# Patient Record
Sex: MALE | Race: BLACK OR AFRICAN AMERICAN | NOT HISPANIC OR LATINO | Employment: OTHER | URBAN - METROPOLITAN AREA
[De-identification: names, ages, dates, MRNs, and addresses within clinical notes are randomized per-mention and may not be internally consistent; named-entity substitution may affect disease eponyms.]

---

## 2013-10-01 LAB — CRC RECOMMENDATION EXT: NORMAL

## 2017-04-17 ENCOUNTER — HISTORICAL (OUTPATIENT)
Dept: ADMINISTRATIVE | Facility: HOSPITAL | Age: 67
End: 2017-04-17

## 2017-05-19 ENCOUNTER — HISTORICAL (OUTPATIENT)
Dept: CARDIOLOGY | Facility: HOSPITAL | Age: 67
End: 2017-05-19

## 2017-05-22 ENCOUNTER — HISTORICAL (OUTPATIENT)
Dept: ADMINISTRATIVE | Facility: HOSPITAL | Age: 67
End: 2017-05-22

## 2017-06-05 ENCOUNTER — HISTORICAL (OUTPATIENT)
Dept: ADMINISTRATIVE | Facility: HOSPITAL | Age: 67
End: 2017-06-05

## 2017-07-17 ENCOUNTER — HISTORICAL (OUTPATIENT)
Dept: ADMINISTRATIVE | Facility: HOSPITAL | Age: 67
End: 2017-07-17

## 2017-07-20 ENCOUNTER — HISTORICAL (OUTPATIENT)
Dept: MEDSURG UNIT | Facility: HOSPITAL | Age: 67
End: 2017-07-20

## 2017-07-21 LAB
ABS NEUT (OLG): 2.61 X10(3)/MCL (ref 2.1–9.2)
BASOPHILS # BLD AUTO: 0.1 X10(3)/MCL (ref 0–0.2)
BASOPHILS NFR BLD AUTO: 1 %
BUN SERPL-MCNC: 9 MG/DL (ref 7–18)
CALCIUM SERPL-MCNC: 8.3 MG/DL (ref 8.5–10.1)
CHLORIDE SERPL-SCNC: 108 MMOL/L (ref 98–107)
CO2 SERPL-SCNC: 29 MMOL/L (ref 21–32)
CREAT SERPL-MCNC: 1.02 MG/DL (ref 0.7–1.3)
EOSINOPHIL # BLD AUTO: 0.3 X10(3)/MCL (ref 0–0.9)
EOSINOPHIL NFR BLD AUTO: 5 %
ERYTHROCYTE [DISTWIDTH] IN BLOOD BY AUTOMATED COUNT: 14.6 % (ref 11.5–17)
GLUCOSE SERPL-MCNC: 129 MG/DL (ref 74–106)
HCT VFR BLD AUTO: 37.5 % (ref 42–52)
HGB BLD-MCNC: 12 GM/DL (ref 14–18)
LYMPHOCYTES # BLD AUTO: 2.2 X10(3)/MCL (ref 0.6–4.6)
LYMPHOCYTES NFR BLD AUTO: 36 %
MCH RBC QN AUTO: 29.1 PG (ref 27–31)
MCHC RBC AUTO-ENTMCNC: 32 GM/DL (ref 33–36)
MCV RBC AUTO: 90.8 FL (ref 80–94)
MONOCYTES # BLD AUTO: 0.8 X10(3)/MCL (ref 0.1–1.3)
MONOCYTES NFR BLD AUTO: 14 %
NEUTROPHILS # BLD AUTO: 2.61 X10(3)/MCL (ref 2.1–9.2)
NEUTROPHILS NFR BLD AUTO: 43 %
PLATELET # BLD AUTO: 315 X10(3)/MCL (ref 130–400)
PMV BLD AUTO: 9.2 FL (ref 9.4–12.4)
POTASSIUM SERPL-SCNC: 4 MMOL/L (ref 3.5–5.1)
RBC # BLD AUTO: 4.13 X10(6)/MCL (ref 4.7–6.1)
SODIUM SERPL-SCNC: 148 MMOL/L (ref 136–145)
WBC # SPEC AUTO: 6.1 X10(3)/MCL (ref 4.5–11.5)

## 2017-09-15 ENCOUNTER — HISTORICAL (OUTPATIENT)
Dept: ADMINISTRATIVE | Facility: HOSPITAL | Age: 67
End: 2017-09-15

## 2017-09-15 LAB
ABS NEUT (OLG): 2.87 X10(3)/MCL (ref 2.1–9.2)
ALBUMIN SERPL-MCNC: 3.4 GM/DL (ref 3.4–5)
ALBUMIN/GLOB SERPL: 1 {RATIO}
ALP SERPL-CCNC: 72 UNIT/L (ref 50–136)
ALT SERPL-CCNC: 16 UNIT/L (ref 12–78)
APPEARANCE, UA: CLEAR
AST SERPL-CCNC: 12 UNIT/L (ref 15–37)
BACTERIA SPEC CULT: ABNORMAL /HPF
BASOPHILS # BLD AUTO: 0.1 X10(3)/MCL (ref 0–0.2)
BASOPHILS NFR BLD AUTO: 2 %
BILIRUB SERPL-MCNC: 0.4 MG/DL (ref 0.2–1)
BILIRUB UR QL STRIP: NEGATIVE
BILIRUBIN DIRECT+TOT PNL SERPL-MCNC: 0.1 MG/DL (ref 0–0.2)
BILIRUBIN DIRECT+TOT PNL SERPL-MCNC: 0.3 MG/DL (ref 0–0.8)
BUN SERPL-MCNC: 14 MG/DL (ref 7–18)
CALCIUM SERPL-MCNC: 9.1 MG/DL (ref 8.5–10.1)
CHLORIDE SERPL-SCNC: 103 MMOL/L (ref 98–107)
CHOLEST SERPL-MCNC: 158 MG/DL (ref 0–200)
CHOLEST/HDLC SERPL: 3 {RATIO} (ref 0–5)
CO2 SERPL-SCNC: 32 MMOL/L (ref 21–32)
COLOR UR: YELLOW
CREAT SERPL-MCNC: 1.2 MG/DL (ref 0.7–1.3)
CREAT UR-MCNC: 249 MG/DL
EOSINOPHIL # BLD AUTO: 0.3 X10(3)/MCL (ref 0–0.9)
EOSINOPHIL NFR BLD AUTO: 6 %
ERYTHROCYTE [DISTWIDTH] IN BLOOD BY AUTOMATED COUNT: 13.1 % (ref 11.5–17)
EST. AVERAGE GLUCOSE BLD GHB EST-MCNC: 166 MG/DL
GLOBULIN SER-MCNC: 3.5 GM/DL (ref 2.4–3.5)
GLUCOSE (UA): ABNORMAL
GLUCOSE SERPL-MCNC: 153 MG/DL (ref 74–106)
HBA1C MFR BLD: 7.4 % (ref 4.2–6.3)
HCT VFR BLD AUTO: 40.5 % (ref 42–52)
HDLC SERPL-MCNC: 53 MG/DL (ref 35–60)
HGB BLD-MCNC: 13.5 GM/DL (ref 14–18)
HGB UR QL STRIP: NEGATIVE
KETONES UR QL STRIP: NEGATIVE
LDLC SERPL CALC-MCNC: 56 MG/DL (ref 0–129)
LEUKOCYTE ESTERASE UR QL STRIP: ABNORMAL
LYMPHOCYTES # BLD AUTO: 2 X10(3)/MCL (ref 0.6–4.6)
LYMPHOCYTES NFR BLD AUTO: 33 %
MCH RBC QN AUTO: 30.4 PG (ref 27–31)
MCHC RBC AUTO-ENTMCNC: 33.3 GM/DL (ref 33–36)
MCV RBC AUTO: 91.2 FL (ref 80–94)
MICROALBUMIN UR-MCNC: 9.1 MG/DL
MICROALBUMIN/CREAT RATIO PNL UR: 36.5 MG/GM CR (ref 0–30)
MONOCYTES # BLD AUTO: 0.7 X10(3)/MCL (ref 0.1–1.3)
MONOCYTES NFR BLD AUTO: 12 %
NEUTROPHILS # BLD AUTO: 2.87 X10(3)/MCL (ref 1.4–7.9)
NEUTROPHILS NFR BLD AUTO: 47 %
NITRITE UR QL STRIP: NEGATIVE
PH UR STRIP: 5.5 [PH] (ref 5–9)
PLATELET # BLD AUTO: 278 X10(3)/MCL (ref 130–400)
PMV BLD AUTO: 9 FL (ref 9.4–12.4)
POTASSIUM SERPL-SCNC: 4.3 MMOL/L (ref 3.5–5.1)
PROT SERPL-MCNC: 6.9 GM/DL (ref 6.4–8.2)
PROT UR QL STRIP: ABNORMAL
RBC # BLD AUTO: 4.44 X10(6)/MCL (ref 4.7–6.1)
RBC #/AREA URNS HPF: ABNORMAL /[HPF]
SODIUM SERPL-SCNC: 141 MMOL/L (ref 136–145)
SP GR UR STRIP: 1.02 (ref 1–1.03)
SQUAMOUS EPITHELIAL, UA: ABNORMAL
TRIGL SERPL-MCNC: 244 MG/DL (ref 30–150)
UROBILINOGEN UR STRIP-ACNC: 0.2
VLDLC SERPL CALC-MCNC: 49 MG/DL
WBC # SPEC AUTO: 6.1 X10(3)/MCL (ref 4.5–11.5)
WBC #/AREA URNS HPF: 19 /HPF (ref 0–3)

## 2018-01-19 ENCOUNTER — HISTORICAL (OUTPATIENT)
Dept: ADMINISTRATIVE | Facility: HOSPITAL | Age: 68
End: 2018-01-19

## 2018-01-19 LAB
ABS NEUT (OLG): 3.26 X10(3)/MCL (ref 2.1–9.2)
ALBUMIN SERPL-MCNC: 3.6 GM/DL (ref 3.4–5)
ALBUMIN/GLOB SERPL: 0.9 {RATIO}
ALP SERPL-CCNC: 75 UNIT/L (ref 50–136)
ALT SERPL-CCNC: 20 UNIT/L (ref 12–78)
APPEARANCE, UA: ABNORMAL
AST SERPL-CCNC: 12 UNIT/L (ref 15–37)
BACTERIA SPEC CULT: ABNORMAL /HPF
BASOPHILS # BLD AUTO: 0.1 X10(3)/MCL (ref 0–0.2)
BASOPHILS NFR BLD AUTO: 1 %
BILIRUB SERPL-MCNC: 0.5 MG/DL (ref 0.2–1)
BILIRUB UR QL STRIP: NEGATIVE
BILIRUBIN DIRECT+TOT PNL SERPL-MCNC: 0.1 MG/DL (ref 0–0.2)
BILIRUBIN DIRECT+TOT PNL SERPL-MCNC: 0.4 MG/DL (ref 0–0.8)
BUN SERPL-MCNC: 10 MG/DL (ref 7–18)
CALCIUM SERPL-MCNC: 8.8 MG/DL (ref 8.5–10.1)
CHLORIDE SERPL-SCNC: 102 MMOL/L (ref 98–107)
CHOLEST SERPL-MCNC: 179 MG/DL (ref 0–200)
CHOLEST/HDLC SERPL: 3.4 {RATIO} (ref 0–5)
CO2 SERPL-SCNC: 29 MMOL/L (ref 21–32)
COLOR UR: YELLOW
CREAT SERPL-MCNC: 1.07 MG/DL (ref 0.7–1.3)
CREAT UR-MCNC: 175 MG/DL
EOSINOPHIL # BLD AUTO: 0.3 X10(3)/MCL (ref 0–0.9)
EOSINOPHIL NFR BLD AUTO: 4 %
ERYTHROCYTE [DISTWIDTH] IN BLOOD BY AUTOMATED COUNT: 12.7 % (ref 11.5–17)
EST. AVERAGE GLUCOSE BLD GHB EST-MCNC: 171 MG/DL
GLOBULIN SER-MCNC: 4.1 GM/DL (ref 2.4–3.5)
GLUCOSE (UA): ABNORMAL
GLUCOSE SERPL-MCNC: 152 MG/DL (ref 74–106)
HBA1C MFR BLD: 7.6 % (ref 4.2–6.3)
HCT VFR BLD AUTO: 42.8 % (ref 42–52)
HDLC SERPL-MCNC: 52 MG/DL (ref 35–60)
HGB BLD-MCNC: 13.8 GM/DL (ref 14–18)
HGB UR QL STRIP: NEGATIVE
KETONES UR QL STRIP: NEGATIVE
LDLC SERPL CALC-MCNC: 81 MG/DL (ref 0–129)
LEUKOCYTE ESTERASE UR QL STRIP: ABNORMAL
LYMPHOCYTES # BLD AUTO: 1.9 X10(3)/MCL (ref 0.6–4.6)
LYMPHOCYTES NFR BLD AUTO: 30 %
MCH RBC QN AUTO: 29.6 PG (ref 27–31)
MCHC RBC AUTO-ENTMCNC: 32.2 GM/DL (ref 33–36)
MCV RBC AUTO: 91.6 FL (ref 80–94)
MICROALBUMIN UR-MCNC: 6.6 MG/DL
MICROALBUMIN/CREAT RATIO PNL UR: 37.7 MG/GM CR (ref 0–30)
MONOCYTES # BLD AUTO: 0.7 X10(3)/MCL (ref 0.1–1.3)
MONOCYTES NFR BLD AUTO: 11 %
NEUTROPHILS # BLD AUTO: 3.26 X10(3)/MCL (ref 1.4–7.9)
NEUTROPHILS NFR BLD AUTO: 52 %
NITRITE UR QL STRIP: POSITIVE
PH UR STRIP: 7 [PH] (ref 5–9)
PLATELET # BLD AUTO: 293 X10(3)/MCL (ref 130–400)
PMV BLD AUTO: 8.6 FL (ref 9.4–12.4)
POTASSIUM SERPL-SCNC: 4.3 MMOL/L (ref 3.5–5.1)
PROT SERPL-MCNC: 7.7 GM/DL (ref 6.4–8.2)
PROT UR QL STRIP: ABNORMAL
RBC # BLD AUTO: 4.67 X10(6)/MCL (ref 4.7–6.1)
RBC #/AREA URNS HPF: ABNORMAL /[HPF]
SODIUM SERPL-SCNC: 140 MMOL/L (ref 136–145)
SP GR UR STRIP: 1.02 (ref 1–1.03)
SQUAMOUS EPITHELIAL, UA: ABNORMAL
TRIGL SERPL-MCNC: 228 MG/DL (ref 30–150)
UA WBC MAN: ABNORMAL /HPF
UROBILINOGEN UR STRIP-ACNC: 0.2
VLDLC SERPL CALC-MCNC: 46 MG/DL
WBC # SPEC AUTO: 6.2 X10(3)/MCL (ref 4.5–11.5)
WBC #/AREA URNS HPF: 174 /HPF (ref 0–3)

## 2018-07-23 ENCOUNTER — HISTORICAL (OUTPATIENT)
Dept: ADMINISTRATIVE | Facility: HOSPITAL | Age: 68
End: 2018-07-23

## 2018-07-23 LAB
ABS NEUT (OLG): 3.44 X10(3)/MCL (ref 2.1–9.2)
ALBUMIN SERPL-MCNC: 3.3 GM/DL (ref 3.4–5)
ALBUMIN/GLOB SERPL: 0.8 RATIO (ref 1.1–2)
ALP SERPL-CCNC: 76 UNIT/L (ref 50–136)
ALT SERPL-CCNC: 24 UNIT/L (ref 12–78)
APPEARANCE, UA: CLEAR
AST SERPL-CCNC: 17 UNIT/L (ref 15–37)
BACTERIA SPEC CULT: ABNORMAL /HPF
BASOPHILS # BLD AUTO: 0.1 X10(3)/MCL (ref 0–0.2)
BASOPHILS NFR BLD AUTO: 1 %
BILIRUB SERPL-MCNC: 0.6 MG/DL (ref 0.2–1)
BILIRUB UR QL STRIP: NEGATIVE
BILIRUBIN DIRECT+TOT PNL SERPL-MCNC: 0.1 MG/DL (ref 0–0.5)
BILIRUBIN DIRECT+TOT PNL SERPL-MCNC: 0.5 MG/DL (ref 0–0.8)
BUN SERPL-MCNC: 10 MG/DL (ref 7–18)
CALCIUM SERPL-MCNC: 9.1 MG/DL (ref 8.5–10.1)
CHLORIDE SERPL-SCNC: 104 MMOL/L (ref 98–107)
CHOLEST SERPL-MCNC: 176 MG/DL (ref 0–200)
CHOLEST/HDLC SERPL: 3.5 {RATIO} (ref 0–5)
CO2 SERPL-SCNC: 33 MMOL/L (ref 21–32)
COLOR UR: YELLOW
CREAT SERPL-MCNC: 1.12 MG/DL (ref 0.7–1.3)
CREAT UR-MCNC: 264 MG/DL
EOSINOPHIL # BLD AUTO: 0.4 X10(3)/MCL (ref 0–0.9)
EOSINOPHIL NFR BLD AUTO: 5 %
ERYTHROCYTE [DISTWIDTH] IN BLOOD BY AUTOMATED COUNT: 13.2 % (ref 11.5–17)
EST. AVERAGE GLUCOSE BLD GHB EST-MCNC: 171 MG/DL
GLOBULIN SER-MCNC: 4.1 GM/DL (ref 2.4–3.5)
GLUCOSE (UA): ABNORMAL
GLUCOSE SERPL-MCNC: 168 MG/DL (ref 74–106)
HBA1C MFR BLD: 7.6 % (ref 4.2–6.3)
HCT VFR BLD AUTO: 43.7 % (ref 42–52)
HDLC SERPL-MCNC: 50 MG/DL (ref 35–60)
HGB BLD-MCNC: 13.6 GM/DL (ref 14–18)
HGB UR QL STRIP: NEGATIVE
KETONES UR QL STRIP: ABNORMAL
LDLC SERPL CALC-MCNC: 68 MG/DL (ref 0–129)
LEUKOCYTE ESTERASE UR QL STRIP: ABNORMAL
LYMPHOCYTES # BLD AUTO: 2.2 X10(3)/MCL (ref 0.6–4.6)
LYMPHOCYTES NFR BLD AUTO: 32 %
MCH RBC QN AUTO: 29.2 PG (ref 27–31)
MCHC RBC AUTO-ENTMCNC: 31.1 GM/DL (ref 33–36)
MCV RBC AUTO: 93.8 FL (ref 80–94)
MICROALBUMIN UR-MCNC: 7.3 MG/DL
MICROALBUMIN/CREAT RATIO PNL UR: 27.7 MG/GM CR (ref 0–30)
MONOCYTES # BLD AUTO: 0.8 X10(3)/MCL (ref 0.1–1.3)
MONOCYTES NFR BLD AUTO: 11 %
NEUTROPHILS # BLD AUTO: 3.44 X10(3)/MCL (ref 2.1–9.2)
NEUTROPHILS NFR BLD AUTO: 50 %
NITRITE UR QL STRIP: POSITIVE
PH UR STRIP: 7 [PH] (ref 5–9)
PLATELET # BLD AUTO: 333 X10(3)/MCL (ref 130–400)
PMV BLD AUTO: 8.9 FL (ref 9.4–12.4)
POTASSIUM SERPL-SCNC: 4.4 MMOL/L (ref 3.5–5.1)
PROT SERPL-MCNC: 7.4 GM/DL (ref 6.4–8.2)
PROT UR QL STRIP: ABNORMAL
RBC # BLD AUTO: 4.66 X10(6)/MCL (ref 4.7–6.1)
RBC #/AREA URNS HPF: ABNORMAL /[HPF]
SODIUM SERPL-SCNC: 142 MMOL/L (ref 136–145)
SP GR UR STRIP: 1.02 (ref 1–1.03)
SQUAMOUS EPITHELIAL, UA: ABNORMAL
TRIGL SERPL-MCNC: 292 MG/DL (ref 30–150)
UROBILINOGEN UR STRIP-ACNC: 1
VLDLC SERPL CALC-MCNC: 58 MG/DL
WBC # SPEC AUTO: 6.9 X10(3)/MCL (ref 4.5–11.5)
WBC #/AREA URNS HPF: 31 /HPF (ref 0–3)

## 2019-02-01 ENCOUNTER — HISTORICAL (OUTPATIENT)
Dept: ADMINISTRATIVE | Facility: HOSPITAL | Age: 69
End: 2019-02-01

## 2019-02-01 LAB
ABS NEUT (OLG): 2.56 X10(3)/MCL (ref 2.1–9.2)
ALBUMIN SERPL-MCNC: 3.5 GM/DL (ref 3.4–5)
ALBUMIN/GLOB SERPL: 0.9 {RATIO}
ALP SERPL-CCNC: 64 UNIT/L (ref 50–136)
ALT SERPL-CCNC: 25 UNIT/L (ref 12–78)
APPEARANCE, UA: CLEAR
AST SERPL-CCNC: 15 UNIT/L (ref 15–37)
BACTERIA SPEC CULT: ABNORMAL /HPF
BASOPHILS # BLD AUTO: 0.1 X10(3)/MCL (ref 0–0.2)
BASOPHILS NFR BLD AUTO: 1 %
BILIRUB SERPL-MCNC: 0.5 MG/DL (ref 0.2–1)
BILIRUB UR QL STRIP: NEGATIVE
BILIRUBIN DIRECT+TOT PNL SERPL-MCNC: 0.1 MG/DL (ref 0–0.2)
BILIRUBIN DIRECT+TOT PNL SERPL-MCNC: 0.4 MG/DL (ref 0–0.8)
BUN SERPL-MCNC: 12 MG/DL (ref 7–18)
CALCIUM SERPL-MCNC: 9.3 MG/DL (ref 8.5–10.1)
CHLORIDE SERPL-SCNC: 105 MMOL/L (ref 98–107)
CHOLEST SERPL-MCNC: 158 MG/DL (ref 0–200)
CHOLEST/HDLC SERPL: 3 {RATIO} (ref 0–5)
CO2 SERPL-SCNC: 32 MMOL/L (ref 21–32)
COLOR UR: YELLOW
CREAT SERPL-MCNC: 1.1 MG/DL (ref 0.7–1.3)
CREAT UR-MCNC: 245 MG/DL
EOSINOPHIL # BLD AUTO: 0.2 X10(3)/MCL (ref 0–0.9)
EOSINOPHIL NFR BLD AUTO: 4 %
ERYTHROCYTE [DISTWIDTH] IN BLOOD BY AUTOMATED COUNT: 13.5 % (ref 11.5–17)
EST. AVERAGE GLUCOSE BLD GHB EST-MCNC: 183 MG/DL
GLOBULIN SER-MCNC: 3.7 GM/DL (ref 2.4–3.5)
GLUCOSE (UA): ABNORMAL
GLUCOSE SERPL-MCNC: 131 MG/DL (ref 74–106)
HBA1C MFR BLD: 8 % (ref 4.2–6.3)
HCT VFR BLD AUTO: 44.3 % (ref 42–52)
HDLC SERPL-MCNC: 53 MG/DL (ref 35–60)
HGB BLD-MCNC: 13.9 GM/DL (ref 14–18)
HGB UR QL STRIP: NEGATIVE
KETONES UR QL STRIP: NEGATIVE
LDLC SERPL CALC-MCNC: 64 MG/DL (ref 0–129)
LEUKOCYTE ESTERASE UR QL STRIP: ABNORMAL
LYMPHOCYTES # BLD AUTO: 2.2 X10(3)/MCL (ref 0.6–4.6)
LYMPHOCYTES NFR BLD AUTO: 38 %
MCH RBC QN AUTO: 29.4 PG (ref 27–31)
MCHC RBC AUTO-ENTMCNC: 31.4 GM/DL (ref 33–36)
MCV RBC AUTO: 93.7 FL (ref 80–94)
MICROALBUMIN UR-MCNC: 5 MG/DL
MICROALBUMIN/CREAT RATIO PNL UR: 20.4 MG/GM CR (ref 0–30)
MONOCYTES # BLD AUTO: 0.6 X10(3)/MCL (ref 0.1–1.3)
MONOCYTES NFR BLD AUTO: 11 %
NEUTROPHILS # BLD AUTO: 2.56 X10(3)/MCL (ref 2.1–9.2)
NEUTROPHILS NFR BLD AUTO: 45 %
NITRITE UR QL STRIP: NEGATIVE
PH UR STRIP: 7 [PH] (ref 5–9)
PLATELET # BLD AUTO: 339 X10(3)/MCL (ref 130–400)
PMV BLD AUTO: 8.7 FL (ref 9.4–12.4)
POTASSIUM SERPL-SCNC: 4.8 MMOL/L (ref 3.5–5.1)
PROT SERPL-MCNC: 7.2 GM/DL (ref 6.4–8.2)
PROT UR QL STRIP: ABNORMAL
RBC # BLD AUTO: 4.73 X10(6)/MCL (ref 4.7–6.1)
RBC #/AREA URNS HPF: ABNORMAL /[HPF]
SODIUM SERPL-SCNC: 143 MMOL/L (ref 136–145)
SP GR UR STRIP: 1.02 (ref 1–1.03)
SQUAMOUS EPITHELIAL, UA: ABNORMAL
TRIGL SERPL-MCNC: 206 MG/DL (ref 30–150)
UROBILINOGEN UR STRIP-ACNC: 1
VLDLC SERPL CALC-MCNC: 41 MG/DL
WBC # SPEC AUTO: 5.8 X10(3)/MCL (ref 4.5–11.5)
WBC #/AREA URNS HPF: 5 /HPF (ref 0–3)

## 2019-08-09 ENCOUNTER — HISTORICAL (OUTPATIENT)
Dept: ADMINISTRATIVE | Facility: HOSPITAL | Age: 69
End: 2019-08-09

## 2019-08-09 LAB
ABS NEUT (OLG): 3.16 X10(3)/MCL (ref 2.1–9.2)
ALBUMIN SERPL-MCNC: 3.3 GM/DL (ref 3.4–5)
ALBUMIN/GLOB SERPL: 0.9 {RATIO}
ALP SERPL-CCNC: 65 UNIT/L (ref 50–136)
ALT SERPL-CCNC: 30 UNIT/L (ref 12–78)
APPEARANCE, UA: CLEAR
AST SERPL-CCNC: 20 UNIT/L (ref 15–37)
BACTERIA SPEC CULT: ABNORMAL /HPF
BASOPHILS # BLD AUTO: 0.1 X10(3)/MCL (ref 0–0.2)
BASOPHILS NFR BLD AUTO: 1 %
BILIRUB SERPL-MCNC: 0.5 MG/DL (ref 0.2–1)
BILIRUB UR QL STRIP: NEGATIVE
BILIRUBIN DIRECT+TOT PNL SERPL-MCNC: 0.2 MG/DL (ref 0–0.2)
BILIRUBIN DIRECT+TOT PNL SERPL-MCNC: 0.3 MG/DL (ref 0–0.8)
BUN SERPL-MCNC: 14 MG/DL (ref 7–18)
CALCIUM SERPL-MCNC: 9.1 MG/DL (ref 8.5–10.1)
CHLORIDE SERPL-SCNC: 105 MMOL/L (ref 98–107)
CHOLEST SERPL-MCNC: 153 MG/DL (ref 0–200)
CHOLEST/HDLC SERPL: 3.1 {RATIO} (ref 0–5)
CO2 SERPL-SCNC: 29 MMOL/L (ref 21–32)
COLOR UR: YELLOW
CREAT SERPL-MCNC: 1.08 MG/DL (ref 0.7–1.3)
EOSINOPHIL # BLD AUTO: 0.5 X10(3)/MCL (ref 0–0.9)
EOSINOPHIL NFR BLD AUTO: 7 %
ERYTHROCYTE [DISTWIDTH] IN BLOOD BY AUTOMATED COUNT: 13.4 % (ref 11.5–17)
GLOBULIN SER-MCNC: 3.6 GM/DL (ref 2.4–3.5)
GLUCOSE (UA): NEGATIVE
GLUCOSE SERPL-MCNC: 114 MG/DL (ref 74–106)
HCT VFR BLD AUTO: 42.6 % (ref 42–52)
HDLC SERPL-MCNC: 50 MG/DL (ref 35–60)
HGB BLD-MCNC: 13.4 GM/DL (ref 14–18)
HGB UR QL STRIP: NEGATIVE
KETONES UR QL STRIP: NEGATIVE
LDLC SERPL CALC-MCNC: 67 MG/DL (ref 0–129)
LEUKOCYTE ESTERASE UR QL STRIP: ABNORMAL
LYMPHOCYTES # BLD AUTO: 2 X10(3)/MCL (ref 0.6–4.6)
LYMPHOCYTES NFR BLD AUTO: 32 %
MCH RBC QN AUTO: 29.1 PG (ref 27–31)
MCHC RBC AUTO-ENTMCNC: 31.5 GM/DL (ref 33–36)
MCV RBC AUTO: 92.6 FL (ref 80–94)
MONOCYTES # BLD AUTO: 0.6 X10(3)/MCL (ref 0.1–1.3)
MONOCYTES NFR BLD AUTO: 10 %
NEUTROPHILS # BLD AUTO: 3.16 X10(3)/MCL (ref 2.1–9.2)
NEUTROPHILS NFR BLD AUTO: 50 %
NITRITE UR QL STRIP: POSITIVE
PH UR STRIP: 7 [PH] (ref 5–9)
PLATELET # BLD AUTO: 316 X10(3)/MCL (ref 130–400)
PMV BLD AUTO: 8.4 FL (ref 9.4–12.4)
POTASSIUM SERPL-SCNC: 4.2 MMOL/L (ref 3.5–5.1)
PROT SERPL-MCNC: 6.9 GM/DL (ref 6.4–8.2)
PROT UR QL STRIP: NEGATIVE
RBC # BLD AUTO: 4.6 X10(6)/MCL (ref 4.7–6.1)
RBC #/AREA URNS HPF: ABNORMAL /[HPF]
SODIUM SERPL-SCNC: 140 MMOL/L (ref 136–145)
SP GR UR STRIP: 1.02 (ref 1–1.03)
SQUAMOUS EPITHELIAL, UA: ABNORMAL
TRIGL SERPL-MCNC: 180 MG/DL (ref 30–150)
UROBILINOGEN UR STRIP-ACNC: 0.2
VLDLC SERPL CALC-MCNC: 36 MG/DL
WBC # SPEC AUTO: 6.3 X10(3)/MCL (ref 4.5–11.5)
WBC #/AREA URNS HPF: 13 /HPF (ref 0–3)

## 2020-02-17 ENCOUNTER — HISTORICAL (OUTPATIENT)
Dept: ADMINISTRATIVE | Facility: HOSPITAL | Age: 70
End: 2020-02-17

## 2020-02-17 LAB
ABS NEUT (OLG): 3.25 X10(3)/MCL (ref 2.1–9.2)
ALBUMIN SERPL-MCNC: 3.6 GM/DL (ref 3.4–5)
ALBUMIN/GLOB SERPL: 1 {RATIO}
ALP SERPL-CCNC: 66 UNIT/L (ref 50–136)
ALT SERPL-CCNC: 21 UNIT/L (ref 12–78)
APPEARANCE, UA: CLEAR
AST SERPL-CCNC: 18 UNIT/L (ref 15–37)
BACTERIA SPEC CULT: ABNORMAL /HPF
BASOPHILS # BLD AUTO: 0.1 X10(3)/MCL (ref 0–0.2)
BASOPHILS NFR BLD AUTO: 1 %
BILIRUB SERPL-MCNC: 0.9 MG/DL (ref 0.2–1)
BILIRUB UR QL STRIP: NEGATIVE
BILIRUBIN DIRECT+TOT PNL SERPL-MCNC: 0.1 MG/DL (ref 0–0.2)
BILIRUBIN DIRECT+TOT PNL SERPL-MCNC: 0.8 MG/DL (ref 0–0.8)
BUN SERPL-MCNC: 11 MG/DL (ref 7–18)
CALCIUM SERPL-MCNC: 9.3 MG/DL (ref 8.5–10.1)
CHLORIDE SERPL-SCNC: 104 MMOL/L (ref 98–107)
CHOLEST SERPL-MCNC: 152 MG/DL (ref 0–200)
CHOLEST/HDLC SERPL: 3 {RATIO} (ref 0–5)
CO2 SERPL-SCNC: 32 MMOL/L (ref 21–32)
COLOR UR: YELLOW
CREAT SERPL-MCNC: 1.05 MG/DL (ref 0.7–1.3)
CREAT UR-MCNC: 196 MG/DL
EOSINOPHIL # BLD AUTO: 0.2 X10(3)/MCL (ref 0–0.9)
EOSINOPHIL NFR BLD AUTO: 4 %
ERYTHROCYTE [DISTWIDTH] IN BLOOD BY AUTOMATED COUNT: 14 % (ref 11.5–17)
EST. AVERAGE GLUCOSE BLD GHB EST-MCNC: 140 MG/DL
GLOBULIN SER-MCNC: 3.6 GM/DL (ref 2.4–3.5)
GLUCOSE (UA): NEGATIVE
GLUCOSE SERPL-MCNC: 105 MG/DL (ref 74–106)
HBA1C MFR BLD: 6.5 % (ref 4.2–6.3)
HCT VFR BLD AUTO: 44 % (ref 42–52)
HDLC SERPL-MCNC: 50 MG/DL (ref 35–60)
HGB BLD-MCNC: 13.8 GM/DL (ref 14–18)
HGB UR QL STRIP: NEGATIVE
KETONES UR QL STRIP: NEGATIVE
LDLC SERPL CALC-MCNC: 72 MG/DL (ref 0–129)
LEUKOCYTE ESTERASE UR QL STRIP: ABNORMAL
LYMPHOCYTES # BLD AUTO: 1.7 X10(3)/MCL (ref 0.6–4.6)
LYMPHOCYTES NFR BLD AUTO: 28 %
MCH RBC QN AUTO: 30.3 PG (ref 27–31)
MCHC RBC AUTO-ENTMCNC: 31.4 GM/DL (ref 33–36)
MCV RBC AUTO: 96.5 FL (ref 80–94)
MICROALBUMIN UR-MCNC: 5.2 MG/DL
MICROALBUMIN/CREAT RATIO PNL UR: 26.5 MG/GM CR (ref 0–30)
MONOCYTES # BLD AUTO: 0.8 X10(3)/MCL (ref 0.1–1.3)
MONOCYTES NFR BLD AUTO: 13 %
NEUTROPHILS # BLD AUTO: 3.25 X10(3)/MCL (ref 2.1–9.2)
NEUTROPHILS NFR BLD AUTO: 54 %
NITRITE UR QL STRIP: POSITIVE
PH UR STRIP: 6.5 [PH] (ref 5–9)
PLATELET # BLD AUTO: 349 X10(3)/MCL (ref 130–400)
PMV BLD AUTO: 8.7 FL (ref 9.4–12.4)
POTASSIUM SERPL-SCNC: 4.5 MMOL/L (ref 3.5–5.1)
PROT SERPL-MCNC: 7.2 GM/DL (ref 6.4–8.2)
PROT UR QL STRIP: ABNORMAL
PSA SERPL-MCNC: 0.6 NG/ML (ref 0–4)
RBC # BLD AUTO: 4.56 X10(6)/MCL (ref 4.7–6.1)
RBC #/AREA URNS HPF: ABNORMAL /[HPF]
SODIUM SERPL-SCNC: 141 MMOL/L (ref 136–145)
SP GR UR STRIP: 1.02 (ref 1–1.03)
SQUAMOUS EPITHELIAL, UA: ABNORMAL
TRIGL SERPL-MCNC: 148 MG/DL (ref 30–150)
UROBILINOGEN UR STRIP-ACNC: 1
VLDLC SERPL CALC-MCNC: 30 MG/DL
WBC # SPEC AUTO: 6 X10(3)/MCL (ref 4.5–11.5)
WBC #/AREA URNS HPF: 22 /HPF (ref 0–3)

## 2020-08-17 ENCOUNTER — HISTORICAL (OUTPATIENT)
Dept: ADMINISTRATIVE | Facility: HOSPITAL | Age: 70
End: 2020-08-17

## 2020-08-17 LAB
ABS NEUT (OLG): 3.07 X10(3)/MCL (ref 2.1–9.2)
ALBUMIN SERPL-MCNC: 3.5 GM/DL (ref 3.4–5)
ALBUMIN/GLOB SERPL: 1.1 RATIO (ref 1.1–2)
ALP SERPL-CCNC: 61 UNIT/L (ref 40–150)
ALT SERPL-CCNC: 13 UNIT/L (ref 0–55)
APPEARANCE, UA: CLEAR
AST SERPL-CCNC: 15 UNIT/L (ref 5–34)
BACTERIA SPEC CULT: ABNORMAL /HPF
BASOPHILS # BLD AUTO: 0.1 X10(3)/MCL (ref 0–0.2)
BASOPHILS NFR BLD AUTO: 1 %
BILIRUB SERPL-MCNC: 0.6 MG/DL
BILIRUB UR QL STRIP: NEGATIVE
BILIRUBIN DIRECT+TOT PNL SERPL-MCNC: 0.3 MG/DL (ref 0–0.5)
BILIRUBIN DIRECT+TOT PNL SERPL-MCNC: 0.3 MG/DL (ref 0–0.8)
BUN SERPL-MCNC: 9.3 MG/DL (ref 8.4–25.7)
CALCIUM SERPL-MCNC: 8.5 MG/DL (ref 8.8–10)
CHLORIDE SERPL-SCNC: 103 MMOL/L (ref 98–107)
CHOLEST SERPL-MCNC: 136 MG/DL
CHOLEST/HDLC SERPL: 3 {RATIO} (ref 0–5)
CO2 SERPL-SCNC: 31 MMOL/L (ref 23–31)
COLOR UR: YELLOW
CREAT SERPL-MCNC: 1.03 MG/DL (ref 0.73–1.18)
CREAT UR-MCNC: 258 MG/DL (ref 58–161)
EOSINOPHIL # BLD AUTO: 0.2 X10(3)/MCL (ref 0–0.9)
EOSINOPHIL NFR BLD AUTO: 4 %
ERYTHROCYTE [DISTWIDTH] IN BLOOD BY AUTOMATED COUNT: 13.4 % (ref 11.5–17)
EST. AVERAGE GLUCOSE BLD GHB EST-MCNC: 125.5 MG/DL
GLOBULIN SER-MCNC: 3.2 GM/DL (ref 2.4–3.5)
GLUCOSE (UA): NEGATIVE
GLUCOSE SERPL-MCNC: 110 MG/DL (ref 82–115)
HBA1C MFR BLD: 6 %
HCT VFR BLD AUTO: 43.5 % (ref 42–52)
HDLC SERPL-MCNC: 43 MG/DL (ref 35–60)
HGB BLD-MCNC: 14.1 GM/DL (ref 14–18)
HGB UR QL STRIP: NEGATIVE
KETONES UR QL STRIP: ABNORMAL
LDLC SERPL CALC-MCNC: 70 MG/DL (ref 50–140)
LEUKOCYTE ESTERASE UR QL STRIP: ABNORMAL
LYMPHOCYTES # BLD AUTO: 1.8 X10(3)/MCL (ref 0.6–4.6)
LYMPHOCYTES NFR BLD AUTO: 30 %
MCH RBC QN AUTO: 31.1 PG (ref 27–31)
MCHC RBC AUTO-ENTMCNC: 32.4 GM/DL (ref 33–36)
MCV RBC AUTO: 95.8 FL (ref 80–94)
MICROALBUMIN UR-MCNC: 27.6 UG/ML
MICROALBUMIN/CREAT RATIO PNL UR: 10.7 MG/GM CR (ref 0–30)
MONOCYTES # BLD AUTO: 0.7 X10(3)/MCL (ref 0.1–1.3)
MONOCYTES NFR BLD AUTO: 12 %
NEUTROPHILS # BLD AUTO: 3.07 X10(3)/MCL (ref 2.1–9.2)
NEUTROPHILS NFR BLD AUTO: 52 %
NITRITE UR QL STRIP: POSITIVE
PH UR STRIP: 5.5 [PH] (ref 5–9)
PLATELET # BLD AUTO: 321 X10(3)/MCL (ref 130–400)
PMV BLD AUTO: 8.4 FL (ref 9.4–12.4)
POTASSIUM SERPL-SCNC: 4.5 MMOL/L (ref 3.5–5.1)
PROT SERPL-MCNC: 6.7 GM/DL (ref 5.8–7.6)
PROT UR QL STRIP: ABNORMAL
RBC # BLD AUTO: 4.54 X10(6)/MCL (ref 4.7–6.1)
RBC #/AREA URNS HPF: ABNORMAL /HPF (ref 0–2)
SODIUM SERPL-SCNC: 140 MMOL/L (ref 136–145)
SP GR UR STRIP: 1.02 (ref 1–1.03)
SQUAMOUS EPITHELIAL, UA: ABNORMAL
TRIGL SERPL-MCNC: 115 MG/DL (ref 34–140)
UROBILINOGEN UR STRIP-ACNC: 0.2
VLDLC SERPL CALC-MCNC: 23 MG/DL
WBC # SPEC AUTO: 5.9 X10(3)/MCL (ref 4.5–11.5)
WBC #/AREA URNS HPF: 81 /HPF (ref 0–3)

## 2021-02-22 ENCOUNTER — HISTORICAL (OUTPATIENT)
Dept: ADMINISTRATIVE | Facility: HOSPITAL | Age: 71
End: 2021-02-22

## 2021-02-22 LAB
ABS NEUT (OLG): 2.75 X10(3)/MCL (ref 2.1–9.2)
ALBUMIN SERPL-MCNC: 3.7 GM/DL (ref 3.4–4.8)
ALBUMIN/GLOB SERPL: 1.1 RATIO (ref 1.1–2)
ALP SERPL-CCNC: 63 UNIT/L (ref 40–150)
ALT SERPL-CCNC: 11 UNIT/L (ref 0–55)
APPEARANCE, UA: CLEAR
AST SERPL-CCNC: 13 UNIT/L (ref 5–34)
BACTERIA SPEC CULT: ABNORMAL /HPF
BASOPHILS # BLD AUTO: 0.1 X10(3)/MCL (ref 0–0.2)
BASOPHILS NFR BLD AUTO: 2 %
BILIRUB SERPL-MCNC: 0.7 MG/DL
BILIRUB UR QL STRIP: NEGATIVE
BILIRUBIN DIRECT+TOT PNL SERPL-MCNC: 0.3 MG/DL (ref 0–0.5)
BILIRUBIN DIRECT+TOT PNL SERPL-MCNC: 0.4 MG/DL (ref 0–0.8)
BUN SERPL-MCNC: 10.1 MG/DL (ref 8.4–25.7)
CALCIUM SERPL-MCNC: 9 MG/DL (ref 8.8–10)
CHLORIDE SERPL-SCNC: 104 MMOL/L (ref 98–107)
CHOLEST SERPL-MCNC: 132 MG/DL
CHOLEST/HDLC SERPL: 3 {RATIO} (ref 0–5)
CO2 SERPL-SCNC: 31 MMOL/L (ref 23–31)
COLOR UR: YELLOW
CREAT SERPL-MCNC: 1.04 MG/DL (ref 0.73–1.18)
CREAT UR-MCNC: 169.2 MG/DL (ref 58–161)
EOSINOPHIL # BLD AUTO: 0.2 X10(3)/MCL (ref 0–0.9)
EOSINOPHIL NFR BLD AUTO: 4 %
ERYTHROCYTE [DISTWIDTH] IN BLOOD BY AUTOMATED COUNT: 13.9 % (ref 11.5–17)
EST. AVERAGE GLUCOSE BLD GHB EST-MCNC: 116.9 MG/DL
GLOBULIN SER-MCNC: 3.3 GM/DL (ref 2.4–3.5)
GLUCOSE (UA): ABNORMAL
GLUCOSE SERPL-MCNC: 87 MG/DL (ref 82–115)
HBA1C MFR BLD: 5.7 %
HCT VFR BLD AUTO: 45.4 % (ref 42–52)
HDLC SERPL-MCNC: 45 MG/DL (ref 35–60)
HGB BLD-MCNC: 14.1 GM/DL (ref 14–18)
HGB UR QL STRIP: NEGATIVE
KETONES UR QL STRIP: NEGATIVE
LDLC SERPL CALC-MCNC: 61 MG/DL (ref 50–140)
LEUKOCYTE ESTERASE UR QL STRIP: ABNORMAL
LYMPHOCYTES # BLD AUTO: 1.9 X10(3)/MCL (ref 0.6–4.6)
LYMPHOCYTES NFR BLD AUTO: 34 %
MCH RBC QN AUTO: 30.5 PG (ref 27–31)
MCHC RBC AUTO-ENTMCNC: 31.1 GM/DL (ref 33–36)
MCV RBC AUTO: 98.3 FL (ref 80–94)
MICROALBUMIN UR-MCNC: 17 UG/ML
MICROALBUMIN/CREAT RATIO PNL UR: 10 MG/GM CR (ref 0–30)
MONOCYTES # BLD AUTO: 0.6 X10(3)/MCL (ref 0.1–1.3)
MONOCYTES NFR BLD AUTO: 11 %
NEUTROPHILS # BLD AUTO: 2.75 X10(3)/MCL (ref 2.1–9.2)
NEUTROPHILS NFR BLD AUTO: 49 %
NITRITE UR QL STRIP: NEGATIVE
PH UR STRIP: 7 [PH] (ref 5–9)
PLATELET # BLD AUTO: 364 X10(3)/MCL (ref 130–400)
PMV BLD AUTO: 9 FL (ref 9.4–12.4)
POTASSIUM SERPL-SCNC: 5.1 MMOL/L (ref 3.5–5.1)
PROT SERPL-MCNC: 7 GM/DL (ref 5.8–7.6)
PROT UR QL STRIP: NEGATIVE
RBC # BLD AUTO: 4.62 X10(6)/MCL (ref 4.7–6.1)
RBC #/AREA URNS HPF: ABNORMAL /[HPF]
SODIUM SERPL-SCNC: 143 MMOL/L (ref 136–145)
SP GR UR STRIP: 1.02 (ref 1–1.03)
SQUAMOUS EPITHELIAL, UA: ABNORMAL
TRIGL SERPL-MCNC: 130 MG/DL (ref 34–140)
UROBILINOGEN UR STRIP-ACNC: 0.2
VLDLC SERPL CALC-MCNC: 26 MG/DL
WBC # SPEC AUTO: 5.6 X10(3)/MCL (ref 4.5–11.5)
WBC #/AREA URNS HPF: 8 /HPF (ref 0–3)

## 2021-05-12 LAB
LEFT EYE DM RETINOPATHY: NEGATIVE
RIGHT EYE DM RETINOPATHY: NEGATIVE

## 2021-08-06 ENCOUNTER — HISTORICAL (OUTPATIENT)
Dept: ADMINISTRATIVE | Facility: HOSPITAL | Age: 71
End: 2021-08-06

## 2021-08-06 LAB
ABS NEUT (OLG): 3.63 X10(3)/MCL (ref 2.1–9.2)
ALBUMIN SERPL-MCNC: 3.6 GM/DL (ref 3.4–4.8)
ALBUMIN/GLOB SERPL: 1.1 RATIO (ref 1.1–2)
ALP SERPL-CCNC: 60 UNIT/L (ref 40–150)
ALT SERPL-CCNC: 9 UNIT/L (ref 0–55)
APPEARANCE, UA: CLEAR
AST SERPL-CCNC: 15 UNIT/L (ref 5–34)
BACTERIA SPEC CULT: ABNORMAL /HPF
BASOPHILS # BLD AUTO: 0.1 X10(3)/MCL (ref 0–0.2)
BASOPHILS NFR BLD AUTO: 1 %
BILIRUB SERPL-MCNC: 0.8 MG/DL
BILIRUB UR QL STRIP: NEGATIVE
BILIRUBIN DIRECT+TOT PNL SERPL-MCNC: 0.3 MG/DL (ref 0–0.5)
BILIRUBIN DIRECT+TOT PNL SERPL-MCNC: 0.5 MG/DL (ref 0–0.8)
BUN SERPL-MCNC: 9.2 MG/DL (ref 8.4–25.7)
CALCIUM SERPL-MCNC: 9.1 MG/DL (ref 8.8–10)
CHLORIDE SERPL-SCNC: 104 MMOL/L (ref 98–107)
CHOLEST SERPL-MCNC: 148 MG/DL
CHOLEST/HDLC SERPL: 3 {RATIO} (ref 0–5)
CO2 SERPL-SCNC: 27 MMOL/L (ref 23–31)
COLOR UR: YELLOW
CREAT SERPL-MCNC: 0.96 MG/DL (ref 0.73–1.18)
CREAT UR-MCNC: 265.4 MG/DL (ref 58–161)
EOSINOPHIL # BLD AUTO: 0.2 X10(3)/MCL (ref 0–0.9)
EOSINOPHIL NFR BLD AUTO: 3 %
ERYTHROCYTE [DISTWIDTH] IN BLOOD BY AUTOMATED COUNT: 14 % (ref 11.5–17)
EST. AVERAGE GLUCOSE BLD GHB EST-MCNC: 125.5 MG/DL
GLOBULIN SER-MCNC: 3.3 GM/DL (ref 2.4–3.5)
GLUCOSE (UA): NEGATIVE
GLUCOSE SERPL-MCNC: 83 MG/DL (ref 82–115)
HBA1C MFR BLD: 6 %
HCT VFR BLD AUTO: 42.4 % (ref 42–52)
HDLC SERPL-MCNC: 46 MG/DL (ref 35–60)
HGB BLD-MCNC: 13.8 GM/DL (ref 14–18)
HGB UR QL STRIP: NEGATIVE
KETONES UR QL STRIP: ABNORMAL
LDLC SERPL CALC-MCNC: 74 MG/DL (ref 50–140)
LEUKOCYTE ESTERASE UR QL STRIP: ABNORMAL
LYMPHOCYTES # BLD AUTO: 1.8 X10(3)/MCL (ref 0.6–4.6)
LYMPHOCYTES NFR BLD AUTO: 28 %
MCH RBC QN AUTO: 30.1 PG (ref 27–31)
MCHC RBC AUTO-ENTMCNC: 32.5 GM/DL (ref 33–36)
MCV RBC AUTO: 92.6 FL (ref 80–94)
MICROALBUMIN UR-MCNC: 55.3 UG/ML
MICROALBUMIN/CREAT RATIO PNL UR: 20.8 MG/GM CR (ref 0–30)
MONOCYTES # BLD AUTO: 0.7 X10(3)/MCL (ref 0.1–1.3)
MONOCYTES NFR BLD AUTO: 11 %
NEUTROPHILS # BLD AUTO: 3.63 X10(3)/MCL (ref 2.1–9.2)
NEUTROPHILS NFR BLD AUTO: 57 %
NITRITE UR QL STRIP: POSITIVE
PH UR STRIP: 7 [PH] (ref 5–9)
PLATELET # BLD AUTO: 332 X10(3)/MCL (ref 130–400)
PMV BLD AUTO: 9.1 FL (ref 9.4–12.4)
POTASSIUM SERPL-SCNC: 4.2 MMOL/L (ref 3.5–5.1)
PROT SERPL-MCNC: 6.9 GM/DL (ref 5.8–7.6)
PROT UR QL STRIP: ABNORMAL
PSA SERPL-MCNC: 0.68 NG/ML
RBC # BLD AUTO: 4.58 X10(6)/MCL (ref 4.7–6.1)
RBC #/AREA URNS HPF: ABNORMAL /[HPF]
SODIUM SERPL-SCNC: 144 MMOL/L (ref 136–145)
SP GR UR STRIP: 1.02 (ref 1–1.03)
SQUAMOUS EPITHELIAL, UA: ABNORMAL /HPF (ref 0–4)
TRIGL SERPL-MCNC: 142 MG/DL (ref 34–140)
TSH SERPL-ACNC: 0.46 UIU/ML (ref 0.35–4.94)
UROBILINOGEN UR STRIP-ACNC: 1
VLDLC SERPL CALC-MCNC: 28 MG/DL
WBC # SPEC AUTO: 6.4 X10(3)/MCL (ref 4.5–11.5)
WBC #/AREA URNS HPF: 107 /HPF (ref 0–3)

## 2022-04-12 ENCOUNTER — HISTORICAL (OUTPATIENT)
Dept: ADMINISTRATIVE | Facility: HOSPITAL | Age: 72
End: 2022-04-12

## 2022-04-22 ENCOUNTER — HISTORICAL (OUTPATIENT)
Dept: ADMINISTRATIVE | Facility: HOSPITAL | Age: 72
End: 2022-04-22

## 2022-04-22 LAB
ABS NEUT (OLG): 2.72 (ref 2.1–9.2)
ALBUMIN SERPL-MCNC: 3.5 G/DL (ref 3.4–4.8)
ALBUMIN/GLOB SERPL: 1.1 {RATIO} (ref 1.1–2)
ALP SERPL-CCNC: 52 U/L (ref 40–150)
ALT SERPL-CCNC: 10 U/L (ref 0–55)
AST SERPL-CCNC: 14 U/L (ref 5–34)
BASOPHILS # BLD AUTO: 0.1 10*3/UL (ref 0–0.2)
BASOPHILS NFR BLD AUTO: 2 %
BILIRUB SERPL-MCNC: 0.6 MG/DL
BILIRUBIN DIRECT+TOT PNL SERPL-MCNC: 0.3 (ref 0–0.5)
BILIRUBIN DIRECT+TOT PNL SERPL-MCNC: 0.3 (ref 0–0.8)
BUN SERPL-MCNC: 12.9 MG/DL (ref 8.4–25.7)
CALCIUM SERPL-MCNC: 9.3 MG/DL (ref 8.7–10.5)
CHLORIDE SERPL-SCNC: 104 MMOL/L (ref 98–107)
CHOLEST SERPL-MCNC: 118 MG/DL
CHOLEST/HDLC SERPL: 2 {RATIO} (ref 0–5)
CO2 SERPL-SCNC: 32 MMOL/L (ref 23–31)
CREAT SERPL-MCNC: 0.91 MG/DL (ref 0.73–1.18)
CREAT UR-MCNC: 155.1 MG/DL (ref 58–161)
EOSINOPHIL # BLD AUTO: 0.2 10*3/UL (ref 0–0.9)
EOSINOPHIL NFR BLD AUTO: 5 %
ERYTHROCYTE [DISTWIDTH] IN BLOOD BY AUTOMATED COUNT: 13.2 % (ref 11.5–17)
EST. AVERAGE GLUCOSE BLD GHB EST-MCNC: 105.4 MG/DL
GLOBULIN SER-MCNC: 3.2 G/DL (ref 2.4–3.5)
GLUCOSE SERPL-MCNC: 93 MG/DL (ref 82–115)
HBA1C MFR BLD: 5.3 %
HCT VFR BLD AUTO: 41.1 % (ref 42–52)
HDLC SERPL-MCNC: 52 MG/DL (ref 35–60)
HEMOLYSIS INTERF INDEX SERPL-ACNC: 2
HGB BLD-MCNC: 13.6 G/DL (ref 14–18)
ICTERIC INTERF INDEX SERPL-ACNC: 1
LDLC SERPL CALC-MCNC: 52 MG/DL (ref 50–140)
LIPEMIC INTERF INDEX SERPL-ACNC: <0
LYMPHOCYTES # BLD AUTO: 1.6 10*3/UL (ref 0.6–4.6)
LYMPHOCYTES NFR BLD AUTO: 30 %
MANUAL DIFF? (OHS): NO
MCH RBC QN AUTO: 32.8 PG (ref 27–31)
MCHC RBC AUTO-ENTMCNC: 33.1 G/DL (ref 33–36)
MCV RBC AUTO: 99 FL (ref 80–94)
MICROALBUMIN UR-MCNC: 16.5
MICROALBUMIN/CREAT RATIO PNL UR: 10.6 (ref 0–30)
MONOCYTES # BLD AUTO: 0.7 10*3/UL (ref 0.1–1.3)
MONOCYTES NFR BLD AUTO: 13 %
NEUTROPHILS # BLD AUTO: 2.72 10*3/UL (ref 2.1–9.2)
NEUTROPHILS NFR BLD AUTO: 50 %
PLATELET # BLD AUTO: 330 10*3/UL (ref 130–400)
PMV BLD AUTO: 8.8 FL (ref 9.4–12.4)
POTASSIUM SERPL-SCNC: 4.5 MMOL/L (ref 3.5–5.1)
PROT SERPL-MCNC: 6.7 G/DL (ref 5.8–7.6)
RBC # BLD AUTO: 4.15 10*6/UL (ref 4.7–6.1)
SODIUM SERPL-SCNC: 143 MMOL/L (ref 136–145)
TRIGL SERPL-MCNC: 71 MG/DL (ref 34–140)
TSH SERPL-ACNC: 0.44 M[IU]/L (ref 0.35–4.94)
VLDLC SERPL CALC-MCNC: 14 MG/DL
WBC # SPEC AUTO: 5.4 10*3/UL (ref 4.5–11.5)

## 2022-04-29 NOTE — OP NOTE
Patient:   Vaibhav Vargas            MRN: [id number]            FIN: [id number]               Age:   66 years     Sex:  Male     :  1950   Associated Diagnoses:   None   Author:   Casey Steel MD intervention done with LUIS  follow up as outpt    DC on asa and plavix

## 2022-04-30 VITALS
SYSTOLIC BLOOD PRESSURE: 136 MMHG | WEIGHT: 294.31 LBS | BODY MASS INDEX: 39.86 KG/M2 | DIASTOLIC BLOOD PRESSURE: 80 MMHG | OXYGEN SATURATION: 99 % | HEIGHT: 72 IN

## 2022-05-19 RX ORDER — HYDROCODONE BITARTRATE AND ACETAMINOPHEN 10; 325 MG/1; MG/1
1 TABLET ORAL EVERY 12 HOURS PRN
Qty: 60 TABLET | Refills: 0 | Status: SHIPPED | OUTPATIENT
Start: 2022-05-19 | End: 2022-06-20 | Stop reason: SDUPTHER

## 2022-05-19 RX ORDER — HYDROCODONE BITARTRATE AND ACETAMINOPHEN 10; 325 MG/1; MG/1
1 TABLET ORAL 2 TIMES DAILY
COMMUNITY
Start: 2022-02-21 | End: 2022-05-19 | Stop reason: SDUPTHER

## 2022-05-19 NOTE — TELEPHONE ENCOUNTER
----- Message from Nevin Mcmillan sent at 5/19/2022  9:10 AM CDT -----  Regarding: med  Pt is req refill of Norco & humulin - 100 mg  [place]  [phone]

## 2022-06-20 RX ORDER — METFORMIN HYDROCHLORIDE 1000 MG/1
1000 TABLET ORAL 2 TIMES DAILY
COMMUNITY
Start: 2022-06-20 | End: 2022-06-20 | Stop reason: SDUPTHER

## 2022-06-20 RX ORDER — METFORMIN HYDROCHLORIDE 1000 MG/1
1000 TABLET ORAL 2 TIMES DAILY
Qty: 60 TABLET | Refills: 11 | Status: SHIPPED | OUTPATIENT
Start: 2022-06-20 | End: 2022-09-20 | Stop reason: SDUPTHER

## 2022-06-20 RX ORDER — HYDROCODONE BITARTRATE AND ACETAMINOPHEN 10; 325 MG/1; MG/1
1 TABLET ORAL EVERY 12 HOURS PRN
Qty: 60 TABLET | Refills: 0 | Status: SHIPPED | OUTPATIENT
Start: 2022-06-20 | End: 2022-07-19 | Stop reason: SDUPTHER

## 2022-06-20 NOTE — TELEPHONE ENCOUNTER
Sent to Dr. Srivastava  ----- Message from Chapis Lpóez sent at 6/20/2022  9:03 AM CDT -----  Need refills on Metformin and Boqueron  Uses [facility]

## 2022-07-19 RX ORDER — HYDROCODONE BITARTRATE AND ACETAMINOPHEN 10; 325 MG/1; MG/1
1 TABLET ORAL EVERY 12 HOURS PRN
Qty: 60 TABLET | Refills: 0 | Status: SHIPPED | OUTPATIENT
Start: 2022-07-19 | End: 2022-08-17 | Stop reason: SDUPTHER

## 2022-07-19 RX ORDER — HYDROCODONE BITARTRATE AND ACETAMINOPHEN 10; 325 MG/1; MG/1
TABLET ORAL
Qty: 60 TABLET | Refills: 0 | OUTPATIENT
Start: 2022-07-19

## 2022-07-19 NOTE — TELEPHONE ENCOUNTER
----- Message from Elaina Lane Patient Care Assistant sent at 7/19/2022  9:07 AM CDT -----  Regarding: med refill  Type:  RX Refill Request    Who Called: pt  Refill or New Rx: refill  RX Name and Strength: HYDROcodone-acetaminophen (NORCO)  mg per tablet  How is the patient currently taking it? (ex. 1XDay): Take 1 tablet by mouth every 12 (twelve) hours as needed for Pain.  Is this a 30 day or 90 day RX: 60 tablets  Preferred Pharmacy with phone number: [facility] [phone]  Local or Mail Order: Local  Ordering Provider: Dr. Srivastava  Would the patient rather a call back or a response via [facility]? C/b  Best Call Back Number: [phone]  Additional Information: req refill on med please

## 2022-07-19 NOTE — TELEPHONE ENCOUNTER
Sent to Dr. Srivastava  ----- Message from Elaina Lane, Patient Care Assistant sent at 7/19/2022  9:07 AM CDT -----  Regarding: med refill  Type:  RX Refill Request    Who Called: pt  Refill or New Rx: refill  RX Name and Strength: HYDROcodone-acetaminophen (NORCO)  mg per tablet  How is the patient currently taking it? (ex. 1XDay): Take 1 tablet by mouth every 12 (twelve) hours as needed for Pain.  Is this a 30 day or 90 day RX: 60 tablets  Preferred Pharmacy with phone number: [facility] [phone]  Local or Mail Order: Local  Ordering Provider: Dr. Srivastava  Would the patient rather a call back or a response via [facility]? C/b  Best Call Back Number: [phone]  Additional Information: req refill on med please

## 2022-08-09 ENCOUNTER — DOCUMENTATION ONLY (OUTPATIENT)
Dept: ADMINISTRATIVE | Facility: HOSPITAL | Age: 72
End: 2022-08-09
Payer: MEDICARE

## 2022-08-17 RX ORDER — HYDROCODONE BITARTRATE AND ACETAMINOPHEN 10; 325 MG/1; MG/1
1 TABLET ORAL EVERY 12 HOURS PRN
Qty: 60 TABLET | Refills: 0 | Status: SHIPPED | OUTPATIENT
Start: 2022-08-19 | End: 2022-09-19

## 2022-08-17 NOTE — TELEPHONE ENCOUNTER
----- Message from Nevin Mcmillan sent at 8/17/2022  9:25 AM CDT -----  Regarding: med  Pt is re refill [facility]  [phone]

## 2022-09-19 DIAGNOSIS — E11.9 TYPE 2 DIABETES MELLITUS WITHOUT COMPLICATION, WITHOUT LONG-TERM CURRENT USE OF INSULIN: Primary | ICD-10-CM

## 2022-09-19 RX ORDER — HYDROCODONE BITARTRATE AND ACETAMINOPHEN 10; 325 MG/1; MG/1
TABLET ORAL
Qty: 60 TABLET | Refills: 0 | Status: SHIPPED | OUTPATIENT
Start: 2022-09-19 | End: 2022-10-21 | Stop reason: SDUPTHER

## 2022-09-19 NOTE — TELEPHONE ENCOUNTER
----- Message from Chapis López sent at 9/19/2022  9:36 AM CDT -----  Need refills on [facility]

## 2022-09-20 RX ORDER — METFORMIN HYDROCHLORIDE 1000 MG/1
1000 TABLET ORAL 2 TIMES DAILY
Qty: 180 TABLET | Refills: 3 | Status: SHIPPED | OUTPATIENT
Start: 2022-09-20 | End: 2022-12-21 | Stop reason: SDUPTHER

## 2022-09-20 NOTE — TELEPHONE ENCOUNTER
----- Message from Chapis López sent at 9/20/2022  9:20 AM CDT -----  Need refills on Metformin 1000mg, #180  Uses [facility]

## 2022-10-19 NOTE — TELEPHONE ENCOUNTER
----- Message from Chapis López sent at 10/19/2022  9:16 AM CDT -----  Need refills on [facility]

## 2022-10-21 RX ORDER — HYDROCODONE BITARTRATE AND ACETAMINOPHEN 10; 325 MG/1; MG/1
TABLET ORAL
Qty: 10 TABLET | Refills: 0 | Status: SHIPPED | OUTPATIENT
Start: 2022-10-21 | End: 2022-10-27 | Stop reason: SDUPTHER

## 2022-10-21 RX ORDER — HYDROCODONE BITARTRATE AND ACETAMINOPHEN 10; 325 MG/1; MG/1
TABLET ORAL
Qty: 60 TABLET | Refills: 0 | OUTPATIENT
Start: 2022-10-21

## 2022-10-24 ENCOUNTER — TELEPHONE (OUTPATIENT)
Dept: INTERNAL MEDICINE | Facility: CLINIC | Age: 72
End: 2022-10-24
Payer: MEDICARE

## 2022-10-24 NOTE — TELEPHONE ENCOUNTER
Patient advised will send a refill request to Dr. Srivastava and if he returns tomorrow he will fill it. Unable to until then.  ----- Message from Chapis López sent at 10/24/2022  1:53 PM CDT -----  Mr Vargas called to ask why the remaining script for his pain medication was not called to pharmacy  Only 10 was sent in on Friday, I know we talked about this  Just let me know thanks!

## 2022-10-26 ENCOUNTER — TELEPHONE (OUTPATIENT)
Dept: INTERNAL MEDICINE | Facility: CLINIC | Age: 72
End: 2022-10-26
Payer: MEDICARE

## 2022-10-26 DIAGNOSIS — Z79.4 TYPE 2 DIABETES MELLITUS WITHOUT COMPLICATION, WITH LONG-TERM CURRENT USE OF INSULIN: Primary | ICD-10-CM

## 2022-10-26 DIAGNOSIS — E11.9 TYPE 2 DIABETES MELLITUS WITHOUT COMPLICATION, WITH LONG-TERM CURRENT USE OF INSULIN: Primary | ICD-10-CM

## 2022-10-26 RX ORDER — INSULIN HUMAN 100 [IU]/ML
INJECTION, SUSPENSION SUBCUTANEOUS
Qty: 20 ML | Refills: 11 | Status: SHIPPED | OUTPATIENT
Start: 2022-10-26 | End: 2022-10-26

## 2022-10-26 NOTE — TELEPHONE ENCOUNTER
sent    ----- Message from Nevin Mcmillan sent at 10/26/2022  9:16 AM CDT -----  Regarding: med  Pt is req refill of [facility]  [phone]

## 2022-11-01 DIAGNOSIS — Z12.5 PROSTATE CANCER SCREENING: ICD-10-CM

## 2022-11-01 DIAGNOSIS — Z13.6 SCREENING FOR CARDIOVASCULAR, RESPIRATORY, AND GENITOURINARY DISEASES: ICD-10-CM

## 2022-11-01 DIAGNOSIS — Z13.83 SCREENING FOR CARDIOVASCULAR, RESPIRATORY, AND GENITOURINARY DISEASES: ICD-10-CM

## 2022-11-01 DIAGNOSIS — R60.9 EDEMA, UNSPECIFIED TYPE: ICD-10-CM

## 2022-11-01 DIAGNOSIS — Z13.89 SCREENING FOR CARDIOVASCULAR, RESPIRATORY, AND GENITOURINARY DISEASES: ICD-10-CM

## 2022-11-01 DIAGNOSIS — I10 HYPERTENSION, UNSPECIFIED TYPE: ICD-10-CM

## 2022-11-01 DIAGNOSIS — Z79.4 TYPE 2 DIABETES MELLITUS WITHOUT COMPLICATION, WITH LONG-TERM CURRENT USE OF INSULIN: Primary | ICD-10-CM

## 2022-11-01 DIAGNOSIS — Z00.00 WELL ADULT EXAM: ICD-10-CM

## 2022-11-01 DIAGNOSIS — E11.9 TYPE 2 DIABETES MELLITUS WITHOUT COMPLICATION, WITH LONG-TERM CURRENT USE OF INSULIN: Primary | ICD-10-CM

## 2022-11-07 ENCOUNTER — LAB VISIT (OUTPATIENT)
Dept: LAB | Facility: HOSPITAL | Age: 72
End: 2022-11-07
Attending: INTERNAL MEDICINE
Payer: MEDICARE

## 2022-11-07 DIAGNOSIS — Z13.89 SCREENING FOR CARDIOVASCULAR, RESPIRATORY, AND GENITOURINARY DISEASES: ICD-10-CM

## 2022-11-07 DIAGNOSIS — Z13.83 SCREENING FOR CARDIOVASCULAR, RESPIRATORY, AND GENITOURINARY DISEASES: ICD-10-CM

## 2022-11-07 DIAGNOSIS — E11.9 TYPE 2 DIABETES MELLITUS WITHOUT COMPLICATION, WITH LONG-TERM CURRENT USE OF INSULIN: ICD-10-CM

## 2022-11-07 DIAGNOSIS — Z00.00 WELL ADULT EXAM: ICD-10-CM

## 2022-11-07 DIAGNOSIS — R60.9 EDEMA, UNSPECIFIED TYPE: ICD-10-CM

## 2022-11-07 DIAGNOSIS — Z13.6 SCREENING FOR CARDIOVASCULAR, RESPIRATORY, AND GENITOURINARY DISEASES: ICD-10-CM

## 2022-11-07 DIAGNOSIS — Z12.5 PROSTATE CANCER SCREENING: ICD-10-CM

## 2022-11-07 DIAGNOSIS — Z79.4 TYPE 2 DIABETES MELLITUS WITHOUT COMPLICATION, WITH LONG-TERM CURRENT USE OF INSULIN: ICD-10-CM

## 2022-11-07 DIAGNOSIS — I10 HYPERTENSION, UNSPECIFIED TYPE: ICD-10-CM

## 2022-11-07 LAB
ALBUMIN SERPL-MCNC: 3.7 GM/DL (ref 3.4–4.8)
ALBUMIN/GLOB SERPL: 1.2 RATIO (ref 1.1–2)
ALP SERPL-CCNC: 52 UNIT/L (ref 40–150)
ALT SERPL-CCNC: 10 UNIT/L (ref 0–55)
AMORPH URATE CRY URNS QL MICRO: ABNORMAL /HPF
APPEARANCE UR: CLEAR
AST SERPL-CCNC: 13 UNIT/L (ref 5–34)
BACTERIA #/AREA URNS AUTO: ABNORMAL /HPF
BASOPHILS # BLD AUTO: 0.07 X10(3)/MCL (ref 0–0.2)
BASOPHILS NFR BLD AUTO: 1.1 %
BILIRUB UR QL STRIP.AUTO: NEGATIVE MG/DL
BILIRUBIN DIRECT+TOT PNL SERPL-MCNC: 0.7 MG/DL
BUN SERPL-MCNC: 10.5 MG/DL (ref 8.4–25.7)
CALCIUM SERPL-MCNC: 9.7 MG/DL (ref 8.8–10)
CHLORIDE SERPL-SCNC: 102 MMOL/L (ref 98–107)
CHOLEST SERPL-MCNC: 126 MG/DL
CHOLEST/HDLC SERPL: 2 {RATIO} (ref 0–5)
CO2 SERPL-SCNC: 29 MMOL/L (ref 23–31)
COLOR UR AUTO: YELLOW
CREAT SERPL-MCNC: 1.01 MG/DL (ref 0.73–1.18)
CREAT UR-MCNC: 127.1 MG/DL (ref 63–166)
EOSINOPHIL # BLD AUTO: 0.4 X10(3)/MCL (ref 0–0.9)
EOSINOPHIL NFR BLD AUTO: 6.5 %
ERYTHROCYTE [DISTWIDTH] IN BLOOD BY AUTOMATED COUNT: 13.4 % (ref 11.5–17)
EST. AVERAGE GLUCOSE BLD GHB EST-MCNC: 111.2 MG/DL
GFR SERPLBLD CREATININE-BSD FMLA CKD-EPI: >60 MLS/MIN/1.73/M2
GLOBULIN SER-MCNC: 3 GM/DL (ref 2.4–3.5)
GLUCOSE SERPL-MCNC: 90 MG/DL (ref 82–115)
GLUCOSE UR QL STRIP.AUTO: NEGATIVE MG/DL
HBA1C MFR BLD: 5.5 %
HCT VFR BLD AUTO: 42.6 % (ref 42–52)
HDLC SERPL-MCNC: 53 MG/DL (ref 35–60)
HGB BLD-MCNC: 14 GM/DL (ref 14–18)
IMM GRANULOCYTES # BLD AUTO: 0.02 X10(3)/MCL (ref 0–0.04)
IMM GRANULOCYTES NFR BLD AUTO: 0.3 %
KETONES UR QL STRIP.AUTO: NEGATIVE MG/DL
LDLC SERPL CALC-MCNC: 57 MG/DL (ref 50–140)
LEUKOCYTE ESTERASE UR QL STRIP.AUTO: ABNORMAL UNIT/L
LYMPHOCYTES # BLD AUTO: 1.86 X10(3)/MCL (ref 0.6–4.6)
LYMPHOCYTES NFR BLD AUTO: 30.1 %
MCH RBC QN AUTO: 32.6 PG (ref 27–31)
MCHC RBC AUTO-ENTMCNC: 32.9 MG/DL (ref 33–36)
MCV RBC AUTO: 99.3 FL (ref 80–94)
MICROALBUMIN UR-MCNC: 9.5 UG/ML
MICROALBUMIN/CREAT RATIO PNL UR: 7.5 MG/GM CR (ref 0–30)
MONOCYTES # BLD AUTO: 0.7 X10(3)/MCL (ref 0.1–1.3)
MONOCYTES NFR BLD AUTO: 11.3 %
MUCOUS THREADS URNS QL MICRO: ABNORMAL /LPF
NEUTROPHILS # BLD AUTO: 3.1 X10(3)/MCL (ref 2.1–9.2)
NEUTROPHILS NFR BLD AUTO: 50.7 %
NITRITE UR QL STRIP.AUTO: POSITIVE
NRBC BLD AUTO-RTO: 0 %
PH UR STRIP.AUTO: 6.5 [PH]
PLATELET # BLD AUTO: 304 X10(3)/MCL (ref 130–400)
PMV BLD AUTO: 8.7 FL (ref 7.4–10.4)
POTASSIUM SERPL-SCNC: 4.6 MMOL/L (ref 3.5–5.1)
PROT SERPL-MCNC: 6.7 GM/DL (ref 5.8–7.6)
PROT UR QL STRIP.AUTO: NEGATIVE MG/DL
PSA SERPL-MCNC: 0.65 NG/ML
RBC # BLD AUTO: 4.29 X10(6)/MCL (ref 4.7–6.1)
RBC #/AREA URNS AUTO: <5 /HPF
RBC UR QL AUTO: NEGATIVE UNIT/L
SODIUM SERPL-SCNC: 141 MMOL/L (ref 136–145)
SP GR UR STRIP.AUTO: 1.01 (ref 1–1.03)
SQUAMOUS #/AREA URNS AUTO: <5 /HPF
TRIGL SERPL-MCNC: 80 MG/DL (ref 34–140)
TSH SERPL-ACNC: 0.25 UIU/ML (ref 0.35–4.94)
UROBILINOGEN UR STRIP-ACNC: 1 MG/DL
VLDLC SERPL CALC-MCNC: 16 MG/DL
WBC # SPEC AUTO: 6.2 X10(3)/MCL (ref 4.5–11.5)
WBC #/AREA URNS AUTO: 44 /HPF

## 2022-11-07 PROCEDURE — 82043 UR ALBUMIN QUANTITATIVE: CPT

## 2022-11-07 PROCEDURE — 83036 HEMOGLOBIN GLYCOSYLATED A1C: CPT

## 2022-11-07 PROCEDURE — 81001 URINALYSIS AUTO W/SCOPE: CPT

## 2022-11-07 PROCEDURE — 80061 LIPID PANEL: CPT

## 2022-11-07 PROCEDURE — 84443 ASSAY THYROID STIM HORMONE: CPT

## 2022-11-07 PROCEDURE — 85025 COMPLETE CBC W/AUTO DIFF WBC: CPT

## 2022-11-07 PROCEDURE — 84153 ASSAY OF PSA TOTAL: CPT

## 2022-11-07 PROCEDURE — 36415 COLL VENOUS BLD VENIPUNCTURE: CPT

## 2022-11-07 PROCEDURE — 87088 URINE BACTERIA CULTURE: CPT

## 2022-11-07 PROCEDURE — 80053 COMPREHEN METABOLIC PANEL: CPT

## 2022-11-08 PROBLEM — E11.9 TYPE 2 DIABETES MELLITUS: Status: ACTIVE | Noted: 2022-11-08

## 2022-11-08 PROBLEM — G89.29 CHRONIC LOW BACK PAIN: Status: ACTIVE | Noted: 2022-11-08

## 2022-11-08 PROBLEM — I10 PRIMARY HYPERTENSION: Status: ACTIVE | Noted: 2022-11-08

## 2022-11-08 PROBLEM — E66.9 OBESITY: Status: ACTIVE | Noted: 2022-11-08

## 2022-11-08 PROBLEM — R60.9 EDEMA: Status: ACTIVE | Noted: 2022-11-08

## 2022-11-08 PROBLEM — M54.50 CHRONIC LOW BACK PAIN: Status: ACTIVE | Noted: 2022-11-08

## 2022-11-08 PROBLEM — M17.9 OSTEOARTHRITIS OF KNEE: Status: ACTIVE | Noted: 2022-11-08

## 2022-11-09 LAB — BACTERIA UR CULT: NORMAL
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
## 2022-11-14 ENCOUNTER — OFFICE VISIT (OUTPATIENT)
Dept: INTERNAL MEDICINE | Facility: CLINIC | Age: 72
End: 2022-11-14
Payer: MEDICARE

## 2022-11-14 VITALS
SYSTOLIC BLOOD PRESSURE: 140 MMHG | DIASTOLIC BLOOD PRESSURE: 90 MMHG | BODY MASS INDEX: 36.57 KG/M2 | HEIGHT: 72 IN | WEIGHT: 270 LBS | OXYGEN SATURATION: 97 % | HEART RATE: 68 BPM

## 2022-11-14 DIAGNOSIS — Z00.00 WELL ADULT EXAM: ICD-10-CM

## 2022-11-14 DIAGNOSIS — Z23 NEED FOR INFLUENZA VACCINATION: Primary | ICD-10-CM

## 2022-11-14 DIAGNOSIS — Z79.4 TYPE 2 DIABETES MELLITUS WITHOUT COMPLICATION, WITH LONG-TERM CURRENT USE OF INSULIN: ICD-10-CM

## 2022-11-14 DIAGNOSIS — E11.9 TYPE 2 DIABETES MELLITUS WITHOUT COMPLICATION, WITH LONG-TERM CURRENT USE OF INSULIN: ICD-10-CM

## 2022-11-14 DIAGNOSIS — I10 PRIMARY HYPERTENSION: ICD-10-CM

## 2022-11-14 DIAGNOSIS — R60.9 EDEMA, UNSPECIFIED TYPE: ICD-10-CM

## 2022-11-14 PROCEDURE — 3080F PR MOST RECENT DIASTOLIC BLOOD PRESSURE >= 90 MM HG: ICD-10-PCS | Mod: CPTII,,, | Performed by: INTERNAL MEDICINE

## 2022-11-14 PROCEDURE — 3288F PR FALLS RISK ASSESSMENT DOCUMENTED: ICD-10-PCS | Mod: CPTII,,, | Performed by: INTERNAL MEDICINE

## 2022-11-14 PROCEDURE — G0008 FLU VACCINE - QUADRIVALENT - ADJUVANTED: ICD-10-PCS | Mod: ,,, | Performed by: INTERNAL MEDICINE

## 2022-11-14 PROCEDURE — 4010F PR ACE/ARB THEARPY RXD/TAKEN: ICD-10-PCS | Mod: CPTII,,, | Performed by: INTERNAL MEDICINE

## 2022-11-14 PROCEDURE — 3008F PR BODY MASS INDEX (BMI) DOCUMENTED: ICD-10-PCS | Mod: CPTII,,, | Performed by: INTERNAL MEDICINE

## 2022-11-14 PROCEDURE — 4010F ACE/ARB THERAPY RXD/TAKEN: CPT | Mod: CPTII,,, | Performed by: INTERNAL MEDICINE

## 2022-11-14 PROCEDURE — 1159F PR MEDICATION LIST DOCUMENTED IN MEDICAL RECORD: ICD-10-PCS | Mod: CPTII,,, | Performed by: INTERNAL MEDICINE

## 2022-11-14 PROCEDURE — 1160F PR REVIEW ALL MEDS BY PRESCRIBER/CLIN PHARMACIST DOCUMENTED: ICD-10-PCS | Mod: CPTII,,, | Performed by: INTERNAL MEDICINE

## 2022-11-14 PROCEDURE — 3066F PR DOCUMENTATION OF TREATMENT FOR NEPHROPATHY: ICD-10-PCS | Mod: CPTII,,, | Performed by: INTERNAL MEDICINE

## 2022-11-14 PROCEDURE — 3008F BODY MASS INDEX DOCD: CPT | Mod: CPTII,,, | Performed by: INTERNAL MEDICINE

## 2022-11-14 PROCEDURE — 3077F SYST BP >= 140 MM HG: CPT | Mod: CPTII,,, | Performed by: INTERNAL MEDICINE

## 2022-11-14 PROCEDURE — 90694 FLU VACCINE - QUADRIVALENT - ADJUVANTED: ICD-10-PCS | Mod: ,,, | Performed by: INTERNAL MEDICINE

## 2022-11-14 PROCEDURE — G0439 PPPS, SUBSEQ VISIT: HCPCS | Mod: ,,, | Performed by: INTERNAL MEDICINE

## 2022-11-14 PROCEDURE — G0008 ADMIN INFLUENZA VIRUS VAC: HCPCS | Mod: ,,, | Performed by: INTERNAL MEDICINE

## 2022-11-14 PROCEDURE — 3288F FALL RISK ASSESSMENT DOCD: CPT | Mod: CPTII,,, | Performed by: INTERNAL MEDICINE

## 2022-11-14 PROCEDURE — 3061F NEG MICROALBUMINURIA REV: CPT | Mod: CPTII,,, | Performed by: INTERNAL MEDICINE

## 2022-11-14 PROCEDURE — 1101F PT FALLS ASSESS-DOCD LE1/YR: CPT | Mod: CPTII,,, | Performed by: INTERNAL MEDICINE

## 2022-11-14 PROCEDURE — 90694 VACC AIIV4 NO PRSRV 0.5ML IM: CPT | Mod: ,,, | Performed by: INTERNAL MEDICINE

## 2022-11-14 PROCEDURE — 1101F PR PT FALLS ASSESS DOC 0-1 FALLS W/OUT INJ PAST YR: ICD-10-PCS | Mod: CPTII,,, | Performed by: INTERNAL MEDICINE

## 2022-11-14 PROCEDURE — 1160F RVW MEDS BY RX/DR IN RCRD: CPT | Mod: CPTII,,, | Performed by: INTERNAL MEDICINE

## 2022-11-14 PROCEDURE — 3077F PR MOST RECENT SYSTOLIC BLOOD PRESSURE >= 140 MM HG: ICD-10-PCS | Mod: CPTII,,, | Performed by: INTERNAL MEDICINE

## 2022-11-14 PROCEDURE — 3061F PR NEG MICROALBUMINURIA RESULT DOCUMENTED/REVIEW: ICD-10-PCS | Mod: CPTII,,, | Performed by: INTERNAL MEDICINE

## 2022-11-14 PROCEDURE — G0439 PR MEDICARE ANNUAL WELLNESS SUBSEQUENT VISIT: ICD-10-PCS | Mod: ,,, | Performed by: INTERNAL MEDICINE

## 2022-11-14 PROCEDURE — 3080F DIAST BP >= 90 MM HG: CPT | Mod: CPTII,,, | Performed by: INTERNAL MEDICINE

## 2022-11-14 PROCEDURE — 3066F NEPHROPATHY DOC TX: CPT | Mod: CPTII,,, | Performed by: INTERNAL MEDICINE

## 2022-11-14 PROCEDURE — 1159F MED LIST DOCD IN RCRD: CPT | Mod: CPTII,,, | Performed by: INTERNAL MEDICINE

## 2022-11-14 RX ORDER — LOSARTAN POTASSIUM 50 MG/1
50 TABLET ORAL DAILY
COMMUNITY
Start: 2022-11-07

## 2022-11-14 RX ORDER — PROPRANOLOL HYDROCHLORIDE 40 MG/1
TABLET ORAL
COMMUNITY
Start: 2022-10-21

## 2022-11-14 RX ORDER — CIPROFLOXACIN 500 MG/1
500 TABLET ORAL 2 TIMES DAILY
Qty: 14 TABLET | Refills: 1 | Status: SHIPPED | OUTPATIENT
Start: 2022-11-14 | End: 2022-11-21

## 2022-11-14 RX ORDER — CLOPIDOGREL BISULFATE 75 MG/1
75 TABLET ORAL DAILY
COMMUNITY
Start: 2022-10-21 | End: 2023-01-23

## 2022-11-14 RX ORDER — MUPIROCIN 20 MG/G
OINTMENT TOPICAL 3 TIMES DAILY
Qty: 2 G | Refills: 1 | Status: ON HOLD | OUTPATIENT
Start: 2022-11-14 | End: 2024-03-24 | Stop reason: HOSPADM

## 2022-11-22 RX ORDER — HYDROCODONE BITARTRATE AND ACETAMINOPHEN 10; 325 MG/1; MG/1
TABLET ORAL
Qty: 60 TABLET | Refills: 0 | Status: SHIPPED | OUTPATIENT
Start: 2022-11-22 | End: 2022-12-23

## 2022-11-22 NOTE — TELEPHONE ENCOUNTER
----- Message from Nevin Mcmillan sent at 11/22/2022  9:12 AM CST -----  Regarding: med  Pt is req refill [facility]  [phone]

## 2022-12-19 NOTE — TELEPHONE ENCOUNTER
----- Message from Elaina Lane Patient Care Assistant sent at 12/19/2022  9:07 AM CST -----  Regarding: med refill  Type:  RX Refill Request    RX Name and Strength: HYDROcodone-acetaminophen (NORCO)  mg per tablet    How is the patient currently taking it? (ex. 1XDay): TAKE 1 TABLET BY MOUTH EVERY 12 HOURS AS NEEDED FOR PAIN    Is this a 30 day or 90 day RX: 60 tablets    Preferred Pharmacy with phone number: [facility] [address] Call Back Number: [phone]    Additional Information: pt is req refill on med please!

## 2022-12-20 DIAGNOSIS — E11.9 TYPE 2 DIABETES MELLITUS WITHOUT COMPLICATION, WITHOUT LONG-TERM CURRENT USE OF INSULIN: ICD-10-CM

## 2022-12-20 DIAGNOSIS — I10 PRIMARY HYPERTENSION: Primary | ICD-10-CM

## 2022-12-21 RX ORDER — HYDROCODONE BITARTRATE AND ACETAMINOPHEN 10; 325 MG/1; MG/1
TABLET ORAL
Qty: 60 TABLET | Refills: 0 | OUTPATIENT
Start: 2022-12-21

## 2022-12-21 RX ORDER — METFORMIN HYDROCHLORIDE 1000 MG/1
1000 TABLET ORAL 2 TIMES DAILY
Qty: 180 TABLET | Refills: 3 | Status: SHIPPED | OUTPATIENT
Start: 2022-12-21 | End: 2023-12-18 | Stop reason: SDUPTHER

## 2022-12-21 RX ORDER — VERAPAMIL HYDROCHLORIDE 240 MG/1
240 TABLET, FILM COATED, EXTENDED RELEASE ORAL DAILY
Qty: 90 TABLET | Refills: 4 | Status: SHIPPED | OUTPATIENT
Start: 2022-12-21

## 2022-12-21 NOTE — TELEPHONE ENCOUNTER
Will send Metformin and Verapamil but Dr. Srivastava will call patient regarding the Norco    ----- Message from Elaina Lane, Patient Care Assistant sent at 12/21/2022  9:23 AM [facility] -----  Regarding: med refill  Type:  RX Refill Request    RX Name and Strength: metFORMIN (GLUCOPHAGE) 1000 MG tablet  How is the patient currently taking it? (ex. 1XDay): Take 1 tablet (1,000 mg total) by mouth 2 (two) times daily  Is this a 30 day or 90 day RX: 180 tablets    RX Name and Strength: verapamiL (CALAN-SR) 240 MG CR tablet  How is the patient currently taking it? (ex. 1XDay):  TAKE 1 TABLET BY MOUTH ONCE DAILY  Is this a 30 day or 90 day RX: 90 tablets    RX Name and Strength: HYDROcodone-acetaminophen (NORCO)  mg per tablet  How is the patient currently taking it? (ex. 1XDay): TAKE 1 TABLET BY MOUTH EVERY 12 HOURS AS NEEDED FOR PAIN Strength  Is this a 30 day or 90 day RX: 60 tablets    Preferred Pharmacy with phone number: [facility] [place], [place] - [address] [place] [address] Call Back Number: [phone]    Additional Information: pt needs refill on all 3 meds please!

## 2022-12-22 DIAGNOSIS — G89.29 CHRONIC BILATERAL LOW BACK PAIN WITH BILATERAL SCIATICA: Primary | ICD-10-CM

## 2022-12-22 DIAGNOSIS — M54.42 CHRONIC BILATERAL LOW BACK PAIN WITH BILATERAL SCIATICA: Primary | ICD-10-CM

## 2022-12-22 DIAGNOSIS — M54.41 CHRONIC BILATERAL LOW BACK PAIN WITH BILATERAL SCIATICA: Primary | ICD-10-CM

## 2022-12-23 RX ORDER — HYDROCODONE BITARTRATE AND ACETAMINOPHEN 10; 325 MG/1; MG/1
TABLET ORAL
Qty: 90 TABLET | Refills: 0 | Status: ON HOLD | OUTPATIENT
Start: 2022-12-23 | End: 2024-03-24 | Stop reason: HOSPADM

## 2022-12-23 NOTE — TELEPHONE ENCOUNTER
Sent this morning    ----- Message from Elaina Lane Patient Care Assistant sent at 12/23/2022  9:44 AM CST -----  Regarding: med refill  Type:  RX Refill Request    RX Name and Strength: HYDROcodone-acetaminophen (NORCO)  mg per tablet    How is the patient currently taking it? (ex. 1XDay): TAKE 1 TABLET BY MOUTH EVERY 12 HOURS AS NEEDED FOR PAIN Strength:  mg    Is this a 30 day or 90 day RX: 60 tablets    Preferred Pharmacy with phone number: [facility] [address] Call Back Number: [phone]    Additional Information: pt needs refill he said he req this earlier in the week but I dont see anything in the chart. Please send refill to pharmacy for the pt. Thank you!

## 2023-01-17 ENCOUNTER — OFFICE VISIT (OUTPATIENT)
Dept: PAIN MEDICINE | Facility: CLINIC | Age: 73
End: 2023-01-17
Payer: MEDICARE

## 2023-01-17 VITALS
DIASTOLIC BLOOD PRESSURE: 87 MMHG | SYSTOLIC BLOOD PRESSURE: 159 MMHG | OXYGEN SATURATION: 98 % | BODY MASS INDEX: 36.57 KG/M2 | HEART RATE: 67 BPM | RESPIRATION RATE: 18 BRPM | WEIGHT: 270 LBS | HEIGHT: 72 IN

## 2023-01-17 DIAGNOSIS — M54.41 CHRONIC BILATERAL LOW BACK PAIN WITH BILATERAL SCIATICA: ICD-10-CM

## 2023-01-17 DIAGNOSIS — G89.29 CHRONIC BILATERAL LOW BACK PAIN WITH BILATERAL SCIATICA: ICD-10-CM

## 2023-01-17 DIAGNOSIS — M15.3 POST-TRAUMATIC OSTEOARTHRITIS OF MULTIPLE JOINTS: Primary | ICD-10-CM

## 2023-01-17 DIAGNOSIS — M54.42 CHRONIC BILATERAL LOW BACK PAIN WITH BILATERAL SCIATICA: ICD-10-CM

## 2023-01-17 PROCEDURE — 1100F PR PT FALLS ASSESS DOC 2+ FALLS/FALL W/INJURY/YR: ICD-10-PCS | Mod: CPTII,,, | Performed by: NURSE PRACTITIONER

## 2023-01-17 PROCEDURE — 3079F PR MOST RECENT DIASTOLIC BLOOD PRESSURE 80-89 MM HG: ICD-10-PCS | Mod: CPTII,,, | Performed by: NURSE PRACTITIONER

## 2023-01-17 PROCEDURE — 1125F AMNT PAIN NOTED PAIN PRSNT: CPT | Mod: CPTII,,, | Performed by: NURSE PRACTITIONER

## 2023-01-17 PROCEDURE — 3288F PR FALLS RISK ASSESSMENT DOCUMENTED: ICD-10-PCS | Mod: CPTII,,, | Performed by: NURSE PRACTITIONER

## 2023-01-17 PROCEDURE — 1160F RVW MEDS BY RX/DR IN RCRD: CPT | Mod: CPTII,,, | Performed by: NURSE PRACTITIONER

## 2023-01-17 PROCEDURE — 3079F DIAST BP 80-89 MM HG: CPT | Mod: CPTII,,, | Performed by: NURSE PRACTITIONER

## 2023-01-17 PROCEDURE — 99204 OFFICE O/P NEW MOD 45 MIN: CPT | Mod: ,,, | Performed by: NURSE PRACTITIONER

## 2023-01-17 PROCEDURE — 3008F PR BODY MASS INDEX (BMI) DOCUMENTED: ICD-10-PCS | Mod: CPTII,,, | Performed by: NURSE PRACTITIONER

## 2023-01-17 PROCEDURE — 1125F PR PAIN SEVERITY QUANTIFIED, PAIN PRESENT: ICD-10-PCS | Mod: CPTII,,, | Performed by: NURSE PRACTITIONER

## 2023-01-17 PROCEDURE — 1100F PTFALLS ASSESS-DOCD GE2>/YR: CPT | Mod: CPTII,,, | Performed by: NURSE PRACTITIONER

## 2023-01-17 PROCEDURE — 3077F PR MOST RECENT SYSTOLIC BLOOD PRESSURE >= 140 MM HG: ICD-10-PCS | Mod: CPTII,,, | Performed by: NURSE PRACTITIONER

## 2023-01-17 PROCEDURE — 3077F SYST BP >= 140 MM HG: CPT | Mod: CPTII,,, | Performed by: NURSE PRACTITIONER

## 2023-01-17 PROCEDURE — 1159F MED LIST DOCD IN RCRD: CPT | Mod: CPTII,,, | Performed by: NURSE PRACTITIONER

## 2023-01-17 PROCEDURE — 1160F PR REVIEW ALL MEDS BY PRESCRIBER/CLIN PHARMACIST DOCUMENTED: ICD-10-PCS | Mod: CPTII,,, | Performed by: NURSE PRACTITIONER

## 2023-01-17 PROCEDURE — 3008F BODY MASS INDEX DOCD: CPT | Mod: CPTII,,, | Performed by: NURSE PRACTITIONER

## 2023-01-17 PROCEDURE — 1159F PR MEDICATION LIST DOCUMENTED IN MEDICAL RECORD: ICD-10-PCS | Mod: CPTII,,, | Performed by: NURSE PRACTITIONER

## 2023-01-17 PROCEDURE — 99204 PR OFFICE/OUTPT VISIT, NEW, LEVL IV, 45-59 MIN: ICD-10-PCS | Mod: ,,, | Performed by: NURSE PRACTITIONER

## 2023-01-17 PROCEDURE — 3288F FALL RISK ASSESSMENT DOCD: CPT | Mod: CPTII,,, | Performed by: NURSE PRACTITIONER

## 2023-01-17 RX ORDER — GABAPENTIN 300 MG/1
300 CAPSULE ORAL 3 TIMES DAILY
Qty: 90 CAPSULE | Refills: 3 | Status: SHIPPED | OUTPATIENT
Start: 2023-01-17 | End: 2024-02-14

## 2023-01-17 NOTE — PATIENT INSTRUCTIONS
Call our office back after you talk to your insurance company to see what [employer] in [place], [place] is accepted    Let them know your Nurse Practitioner would like to order a Rollator for you to prevent falling    Call our office back with the name of the [employer] company and  I will send the order     Follow up in two weeks to evaluated leg pain after taking Gabapentin 300  mg three times a day.
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
## 2023-01-18 ENCOUNTER — TELEPHONE (OUTPATIENT)
Dept: ORTHOPEDICS | Facility: HOSPITAL | Age: 73
End: 2023-01-18
Payer: MEDICARE

## 2023-01-18 DIAGNOSIS — G89.4 CHRONIC PAIN SYNDROME: Primary | ICD-10-CM

## 2023-01-18 NOTE — TELEPHONE ENCOUNTER
----- Message from Nate Silverio NP sent at 1/17/2023  5:02 PM CST -----  Regarding: RE: [facility] can you please send this DME order for a Rollator for this patient?  I am unable to access incomplete orders for nonnarcotic medications, DME, MRIs, any imaging, physical therapy etc..  I have placed a ticket to IT  They do not have a solution yet.  Blessing asked if I would send a request to you until day correct this and let the patient know.  Thanks, Eunice  ----- Message -----  From: Annemarie Craig MA  Sent: 1/17/2023  12:05 PM CST  To: Nate Silverio NP, Linette Malave LPN  Subject: DME                                              Patient would like to have his DME sent to [facility].

## 2023-01-31 ENCOUNTER — TELEPHONE (OUTPATIENT)
Dept: INTERNAL MEDICINE | Facility: CLINIC | Age: 73
End: 2023-01-31
Payer: MEDICARE

## 2023-01-31 ENCOUNTER — OFFICE VISIT (OUTPATIENT)
Dept: PAIN MEDICINE | Facility: CLINIC | Age: 73
End: 2023-01-31
Payer: MEDICARE

## 2023-01-31 VITALS — BODY MASS INDEX: 36.57 KG/M2 | HEIGHT: 72 IN | WEIGHT: 270 LBS

## 2023-01-31 DIAGNOSIS — M54.42 CHRONIC BILATERAL LOW BACK PAIN WITH BILATERAL SCIATICA: ICD-10-CM

## 2023-01-31 DIAGNOSIS — G89.29 CHRONIC BILATERAL LOW BACK PAIN WITH BILATERAL SCIATICA: ICD-10-CM

## 2023-01-31 DIAGNOSIS — G89.29 CHRONIC BILATERAL LOW BACK PAIN WITH BILATERAL SCIATICA: Primary | ICD-10-CM

## 2023-01-31 DIAGNOSIS — M17.0 OSTEOARTHRITIS OF BOTH KNEES, UNSPECIFIED OSTEOARTHRITIS TYPE: ICD-10-CM

## 2023-01-31 DIAGNOSIS — M54.41 CHRONIC BILATERAL LOW BACK PAIN WITH BILATERAL SCIATICA: ICD-10-CM

## 2023-01-31 DIAGNOSIS — M54.41 CHRONIC BILATERAL LOW BACK PAIN WITH BILATERAL SCIATICA: Primary | ICD-10-CM

## 2023-01-31 DIAGNOSIS — M54.42 CHRONIC BILATERAL LOW BACK PAIN WITH BILATERAL SCIATICA: Primary | ICD-10-CM

## 2023-01-31 PROCEDURE — 3288F FALL RISK ASSESSMENT DOCD: CPT | Mod: CPTII,,, | Performed by: NURSE PRACTITIONER

## 2023-01-31 PROCEDURE — 1160F PR REVIEW ALL MEDS BY PRESCRIBER/CLIN PHARMACIST DOCUMENTED: ICD-10-PCS | Mod: CPTII,,, | Performed by: NURSE PRACTITIONER

## 2023-01-31 PROCEDURE — 3008F PR BODY MASS INDEX (BMI) DOCUMENTED: ICD-10-PCS | Mod: CPTII,,, | Performed by: NURSE PRACTITIONER

## 2023-01-31 PROCEDURE — 1101F PT FALLS ASSESS-DOCD LE1/YR: CPT | Mod: CPTII,,, | Performed by: NURSE PRACTITIONER

## 2023-01-31 PROCEDURE — 3288F PR FALLS RISK ASSESSMENT DOCUMENTED: ICD-10-PCS | Mod: CPTII,,, | Performed by: NURSE PRACTITIONER

## 2023-01-31 PROCEDURE — 1101F PR PT FALLS ASSESS DOC 0-1 FALLS W/OUT INJ PAST YR: ICD-10-PCS | Mod: CPTII,,, | Performed by: NURSE PRACTITIONER

## 2023-01-31 PROCEDURE — 99214 PR OFFICE/OUTPT VISIT, EST, LEVL IV, 30-39 MIN: ICD-10-PCS | Mod: ,,, | Performed by: NURSE PRACTITIONER

## 2023-01-31 PROCEDURE — 99214 OFFICE O/P EST MOD 30 MIN: CPT | Mod: ,,, | Performed by: NURSE PRACTITIONER

## 2023-01-31 PROCEDURE — 1159F MED LIST DOCD IN RCRD: CPT | Mod: CPTII,,, | Performed by: NURSE PRACTITIONER

## 2023-01-31 PROCEDURE — 3008F BODY MASS INDEX DOCD: CPT | Mod: CPTII,,, | Performed by: NURSE PRACTITIONER

## 2023-01-31 PROCEDURE — 1160F RVW MEDS BY RX/DR IN RCRD: CPT | Mod: CPTII,,, | Performed by: NURSE PRACTITIONER

## 2023-01-31 PROCEDURE — 1159F PR MEDICATION LIST DOCUMENTED IN MEDICAL RECORD: ICD-10-PCS | Mod: CPTII,,, | Performed by: NURSE PRACTITIONER

## 2023-01-31 RX ORDER — HYDROCODONE BITARTRATE AND ACETAMINOPHEN 10; 325 MG/1; MG/1
TABLET ORAL
Qty: 60 TABLET | Refills: 0 | Status: CANCELLED | OUTPATIENT
Start: 2023-01-31

## 2023-01-31 NOTE — PROGRESS NOTES
Subjective:      Patient ID: Vaibhav Vargas is a 72 y.o. male.    Chief Complaint: Follow-up (Chronic lower back pain with sciatica - pt states that the weather has his back hurting - he is ambulating with a cane - he is taking gabapentin for the pain,but it is not helping. )    Referred by: No ref. provider found     HPI:  Patient presents as a follow-up for chronic low back pain with sciatica.  Since last office visit patient has been taking gabapentin 300 mg t.i.d.. Patient states this is helping some. He reports about 50% pain relief. He states he was receiving Norco in the past when he was seeing Dr. Mccabe. Patient states he will call  him to see if he will increase his Norco.      Patient states this is not helping relieve his back pain Additionally I ordered a Rollator for him to [facility].  Recap of initial consult noted physical therapy was completed years ago he does not want to repeat this as it never helped his pain and he is not willing to obtain an MRI and does not want anymore interventional pain procedures.    Interventional Pain History  Patient reported prior 17 injections to lumbar spine with Dr. Thurston that were ineffective.    ROS  Low back pain with sciatica      Objective:          Physical Exam    General: Well developed; overweight; A&O x 3; No anxiety/depression.  Loquacious.  Accompanied by his sister  Mental Status: Oriented to person, palce and time. Displays appropriate mood & affect.  Head: Norm cephalic and atraumatic  Neck:  Normal range of motion with lateral neck turning, flexion and extension.  No cervical paraspinal banding.  Eyes: normal conjunctiva, normal lids, normal pupils  ENT and mouth: normal external ear, nose, and no lesions noted on the lips.  Respiratory: Symmetrical, Unlabored  Abdomen: Non-distended     Extremities:  Gen: No cyanosis or tenderness to palpation bilateral LE  Skin: Warm, pink, dry, no rashes, no lesions on  the lumbar spine  Strength: 5/5 motor strength bilateral LE  ROM: Full ROM in bilateral knees and ankles without pain or instability.     Neuro:  Gait: altalgic lean, normal toe and heel raise.  Intermittently using single prong cane.  Right foot deviate slightly outward.  DTR's: 2+ in bilateral patellar  Sensory: Intact to light touch bilateral lower extremities     Spine tenderness to palpation over lower lumbar spine to low back.  Mild discomfort  to upperbilateral buttocks No trigger points.     L-spine ROM:  limited range of motion with extension and  bilateral rotation at the waist.  Normal lumbar flexion   Straight Leg Raise:  negative bilateral  SI Joint: negative bilaterally      Assessment:       Encounter Diagnoses   Name Primary?    Chronic bilateral low back pain with bilateral sciatica Yes    Osteoarthritis of both knees, unspecified osteoarthritis type          Plan:       Vaibhav was seen today for follow-up.    Diagnoses and all orders for this visit:    Chronic bilateral low back pain with bilateral sciatica    Osteoarthritis of both knees, unspecified osteoarthritis type  -     HME - OTHER       Offered to increase Gabapentin to 400 mg TID or 600 mg TID. He will call back if he is interested and will call back if he wants an increase in Gabpentin.     Patient has not received rollator from [employer]. His sister relayed Annemarie stated she would confirm if they received my resubmission of his Rollator order with a new diagnosis. Stevie confirmed with patient and sister the fax went through.Additionally, Annemarie is calling patient back to confirm [facility] received.  [facility] responded saying they needed a specific location of osteoarthritis this was added as his diagnosis.  Script was given to Linette Malave LPN today to fax to [facility]  in [place], [place] on [facility] and notify patient    Patient is planning to call Dr. Murillo back to see if he will continue to Rx Norco, as   [facility] does not Rx this for chronic pain.

## 2023-06-16 ENCOUNTER — OFFICE VISIT (OUTPATIENT)
Dept: FAMILY MEDICINE | Facility: CLINIC | Age: 73
End: 2023-06-16
Payer: MEDICARE

## 2023-06-16 VITALS
TEMPERATURE: 98 F | RESPIRATION RATE: 18 BRPM | BODY MASS INDEX: 36.87 KG/M2 | HEART RATE: 65 BPM | HEIGHT: 72 IN | WEIGHT: 272.19 LBS | DIASTOLIC BLOOD PRESSURE: 72 MMHG | SYSTOLIC BLOOD PRESSURE: 140 MMHG | OXYGEN SATURATION: 100 %

## 2023-06-16 DIAGNOSIS — G89.29 CHRONIC LOW BACK PAIN, UNSPECIFIED BACK PAIN LATERALITY, UNSPECIFIED WHETHER SCIATICA PRESENT: ICD-10-CM

## 2023-06-16 DIAGNOSIS — I10 PRIMARY HYPERTENSION: ICD-10-CM

## 2023-06-16 DIAGNOSIS — Z79.4 TYPE 2 DIABETES MELLITUS WITHOUT COMPLICATION, WITH LONG-TERM CURRENT USE OF INSULIN: Primary | ICD-10-CM

## 2023-06-16 DIAGNOSIS — Z11.59 ENCOUNTER FOR HEPATITIS C SCREENING TEST FOR LOW RISK PATIENT: ICD-10-CM

## 2023-06-16 DIAGNOSIS — M54.50 CHRONIC LOW BACK PAIN, UNSPECIFIED BACK PAIN LATERALITY, UNSPECIFIED WHETHER SCIATICA PRESENT: ICD-10-CM

## 2023-06-16 DIAGNOSIS — R60.9 EDEMA, UNSPECIFIED TYPE: ICD-10-CM

## 2023-06-16 DIAGNOSIS — R79.89 LOW TSH LEVEL: ICD-10-CM

## 2023-06-16 DIAGNOSIS — E11.9 TYPE 2 DIABETES MELLITUS WITHOUT COMPLICATION, WITH LONG-TERM CURRENT USE OF INSULIN: Primary | ICD-10-CM

## 2023-06-16 DIAGNOSIS — I25.10 CORONARY ARTERY DISEASE, UNSPECIFIED VESSEL OR LESION TYPE, UNSPECIFIED WHETHER ANGINA PRESENT, UNSPECIFIED WHETHER NATIVE OR TRANSPLANTED HEART: ICD-10-CM

## 2023-06-16 DIAGNOSIS — E78.49 OTHER HYPERLIPIDEMIA: ICD-10-CM

## 2023-06-16 PROCEDURE — 1159F PR MEDICATION LIST DOCUMENTED IN MEDICAL RECORD: ICD-10-PCS | Mod: CPTII,,, | Performed by: STUDENT IN AN ORGANIZED HEALTH CARE EDUCATION/TRAINING PROGRAM

## 2023-06-16 PROCEDURE — 99204 OFFICE O/P NEW MOD 45 MIN: CPT | Mod: ,,, | Performed by: STUDENT IN AN ORGANIZED HEALTH CARE EDUCATION/TRAINING PROGRAM

## 2023-06-16 PROCEDURE — 3078F PR MOST RECENT DIASTOLIC BLOOD PRESSURE < 80 MM HG: ICD-10-PCS | Mod: CPTII,,, | Performed by: STUDENT IN AN ORGANIZED HEALTH CARE EDUCATION/TRAINING PROGRAM

## 2023-06-16 PROCEDURE — 3288F FALL RISK ASSESSMENT DOCD: CPT | Mod: CPTII,,, | Performed by: STUDENT IN AN ORGANIZED HEALTH CARE EDUCATION/TRAINING PROGRAM

## 2023-06-16 PROCEDURE — 99204 PR OFFICE/OUTPT VISIT, NEW, LEVL IV, 45-59 MIN: ICD-10-PCS | Mod: ,,, | Performed by: STUDENT IN AN ORGANIZED HEALTH CARE EDUCATION/TRAINING PROGRAM

## 2023-06-16 PROCEDURE — 3077F SYST BP >= 140 MM HG: CPT | Mod: CPTII,,, | Performed by: STUDENT IN AN ORGANIZED HEALTH CARE EDUCATION/TRAINING PROGRAM

## 2023-06-16 PROCEDURE — 1126F PR PAIN SEVERITY QUANTIFIED, NO PAIN PRESENT: ICD-10-PCS | Mod: CPTII,,, | Performed by: STUDENT IN AN ORGANIZED HEALTH CARE EDUCATION/TRAINING PROGRAM

## 2023-06-16 PROCEDURE — 1126F AMNT PAIN NOTED NONE PRSNT: CPT | Mod: CPTII,,, | Performed by: STUDENT IN AN ORGANIZED HEALTH CARE EDUCATION/TRAINING PROGRAM

## 2023-06-16 PROCEDURE — 1101F PT FALLS ASSESS-DOCD LE1/YR: CPT | Mod: CPTII,,, | Performed by: STUDENT IN AN ORGANIZED HEALTH CARE EDUCATION/TRAINING PROGRAM

## 2023-06-16 PROCEDURE — 3288F PR FALLS RISK ASSESSMENT DOCUMENTED: ICD-10-PCS | Mod: CPTII,,, | Performed by: STUDENT IN AN ORGANIZED HEALTH CARE EDUCATION/TRAINING PROGRAM

## 2023-06-16 PROCEDURE — 3008F BODY MASS INDEX DOCD: CPT | Mod: CPTII,,, | Performed by: STUDENT IN AN ORGANIZED HEALTH CARE EDUCATION/TRAINING PROGRAM

## 2023-06-16 PROCEDURE — 1101F PR PT FALLS ASSESS DOC 0-1 FALLS W/OUT INJ PAST YR: ICD-10-PCS | Mod: CPTII,,, | Performed by: STUDENT IN AN ORGANIZED HEALTH CARE EDUCATION/TRAINING PROGRAM

## 2023-06-16 PROCEDURE — 3077F PR MOST RECENT SYSTOLIC BLOOD PRESSURE >= 140 MM HG: ICD-10-PCS | Mod: CPTII,,, | Performed by: STUDENT IN AN ORGANIZED HEALTH CARE EDUCATION/TRAINING PROGRAM

## 2023-06-16 PROCEDURE — 1159F MED LIST DOCD IN RCRD: CPT | Mod: CPTII,,, | Performed by: STUDENT IN AN ORGANIZED HEALTH CARE EDUCATION/TRAINING PROGRAM

## 2023-06-16 PROCEDURE — 3008F PR BODY MASS INDEX (BMI) DOCUMENTED: ICD-10-PCS | Mod: CPTII,,, | Performed by: STUDENT IN AN ORGANIZED HEALTH CARE EDUCATION/TRAINING PROGRAM

## 2023-06-16 PROCEDURE — 4010F ACE/ARB THERAPY RXD/TAKEN: CPT | Mod: CPTII,,, | Performed by: STUDENT IN AN ORGANIZED HEALTH CARE EDUCATION/TRAINING PROGRAM

## 2023-06-16 PROCEDURE — 4010F PR ACE/ARB THEARPY RXD/TAKEN: ICD-10-PCS | Mod: CPTII,,, | Performed by: STUDENT IN AN ORGANIZED HEALTH CARE EDUCATION/TRAINING PROGRAM

## 2023-06-16 PROCEDURE — 3078F DIAST BP <80 MM HG: CPT | Mod: CPTII,,, | Performed by: STUDENT IN AN ORGANIZED HEALTH CARE EDUCATION/TRAINING PROGRAM

## 2023-06-16 RX ORDER — ATORVASTATIN CALCIUM 20 MG/1
20 TABLET, FILM COATED ORAL DAILY
Qty: 90 TABLET | Refills: 0 | Status: SHIPPED | OUTPATIENT
Start: 2023-06-16 | End: 2023-09-21 | Stop reason: SDUPTHER

## 2023-06-16 NOTE — ASSESSMENT & PLAN NOTE
- Patient following with Pain Management.   - Pain Management office visit from 01/31/23 reviewed.  - Patient taking Gabapentin, but states it's not really helping. Patient used to take Norco.  - Patient informed that I would not prescribe Norco. He verbalized understanding.

## 2023-06-16 NOTE — PROGRESS NOTES
Subjective:      Patient ID: Vaibhav Vargas is a 72 y.o.  male. Patient accompanied by his sister, Ms. Taylor Vargas.    Chief Complaint: Establish Care    IDDMII: A1c 5.5 from 11/07/22. He checks his fasting blood sugars in the mornings, averaging 80-100s. He gives himself 45U of Humulin every morning. He states compliance with Metformin 1000mg BID. He is following with [facility]. He is also following with Dr. Raji Green with Podiatry.    HTN/CAD/LE Edema: Patient following with Dr. Casey Steel with Cardiology. Patient taking Plavix, Losartan, Propranolol, Torsemide, and Verapamil.     Chronic Back Pain: Patient following with Pain Management. Patient taking Gabapentin, but states it's not really helping. Patient used to take Norco.    Review of Systems   Constitutional:  Negative for activity change, chills, fatigue and fever.   Eyes:  Negative for visual disturbance.   Respiratory:  Negative for apnea, cough and shortness of breath.    Cardiovascular:  Negative for chest pain, palpitations and leg swelling.   Gastrointestinal:  Negative for abdominal pain, blood in stool, constipation, diarrhea, nausea and vomiting.   Genitourinary:  Negative for dysuria and hematuria.   Musculoskeletal:  Positive for back pain (chronic). Negative for arthralgias.   Skin:  Negative for rash and wound.   Neurological:  Negative for dizziness, weakness, numbness and headaches.   Psychiatric/Behavioral:  Negative for behavioral problems, dysphoric mood and sleep disturbance. The patient is not nervous/anxious.      Objective:   BP (!) 140/72 (BP Location: Right arm, Patient Position: Sitting, BP Method: Medium (Automatic))   Pulse 65   Temp 98 °F (36.7 °C)   Resp 18   Ht 6' (1.829 m)   Wt 123.5 kg (272 lb 3.2 oz)   SpO2 100%   BMI 36.92 kg/m²     Physical Exam  Vitals and nursing note reviewed.   Constitutional:       General: He is not in acute distress.     Appearance: Normal appearance. He is  not ill-appearing or toxic-appearing.   HENT:      Head: Normocephalic and atraumatic.   Eyes:      Conjunctiva/sclera: Conjunctivae normal.   Cardiovascular:      Rate and Rhythm: Normal rate and regular rhythm.      Heart sounds: Normal heart sounds. No murmur heard.  Pulmonary:      Effort: Pulmonary effort is normal. No respiratory distress.      Breath sounds: Normal breath sounds. No wheezing.   Abdominal:      General: Bowel sounds are normal. There is no distension.      Palpations: Abdomen is soft.      Tenderness: There is no abdominal tenderness.   Musculoskeletal:         General: No deformity. Normal range of motion.      Right lower leg: Edema (2+ pitting) present.      Left lower leg: Edema (2+ pitting) present.   Skin:     General: Skin is warm and dry.      Findings: No lesion or rash.   Neurological:      General: No focal deficit present.      Mental Status: He is alert.   Psychiatric:         Mood and Affect: Mood normal.         Behavior: Behavior normal.         Thought Content: Thought content normal.         Judgment: Judgment normal.     Assessment/Plan:   1. Type 2 diabetes mellitus without complication, with long-term current use of insulin  Assessment & Plan:  - Labs for routine monitoring.  - Patient following with [facility].  - Patient following with Dr. Raji Green with Podiatry.  - Starting Lipitor 20mg daily.  - Continue Humulin 45U daily and Metformin 1000mg BID.   - Continue glucose monitoring.      Orders:  -     Hemoglobin A1C; Future; Expected date: 06/16/2023  -     atorvastatin (LIPITOR) 20 MG tablet; Take 1 tablet (20 mg total) by mouth once daily.  Dispense: 90 tablet; Refill: 0    2. Primary hypertension  Assessment & Plan:  - Patient following with Dr. Casey Steel with Cardiology.   - He is taking Plavix 75mg daily, Losartan 50mg daily, Propranolol 40mg daily, and Verapamil 240mg daily.      3. Coronary artery disease, unspecified vessel or lesion type,  unspecified whether angina present, unspecified whether native or transplanted heart  Assessment & Plan:  - Refer to HTN plan.    Orders:  -     atorvastatin (LIPITOR) 20 MG tablet; Take 1 tablet (20 mg total) by mouth once daily.  Dispense: 90 tablet; Refill: 0    4. Edema, unspecified type  Assessment & Plan:  - Patient taking Torsemide.      5. Chronic low back pain, unspecified back pain laterality, unspecified whether sciatica present  Assessment & Plan:  - Patient following with Pain Management.   - Pain Management office visit from 01/31/23 reviewed.  - Patient taking Gabapentin, but states it's not really helping. Patient used to take Norco.  - Patient informed that I would not prescribe Norco. He verbalized understanding.      6. Other hyperlipidemia  -     TSH; Future; Expected date: 06/16/2023    7. Low TSH level  -     TSH; Future; Expected date: 06/16/2023    8. Encounter for hepatitis C screening test for low risk patient  -     Hepatitis C Antibody; Future; Expected date: 06/16/2023       Follow up in about 3 months (around 9/16/2023) for Medicare Wellness.

## 2023-06-16 NOTE — ASSESSMENT & PLAN NOTE
- Patient following with Dr. Casey Steel with Cardiology.   - He is taking Plavix 75mg daily, Losartan 50mg daily, Propranolol 40mg daily, and Verapamil 240mg daily.

## 2023-07-24 DIAGNOSIS — I10 PRIMARY HYPERTENSION: ICD-10-CM

## 2023-07-24 RX ORDER — CLOPIDOGREL BISULFATE 75 MG/1
75 TABLET ORAL DAILY
Qty: 90 TABLET | Refills: 1 | Status: CANCELLED | OUTPATIENT
Start: 2023-07-24

## 2023-07-24 NOTE — TELEPHONE ENCOUNTER
Patient will need to reach out to Cardiology regarding Plavix refill.    Would recommend patient go to ER for wound evaluation.

## 2023-07-25 ENCOUNTER — HOSPITAL ENCOUNTER (EMERGENCY)
Facility: HOSPITAL | Age: 73
Discharge: HOME | End: 2023-07-25
Attending: EMERGENCY MEDICINE
Payer: MEDICARE

## 2023-07-25 VITALS
RESPIRATION RATE: 18 BRPM | DIASTOLIC BLOOD PRESSURE: 88 MMHG | HEIGHT: 74 IN | TEMPERATURE: 98 F | BODY MASS INDEX: 34.39 KG/M2 | HEART RATE: 56 BPM | OXYGEN SATURATION: 97 % | WEIGHT: 268 LBS | SYSTOLIC BLOOD PRESSURE: 159 MMHG

## 2023-07-25 DIAGNOSIS — M79.89 LEG SWELLING: ICD-10-CM

## 2023-07-25 DIAGNOSIS — N39.0 ACUTE UTI: ICD-10-CM

## 2023-07-25 DIAGNOSIS — R60.0 VENOUS STASIS ULCER OF LEFT LOWER LEG WITH EDEMA OF LEFT LOWER LEG: ICD-10-CM

## 2023-07-25 DIAGNOSIS — I83.029 VENOUS STASIS ULCER OF LEFT LOWER LEG WITH EDEMA OF LEFT LOWER LEG: ICD-10-CM

## 2023-07-25 DIAGNOSIS — I87.2 VENOUS STASIS DERMATITIS OF BOTH LOWER EXTREMITIES: Primary | ICD-10-CM

## 2023-07-25 DIAGNOSIS — L97.929 VENOUS STASIS ULCER OF LEFT LOWER LEG WITH EDEMA OF LEFT LOWER LEG: ICD-10-CM

## 2023-07-25 DIAGNOSIS — I83.892 VENOUS STASIS ULCER OF LEFT LOWER LEG WITH EDEMA OF LEFT LOWER LEG: ICD-10-CM

## 2023-07-25 LAB
ALBUMIN SERPL-MCNC: 3.6 G/DL (ref 3.4–4.8)
ALBUMIN/GLOB SERPL: 0.9 RATIO (ref 1.1–2)
ALP SERPL-CCNC: 53 UNIT/L (ref 40–150)
ALT SERPL-CCNC: 10 UNIT/L (ref 0–55)
APPEARANCE UR: ABNORMAL
APTT PPP: 28.5 SECONDS (ref 23.4–33.9)
AST SERPL-CCNC: 16 UNIT/L (ref 5–34)
BACTERIA #/AREA URNS AUTO: ABNORMAL /HPF
BASOPHILS # BLD AUTO: 0.08 X10(3)/MCL
BASOPHILS NFR BLD AUTO: 1.2 %
BILIRUB UR QL STRIP.AUTO: NEGATIVE
BILIRUBIN DIRECT+TOT PNL SERPL-MCNC: 0.7 MG/DL
BNP BLD-MCNC: 104.8 PG/ML
BUN SERPL-MCNC: 12.9 MG/DL (ref 8.4–25.7)
CALCIUM SERPL-MCNC: 10.1 MG/DL (ref 8.8–10)
CHLORIDE SERPL-SCNC: 103 MMOL/L (ref 98–107)
CO2 SERPL-SCNC: 28 MMOL/L (ref 23–31)
COLOR UR: ABNORMAL
CREAT SERPL-MCNC: 1.06 MG/DL (ref 0.73–1.18)
D DIMER PPP IA.FEU-MCNC: 0.53 UG/ML FEU (ref 0–0.5)
EOSINOPHIL # BLD AUTO: 0.56 X10(3)/MCL (ref 0–0.9)
EOSINOPHIL NFR BLD AUTO: 8.4 %
ERYTHROCYTE [DISTWIDTH] IN BLOOD BY AUTOMATED COUNT: 13.4 % (ref 11.5–17)
GFR SERPLBLD CREATININE-BSD FMLA CKD-EPI: >60 MLS/MIN/1.73/M2
GLOBULIN SER-MCNC: 3.9 GM/DL (ref 2.4–3.5)
GLUCOSE SERPL-MCNC: 121 MG/DL (ref 82–115)
GLUCOSE UR QL STRIP.AUTO: NEGATIVE
HCT VFR BLD AUTO: 42.6 % (ref 42–52)
HGB BLD-MCNC: 14.3 G/DL (ref 14–18)
HYALINE CASTS URNS QL MICRO: ABNORMAL /LPF
IMM GRANULOCYTES # BLD AUTO: 0.02 X10(3)/MCL (ref 0–0.04)
IMM GRANULOCYTES NFR BLD AUTO: 0.3 %
INR BLD: 1.02 (ref 2–3)
KETONES UR QL STRIP.AUTO: NEGATIVE
LEUKOCYTE ESTERASE UR QL STRIP.AUTO: ABNORMAL
LYMPHOCYTES # BLD AUTO: 1.87 X10(3)/MCL (ref 0.6–4.6)
LYMPHOCYTES NFR BLD AUTO: 27.9 %
MAGNESIUM SERPL-MCNC: 1.7 MG/DL (ref 1.6–2.6)
MCH RBC QN AUTO: 32.3 PG (ref 27–31)
MCHC RBC AUTO-ENTMCNC: 33.6 G/DL (ref 33–36)
MCV RBC AUTO: 96.2 FL (ref 80–94)
MONOCYTES # BLD AUTO: 0.73 X10(3)/MCL (ref 0.1–1.3)
MONOCYTES NFR BLD AUTO: 10.9 %
NEUTROPHILS # BLD AUTO: 3.44 X10(3)/MCL (ref 2.1–9.2)
NEUTROPHILS NFR BLD AUTO: 51.3 %
NITRITE UR QL STRIP.AUTO: POSITIVE
NRBC BLD AUTO-RTO: 0 %
PH UR STRIP.AUTO: 6 [PH]
PLATELET # BLD AUTO: 322 X10(3)/MCL (ref 130–400)
PMV BLD AUTO: 8.7 FL (ref 7.4–10.4)
POTASSIUM SERPL-SCNC: 4.2 MMOL/L (ref 3.5–5.1)
PROT SERPL-MCNC: 7.5 GM/DL (ref 5.8–7.6)
PROT UR QL STRIP.AUTO: NEGATIVE
PROTHROMBIN TIME: 13.5 SECONDS (ref 11.7–14.5)
RBC # BLD AUTO: 4.43 X10(6)/MCL (ref 4.7–6.1)
RBC #/AREA URNS AUTO: ABNORMAL /HPF
RBC UR QL AUTO: ABNORMAL
SODIUM SERPL-SCNC: 142 MMOL/L (ref 136–145)
SP GR UR STRIP.AUTO: 1.01
SPERM URNS QL MICRO: ABNORMAL /HPF
SQUAMOUS #/AREA URNS AUTO: ABNORMAL /HPF
TROPONIN I SERPL-MCNC: 0.03 NG/ML (ref 0–0.04)
TSH SERPL-ACNC: 0.33 UIU/ML (ref 0.35–4.94)
UROBILINOGEN UR STRIP-ACNC: 0.2
WBC # SPEC AUTO: 6.7 X10(3)/MCL (ref 4.5–11.5)
WBC #/AREA URNS AUTO: ABNORMAL /HPF

## 2023-07-25 PROCEDURE — 84443 ASSAY THYROID STIM HORMONE: CPT | Performed by: EMERGENCY MEDICINE

## 2023-07-25 PROCEDURE — 83735 ASSAY OF MAGNESIUM: CPT | Performed by: EMERGENCY MEDICINE

## 2023-07-25 PROCEDURE — 63600175 PHARM REV CODE 636 W HCPCS: Performed by: EMERGENCY MEDICINE

## 2023-07-25 PROCEDURE — 85025 COMPLETE CBC W/AUTO DIFF WBC: CPT | Performed by: EMERGENCY MEDICINE

## 2023-07-25 PROCEDURE — 84484 ASSAY OF TROPONIN QUANT: CPT | Performed by: EMERGENCY MEDICINE

## 2023-07-25 PROCEDURE — 85730 THROMBOPLASTIN TIME PARTIAL: CPT | Performed by: EMERGENCY MEDICINE

## 2023-07-25 PROCEDURE — 81001 URINALYSIS AUTO W/SCOPE: CPT | Performed by: EMERGENCY MEDICINE

## 2023-07-25 PROCEDURE — 83880 ASSAY OF NATRIURETIC PEPTIDE: CPT | Performed by: EMERGENCY MEDICINE

## 2023-07-25 PROCEDURE — 99285 EMERGENCY DEPT VISIT HI MDM: CPT | Mod: 25

## 2023-07-25 PROCEDURE — 87184 SC STD DISK METHOD PER PLATE: CPT | Performed by: EMERGENCY MEDICINE

## 2023-07-25 PROCEDURE — 87077 CULTURE AEROBIC IDENTIFY: CPT | Performed by: EMERGENCY MEDICINE

## 2023-07-25 PROCEDURE — 96374 THER/PROPH/DIAG INJ IV PUSH: CPT

## 2023-07-25 PROCEDURE — 80053 COMPREHEN METABOLIC PANEL: CPT | Performed by: EMERGENCY MEDICINE

## 2023-07-25 PROCEDURE — 85379 FIBRIN DEGRADATION QUANT: CPT | Performed by: EMERGENCY MEDICINE

## 2023-07-25 PROCEDURE — 85610 PROTHROMBIN TIME: CPT | Performed by: EMERGENCY MEDICINE

## 2023-07-25 RX ORDER — POTASSIUM CHLORIDE 20 MEQ/1
20 TABLET, EXTENDED RELEASE ORAL DAILY
Qty: 30 TABLET | Refills: 0 | Status: ON HOLD | OUTPATIENT
Start: 2023-07-25 | End: 2024-03-24 | Stop reason: HOSPADM

## 2023-07-25 RX ORDER — FUROSEMIDE 40 MG/1
40 TABLET ORAL DAILY
Qty: 30 TABLET | Refills: 0 | Status: SHIPPED | OUTPATIENT
Start: 2023-07-25 | End: 2024-07-24

## 2023-07-25 RX ORDER — FUROSEMIDE 10 MG/ML
80 INJECTION INTRAMUSCULAR; INTRAVENOUS
Status: COMPLETED | OUTPATIENT
Start: 2023-07-25 | End: 2023-07-25

## 2023-07-25 RX ORDER — CEFDINIR 300 MG/1
300 CAPSULE ORAL 2 TIMES DAILY
Qty: 20 CAPSULE | Refills: 0 | Status: SHIPPED | OUTPATIENT
Start: 2023-07-25 | End: 2023-08-04

## 2023-07-25 RX ADMIN — FUROSEMIDE 80 MG: 10 INJECTION, SOLUTION INTRAMUSCULAR; INTRAVENOUS at 10:07

## 2023-07-25 NOTE — ED TRIAGE NOTES
Pt concerned at healing of sore to left anterior lower leg for 2 weeks with no injury, and relates swelling to right lower leg also
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
## 2023-07-28 LAB — BACTERIA UR CULT: ABNORMAL

## 2023-09-18 ENCOUNTER — LAB VISIT (OUTPATIENT)
Dept: LAB | Facility: HOSPITAL | Age: 73
End: 2023-09-18
Attending: STUDENT IN AN ORGANIZED HEALTH CARE EDUCATION/TRAINING PROGRAM
Payer: MEDICARE

## 2023-09-18 DIAGNOSIS — Z11.59 ENCOUNTER FOR HEPATITIS C SCREENING TEST FOR LOW RISK PATIENT: ICD-10-CM

## 2023-09-18 LAB — HCV AB SERPL QL IA: NONREACTIVE

## 2023-09-18 PROCEDURE — 36415 COLL VENOUS BLD VENIPUNCTURE: CPT

## 2023-09-18 PROCEDURE — 86803 HEPATITIS C AB TEST: CPT

## 2023-09-21 ENCOUNTER — LAB VISIT (OUTPATIENT)
Dept: LAB | Facility: HOSPITAL | Age: 73
End: 2023-09-21
Attending: STUDENT IN AN ORGANIZED HEALTH CARE EDUCATION/TRAINING PROGRAM
Payer: MEDICARE

## 2023-09-21 ENCOUNTER — OFFICE VISIT (OUTPATIENT)
Dept: FAMILY MEDICINE | Facility: CLINIC | Age: 73
End: 2023-09-21
Payer: MEDICARE

## 2023-09-21 VITALS
OXYGEN SATURATION: 100 % | RESPIRATION RATE: 18 BRPM | WEIGHT: 271.63 LBS | HEART RATE: 69 BPM | TEMPERATURE: 98 F | SYSTOLIC BLOOD PRESSURE: 139 MMHG | DIASTOLIC BLOOD PRESSURE: 75 MMHG | HEIGHT: 74 IN | BODY MASS INDEX: 34.86 KG/M2

## 2023-09-21 DIAGNOSIS — M54.50 CHRONIC LOW BACK PAIN, UNSPECIFIED BACK PAIN LATERALITY, UNSPECIFIED WHETHER SCIATICA PRESENT: ICD-10-CM

## 2023-09-21 DIAGNOSIS — Z79.4 TYPE 2 DIABETES MELLITUS WITHOUT COMPLICATION, WITH LONG-TERM CURRENT USE OF INSULIN: ICD-10-CM

## 2023-09-21 DIAGNOSIS — I25.10 CORONARY ARTERY DISEASE, UNSPECIFIED VESSEL OR LESION TYPE, UNSPECIFIED WHETHER ANGINA PRESENT, UNSPECIFIED WHETHER NATIVE OR TRANSPLANTED HEART: ICD-10-CM

## 2023-09-21 DIAGNOSIS — Z12.11 SCREENING FOR MALIGNANT NEOPLASM OF COLON: ICD-10-CM

## 2023-09-21 DIAGNOSIS — G89.29 CHRONIC LOW BACK PAIN, UNSPECIFIED BACK PAIN LATERALITY, UNSPECIFIED WHETHER SCIATICA PRESENT: ICD-10-CM

## 2023-09-21 DIAGNOSIS — R60.9 EDEMA, UNSPECIFIED TYPE: ICD-10-CM

## 2023-09-21 DIAGNOSIS — I10 PRIMARY HYPERTENSION: ICD-10-CM

## 2023-09-21 DIAGNOSIS — Z00.00 MEDICARE ANNUAL WELLNESS VISIT, SUBSEQUENT: Primary | ICD-10-CM

## 2023-09-21 DIAGNOSIS — E11.9 TYPE 2 DIABETES MELLITUS WITHOUT COMPLICATION, WITH LONG-TERM CURRENT USE OF INSULIN: ICD-10-CM

## 2023-09-21 DIAGNOSIS — Z23 NEED FOR INFLUENZA VACCINATION: ICD-10-CM

## 2023-09-21 LAB
EST. AVERAGE GLUCOSE BLD GHB EST-MCNC: 102.5 MG/DL
HBA1C MFR BLD: 5.2 %

## 2023-09-21 PROCEDURE — G0439 PPPS, SUBSEQ VISIT: HCPCS | Mod: ,,, | Performed by: STUDENT IN AN ORGANIZED HEALTH CARE EDUCATION/TRAINING PROGRAM

## 2023-09-21 PROCEDURE — 3008F BODY MASS INDEX DOCD: CPT | Mod: CPTII,,, | Performed by: STUDENT IN AN ORGANIZED HEALTH CARE EDUCATION/TRAINING PROGRAM

## 2023-09-21 PROCEDURE — 1126F PR PAIN SEVERITY QUANTIFIED, NO PAIN PRESENT: ICD-10-PCS | Mod: CPTII,,, | Performed by: STUDENT IN AN ORGANIZED HEALTH CARE EDUCATION/TRAINING PROGRAM

## 2023-09-21 PROCEDURE — 36415 COLL VENOUS BLD VENIPUNCTURE: CPT

## 2023-09-21 PROCEDURE — 3044F PR MOST RECENT HEMOGLOBIN A1C LEVEL <7.0%: ICD-10-PCS | Mod: CPTII,,, | Performed by: STUDENT IN AN ORGANIZED HEALTH CARE EDUCATION/TRAINING PROGRAM

## 2023-09-21 PROCEDURE — 3075F SYST BP GE 130 - 139MM HG: CPT | Mod: CPTII,,, | Performed by: STUDENT IN AN ORGANIZED HEALTH CARE EDUCATION/TRAINING PROGRAM

## 2023-09-21 PROCEDURE — 1159F MED LIST DOCD IN RCRD: CPT | Mod: CPTII,,, | Performed by: STUDENT IN AN ORGANIZED HEALTH CARE EDUCATION/TRAINING PROGRAM

## 2023-09-21 PROCEDURE — 83036 HEMOGLOBIN GLYCOSYLATED A1C: CPT

## 2023-09-21 PROCEDURE — 1126F AMNT PAIN NOTED NONE PRSNT: CPT | Mod: CPTII,,, | Performed by: STUDENT IN AN ORGANIZED HEALTH CARE EDUCATION/TRAINING PROGRAM

## 2023-09-21 PROCEDURE — 3078F DIAST BP <80 MM HG: CPT | Mod: CPTII,,, | Performed by: STUDENT IN AN ORGANIZED HEALTH CARE EDUCATION/TRAINING PROGRAM

## 2023-09-21 PROCEDURE — 90694 FLU VACCINE - QUADRIVALENT - ADJUVANTED: ICD-10-PCS | Mod: ,,, | Performed by: STUDENT IN AN ORGANIZED HEALTH CARE EDUCATION/TRAINING PROGRAM

## 2023-09-21 PROCEDURE — G0439 PR MEDICARE ANNUAL WELLNESS SUBSEQUENT VISIT: ICD-10-PCS | Mod: ,,, | Performed by: STUDENT IN AN ORGANIZED HEALTH CARE EDUCATION/TRAINING PROGRAM

## 2023-09-21 PROCEDURE — 3044F HG A1C LEVEL LT 7.0%: CPT | Mod: CPTII,,, | Performed by: STUDENT IN AN ORGANIZED HEALTH CARE EDUCATION/TRAINING PROGRAM

## 2023-09-21 PROCEDURE — 4010F PR ACE/ARB THEARPY RXD/TAKEN: ICD-10-PCS | Mod: CPTII,,, | Performed by: STUDENT IN AN ORGANIZED HEALTH CARE EDUCATION/TRAINING PROGRAM

## 2023-09-21 PROCEDURE — 90694 VACC AIIV4 NO PRSRV 0.5ML IM: CPT | Mod: ,,, | Performed by: STUDENT IN AN ORGANIZED HEALTH CARE EDUCATION/TRAINING PROGRAM

## 2023-09-21 PROCEDURE — G0008 ADMIN INFLUENZA VIRUS VAC: HCPCS | Mod: ,,, | Performed by: STUDENT IN AN ORGANIZED HEALTH CARE EDUCATION/TRAINING PROGRAM

## 2023-09-21 PROCEDURE — 1159F PR MEDICATION LIST DOCUMENTED IN MEDICAL RECORD: ICD-10-PCS | Mod: CPTII,,, | Performed by: STUDENT IN AN ORGANIZED HEALTH CARE EDUCATION/TRAINING PROGRAM

## 2023-09-21 PROCEDURE — 3008F PR BODY MASS INDEX (BMI) DOCUMENTED: ICD-10-PCS | Mod: CPTII,,, | Performed by: STUDENT IN AN ORGANIZED HEALTH CARE EDUCATION/TRAINING PROGRAM

## 2023-09-21 PROCEDURE — 1101F PR PT FALLS ASSESS DOC 0-1 FALLS W/OUT INJ PAST YR: ICD-10-PCS | Mod: CPTII,,, | Performed by: STUDENT IN AN ORGANIZED HEALTH CARE EDUCATION/TRAINING PROGRAM

## 2023-09-21 PROCEDURE — 1101F PT FALLS ASSESS-DOCD LE1/YR: CPT | Mod: CPTII,,, | Performed by: STUDENT IN AN ORGANIZED HEALTH CARE EDUCATION/TRAINING PROGRAM

## 2023-09-21 PROCEDURE — 3288F PR FALLS RISK ASSESSMENT DOCUMENTED: ICD-10-PCS | Mod: CPTII,,, | Performed by: STUDENT IN AN ORGANIZED HEALTH CARE EDUCATION/TRAINING PROGRAM

## 2023-09-21 PROCEDURE — 3288F FALL RISK ASSESSMENT DOCD: CPT | Mod: CPTII,,, | Performed by: STUDENT IN AN ORGANIZED HEALTH CARE EDUCATION/TRAINING PROGRAM

## 2023-09-21 PROCEDURE — 3075F PR MOST RECENT SYSTOLIC BLOOD PRESS GE 130-139MM HG: ICD-10-PCS | Mod: CPTII,,, | Performed by: STUDENT IN AN ORGANIZED HEALTH CARE EDUCATION/TRAINING PROGRAM

## 2023-09-21 PROCEDURE — 4010F ACE/ARB THERAPY RXD/TAKEN: CPT | Mod: CPTII,,, | Performed by: STUDENT IN AN ORGANIZED HEALTH CARE EDUCATION/TRAINING PROGRAM

## 2023-09-21 PROCEDURE — 3078F PR MOST RECENT DIASTOLIC BLOOD PRESSURE < 80 MM HG: ICD-10-PCS | Mod: CPTII,,, | Performed by: STUDENT IN AN ORGANIZED HEALTH CARE EDUCATION/TRAINING PROGRAM

## 2023-09-21 PROCEDURE — G0008 FLU VACCINE - QUADRIVALENT - ADJUVANTED: ICD-10-PCS | Mod: ,,, | Performed by: STUDENT IN AN ORGANIZED HEALTH CARE EDUCATION/TRAINING PROGRAM

## 2023-09-21 RX ORDER — ATORVASTATIN CALCIUM 20 MG/1
20 TABLET, FILM COATED ORAL DAILY
Qty: 90 TABLET | Refills: 3 | Status: SHIPPED | OUTPATIENT
Start: 2023-09-21 | End: 2024-02-14 | Stop reason: SDUPTHER

## 2023-09-21 NOTE — PROGRESS NOTES
"Subjective:      Patient ID: Vaibhav Vargas is a 72 y.o.  male. Patient accompanied by his sister, Ms. Taylor Vargas.    Chief Complaint: Medicare Wellness    Preventative Health: Hepatitis C Antibody negative.    IDDMII: A1c 5.5 from 11/07/22. He checks his fasting blood sugars in the mornings, averaging 80-100s. He gives himself 45U of Humulin every morning. He states compliance with Metformin 1000mg BID. He is following with [facility]. He is also following with Dr. Raji Green with Podiatry.     HTN/CAD/LE Edema: Patient following with Dr. Casey Steel with Cardiology. Patient taking Plavix, Losartan, Propranolol, Torsemide, and Verapamil.      Chronic Back Pain: Patient following with Pain Management. Patient taking Gabapentin, but states it's not really helping. Patient used to take Norco.    Review of Systems   Constitutional:  Negative for activity change, appetite change, chills, diaphoresis, fatigue, fever and unexpected weight change.   Eyes:  Negative for visual disturbance.   Respiratory:  Negative for apnea, cough and shortness of breath.    Cardiovascular:  Positive for leg swelling. Negative for chest pain and palpitations.   Gastrointestinal:  Negative for abdominal pain, blood in stool, constipation, diarrhea, nausea and vomiting.   Genitourinary:  Negative for dysuria and hematuria.   Musculoskeletal:  Positive for back pain (chronic). Negative for arthralgias.   Skin:  Positive for rash (venous stasis dermatitis). Negative for wound.   Neurological:  Negative for dizziness, syncope, weakness, numbness and headaches.   Psychiatric/Behavioral:  Negative for behavioral problems, dysphoric mood and sleep disturbance. The patient is not nervous/anxious.      Objective:   /75 (BP Location: Right arm, Patient Position: Sitting, BP Method: Large (Automatic))   Pulse 69   Temp 97.9 °F (36.6 °C) (Temporal)   Resp 18   Ht 6' 2" (1.88 m)   Wt 123.2 kg (271 lb 9.6 oz)   " SpO2 100%   BMI 34.87 kg/m²     Physical Exam  Vitals and nursing note reviewed.   Constitutional:       General: He is not in acute distress.     Appearance: Normal appearance. He is obese. He is not ill-appearing or toxic-appearing.   HENT:      Head: Normocephalic and atraumatic.      Mouth/Throat:      Mouth: Mucous membranes are moist.      Pharynx: Oropharynx is clear.   Eyes:      Conjunctiva/sclera: Conjunctivae normal.   Cardiovascular:      Rate and Rhythm: Normal rate and regular rhythm.      Heart sounds: Normal heart sounds. No murmur heard.  Pulmonary:      Effort: Pulmonary effort is normal. No respiratory distress.      Breath sounds: Normal breath sounds. No wheezing.   Abdominal:      General: Bowel sounds are normal. There is no distension.      Palpations: Abdomen is soft.      Tenderness: There is no abdominal tenderness.   Musculoskeletal:         General: No deformity. Normal range of motion.      Cervical back: Neck supple. No tenderness.      Right lower leg: Edema (2+ pitting) present.      Left lower leg: Edema (2+ pitting) present.   Lymphadenopathy:      Cervical: No cervical adenopathy.   Skin:     General: Skin is warm and dry.      Findings: Rash (brawny skin discoloration to bilateral shins) present. No lesion.   Neurological:      General: No focal deficit present.      Mental Status: He is alert. Mental status is at baseline.      Motor: No weakness.      Coordination: Coordination normal.   Psychiatric:         Mood and Affect: Mood normal.         Behavior: Behavior normal.         Thought Content: Thought content normal.         Judgment: Judgment normal.       Assessment/Plan:   1. Medicare annual wellness visit, subsequent  Comments:  - Health maintenance reviewed.  Orders:  -     Influenza (FLUAD) - Quadrivalent (Adjuvanted) *Preferred* (65+) (PF)  -     Cologuard Screening (Multitarget Stool DNA); Future; Expected date: 09/21/2023    2. Need for influenza vaccination  -      Influenza (FLUAD) - Quadrivalent (Adjuvanted) *Preferred* (65+) (PF)    3. Screening for malignant neoplasm of colon  -     Cologuard Screening (Multitarget Stool DNA); Future; Expected date: 09/21/2023    4. Type 2 diabetes mellitus without complication, with long-term current use of insulin  Assessment & Plan:  - Labs for routine monitoring.  - Patient following with [facility].  - Patient following with Dr. Raji Green with Podiatry.  - Starting Lipitor 20mg daily.  - Continue Humulin 45U daily and Metformin 1000mg BID.   - Continue glucose monitoring.      Orders:  -     Hemoglobin A1C; Future; Expected date: 09/21/2023  -     atorvastatin (LIPITOR) 20 MG tablet; Take 1 tablet (20 mg total) by mouth once daily.  Dispense: 90 tablet; Refill: 3    5. Primary hypertension  Assessment & Plan:  - Patient following with Dr. Casey Steel with Cardiology.   - He is taking Plavix 75mg daily, Losartan 50mg daily, Propranolol 40mg daily, and Verapamil 240mg daily.      6. Coronary artery disease, unspecified vessel or lesion type, unspecified whether angina present, unspecified whether native or transplanted heart  Assessment & Plan:  - Refer to HTN plan.    Orders:  -     atorvastatin (LIPITOR) 20 MG tablet; Take 1 tablet (20 mg total) by mouth once daily.  Dispense: 90 tablet; Refill: 3    7. Edema, unspecified type  Assessment & Plan:  - Patient taking Torsemide.      8. Chronic low back pain, unspecified back pain laterality, unspecified whether sciatica present  Assessment & Plan:  - Patient following with Pain Management.   - Pain Management office visit from 01/31/23 reviewed.  - Patient taking Gabapentin, but states it's not really helping. Patient used to take Norco.  - Patient informed that I would not prescribe Norco. He verbalized understanding.         Opioid Screening: Patient medication list reviewed, patient is taking prescription opioids. Patient is not using additional opioids than prescribed.  Patient is not at low risk of substance abuse based on this opioid use history.     Patient Reported Health Risk Assessment  What [age] 70-79  Are you male or female?: Male  During the past four weeks, how much have you been bothered by emotional problems such as feeling anxious, depressed, irritable, sad, or downhearted and blue?: Not at all  During the past five weeks, has your physical and/or emotional health limited your social activities with family, friends, neighbors, or groups?: Not at all  During the past four weeks, how much bodily pain have you generally had?: Mild pain  During the past four weeks, was someone available to help if you needed and wanted help?: Yes, as much as I wanted  During the past four weeks, what was the hardest physical activity you could do for at least two minutes?: Moderate  Can you get to places out of walking distance without help?  (For example, can you travel alone on buses or taxis, or drive your own car?): No  Can you go shopping for groceries or clothes without someone's help?: Yes  Can you prepare your own meals?: Yes  Can you do your own housework without help?: Yes  Because of any health problems, do you need the help of another person with your personal care needs such as eating, bathing, dressing, or getting around the house?: No  Can you handle your own money without help?: Yes  During the past four weeks, how would you rate your health in general?: Good  How have things been going for you during the past four weeks?: Very well  Are you having difficulties driving your car?: No  Do you always fasten your seat belt when you are in a car?: Yes, usually  How often in the past four weeks have you been bothered by falling or dizzy when standing up?: Never  How often in the past four weeks have you been bothered by sexual problems?: Never  How often in the past four weeks have you been bothered by trouble eating well?: Never  How often in the past four weeks have  you been bothered by teeth or denture problems?: Never  How often in the past four weeks have you been bothered with problems using the telephone?: Never  How often in the past four weeks have you been bothered by tiredness or fatigue?: Never  Have you fallen two or more times in the past year?: No  Are you afraid of falling?: No  Are you a smoker?: No  During the past four weeks, how many drinks of wine, beer, or other alcoholic beverages did you have?: No alcohol at all  Do you exercise for about 20 minutes three or more days a week?: No, I usually do not exercise this much  Have you been given any information to help you with hazards in your house that might hurt you?: No  Have you been given any information to help you with keeping track of your medications?: No  How often do you have trouble taking medicines the way you've been told to take them?: I do not have to take medicine  How confident are you that you can control and manage most of your health problems?: Not very confident  What is your race? (Check all that apply.):  or other ,  or [place] Native    Medicare Annual Wellness and Personalized Prevention Plan:   Fall Risk + Home Safety + Hearing Impairment + Depression Screen + Opioid and Substance Abuse Screening + Cognitive Impairment Screen + Health Risk Assessment all reviewed.     Advance Care Planning   I attest to discussing Advance Care Planning with patient and/or family member.  Education was provided including the importance of the Health Care Power of , Advance Directives, and/or [place] documentation.  The patient expressed understanding to the importance of this information and discussion.       Follow up in about 3 months (around 12/21/2023) for Chronic Medical Management.

## 2023-09-21 NOTE — ASSESSMENT & PLAN NOTE
- Labs for routine monitoring.  - Patient following with [facility].  - Patient following with Dr. Raji Green with Podiatry.  - Continue Lipitor 20mg daily.  - Continue Humulin 45U daily and Metformin 1000mg BID.   - Continue glucose monitoring.

## 2023-09-22 ENCOUNTER — TELEPHONE (OUTPATIENT)
Dept: FAMILY MEDICINE | Facility: CLINIC | Age: 73
End: 2023-09-22
Payer: MEDICARE

## 2023-09-22 NOTE — TELEPHONE ENCOUNTER
----- Message from Molly Momin sent at 9/22/2023 10:09 AM CDT -----  Regarding: med advice  .Type:  Needs Medical Advice    Who Called: Taylor pt's sister  Symptoms (please be specific):    How long has patient had these symptoms:    Pharmacy name and phone #:    Would the patient rather a call back or a response via [facility]? Call back  Best Call Back Number: [phone]  Additional Information: Pt states he was told to stop taking insulin for 3 weeks, sister following up to make sure that's what was told.

## 2023-10-25 ENCOUNTER — PATIENT OUTREACH (OUTPATIENT)
Dept: ADMINISTRATIVE | Facility: HOSPITAL | Age: 73
End: 2023-10-25
Payer: MEDICARE

## 2023-10-25 NOTE — LETTER
AUTHORIZATION FOR RELEASE OF   CONFIDENTIAL INFORMATION    Dear [facility], Fax [phone]    We are seeing Vaibhav Vargas, date of birth 1950, in the clinic at [facility]. Shameka Rooney DO is the patient's PCP. Vaibhav Vargas has an outstanding lab/procedure at the time we reviewed his chart. In order to help keep his health information updated, he has authorized us to request the following medical record(s):        (  )  MAMMOGRAM                                      (  )  COLONOSCOPY      (  )  PAP SMEAR                                          (  )  OUTSIDE LAB RESULTS     (  )  DEXA SCAN                                          ( X )  EYE EXAM            (  )  FOOT EXAM                                          (  )  ENTIRE RECORD     (  )  OUTSIDE IMMUNIZATIONS                 (  )  _______________         Please fax records to [facility], Nguyen, Mimi T, DO, [phone] or [phone].       If you have any questions you can contact Ana Friend at [phone].           Patient Name: Vaibhav Vargas  : 1950  Patient Phone #: [phone]

## 2023-10-25 NOTE — PROGRESS NOTES
Population Health. Out Reach. Reviewing patient's chart for quality metrics. Records request sent to [facility] for DM eye exam.

## 2023-12-04 DIAGNOSIS — E11.9 TYPE 2 DIABETES MELLITUS WITHOUT COMPLICATION, WITH LONG-TERM CURRENT USE OF INSULIN: Primary | ICD-10-CM

## 2023-12-04 DIAGNOSIS — Z79.4 TYPE 2 DIABETES MELLITUS WITHOUT COMPLICATION, WITH LONG-TERM CURRENT USE OF INSULIN: Primary | ICD-10-CM

## 2023-12-04 RX ORDER — INSULIN HUMAN 100 [IU]/ML
45 INJECTION, SUSPENSION SUBCUTANEOUS DAILY
Qty: 20 ML | Refills: 2 | Status: SHIPPED | OUTPATIENT
Start: 2023-12-04 | End: 2024-02-14 | Stop reason: SDUPTHER

## 2023-12-04 NOTE — TELEPHONE ENCOUNTER
----- Message from Fracisco Simpson sent at 12/4/2023  8:57 AM CST -----  Regarding: refill  .Type:  RX Refill Request    Who Called: dimitrios Beckford or New Rx:refill  RX Name and Strength:HUMULIN N NPH U-100 INSULIN 100 unit/mL injection  How is the patient currently taking it? (ex. 1XDay):1x day  Is this a 30 day or 90 day RX:90  Preferred Pharmacy with phone number: [facility]   Local or Mail Order:local  Ordering Provider:  Would the patient rather a call back or a response via [facility]?   Best Call Back Number:[phone]  Additional Information:

## 2023-12-18 DIAGNOSIS — E11.9 TYPE 2 DIABETES MELLITUS WITHOUT COMPLICATION, WITHOUT LONG-TERM CURRENT USE OF INSULIN: ICD-10-CM

## 2023-12-18 RX ORDER — METFORMIN HYDROCHLORIDE 1000 MG/1
1000 TABLET ORAL 2 TIMES DAILY
Qty: 180 TABLET | Refills: 3 | Status: SHIPPED | OUTPATIENT
Start: 2023-12-18 | End: 2024-02-14 | Stop reason: SDUPTHER

## 2024-01-24 DIAGNOSIS — I10 PRIMARY HYPERTENSION: ICD-10-CM

## 2024-01-24 DIAGNOSIS — I25.10 CORONARY ARTERY DISEASE, UNSPECIFIED VESSEL OR LESION TYPE, UNSPECIFIED WHETHER ANGINA PRESENT, UNSPECIFIED WHETHER NATIVE OR TRANSPLANTED HEART: Primary | ICD-10-CM

## 2024-01-24 RX ORDER — CLOPIDOGREL BISULFATE 75 MG/1
75 TABLET ORAL DAILY
Qty: 90 TABLET | Refills: 1 | OUTPATIENT
Start: 2024-01-24

## 2024-01-25 RX ORDER — CLOPIDOGREL BISULFATE 75 MG/1
75 TABLET ORAL DAILY
Qty: 90 TABLET | Refills: 3 | Status: SHIPPED | OUTPATIENT
Start: 2024-01-25

## 2024-01-25 NOTE — TELEPHONE ENCOUNTER
I have ordered the following medication. Please notify the patient.    Medications Ordered This Encounter   Medications    clopidogreL (PLAVIX) 75 mg tablet     Sig: Take 1 tablet (75 mg total) by mouth once daily.     Dispense:  90 tablet     Refill:  3

## 2024-02-12 RX ORDER — TORSEMIDE 20 MG/1
TABLET ORAL
COMMUNITY
Start: 2023-11-27 | End: 2024-02-14

## 2024-02-14 ENCOUNTER — OFFICE VISIT (OUTPATIENT)
Dept: FAMILY MEDICINE | Facility: CLINIC | Age: 74
End: 2024-02-14
Payer: MEDICARE

## 2024-02-14 ENCOUNTER — LAB VISIT (OUTPATIENT)
Dept: LAB | Facility: HOSPITAL | Age: 74
End: 2024-02-14
Attending: STUDENT IN AN ORGANIZED HEALTH CARE EDUCATION/TRAINING PROGRAM
Payer: MEDICARE

## 2024-02-14 VITALS
TEMPERATURE: 98 F | SYSTOLIC BLOOD PRESSURE: 126 MMHG | DIASTOLIC BLOOD PRESSURE: 72 MMHG | OXYGEN SATURATION: 98 % | HEIGHT: 74 IN | RESPIRATION RATE: 16 BRPM | HEART RATE: 71 BPM | BODY MASS INDEX: 35.74 KG/M2 | WEIGHT: 278.5 LBS

## 2024-02-14 DIAGNOSIS — I10 PRIMARY HYPERTENSION: ICD-10-CM

## 2024-02-14 DIAGNOSIS — E11.9 TYPE 2 DIABETES MELLITUS WITHOUT COMPLICATION, WITHOUT LONG-TERM CURRENT USE OF INSULIN: ICD-10-CM

## 2024-02-14 DIAGNOSIS — M54.50 CHRONIC LOW BACK PAIN, UNSPECIFIED BACK PAIN LATERALITY, UNSPECIFIED WHETHER SCIATICA PRESENT: ICD-10-CM

## 2024-02-14 DIAGNOSIS — Z79.4 TYPE 2 DIABETES MELLITUS WITHOUT COMPLICATION, WITH LONG-TERM CURRENT USE OF INSULIN: Primary | ICD-10-CM

## 2024-02-14 DIAGNOSIS — G89.29 CHRONIC LOW BACK PAIN, UNSPECIFIED BACK PAIN LATERALITY, UNSPECIFIED WHETHER SCIATICA PRESENT: ICD-10-CM

## 2024-02-14 DIAGNOSIS — Z12.11 SCREENING FOR MALIGNANT NEOPLASM OF COLON: ICD-10-CM

## 2024-02-14 DIAGNOSIS — R60.9 EDEMA, UNSPECIFIED TYPE: ICD-10-CM

## 2024-02-14 DIAGNOSIS — E11.9 TYPE 2 DIABETES MELLITUS WITHOUT COMPLICATION, WITH LONG-TERM CURRENT USE OF INSULIN: Primary | ICD-10-CM

## 2024-02-14 DIAGNOSIS — I25.10 CORONARY ARTERY DISEASE, UNSPECIFIED VESSEL OR LESION TYPE, UNSPECIFIED WHETHER ANGINA PRESENT, UNSPECIFIED WHETHER NATIVE OR TRANSPLANTED HEART: ICD-10-CM

## 2024-02-14 DIAGNOSIS — Z79.4 TYPE 2 DIABETES MELLITUS WITHOUT COMPLICATION, WITH LONG-TERM CURRENT USE OF INSULIN: ICD-10-CM

## 2024-02-14 DIAGNOSIS — E11.9 TYPE 2 DIABETES MELLITUS WITHOUT COMPLICATION, WITH LONG-TERM CURRENT USE OF INSULIN: ICD-10-CM

## 2024-02-14 LAB
CREAT UR-MCNC: 57.5 MG/DL (ref 63–166)
EST. AVERAGE GLUCOSE BLD GHB EST-MCNC: 99.7 MG/DL
HBA1C MFR BLD: 5.1 %
MICROALBUMIN UR-MCNC: 10.2 UG/ML
MICROALBUMIN/CREAT RATIO PNL UR: 17.7 MG/GM CR (ref 0–30)

## 2024-02-14 PROCEDURE — 99214 OFFICE O/P EST MOD 30 MIN: CPT | Mod: ,,, | Performed by: STUDENT IN AN ORGANIZED HEALTH CARE EDUCATION/TRAINING PROGRAM

## 2024-02-14 PROCEDURE — 83036 HEMOGLOBIN GLYCOSYLATED A1C: CPT

## 2024-02-14 PROCEDURE — 36415 COLL VENOUS BLD VENIPUNCTURE: CPT

## 2024-02-14 PROCEDURE — 82043 UR ALBUMIN QUANTITATIVE: CPT

## 2024-02-14 RX ORDER — ATORVASTATIN CALCIUM 20 MG/1
20 TABLET, FILM COATED ORAL DAILY
Qty: 90 TABLET | Refills: 3 | Status: SHIPPED | OUTPATIENT
Start: 2024-02-14 | End: 2025-02-13

## 2024-02-14 RX ORDER — INSULIN HUMAN 100 [IU]/ML
45 INJECTION, SUSPENSION SUBCUTANEOUS DAILY
Qty: 20 ML | Refills: 2 | Status: ON HOLD | OUTPATIENT
Start: 2024-02-14 | End: 2024-03-24 | Stop reason: HOSPADM

## 2024-02-14 RX ORDER — METFORMIN HYDROCHLORIDE 1000 MG/1
1000 TABLET ORAL 2 TIMES DAILY
Qty: 180 TABLET | Refills: 3 | Status: SHIPPED | OUTPATIENT
Start: 2024-02-14

## 2024-02-14 NOTE — PROGRESS NOTES
"Subjective:      Patient ID: Vaibhav Vargas is a 73 y.o.  male. Patient accompanied by his sister, Ms. Taylor Vargas.     Chief Complaint: Chronic Medical Management    IDDMII: A1c 5.2 from 09/21/23. He checks his fasting blood sugars in the mornings, averaging 80-100s. He gives himself 45U of Humulin every morning. He states compliance with Metformin 1000mg BID. He is following with [facility]. He is also following with Dr. Raji Green with Podiatry.     HTN/CAD/LE Edema: Patient following with Dr. Casey Steel with Cardiology. Patient taking Plavix, Losartan, Propranolol, Lasix, and Verapamil.      Chronic Back Pain: Patient following with Pain Management. Patient taking Gabapentin, but states it's not really helping. Patient used to take [place].    Preventative Health: Medicare Wellness completed on 09/21/23. Patient amenable to colonoscopy referral.    Review of Systems   Constitutional:  Negative for activity change, appetite change, chills, diaphoresis, fatigue, fever and unexpected weight change.   Eyes:  Negative for visual disturbance.   Respiratory:  Negative for apnea, cough and shortness of breath.    Cardiovascular:  Positive for leg swelling. Negative for chest pain and palpitations.   Gastrointestinal:  Negative for abdominal pain, blood in stool, constipation, diarrhea, nausea and vomiting.   Genitourinary:  Negative for dysuria and hematuria.   Musculoskeletal:  Positive for back pain (chronic). Negative for arthralgias.   Skin:  Positive for rash (venous stasis dermatitis). Negative for wound.   Neurological:  Negative for dizziness, syncope, weakness, numbness and headaches.   Psychiatric/Behavioral:  Negative for behavioral problems, dysphoric mood and sleep disturbance. The patient is not nervous/anxious.      Objective:   /72 (BP Location: Right arm)   Pulse 71   Temp 97.9 °F (36.6 °C) (Temporal)   Resp 16   Ht 6' 2" (1.88 m)   Wt 126.3 kg (278 lb 8 oz)   " SpO2 98%   BMI 35.76 kg/m²     Physical Exam  Vitals and nursing note reviewed.   Constitutional:       General: He is not in acute distress.     Appearance: Normal appearance. He is obese. He is not ill-appearing or toxic-appearing.   HENT:      Head: Normocephalic and atraumatic.   Eyes:      Conjunctiva/sclera: Conjunctivae normal.   Cardiovascular:      Rate and Rhythm: Normal rate and regular rhythm.      Heart sounds: Normal heart sounds. No murmur heard.  Pulmonary:      Effort: Pulmonary effort is normal. No respiratory distress.      Breath sounds: Normal breath sounds. No wheezing.   Abdominal:      General: Bowel sounds are normal. There is no distension.      Palpations: Abdomen is soft.      Tenderness: There is no abdominal tenderness.   Musculoskeletal:         General: No deformity. Normal range of motion.      Right lower leg: Edema (2+ pitting) present.      Left lower leg: Edema (2+ pitting) present.   Skin:     General: Skin is warm and dry.      Findings: Rash (brawny skin discoloration to bilateral shins) present. No lesion.   Neurological:      General: No focal deficit present.      Mental Status: He is alert. Mental status is at baseline.      Motor: No weakness.      Coordination: Coordination normal.   Psychiatric:         Mood and Affect: Mood normal.         Behavior: Behavior normal.         Thought Content: Thought content normal.         Judgment: Judgment normal.       Assessment/Plan:   1. Type 2 diabetes mellitus without complication, with long-term current use of insulin  Assessment & Plan:  - Labs for routine monitoring.  - Patient following with [facility].  - Patient following with Dr. Raji Green with Podiatry.  - Continue Lipitor 20mg daily.  - Continue Humulin 45U daily and Metformin 1000mg BID.   - Continue glucose monitoring.      Orders:  -     Hemoglobin A1C; Future; Expected date: 02/14/2024  -     Microalbumin/Creatinine Ratio, Urine; Future; Expected date:  02/14/2024  -     atorvastatin (LIPITOR) 20 MG tablet; Take 1 tablet (20 mg total) by mouth once daily.  Dispense: 90 tablet; Refill: 3  -     insulin NPH (HUMULIN N NPH U-100 INSULIN) 100 unit/mL injection; Inject 45 Units into the skin once daily.  Dispense: 20 mL; Refill: 2    2. Type 2 diabetes mellitus without complication, without long-term current use of insulin  Assessment & Plan:  - Labs for routine monitoring.  - Patient following with [facility].  - Patient following with Dr. Raji Green with Podiatry.  - Continue Lipitor 20mg daily.  - Continue Humulin 45U daily and Metformin 1000mg BID.   - Continue glucose monitoring.      Orders:  -     metFORMIN (GLUCOPHAGE) 1000 MG tablet; Take 1 tablet (1,000 mg total) by mouth 2 (two) times daily.  Dispense: 180 tablet; Refill: 3    3. Primary hypertension  Assessment & Plan:  - BP well-controlled.  - Patient following with Dr. Casey Steel with Cardiology.   - He is taking Plavix 75mg daily, Losartan 50mg daily, Propranolol 40mg daily, and Verapamil 240mg daily.      4. Coronary artery disease, unspecified vessel or lesion type, unspecified whether angina present, unspecified whether native or transplanted heart  Assessment & Plan:  - Refer to HTN plan.    Orders:  -     atorvastatin (LIPITOR) 20 MG tablet; Take 1 tablet (20 mg total) by mouth once daily.  Dispense: 90 tablet; Refill: 3    5. Edema, unspecified type  Assessment & Plan:  - Stable.  - Patient taking Lasix.      6. Chronic low back pain, unspecified back pain laterality, unspecified whether sciatica present  Assessment & Plan:  - Patient following with Pain Management.   - Pain Management office visit from 01/31/23 reviewed.  - Patient taking Gabapentin, but states it's not really helping. Patient used to take Norco.  - Patient informed that I would not prescribe Norco. He verbalized understanding.      7. Screening for malignant neoplasm of colon  -     Ambulatory referral/consult to  Gastroenterology; Future; Expected date: 02/21/2024       Follow up in about 3 months (around 5/14/2024) for Chronic Medical Management.

## 2024-02-14 NOTE — ASSESSMENT & PLAN NOTE
- BP well-controlled.  - Patient following with Dr. Casey Steel with Cardiology.   - He is taking Plavix 75mg daily, Losartan 50mg daily, Propranolol 40mg daily, and Verapamil 240mg daily.

## 2024-02-14 NOTE — ASSESSMENT & PLAN NOTE
- Labs for routine monitoring.  - Patient following with [facility].  - Patient following with Dr. Raji Green with Podiatry.  - Continue Lipitor 20mg daily.  - Continue Humulin 45U daily and Metformin 1000mg BID.   - Continue glucose monitoring.

## 2024-02-26 ENCOUNTER — PATIENT OUTREACH (OUTPATIENT)
Dept: ADMINISTRATIVE | Facility: HOSPITAL | Age: 74
End: 2024-02-26
Payer: MEDICARE

## 2024-02-26 NOTE — PROGRESS NOTES
Population Health. Out Reach.  The following record(s)  below were uploaded for Health Maintenance .    DM FOOT EXAM   9/13/23

## 2024-03-22 ENCOUNTER — HOSPITAL ENCOUNTER (OUTPATIENT)
Facility: HOSPITAL | Age: 74
Discharge: HOME | End: 2024-03-24
Attending: EMERGENCY MEDICINE | Admitting: INTERNAL MEDICINE
Payer: MEDICARE

## 2024-03-22 DIAGNOSIS — R79.89 TROPONIN LEVEL ELEVATED: ICD-10-CM

## 2024-03-22 DIAGNOSIS — I21.4 NSTEMI (NON-ST ELEVATED MYOCARDIAL INFARCTION): Primary | ICD-10-CM

## 2024-03-22 DIAGNOSIS — E16.2 HYPOGLYCEMIA: ICD-10-CM

## 2024-03-22 DIAGNOSIS — N39.0 URINARY TRACT INFECTION WITHOUT HEMATURIA, SITE UNSPECIFIED: ICD-10-CM

## 2024-03-22 LAB
ALBUMIN SERPL-MCNC: 3.7 G/DL (ref 3.4–4.8)
ALBUMIN/GLOB SERPL: 1 RATIO (ref 1.1–2)
ALP SERPL-CCNC: 58 UNIT/L (ref 40–150)
ALT SERPL-CCNC: 9 UNIT/L (ref 0–55)
APPEARANCE UR: ABNORMAL
AST SERPL-CCNC: 17 UNIT/L (ref 5–34)
BACTERIA #/AREA URNS AUTO: ABNORMAL /HPF
BASOPHILS # BLD AUTO: 0.1 X10(3)/MCL
BASOPHILS NFR BLD AUTO: 1.2 %
BILIRUB SERPL-MCNC: 0.7 MG/DL
BILIRUB UR QL STRIP.AUTO: NEGATIVE
BUN SERPL-MCNC: 9.9 MG/DL (ref 8.4–25.7)
CALCIUM SERPL-MCNC: 9.6 MG/DL (ref 8.8–10)
CHLORIDE SERPL-SCNC: 104 MMOL/L (ref 98–107)
CO2 SERPL-SCNC: 26 MMOL/L (ref 23–31)
COLOR UR AUTO: ABNORMAL
CREAT SERPL-MCNC: 1.11 MG/DL (ref 0.73–1.18)
EOSINOPHIL # BLD AUTO: 0.3 X10(3)/MCL (ref 0–0.9)
EOSINOPHIL NFR BLD AUTO: 3.6 %
ERYTHROCYTE [DISTWIDTH] IN BLOOD BY AUTOMATED COUNT: 13 % (ref 11.5–17)
ETHANOL SERPL-MCNC: <10 MG/DL
GFR SERPLBLD CREATININE-BSD FMLA CKD-EPI: >60 MLS/MIN/1.73/M2
GLOBULIN SER-MCNC: 3.6 GM/DL (ref 2.4–3.5)
GLUCOSE SERPL-MCNC: 102 MG/DL (ref 82–115)
GLUCOSE UR QL STRIP.AUTO: NORMAL
HCT VFR BLD AUTO: 43 % (ref 42–52)
HGB BLD-MCNC: 14.3 G/DL (ref 14–18)
HYALINE CASTS #/AREA URNS LPF: ABNORMAL /LPF
IMM GRANULOCYTES # BLD AUTO: 0.04 X10(3)/MCL (ref 0–0.04)
IMM GRANULOCYTES NFR BLD AUTO: 0.5 %
KETONES UR QL STRIP.AUTO: NEGATIVE
LEUKOCYTE ESTERASE UR QL STRIP.AUTO: 500
LYMPHOCYTES # BLD AUTO: 1.71 X10(3)/MCL (ref 0.6–4.6)
LYMPHOCYTES NFR BLD AUTO: 20.4 %
MCH RBC QN AUTO: 31.6 PG (ref 27–31)
MCHC RBC AUTO-ENTMCNC: 33.3 G/DL (ref 33–36)
MCV RBC AUTO: 94.9 FL (ref 80–94)
MONOCYTES # BLD AUTO: 0.59 X10(3)/MCL (ref 0.1–1.3)
MONOCYTES NFR BLD AUTO: 7 %
MUCOUS THREADS URNS QL MICRO: ABNORMAL /LPF
NEUTROPHILS # BLD AUTO: 5.65 X10(3)/MCL (ref 2.1–9.2)
NEUTROPHILS NFR BLD AUTO: 67.3 %
NITRITE UR QL STRIP.AUTO: NEGATIVE
NRBC BLD AUTO-RTO: 0 %
PH UR STRIP.AUTO: 6.5 [PH]
PLATELET # BLD AUTO: 357 X10(3)/MCL (ref 130–400)
PMV BLD AUTO: 8.5 FL (ref 7.4–10.4)
POCT GLUCOSE: 105 MG/DL (ref 70–110)
POCT GLUCOSE: 41 MG/DL (ref 70–110)
POCT GLUCOSE: 92 MG/DL (ref 70–110)
POTASSIUM SERPL-SCNC: 4.6 MMOL/L (ref 3.5–5.1)
PROT SERPL-MCNC: 7.3 GM/DL (ref 5.8–7.6)
PROT UR QL STRIP.AUTO: NEGATIVE
RBC # BLD AUTO: 4.53 X10(6)/MCL (ref 4.7–6.1)
RBC #/AREA URNS AUTO: ABNORMAL /HPF
RBC UR QL AUTO: ABNORMAL
SODIUM SERPL-SCNC: 141 MMOL/L (ref 136–145)
SP GR UR STRIP.AUTO: 1.01 (ref 1–1.03)
SQUAMOUS #/AREA URNS LPF: ABNORMAL /HPF
TROPONIN I SERPL-MCNC: 0.18 NG/ML (ref 0–0.04)
TROPONIN I SERPL-MCNC: 0.21 NG/ML (ref 0–0.04)
UROBILINOGEN UR STRIP-ACNC: NORMAL
WBC # SPEC AUTO: 8.39 X10(3)/MCL (ref 4.5–11.5)
WBC #/AREA URNS AUTO: >100 /HPF
WBC CLUMPS UR QL AUTO: ABNORMAL

## 2024-03-22 PROCEDURE — 96374 THER/PROPH/DIAG INJ IV PUSH: CPT

## 2024-03-22 PROCEDURE — 81001 URINALYSIS AUTO W/SCOPE: CPT | Performed by: EMERGENCY MEDICINE

## 2024-03-22 PROCEDURE — 84484 ASSAY OF TROPONIN QUANT: CPT | Performed by: EMERGENCY MEDICINE

## 2024-03-22 PROCEDURE — 25000003 PHARM REV CODE 250: Performed by: EMERGENCY MEDICINE

## 2024-03-22 PROCEDURE — 87077 CULTURE AEROBIC IDENTIFY: CPT | Performed by: EMERGENCY MEDICINE

## 2024-03-22 PROCEDURE — 85025 COMPLETE CBC W/AUTO DIFF WBC: CPT | Performed by: EMERGENCY MEDICINE

## 2024-03-22 PROCEDURE — 99285 EMERGENCY DEPT VISIT HI MDM: CPT | Mod: 25

## 2024-03-22 PROCEDURE — 93005 ELECTROCARDIOGRAM TRACING: CPT

## 2024-03-22 PROCEDURE — 87086 URINE CULTURE/COLONY COUNT: CPT | Performed by: EMERGENCY MEDICINE

## 2024-03-22 PROCEDURE — 63600175 PHARM REV CODE 636 W HCPCS: Performed by: EMERGENCY MEDICINE

## 2024-03-22 PROCEDURE — 82962 GLUCOSE BLOOD TEST: CPT

## 2024-03-22 PROCEDURE — 82077 ASSAY SPEC XCP UR&BREATH IA: CPT | Performed by: EMERGENCY MEDICINE

## 2024-03-22 PROCEDURE — 80053 COMPREHEN METABOLIC PANEL: CPT | Performed by: EMERGENCY MEDICINE

## 2024-03-22 RX ORDER — LOSARTAN POTASSIUM 25 MG/1
50 TABLET ORAL DAILY
Status: DISCONTINUED | OUTPATIENT
Start: 2024-03-23 | End: 2024-03-24 | Stop reason: HOSPADM

## 2024-03-22 RX ORDER — POTASSIUM CHLORIDE 20 MEQ/1
20 TABLET, EXTENDED RELEASE ORAL DAILY
Status: DISCONTINUED | OUTPATIENT
Start: 2024-03-23 | End: 2024-03-24 | Stop reason: HOSPADM

## 2024-03-22 RX ORDER — VERAPAMIL HYDROCHLORIDE 120 MG/1
240 TABLET, FILM COATED, EXTENDED RELEASE ORAL DAILY
Status: DISCONTINUED | OUTPATIENT
Start: 2024-03-23 | End: 2024-03-24 | Stop reason: HOSPADM

## 2024-03-22 RX ORDER — ASPIRIN 325 MG
325 TABLET ORAL
Status: COMPLETED | OUTPATIENT
Start: 2024-03-22 | End: 2024-03-22

## 2024-03-22 RX ORDER — ATORVASTATIN CALCIUM 20 MG/1
20 TABLET, FILM COATED ORAL DAILY
Status: DISCONTINUED | OUTPATIENT
Start: 2024-03-23 | End: 2024-03-24 | Stop reason: HOSPADM

## 2024-03-22 RX ORDER — CEFTRIAXONE 1 G/1
1 INJECTION, POWDER, FOR SOLUTION INTRAMUSCULAR; INTRAVENOUS
Status: COMPLETED | OUTPATIENT
Start: 2024-03-22 | End: 2024-03-22

## 2024-03-22 RX ORDER — CLOPIDOGREL BISULFATE 75 MG/1
75 TABLET ORAL DAILY
Status: DISCONTINUED | OUTPATIENT
Start: 2024-03-23 | End: 2024-03-24 | Stop reason: HOSPADM

## 2024-03-22 RX ORDER — PROPRANOLOL HYDROCHLORIDE 20 MG/1
40 TABLET ORAL DAILY
Status: DISCONTINUED | OUTPATIENT
Start: 2024-03-23 | End: 2024-03-24 | Stop reason: HOSPADM

## 2024-03-22 RX ORDER — FUROSEMIDE 20 MG/1
40 TABLET ORAL DAILY
Status: DISCONTINUED | OUTPATIENT
Start: 2024-03-23 | End: 2024-03-24 | Stop reason: HOSPADM

## 2024-03-22 RX ADMIN — CEFTRIAXONE SODIUM 1 G: 1 INJECTION, POWDER, FOR SOLUTION INTRAMUSCULAR; INTRAVENOUS at 10:03

## 2024-03-22 RX ADMIN — ASPIRIN 325 MG ORAL TABLET 325 MG: 325 PILL ORAL at 10:03

## 2024-03-23 LAB
ALBUMIN SERPL-MCNC: 3.3 G/DL (ref 3.4–4.8)
ALBUMIN/GLOB SERPL: 1 RATIO (ref 1.1–2)
ALP SERPL-CCNC: 53 UNIT/L (ref 40–150)
ALT SERPL-CCNC: 9 UNIT/L (ref 0–55)
AST SERPL-CCNC: 15 UNIT/L (ref 5–34)
BASOPHILS # BLD AUTO: 0.07 X10(3)/MCL
BASOPHILS NFR BLD AUTO: 1 %
BILIRUB SERPL-MCNC: 0.4 MG/DL
BUN SERPL-MCNC: 9.9 MG/DL (ref 8.4–25.7)
CALCIUM SERPL-MCNC: 9.3 MG/DL (ref 8.8–10)
CHLORIDE SERPL-SCNC: 105 MMOL/L (ref 98–107)
CO2 SERPL-SCNC: 28 MMOL/L (ref 23–31)
CREAT SERPL-MCNC: 0.95 MG/DL (ref 0.73–1.18)
EOSINOPHIL # BLD AUTO: 0.24 X10(3)/MCL (ref 0–0.9)
EOSINOPHIL NFR BLD AUTO: 3.4 %
ERYTHROCYTE [DISTWIDTH] IN BLOOD BY AUTOMATED COUNT: 12.9 % (ref 11.5–17)
FOLATE SERPL-MCNC: 12.6 NG/ML (ref 7–31.4)
GFR SERPLBLD CREATININE-BSD FMLA CKD-EPI: >60 MLS/MIN/1.73/M2
GLOBULIN SER-MCNC: 3.2 GM/DL (ref 2.4–3.5)
GLUCOSE SERPL-MCNC: 63 MG/DL (ref 82–115)
HCT VFR BLD AUTO: 38.8 % (ref 42–52)
HGB BLD-MCNC: 12.6 G/DL (ref 14–18)
IMM GRANULOCYTES # BLD AUTO: 0.03 X10(3)/MCL (ref 0–0.04)
IMM GRANULOCYTES NFR BLD AUTO: 0.4 %
LYMPHOCYTES # BLD AUTO: 2.25 X10(3)/MCL (ref 0.6–4.6)
LYMPHOCYTES NFR BLD AUTO: 32.3 %
MAGNESIUM SERPL-MCNC: 2 MG/DL (ref 1.6–2.6)
MCH RBC QN AUTO: 30.6 PG (ref 27–31)
MCHC RBC AUTO-ENTMCNC: 32.5 G/DL (ref 33–36)
MCV RBC AUTO: 94.2 FL (ref 80–94)
MONOCYTES # BLD AUTO: 0.69 X10(3)/MCL (ref 0.1–1.3)
MONOCYTES NFR BLD AUTO: 9.9 %
NEUTROPHILS # BLD AUTO: 3.68 X10(3)/MCL (ref 2.1–9.2)
NEUTROPHILS NFR BLD AUTO: 53 %
NRBC BLD AUTO-RTO: 0 %
PHOSPHATE SERPL-MCNC: 3.3 MG/DL (ref 2.3–4.7)
PLATELET # BLD AUTO: 323 X10(3)/MCL (ref 130–400)
PMV BLD AUTO: 8.8 FL (ref 7.4–10.4)
POCT GLUCOSE: 102 MG/DL (ref 70–110)
POCT GLUCOSE: 109 MG/DL (ref 70–110)
POCT GLUCOSE: 123 MG/DL (ref 70–110)
POCT GLUCOSE: 166 MG/DL (ref 70–110)
POCT GLUCOSE: 209 MG/DL (ref 70–110)
POCT GLUCOSE: 35 MG/DL (ref 70–110)
POCT GLUCOSE: 70 MG/DL (ref 70–110)
POTASSIUM SERPL-SCNC: 4.6 MMOL/L (ref 3.5–5.1)
PROT SERPL-MCNC: 6.5 GM/DL (ref 5.8–7.6)
RBC # BLD AUTO: 4.12 X10(6)/MCL (ref 4.7–6.1)
SODIUM SERPL-SCNC: 142 MMOL/L (ref 136–145)
TROPONIN I SERPL-MCNC: 0.12 NG/ML (ref 0–0.04)
VIT B12 SERPL-MCNC: <148 PG/ML (ref 213–816)
WBC # SPEC AUTO: 6.96 X10(3)/MCL (ref 4.5–11.5)

## 2024-03-23 PROCEDURE — 93005 ELECTROCARDIOGRAM TRACING: CPT

## 2024-03-23 PROCEDURE — 25000003 PHARM REV CODE 250

## 2024-03-23 PROCEDURE — 94761 N-INVAS EAR/PLS OXIMETRY MLT: CPT

## 2024-03-23 PROCEDURE — 80053 COMPREHEN METABOLIC PANEL: CPT

## 2024-03-23 PROCEDURE — 82962 GLUCOSE BLOOD TEST: CPT

## 2024-03-23 PROCEDURE — 82746 ASSAY OF FOLIC ACID SERUM: CPT

## 2024-03-23 PROCEDURE — 63600175 PHARM REV CODE 636 W HCPCS

## 2024-03-23 PROCEDURE — 84484 ASSAY OF TROPONIN QUANT: CPT

## 2024-03-23 PROCEDURE — 85025 COMPLETE CBC W/AUTO DIFF WBC: CPT

## 2024-03-23 PROCEDURE — 82607 VITAMIN B-12: CPT

## 2024-03-23 PROCEDURE — G0378 HOSPITAL OBSERVATION PER HR: HCPCS

## 2024-03-23 PROCEDURE — 96372 THER/PROPH/DIAG INJ SC/IM: CPT

## 2024-03-23 PROCEDURE — 83735 ASSAY OF MAGNESIUM: CPT

## 2024-03-23 PROCEDURE — 84100 ASSAY OF PHOSPHORUS: CPT

## 2024-03-23 RX ORDER — SODIUM CHLORIDE 0.9 % (FLUSH) 0.9 %
10 SYRINGE (ML) INJECTION
Status: DISCONTINUED | OUTPATIENT
Start: 2024-03-23 | End: 2024-03-24 | Stop reason: HOSPADM

## 2024-03-23 RX ORDER — HYDRALAZINE HYDROCHLORIDE 20 MG/ML
10 INJECTION INTRAMUSCULAR; INTRAVENOUS EVERY 4 HOURS PRN
Status: DISCONTINUED | OUTPATIENT
Start: 2024-03-23 | End: 2024-03-24 | Stop reason: HOSPADM

## 2024-03-23 RX ORDER — IBUPROFEN 200 MG
24 TABLET ORAL
Status: DISCONTINUED | OUTPATIENT
Start: 2024-03-23 | End: 2024-03-24 | Stop reason: HOSPADM

## 2024-03-23 RX ORDER — ACETAMINOPHEN 325 MG/1
650 TABLET ORAL EVERY 6 HOURS PRN
Status: DISCONTINUED | OUTPATIENT
Start: 2024-03-23 | End: 2024-03-24 | Stop reason: HOSPADM

## 2024-03-23 RX ORDER — IBUPROFEN 200 MG
16 TABLET ORAL
Status: DISCONTINUED | OUTPATIENT
Start: 2024-03-23 | End: 2024-03-24 | Stop reason: HOSPADM

## 2024-03-23 RX ORDER — IBUPROFEN 200 MG
16 TABLET ORAL
Status: DISCONTINUED | OUTPATIENT
Start: 2024-03-23 | End: 2024-03-23

## 2024-03-23 RX ORDER — IBUPROFEN 200 MG
24 TABLET ORAL
Status: DISCONTINUED | OUTPATIENT
Start: 2024-03-23 | End: 2024-03-23

## 2024-03-23 RX ORDER — GLUCAGON 1 MG
1 KIT INJECTION
Status: DISCONTINUED | OUTPATIENT
Start: 2024-03-23 | End: 2024-03-23

## 2024-03-23 RX ORDER — INSULIN ASPART 100 [IU]/ML
0-10 INJECTION, SOLUTION INTRAVENOUS; SUBCUTANEOUS
Status: DISCONTINUED | OUTPATIENT
Start: 2024-03-23 | End: 2024-03-23

## 2024-03-23 RX ORDER — ENOXAPARIN SODIUM 100 MG/ML
40 INJECTION SUBCUTANEOUS EVERY 24 HOURS
Status: DISCONTINUED | OUTPATIENT
Start: 2024-03-23 | End: 2024-03-24 | Stop reason: HOSPADM

## 2024-03-23 RX ORDER — VERAPAMIL HYDROCHLORIDE 240 MG/1
240 TABLET, FILM COATED, EXTENDED RELEASE ORAL DAILY
Status: DISCONTINUED | OUTPATIENT
Start: 2024-03-23 | End: 2024-03-24 | Stop reason: HOSPADM

## 2024-03-23 RX ORDER — INSULIN ASPART 100 [IU]/ML
0-5 INJECTION, SOLUTION INTRAVENOUS; SUBCUTANEOUS
Status: DISCONTINUED | OUTPATIENT
Start: 2024-03-23 | End: 2024-03-24 | Stop reason: HOSPADM

## 2024-03-23 RX ORDER — TALC
6 POWDER (GRAM) TOPICAL NIGHTLY PRN
Status: DISCONTINUED | OUTPATIENT
Start: 2024-03-23 | End: 2024-03-24 | Stop reason: HOSPADM

## 2024-03-23 RX ORDER — GLUCAGON 1 MG
1 KIT INJECTION
Status: DISCONTINUED | OUTPATIENT
Start: 2024-03-23 | End: 2024-03-24 | Stop reason: HOSPADM

## 2024-03-23 RX ADMIN — INSULIN ASPART 2 UNITS: 100 INJECTION, SOLUTION INTRAVENOUS; SUBCUTANEOUS at 11:03

## 2024-03-23 RX ADMIN — FUROSEMIDE 40 MG: 20 TABLET ORAL at 09:03

## 2024-03-23 RX ADMIN — GLUCAGON 1 MG: KIT at 09:03

## 2024-03-23 RX ADMIN — POTASSIUM CHLORIDE 20 MEQ: 1500 TABLET, EXTENDED RELEASE ORAL at 09:03

## 2024-03-23 RX ADMIN — ATORVASTATIN CALCIUM 20 MG: 20 TABLET, FILM COATED ORAL at 09:03

## 2024-03-23 RX ADMIN — LOSARTAN POTASSIUM 50 MG: 25 TABLET, FILM COATED ORAL at 09:03

## 2024-03-23 RX ADMIN — VERAPAMIL HYDROCHLORIDE 240 MG: 240 TABLET ORAL at 01:03

## 2024-03-23 RX ADMIN — PROPRANOLOL HYDROCHLORIDE 40 MG: 20 TABLET ORAL at 09:03

## 2024-03-23 RX ADMIN — CLOPIDOGREL BISULFATE 75 MG: 75 TABLET ORAL at 09:03

## 2024-03-23 RX ADMIN — ENOXAPARIN SODIUM 40 MG: 40 INJECTION SUBCUTANEOUS at 06:03

## 2024-03-23 RX ADMIN — DEXTROSE MONOHYDRATE 250 ML: 100 INJECTION, SOLUTION INTRAVENOUS at 08:03
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
## 2024-03-24 VITALS
BODY MASS INDEX: 36.22 KG/M2 | HEART RATE: 64 BPM | HEIGHT: 74 IN | SYSTOLIC BLOOD PRESSURE: 155 MMHG | OXYGEN SATURATION: 96 % | RESPIRATION RATE: 18 BRPM | DIASTOLIC BLOOD PRESSURE: 78 MMHG | TEMPERATURE: 98 F | WEIGHT: 282.19 LBS

## 2024-03-24 PROBLEM — E16.2 HYPOGLYCEMIA: Status: ACTIVE | Noted: 2024-03-24

## 2024-03-24 PROBLEM — I21.4 NSTEMI (NON-ST ELEVATED MYOCARDIAL INFARCTION): Status: ACTIVE | Noted: 2024-03-24

## 2024-03-24 PROBLEM — I21.4 NSTEMI (NON-ST ELEVATED MYOCARDIAL INFARCTION): Status: RESOLVED | Noted: 2024-03-24 | Resolved: 2024-03-24

## 2024-03-24 LAB
ALBUMIN SERPL-MCNC: 3.2 G/DL (ref 3.4–4.8)
ALBUMIN/GLOB SERPL: 0.9 RATIO (ref 1.1–2)
ALP SERPL-CCNC: 49 UNIT/L (ref 40–150)
ALT SERPL-CCNC: 8 UNIT/L (ref 0–55)
AST SERPL-CCNC: 15 UNIT/L (ref 5–34)
BASOPHILS # BLD AUTO: 0.1 X10(3)/MCL
BASOPHILS NFR BLD AUTO: 1.7 %
BILIRUB SERPL-MCNC: 0.6 MG/DL
BUN SERPL-MCNC: 10.6 MG/DL (ref 8.4–25.7)
CALCIUM SERPL-MCNC: 9.4 MG/DL (ref 8.8–10)
CHLORIDE SERPL-SCNC: 105 MMOL/L (ref 98–107)
CO2 SERPL-SCNC: 29 MMOL/L (ref 23–31)
CREAT SERPL-MCNC: 1.08 MG/DL (ref 0.73–1.18)
EOSINOPHIL # BLD AUTO: 0.37 X10(3)/MCL (ref 0–0.9)
EOSINOPHIL NFR BLD AUTO: 6.3 %
ERYTHROCYTE [DISTWIDTH] IN BLOOD BY AUTOMATED COUNT: 13.1 % (ref 11.5–17)
GFR SERPLBLD CREATININE-BSD FMLA CKD-EPI: >60 MLS/MIN/1.73/M2
GLOBULIN SER-MCNC: 3.4 GM/DL (ref 2.4–3.5)
GLUCOSE SERPL-MCNC: 87 MG/DL (ref 82–115)
HCT VFR BLD AUTO: 38 % (ref 42–52)
HGB BLD-MCNC: 12.6 G/DL (ref 14–18)
IMM GRANULOCYTES # BLD AUTO: 0.02 X10(3)/MCL (ref 0–0.04)
IMM GRANULOCYTES NFR BLD AUTO: 0.3 %
LYMPHOCYTES # BLD AUTO: 2.5 X10(3)/MCL (ref 0.6–4.6)
LYMPHOCYTES NFR BLD AUTO: 42.4 %
MAGNESIUM SERPL-MCNC: 2.1 MG/DL (ref 1.6–2.6)
MCH RBC QN AUTO: 31.2 PG (ref 27–31)
MCHC RBC AUTO-ENTMCNC: 33.2 G/DL (ref 33–36)
MCV RBC AUTO: 94.1 FL (ref 80–94)
MONOCYTES # BLD AUTO: 0.78 X10(3)/MCL (ref 0.1–1.3)
MONOCYTES NFR BLD AUTO: 13.2 %
NEUTROPHILS # BLD AUTO: 2.13 X10(3)/MCL (ref 2.1–9.2)
NEUTROPHILS NFR BLD AUTO: 36.1 %
NRBC BLD AUTO-RTO: 0 %
PHOSPHATE SERPL-MCNC: 3.9 MG/DL (ref 2.3–4.7)
PLATELET # BLD AUTO: 313 X10(3)/MCL (ref 130–400)
PMV BLD AUTO: 8.6 FL (ref 7.4–10.4)
POCT GLUCOSE: 35 MG/DL (ref 70–110)
POCT GLUCOSE: 73 MG/DL (ref 70–110)
POTASSIUM SERPL-SCNC: 4.4 MMOL/L (ref 3.5–5.1)
PROT SERPL-MCNC: 6.6 GM/DL (ref 5.8–7.6)
RBC # BLD AUTO: 4.04 X10(6)/MCL (ref 4.7–6.1)
SODIUM SERPL-SCNC: 141 MMOL/L (ref 136–145)
WBC # SPEC AUTO: 5.9 X10(3)/MCL (ref 4.5–11.5)

## 2024-03-24 PROCEDURE — 25000003 PHARM REV CODE 250

## 2024-03-24 PROCEDURE — G0378 HOSPITAL OBSERVATION PER HR: HCPCS

## 2024-03-24 PROCEDURE — 84100 ASSAY OF PHOSPHORUS: CPT

## 2024-03-24 PROCEDURE — 83735 ASSAY OF MAGNESIUM: CPT

## 2024-03-24 PROCEDURE — 94761 N-INVAS EAR/PLS OXIMETRY MLT: CPT

## 2024-03-24 PROCEDURE — 85025 COMPLETE CBC W/AUTO DIFF WBC: CPT

## 2024-03-24 PROCEDURE — 80053 COMPREHEN METABOLIC PANEL: CPT

## 2024-03-24 RX ADMIN — PROPRANOLOL HYDROCHLORIDE 40 MG: 20 TABLET ORAL at 08:03

## 2024-03-24 RX ADMIN — LOSARTAN POTASSIUM 50 MG: 25 TABLET, FILM COATED ORAL at 08:03

## 2024-03-24 RX ADMIN — POTASSIUM CHLORIDE 20 MEQ: 1500 TABLET, EXTENDED RELEASE ORAL at 08:03

## 2024-03-24 RX ADMIN — ATORVASTATIN CALCIUM 20 MG: 20 TABLET, FILM COATED ORAL at 08:03

## 2024-03-24 RX ADMIN — CLOPIDOGREL BISULFATE 75 MG: 75 TABLET ORAL at 08:03

## 2024-03-24 RX ADMIN — VERAPAMIL HYDROCHLORIDE 240 MG: 240 TABLET ORAL at 08:03

## 2024-03-24 RX ADMIN — FUROSEMIDE 40 MG: 20 TABLET ORAL at 08:03

## 2024-03-24 NOTE — DISCHARGE SUMMARY
[facility] Discharge Summary    Admitting Physician: Yen Barber MD  Attending Physician: Yen Barber MD  Date of Admit: 3/22/2024  Date of Discharge: 3/24/2024    Condition: Good  Outcome: Condition has improved and patient is now ready for discharge.  DISPOSITION: Home or Self Care    Discharge Diagnoses     Patient Active Problem List   Diagnosis    Chronic low back pain    Edema    Obesity    Osteoarthritis of knee    Primary hypertension    Type 2 diabetes mellitus    Coronary artery disease    NSTEMI (non-ST elevated myocardial infarction)       Principal Problem:  NSTEMI (non-ST elevated myocardial infarction)    Consultants and Procedures     Consultants:  IP CONSULT TO HOSPITAL MEDICINE    Procedures:   * No surgery found *     Brief Admission History      Vaibhav Vargas is a 73 y.o. male with past medical history of chronic lumbar pain, T2DM, lower extremity edema, Hypertension, Obesity, lymphedema, venous insufficieny (stents in R leg), CAD (stent in LAD - follows Dr. Casey Steel with [facility]), and Osteoarthritis presents to ED on 03/22/204 after daughter called EMS due to concern for altered mentation.  Patient's sugar at his house and in route were in the 30s. In the emergency department he was awake alert and oriented and was given food, initially his point of care glucose was in the 40s but after several hours his glucose normalized in the low 100s.  Patient is consistent with taking his metformin BID and NPH 45 units daily; patient checks sugars each morning and they are usually in the low 100s, today 101.  He has not been hospitalized for hypoglycemia in the past.      ROS  (+) decreased responsiveness (resolved), generalized weakness (resolved), anasarca of feet bilaterally (chronic)   (-) chest pain, shortness on breath, nausea, vomiting, dysuria, diarrhea, constipation, falls, numbness, tingling, abdominal pain, decreased sensation     In ED patient was hypertensive with blood pressure  185/89, afebrile with normal heart rate in 60s and 80s, and saturating above 95% on room air without difficulty.  Patient's CBC unremarkable.  Patient's CMP unremarkable.  Patient with initial troponin 0.251, on repeat troponin still elevated but decreased to 0.181. EKG in ED showed sinus rhythm with first-degree AV block, right bundle branch block.  No significant changes from prior EKG on repeat. Chest x-ray unremarkable.  Urinalysis showed leukocyte esterase 500, patient received Rocephin in ED. Pt's HEART score of 6, moderate risk of adverse cardiac event. Patient being admitted to Internal Medicine for elevated troponin and continue management of diabetes in setting of hypoglycemia due to insulin.        Hospital Course with Pertinent Findings     Upon hospital admission, patient's troponins were trended x3 that were downtrended from 0.251 to 0.181 to 0.117 every 6 hours along with ECGs that showed no ischemic changes. Patient home insulin and metformin were held. Patient's home Plavix 75 mg, losartan 50 mg, propranolol 40 mg, Lasix 40 mg, and potassium chloride were resumed during hospital stay. Patient was started on low-dose sliding to monitor glucose levels, which have been ranging 70s-100s. Patient was given 2 units of insulin after glucose levels were 105, which decreased his CBG to 35. Patient was subsequently given 250 mL D10 IV and 1 mg Glucagon injection, which rupal his glucose levels to 123. Patient's glucose was monitored for additional 24hrs in-pt which his glucose levels have been ranging in the 70s. Patient's A1c on 02/14/24 was 5.1. Patient was informed to stop his home insulin regiment but to continue his metformin 1000 mg BID and to have close f/u with his PCP for diabetes management upon discharge. Informed patient to monitor home glucose levels with home glucose monitor and to log CBGs to paper to bring to PCP's office. Patient was told to make appointment the day after discharge for appt  "with PCP's office. Patient is stable for discharge.     Discharge physical exam:  Vitals  BP: (!) 155/78  Temp: 98 °F (36.7 °C)  Temp Source: Oral  Pulse: 64  Resp: 18  SpO2: 96 %  Height: 6' 2" (188 cm)  Weight: 128 kg (282 lb 3 oz)    General:  Well developed, well nourished, no acute respiratory distress  Head: Normocephalic, atraumatic  Eyes: PERRL, EOMI, anicteric sclera  Throat: No posterior pharyngeal erythema or exudate, no tonsillar exudate  Neck: supple, normal ROM, no JVD  CVS:  RRR, S1 and S2 normal, no murmurs, no added heart sounds, rubs, gallops, regular peripheral pulses, and no peripheral edema  Resp:  Lungs clear to auscultation bilaterally, no wheezes, rales, or rhonci  GI:  Abdomen soft, non-tender, non-distended, normoactive bowel sounds  MSK:  Full range of motion, no obvious deformities   Skin:  No rashes, ulcers, erythema  Neuro:  Alert and oriented x3, No focal neuro deficits, CNII-XII grossly intact  Psych:  Appropriate mood and affect     TIME SPENT ON DISCHARGE: 60 minutes    Discharge Medications        Medication List        CONTINUE taking these medications      atorvastatin 20 MG tablet  Commonly known as: LIPITOR  Take 1 tablet (20 mg total) by mouth once daily.     clopidogreL 75 mg tablet  Commonly known as: PLAVIX  Take 1 tablet (75 mg total) by mouth once daily.     furosemide 40 MG tablet  Commonly known as: LASIX  Take 1 tablet (40 mg total) by mouth once daily.     losartan 50 MG tablet  Commonly known as: COZAAR     metFORMIN 1000 MG tablet  Commonly known as: GLUCOPHAGE  Take 1 tablet (1,000 mg total) by mouth 2 (two) times daily.     propranoloL 40 MG tablet  Commonly known as: INDERAL     verapamiL 240 MG CR tablet  Commonly known as: CALAN-SR  Take 1 tablet (240 mg total) by mouth once daily.            STOP taking these medications      HumuLIN N NPH U-100 Insulin 100 unit/mL injection  Generic drug: insulin NPH     HYDROcodone-acetaminophen  mg per " tablet  Commonly known as: NORCO     mupirocin 2 % ointment  Commonly known as: BACTROBAN     potassium chloride SA 20 MEQ tablet  Commonly known as: K-DUR,KLOR-CON              Discharge Information:   Vaibhav Vargas is being discharged Home or Self Care.    No discharge procedures on file.     Follow-Up Appointments:   Follow-up Information       Shameka Rooney, DO Follow up in 1 week(s).    Specialty: Family Medicine  Why: - for hospital follow-up  - Stop taking Insulin but continue to take metformin 1000 mg BID  - monitor/log glucose in the AM before meals and at night before bedside and bring paper log to PCP's office  - inquire PCP about insulin use moving forward due to having low-glucose levels during hospital stay  Contact information:  [address] [place] [address]  [phone]                             To address at follow-up:  - consider stopping Insulin use at home due hypoglycemic event resulting in hospital admission.     The above information was discussed with the patient in clear terms. Patient was able to repeat the instructions to me in their own words. All questions answered. ED precautions provided.    Lc Díaz MD     [facility] Resident HO-1  03/24/2024

## 2024-03-24 NOTE — PLAN OF CARE
Problem: Adult Inpatient Plan of Care  Goal: Plan of Care Review  3/24/2024 1010 by Mali Wyatt LPN  Outcome: Met  3/24/2024 0750 by Mali Wyatt LPN  Outcome: Ongoing, Progressing  Goal: Patient-Specific Goal (Individualized)  3/24/2024 1010 by Mali Wyatt LPN  Outcome: Met  3/24/2024 0750 by Mali Wyatt LPN  Outcome: Ongoing, Progressing  Goal: Absence of Hospital-Acquired Illness or Injury  3/24/2024 1010 by Mali Wyatt LPN  Outcome: Met  3/24/2024 0750 by Mali Wyatt LPN  Outcome: Ongoing, Progressing  Goal: Optimal Comfort and Wellbeing  3/24/2024 1010 by Mali Wyatt LPN  Outcome: Met  3/24/2024 0750 by Mali Wyatt LPN  Outcome: Ongoing, Progressing  Goal: Readiness for Transition of Care  3/24/2024 1010 by Mali Wyatt LPN  Outcome: Met  3/24/2024 0750 by Mali Wyatt LPN  Outcome: Ongoing, Progressing

## 2024-03-25 ENCOUNTER — TELEPHONE (OUTPATIENT)
Dept: FAMILY MEDICINE | Facility: CLINIC | Age: 74
End: 2024-03-25
Payer: MEDICARE

## 2024-03-25 LAB
OHS QRS DURATION: 134 MS
OHS QRS DURATION: 136 MS
OHS QRS DURATION: 142 MS
OHS QTC CALCULATION: 435 MS
OHS QTC CALCULATION: 477 MS
OHS QTC CALCULATION: 490 MS

## 2024-03-26 ENCOUNTER — OFFICE VISIT (OUTPATIENT)
Dept: FAMILY MEDICINE | Facility: CLINIC | Age: 74
End: 2024-03-26
Payer: MEDICARE

## 2024-03-26 VITALS
TEMPERATURE: 97 F | HEART RATE: 64 BPM | SYSTOLIC BLOOD PRESSURE: 115 MMHG | BODY MASS INDEX: 36.28 KG/M2 | DIASTOLIC BLOOD PRESSURE: 63 MMHG | HEIGHT: 74 IN | OXYGEN SATURATION: 95 % | RESPIRATION RATE: 18 BRPM | WEIGHT: 282.69 LBS

## 2024-03-26 DIAGNOSIS — E11.9 TYPE 2 DIABETES MELLITUS WITHOUT COMPLICATION, WITH LONG-TERM CURRENT USE OF INSULIN: ICD-10-CM

## 2024-03-26 DIAGNOSIS — Z79.4 TYPE 2 DIABETES MELLITUS WITHOUT COMPLICATION, WITH LONG-TERM CURRENT USE OF INSULIN: ICD-10-CM

## 2024-03-26 DIAGNOSIS — I10 PRIMARY HYPERTENSION: ICD-10-CM

## 2024-03-26 DIAGNOSIS — Z09 HOSPITAL DISCHARGE FOLLOW-UP: Primary | ICD-10-CM

## 2024-03-26 PROCEDURE — 99215 OFFICE O/P EST HI 40 MIN: CPT | Mod: ,,, | Performed by: NURSE PRACTITIONER

## 2024-03-26 RX ORDER — INSULIN HUMAN 100 [IU]/ML
INJECTION, SUSPENSION SUBCUTANEOUS
COMMUNITY
End: 2024-03-26 | Stop reason: ALTCHOICE

## 2024-03-26 RX ORDER — ASPIRIN 81 MG/1
81 TABLET ORAL
COMMUNITY

## 2024-03-26 RX ORDER — GLUCAGON INJECTION, SOLUTION 1 MG/.2ML
0.2 INJECTION, SOLUTION SUBCUTANEOUS DAILY PRN
Qty: 0.4 ML | Refills: 0 | Status: SHIPPED | OUTPATIENT
Start: 2024-03-26

## 2024-03-26 NOTE — PROGRESS NOTES
Subjective:      Patient ID: Vaibhav Vargas is a 73 y.o. Black or  male      Chief Complaint: Hospital F/U       Past Medical History:   Diagnosis Date    Chronic lumbar pain     Diabetes mellitus, type 2     Edema     Hypertension     Obesity, unspecified     Osteoarthritis of multiple joints         HPI  Presents to clinic for hospital follow up.    Hospital Course:  Patient was transferred to the ED with altered mental status and hypoglycemia. His blood sugar route to the ED was in the 30s and 40s. ED showed a troponin of 0.251, repeat troponin level 0.181, and 0.117. EKG showed no change from previous EKG of sinus rhythm with first degree AV block, R bundle branch block.  The patient arrived at the ED alert and oriented, he was feed and his blood sugar increased to the low 100s. Admitted with Hypoglycemia and increased troponin level.  His home insulin and Metformin were held.  He was later tried on low dose sliding scale insulin when his blood sugar was 105, he was given 2 units of insulin which dropped his blood sugar to 35. He was given 250 mg of D10 IV and 1 mg glucagon. His blood sugar increased to 123. His blood sugars were monitored for the next 24 hours and remained in the 70s. He was told to discontinue home insulin, continue Metformin 1000 mg BID and f/u with PCP.     Overall:reports doing better. Denies CP, SOB, fever, chills, dizziness or weakness. CBGs at home am 119, 127, and pm 114 and 122. Not taking insulin, still taking metformin 1000 mg BID.  BP at goal.  States compliance with meds. Denies any chest pain, SOB, or weakness.     Follow up: sees Dr. Steel with [facility], appt. made in office to see Dr. Steel tomorrow.   3 month f/u with PCP        Patient Care Team:  Shameka Rooney DO as PCP - General (Family Medicine)      Review of Systems   Constitutional:  Negative for chills, fatigue, fever and unexpected weight change.   HENT: Negative.     Eyes: Negative.    Respiratory:  Negative.  Negative for shortness of breath.    Cardiovascular: Negative.  Negative for chest pain.   Gastrointestinal: Negative.    Endocrine: Negative.    Genitourinary: Negative.    Musculoskeletal: Negative.    Integumentary:  Negative.   Allergic/Immunologic: Negative.    Neurological: Negative.  Negative for dizziness and weakness.   Hematological: Negative.    Psychiatric/Behavioral: Negative.     All other systems reviewed and are negative.          Objective:      Vitals:    03/26/24 1318   BP: 115/63   Pulse: 64   Resp: 18   Temp: 97.2 °F (36.2 °C)      Body mass index is 36.3 kg/m².     Physical Exam  Vitals reviewed.   Constitutional:       Appearance: He is not toxic-appearing.   HENT:      Head: Normocephalic.      Mouth/Throat:      Mouth: Mucous membranes are moist.   Eyes:      Extraocular Movements: Extraocular movements intact.      Conjunctiva/sclera: Conjunctivae normal.      Pupils: Pupils are equal, round, and reactive to light.   Cardiovascular:      Rate and Rhythm: Normal rate and regular rhythm.      Pulses: Normal pulses.      Heart sounds: Normal heart sounds.   Pulmonary:      Effort: Pulmonary effort is normal. No respiratory distress.      Breath sounds: Normal breath sounds.   Abdominal:      General: Bowel sounds are normal. There is no distension.      Palpations: Abdomen is soft.      Tenderness: There is no abdominal tenderness.   Musculoskeletal:         General: No tenderness. Normal range of motion.      Cervical back: Neck supple.   Skin:     General: Skin is warm and dry.   Neurological:      Mental Status: He is alert and oriented to person, place, and time.   Psychiatric:         Mood and Affect: Mood normal.            Current Outpatient Medications:     atorvastatin (LIPITOR) 20 MG tablet, Take 1 tablet (20 mg total) by mouth once daily., Disp: 90 tablet, Rfl: 3    clopidogreL (PLAVIX) 75 mg tablet, Take 1 tablet (75 mg total) by mouth once daily., Disp: 90 tablet, Rfl:  3    furosemide (LASIX) 40 MG tablet, Take 1 tablet (40 mg total) by mouth once daily., Disp: 30 tablet, Rfl: 0    losartan (COZAAR) 50 MG tablet, Take 50 mg by mouth once daily., Disp: , Rfl:     metFORMIN (GLUCOPHAGE) 1000 MG tablet, Take 1 tablet (1,000 mg total) by mouth 2 (two) times daily., Disp: 180 tablet, Rfl: 3    propranoloL (INDERAL) 40 MG tablet, Take by mouth., Disp: , Rfl:     verapamiL (CALAN-SR) 240 MG CR tablet, Take 1 tablet (240 mg total) by mouth once daily., Disp: 90 tablet, Rfl: 4    aspirin (ECOTRIN) 81 MG EC tablet, Take 81 mg by mouth., Disp: , Rfl:     glucagon (GVOKE HYPOPEN 2-PACK) 1 mg/0.2 mL AtIn, Inject 0.2 mLs into the skin daily as needed (low blood sugar). May repeat dose in 15 minutes, Disp: 0.4 mL, Rfl: 0    Assessment & Plan:     Problem List Items Addressed This Visit          Cardiac/Vascular    Primary hypertension     BP at goal  Continue current medication  Daily BP check; bring log all clinic visits  Instructed to report to ED for any BP greater than 200/100, dizziness, syncope, CP, or SOB  Keep appt. With PCP for follow up  Patient is agreeable to plan and verbalizes understanding              Endocrine    Type 2 diabetes mellitus     Was seen in ED for altered mental status and hypoglycemia  Insulin discontinued in the hospital;   Continue Metformin BID  Instructed to restart NPH insulin at 10 units for fasting blood sugar >130  Rx: glucagon pen  Educated on symptoms of hypoglycemia and treatment  Monitor blood sugars daily,  >150, call office if blood sugar continuously above 150.   Follow up with PCP in 1 month             Relevant Medications    glucagon (GVOKE HYPOPEN 2-PACK) 1 mg/0.2 mL AtIn       Other    Hospital discharge follow-up - Primary     S/P Hypoglycemia and elevated Troponin  Med rec complicated  Follow up:   Dr. Steel with [facility], appt. made in office to see Dr. Steel on tomorrow.   Instructed to continue to monitor symptoms  Report to ED for any CBG's  >400; hypoglycemia, CP, SOB, and/or worsening symptoms  Pt is agreeable to plan and verbalizes understanding                     Prior to the patient's arrival on the same day, I spent (5) minutes reviewing chart. Once in the exam room with the patient, I spent (25) minutes in the room with the member performing a history and exam as well as reviewing the test results and recommendations with the patient. After leaving the exam room, I spent an additional (10 ) minutes completing the electronic health record. The total time spent that day caring for the member is (40 ) minutes, and this time - including the breakdown - is documented in the medical record.

## 2024-03-26 NOTE — ASSESSMENT & PLAN NOTE
S/P Hypoglycemia and elevated Troponin  Med rec complicated  Follow up:   Dr. Steel with [facility], appt. made in office to see Dr. Steel on tomorrow.   Instructed to continue to monitor symptoms  Report to ED for any CBG's >400; hypoglycemia, CP, SOB, and/or worsening symptoms  Pt is agreeable to plan and verbalizes understanding

## 2024-03-26 NOTE — ASSESSMENT & PLAN NOTE
Was seen in ED for altered mental status and hypoglycemia  Insulin discontinued in the hospital;   Continue Metformin BID  Instructed to restart NPH insulin at 10 units for fasting blood sugar >130  Rx: glucagon pen  Educated on symptoms of hypoglycemia and treatment  Monitor blood sugars daily,  >150, call office if blood sugar continuously above 150.   Follow up with PCP in 1 month

## 2024-03-27 LAB — BACTERIA UR CULT: ABNORMAL

## 2024-05-15 PROBLEM — E16.2 HYPOGLYCEMIA: Status: RESOLVED | Noted: 2024-03-24 | Resolved: 2024-05-15

## 2024-05-15 PROBLEM — Z09 HOSPITAL DISCHARGE FOLLOW-UP: Status: RESOLVED | Noted: 2024-03-26 | Resolved: 2024-05-15

## 2025-01-01 ENCOUNTER — ANESTHESIA EVENT (OUTPATIENT)
Dept: ENDOSCOPY | Facility: HOSPITAL | Age: 75
DRG: 853 | End: 2025-01-01
Payer: MEDICARE

## 2025-01-01 ENCOUNTER — ANESTHESIA (OUTPATIENT)
Dept: ENDOSCOPY | Facility: HOSPITAL | Age: 75
DRG: 853 | End: 2025-01-01
Payer: MEDICARE

## 2025-01-01 PROCEDURE — D9220A PRA ANESTHESIA: Mod: ,,, | Performed by: NURSE ANESTHETIST, CERTIFIED REGISTERED

## 2025-01-01 PROCEDURE — 25000003 PHARM REV CODE 250: Performed by: NURSE ANESTHETIST, CERTIFIED REGISTERED

## 2025-01-01 PROCEDURE — 63600175 PHARM REV CODE 636 W HCPCS: Performed by: NURSE ANESTHETIST, CERTIFIED REGISTERED

## 2025-01-01 RX ORDER — PROPOFOL 10 MG/ML
VIAL (ML) INTRAVENOUS
Status: DISCONTINUED | OUTPATIENT
Start: 2025-01-01 | End: 2025-01-01

## 2025-01-01 RX ORDER — ETOMIDATE 2 MG/ML
INJECTION INTRAVENOUS
Status: DISCONTINUED | OUTPATIENT
Start: 2025-01-01 | End: 2025-01-01

## 2025-01-01 RX ORDER — LIDOCAINE HYDROCHLORIDE 20 MG/ML
INJECTION, SOLUTION EPIDURAL; INFILTRATION; INTRACAUDAL; PERINEURAL
Status: DISCONTINUED | OUTPATIENT
Start: 2025-01-01 | End: 2025-01-01

## 2025-01-01 RX ADMIN — ETOMIDATE INJECTION 4 MG: 2 SOLUTION INTRAVENOUS at 11:06

## 2025-01-01 RX ADMIN — LIDOCAINE HYDROCHLORIDE 75 MG: 20 INJECTION, SOLUTION EPIDURAL; INFILTRATION; INTRACAUDAL; PERINEURAL at 11:06

## 2025-01-01 RX ADMIN — PROPOFOL 20 MG: 10 INJECTION, EMULSION INTRAVENOUS at 11:06

## 2025-01-01 RX ADMIN — SODIUM CHLORIDE: 9 INJECTION, SOLUTION INTRAVENOUS at 11:06

## 2025-03-13 DIAGNOSIS — E11.9 TYPE 2 DIABETES MELLITUS WITHOUT COMPLICATION, WITHOUT LONG-TERM CURRENT USE OF INSULIN: ICD-10-CM

## 2025-03-13 RX ORDER — METFORMIN HYDROCHLORIDE 1000 MG/1
1000 TABLET ORAL 2 TIMES DAILY
Qty: 180 TABLET | Refills: 3 | Status: SHIPPED | OUTPATIENT
Start: 2025-03-13

## 2025-03-23 ENCOUNTER — HOSPITAL ENCOUNTER (INPATIENT)
Facility: HOSPITAL | Age: 75
LOS: 7 days | Discharge: NURSING, REHAB OR LONG-TERM CARE | DRG: 064 | End: 2025-03-31
Attending: EMERGENCY MEDICINE | Admitting: INTERNAL MEDICINE
Payer: MEDICARE

## 2025-03-23 DIAGNOSIS — Z13.9 SCREENING DUE: ICD-10-CM

## 2025-03-23 DIAGNOSIS — I21.4 NSTEMI (NON-ST ELEVATED MYOCARDIAL INFARCTION): ICD-10-CM

## 2025-03-23 DIAGNOSIS — I63.9 ACUTE STROKE DUE TO ISCHEMIA: ICD-10-CM

## 2025-03-23 DIAGNOSIS — M62.82 NON-TRAUMATIC RHABDOMYOLYSIS: Primary | ICD-10-CM

## 2025-03-23 DIAGNOSIS — R55 SYNCOPE: ICD-10-CM

## 2025-03-23 DIAGNOSIS — N17.9 AKI (ACUTE KIDNEY INJURY): ICD-10-CM

## 2025-03-23 DIAGNOSIS — R07.9 CHEST PAIN: ICD-10-CM

## 2025-03-23 PROBLEM — I21.9 MYOCARDIAL INFARCTION: Status: ACTIVE | Noted: 2024-03-24

## 2025-03-23 LAB
ALBUMIN SERPL-MCNC: 3.2 G/DL (ref 3.4–4.8)
ALBUMIN/GLOB SERPL: 0.8 RATIO (ref 1.1–2)
ALP SERPL-CCNC: 58 UNIT/L (ref 40–150)
ALT SERPL-CCNC: 29 UNIT/L (ref 0–55)
ANION GAP SERPL CALC-SCNC: 12 MEQ/L
AST SERPL-CCNC: 115 UNIT/L (ref 11–45)
BASOPHILS # BLD AUTO: 0.02 X10(3)/MCL
BASOPHILS NFR BLD AUTO: 0.2 %
BILIRUB SERPL-MCNC: 1.3 MG/DL
BNP BLD-MCNC: 570.6 PG/ML
BUN SERPL-MCNC: 13.3 MG/DL (ref 8.4–25.7)
CALCIUM SERPL-MCNC: 9 MG/DL (ref 8.8–10)
CHLORIDE SERPL-SCNC: 106 MMOL/L (ref 98–107)
CK SERPL-CCNC: 4104 U/L (ref 30–200)
CO2 SERPL-SCNC: 24 MMOL/L (ref 23–31)
CREAT SERPL-MCNC: 1.02 MG/DL (ref 0.72–1.25)
CREAT/UREA NIT SERPL: 13
EOSINOPHIL # BLD AUTO: 0.01 X10(3)/MCL (ref 0–0.9)
EOSINOPHIL NFR BLD AUTO: 0.1 %
ERYTHROCYTE [DISTWIDTH] IN BLOOD BY AUTOMATED COUNT: 13.7 % (ref 11.5–17)
FLUAV AG UPPER RESP QL IA.RAPID: NOT DETECTED
FLUBV AG UPPER RESP QL IA.RAPID: NOT DETECTED
GFR SERPLBLD CREATININE-BSD FMLA CKD-EPI: >60 ML/MIN/1.73/M2
GLOBULIN SER-MCNC: 4 GM/DL (ref 2.4–3.5)
GLUCOSE SERPL-MCNC: 119 MG/DL (ref 82–115)
HCT VFR BLD AUTO: 41.5 % (ref 42–52)
HGB BLD-MCNC: 14.3 G/DL (ref 14–18)
HOLD SPECIMEN: NORMAL
HOLD SPECIMEN: NORMAL
IMM GRANULOCYTES # BLD AUTO: 0.07 X10(3)/MCL (ref 0–0.04)
IMM GRANULOCYTES NFR BLD AUTO: 0.8 %
LACTATE SERPL-SCNC: 2 MMOL/L (ref 0.5–2.2)
LYMPHOCYTES # BLD AUTO: 0.87 X10(3)/MCL (ref 0.6–4.6)
LYMPHOCYTES NFR BLD AUTO: 10.2 %
MCH RBC QN AUTO: 35.9 PG (ref 27–31)
MCHC RBC AUTO-ENTMCNC: 34.5 G/DL (ref 33–36)
MCV RBC AUTO: 104.3 FL (ref 80–94)
MONOCYTES # BLD AUTO: 0.6 X10(3)/MCL (ref 0.1–1.3)
MONOCYTES NFR BLD AUTO: 7 %
NEUTROPHILS # BLD AUTO: 6.96 X10(3)/MCL (ref 2.1–9.2)
NEUTROPHILS NFR BLD AUTO: 81.7 %
NRBC BLD AUTO-RTO: 0 %
PLATELET # BLD AUTO: 292 X10(3)/MCL (ref 130–400)
PMV BLD AUTO: 8.9 FL (ref 7.4–10.4)
POCT GLUCOSE: 102 MG/DL (ref 70–110)
POTASSIUM SERPL-SCNC: 4.7 MMOL/L (ref 3.5–5.1)
PROT SERPL-MCNC: 7.2 GM/DL (ref 5.8–7.6)
RBC # BLD AUTO: 3.98 X10(6)/MCL (ref 4.7–6.1)
SARS-COV-2 RNA RESP QL NAA+PROBE: NOT DETECTED
SODIUM SERPL-SCNC: 142 MMOL/L (ref 136–145)
TROPONIN I SERPL-MCNC: 0.12 NG/ML (ref 0–0.04)
TSH SERPL-ACNC: 0.68 UIU/ML (ref 0.35–4.94)
WBC # BLD AUTO: 8.53 X10(3)/MCL (ref 4.5–11.5)

## 2025-03-23 PROCEDURE — 83880 ASSAY OF NATRIURETIC PEPTIDE: CPT | Performed by: EMERGENCY MEDICINE

## 2025-03-23 PROCEDURE — 82550 ASSAY OF CK (CPK): CPT | Performed by: EMERGENCY MEDICINE

## 2025-03-23 PROCEDURE — 93005 ELECTROCARDIOGRAM TRACING: CPT

## 2025-03-23 PROCEDURE — 84443 ASSAY THYROID STIM HORMONE: CPT

## 2025-03-23 PROCEDURE — 80053 COMPREHEN METABOLIC PANEL: CPT | Performed by: EMERGENCY MEDICINE

## 2025-03-23 PROCEDURE — G0378 HOSPITAL OBSERVATION PER HR: HCPCS

## 2025-03-23 PROCEDURE — 83605 ASSAY OF LACTIC ACID: CPT | Performed by: EMERGENCY MEDICINE

## 2025-03-23 PROCEDURE — 96372 THER/PROPH/DIAG INJ SC/IM: CPT

## 2025-03-23 PROCEDURE — 0240U COVID/FLU A&B PCR: CPT | Performed by: EMERGENCY MEDICINE

## 2025-03-23 PROCEDURE — 96374 THER/PROPH/DIAG INJ IV PUSH: CPT

## 2025-03-23 PROCEDURE — 25000003 PHARM REV CODE 250: Performed by: EMERGENCY MEDICINE

## 2025-03-23 PROCEDURE — 85025 COMPLETE CBC W/AUTO DIFF WBC: CPT | Performed by: EMERGENCY MEDICINE

## 2025-03-23 PROCEDURE — 63600175 PHARM REV CODE 636 W HCPCS

## 2025-03-23 PROCEDURE — 84484 ASSAY OF TROPONIN QUANT: CPT | Performed by: EMERGENCY MEDICINE

## 2025-03-23 PROCEDURE — 99285 EMERGENCY DEPT VISIT HI MDM: CPT | Mod: 25

## 2025-03-23 PROCEDURE — 25000003 PHARM REV CODE 250

## 2025-03-23 RX ORDER — PROCHLORPERAZINE EDISYLATE 5 MG/ML
5 INJECTION INTRAMUSCULAR; INTRAVENOUS EVERY 6 HOURS PRN
Status: DISCONTINUED | OUTPATIENT
Start: 2025-03-23 | End: 2025-03-31 | Stop reason: HOSPADM

## 2025-03-23 RX ORDER — ATORVASTATIN CALCIUM 20 MG/1
20 TABLET, FILM COATED ORAL DAILY
Status: DISCONTINUED | OUTPATIENT
Start: 2025-03-24 | End: 2025-03-25

## 2025-03-23 RX ORDER — FUROSEMIDE 10 MG/ML
80 INJECTION INTRAMUSCULAR; INTRAVENOUS ONCE
Status: COMPLETED | OUTPATIENT
Start: 2025-03-23 | End: 2025-03-23

## 2025-03-23 RX ORDER — ONDANSETRON HYDROCHLORIDE 2 MG/ML
4 INJECTION, SOLUTION INTRAVENOUS EVERY 8 HOURS PRN
Status: DISCONTINUED | OUTPATIENT
Start: 2025-03-23 | End: 2025-03-31 | Stop reason: HOSPADM

## 2025-03-23 RX ORDER — IBUPROFEN 200 MG
24 TABLET ORAL
Status: DISCONTINUED | OUTPATIENT
Start: 2025-03-23 | End: 2025-03-31 | Stop reason: HOSPADM

## 2025-03-23 RX ORDER — SODIUM CHLORIDE 0.9 % (FLUSH) 0.9 %
10 SYRINGE (ML) INJECTION EVERY 12 HOURS PRN
Status: DISCONTINUED | OUTPATIENT
Start: 2025-03-23 | End: 2025-03-31 | Stop reason: HOSPADM

## 2025-03-23 RX ORDER — ENOXAPARIN SODIUM 100 MG/ML
40 INJECTION SUBCUTANEOUS EVERY 24 HOURS
Status: DISCONTINUED | OUTPATIENT
Start: 2025-03-23 | End: 2025-03-31 | Stop reason: HOSPADM

## 2025-03-23 RX ORDER — ACETAMINOPHEN 325 MG/1
650 TABLET ORAL EVERY 4 HOURS PRN
Status: DISCONTINUED | OUTPATIENT
Start: 2025-03-23 | End: 2025-03-31 | Stop reason: HOSPADM

## 2025-03-23 RX ORDER — ASPIRIN 325 MG
325 TABLET ORAL
Status: COMPLETED | OUTPATIENT
Start: 2025-03-23 | End: 2025-03-23

## 2025-03-23 RX ORDER — CLOPIDOGREL BISULFATE 75 MG/1
75 TABLET ORAL DAILY
Status: DISCONTINUED | OUTPATIENT
Start: 2025-03-24 | End: 2025-03-31 | Stop reason: HOSPADM

## 2025-03-23 RX ORDER — HYDROCHLOROTHIAZIDE 25 MG/1
240 TABLET ORAL DAILY
Status: DISCONTINUED | OUTPATIENT
Start: 2025-03-24 | End: 2025-03-31 | Stop reason: HOSPADM

## 2025-03-23 RX ORDER — ASPIRIN 81 MG/1
81 TABLET ORAL ONCE
Status: COMPLETED | OUTPATIENT
Start: 2025-03-23 | End: 2025-03-23

## 2025-03-23 RX ORDER — NALOXONE HCL 0.4 MG/ML
0.02 VIAL (ML) INJECTION
Status: DISCONTINUED | OUTPATIENT
Start: 2025-03-23 | End: 2025-03-31 | Stop reason: HOSPADM

## 2025-03-23 RX ORDER — INSULIN ASPART 100 [IU]/ML
0-5 INJECTION, SOLUTION INTRAVENOUS; SUBCUTANEOUS
Status: DISCONTINUED | OUTPATIENT
Start: 2025-03-23 | End: 2025-03-31 | Stop reason: HOSPADM

## 2025-03-23 RX ORDER — IBUPROFEN 200 MG
16 TABLET ORAL
Status: DISCONTINUED | OUTPATIENT
Start: 2025-03-23 | End: 2025-03-31 | Stop reason: HOSPADM

## 2025-03-23 RX ORDER — GLUCAGON 1 MG
1 KIT INJECTION
Status: DISCONTINUED | OUTPATIENT
Start: 2025-03-23 | End: 2025-03-31 | Stop reason: HOSPADM

## 2025-03-23 RX ADMIN — ASPIRIN 81 MG: 81 TABLET, COATED ORAL at 10:03

## 2025-03-23 RX ADMIN — ENOXAPARIN SODIUM 40 MG: 40 INJECTION SUBCUTANEOUS at 10:03

## 2025-03-23 RX ADMIN — FUROSEMIDE 80 MG: 10 INJECTION, SOLUTION INTRAVENOUS at 09:03

## 2025-03-23 RX ADMIN — ASPIRIN 325 MG ORAL TABLET 325 MG: 325 PILL ORAL at 07:03

## 2025-03-24 LAB
ACCEPTIBLE SP GR UR QL: 1.01 (ref 1–1.03)
ALBUMIN SERPL-MCNC: 2.9 G/DL (ref 3.4–4.8)
ALBUMIN/GLOB SERPL: 0.8 RATIO (ref 1.1–2)
ALP SERPL-CCNC: 54 UNIT/L (ref 40–150)
ALT SERPL-CCNC: 29 UNIT/L (ref 0–55)
AMPHET UR QL SCN: NEGATIVE
ANION GAP SERPL CALC-SCNC: 10 MEQ/L
ANION GAP SERPL CALC-SCNC: 7 MEQ/L
APICAL FOUR CHAMBER EJECTION FRACTION: 55 %
APICAL TWO CHAMBER EJECTION FRACTION: 56 %
AST SERPL-CCNC: 106 UNIT/L (ref 11–45)
AV INDEX (PROSTH): 0.71
AV MEAN GRADIENT: 2 MMHG
AV PEAK GRADIENT: 3 MMHG
AV VALVE AREA BY VELOCITY RATIO: 3 CM²
AV VALVE AREA: 2.7 CM²
AV VELOCITY RATIO: 0.78
BACTERIA #/AREA URNS AUTO: ABNORMAL /HPF
BARBITURATE SCN PRESENT UR: NEGATIVE
BASOPHILS # BLD AUTO: 0.04 X10(3)/MCL
BASOPHILS NFR BLD AUTO: 0.4 %
BENZODIAZ UR QL SCN: NEGATIVE
BILIRUB SERPL-MCNC: 1.2 MG/DL
BILIRUB UR QL STRIP.AUTO: NEGATIVE
BSA FOR ECHO PROCEDURE: 2.46 M2
BUN SERPL-MCNC: 14.1 MG/DL (ref 8.4–25.7)
BUN SERPL-MCNC: 14.8 MG/DL (ref 8.4–25.7)
CALCIUM SERPL-MCNC: 8.7 MG/DL (ref 8.8–10)
CALCIUM SERPL-MCNC: 9.1 MG/DL (ref 8.8–10)
CANNABINOIDS UR QL SCN: NEGATIVE
CHLORIDE SERPL-SCNC: 105 MMOL/L (ref 98–107)
CHLORIDE SERPL-SCNC: 107 MMOL/L (ref 98–107)
CK SERPL-CCNC: 2770 U/L (ref 30–200)
CK SERPL-CCNC: 2941 U/L (ref 30–200)
CK SERPL-CCNC: 3493 U/L (ref 30–200)
CLARITY UR: ABNORMAL
CO2 SERPL-SCNC: 25 MMOL/L (ref 23–31)
CO2 SERPL-SCNC: 29 MMOL/L (ref 23–31)
COCAINE UR QL SCN: NEGATIVE
COLOR UR AUTO: YELLOW
CREAT SERPL-MCNC: 1.01 MG/DL (ref 0.72–1.25)
CREAT SERPL-MCNC: 1.2 MG/DL (ref 0.72–1.25)
CREAT/UREA NIT SERPL: 12
CREAT/UREA NIT SERPL: 14
CV ECHO LV RWT: 0.43 CM
DOP CALC AO PEAK VEL: 0.9 M/S
DOP CALC AO VTI: 21.7 CM
DOP CALC LVOT AREA: 3.8 CM2
DOP CALC LVOT DIAMETER: 2.2 CM
DOP CALC LVOT PEAK VEL: 0.7 M/S
DOP CALC LVOT STROKE VOLUME: 58.5 CM3
DOP CALC MV VTI: 22 CM
DOP CALCLVOT PEAK VEL VTI: 15.4 CM
E WAVE DECELERATION TIME: 254 MSEC
E/A RATIO: 0.69
E/E' RATIO: 7 M/S
ECHO LV POSTERIOR WALL: 1 CM (ref 0.6–1.1)
EOSINOPHIL # BLD AUTO: 0.04 X10(3)/MCL (ref 0–0.9)
EOSINOPHIL NFR BLD AUTO: 0.4 %
ERYTHROCYTE [DISTWIDTH] IN BLOOD BY AUTOMATED COUNT: 13.7 % (ref 11.5–17)
EST. AVERAGE GLUCOSE BLD GHB EST-MCNC: 105.4 MG/DL
ETHANOL SERPL-MCNC: <10 MG/DL
FENTANYL UR QL SCN: NEGATIVE
FOLATE SERPL-MCNC: 12.6 NG/ML (ref 7–31.4)
FRACTIONAL SHORTENING: 26.1 % (ref 28–44)
GFR SERPLBLD CREATININE-BSD FMLA CKD-EPI: >60 ML/MIN/1.73/M2
GFR SERPLBLD CREATININE-BSD FMLA CKD-EPI: >60 ML/MIN/1.73/M2
GLOBULIN SER-MCNC: 3.7 GM/DL (ref 2.4–3.5)
GLUCOSE SERPL-MCNC: 137 MG/DL (ref 82–115)
GLUCOSE SERPL-MCNC: 205 MG/DL (ref 82–115)
GLUCOSE UR QL STRIP: NORMAL
HBA1C MFR BLD: 5.3 %
HCT VFR BLD AUTO: 38.2 % (ref 42–52)
HGB BLD-MCNC: 13.2 G/DL (ref 14–18)
HGB UR QL STRIP: ABNORMAL
HOLD SPECIMEN: NORMAL
HYALINE CASTS #/AREA URNS LPF: ABNORMAL /LPF
IMM GRANULOCYTES # BLD AUTO: 0.06 X10(3)/MCL (ref 0–0.04)
IMM GRANULOCYTES NFR BLD AUTO: 0.6 %
INTERVENTRICULAR SEPTUM: 1 CM (ref 0.6–1.1)
KETONES UR QL STRIP: ABNORMAL
LEFT ATRIUM SIZE: 4.3 CM
LEFT ATRIUM VOLUME INDEX MOD: 29 ML/M2
LEFT ATRIUM VOLUME MOD: 70 ML
LEFT INTERNAL DIMENSION IN SYSTOLE: 3.4 CM (ref 2.1–4)
LEFT VENTRICLE DIASTOLIC VOLUME INDEX: 41.08 ML/M2
LEFT VENTRICLE DIASTOLIC VOLUME: 99 ML
LEFT VENTRICLE END DIASTOLIC VOLUME APICAL 2 CHAMBER: 86.95 ML
LEFT VENTRICLE END DIASTOLIC VOLUME APICAL 4 CHAMBER: 91.1 ML
LEFT VENTRICLE MASS INDEX: 65.9 G/M2
LEFT VENTRICLE SYSTOLIC VOLUME INDEX: 19.1 ML/M2
LEFT VENTRICLE SYSTOLIC VOLUME: 46 ML
LEFT VENTRICULAR INTERNAL DIMENSION IN DIASTOLE: 4.6 CM (ref 3.5–6)
LEFT VENTRICULAR MASS: 158.8 G
LEUKOCYTE ESTERASE UR QL STRIP: 500
LV LATERAL E/E' RATIO: 6.3 M/S
LV SEPTAL E/E' RATIO: 7.1 M/S
LVED V (TEICH): 99.03 ML
LVES V (TEICH): 45.73 ML
LVOT MG: 0.92 MMHG
LVOT MV: 0.43 CM/S
LYMPHOCYTES # BLD AUTO: 1.68 X10(3)/MCL (ref 0.6–4.6)
LYMPHOCYTES NFR BLD AUTO: 16.9 %
MAGNESIUM SERPL-MCNC: 2 MG/DL (ref 1.6–2.6)
MCH RBC QN AUTO: 37 PG (ref 27–31)
MCHC RBC AUTO-ENTMCNC: 34.6 G/DL (ref 33–36)
MCV RBC AUTO: 107 FL (ref 80–94)
MDMA UR QL SCN: NEGATIVE
MONOCYTES # BLD AUTO: 0.97 X10(3)/MCL (ref 0.1–1.3)
MONOCYTES NFR BLD AUTO: 9.7 %
MUCOUS THREADS URNS QL MICRO: ABNORMAL /LPF
MV MEAN GRADIENT: 1 MMHG
MV PEAK A VEL: 0.72 M/S
MV PEAK E VEL: 0.5 M/S
MV PEAK GRADIENT: 1 MMHG
MV STENOSIS PRESSURE HALF TIME: 73.72 MS
MV VALVE AREA BY CONTINUITY EQUATION: 2.66 CM2
MV VALVE AREA P 1/2 METHOD: 2.98 CM2
NEUTROPHILS # BLD AUTO: 7.17 X10(3)/MCL (ref 2.1–9.2)
NEUTROPHILS NFR BLD AUTO: 72 %
NITRITE UR QL STRIP: NEGATIVE
NRBC BLD AUTO-RTO: 0 %
OHS CV RV/LV RATIO: 0.7 CM
OHS LV EJECTION FRACTION SIMPSONS BIPLANE MOD: 55 %
OHS QRS DURATION: 122 MS
OHS QTC CALCULATION: 481 MS
OPIATES UR QL SCN: NEGATIVE
PCP UR QL: NEGATIVE
PH UR STRIP: 6 [PH]
PH UR: 6 [PH] (ref 3–11)
PHOSPHATE SERPL-MCNC: 3.3 MG/DL (ref 2.3–4.7)
PISA TR MAX VEL: 3 M/S
PLATELET # BLD AUTO: 268 X10(3)/MCL (ref 130–400)
PMV BLD AUTO: 9.3 FL (ref 7.4–10.4)
POCT GLUCOSE: 149 MG/DL (ref 70–110)
POCT GLUCOSE: 185 MG/DL (ref 70–110)
POCT GLUCOSE: 194 MG/DL (ref 70–110)
POTASSIUM SERPL-SCNC: 3.9 MMOL/L (ref 3.5–5.1)
POTASSIUM SERPL-SCNC: 4 MMOL/L (ref 3.5–5.1)
PROT SERPL-MCNC: 6.6 GM/DL (ref 5.8–7.6)
PROT UR QL STRIP: ABNORMAL
RA PRESSURE ESTIMATED: 0 MMHG
RBC # BLD AUTO: 3.57 X10(6)/MCL (ref 4.7–6.1)
RBC #/AREA URNS AUTO: ABNORMAL /HPF
RIGHT VENTRICLE DIASTOLIC BASEL DIMENSION: 3.2 CM
RIGHT VENTRICULAR END-DIASTOLIC DIMENSION: 3.15 CM
RV TB RVSP: 3 MMHG
SINUS: 3.3 CM
SODIUM SERPL-SCNC: 141 MMOL/L (ref 136–145)
SODIUM SERPL-SCNC: 142 MMOL/L (ref 136–145)
SP GR UR STRIP.AUTO: 1.01 (ref 1–1.03)
SQUAMOUS #/AREA URNS LPF: ABNORMAL /HPF
TDI LATERAL: 0.08 M/S
TDI SEPTAL: 0.07 M/S
TDI: 0.08 M/S
TR MAX PG: 37 MMHG
TRICUSPID ANNULAR PLANE SYSTOLIC EXCURSION: 2.02 CM
TROPONIN I SERPL-MCNC: 0.12 NG/ML (ref 0–0.04)
TROPONIN I SERPL-MCNC: 0.13 NG/ML (ref 0–0.04)
TV REST PULMONARY ARTERY PRESSURE: 36 MMHG
UNIDENT CRYS #/AREA URNS HPF: ABNORMAL /HPF
UROBILINOGEN UR STRIP-ACNC: NORMAL
VIT B12 SERPL-MCNC: <148 PG/ML (ref 213–816)
WBC # BLD AUTO: 9.96 X10(3)/MCL (ref 4.5–11.5)
WBC #/AREA URNS AUTO: >100 /HPF
Z-SCORE OF LEFT VENTRICULAR DIMENSION IN END DIASTOLE: -8.88
Z-SCORE OF LEFT VENTRICULAR DIMENSION IN END SYSTOLE: -5.35

## 2025-03-24 PROCEDURE — 83735 ASSAY OF MAGNESIUM: CPT

## 2025-03-24 PROCEDURE — 97166 OT EVAL MOD COMPLEX 45 MIN: CPT

## 2025-03-24 PROCEDURE — 87086 URINE CULTURE/COLONY COUNT: CPT | Performed by: EMERGENCY MEDICINE

## 2025-03-24 PROCEDURE — 97162 PT EVAL MOD COMPLEX 30 MIN: CPT

## 2025-03-24 PROCEDURE — 80307 DRUG TEST PRSMV CHEM ANLYZR: CPT | Performed by: EMERGENCY MEDICINE

## 2025-03-24 PROCEDURE — 25500020 PHARM REV CODE 255

## 2025-03-24 PROCEDURE — 80053 COMPREHEN METABOLIC PANEL: CPT

## 2025-03-24 PROCEDURE — 84100 ASSAY OF PHOSPHORUS: CPT

## 2025-03-24 PROCEDURE — 85025 COMPLETE CBC W/AUTO DIFF WBC: CPT

## 2025-03-24 PROCEDURE — 82607 VITAMIN B-12: CPT

## 2025-03-24 PROCEDURE — 82550 ASSAY OF CK (CPK): CPT | Mod: 91

## 2025-03-24 PROCEDURE — 81001 URINALYSIS AUTO W/SCOPE: CPT | Mod: XB | Performed by: EMERGENCY MEDICINE

## 2025-03-24 PROCEDURE — 82550 ASSAY OF CK (CPK): CPT

## 2025-03-24 PROCEDURE — 21400001 HC TELEMETRY ROOM

## 2025-03-24 PROCEDURE — 63600175 PHARM REV CODE 636 W HCPCS

## 2025-03-24 PROCEDURE — 25000003 PHARM REV CODE 250

## 2025-03-24 PROCEDURE — 83036 HEMOGLOBIN GLYCOSYLATED A1C: CPT

## 2025-03-24 PROCEDURE — 87040 BLOOD CULTURE FOR BACTERIA: CPT | Mod: 91

## 2025-03-24 PROCEDURE — 25000003 PHARM REV CODE 250: Performed by: INTERNAL MEDICINE

## 2025-03-24 PROCEDURE — 84484 ASSAY OF TROPONIN QUANT: CPT | Mod: 91

## 2025-03-24 PROCEDURE — 82746 ASSAY OF FOLIC ACID SERUM: CPT

## 2025-03-24 PROCEDURE — 82077 ASSAY SPEC XCP UR&BREATH IA: CPT

## 2025-03-24 PROCEDURE — A9577 INJ MULTIHANCE: HCPCS

## 2025-03-24 PROCEDURE — 84484 ASSAY OF TROPONIN QUANT: CPT

## 2025-03-24 PROCEDURE — 36415 COLL VENOUS BLD VENIPUNCTURE: CPT

## 2025-03-24 RX ORDER — LANOLIN ALCOHOL/MO/W.PET/CERES
1000 CREAM (GRAM) TOPICAL DAILY
Status: DISCONTINUED | OUTPATIENT
Start: 2025-03-24 | End: 2025-03-31 | Stop reason: HOSPADM

## 2025-03-24 RX ORDER — NAPROXEN SODIUM 220 MG/1
81 TABLET, FILM COATED ORAL DAILY
Status: DISCONTINUED | OUTPATIENT
Start: 2025-03-24 | End: 2025-03-31 | Stop reason: HOSPADM

## 2025-03-24 RX ADMIN — CYANOCOBALAMIN TAB 1000 MCG 1000 MCG: 1000 TAB at 08:03

## 2025-03-24 RX ADMIN — ENOXAPARIN SODIUM 40 MG: 40 INJECTION SUBCUTANEOUS at 05:03

## 2025-03-24 RX ADMIN — ASPIRIN 81 MG: 81 TABLET, CHEWABLE ORAL at 11:03

## 2025-03-24 RX ADMIN — VERAPAMIL HYDROCHLORIDE 240 MG: 120 TABLET, FILM COATED, EXTENDED RELEASE ORAL at 11:03

## 2025-03-24 RX ADMIN — SODIUM CHLORIDE 1000 ML: 9 INJECTION, SOLUTION INTRAVENOUS at 01:03

## 2025-03-24 RX ADMIN — CLOPIDOGREL BISULFATE 75 MG: 75 TABLET, FILM COATED ORAL at 08:03

## 2025-03-24 RX ADMIN — IOHEXOL 100 ML: 350 INJECTION, SOLUTION INTRAVENOUS at 04:03

## 2025-03-24 RX ADMIN — ATORVASTATIN CALCIUM 20 MG: 20 TABLET, FILM COATED ORAL at 08:03

## 2025-03-24 RX ADMIN — GADOBENATE DIMEGLUMINE 20 ML: 529 INJECTION, SOLUTION INTRAVENOUS at 09:03

## 2025-03-24 NOTE — PLAN OF CARE
Problem: Adult Inpatient Plan of Care  Goal: Plan of Care Review  Outcome: Progressing  Goal: Patient-Specific Goal (Individualized)  Outcome: Progressing  Goal: Absence of Hospital-Acquired Illness or Injury  Outcome: Progressing  Goal: Optimal Comfort and Wellbeing  Outcome: Progressing  Goal: Readiness for Transition of Care  Outcome: Progressing     Problem: Skin Injury Risk Increased  Goal: Skin Health and Integrity  Outcome: Progressing     Problem: Diabetes Comorbidity  Goal: Blood Glucose Level Within Targeted Range  Outcome: Progressing     Problem: Heart Failure  Goal: Optimal Coping  Outcome: Progressing  Goal: Optimal Cardiac Output  Outcome: Progressing  Goal: Stable Heart Rate and Rhythm  Outcome: Progressing  Goal: Optimal Functional Ability  Outcome: Progressing  Goal: Fluid and Electrolyte Balance  Outcome: Progressing  Goal: Improved Oral Intake  Outcome: Progressing  Goal: Effective Breathing Pattern During Sleep  Outcome: Progressing     Problem: Fall Injury Risk  Goal: Absence of Fall and Fall-Related Injury  Outcome: Progressing

## 2025-03-24 NOTE — PLAN OF CARE
03/24/25 1224   Discharge Assessment   Assessment Type Discharge Planning Assessment   Confirmed/corrected address, phone number and insurance Yes   Confirmed Demographics Correct on Facesheet   Source of Information patient   When was your last doctors appointment?   (Shameka Rooney)   Reason For Admission Syncope, NSTEMI, AI, Chest pain, Non-traumatic rhabdomyolysis   People in Home alone   Facility Arrived From: Home   Do you expect to return to your current living situation? Yes   Do you have help at home or someone to help you manage your care at home? Yes   Who are your caregiver(s) and their phone number(s)? Tay Longoria (Daughter)  [phone]   Walking or Climbing Stairs Difficulty yes   Walking or Climbing Stairs ambulation difficulty, requires equipment   Mobility Management Cane, Rollator   Dressing/Bathing Difficulty no   Home Layout Able to live on 1st floor   Equipment Currently Used at Home rollator;cane, straight   Readmission within 30 days? No   Patient currently being followed by outpatient case management? No   Do you currently have service(s) that help you manage your care at home? No   Do you take prescription medications? Yes  ([facility])   Do you have prescription coverage? Yes   Coverage [employer]   Do you have any problems affording any of your prescribed medications? No   Is the patient taking medications as prescribed? yes   Who is going to help you get home at discharge? Family   How do you get to doctors appointments? car, drives self   Are you on dialysis? No   Discharge Plan A Skilled Nursing Facility   Discharge Plan B Home;Home Health   DME Needed Upon Discharge  none   Discharge Plan discussed with: Patient   Transition of Care Barriers None   Financial Resource Strain   How hard is it for you to pay for the very basics like food, housing, medical care, and heating? Not very   Housing Stability   In the last 12 months, was there a time when you were not  "able to pay the mortgage or rent on time? N   At any time in the past 12 months, were you homeless or living in a shelter (including now)? N   Transportation Needs   In the past 12 months, has lack of transportation kept you from medical appointments or from getting medications? no   In the past 12 months, has lack of transportation kept you from meetings, work, or from getting things needed for daily living? No   Food Insecurity   Within the past 12 months, you worried that your food would run out before you got the money to buy more. Never true   Within the past 12 months, the food you bought just didn't last and you didn't have money to get more. Never true   Stress   Do you feel stress - tense, restless, nervous, or anxious, or unable to sleep at night because your mind is troubled all the time - these days? Not at all   Social Isolation   How often do you feel lonely or isolated from those around you?  Never   Utilities   In the past 12 months has the electric, gas, oil, or water company threatened to shut off services in your home? No   Health Literacy   How often do you need to have someone help you when you read instructions, pamphlets, or other written material from your doctor or pharmacy? Never     Pt  with 2 children; Resides alone; Sister lives next and checks on pt "all the time";  Emergency contact is dghtr: Tay Longoria ([phone]); Pt independent with ADL's; Receives Disability & SNAP benefits; CM to follow for d/c planning needs.  "

## 2025-03-24 NOTE — PROGRESS NOTES
[facility]  PROGRESS NOTE    Patient's Name: Vaibhav Vargas  : 1950  MRN: [id number]    Admission Date: 3/23/2025  Length of Stay: 0  Attending Physician: Valentine Victor MD  Resident: Joaquim  Intern: Woodrow    SUBJECTIVE     Chief Complaint   Patient presents with    Fall     Pt. Arrives via EMS after being found down by family this evening. States he was putting on pajamas last night when he tripped and fell. C/o R shoulder pain, abrasion/swelling noted to R side face. Pt. GCS 15 at present     History of Present Illness:  Vaibhav Vargas is a 74 y.o. male with a history of CAD s/p LAD stent 2017, venous insuffiency, HTN, 1st Degree AVB, NIDDM2 who presented to [facility] ED on 3/23/2025  with complaint of a fall earlier in the day.  Patient is a difficult historian, daughter at bedside provides collateral information.  Patient arrives to the ED via EMS after being found down on the floor for an unknown period of time; patient states that he was attempting to put on his pajamas when he tripped and fell earlier in the evening.  He is unable to relay how long he was on the floor but states that he was wedged between the wall in his bed lying on his right side for prolonged period of time.  He denies any pain with extraocular motion, loss of consciousness, dizziness, chest pain, shortness of breath.  States that this is his 2nd fall over the course of the past 2 weeks.  States that he takes all of his medications but is unable to state which ones he is taking. Follows with Dr Steel for PVD, compliant with plavix.     In the ED, vital signs are stable.  CBC within normal limits.  CMP notable for AST//29, otherwise within normal limits.  , troponin 0.122, EKG NSR first-degree AV block and right bundle-branch block without any signs acute ischemia.  CPK 4104. UA positive for 1+ ketones, 2+ blood, 500 leukocyte esterase, >1000 WBC, trace bacteria.  Patient admitted to Hospital Medicine for continued monitoring and management.    Interval History:  NAEON. Patient initially hypertensive 150's/90's, hypotensive this am 98/57 and now normotensive. Seen at bedside this am with daughter in room. Patient appears to have some degree of dementia, not the best historian, tends to repeat self. No acute complaints, mentions remembering his syncopal episode yesterday. Denies any headache, dizziness, weakness, chest pain, SOB, nausea, vomiting, urinary or other complaints. Endorses R shoulder pain at site where he fell and stayed passed out - Xray R shoulder ordered. Labs revealed macrocytic anemia H, H 13.2, 38.2, ; Vit B12 <148,  supplementation ordered. CPK improving 2941 this am, Trop downtrended 0.119. Neurological deficits noted on exam, details below. Will continue to monitor.    Review of Systems:  A 12-pt ROS was conducted and was negative unless stated above.    OBJECTIVE     VITAL SIGNS: 24 HR MIN & MAX Most Recent Vitals   Temp  Min: 97.7 °F (36.5 °C)  Max: 97.8 °F (36.6 °C)  97.7 °F (36.5 °C)   BP  Min: 98/57  Max: 162/90  133/69    Pulse  Min: 65  Max: 106  106   Resp  Min: 17  Max: 21  20    SpO2  Min: 95 %  Max: 98 %  96 %    Body mass index is 32.74 kg/m².    Intake/Output:  I/O last 3 completed shifts:  In: 240 [P.O.:240]  Out: 450 [Urine:450]    Physical Examination:  General: Nontoxic-appearing, well nourished, in no acute distress  Eye: PERRL, EOMI  HENT: Normocephalic, atraumatic, moist mucous membranes  Neck: Supple, nontender, no JVD  Respiratory: Clear to auscultation bilaterally, no wheezes, rales, or rhonchi. Normal work of breathing  Cardiovascular: Regular rate and rhythm, no murmur, rubs, or gallops. Positive for leg swelling, 2+ radial pulses  Gastrointestinal: Soft, nontender, nondistended  Musculoskeletal: No calf tenderness. Reports pain and restriction of movement of R shoulder. Positive for falls, 2 known episodes over past  2 weeks  Integumentary: Warm, dry, intact. No rashes  Neurologic: Alert and oriented x3. Normal speech. CN grossly intact. Paralysis of R lower limb, recent onset since yesterday as well as reduced motor strength in RUE 4/5 vs 5/5 on L side.   Psychiatric: Appropriate mood and affect            Current Medications:  Scheduled:   aspirin  81 mg Oral Daily    atorvastatin  20 mg Oral Daily    clopidogreL  75 mg Oral Daily    cyanocobalamin  1,000 mcg Oral Daily    enoxparin  40 mg Subcutaneous Daily    verapamiL  240 mg Oral Daily      Infusions:    PRNs:    Current Facility-Administered Medications:     acetaminophen, 650 mg, Oral, Q4H PRN    dextrose 50%, 12.5 g, Intravenous, PRN    dextrose 50%, 25 g, Intravenous, PRN    glucagon (human recombinant), 1 mg, Intramuscular, PRN    glucose, 16 g, Oral, PRN    glucose, 24 g, Oral, PRN    insulin aspart U-100, 0-5 Units, Subcutaneous, QID (AC + HS) PRN    naloxone, 0.02 mg, Intravenous, PRN    ondansetron, 4 mg, Intravenous, Q8H PRN    prochlorperazine, 5 mg, Intravenous, Q6H PRN    sodium chloride 0.9%, 10 mL, Intravenous, Q12H PRN  Labs:  CBC:  Recent Labs   Lab 03/23/25 1939 03/24/25  0123   WBC 8.53 9.96   HGB 14.3 13.2*   HCT 41.5* 38.2*    268   .3* 107.0*   RDW 13.7 13.7     BMP/CMP:  Recent Labs   Lab 03/23/25 1939 03/24/25  0123    142   K 4.7 3.9    107   CO2 24 25   BUN 13.3 14.1   CREATININE 1.02 1.01   GLUCOSE 119* 137*   EGFRNORACEVR >60 >60     RFTs/LFTs:  Recent Labs   Lab 03/23/25 1939 03/24/25  0123   CALCIUM 9.0 8.7*   LABPROT 7.2 6.6   ALBUMIN 3.2* 2.9*   * 106*   ALT 29 29   ALKPHOS 58 54   BILITOT 1.3 1.2     Recent Labs   Lab 03/24/25  0123   MG 2.00   PHOS 3.3     Cardiac Panel:  Recent Labs   Lab 03/23/25 1939 03/24/25  0123 03/24/25  0510   TROPONINI 0.122* 0.130* 0.119*   .6*  --   --      Interval Imaging:  MRI Brain W WO Contrast  Narrative: EXAMINATION:  MRI BRAIN W WO CONTRAST    CLINICAL  HISTORY:  Syncope, recurrent;    TECHNIQUE:  Multiplanar multisequence MR imaging of the brain was performed before and after the administration of 20 mL MultiHance intravenous contrast.    COMPARISON:  CT head without contrast 03/23/2025.    FINDINGS:  INTRACRANIAL: Restricted diffusion noted within the left posterior parasagittal frontal gyrus consistent with an acute ischemic infarct of the left CINTHYA vascular territory.  Chronic infarcts of the cerebellar hemispheres bilaterally but more pronounced on the left.  Chronic lacunar infarct of the left paramedian gary.  Moderate chronic small-vessel ischemic changes of the supratentorial white matter bilaterally.  Mild generalized brain atrophy.  No abnormal enhancement demonstrated.  No evidence of intracranial hemorrhage.  No extra-axial fluid collection or mass.  No intracranial mass effect.  No hydrocephalus.  Midline structures have a normal configuration.  Visualized pituitary gland and infundibulum are normal.  Visualized major intracranial vascular structures demonstrate normal flow voids and are normal in course and caliber.    SINUSES: Small mucous retention cyst in the right maxillary sinus.    ORBITS: Bilateral lens replacements.    Other: 1.7 cm well-circumscribed cyst within the subcutaneous fat of the left face best seen on series 5, image 3.  Impression: Acute ischemic infarct of the left CINTHYA vascular territory involving the left posterior parasagittal frontal cortex.    Encephalomalacia of the left greater than right cerebellar hemispheres.    Chronic lacunar infarct of the left paramedian agry.    Moderate chronic small-vessel ischemic changes of the supratentorial white matter.    Mild generalized brain atrophy.    I discussed the findings with Melania Basilio RN at 10:51 on 03/24/2025.  She will notify the primary team of the above findings.    Electronically signed by: Venkat Yoder  Date:    03/24/2025  Time:    10:51      Microbiology:  Microbiology Results (last 7 days)       Procedure Component Value Units Date/Time    Blood Culture [[id number]] Collected: 03/24/25 0704    Order Status: Resulted Specimen: Blood from Hand, Left Updated: 03/24/25 0743    Blood Culture [[id number]] Collected: 03/24/25 0623    Order Status: Resulted Specimen: Blood from Hand, Right Updated: 03/24/25 0638    Urine culture [[id number]] Collected: 03/24/25 0011    Order Status: Sent Specimen: Urine Updated: 03/24/25 0036          Antibiotics:  Antibiotics (From admission, onward)      None             ASSESSMENT AND PLAN   Recurrent Syncope   Acute ischemic infarct of the left CINTHYA   R sided neurological deficits - Paralysis in RLE, 4/5 strength in RUE  HTN  - Initial , Trop 0.122, EKG SR with 1st AVB and RBBB  - TSH wnl, A1c 5.3, UDS negative  - Carotid US 2/2023 neg   - Trop downtrending 0.119 this am.   - Echo revealed absence of intracardiac shunt  - MRI brain w w/ocontrast:  Acute ischemic infarct of the left CINTHYA vascular territory involving the left posterior parasagittal frontal cortex.   Encephalomalacia of the left greater than right cerebellar hemispheres.  Chronic lacunar infarct of the left paramedian gary.  Moderate chronic small-vessel ischemic changes of the supratentorial white matter.  Mild generalized brain atrophy.   - Continue home DAPT, atorvastatin 20 mg, Verapamil; holding home propranolol, cozaar for permissive HTN.   - Orthostatic vitals ordered, fall precautions in place  - PT/OT on board, appreciate assistance  - CTA head and neck ordered    Type 2 NSTEMI  CHF Exacerbation (EF 55% 4/2024)  CAD s/p Stenting to LAD   1st Degree AV Block and RBBB  Hx of PVD with stents  - Initial , Trop 0.122, EKG SR with 1st AVB and RBBB  - Lasix 80 IV x1 in ED, will monitor lytes and response to diuresis; holding home lasix 40 PO for now   - Strict I's and O's  - Trop downtrending, 0.119 this am.   - Patient follows with Dr Steel  CIS for PVD  - Multiple runs of vtach overnight, longest being a 12 beat run  - Continue telemetry monitoring  - Cardiology consulted, appreciate assistance     Rhabdomyolysis   Transaminitis  - Diuresed with 80 Lasix IV   - CPK improving - 2941 this am - trending  - Avoiding nephrotoxins, hepatotoxins      Vit B12 deficiency  Mild macrocytic anemia  - H, H 13.2, 38.2, ; Vit B12 <148,   - Denies alcohol use  - Vit B12 supplementation ordered, 1000 mcg daily     CODE STATUS:  Full  Access:  PIV   Antibiotics:  None  Diet:  Heart Healthy, Fluid Restriction 1800 cc  DVT Prophylaxis:  LVX 40  GI Prophylaxis:  None  Fluids:  None      Dispo:  A 74 year-old male admitted for type 2 NSTEMI and rhabdomyolysis in the setting of recurrent syncope and fall yesterday. MRI positive for acute ischemic infarct of left CINTHYA - exam consistent with R sided deficits. Dispo pending response.    Sarah Troy MD  Internal Medicine - PGY-1

## 2025-03-24 NOTE — CARE UPDATE
Nursing called regarding asymptomatic 12 beat run of VT, which could be playing role in syncope. Costa cutler. Will CTM.         Say Hartmann,    [employer] IM PGY-2
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
## 2025-03-24 NOTE — PROGRESS NOTES
Inpatient Nutrition Assessment    Admit Date: 3/23/2025   Total duration of encounter: 1 day   Patient Age: 74 y.o.    Nutrition Recommendation/Prescription     Will change diet to consistent carb/heart healthy diet; 1800 ml fluid restriction  Pt would like boost breeze with lunch meal; Boost Breeze (provides 250 kcal, 9 g protein per serving)   Pt education on diet complete  MVI/fe  Biweekly wt  Will monitor nutrition status     Communication of Recommendations: reviewed with nurse and reviewed with patient    Nutrition Assessment     Malnutrition Assessment/Nutrition-Focused Physical Exam       Malnutrition Level: other (see comments) (Does not meet criteria) (03/24/25 1341)  Energy Intake (Malnutrition): other (see comments) (Does not meet criteria) (03/24/25 1341)  Weight Loss (Malnutrition): other (see comments) (Does not meet criteria) (03/24/25 1341)         Clavicle Bone Region (Muscle Loss): well nourished         Fluid Accumulation (Malnutrition): moderate to severe (03/24/25 1341)     Hand  Strength, Right (Malnutrition): Unable to assess (03/24/25 1341)  A minimum of two characteristics is recommended for diagnosis of either severe or non-severe malnutrition.    Chart Review    Reason Seen: continuous nutrition monitoring    Malnutrition Screening Tool Results              Diagnosis:  Recurrent syncope, NSTEMI, CHF, CAD< AV block, rhabdo, transaminitis    Relevant Medical History: CAD, S/P LAD stent, venous insufficiency, HTN, NIDDM, obesity     Scheduled Medications:  aspirin, 81 mg, Daily  atorvastatin, 20 mg, Daily  clopidogreL, 75 mg, Daily  cyanocobalamin, 1,000 mcg, Daily  enoxparin, 40 mg, Daily  sodium chloride 0.9%, 1,000 mL, Once  verapamiL, 240 mg, Daily    Continuous Infusions:   PRN Medications:  acetaminophen, 650 mg, Q4H PRN  dextrose 50%, 12.5 g, PRN  dextrose 50%, 25 g, PRN  glucagon (human recombinant), 1 mg, PRN  glucose, 16 g, PRN  glucose, 24 g, PRN  insulin aspart U-100, 0-5  "Units, QID (AC + HS) PRN  naloxone, 0.02 mg, PRN  ondansetron, 4 mg, Q8H PRN  prochlorperazine, 5 mg, Q6H PRN  sodium chloride 0.9%, 10 mL, Q12H PRN    Calorie Containing IV Medications: no significant kcals from medications at this time    Recent Labs   Lab 03/23/25  1939 03/24/25  0123 03/24/25  1155    142 141   K 4.7 3.9 4.0   CALCIUM 9.0 8.7* 9.1   PHOS  --  3.3  --    MG  --  2.00  --     107 105   CO2 24 25 29   BUN 13.3 14.1 14.8   CREATININE 1.02 1.01 1.20   EGFRNORACEVR >60 >60 >60   GLUCOSE 119* 137* 205*   BILITOT 1.3 1.2  --    ALKPHOS 58 54  --    ALT 29 29  --    * 106*  --    ALBUMIN 3.2* 2.9*  --    HGBA1C  --  5.3  --    WBC 8.53 9.96  --    HGB 14.3 13.2*  --    HCT 41.5* 38.2*  --      Nutrition Orders:  Diet Heart Healthy Fluid - 1800mL      Appetite/Oral Intake: good/% of meals  Factors Affecting Nutritional Intake: none identified  Social Needs Impacting Access to Food: none identified  Food/Mormonism/Cultural Preferences: none reported  Food Allergies: none reported  Last Bowel Movement: 03/23/25  Wound(s):  none    Comments    (3/24) Pt reported he is eating well; good appetite; cooks for self at home; tries to limit sugar/salt in diet; unsure of wt hx --wt 1 year ago 282#--? 10% wt loss; will track wts; pt did have + LE edema upon admit. Reinforced diet principles. Labs acknoweldged: Gluc (H)--DM; LFTs elevated.     Anthropometrics    Height: 6' 2" (188 cm), Height Method: Stated  Last Weight: 115.7 kg (255 lb) (03/24/25 0908), Weight Method: Bed Scale  BMI (Calculated): 32.7  BMI Classification: obese grade I (BMI 30-34.9)        Ideal Body Weight (IBW), Male: 190 lb     % Ideal Body Weight, Male (lb): 134.21 %                 Usual Body Weight (UBW), kg:  (pt not sure UBW)        Usual Weight Provided By: patient and EMR weight history    Wt Readings from Last 5 Encounters:   03/24/25 115.7 kg (255 lb)   03/26/24 128.2 kg (282 lb 11.2 oz)   03/23/24 128 kg (282 lb " 3 oz)   02/14/24 126.3 kg (278 lb 8 oz)   09/21/23 123.2 kg (271 lb 9.6 oz)     Weight Change(s) Since Admission:pt unsure any recent wt change   Wt Readings from Last 1 Encounters:   03/24/25 0908 115.7 kg (255 lb)   03/23/25 2349 115.8 kg (255 lb 3.2 oz)   03/23/25 1918 127 kg (280 lb)   Admit Weight: 127 kg (280 lb) (03/23/25 1918), Weight Method: Estimated    Estimated Needs    Weight Used For Calorie Calculations: 115.8 kg (255 lb 4.7 oz)  Energy Calorie Requirements (kcal): 2084 kcal/d; 18 humaira/kg Obese  Energy Need Method: Kcal/kg  Weight Used For Protein Calculations: 86.3 kg (190 lb 4.1 oz) (based IBW/obese)  Protein Requirements: 112 gm protein/d; 1.3 gm/kg IBW  Fluid Requirements (mL): 2084 ml/d; 1ml/humaira  CHO Requirement: 234 gm CHO/d     Enteral Nutrition     Patient not receiving enteral nutrition at this time.    Parenteral Nutrition     Patient not receiving parenteral nutrition support at this time.    Evaluation of Received Nutrient Intake    Calories: meeting estimated needs  Protein: meeting estimated needs    Patient Education     Education Provided: diabetic diet and heart healthy diet  Teaching Method: explanation and printed materials  Comprehension: verbalizes understanding  Barriers to Learning: none evident  Expected Compliance: fair  Comments: All questions were answered and dietitian's contact information was provided.     Nutrition Diagnosis     PES: Altered nutrition related laboratory values related to chronic illness as evidenced by Gluc (H). (new)     PES:            Nutrition Interventions     Intervention(s): modified composition of meals/snacks, commercial beverage, multivitamin/mineral supplement therapy, purpose of nutrition education, and collaboration with other providers  Intervention(s):      Goal: Meet greater than 80% of nutritional needs by follow-up. (new)  Goal: Maintain weight throughout hospitalization. (new)    Nutrition Goals & Monitoring     Dietitian will monitor:  food and beverage intake, weight, and food/nutrition knowledge skill  Discharge planning: continue diabetic/ card  diet  Nutrition Risk/Follow-Up: low (follow-up in 5-7 days)   Please consult if re-assessment needed sooner.

## 2025-03-24 NOTE — PLAN OF CARE
Problem: Occupational Therapy  Goal: Occupational Therapy Goal  Description: Goals to be met by: d/c     Patient will increase functional independence with ADLs by performing:    UE Dressing with Stand-by Assistance while seated EOB .  LE Dressing with Moderate Assistance.  Grooming while seated EOB with Stand-by Assistance.  Pt will improve sitting balance EOB to SBA > 8 min to impact performance in self care tasks  Toileting from bedside commode with  Moderate Assistance for hygiene and clothing management.   Toilet transfer to bedside commode with Minimal Assistance.  Pt will increase R shoulder strength to 3/3+/5 as needed to impact efficiency during self care tasks    Outcome: Progressing
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
## 2025-03-24 NOTE — CONSULTS
[facility]  Inpatient Cardiology Consult    Reason for Consult: syncopal episodes and NSVT                    HPI:  Vaibhav Vargas 74 y.o. male with CAD s/p PCI to mid LAD in 2017, lymphedema, HTN, DM who is admitted to [facility] with syncope or fall. He is usually followed by Dr. Steel.    Per pt, he does not recall losing consciousness. He reports he tripped and fell and was unable to get up for a long time until one of his neighbors came to check on him.  Per primary team, pt did mention losing consciousness.    On workup, pt was found to have an anterior cerebral artery stroke. He was noted to have elevated CPK and troponin to peak 0.13.   EKG shows normal sinus rhythm with 1st degree AVB and RBBB and LAD.   Echo which I have personally reviewed reveals preserved EF, grade I diastolic dysfunction, PASP at least 37mmHg (mild pHTN).    To note, a year ago, the pt was admitted with AMS due to hypoglycemia. He was noted to have mildly elevated troponin.   Nuclear stress test was attempted twice in April and May 2024  because of elevated trop but pt was unable to complete the test due to side effects from lexiscan.                                                                                                                                                                                                                                                                                                                                                                                                                                                                                CARDIAC TESTING:    Results for orders placed in visit on 07/20/17    CATH LAB PROCEDURE  Mid LAD 80% stenosis s/p PCI  Normal coronaries otherwise            Prior to Admission medications    Medication Sig Start Date End Date Taking? Authorizing Provider   aspirin (ECOTRIN) 81 MG EC tablet Take 81 mg by mouth.    Provider, Historical   atorvastatin (LIPITOR) 20  MG tablet Take 1 tablet (20 mg total) by mouth once daily. 2/14/24 2/13/25  Shameka Rooney, DO   clopidogreL (PLAVIX) 75 mg tablet Take 1 tablet (75 mg total) by mouth once daily. 1/25/24   Shameka Rooney, DO   furosemide (LASIX) 40 MG tablet Take 1 tablet (40 mg total) by mouth once daily. 7/25/23 7/24/24  Jayme López MD   glucagon (GVOKE HYPOPEN 2-PACK) 1 mg/0.2 mL AtIn Inject 0.2 mLs into the skin daily as needed (low blood sugar). May repeat dose in 15 minutes 3/26/24   Tamara Reed NP   losartan (COZAAR) 50 MG tablet Take 50 mg by mouth once daily. 11/7/22   Provider, Historical   metFORMIN (GLUCOPHAGE) 1000 MG tablet Take 1 tablet (1,000 mg total) by mouth 2 (two) times daily. 3/13/25   Shameka Rooney, DO   propranoloL (INDERAL) 40 MG tablet Take by mouth. 10/21/22   Provider, Historical   verapamiL (CALAN-SR) 240 MG CR tablet Take 1 tablet (240 mg total) by mouth once daily. 12/21/22   Ramone Srivastava Jr., MD       Inpatient Scheduled Medications:   iohexoL        aspirin  81 mg Oral Daily    atorvastatin  20 mg Oral Daily    clopidogreL  75 mg Oral Daily    cyanocobalamin  1,000 mcg Oral Daily    enoxparin  40 mg Subcutaneous Daily    verapamiL  240 mg Oral Daily     Continuous Infusions:      ROS:                                                                                                                                                                             Negative except as stated in the history of present illness. See HPI for details.    PHYSICAL EXAM:  VITALS: T 98.3 °F (36.8 °C)   BP (!) 151/73   P 97   RR 17   O2 96 %    General: alert and oriented/no acute distress  Eye: EOMI/normal conjunctiva/no xanthelasma  HENT: normocephalic/moist oral mucosa  Neck: supple/nontender/no carotid bruit  Respiratory: lungs CTA/nonlabored respirations/BS equal/symmetrical expansion/no  chest wall tenderness  Cardiovascular: normal rate/normal rhythm/no murmur/normal peripheral  perfusion/no  edema/no JVD  Gastrointestinal: soft/nontender  Musculoskeletal: normal ROM  Integumentary: warm/dry/pink/intact  Neurologic: alert/oriented/normal sensory/no focal deficits  Psychiatric: cooperative/appropriate mood and affect/normal judgment    All medications, laboratory studies, cardiac diagnostic imaging independently reviewed.     ASSESSMENT/PLAN:    Syncope vs fall  Troponin elevation  CINTHYA stroke  SVT episodes     Troponin I elevation likely in the setting of mild rhabdomyolysis  Echo not revealing of cause of syncope, also unclear if pt did actually have syncope.  Review of tele shows no evidence of NSVT. Pt has occasional PVCs and 2 short episodes of SVT.    Recommend pt follows up with his outpatient cardiologist where an event monitor can be ordered to rule out pauses or arrhythmias.  Pt could benefit from outpatient stress echocardiogram     Angelia Bhatti MD  Cardiology staff
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
## 2025-03-25 LAB
ALBUMIN SERPL-MCNC: 2.7 G/DL (ref 3.4–4.8)
ALBUMIN/GLOB SERPL: 0.9 RATIO (ref 1.1–2)
ALP SERPL-CCNC: 42 UNIT/L (ref 40–150)
ALT SERPL-CCNC: 27 UNIT/L (ref 0–55)
ANION GAP SERPL CALC-SCNC: 8 MEQ/L
AST SERPL-CCNC: 63 UNIT/L (ref 11–45)
BASOPHILS # BLD AUTO: 0.06 X10(3)/MCL
BASOPHILS NFR BLD AUTO: 0.7 %
BILIRUB SERPL-MCNC: 0.7 MG/DL
BUN SERPL-MCNC: 13.4 MG/DL (ref 8.4–25.7)
CALCIUM SERPL-MCNC: 8.4 MG/DL (ref 8.8–10)
CHLORIDE SERPL-SCNC: 108 MMOL/L (ref 98–107)
CK SERPL-CCNC: 1388 U/L (ref 30–200)
CO2 SERPL-SCNC: 26 MMOL/L (ref 23–31)
CREAT SERPL-MCNC: 0.95 MG/DL (ref 0.72–1.25)
CREAT/UREA NIT SERPL: 14
EOSINOPHIL # BLD AUTO: 0.47 X10(3)/MCL (ref 0–0.9)
EOSINOPHIL NFR BLD AUTO: 5.7 %
ERYTHROCYTE [DISTWIDTH] IN BLOOD BY AUTOMATED COUNT: 13.9 % (ref 11.5–17)
GFR SERPLBLD CREATININE-BSD FMLA CKD-EPI: >60 ML/MIN/1.73/M2
GLOBULIN SER-MCNC: 3.1 GM/DL (ref 2.4–3.5)
GLUCOSE SERPL-MCNC: 133 MG/DL (ref 82–115)
HCT VFR BLD AUTO: 33.9 % (ref 42–52)
HGB BLD-MCNC: 11.7 G/DL (ref 14–18)
IMM GRANULOCYTES # BLD AUTO: 0.05 X10(3)/MCL (ref 0–0.04)
IMM GRANULOCYTES NFR BLD AUTO: 0.6 %
LYMPHOCYTES # BLD AUTO: 2.34 X10(3)/MCL (ref 0.6–4.6)
LYMPHOCYTES NFR BLD AUTO: 28.2 %
MAGNESIUM SERPL-MCNC: 2 MG/DL (ref 1.6–2.6)
MCH RBC QN AUTO: 36.3 PG (ref 27–31)
MCHC RBC AUTO-ENTMCNC: 34.5 G/DL (ref 33–36)
MCV RBC AUTO: 105.3 FL (ref 80–94)
MONOCYTES # BLD AUTO: 0.85 X10(3)/MCL (ref 0.1–1.3)
MONOCYTES NFR BLD AUTO: 10.2 %
NEUTROPHILS # BLD AUTO: 4.53 X10(3)/MCL (ref 2.1–9.2)
NEUTROPHILS NFR BLD AUTO: 54.6 %
NRBC BLD AUTO-RTO: 0 %
PHOSPHATE SERPL-MCNC: 3.3 MG/DL (ref 2.3–4.7)
PLATELET # BLD AUTO: 219 X10(3)/MCL (ref 130–400)
PMV BLD AUTO: 9.1 FL (ref 7.4–10.4)
POCT GLUCOSE: 116 MG/DL (ref 70–110)
POCT GLUCOSE: 155 MG/DL (ref 70–110)
POCT GLUCOSE: 222 MG/DL (ref 70–110)
POCT GLUCOSE: 251 MG/DL (ref 70–110)
POTASSIUM SERPL-SCNC: 3.4 MMOL/L (ref 3.5–5.1)
PROT SERPL-MCNC: 5.8 GM/DL (ref 5.8–7.6)
RBC # BLD AUTO: 3.22 X10(6)/MCL (ref 4.7–6.1)
SODIUM SERPL-SCNC: 142 MMOL/L (ref 136–145)
WBC # BLD AUTO: 8.3 X10(3)/MCL (ref 4.5–11.5)

## 2025-03-25 PROCEDURE — 83735 ASSAY OF MAGNESIUM: CPT

## 2025-03-25 PROCEDURE — 25000003 PHARM REV CODE 250: Performed by: INTERNAL MEDICINE

## 2025-03-25 PROCEDURE — 97112 NEUROMUSCULAR REEDUCATION: CPT

## 2025-03-25 PROCEDURE — 63600175 PHARM REV CODE 636 W HCPCS

## 2025-03-25 PROCEDURE — 84100 ASSAY OF PHOSPHORUS: CPT

## 2025-03-25 PROCEDURE — 85025 COMPLETE CBC W/AUTO DIFF WBC: CPT

## 2025-03-25 PROCEDURE — 97530 THERAPEUTIC ACTIVITIES: CPT

## 2025-03-25 PROCEDURE — 80053 COMPREHEN METABOLIC PANEL: CPT

## 2025-03-25 PROCEDURE — 25000003 PHARM REV CODE 250

## 2025-03-25 PROCEDURE — 82550 ASSAY OF CK (CPK): CPT

## 2025-03-25 PROCEDURE — 21400001 HC TELEMETRY ROOM

## 2025-03-25 PROCEDURE — 36415 COLL VENOUS BLD VENIPUNCTURE: CPT

## 2025-03-25 RX ORDER — POTASSIUM CHLORIDE 20 MEQ/1
40 TABLET, EXTENDED RELEASE ORAL ONCE
Status: COMPLETED | OUTPATIENT
Start: 2025-03-25 | End: 2025-03-25

## 2025-03-25 RX ADMIN — ASPIRIN 81 MG: 81 TABLET, CHEWABLE ORAL at 10:03

## 2025-03-25 RX ADMIN — INSULIN ASPART 2 UNITS: 100 INJECTION, SOLUTION INTRAVENOUS; SUBCUTANEOUS at 12:03

## 2025-03-25 RX ADMIN — CYANOCOBALAMIN TAB 1000 MCG 1000 MCG: 1000 TAB at 10:03

## 2025-03-25 RX ADMIN — ENOXAPARIN SODIUM 40 MG: 40 INJECTION SUBCUTANEOUS at 05:03

## 2025-03-25 RX ADMIN — POTASSIUM CHLORIDE 40 MEQ: 1500 TABLET, EXTENDED RELEASE ORAL at 06:03

## 2025-03-25 RX ADMIN — VERAPAMIL HYDROCHLORIDE 240 MG: 120 TABLET, FILM COATED, EXTENDED RELEASE ORAL at 10:03

## 2025-03-25 RX ADMIN — INSULIN ASPART 1 UNITS: 100 INJECTION, SOLUTION INTRAVENOUS; SUBCUTANEOUS at 08:03

## 2025-03-25 RX ADMIN — CLOPIDOGREL BISULFATE 75 MG: 75 TABLET, FILM COATED ORAL at 10:03

## 2025-03-25 NOTE — PROGRESS NOTES
"Vandana Dumont with [facility] reported pt not appropriate for Rehab, as pt has not been able to "take steps". SNF level of care recommended until pt able to ambulate. Discussed with dghtr, who would like to see if pt able to progress before making decision re SNF.  "

## 2025-03-25 NOTE — PROGRESS NOTES
[facility]  PROGRESS NOTE    Patient's Name: Vaibhav Vargas  : 1950  MRN: [id number]    Admission Date: 3/23/2025  Length of Stay: 1  Attending Physician: Valetnine Victor MD  Resident: Joaquim  Intern: Woodrow    SUBJECTIVE     Chief Complaint   Patient presents with    Fall     Pt. Arrives via EMS after being found down by family this evening. States he was putting on pajamas last night when he tripped and fell. C/o R shoulder pain, abrasion/swelling noted to R side face. Pt. GCS 15 at present     History of Present Illness:  Vaibhav Vargas is a 74 y.o. male with a history of CAD s/p LAD stent 2017, venous insuffiency, HTN, 1st Degree AVB, NIDDM2 who presented to [facility] ED on 3/23/2025  with complaint of a fall earlier in the day.  Patient is a difficult historian, daughter at bedside provides collateral information.  Patient arrives to the ED via EMS after being found down on the floor for an unknown period of time; patient states that he was attempting to put on his pajamas when he tripped and fell earlier in the evening.  He is unable to relay how long he was on the floor but states that he was wedged between the wall in his bed lying on his right side for prolonged period of time.  He denies any pain with extraocular motion, loss of consciousness, dizziness, chest pain, shortness of breath.  States that this is his 2nd fall over the course of the past 2 weeks.  States that he takes all of his medications but is unable to state which ones he is taking. Follows with Dr Steel for PVD, compliant with plavix.     In the ED, vital signs are stable.  CBC within normal limits.  CMP notable for AST//29, otherwise within normal limits.  , troponin 0.122, EKG NSR first-degree AV block and right bundle-branch block without any signs acute ischemia.  CPK 4104. UA positive for 1+ ketones, 2+ blood, 500 leukocyte esterase, >1000 WBC, trace bacteria.  Patient admitted to Hospital Medicine for continued monitoring and management.    Interval History:  NAEON. Hypertensive 140's/80's. Seen at bedside this am. No acute complaints. Denies any headache, dizziness, weakness, chest pain, SOB, nausea, vomiting, urinary or other complaints. Labs revealed macrocytic anemia H, H 11.7, 33.9, ; CPK improving 1388 this am. Neurological deficits same as day prior. Will continue to monitor.    Review of Systems:  A 12-pt ROS was conducted and was negative unless stated above.    OBJECTIVE     VITAL SIGNS: 24 HR MIN & MAX Most Recent Vitals   Temp  Min: 97.3 °F (36.3 °C)  Max: 98 °F (36.7 °C)  97.3 °F (36.3 °C)   BP  Min: 134/70  Max: 149/80  134/70    Pulse  Min: 74  Max: 87  87   Resp  Min: 18  Max: 20  18    SpO2  Min: 94 %  Max: 98 %  97 %    Body mass index is 32.74 kg/m².    Intake/Output:  I/O last 3 completed shifts:  In: 2535.4 [P.O.:1540; IV Piggyback:995.4]  Out: 850 [Urine:850]    Physical Examination:  General: Nontoxic-appearing, well nourished, in no acute distress  Eye: PERRL, EOMI  HENT: Normocephalic, atraumatic, moist mucous membranes  Neck: Supple, nontender, no JVD  Respiratory: Clear to auscultation bilaterally, no wheezes, rales, or rhonchi. Normal work of breathing  Cardiovascular: Regular rate and rhythm, no murmur, rubs, or gallops. Positive for leg swelling, 2+ radial pulses  Gastrointestinal: Soft, nontender, nondistended  Musculoskeletal: No calf tenderness. Reports pain and restriction of movement of R shoulder. Positive for falls, 2 known episodes over past 2 weeks  Integumentary: Warm, dry, intact. No rashes  Neurologic: Alert and oriented x3. Normal speech. CN grossly intact. Paralysis of R lower limb, recent onset since yesterday as well as reduced motor strength in RUE 4/5 vs 5/5 on L side.   Psychiatric: Appropriate mood and affect            Current Medications:  Scheduled:   aspirin  81 mg Oral Daily    clopidogreL  75 mg Oral Daily     cyanocobalamin  1,000 mcg Oral Daily    enoxparin  40 mg Subcutaneous Daily    verapamiL  240 mg Oral Daily      Infusions:    PRNs:    Current Facility-Administered Medications:     acetaminophen, 650 mg, Oral, Q4H PRN    dextrose 50%, 12.5 g, Intravenous, PRN    dextrose 50%, 25 g, Intravenous, PRN    glucagon (human recombinant), 1 mg, Intramuscular, PRN    glucose, 16 g, Oral, PRN    glucose, 24 g, Oral, PRN    insulin aspart U-100, 0-5 Units, Subcutaneous, QID (AC + HS) PRN    naloxone, 0.02 mg, Intravenous, PRN    ondansetron, 4 mg, Intravenous, Q8H PRN    prochlorperazine, 5 mg, Intravenous, Q6H PRN    sodium chloride 0.9%, 10 mL, Intravenous, Q12H PRN  Labs:  CBC:  Recent Labs   Lab 03/23/25 1939 03/24/25 0123 03/25/25  0334   WBC 8.53 9.96 8.30   HGB 14.3 13.2* 11.7*   HCT 41.5* 38.2* 33.9*    268 219   .3* 107.0* 105.3*   RDW 13.7 13.7 13.9     BMP/CMP:  Recent Labs   Lab 03/24/25 0123 03/24/25  1155 03/25/25  0334    141 142   K 3.9 4.0 3.4*    105 108*   CO2 25 29 26   BUN 14.1 14.8 13.4   CREATININE 1.01 1.20 0.95   GLUCOSE 137* 205* 133*   EGFRNORACEVR >60 >60 >60     RFTs/LFTs:  Recent Labs   Lab 03/23/25 1939 03/24/25 0123 03/24/25  1155 03/25/25  0334   CALCIUM 9.0 8.7* 9.1 8.4*   LABPROT 7.2 6.6  --  5.8   ALBUMIN 3.2* 2.9*  --  2.7*   * 106*  --  63*   ALT 29 29  --  27   ALKPHOS 58 54  --  42   BILITOT 1.3 1.2  --  0.7     Recent Labs   Lab 03/24/25 0123 03/25/25  0334   MG 2.00 2.00   PHOS 3.3 3.3     Cardiac Panel:  Recent Labs   Lab 03/23/25  1939 03/24/25  0123 03/24/25  0510   TROPONINI 0.122* 0.130* 0.119*   .6*  --   --      Interval Imaging:  X-Ray Hand 2 View Right  Narrative: EXAMINATION:  XR HAND 2 VIEW RIGHT    CLINICAL HISTORY:  s/p fall, dislocated R 3rd digit and 'placed it back in';    COMPARISON:  None.    FINDINGS:  No acute displaced fractures or dislocations.    There is some narrowing of the proximal and distal interphalangeal  joints articular spaces otherwise preserved with smooth articular surfaces    No blastic or lytic lesions.    Soft tissues within normal limits.  Impression: No acute osseous abnormality..    Degenerative changes    Electronically signed by: Singh Woodruff  Date:    03/25/2025  Time:    05:51     Microbiology:  Microbiology Results (last 7 days)       Procedure Component Value Units Date/Time    Blood Culture [[id number]]  (Normal) Collected: 03/24/25 0623    Order Status: Completed Specimen: Blood from Hand, Right Updated: 03/25/25 1200     Blood Culture No Growth At 24 Hours    Blood Culture [[id number]]  (Normal) Collected: 03/24/25 0704    Order Status: Completed Specimen: Blood from Hand, Left Updated: 03/25/25 1100     Blood Culture No Growth At 24 Hours    Urine culture [[id number]] Collected: 03/24/25 0011    Order Status: Resulted Specimen: Urine Updated: 03/24/25 0036          Antibiotics:  Antibiotics (From admission, onward)      None             ASSESSMENT AND PLAN   Recurrent Syncope   Acute ischemic infarct of the left CINTHYA   R sided neurological deficits - Paralysis in RLE, 4/5 strength in RUE  HTN  - Initial , Trop 0.122, EKG SR with 1st AVB and RBBB  - TSH wnl, A1c 5.3, UDS negative  - Carotid US 2/2023 neg   - Trop downtrending 0.119 this am.   - Echo revealed absence of intracardiac shunt  - MRI brain w w/ocontrast:  Acute ischemic infarct of the left CINTHYA vascular territory involving the left posterior parasagittal frontal cortex.   Encephalomalacia of the left greater than right cerebellar hemispheres.  Chronic lacunar infarct of the left paramedian gary.  Moderate chronic small-vessel ischemic changes of the supratentorial white matter.  Mild generalized brain atrophy.   - Continue home DAPT, atorvastatin 20 mg, Verapamil; holding home propranolol, cozaar for permissive HTN.   - Orthostatic vitals ordered, fall precautions in place  - PT/OT on board, appreciate assistance  - CTA head  and neck:  Minimal flow throughout the left vertebral artery. No large vessel occlusion or flow-limiting stenosis. Discussed with neurology staff.  Enlarged multinodular thyroid gland with rightward tracheal deviation and tracheal narrowing. ENT consulted for evaluation  - CM consulted for inpatient rehab referral, appreciate assistance.     Type 2 NSTEMI  CHF Exacerbation (EF 55% 4/2024)  CAD s/p Stenting to LAD   1st Degree AV Block and RBBB  Hx of PVD with stents  - Initial , Trop 0.122, EKG SR with 1st AVB and RBBB  - Lasix 80 IV x1 in ED, will monitor lytes and response to diuresis; holding home lasix 40 PO for now   - Strict I's and O's  - Trop downtrending, 0.119 this am.   - Patient follows with Dr Steel [facility] for PVD  - Multiple runs of vtach overnight, longest being a 12 beat run  - Continue telemetry monitoring  - Cardiology consulted - per them, patient is okay to follow with Dr Steel outpatient and obtain stress test when possible. This has been attempted twice before but patient was unable to tolerate it.     Rhabdomyolysis   Transaminitis  - Diuresed with 80 Lasix IV   - CPK improving - 1388 this am - trending  - Avoiding nephrotoxins, hepatotoxins   - Statin discontinued      Vit B12 deficiency  Mild macrocytic anemia  - H, H 11.7, 33.9,   - Denies alcohol use  - Vit B12 supplementation ordered, 1000 mcg daily     CODE STATUS:  Full  Access:  PIV   Antibiotics:  None  Diet:  Heart Healthy, Fluid Restriction 1800 cc  DVT Prophylaxis:  LVX 40  GI Prophylaxis:  None  Fluids:  None      Dispo:  A 74 year-old male admitted for type 2 NSTEMI and rhabdomyolysis in the setting of recurrent syncope and fall yesterday. MRI positive for acute ischemic infarct of left CINTHYA - exam consistent with R sided deficits. Dispo pending response.    Sarah Troy MD  Internal Medicine - PGY-1

## 2025-03-25 NOTE — MEDICAL/APP STUDENT
[facility]  PROGRESS NOTE    Patient's Name: Vaibhav Vargas  : 1950  MRN: [id number]    Admission Date: 3/23/2025  Length of Stay: 1  Attending Physician: Valentine Victor MD  Resident: STAS March MD  Intern: STAS Troy MD    SUBJECTIVE     Chief Complaint   Patient presents with    Fall     Pt. Arrives via EMS after being found down by family this evening. States he was putting on pajamas last night when he tripped and fell. C/o R shoulder pain, abrasion/swelling noted to R side face. Pt. GCS 15 at present     History of Present Illness:  Vaibhav Vargas is a 74 y.o. male with a history of CAD s/p LAD stent 2017, venous insuffiency, HTN, 1st Degree AVB, NIDDM2 who presented to [facility] ED on 3/23/2025  with complaint of a fall earlier in the day.  Patient is a difficult historian, daughter at bedside provides collateral information.  Patient arrives to the ED via EMS after being found down on the floor for an unknown period of time; patient states that he was attempting to put on his pajamas when he tripped and fell earlier in the evening.  He is unable to relay how long he was on the floor but states that he was wedged between the wall in his bed lying on his right side for prolonged period of time.  He denies any pain with extraocular motion, loss of consciousness, dizziness, chest pain, shortness of breath.  States that this is his 2nd fall over the course of the past 2 weeks.  States that he takes all of his medications but is unable to state which ones he is taking. Follows with Dr Steel for PVD, compliant with plavix.     In the ED, vital signs are stable.  CBC within normal limits.  CMP notable for AST//29, otherwise within normal limits.  , troponin 0.122, EKG NSR first-degree AV block and right bundle-branch block without any signs acute ischemia.  CPK 4104. UA positive for 1+ ketones, 2+ blood, 500 leukocyte esterase, >1000 WBC, trace  bacteria. Patient admitted to Hospital Medicine for continued monitoring and management.    Hospital Course/Significant Events:  3/23/2025: Admitted to [facility].    Interval History:  NAEON. VSS. WBC 8.3, macrocytic anemia: H/H 11.7/33.9. .3, decreased from prior (107). CMP notable for K+ 3.4, decreased from yesterday (4.0). Glucose 133. AST/ALT 63/27, improved from 106/29. CPK 1388, down from 2770. Saw pt at bedside this am without daughter present. Some degree of dementia and will repeat himself. No acute complaints overnight. He reports feeling improved from yesterday. Continued paralysis of the RLE. Notes improved strength of RUE. Denies headache, new weakness/paresthesia, vision change, cp, palpitations, n/v/d/c, abd pain, or urinary complaints. XR R hand and shoulder negative for acute process or body abnormality. CT head and neck showed encephalomalacia in the L cerebellum with a questionable area of decreased attenuation in the L parietal lobe adjacent to the falx. Minimal flow in the L vertebral artery. Patent R vertebral artery. Noted enlarged multinodular thyroid gland with rightward tracheal deviation and tracheal narrowing. MRI brain positive for acute ischemia of left CINTHYA involving the L posterior parasagittal frontal cortex. Echo noted EF 55%, negative otherwise for abnormality. Will consult ENT due to findings on CT head and neck. Needs inpatient rehabilitation for mobility. Unable to tolerate nuclear stress test in the past. Will refer to cardiology for outpatient visit. Will continue to monitor.     Review of Systems:  A 12-pt ROS was conducted and was negative unless stated above.    OBJECTIVE     VITAL SIGNS: 24 HR MIN & MAX Most Recent Vitals   Temp  Min: 97.4 °F (36.3 °C)  Max: 98.3 °F (36.8 °C)  97.4 °F (36.3 °C)   BP  Min: 133/69  Max: 151/73  (!) 149/80    Pulse  Min: 74  Max: 106  74   Resp  Min: 18  Max: 20  18    SpO2  Min: 94 %  Max: 97 %  97 %    Body mass index is 32.74  kg/m².    Intake/Output:  I/O last 3 completed shifts:  In: 2535.4 [P.O.:1540; IV Piggyback:995.4]  Out: 850 [Urine:850]  Physical Exam  HENT:      Head: Normocephalic.      Comments: Old skin abrasion present on forehead.   Eyes:      Extraocular Movements: Extraocular movements intact.   Cardiovascular:      Rate and Rhythm: Normal rate and regular rhythm.      Heart sounds: Normal heart sounds.   Pulmonary:      Effort: Pulmonary effort is normal.      Breath sounds: Normal breath sounds.   Neurological:      Mental Status: Mental status is at baseline.      Comments: LUE: 5/5 strength, full ROM  RUE: 4/5 strength, full ROM  LLE: 5/5 strength in all planes of motion. Able to move metatarsals and dorsiflex/plantarflex against resistance  RLE: 0/5 strength, no ROM- paralyzed          Current Medications:  Scheduled:   aspirin  81 mg Oral Daily    clopidogreL  75 mg Oral Daily    cyanocobalamin  1,000 mcg Oral Daily    enoxparin  40 mg Subcutaneous Daily    verapamiL  240 mg Oral Daily      Infusions:    PRNs:    Current Facility-Administered Medications:     acetaminophen, 650 mg, Oral, Q4H PRN    dextrose 50%, 12.5 g, Intravenous, PRN    dextrose 50%, 25 g, Intravenous, PRN    glucagon (human recombinant), 1 mg, Intramuscular, PRN    glucose, 16 g, Oral, PRN    glucose, 24 g, Oral, PRN    insulin aspart U-100, 0-5 Units, Subcutaneous, QID (AC + HS) PRN    naloxone, 0.02 mg, Intravenous, PRN    ondansetron, 4 mg, Intravenous, Q8H PRN    prochlorperazine, 5 mg, Intravenous, Q6H PRN    sodium chloride 0.9%, 10 mL, Intravenous, Q12H PRN  Labs:  CBC:  Recent Labs   Lab 03/23/25  1939 03/24/25  0123 03/25/25  0334   WBC 8.53 9.96 8.30   HGB 14.3 13.2* 11.7*   HCT 41.5* 38.2* 33.9*    268 219   .3* 107.0* 105.3*   RDW 13.7 13.7 13.9     BMP/CMP:  Recent Labs   Lab 03/24/25  0123 03/24/25  1155 03/25/25  0334    141 142   K 3.9 4.0 3.4*    105 108*   CO2 25 29 26   BUN 14.1 14.8 13.4   CREATININE  1.01 1.20 0.95   GLUCOSE 137* 205* 133*   EGFRNORACEVR >60 >60 >60     RFTs/LFTs:  Recent Labs   Lab 03/23/25 1939 03/24/25  0123 03/24/25  1155 03/25/25  0334   CALCIUM 9.0 8.7* 9.1 8.4*   LABPROT 7.2 6.6  --  5.8   ALBUMIN 3.2* 2.9*  --  2.7*   * 106*  --  63*   ALT 29 29  --  27   ALKPHOS 58 54  --  42   BILITOT 1.3 1.2  --  0.7     Recent Labs   Lab 03/24/25  0123 03/25/25  0334   MG 2.00 2.00   PHOS 3.3 3.3     Cardiac Panel:  Recent Labs   Lab 03/23/25 1939 03/24/25 0123 03/24/25  0510   TROPONINI 0.122* 0.130* 0.119*   .6*  --   --      Interval Imaging:  X-Ray Hand 2 View Right  Narrative: EXAMINATION:  XR HAND 2 VIEW RIGHT    CLINICAL HISTORY:  s/p fall, dislocated R 3rd digit and 'placed it back in';    COMPARISON:  None.    FINDINGS:  No acute displaced fractures or dislocations.    There is some narrowing of the proximal and distal interphalangeal joints articular spaces otherwise preserved with smooth articular surfaces    No blastic or lytic lesions.    Soft tissues within normal limits.  Impression: No acute osseous abnormality..    Degenerative changes    Electronically signed by: Singh Woodruff  Date:    03/25/2025  Time:    05:51     Microbiology:  Microbiology Results (last 7 days)       Procedure Component Value Units Date/Time    Blood Culture [[id number]] Collected: 03/24/25 0704    Order Status: Resulted Specimen: Blood from Hand, Left Updated: 03/24/25 0743    Blood Culture [[id number]] Collected: 03/24/25 0623    Order Status: Resulted Specimen: Blood from Hand, Right Updated: 03/24/25 0638    Urine culture [[id number]] Collected: 03/24/25 0011    Order Status: Resulted Specimen: Urine Updated: 03/24/25 0036          Antibiotics:  Antibiotics (From admission, onward)      None             ASSESSMENT AND PLAN   Recurrent Syncope   Acute ischemic infarct of the left CINTHYA   R sided neurological deficits - Paralysis in RLE, 4/5 strength in RUE  HTN  - Initial , Trop  0.122, EKG SR with 1st AVB and RBBB  - TSH wnl, A1c 5.3, UDS negative  - Carotid US 2/2023 neg   - Trop downtrending 0.119 this am.   - Echo revealed absence of intracardiac shunt  - MRI brain w w/ocontrast:  Acute ischemic infarct of the left CINTHYA vascular territory involving the left posterior parasagittal frontal cortex.   Encephalomalacia of the left greater than right cerebellar hemispheres.  Chronic lacunar infarct of the left paramedian gary.  Moderate chronic small-vessel ischemic changes of the supratentorial white matter.  Mild generalized brain atrophy.            - Continue home DAPT, atorvastatin 20 mg, Verapamil; holding home propranolol, cozaar for permissive HTN.   - Orthostatic vitals ordered, fall precautions in place  - PT/OT on board, appreciate assistance  - CTA head and neck positive for decreased attenuation in the L parietal lobe adjacent to the falx. Minimal flow in the L vertebral artery. Patent R vertebral artery. Enlarged multinodular thyroid gland with rightward tracheal deviation and tracheal narrowing.-- will consult ENT  -pend ENT recs     Type 2 NSTEMI  CHF Exacerbation (EF 55% 4/2024)  CAD s/p Stenting to LAD   1st Degree AV Block and RBBB  Hx of PVD with stents  - Initial , Trop 0.122, EKG SR with 1st AVB and RBBB  - Lasix 80 IV x1 in ED, will monitor lytes and response to diuresis; holding home lasix 40 PO for now   - Strict I's and O's  - Trop downtrending, 0.119 this am.   - Patient follows with Dr Milvia GREER for PVD  - Multiple runs of vtach overnight, longest being a 12 beat run  - Continue telemetry monitoring  - Cardiology consulted, appreciate assistance  -Will refer outpatient for future nuclear stress testing     Rhabdomyolysis   Transaminitis  - Diuresed with 80 Lasix IV   - CPK improving - 1388 this am - trending  - statin held  - Avoiding nephrotoxins, hepatotoxins      Vit B12 deficiency  Mild macrocytic anemia  - H, H 11.7, 33.9, ; Vit B12 <148, AST 63  -  Denies alcohol use  - Vit B12 supplementation ordered, 1000 mcg daily      DVT Prophylaxis: Lovenox 40   GI Prophylaxis: none  Abx: none  Access: PIV  Fluids: none  Diet: Diet Heart Healthy Consistent Carbohydrate; 2000 Calories (up to 75 gm per meal); Fluid - 1800mL    Code Status: Full Code    Disposition: 74 y.o. male admitted for type 2 NSTEMI and rhabdomyolysis in the setting of recurrent syncope and fall 2 days ago MRI positive for acute infarct of L CINTHYA and physical exam consistent with R sided LE deficits. Disposition pending ENT consult, rehab referral, and clinical improvement.       Case was discussed and seen with Dr. Victor. Please appreciate attending's attestation to follow.    Sapna Spaulding, ADRIANS-III

## 2025-03-25 NOTE — PT/OT/SLP PROGRESS
Occupational Therapy   Treatment    Name: Vaibhav Vargas  MRN: [id number]  Admitting Diagnosis:     acute ischemic CVA  1. Non-traumatic rhabdomyolysis    2. Screening due    3. PRIYA (acute kidney injury)    4. NSTEMI (non-ST elevated myocardial infarction)    5. Chest pain    6. Syncope    Problem List[1]   Recommendations:     Discharge Recommendations: High Intensity Therapy  Discharge Equipment Recommendations:  to be determined by next level of care  Barriers to discharge:  Other (Comment) (severity of deficits, level of skilled assist, fall risk, safety concerns, medical dx)    Assessment:     Vaibhav Vargas is a 74 y.o. male with a medical diagnosis of acute ischemic CVA.  He presents with impaired proprioception , easily distracted impacting ability to follow commands  and limited insight to deficits . Overall improved tolerance of EOB activity this session and able to perform sit to stand with assist x 2 with improved weight bearing R LE.  Improved R UE AROM noted during functional reaching tasks . Pt motivated and cooperative . Performance deficits affecting function are weakness, impaired endurance, impaired sensation, impaired self care skills, impaired functional mobility, gait instability, impaired balance, impaired cognition, decreased upper extremity function, decreased lower extremity function, decreased safety awareness, pain, abnormal tone, decreased ROM, impaired coordination, impaired fine motor, edema.     Rehab Prognosis:  Good; patient would benefit from acute skilled OT services to address these deficits and reach maximum level of function.       Plan:     Patient to be seen 3 x/week to address the above listed problems via self-care/home management, therapeutic activities, therapeutic exercises, neuromuscular re-education  Plan of Care Expires:  (d/c)  Plan of Care Reviewed with: patient    Subjective     Chief Complaint: pain R UE proximally > distally  Patient/Family Comments/goals:  improve functional status   Pain/Comfort:  Pain Rating 1: 5/10  Location - Side 1: Right  Location 1: arm  Pain Addressed 1: Nurse notified  Pain Rating Post-Intervention 1: 5/10    Objective:     Communicated with: Nurse Mao prior to session.  Patient found HOB elevated with peripheral IV, telemetry, SCD upon OT entry to room.    General Precautions: Standard, fall    Orthopedic Precautions:N/A  Braces: N/A  Respiratory Status: Room air     Occupational Performance:     Bed Mobility:    Patient completed Supine to Sit with moderate assistance  Patient completed Sit to Supine with moderate assistance     Functional Mobility/Transfers:  Patient completed Sit <> Stand Transfer with moderate assistance and of 2 persons  with  rolling walker and and from elevated bed x 2 attempts ; Pt stood ~ 1 min intervals with mod A x 2 for balance; flexed posture , poor awareness of midline  and impulsive    Functional Mobility:    lateral scoot EOB to HOB with mod assist x 2 persons       Treatment & Education:  R UE weight bearing as tolerated to improve muscle activation followed by R UE functional reaching.    Pt sat EOB ~ 15 min with fluctuating balance and impacted by impaired proprioception , unaware of midline and trunk weakness. Overall mod A sitting balance but occasionally CGA for brief intervals with ongoing tactile and verbal cueing to midline .     Patient left with bed in chair position with all lines intact, call button in reach, bed alarm on, and nurse Antonio  notified    GOALS:   Multidisciplinary Problems       Occupational Therapy Goals          Problem: Occupational Therapy    Goal Priority Disciplines Outcome Interventions   Occupational Therapy Goal     OT, PT/OT Progressing    Description: Goals to be met by: d/c     Patient will increase functional independence with ADLs by performing:    UE Dressing with Stand-by Assistance while seated EOB .  LE Dressing with Moderate Assistance.  Grooming while seated  EOB with Stand-by Assistance.  Pt will improve sitting balance EOB to SBA > 8 min to impact performance in self care tasks  Toileting from bedside commode with  Moderate Assistance for hygiene and clothing management.   Toilet transfer to bedside commode with Minimal Assistance.  Pt will increase R shoulder strength to 3/3+/5 as needed to impact efficiency during self care tasks                         DME Justifications:  TBD pending progress and at next level of care    Time Tracking:     OT Date of Treatment: 03/25/25  OT Start Time: 0900  OT Stop Time: 0932  OT Total Time (min): 32 min    Billable Minutes:Neuromuscular Re-education 32 min   Co- treatment performed due to patient's multiple deficits requiring two skilled therapists to appropriately and safely assess patient's strength and endurance while facilitating functional tasks in addition to accommodating for patient's activity tolerance   OT/TETE: OT          3/25/2025       [1]   Patient Active Problem List  Diagnosis    Chronic low back pain    Edema    Obesity    Osteoarthritis of knee    Primary hypertension    Type 2 diabetes mellitus    Coronary artery disease    Myocardial infarction

## 2025-03-25 NOTE — PLAN OF CARE
ENT Plan of Care Note    Vaibhav Vargas is a 74 y.o. male with history of CAD, HTN, T2DM admitted on 3/23/25 for syncopal evident. Found to have NSTEMI and acute ischemic infarct of left CINTHYA with right sided neurological deficits. CTA head and neck obtained as part of work up, noted to have thyroid nodules.    ENT consulted for evaluation. Imaging reviewed, thyroid enlarged with some left substernal extent with some rightward tracheal deviation. Per chart review, patient without SOB, on room air.     Recommend dedicated thyroid ultrasound with FNA if indicated based on TIRADS criteria. Will set up patient for follow up in ENT clinic once discharged from hospital and recovered from acute event.     Please call ENT with questions/concerns.     Indiana Gunter MD  [facility]

## 2025-03-25 NOTE — PROGRESS NOTES
As discussed with [email or web address], referral submitted to [facility] for placement via [facility] fax.

## 2025-03-25 NOTE — PT/OT/SLP PROGRESS
Physical Therapy Treatment    Patient Name:  Vaibhav Vargas   MRN:  [id number]    Recommendations     Therapy Intensity Recommendations at Discharge: High Intensity Therapy  Discharge Equipment Recommendations: to be determined by next level of care   Barriers to discharge: fall risk, decreased endurance, decreased safety awareness, and insight into deficits    Assessment     Vaibhav Vargas is a 74 y.o. male admitted with a medical diagnosis of CVA   1. Non-traumatic rhabdomyolysis    2. Screening due    3. PRIYA (acute kidney injury)    4. NSTEMI (non-ST elevated myocardial infarction)    5. Chest pain    6. Syncope       Problem List[1]   He presents with the following impairments/functional limitations:   .    Rehab Prognosis: Good.    Patient would benefit from continued skilled acute PT services to: address above listed impairments/functional limitations; receive patient/caregiver education; reduce fall risk; and maximize independency/safety with functional mobility.    Recent Surgery: * No surgery found *      Plan     During this hospitalization, patient to be seen 5 x/week to address the identified impairments/functional limitations via gait training, therapeutic activities, therapeutic exercises and progress toward the established goals.    Plan of Care Expires:  04/23/25    Subjective     Communicated with patient's nurse Daylin  prior to session.    Patient agreeable to participate in treatment session.    Chief Complaint: R shoulder pain  Patient/Family Comments/goals: to get better and go home  Pain/Comfort:  Pain Rating 1: 5/10  Location - Side 1: Right  Location 1: arm  Pain Addressed 1: Nurse notified  Pain Rating Post-Intervention 1: 5/10    Objective     Patient found supine in bed with    upon PT entry to room.    General Precautions: Standard, fall   Orthopedic Precautions:N/A   Braces: none    Respiratory Status: room air    Functional Mobility:    Bed Mobility:  Supine to Sit: moderate  assistance    Transfers:  Sit to Stand: moderate assistance and of 2 persons with rolling walker from an elevated bed. Block R knee for safety. Pt. Performed x 2, on 2nd trial pt. Was holding his R knee and didn't require support. Pt. Has flexed posture, unaware of midline, can't step, pt. Is impulsive.    Gait:  Unable to step at this point    Other Mobility:  Worked on sitting balance, activation of trunk and extremities, promote awareness of midline and upright posture    Patient left in chair position in bed with all lines intact, call button in reach, tray table at bedside, and student nurse assisted and present.    DME Justifications:  To be determined with progress    Education     Patient educated on and assisted with functional mobility as noted above.  Patient educated on PT Plan of Care.  Patient was instructed to utilize staff assistance for mobility/transfers.  White board updated regarding patient's safest level of mobility with staff assistance    Goals     Multidisciplinary Problems       Physical Therapy Goals          Problem: Physical Therapy    Goal Priority Disciplines Outcome Interventions   Physical Therapy Goal     PT, PT/OT Progressing    Description: Goals to be met by: 2025     Patient will increase functional independence with mobility by performin. Supine to sit with Contact Guard Assistance  2. Sit to stand transfer with Contact Guard Assistance  3. Bed to chair transfer with Contact Guard Assistance using Rolling Walker vs HemiWalker  4. Gait  x 50 feet with Moderate Assistance using Rolling Walker vs HemiWalker  5. Sitting at edge of bed x10 minutes with Stand-by Assistance  6. Stand for 10 minutes with Contact Guard Assistance using Rolling Walker vs HemiWalker                       Time Tracking     PT Received On: 25  PT Start Time: 0900     PT Stop Time: 0932  PT Total Time (min): 32 min     Billable Minutes: Therapeutic Activity 20 and Neuromuscular  Re-education 12    03/25/2025       [1]   Patient Active Problem List  Diagnosis    Chronic low back pain    Edema    Obesity    Osteoarthritis of knee    Primary hypertension    Type 2 diabetes mellitus    Coronary artery disease    Myocardial infarction

## 2025-03-26 LAB
ALBUMIN SERPL-MCNC: 2.6 G/DL (ref 3.4–4.8)
ALBUMIN/GLOB SERPL: 0.9 RATIO (ref 1.1–2)
ALP SERPL-CCNC: 41 UNIT/L (ref 40–150)
ALT SERPL-CCNC: 23 UNIT/L (ref 0–55)
ANION GAP SERPL CALC-SCNC: 6 MEQ/L
AST SERPL-CCNC: 44 UNIT/L (ref 11–45)
BACTERIA UR CULT: NORMAL
BASOPHILS # BLD AUTO: 0.06 X10(3)/MCL
BASOPHILS NFR BLD AUTO: 0.9 %
BILIRUB SERPL-MCNC: 0.5 MG/DL
BUN SERPL-MCNC: 10.8 MG/DL (ref 8.4–25.7)
CALCIUM SERPL-MCNC: 8.3 MG/DL (ref 8.8–10)
CHLORIDE SERPL-SCNC: 108 MMOL/L (ref 98–107)
CK SERPL-CCNC: 790 U/L (ref 30–200)
CO2 SERPL-SCNC: 27 MMOL/L (ref 23–31)
CREAT SERPL-MCNC: 0.84 MG/DL (ref 0.72–1.25)
CREAT/UREA NIT SERPL: 13
EOSINOPHIL # BLD AUTO: 0.42 X10(3)/MCL (ref 0–0.9)
EOSINOPHIL NFR BLD AUTO: 6 %
ERYTHROCYTE [DISTWIDTH] IN BLOOD BY AUTOMATED COUNT: 14 % (ref 11.5–17)
GFR SERPLBLD CREATININE-BSD FMLA CKD-EPI: >60 ML/MIN/1.73/M2
GLOBULIN SER-MCNC: 3 GM/DL (ref 2.4–3.5)
GLUCOSE SERPL-MCNC: 133 MG/DL (ref 82–115)
HCT VFR BLD AUTO: 31.9 % (ref 42–52)
HGB BLD-MCNC: 10.8 G/DL (ref 14–18)
IMM GRANULOCYTES # BLD AUTO: 0.04 X10(3)/MCL (ref 0–0.04)
IMM GRANULOCYTES NFR BLD AUTO: 0.6 %
LYMPHOCYTES # BLD AUTO: 2.38 X10(3)/MCL (ref 0.6–4.6)
LYMPHOCYTES NFR BLD AUTO: 33.8 %
MAGNESIUM SERPL-MCNC: 2.1 MG/DL (ref 1.6–2.6)
MCH RBC QN AUTO: 35.8 PG (ref 27–31)
MCHC RBC AUTO-ENTMCNC: 33.9 G/DL (ref 33–36)
MCV RBC AUTO: 105.6 FL (ref 80–94)
MONOCYTES # BLD AUTO: 0.76 X10(3)/MCL (ref 0.1–1.3)
MONOCYTES NFR BLD AUTO: 10.8 %
NEUTROPHILS # BLD AUTO: 3.38 X10(3)/MCL (ref 2.1–9.2)
NEUTROPHILS NFR BLD AUTO: 47.9 %
NRBC BLD AUTO-RTO: 0 %
PHOSPHATE SERPL-MCNC: 3.4 MG/DL (ref 2.3–4.7)
PLATELET # BLD AUTO: 208 X10(3)/MCL (ref 130–400)
PMV BLD AUTO: 9.5 FL (ref 7.4–10.4)
POCT GLUCOSE: 107 MG/DL (ref 70–110)
POCT GLUCOSE: 149 MG/DL (ref 70–110)
POCT GLUCOSE: 149 MG/DL (ref 70–110)
POCT GLUCOSE: 154 MG/DL (ref 70–110)
POTASSIUM SERPL-SCNC: 3.4 MMOL/L (ref 3.5–5.1)
PROT SERPL-MCNC: 5.6 GM/DL (ref 5.8–7.6)
RBC # BLD AUTO: 3.02 X10(6)/MCL (ref 4.7–6.1)
SODIUM SERPL-SCNC: 141 MMOL/L (ref 136–145)
WBC # BLD AUTO: 7.04 X10(3)/MCL (ref 4.5–11.5)

## 2025-03-26 PROCEDURE — 83735 ASSAY OF MAGNESIUM: CPT

## 2025-03-26 PROCEDURE — 85025 COMPLETE CBC W/AUTO DIFF WBC: CPT

## 2025-03-26 PROCEDURE — 80053 COMPREHEN METABOLIC PANEL: CPT

## 2025-03-26 PROCEDURE — 97112 NEUROMUSCULAR REEDUCATION: CPT

## 2025-03-26 PROCEDURE — 84100 ASSAY OF PHOSPHORUS: CPT

## 2025-03-26 PROCEDURE — 25000003 PHARM REV CODE 250

## 2025-03-26 PROCEDURE — 63600175 PHARM REV CODE 636 W HCPCS

## 2025-03-26 PROCEDURE — 82550 ASSAY OF CK (CPK): CPT

## 2025-03-26 PROCEDURE — 97530 THERAPEUTIC ACTIVITIES: CPT

## 2025-03-26 PROCEDURE — 25000003 PHARM REV CODE 250: Performed by: INTERNAL MEDICINE

## 2025-03-26 PROCEDURE — 36415 COLL VENOUS BLD VENIPUNCTURE: CPT

## 2025-03-26 PROCEDURE — 21400001 HC TELEMETRY ROOM

## 2025-03-26 RX ORDER — POTASSIUM CHLORIDE 20 MEQ/1
40 TABLET, EXTENDED RELEASE ORAL ONCE
Status: COMPLETED | OUTPATIENT
Start: 2025-03-26 | End: 2025-03-26

## 2025-03-26 RX ADMIN — POTASSIUM CHLORIDE 40 MEQ: 1500 TABLET, EXTENDED RELEASE ORAL at 06:03

## 2025-03-26 RX ADMIN — CYANOCOBALAMIN TAB 1000 MCG 1000 MCG: 1000 TAB at 10:03

## 2025-03-26 RX ADMIN — ASPIRIN 81 MG: 81 TABLET, CHEWABLE ORAL at 10:03

## 2025-03-26 RX ADMIN — CLOPIDOGREL BISULFATE 75 MG: 75 TABLET, FILM COATED ORAL at 10:03

## 2025-03-26 RX ADMIN — ENOXAPARIN SODIUM 40 MG: 40 INJECTION SUBCUTANEOUS at 06:03

## 2025-03-26 RX ADMIN — VERAPAMIL HYDROCHLORIDE 240 MG: 120 TABLET, FILM COATED, EXTENDED RELEASE ORAL at 10:03

## 2025-03-26 NOTE — PT/OT/SLP PROGRESS
Physical Therapy Treatment    Patient Name:  Vaibhav Vargas   MRN:  [id number]    Recommendations     Therapy Intensity Recommendations at Discharge: High Intensity Therapy  Discharge Equipment Recommendations: to be determined by next level of care   Barriers to discharge: fall risk, level of skilled assistance required, and decreased safety awareness    Assessment     Vaibhav Vargas is a 74 y.o. male admitted with a medical diagnosis of CVA  1. Non-traumatic rhabdomyolysis    2. Screening due    3. PRIYA (acute kidney injury)    4. NSTEMI (non-ST elevated myocardial infarction)    5. Chest pain    6. Syncope       Problem List[1]   He presents with the following impairments/functional limitations:   .    Rehab Prognosis: Good.    Patient would benefit from continued skilled acute PT services to: address above listed impairments/functional limitations; receive patient/caregiver education; reduce fall risk; and maximize independency/safety with functional mobility.    Recent Surgery: * No surgery found *      Plan     During this hospitalization, patient to be seen 5 x/week to address the identified impairments/functional limitations via gait training, therapeutic activities, therapeutic exercises, neuromuscular re-education and progress toward the established goals.    Plan of Care Expires:  04/23/25    Subjective     Communicated with patient's nurse vieira  prior to session.    Patient agreeable to participate in treatment session.    Chief Complaint: pain in R arm when moving  Patient/Family Comments/goals: to walk again and be independent  Pain/Comfort:  Pain Rating 1: 0/10  Pain Addressed 1: Cessation of Activity  Pain Rating Post-Intervention 1: 2/10    Objective     Patient found supine in bed and with HOB elevated with    upon PT entry to room.    General Precautions: Standard, fall   Orthopedic Precautions:N/A   Braces: no    Respiratory Status: room air    Functional Mobility:    Bed Mobility:  Supine to  sit moderate assist    Transfers:  Sit to Stand: moderate assistance and of 2 persons with rolling walker    Gait:  Pt. Started to side step max A x2 for R LE. R knee hill, needs support  Verbal and tactile cues for upright posture. Pt improved with balance and transfers from yesterday.Pt. is very motivated and a hard worker  and wants to be able to walk again      Other Mobility:  N/A    Patient left in chair position in bed with all lines intact, call button in reach, tray table at bedside, and patient's nurse notified.    DME Justifications:  TBD pending progress    Education     Patient educated on and assisted with functional mobility as noted above.  Patient educated on PT Plan of Care.  Patient was instructed to utilize staff assistance for mobility/transfers.  White board updated regarding patient's safest level of mobility with staff assistance    Goals     Multidisciplinary Problems       Physical Therapy Goals          Problem: Physical Therapy    Goal Priority Disciplines Outcome Interventions   Physical Therapy Goal     PT, PT/OT Progressing    Description: Goals to be met by: 2025     Patient will increase functional independence with mobility by performin. Supine to sit with Contact Guard Assistance  2. Sit to stand transfer with Contact Guard Assistance  3. Bed to chair transfer with Contact Guard Assistance using Rolling Walker vs HemiWalker  4. Gait  x 50 feet with Moderate Assistance using Rolling Walker vs HemiWalker  5. Sitting at edge of bed x10 minutes with Stand-by Assistance  6. Stand for 10 minutes with Contact Guard Assistance using Rolling Walker vs HemiWalker  Reviewed 3/26/2025                         Time Tracking     PT Received On: 25  PT Start Time: 0852     PT Stop Time: 0920  PT Total Time (min): 28 min     Billable Minutes: Therapeutic Activity 28    2025       [1]   Patient Active Problem List  Diagnosis    Chronic low back pain    Edema    Obesity     Osteoarthritis of knee    Primary hypertension    Type 2 diabetes mellitus    Coronary artery disease    Myocardial infarction

## 2025-03-26 NOTE — PROGRESS NOTES
[facility]  PROGRESS NOTE    Patient's Name: Vaibhav Vargas  : 1950  MRN: [id number]    Admission Date: 3/23/2025  Length of Stay: 2  Attending Physician: Valentine Victor MD  Resident: Joaquim  Intern: Woodrow    SUBJECTIVE     Chief Complaint   Patient presents with    Fall     Pt. Arrives via EMS after being found down by family this evening. States he was putting on pajamas last night when he tripped and fell. C/o R shoulder pain, abrasion/swelling noted to R side face. Pt. GCS 15 at present     History of Present Illness:  Vaibhav Vargas is a 74 y.o. male with a history of CAD s/p LAD stent 2017, venous insuffiency, HTN, 1st Degree AVB, NIDDM2 who presented to [facility] ED on 3/23/2025  with complaint of a fall earlier in the day.  Patient is a difficult historian, daughter at bedside provides collateral information.  Patient arrives to the ED via EMS after being found down on the floor for an unknown period of time; patient states that he was attempting to put on his pajamas when he tripped and fell earlier in the evening.  He is unable to relay how long he was on the floor but states that he was wedged between the wall in his bed lying on his right side for prolonged period of time.  He denies any pain with extraocular motion, loss of consciousness, dizziness, chest pain, shortness of breath.  States that this is his 2nd fall over the course of the past 2 weeks.  States that he takes all of his medications but is unable to state which ones he is taking. Follows with Dr Steel for PVD, compliant with plavix.     In the ED, vital signs are stable.  CBC within normal limits.  CMP notable for AST//29, otherwise within normal limits.  , troponin 0.122, EKG NSR first-degree AV block and right bundle-branch block without any signs acute ischemia.  CPK 4104. UA positive for 1+ ketones, 2+ blood, 500 leukocyte esterase, >1000 WBC, trace bacteria.  Patient admitted to Hospital Medicine for continued monitoring and management.    Interval History:  NAEON. Hypertensive 140's/80's. Seen at bedside this am. No acute complaints. Denies any headache, dizziness, weakness, chest pain, SOB, nausea, vomiting, urinary or other complaints. Labs  consistent with macrocytic anemia; CPK improving 790 this am. Neurological deficits same as day prior, mildly improved flexion at R hip. Will continue to monitor. K 3.4 - repleted.    Review of Systems:  A 12-pt ROS was conducted and was negative unless stated above.    OBJECTIVE     VITAL SIGNS: 24 HR MIN & MAX Most Recent Vitals   Temp  Min: 97.3 °F (36.3 °C)  Max: 98.4 °F (36.9 °C)  97.6 °F (36.4 °C)   BP  Min: 134/70  Max: 157/89  (!) 157/89    Pulse  Min: 70  Max: 87  78   Resp  Min: 18  Max: 20  18    SpO2  Min: 94 %  Max: 100 %  98 %    Body mass index is 32.74 kg/m².    Intake/Output:  I/O last 3 completed shifts:  In: 1855.4 [P.O.:860; IV Piggyback:995.4]  Out: 700 [Urine:700]    Physical Examination:  General: Nontoxic-appearing, well nourished, in no acute distress  Eye: PERRL, EOMI  HENT: Normocephalic, atraumatic, moist mucous membranes  Neck: Supple, nontender, no JVD  Respiratory: Clear to auscultation bilaterally, no wheezes, rales, or rhonchi. Normal work of breathing  Cardiovascular: Regular rate and rhythm, no murmur, rubs, or gallops. Positive for leg swelling, 2+ radial pulses  Gastrointestinal: Soft, nontender, nondistended  Musculoskeletal: No calf tenderness. Reports pain and restriction of movement of R shoulder. Positive for falls, 2 known episodes over past 2 weeks  Integumentary: Warm, dry, intact. No rashes  Neurologic: Alert and oriented x3. Normal speech. CN grossly intact. Paralysis of R lower limb, recent onset since yesterday as well as reduced motor strength in RUE 4/5 vs 5/5 on L side.   Psychiatric: Appropriate mood and affect            Current Medications:  Scheduled:   aspirin  81 mg Oral  Daily    clopidogreL  75 mg Oral Daily    cyanocobalamin  1,000 mcg Oral Daily    enoxparin  40 mg Subcutaneous Daily    verapamiL  240 mg Oral Daily      Infusions:    PRNs:    Current Facility-Administered Medications:     acetaminophen, 650 mg, Oral, Q4H PRN    dextrose 50%, 12.5 g, Intravenous, PRN    dextrose 50%, 25 g, Intravenous, PRN    glucagon (human recombinant), 1 mg, Intramuscular, PRN    glucose, 16 g, Oral, PRN    glucose, 24 g, Oral, PRN    insulin aspart U-100, 0-5 Units, Subcutaneous, QID (AC + HS) PRN    naloxone, 0.02 mg, Intravenous, PRN    ondansetron, 4 mg, Intravenous, Q8H PRN    prochlorperazine, 5 mg, Intravenous, Q6H PRN    sodium chloride 0.9%, 10 mL, Intravenous, Q12H PRN  Labs:  CBC:  Recent Labs   Lab 03/24/25  0123 03/25/25  0334 03/26/25 0317   WBC 9.96 8.30 7.04   HGB 13.2* 11.7* 10.8*   HCT 38.2* 33.9* 31.9*    219 208   .0* 105.3* 105.6*   RDW 13.7 13.9 14.0     BMP/CMP:  Recent Labs   Lab 03/24/25  1155 03/25/25  0334 03/26/25 0317    142 141   K 4.0 3.4* 3.4*    108* 108*   CO2 29 26 27   BUN 14.8 13.4 10.8   CREATININE 1.20 0.95 0.84   GLUCOSE 205* 133* 133*   EGFRNORACEVR >60 >60 >60     RFTs/LFTs:  Recent Labs   Lab 03/24/25  0123 03/24/25  1155 03/25/25  0334 03/26/25  0317   CALCIUM 8.7* 9.1 8.4* 8.3*   LABPROT 6.6  --  5.8 5.6*   ALBUMIN 2.9*  --  2.7* 2.6*   *  --  63* 44   ALT 29  --  27 23   ALKPHOS 54  --  42 41   BILITOT 1.2  --  0.7 0.5     Recent Labs   Lab 03/24/25  0123 03/25/25  0334 03/26/25  0317   MG 2.00 2.00 2.10   PHOS 3.3 3.3 3.4     Cardiac Panel:  Recent Labs   Lab 03/23/25  1939 03/24/25  0123 03/24/25  0510   TROPONINI 0.122* 0.130* 0.119*   .6*  --   --      Interval Imaging:  X-Ray Hand 2 View Right  Narrative: EXAMINATION:  XR HAND 2 VIEW RIGHT    CLINICAL HISTORY:  s/p fall, dislocated R 3rd digit and 'placed it back in';    COMPARISON:  None.    FINDINGS:  No acute displaced fractures or  dislocations.    There is some narrowing of the proximal and distal interphalangeal joints articular spaces otherwise preserved with smooth articular surfaces    No blastic or lytic lesions.    Soft tissues within normal limits.  Impression: No acute osseous abnormality..    Degenerative changes    Electronically signed by: Singh Woodruff  Date:    03/25/2025  Time:    05:51     Microbiology:  Microbiology Results (last 7 days)       Procedure Component Value Units Date/Time    Blood Culture [[id number]]  (Normal) Collected: 03/24/25 0704    Order Status: Completed Specimen: Blood from Hand, Left Updated: 03/26/25 1100     Blood Culture No Growth At 48 Hours    Blood Culture [[id number]]  (Normal) Collected: 03/24/25 0623    Order Status: Completed Specimen: Blood from Hand, Right Updated: 03/25/25 1200     Blood Culture No Growth At 24 Hours    Urine culture [[id number]] Collected: 03/24/25 0011    Order Status: Resulted Specimen: Urine Updated: 03/24/25 0036          Antibiotics:  Antibiotics (From admission, onward)      None             ASSESSMENT AND PLAN   Recurrent Syncope   Acute ischemic infarct of the left CINTHYA   R sided neurological deficits - Paralysis in RLE, 4/5 strength in RUE  HTN  - Initial , Trop 0.122, EKG SR with 1st AVB and RBBB  - TSH wnl, A1c 5.3, UDS negative  - Carotid US 2/2023 neg   - Trop downtrending 0.119 this am.   - Echo revealed absence of intracardiac shunt  - MRI brain w w/ocontrast:  Acute ischemic infarct of the left CINTHYA vascular territory involving the left posterior parasagittal frontal cortex.   Encephalomalacia of the left greater than right cerebellar hemispheres.  Chronic lacunar infarct of the left paramedian gary.  Moderate chronic small-vessel ischemic changes of the supratentorial white matter.  Mild generalized brain atrophy.   - Continue home DAPT, atorvastatin 20 mg, Verapamil; holding home propranolol, cozaar for permissive HTN.   - Orthostatic vitals  ordered, fall precautions in place  - PT/OT on board, appreciate assistance  - CTA head and neck:  Minimal flow throughout the left vertebral artery. No large vessel occlusion or flow-limiting stenosis. Discussed with neurology staff.  Enlarged multinodular thyroid gland with rightward tracheal deviation and tracheal narrowing. ENT consulted for evaluation  - CM consulted for inpatient rehab referral - patient has been denied due to level of mobility. CM has concerns for insurance approval, even if another facility is approached. Currently recommended SNF placement. IM team to speak with patient's daughter regarding this today.     Type 2 NSTEMI  CHF Exacerbation (EF 55% 4/2024)  CAD s/p Stenting to LAD   1st Degree AV Block and RBBB  Hx of PVD with stents  - Initial , Trop 0.122, EKG SR with 1st AVB and RBBB  - Lasix 80 IV x1 in ED, will monitor lytes and response to diuresis; holding home lasix 40 PO for now   - Strict I's and O's  - Trop downtrended  - Patient follows with Dr Steel [facility] for PVD  - Multiple runs of vtach overnight, longest being a 12 beat run  - Continue telemetry monitoring  - Cardiology consulted - per them, patient is okay to follow with Dr Steel outpatient and obtain stress test when possible. This has been attempted twice before but patient was unable to tolerate it.     Rhabdomyolysis   Transaminitis  - Diuresed with 80 Lasix IV  on Day 0  - CPK improving - 790 this am - trending  - Avoiding nephrotoxins, hepatotoxins   - Statin discontinued      Vit B12 deficiency  Mild macrocytic anemia  - H, H 10.8, 31.9,    - Denies alcohol use  - Vit B12 supplementation ordered, 1000 mcg daily     CODE STATUS:  Full  Access:  PIV   Antibiotics:  None  Diet:  Heart Healthy, Fluid Restriction 1800 cc  DVT Prophylaxis:  LVX 40  GI Prophylaxis:  None  Fluids:  None      Dispo:  A 74 year-old male admitted for type 2 NSTEMI and rhabdomyolysis in the setting of recurrent syncope and fall  yesterday. MRI positive for acute ischemic infarct of left CINTHYA - exam consistent with R sided deficits. Dispo pending response.    Sarah Troy MD  Internal Medicine - PGY-1

## 2025-03-26 NOTE — PT/OT/SLP PROGRESS
Occupational Therapy   Treatment    Name: Vaibhav Vargas  MRN: [id number]  Admitting Diagnosis:     acute ischemic CVA  1. Non-traumatic rhabdomyolysis    2. Screening due    3. PRIYA (acute kidney injury)    4. NSTEMI (non-ST elevated myocardial infarction)    5. Chest pain    6. Syncope    Problem List[1]   Recommendations:     Discharge Recommendations: High Intensity Therapy  Discharge Equipment Recommendations:  to be determined by next level of care  Barriers to discharge:  Other (Comment) (severity of deficits, level of skilled assist, fall risk, safety concerns, medical dx)    Assessment:     Vaibhav Vargas is a 74 y.o. male with a medical diagnosis of acute ischemic CVA.  He presents with improved awareness of midline , generalized posture and balance seated EOB. Improved weight bearing R LE and able to take 3  sidesteps EOB with max A x 2 persons and RW and assist R knee due to buckling . Improved R UE AROM noted during functional reaching tasks. Performance deficits affecting function are weakness, impaired endurance, impaired sensation, impaired self care skills, impaired functional mobility, gait instability, impaired balance, impaired cognition, decreased upper extremity function, decreased lower extremity function, decreased safety awareness, decreased ROM, impaired coordination, impaired fine motor, edema.     Pt is highly motivated and would benefit from high intensity therapy.     Rehab Prognosis:  Good; patient would benefit from acute skilled OT services to address these deficits and reach maximum level of function.       Plan:     Patient to be seen 3 x/week to address the above listed problems via self-care/home management, therapeutic activities, therapeutic exercises  Plan of Care Expires:  (d/c)  Plan of Care Reviewed with: patient    Subjective     Chief Complaint: occasional c/o of R UE with AROM   Patient/Family Comments/goals: get stronger and return to PLOF and  independence  Pain/Comfort:  Pain Rating 1: 0/10  Pain Addressed 1: Nurse notified  Pain Rating Post-Intervention 1: 0/10    Objective:     Communicated with: Nurse Sprague prior to session.  Patient found HOB elevated with SCD, telemetry, peripheral IV , bed alarm upon OT entry to room.    General Precautions: Standard, fall    Orthopedic Precautions:N/A  Braces: N/A  Respiratory Status: Room air     Occupational Performance:     Bed Mobility:    Patient completed Supine to Sit with moderate assistance  Patient completed Sit to Supine with moderate assistance     Functional Mobility/Transfers:  Patient completed Sit <> Stand Transfer with moderate assistance and of 2 persons  with  rolling walker and x 2 attempts   Functional Mobility: Pt side stepped EOB 3 steps with max A x 2 persons and RW and support of R knee due to buckling     Treatment & Education:  Facilitated midline orientation, upright posture and balance while seated EOB and during B UE functional reaching varied planes with fluctuating balance CGA to min A x 10 min     Pt. educated on OT goals, POC, orientation to environment, use of call bell for assist with transfers OOB or for any other needs due to fall risk.     Patient left with bed in chair position with all lines intact, call button in reach, bed alarm on, and nurse  notified    GOALS:   Multidisciplinary Problems       Occupational Therapy Goals          Problem: Occupational Therapy    Goal Priority Disciplines Outcome Interventions   Occupational Therapy Goal     OT, PT/OT Progressing    Description: Goals to be met by: d/c     Patient will increase functional independence with ADLs by performing:    UE Dressing with Stand-by Assistance while seated EOB .  LE Dressing with Moderate Assistance.  Grooming while seated EOB with Stand-by Assistance.  Pt will improve sitting balance EOB to SBA > 8 min to impact performance in self care tasks  Toileting from bedside commode with  Moderate  Assistance for hygiene and clothing management.   Toilet transfer to bedside commode with Minimal Assistance.  Pt will increase R shoulder strength to 3/3+/5 as needed to impact efficiency during self care tasks                         DME Justifications:  TBD pending progress    Time Tracking:     OT Date of Treatment: 03/26/25  OT Start Time: 0850  OT Stop Time: 920  OT Total Time (min): 30 min    Billable Minutes:Neuromuscular Re-education 30 min    OT/TETE: OT          3/26/2025       [1]   Patient Active Problem List  Diagnosis    Chronic low back pain    Edema    Obesity    Osteoarthritis of knee    Primary hypertension    Type 2 diabetes mellitus    Coronary artery disease    Myocardial infarction

## 2025-03-26 NOTE — PROGRESS NOTES
Vandana Dumont with [facility] submitting request for auth to insurance provider with updated clinicals.

## 2025-03-26 NOTE — CARE UPDATE
Spoke to patient's daughter, Tay Longoria, via phone. Provided an update on patient's participation with physical therapy and inpatient rehab denial. Patient only able to have physical therapy once daily while inpatient and would benefit greatly from more attention and therapy which would be best achieved in inpatient rehab or, alternatively, in a skilled nursing facility. Discussed with case management as well. Current plans to submit physical therapy notes to insurance today for inpatient rehab. Will submit to skilled nursing facility if inpatient rehab denied. Patient's daughter expressed understanding and is in agreement.    Bibi Banda MD  [facility] Internal Medicine, PRG-3  3/26/2025

## 2025-03-26 NOTE — PLAN OF CARE
Problem: Adult Inpatient Plan of Care  Goal: Plan of Care Review  Outcome: Progressing  Goal: Patient-Specific Goal (Individualized)  Outcome: Progressing  Goal: Absence of Hospital-Acquired Illness or Injury  Outcome: Progressing  Goal: Optimal Comfort and Wellbeing  Outcome: Progressing  Goal: Readiness for Transition of Care  Outcome: Progressing     Problem: Skin Injury Risk Increased  Goal: Skin Health and Integrity  Outcome: Progressing     Problem: Diabetes Comorbidity  Goal: Blood Glucose Level Within Targeted Range  Outcome: Progressing     Problem: Heart Failure  Goal: Optimal Coping  Outcome: Progressing  Goal: Optimal Cardiac Output  Outcome: Progressing  Goal: Stable Heart Rate and Rhythm  Outcome: Progressing  Goal: Optimal Functional Ability  Outcome: Progressing  Goal: Fluid and Electrolyte Balance  Outcome: Progressing  Goal: Improved Oral Intake  Outcome: Progressing  Goal: Effective Breathing Pattern During Sleep  Outcome: Progressing

## 2025-03-26 NOTE — MEDICAL/APP STUDENT
[facility]  PROGRESS NOTE    Patient's Name: Vaibhav Vargas  : 1950  MRN: [id number]    Admission Date: 3/23/2025  Length of Stay: 2  Attending Physician: Valentine Victor MD  Resident: Solo  Intern: Woodrow    SUBJECTIVE     Chief Complaint   Patient presents with    Fall     Pt. Arrives via EMS after being found down by family this evening. States he was putting on pajamas last night when he tripped and fell. C/o R shoulder pain, abrasion/swelling noted to R side face. Pt. GCS 15 at present     History of Present Illness:  Vaibhav Vargas is a 74 y.o. male with a history of CAD s/p LAD stent 2017, venous insuffiency, HTN, 1st Degree AVB, NIDDM2 who presented to [facility] ED on 3/23/2025  with complaint of a fall earlier in the day.  Patient is a difficult historian, daughter at bedside provides collateral information.  Patient arrives to the ED via EMS after being found down on the floor for an unknown period of time; patient states that he was attempting to put on his pajamas when he tripped and fell earlier in the evening.  He is unable to relay how long he was on the floor but states that he was wedged between the wall in his bed lying on his right side for prolonged period of time.  He denies any pain with extraocular motion, loss of consciousness, dizziness, chest pain, shortness of breath.  States that this is his 2nd fall over the course of the past 2 weeks.  States that he takes all of his medications but is unable to state which ones he is taking. Follows with Dr Steel for PVD, compliant with plavix.     In the ED, vital signs are stable.  CBC within normal limits.  CMP notable for AST//29, otherwise within normal limits.  , troponin 0.122, EKG NSR first-degree AV block and right bundle-branch block without any signs acute ischemia.  CPK 4104. UA positive for 1+ ketones, 2+ blood, 500 leukocyte esterase, >1000 WBC, trace bacteria. Patient  admitted to Hospital Medicine for continued monitoring and management.    Hospital Course/Significant Events:  3/23/2025: Admitted to [facility].    Interval History:  NAEON. VSS with HTN at 2301 155/80. CBC stable with continued macrocytic anemia (H/H 10.8/31.9 today, .6). CMP stable with notable K at 3.4. Stable from yesterday. Continuing to monitor. AST/ALT normalized. Denies any new complaints overnight. Feeling cheerful with improved movement. Will continue to monitor while hospitalized.     Review of Systems:  A 12-pt ROS was conducted and was negative unless stated above.    OBJECTIVE     VITAL SIGNS: 24 HR MIN & MAX Most Recent Vitals   Temp  Min: 97.3 °F (36.3 °C)  Max: 98.4 °F (36.9 °C)  98.2 °F (36.8 °C)   BP  Min: 134/70  Max: 155/80  (!) 137/58    Pulse  Min: 70  Max: 87  74   Resp  Min: 16  Max: 20  16    SpO2  Min: 94 %  Max: 100 %  97 %    Body mass index is 32.74 kg/m².    Intake/Output:  I/O last 3 completed shifts:  In: 1855.4 [P.O.:860; IV Piggyback:995.4]  Out: 700 [Urine:700]  Physical Exam  Constitutional:       Appearance: Normal appearance.   HENT:      Head: Normocephalic.      Comments: Abrasion noted on the R frontal aspect of the cranium. Healing.   Eyes:      Extraocular Movements: Extraocular movements intact.      Pupils: Pupils are equal, round, and reactive to light.   Cardiovascular:      Rate and Rhythm: Normal rate and regular rhythm.      Heart sounds: Normal heart sounds.   Pulmonary:      Effort: Pulmonary effort is normal.      Breath sounds: Normal breath sounds.   Musculoskeletal:         General: Signs of injury present.      Comments: RUE: purple ecchymosis on the lateral aspect of the shoulder. Mild tenderness to palpation.    Skin:     Findings: Lesion present.      Comments: Abrasion on R frontal cranium   Neurological:      Mental Status: He is alert and oriented to person, place, and time.      Comments: RLE: Improved R hip flexion from yesterday (4/5  strength). Otherwise 0/5 strength in other planes of motion of the RLE.    RUE 4/5 strength.     LUE and LLE: 5/5 strength. Full ROM.           Current Medications:  Scheduled:   aspirin  81 mg Oral Daily    clopidogreL  75 mg Oral Daily    cyanocobalamin  1,000 mcg Oral Daily    enoxparin  40 mg Subcutaneous Daily    verapamiL  240 mg Oral Daily      Infusions:    PRNs:    Current Facility-Administered Medications:     acetaminophen, 650 mg, Oral, Q4H PRN    dextrose 50%, 12.5 g, Intravenous, PRN    dextrose 50%, 25 g, Intravenous, PRN    glucagon (human recombinant), 1 mg, Intramuscular, PRN    glucose, 16 g, Oral, PRN    glucose, 24 g, Oral, PRN    insulin aspart U-100, 0-5 Units, Subcutaneous, QID (AC + HS) PRN    naloxone, 0.02 mg, Intravenous, PRN    ondansetron, 4 mg, Intravenous, Q8H PRN    prochlorperazine, 5 mg, Intravenous, Q6H PRN    sodium chloride 0.9%, 10 mL, Intravenous, Q12H PRN  Labs:  CBC:  Recent Labs   Lab 03/24/25  0123 03/25/25  0334 03/26/25 0317   WBC 9.96 8.30 7.04   HGB 13.2* 11.7* 10.8*   HCT 38.2* 33.9* 31.9*    219 208   .0* 105.3* 105.6*   RDW 13.7 13.9 14.0     BMP/CMP:  Recent Labs   Lab 03/24/25  1155 03/25/25  0334 03/26/25 0317    142 141   K 4.0 3.4* 3.4*    108* 108*   CO2 29 26 27   BUN 14.8 13.4 10.8   CREATININE 1.20 0.95 0.84   GLUCOSE 205* 133* 133*   EGFRNORACEVR >60 >60 >60     RFTs/LFTs:  Recent Labs   Lab 03/24/25  0123 03/24/25  1155 03/25/25  0334 03/26/25 0317   CALCIUM 8.7* 9.1 8.4* 8.3*   LABPROT 6.6  --  5.8 5.6*   ALBUMIN 2.9*  --  2.7* 2.6*   *  --  63* 44   ALT 29  --  27 23   ALKPHOS 54  --  42 41   BILITOT 1.2  --  0.7 0.5     Recent Labs   Lab 03/24/25  0123 03/25/25  0334 03/26/25  0317   MG 2.00 2.00 2.10   PHOS 3.3 3.3 3.4     Cardiac Panel:  Recent Labs   Lab 03/23/25  1939 03/24/25  0123 03/24/25  0510   TROPONINI 0.122* 0.130* 0.119*   .6*  --   --      Interval Imaging:  X-Ray Hand 2 View Right  Narrative:  EXAMINATION:  XR HAND 2 VIEW RIGHT    CLINICAL HISTORY:  s/p fall, dislocated R 3rd digit and 'placed it back in';    COMPARISON:  None.    FINDINGS:  No acute displaced fractures or dislocations.    There is some narrowing of the proximal and distal interphalangeal joints articular spaces otherwise preserved with smooth articular surfaces    No blastic or lytic lesions.    Soft tissues within normal limits.  Impression: No acute osseous abnormality..    Degenerative changes    Electronically signed by: Singh Woodruff  Date:    03/25/2025  Time:    05:51     Microbiology:  Microbiology Results (last 7 days)       Procedure Component Value Units Date/Time    Blood Culture [[id number]]  (Normal) Collected: 03/24/25 0623    Order Status: Completed Specimen: Blood from Hand, Right Updated: 03/25/25 1200     Blood Culture No Growth At 24 Hours    Blood Culture [[id number]]  (Normal) Collected: 03/24/25 0704    Order Status: Completed Specimen: Blood from Hand, Left Updated: 03/25/25 1100     Blood Culture No Growth At 24 Hours    Urine culture [[id number]] Collected: 03/24/25 0011    Order Status: Resulted Specimen: Urine Updated: 03/24/25 0036          Antibiotics:  Antibiotics (From admission, onward)      None             ASSESSMENT AND PLAN   Recurrent Syncope   Acute ischemic infarct of the left CINTHYA   R sided neurological deficits - Paralysis in RLE, 4/5 strength in RUE  HTN  - Initial , Trop 0.122, EKG SR with 1st AVB and RBBB  - TSH wnl, A1c 5.3, UDS negative  - Carotid US 2/2023 neg   - Trop downtrended 0.119 yesterday  - Echo revealed absence of intracardiac shunt  - MRI brain w w/ocontrast:  Acute ischemic infarct of the left CINTHYA vascular territory involving the left posterior parasagittal frontal cortex.   Encephalomalacia of the left greater than right cerebellar hemispheres.  Chronic lacunar infarct of the left paramedian gary.  Moderate chronic small-vessel ischemic changes of the supratentorial  white matter.  Mild generalized brain atrophy.            - Continue home DAPT, Verapamil; holding home propranolol, cozaar for permissive HTN, held atorvastatin for rhabdomyolysis  - Orthostatic vitals ordered, fall precautions in place  - PT/OT on board, appreciate assistance  - CTA head and neck:  Minimal flow throughout the left vertebral artery. No large vessel occlusion or flow-limiting stenosis. Discussed with neurology staff.  Enlarged multinodular thyroid gland with rightward tracheal deviation and tracheal narrowing. Consulted ENT.  - ENT appreciate attestation. Will see outpatient after DC.   - CM consulted for inpatient rehab referral, appreciate assistance.      Type 2 NSTEMI  CHF Exacerbation (EF 55% 4/2024)  CAD s/p Stenting to LAD   1st Degree AV Block and RBBB  Hx of PVD with stents  - Initial , Trop 0.122, EKG SR with 1st AVB and RBBB  - Lasix 80 IV x1 in ED, will monitor lytes and response to diuresis; holding home lasix 40 PO for now   - Strict I's and O's  - Trop downtrending, 0.119 yesterday  - Patient follows with Dr Steel [facility] for PVD  - Multiple runs of vtach yesterday night, longest being a 12 beat run. No recurrences last night.  - Continue telemetry monitoring  - Cardiology consulted - per them, patient is okay to follow with Dr Steel outpatient and obtain stress test when possible. This has been attempted twice before but patient was unable to tolerate it.     Rhabdomyolysis   Transaminitis  - Diuresed with 80 Lasix IV   - CPK improving - 1388 this am - trending  - Avoiding nephrotoxins, hepatotoxins   - Statin discontinued      Vit B12 deficiency  Mild macrocytic anemia  - H, H 10.8, 31.9,   - Denies alcohol use  - Vit B12 supplementation ordered, 1000 mcg daily      DVT Prophylaxis: LVX 40  GI Prophylaxis: None  Abx: None  Access: PIV  Fluids: None  Diet: Diet Heart Healthy Consistent Carbohydrate; 2000 Calories (up to 75 gm per meal); Fluid - 1800mL    Code Status: Full  Code    Disposition: 74 y.o. male admitted for type 2 NSTEMI and rhabdomyolysis in the setting of recurrent syncope and fall two days ago. MRI positive for acute ischemic infarct of the L CINTHYA and physical exam consistent with R sided deficits. Disposition pending rehabilitation facility placement and response.       Case was discussed and seen with Dr. Victor. Please appreciate attending's attestation to follow.    Sapna ELLSWORTH, OMS-III

## 2025-03-27 LAB
ALBUMIN SERPL-MCNC: 2.7 G/DL (ref 3.4–4.8)
ALBUMIN/GLOB SERPL: 0.8 RATIO (ref 1.1–2)
ALP SERPL-CCNC: 45 UNIT/L (ref 40–150)
ALT SERPL-CCNC: 23 UNIT/L (ref 0–55)
ANION GAP SERPL CALC-SCNC: 6 MEQ/L
AST SERPL-CCNC: 34 UNIT/L (ref 11–45)
BASOPHILS # BLD AUTO: 0.04 X10(3)/MCL
BASOPHILS NFR BLD AUTO: 0.7 %
BILIRUB SERPL-MCNC: 0.5 MG/DL
BUN SERPL-MCNC: 9 MG/DL (ref 8.4–25.7)
CALCIUM SERPL-MCNC: 8.5 MG/DL (ref 8.8–10)
CHLORIDE SERPL-SCNC: 107 MMOL/L (ref 98–107)
CK SERPL-CCNC: 461 U/L (ref 30–200)
CO2 SERPL-SCNC: 26 MMOL/L (ref 23–31)
CREAT SERPL-MCNC: 0.78 MG/DL (ref 0.72–1.25)
CREAT/UREA NIT SERPL: 12
EOSINOPHIL # BLD AUTO: 0.35 X10(3)/MCL (ref 0–0.9)
EOSINOPHIL NFR BLD AUTO: 5.7 %
ERYTHROCYTE [DISTWIDTH] IN BLOOD BY AUTOMATED COUNT: 13.9 % (ref 11.5–17)
GFR SERPLBLD CREATININE-BSD FMLA CKD-EPI: >60 ML/MIN/1.73/M2
GLOBULIN SER-MCNC: 3.3 GM/DL (ref 2.4–3.5)
GLUCOSE SERPL-MCNC: 137 MG/DL (ref 82–115)
HCT VFR BLD AUTO: 33.7 % (ref 42–52)
HGB BLD-MCNC: 11.6 G/DL (ref 14–18)
IMM GRANULOCYTES # BLD AUTO: 0.04 X10(3)/MCL (ref 0–0.04)
IMM GRANULOCYTES NFR BLD AUTO: 0.7 %
LYMPHOCYTES # BLD AUTO: 2.03 X10(3)/MCL (ref 0.6–4.6)
LYMPHOCYTES NFR BLD AUTO: 33.2 %
MAGNESIUM SERPL-MCNC: 2.2 MG/DL (ref 1.6–2.6)
MCH RBC QN AUTO: 36.8 PG (ref 27–31)
MCHC RBC AUTO-ENTMCNC: 34.4 G/DL (ref 33–36)
MCV RBC AUTO: 107 FL (ref 80–94)
MONOCYTES # BLD AUTO: 0.85 X10(3)/MCL (ref 0.1–1.3)
MONOCYTES NFR BLD AUTO: 13.9 %
NEUTROPHILS # BLD AUTO: 2.8 X10(3)/MCL (ref 2.1–9.2)
NEUTROPHILS NFR BLD AUTO: 45.8 %
NRBC BLD AUTO-RTO: 0 %
PHOSPHATE SERPL-MCNC: 3 MG/DL (ref 2.3–4.7)
PLATELET # BLD AUTO: 214 X10(3)/MCL (ref 130–400)
PMV BLD AUTO: 9.6 FL (ref 7.4–10.4)
POCT GLUCOSE: 131 MG/DL (ref 70–110)
POCT GLUCOSE: 180 MG/DL (ref 70–110)
POCT GLUCOSE: 198 MG/DL (ref 70–110)
POCT GLUCOSE: 220 MG/DL (ref 70–110)
POTASSIUM SERPL-SCNC: 3.8 MMOL/L (ref 3.5–5.1)
PROT SERPL-MCNC: 6 GM/DL (ref 5.8–7.6)
RBC # BLD AUTO: 3.15 X10(6)/MCL (ref 4.7–6.1)
SODIUM SERPL-SCNC: 139 MMOL/L (ref 136–145)
WBC # BLD AUTO: 6.11 X10(3)/MCL (ref 4.5–11.5)

## 2025-03-27 PROCEDURE — 80053 COMPREHEN METABOLIC PANEL: CPT

## 2025-03-27 PROCEDURE — 84100 ASSAY OF PHOSPHORUS: CPT

## 2025-03-27 PROCEDURE — 25000003 PHARM REV CODE 250

## 2025-03-27 PROCEDURE — 25000003 PHARM REV CODE 250: Performed by: INTERNAL MEDICINE

## 2025-03-27 PROCEDURE — 21400001 HC TELEMETRY ROOM

## 2025-03-27 PROCEDURE — 82550 ASSAY OF CK (CPK): CPT

## 2025-03-27 PROCEDURE — 97530 THERAPEUTIC ACTIVITIES: CPT

## 2025-03-27 PROCEDURE — 97112 NEUROMUSCULAR REEDUCATION: CPT

## 2025-03-27 PROCEDURE — 36415 COLL VENOUS BLD VENIPUNCTURE: CPT

## 2025-03-27 PROCEDURE — 85025 COMPLETE CBC W/AUTO DIFF WBC: CPT

## 2025-03-27 PROCEDURE — 63600175 PHARM REV CODE 636 W HCPCS

## 2025-03-27 PROCEDURE — 83735 ASSAY OF MAGNESIUM: CPT

## 2025-03-27 RX ORDER — LOSARTAN POTASSIUM 25 MG/1
50 TABLET ORAL DAILY
Status: DISCONTINUED | OUTPATIENT
Start: 2025-03-27 | End: 2025-03-30

## 2025-03-27 RX ADMIN — INSULIN ASPART 1 UNITS: 100 INJECTION, SOLUTION INTRAVENOUS; SUBCUTANEOUS at 07:03

## 2025-03-27 RX ADMIN — CLOPIDOGREL BISULFATE 75 MG: 75 TABLET, FILM COATED ORAL at 08:03

## 2025-03-27 RX ADMIN — ASPIRIN 81 MG: 81 TABLET, CHEWABLE ORAL at 08:03

## 2025-03-27 RX ADMIN — ENOXAPARIN SODIUM 40 MG: 40 INJECTION SUBCUTANEOUS at 04:03

## 2025-03-27 RX ADMIN — CYANOCOBALAMIN TAB 1000 MCG 1000 MCG: 1000 TAB at 08:03

## 2025-03-27 RX ADMIN — LOSARTAN POTASSIUM 50 MG: 25 TABLET, FILM COATED ORAL at 10:03

## 2025-03-27 RX ADMIN — VERAPAMIL HYDROCHLORIDE 240 MG: 120 TABLET, FILM COATED, EXTENDED RELEASE ORAL at 08:03

## 2025-03-27 NOTE — PROGRESS NOTES
[facility]  PROGRESS [facility]    Patient's Name: Vaibhav Vargas  : 1950  MRN: [id number]    Admission Date: 3/23/2025  Length of Stay: 3  Attending Physician: Valentine Victor MD  Resident: Joaquim  Intern: Woodrow    SUBJECTIVE     Chief Complaint   Patient presents with    Fall     Pt. Arrives via EMS after being found down by family this evening. States he was putting on pajamas last night when he tripped and fell. C/o R shoulder pain, abrasion/swelling noted to R side face. Pt. GCS 15 at present     History of Present Illness:  Vaibhav Vargas is a 74 y.o. male with a history of CAD s/p LAD stent 2017, venous insuffiency, HTN, 1st Degree AVB, NIDDM2 who presented to [facility] ED on 3/23/2025  with complaint of a fall earlier in the day.  Patient is a difficult historian, daughter at bedside provides collateral information.  Patient arrives to the ED via EMS after being found down on the floor for an unknown period of time; patient states that he was attempting to put on his pajamas when he tripped and fell earlier in the evening.  He is unable to relay how long he was on the floor but states that he was wedged between the wall in his bed lying on his right side for prolonged period of time.  He denies any pain with extraocular motion, loss of consciousness, dizziness, chest pain, shortness of breath.  States that this is his 2nd fall over the course of the past 2 weeks.  States that he takes all of his medications but is unable to state which ones he is taking. Follows with Dr Steel for PVD, compliant with plavix.     In the ED, vital signs are stable.  CBC within normal limits.  CMP notable for AST//29, otherwise within normal limits.  , troponin 0.122, EKG NSR first-degree AV block and right bundle-branch block without any signs acute ischemia.  CPK 4104. UA positive for 1+ ketones, 2+ blood, 500 leukocyte esterase, >1000 WBC, trace bacteria.  Patient admitted to Hospital Medicine for continued monitoring and management.    Interval History:  NAEON. Hypertensive 150's/80's - added on losartan 50 mg that is home dose. Seen at bedside this am. No acute complaints. Denies any headache, dizziness, weakness, chest pain, SOB, nausea, vomiting, urinary or other complaints. Marginally improved weakness in RLE on exam, improved flexion at R hip. Labs  consistent with macrocytic anemia; CPK improving 461  this am. Patient is awaiting placement.     Review of Systems:  A 12-pt ROS was conducted and was negative unless stated above.    OBJECTIVE     VITAL SIGNS: 24 HR MIN & MAX Most Recent Vitals   Temp  Min: 97.6 °F (36.4 °C)  Max: 99.7 °F (37.6 °C)  98.2 °F (36.8 °C)   BP  Min: 143/77  Max: 160/70  (!) 146/76    Pulse  Min: 67  Max: 81  73   Resp  Min: 18  Max: 24  (!) 24    SpO2  Min: 96 %  Max: 100 %  98 %    Body mass index is 32.74 kg/m².    Intake/Output:  I/O last 3 completed shifts:  In: 620 [P.O.:620]  Out: 1450 [Urine:1450]    Physical Examination:  General: Nontoxic-appearing, well nourished, in no acute distress  Eye: PERRL, EOMI  HENT: Normocephalic, atraumatic, moist mucous membranes  Neck: Supple, nontender, no JVD  Respiratory: Clear to auscultation bilaterally, no wheezes, rales, or rhonchi. Normal work of breathing  Cardiovascular: Regular rate and rhythm, no murmur, rubs, or gallops. Positive for leg swelling, 2+ radial pulses  Gastrointestinal: Soft, nontender, nondistended  Musculoskeletal: No calf tenderness. Reports pain and restriction of movement of R shoulder. Positive for falls, 2 known episodes over past 2 weeks  Integumentary: Warm, dry, intact. No rashes  Neurologic: Alert and oriented x3. Normal speech. CN grossly intact. Marginally improving paralysis of R lower limb, recent onset since yesterday as well as reduced motor strength in RUE 4/5 vs 5/5 on L side.   Psychiatric: Appropriate mood and affect            Current  Medications:  Scheduled:   aspirin  81 mg Oral Daily    clopidogreL  75 mg Oral Daily    cyanocobalamin  1,000 mcg Oral Daily    enoxparin  40 mg Subcutaneous Daily    losartan  50 mg Oral Daily    verapamiL  240 mg Oral Daily      Infusions:    PRNs:    Current Facility-Administered Medications:     acetaminophen, 650 mg, Oral, Q4H PRN    dextrose 50%, 12.5 g, Intravenous, PRN    dextrose 50%, 25 g, Intravenous, PRN    glucagon (human recombinant), 1 mg, Intramuscular, PRN    glucose, 16 g, Oral, PRN    glucose, 24 g, Oral, PRN    insulin aspart U-100, 0-5 Units, Subcutaneous, QID (AC + HS) PRN    naloxone, 0.02 mg, Intravenous, PRN    ondansetron, 4 mg, Intravenous, Q8H PRN    prochlorperazine, 5 mg, Intravenous, Q6H PRN    sodium chloride 0.9%, 10 mL, Intravenous, Q12H PRN  Labs:  CBC:  Recent Labs   Lab 03/25/25 0334 03/26/25 0317 03/27/25  0305   WBC 8.30 7.04 6.11   HGB 11.7* 10.8* 11.6*   HCT 33.9* 31.9* 33.7*    208 214   .3* 105.6* 107.0*   RDW 13.9 14.0 13.9     BMP/CMP:  Recent Labs   Lab 03/25/25 0334 03/26/25 0317 03/27/25  0305    141 139   K 3.4* 3.4* 3.8   * 108* 107   CO2 26 27 26   BUN 13.4 10.8 9.0   CREATININE 0.95 0.84 0.78   GLUCOSE 133* 133* 137*   EGFRNORACEVR >60 >60 >60     RFTs/LFTs:  Recent Labs   Lab 03/25/25  0334 03/26/25 0317 03/27/25  0305   CALCIUM 8.4* 8.3* 8.5*   LABPROT 5.8 5.6* 6.0   ALBUMIN 2.7* 2.6* 2.7*   AST 63* 44 34   ALT 27 23 23   ALKPHOS 42 41 45   BILITOT 0.7 0.5 0.5     Recent Labs   Lab 03/25/25  0334 03/26/25 0317 03/27/25  0305   MG 2.00 2.10 2.20   PHOS 3.3 3.4 3.0     Cardiac Panel:  Recent Labs   Lab 03/23/25  1939 03/24/25  0123 03/24/25  0510   TROPONINI 0.122* 0.130* 0.119*   .6*  --   --      Interval Imaging:  X-Ray Hand 2 View Right  Narrative: EXAMINATION:  XR HAND 2 VIEW RIGHT    CLINICAL HISTORY:  s/p fall, dislocated R 3rd digit and 'placed it back in';    COMPARISON:  None.    FINDINGS:  No acute displaced  fractures or dislocations.    There is some narrowing of the proximal and distal interphalangeal joints articular spaces otherwise preserved with smooth articular surfaces    No blastic or lytic lesions.    Soft tissues within normal limits.  Impression: No acute osseous abnormality..    Degenerative changes    Electronically signed by: Singh Woodruff  Date:    03/25/2025  Time:    05:51     Microbiology:  Microbiology Results (last 7 days)       Procedure Component Value Units Date/Time    Urine culture [[id number]] Collected: 03/24/25 0011    Order Status: Completed Specimen: Urine Updated: 03/26/25 1359     Urine Culture Multiple organisms present indicate probable contamination. Recommend recollection.    Blood Culture [[id number]]  (Normal) Collected: 03/24/25 0623    Order Status: Completed Specimen: Blood from Hand, Right Updated: 03/26/25 1201     Blood Culture No Growth At 48 Hours    Blood Culture [[id number]]  (Normal) Collected: 03/24/25 0704    Order Status: Completed Specimen: Blood from Hand, Left Updated: 03/26/25 1100     Blood Culture No Growth At 48 Hours          Antibiotics:  Antibiotics (From admission, onward)      None             ASSESSMENT AND PLAN   Recurrent Syncope   Acute ischemic infarct of the left CINTHYA   R sided neurological deficits - Paralysis in RLE, 4/5 strength in RUE  HTN  - Initial , Trop 0.122, EKG SR with 1st AVB and RBBB  - TSH wnl, A1c 5.3, UDS negative  - Carotid US 2/2023 neg   - Trop downtrending 0.119 this am.   - Echo revealed absence of intracardiac shunt  - MRI brain w w/ocontrast:  Acute ischemic infarct of the left CINTHYA vascular territory involving the left posterior parasagittal frontal cortex.   Encephalomalacia of the left greater than right cerebellar hemispheres.  Chronic lacunar infarct of the left paramedian gary.  Moderate chronic small-vessel ischemic changes of the supratentorial white matter.  Mild generalized brain atrophy.   - Continue home  DAPT, atorvastatin 20 mg, Verapamil; holding home propranolol, cozaar for permissive HTN.   - Orthostatic vitals ordered, fall precautions in place  - PT/OT on board, appreciate assistance  - CTA head and neck:  Minimal flow throughout the left vertebral artery. No large vessel occlusion or flow-limiting stenosis. Discussed with neurology staff.  Enlarged multinodular thyroid gland with rightward tracheal deviation and tracheal narrowing. ENT consulted: Recommend dedicated thyroid ultrasound with FNA if indicated based on TIRADS criteria. Will set up patient for follow up in ENT clinic once discharged from hospital and recovered from acute event.   - CM consulted for inpatient rehab referral vs SNF placement; appreciate their assistance.    Type 2 NSTEMI  CHF Exacerbation (EF 55% 4/2024)  CAD s/p Stenting to LAD   1st Degree AV Block and RBBB  Hx of PVD with stents  - Initial , Trop 0.122, EKG SR with 1st AVB and RBBB  - Lasix 80 IV x1 in ED, will monitor lytes and response to diuresis; holding home lasix 40 PO for now   - Strict I's and O's  - Trop downtrended  - Patient follows with Dr Steel [facility] for PVD  - Multiple runs of vtach overnight, longest being a 12 beat run  - Continue telemetry monitoring  - Cardiology consulted - per them, patient is okay to follow with Dr Steel outpatient and obtain stress test when possible. This has been attempted twice before but patient was unable to tolerate it.     Rhabdomyolysis   Transaminitis  - Diuresed with 80 Lasix IV  on Day 0  - CPK improving - 461 this am - trending  - Avoiding nephrotoxins, hepatotoxins   - Statin discontinued      Vit B12 deficiency  Mild macrocytic anemia  - H, H 10.8, 31.9,    - Denies alcohol use  - Vit B12 supplementation ordered, 1000 mcg daily    HTN  - Has been hypertensive   - Continue home verapamil 240 mg  - Resume home losartan 50 mg     CODE STATUS:  Full  Access:  PIV   Antibiotics:  None  Diet:  Heart Healthy, Fluid  Restriction 1800 cc  DVT Prophylaxis:  LVX 40  GI Prophylaxis:  None  Fluids:  None      Dispo:  A 74 year-old male admitted for type 2 NSTEMI and rhabdomyolysis in the setting of recurrent syncope and fall yesterday. MRI positive for acute ischemic infarct of left CINTHYA - exam consistent with R sided deficits. Dispo pending response. Patient awaiting placement.    Sarah [place] MD  Internal Medicine - PGY-1

## 2025-03-27 NOTE — CONSULTS
Patient is seen at bedside to eval and treat abrasion to left knee from fall prior to admission. Pt is noted with a shallow abrasion measuring approximately 6x2cm. No purulence or erythema noted. Area is cleaned and dressed with new orders placed. Remaining care per nursing with wound care available as needed.

## 2025-03-27 NOTE — PROGRESS NOTES
Contacted by [facility] ([phone]) offering Pre-Determination MRU review for IP Rehab request. Provided contact info for Dr. Lu March ([phone]) with availability tomorrow 3/28/2025 between 1-3 pm.

## 2025-03-27 NOTE — PT/OT/SLP PROGRESS
Physical Therapy Treatment    Patient Name:  Vaibhav Vargas   MRN:  [id number]    Recommendations     Therapy Intensity Recommendations at Discharge: High Intensity Therapy  Discharge Equipment Recommendations: to be determined by next level of care   Barriers to discharge: fall risk, severity of deficits, level of skilled assistance required, decreased endurance, impaired cognitive status, decreased safety awareness, insight into deficits, and medical diagnosis    Assessment     Vaibhav aVrgas is a 74 y.o. male admitted with a medical diagnosis of:    1. Non-traumatic rhabdomyolysis    2. Screening due    3. PRIYA (acute kidney injury)    4. NSTEMI (non-ST elevated myocardial infarction)    5. Chest pain    6. Syncope       Problem List[1]   He presents with the following impairments/functional limitations:  weakness, impaired endurance, impaired sensation, impaired self care skills, impaired functional mobility, gait instability, impaired balance, impaired cognition, decreased upper extremity function, decreased lower extremity function, decreased safety awareness, decreased ROM, impaired coordination, impaired fine motor, edema, impaired skin.    Pt. Very motivated in therapy today and agreeable to all tasks.  Pt. Required 2 person assist with bed mobs and transfers. Pt. Seated edge of bed, and able to obtain midline, however, exhibited poor dual cognition and required Max A due to posterior loss of balance.  Pt. Demonstrated inattention, and required cueing for sequencing and to focus on specific tasks.  Pt. Also with normal range of motion with visual tracking, however, consistent midline jumping present with posterior loss of balance, requiring Max A.  Pt. Required repeated cueing for anterior weight shifting to correct sitting balance.  Pt. Able to obtain good sitting balance, however, unable to maintain for a prolonged period of time.  Pt. With significant R knee buckling  with R lateral lean during standing.   Pt. Required cueing to obtain midline and for weight shifting onto L LE.  Again, pt. Able to maintain for short period of time, however, unable to maintain it.  Pt. Also required verbal cues to push with B UE onto RW to obtain upright posture.  Pt. Very pleasant during session, however, exhibited poor insight with R UE and R LE deficits.    Rehab Prognosis: Good.    Patient would benefit from continued skilled acute PT services to: address above listed impairments/functional limitations; receive patient/caregiver education; reduce fall risk; and maximize independency/safety with functional mobility.    Recent Surgery: * No surgery found *      Plan     During this hospitalization, patient to be seen 5 x/week to address the identified impairments/functional limitations via therapeutic activities, therapeutic exercises, neuromuscular re-education and progress toward the established goals.    Plan of Care Expires:  04/23/25    Subjective     Communicated with patient's nurse (Melania) prior to session.    Patient agreeable to participate in treatment session.    Chief Complaint: Pt. With no complaints.  Patient/Family Comments/goals: Get stronger.  Pain/Comfort:  Pain Rating 1: 0/10  Pain Rating Post-Intervention 1: 0/10    Objective     Patient found supine in bed, with HOB elevated, and with bed rails up bilateral HOB, left FOB, and right FOB with SCD, telemetry, peripheral IV  upon PT entry to room.    General Precautions: Standard, fall   Orthopedic Precautions:N/A   Braces:  N/A  Respiratory Status: room air    Functional Mobility:    Bed Mobility:  Rolling Left: maximal assistance  Rolling Right: maximal assistance  Seated scooting along edge of bed: maximal assistance and of 2 persons  Supine to Sit: moderate assistance and of 2 persons (towards R side)  Sit to Supine: maximal assistance and of 2 persons  Repositioning to head-of-bed: total assistance and of 2 persons    Transfers:  Sit to Stand: moderate  assistance and of 2 persons with rolling walker  Stand to Sit: moderate assistance and of 2 persons with rolling walker    Gait:  N/A    Other Mobility:  Therapeutic Activities performed:        -sitting balance activities:              weight shifting:                   -forward                 -laterally (right)            scooting:                   -laterally (right)            repositioning at edge of bed            reaching:                   -forward                 -laterally (left)                 -laterally (right)                 -diagonal (left)                 -diagonal (right)    Patient left supine in bed, with HOB elevated, and with bed rails up bilateral HOB, left FOB, and right FOB with all lines intact, call button in reach, tray table at bedside, and patient's nurse notified.    DME Justifications:  TBD by next level of care.    Education     Patient educated on and assisted with functional mobility as noted above.  Patient educated on PT Plan of Care.  Patient was instructed to utilize staff assistance for mobility/transfers.  White board updated regarding patient's safest level of mobility with staff assistance    Goals     Multidisciplinary Problems       Physical Therapy Goals          Problem: Physical Therapy    Goal Priority Disciplines Outcome Interventions   Physical Therapy Goal     PT, PT/OT Progressing    Description: Goals to be met by: 2025     Patient will increase functional independence with mobility by performin. Supine to sit with Contact Guard Assistance  2. Sit to stand transfer with Contact Guard Assistance  3. Bed to chair transfer with Contact Guard Assistance using Rolling Walker vs HemiWalker  4. Gait  x 50 feet with Moderate Assistance using Rolling Walker vs HemiWalker  5. Sitting at edge of bed x10 minutes with Stand-by Assistance  6. Stand for 10 minutes with Contact Guard Assistance using Rolling Walker vs HemiWalker  Reviewed 3/26/2025                          Time Tracking     PT Received On: 03/27/25  PT Start Time: 0923     PT Stop Time: 0949  PT Total Time (min): 26 min     Billable Minutes: Therapeutic Activity 26 minutes  *Co- treatment performed due to patient's multiple deficits requiring 2 skilled therapists to appropriately and safely assess patient's strength and endurance while facilitating functional tasks in addition to accommodating for patient's activity tolerance.     03/27/2025         [1]   Patient Active Problem List  Diagnosis    Chronic low back pain    Edema    Obesity    Osteoarthritis of knee    Primary hypertension    Type 2 diabetes mellitus    Coronary artery disease    Myocardial infarction

## 2025-03-27 NOTE — PLAN OF CARE
Problem: Adult Inpatient Plan of Care  Goal: Plan of Care Review  Outcome: Progressing  Goal: Patient-Specific Goal (Individualized)  Outcome: Progressing  Goal: Absence of Hospital-Acquired Illness or Injury  Outcome: Progressing  Goal: Optimal Comfort and Wellbeing  Outcome: Progressing  Goal: Readiness for Transition of Care  Outcome: Progressing     Problem: Skin Injury Risk Increased  Goal: Skin Health and Integrity  Outcome: Progressing     Problem: Diabetes Comorbidity  Goal: Blood Glucose Level Within Targeted Range  Outcome: Progressing     Problem: Heart Failure  Goal: Optimal Coping  Outcome: Progressing  Goal: Optimal Cardiac Output  Outcome: Progressing  Goal: Stable Heart Rate and Rhythm  Outcome: Progressing  Goal: Optimal Functional Ability  Outcome: Progressing  Goal: Fluid and Electrolyte Balance  Outcome: Progressing  Goal: Improved Oral Intake  Outcome: Progressing  Goal: Effective Breathing Pattern During Sleep  Outcome: Progressing     Problem: Fall Injury Risk  Goal: Absence of Fall and Fall-Related Injury  Outcome: Progressing     Problem: Wound  Goal: Optimal Coping  Outcome: Progressing  Goal: Optimal Functional Ability  Outcome: Progressing  Goal: Absence of Infection Signs and Symptoms  Outcome: Progressing  Goal: Improved Oral Intake  Outcome: Progressing  Goal: Optimal Pain Control and Function  Outcome: Progressing  Goal: Skin Health and Integrity  Outcome: Progressing  Goal: Optimal Wound Healing  Outcome: Progressing

## 2025-03-27 NOTE — PT/OT/SLP PROGRESS
Occupational Therapy   Treatment    Name: Vaibhav Vargas  MRN: [id number]  Admitting Diagnosis:     acute ischemic CVA  1. Non-traumatic rhabdomyolysis    2. Screening due    3. PRIYA (acute kidney injury)    4. NSTEMI (non-ST elevated myocardial infarction)    5. Chest pain    6. Syncope    Problem List[1]   Recommendations:     Discharge Recommendations: High Intensity Therapy  Discharge Equipment Recommendations:  to be determined by next level of care  Barriers to discharge:       Assessment:     Vaibhav Vargas is a 74 y.o. male with a medical diagnosis of acute ischemic CVA.      He presents with improved RUE function, but still limited by attention deficits/decreased insight into deficits, and RLE weakness with knee buckling on attempts to take steps with RLE.  Pt motivated and with max cues is able to take small steps with LLE if R knee is braced by therapist.  In sitting, pt demonstrated improved midline orientation but required cues to attempt to self-correct with leaning to R side and posteriorly; inconsistent ability to self-correct with cueing.  Improved R UE AROM noted during functional reaching tasks.     Performance deficits affecting function are weakness, impaired endurance, impaired sensation, impaired self care skills, impaired functional mobility, gait instability, impaired balance, impaired cognition, decreased upper extremity function, decreased lower extremity function, decreased safety awareness, decreased ROM, impaired coordination, impaired fine motor, edema.     Pt is highly motivated and would benefit from high intensity therapy.     Rehab Prognosis:  Good; patient would benefit from acute skilled OT services to address these deficits and reach maximum level of function.       Plan:     Patient to be seen 3 x/week to address the above listed problems via self-care/home management, therapeutic activities, therapeutic exercises  Plan of Care Expires:  (d/c)  Plan of Care Reviewed with:  patient    Subjective     Chief Complaint: RLE weakness, edema  Patient/Family Comments/goals: get stronger and return to PLOF and independence  Pain/Comfort:  Pain Rating 1: 0/10    Objective:     Communicated with: Nurse Velázquez prior to session.  Patient found HOB elevated with SCD, telemetry, peripheral IV , bed alarm upon OT entry to room.    General Precautions: Standard, fall    Orthopedic Precautions:N/A  Braces: N/A  Respiratory Status: Room air     Occupational Performance:     Bed Mobility:    Patient completed Supine to Sit with moderate assistance and 2 persons to manage RLE out of bed and then to use RUE to help push himself up into sitting, and for initial positioning/balance    Functional Mobility/Transfers:  Patient completed Sit <> Stand Transfer with moderate assistance and of 2 persons  with  rolling walker and x 2 attempts , and able to stand roughly 1 minute following verbal and tactile cues for postural alignment and strengthening midline awareness, cues to activate triceps extension at RW to help support himself at RW    Treatment & Education:  Facilitated midline orientation, upright posture and balance while seated EOB and during B UE functional reaching varied planes with fluctuating balance CGA to min A x 10 min, focused on RUE reaching to midline and targets at L side to help facilitate weight shift onto L hip.  Visual scanning activities performed; pt with consistent midline jump during scanning across midline, but able to track targets fully in both visual fields, inconsistent though and apparently limited by inattention.    Pt with noted significant deficits in dual cognitive thinking tasks; most significantly losing postural control in sitting EOB when focusing on other tasks/conversation, and also losing motor control/coordination/strength at RLE during standing when attempting to converse simultaneously.      Pt. educated on OT goals, POC, orientation to environment, use of call  bell for assist with transfers OOB or for any other needs due to fall risk.     Patient left with bed in chair position with all lines intact, call button in reach, bed alarm on, and nurse  notified    GOALS:   Multidisciplinary Problems       Occupational Therapy Goals          Problem: Occupational Therapy    Goal Priority Disciplines Outcome Interventions   Occupational Therapy Goal     OT, PT/OT Progressing    Description: Goals to be met by: d/c     Patient will increase functional independence with ADLs by performing:    UE Dressing with Stand-by Assistance while seated EOB .  LE Dressing with Moderate Assistance.  Grooming while seated EOB with Stand-by Assistance.  Pt will improve sitting balance EOB to SBA > 8 min to impact performance in self care tasks  Toileting from bedside commode with  Moderate Assistance for hygiene and clothing management.   Toilet transfer to bedside commode with Minimal Assistance.  Pt will increase R shoulder strength to 3/3+/5 as needed to impact efficiency during self care tasks                         DME Justifications:  TBD pending progress    Time Tracking:     OT Date of Treatment: 03/27/25  OT Start Time: 0922  OT Stop Time: 939  OT Total Time (min): 17 min    Billable Minutes:Neuromuscular Re-education 17 min  Co- treatment performed due to patient's multiple deficits requiring two skilled therapists to appropriately and safely assess patient's strength and endurance while facilitating functional tasks in addition to accommodating for patient's activity tolerance    OT/TETE: OT          3/27/2025         [1]   Patient Active Problem List  Diagnosis    Chronic low back pain    Edema    Obesity    Osteoarthritis of knee    Primary hypertension    Type 2 diabetes mellitus    Coronary artery disease    Myocardial infarction

## 2025-03-28 LAB
ALBUMIN SERPL-MCNC: 2.7 G/DL (ref 3.4–4.8)
ALBUMIN/GLOB SERPL: 0.8 RATIO (ref 1.1–2)
ALP SERPL-CCNC: 44 UNIT/L (ref 40–150)
ALT SERPL-CCNC: 23 UNIT/L (ref 0–55)
ANION GAP SERPL CALC-SCNC: 5 MEQ/L
AST SERPL-CCNC: 28 UNIT/L (ref 11–45)
BASOPHILS # BLD AUTO: 0.03 X10(3)/MCL
BASOPHILS NFR BLD AUTO: 0.5 %
BILIRUB SERPL-MCNC: 0.6 MG/DL
BUN SERPL-MCNC: 8.7 MG/DL (ref 8.4–25.7)
CALCIUM SERPL-MCNC: 8.6 MG/DL (ref 8.8–10)
CHLORIDE SERPL-SCNC: 108 MMOL/L (ref 98–107)
CK SERPL-CCNC: 282 U/L (ref 30–200)
CO2 SERPL-SCNC: 26 MMOL/L (ref 23–31)
CREAT SERPL-MCNC: 0.81 MG/DL (ref 0.72–1.25)
CREAT/UREA NIT SERPL: 11
EOSINOPHIL # BLD AUTO: 0.35 X10(3)/MCL (ref 0–0.9)
EOSINOPHIL NFR BLD AUTO: 5.7 %
ERYTHROCYTE [DISTWIDTH] IN BLOOD BY AUTOMATED COUNT: 14 % (ref 11.5–17)
GFR SERPLBLD CREATININE-BSD FMLA CKD-EPI: >60 ML/MIN/1.73/M2
GLOBULIN SER-MCNC: 3.5 GM/DL (ref 2.4–3.5)
GLUCOSE SERPL-MCNC: 131 MG/DL (ref 82–115)
HCT VFR BLD AUTO: 34.9 % (ref 42–52)
HGB BLD-MCNC: 11.9 G/DL (ref 14–18)
IMM GRANULOCYTES # BLD AUTO: 0.04 X10(3)/MCL (ref 0–0.04)
IMM GRANULOCYTES NFR BLD AUTO: 0.7 %
LYMPHOCYTES # BLD AUTO: 1.67 X10(3)/MCL (ref 0.6–4.6)
LYMPHOCYTES NFR BLD AUTO: 27.4 %
MAGNESIUM SERPL-MCNC: 2.3 MG/DL (ref 1.6–2.6)
MCH RBC QN AUTO: 37.1 PG (ref 27–31)
MCHC RBC AUTO-ENTMCNC: 34.1 G/DL (ref 33–36)
MCV RBC AUTO: 108.7 FL (ref 80–94)
MONOCYTES # BLD AUTO: 1.04 X10(3)/MCL (ref 0.1–1.3)
MONOCYTES NFR BLD AUTO: 17.1 %
NEUTROPHILS # BLD AUTO: 2.96 X10(3)/MCL (ref 2.1–9.2)
NEUTROPHILS NFR BLD AUTO: 48.6 %
NRBC BLD AUTO-RTO: 0 %
PHOSPHATE SERPL-MCNC: 3 MG/DL (ref 2.3–4.7)
PLATELET # BLD AUTO: 204 X10(3)/MCL (ref 130–400)
PMV BLD AUTO: 9.5 FL (ref 7.4–10.4)
POCT GLUCOSE: 111 MG/DL (ref 70–110)
POCT GLUCOSE: 150 MG/DL (ref 70–110)
POCT GLUCOSE: 194 MG/DL (ref 70–110)
POCT GLUCOSE: 202 MG/DL (ref 70–110)
POTASSIUM SERPL-SCNC: 4.1 MMOL/L (ref 3.5–5.1)
PROT SERPL-MCNC: 6.2 GM/DL (ref 5.8–7.6)
RBC # BLD AUTO: 3.21 X10(6)/MCL (ref 4.7–6.1)
SODIUM SERPL-SCNC: 139 MMOL/L (ref 136–145)
WBC # BLD AUTO: 6.09 X10(3)/MCL (ref 4.5–11.5)

## 2025-03-28 PROCEDURE — 97112 NEUROMUSCULAR REEDUCATION: CPT

## 2025-03-28 PROCEDURE — 21400001 HC TELEMETRY ROOM

## 2025-03-28 PROCEDURE — 63600175 PHARM REV CODE 636 W HCPCS

## 2025-03-28 PROCEDURE — 36415 COLL VENOUS BLD VENIPUNCTURE: CPT

## 2025-03-28 PROCEDURE — 80053 COMPREHEN METABOLIC PANEL: CPT

## 2025-03-28 PROCEDURE — 25000003 PHARM REV CODE 250

## 2025-03-28 PROCEDURE — 97535 SELF CARE MNGMENT TRAINING: CPT

## 2025-03-28 PROCEDURE — 85025 COMPLETE CBC W/AUTO DIFF WBC: CPT

## 2025-03-28 PROCEDURE — 84100 ASSAY OF PHOSPHORUS: CPT

## 2025-03-28 PROCEDURE — 97530 THERAPEUTIC ACTIVITIES: CPT

## 2025-03-28 PROCEDURE — 25000003 PHARM REV CODE 250: Performed by: INTERNAL MEDICINE

## 2025-03-28 PROCEDURE — 82550 ASSAY OF CK (CPK): CPT

## 2025-03-28 PROCEDURE — 83735 ASSAY OF MAGNESIUM: CPT

## 2025-03-28 RX ADMIN — INSULIN ASPART 2 UNITS: 100 INJECTION, SOLUTION INTRAVENOUS; SUBCUTANEOUS at 11:03

## 2025-03-28 RX ADMIN — VERAPAMIL HYDROCHLORIDE 240 MG: 120 TABLET, FILM COATED, EXTENDED RELEASE ORAL at 08:03

## 2025-03-28 RX ADMIN — LOSARTAN POTASSIUM 50 MG: 25 TABLET, FILM COATED ORAL at 08:03

## 2025-03-28 RX ADMIN — CYANOCOBALAMIN TAB 1000 MCG 1000 MCG: 1000 TAB at 08:03

## 2025-03-28 RX ADMIN — CLOPIDOGREL BISULFATE 75 MG: 75 TABLET, FILM COATED ORAL at 08:03

## 2025-03-28 RX ADMIN — ASPIRIN 81 MG: 81 TABLET, CHEWABLE ORAL at 08:03

## 2025-03-28 RX ADMIN — ENOXAPARIN SODIUM 40 MG: 40 INJECTION SUBCUTANEOUS at 04:03

## 2025-03-28 NOTE — PROGRESS NOTES
Approval received for IP Rehab placement at [facility]. Plan for d/c Monday 3/31/2025. Apprised pt & dghtr, Tay Longoria.

## 2025-03-28 NOTE — PROGRESS NOTES
Inpatient Nutrition Assessment    Admit Date: 3/23/2025   Total duration of encounter: 5 days   Patient Age: 74 y.o.    Nutrition Recommendation/Prescription     continue consistent carb/heart healthy diet; 1800 ml fluid restriction  continue boost breeze with lunch meal; Boost Breeze (provides 250 kcal, 9 g protein per serving)   Pt education on diet complete  MVI/fe  Biweekly wt  Will monitor nutrition status     Communication of Recommendations: reviewed with nurse and reviewed with patient    Nutrition Assessment     Malnutrition Assessment/Nutrition-Focused Physical Exam       Malnutrition Level: other (see comments) (Does not meet criteria) (03/24/25 1341)  Energy Intake (Malnutrition): other (see comments) (Does not meet criteria) (03/24/25 1341)  Weight Loss (Malnutrition): other (see comments) (Does not meet criteria) (03/24/25 1341)         Clavicle Bone Region (Muscle Loss): well nourished         Fluid Accumulation (Malnutrition): moderate to severe (03/24/25 1341)     Hand  Strength, Right (Malnutrition): Unable to assess (03/24/25 1341)  A minimum of two characteristics is recommended for diagnosis of either severe or non-severe malnutrition.    Chart Review    Reason Seen: continuous nutrition monitoring and follow-up    Malnutrition Screening Tool Results   Have you recently lost weight without trying?: No  Have you been eating poorly because of a decreased appetite?: No   MST Score: 0   Diagnosis:  Recurrent syncope, NSTEMI, CHF, CAD< AV block, rhabdo, transaminitis; acute ischemic infarct, R side neurological deficit, HTN, B12 def    Relevant Medical History: CAD, S/P LAD stent, venous insufficiency, HTN, NIDDM, obesity     Scheduled Medications:  aspirin, 81 mg, Daily  clopidogreL, 75 mg, Daily  cyanocobalamin, 1,000 mcg, Daily  enoxparin, 40 mg, Daily  losartan, 50 mg, Daily  verapamiL, 240 mg, Daily    Continuous Infusions:   PRN Medications:  acetaminophen, 650 mg, Q4H PRN  dextrose 50%,  12.5 g, PRN  dextrose 50%, 25 g, PRN  glucagon (human recombinant), 1 mg, PRN  glucose, 16 g, PRN  glucose, 24 g, PRN  insulin aspart U-100, 0-5 Units, QID (AC + HS) PRN  naloxone, 0.02 mg, PRN  ondansetron, 4 mg, Q8H PRN  prochlorperazine, 5 mg, Q6H PRN  sodium chloride 0.9%, 10 mL, Q12H PRN    Calorie Containing IV Medications: no significant kcals from medications at this time    Recent Labs   Lab 03/23/25  1939 03/24/25  0123 03/24/25  1155 03/25/25  0334 03/26/25  0317 03/27/25  0305 03/28/25  0428 03/28/25  0429    142 141 142 141 139 139  --    K 4.7 3.9 4.0 3.4* 3.4* 3.8 4.1  --    CALCIUM 9.0 8.7* 9.1 8.4* 8.3* 8.5* 8.6*  --    PHOS  --  3.3  --  3.3 3.4 3.0 3.0  --    MG  --  2.00  --  2.00 2.10 2.20 2.30  --     107 105 108* 108* 107 108*  --    CO2 24 25 29 26 27 26 26  --    BUN 13.3 14.1 14.8 13.4 10.8 9.0 8.7  --    CREATININE 1.02 1.01 1.20 0.95 0.84 0.78 0.81  --    EGFRNORACEVR >60 >60 >60 >60 >60 >60 >60  --    GLUCOSE 119* 137* 205* 133* 133* 137* 131*  --    BILITOT 1.3 1.2  --  0.7 0.5 0.5 0.6  --    ALKPHOS 58 54  --  42 41 45 44  --    ALT 29 29  --  27 23 23 23  --    * 106*  --  63* 44 34 28  --    ALBUMIN 3.2* 2.9*  --  2.7* 2.6* 2.7* 2.7*  --    HGBA1C  --  5.3  --   --   --   --   --   --    WBC 8.53 9.96  --  8.30 7.04 6.11  --  6.09   HGB 14.3 13.2*  --  11.7* 10.8* 11.6*  --  11.9*   HCT 41.5* 38.2*  --  33.9* 31.9* 33.7*  --  34.9*     Nutrition Orders:  Diet Heart Healthy Consistent Carbohydrate; 2000 Calories (up to 75 gm per meal); Fluid - 1800mL  Dietary nutrition supplements Daily; Boost Breeze - [employer]    Appetite/Oral Intake: good/% of meals  Factors Affecting Nutritional Intake: none identified  Social Needs Impacting Access to Food: none identified  Food/Gnosticism/Cultural Preferences: none reported  Food Allergies: none reported  Last Bowel Movement: 03/25/25  Wound(s):  none    Comments  (3/28) Pt reported he continues to eat well; good  "appetiet; drinking ONS; mentioned no BM past few days (3/25); mentioned to nurse caring for pt; ? Need miralax ; bedwt during rounds--273#--wt elevated; + 3 edema; continue to limit salt in diet. Case management assisting with rehab placement. Continue curret diet tx    (3/24) Pt reported he is eating well; good appetite; cooks for self at home; tries to limit sugar/salt in diet; unsure of wt hx --wt 1 year ago 282#--? 10% wt loss; will track wts; pt did have + LE edema upon admit. Reinforced diet principles. Labs acknoweldged: Gluc (H)--DM; LFTs elevated.     Anthropometrics    Height: 6' 2" (188 cm), Height Method: Stated  Last Weight: 115.7 kg (255 lb) (03/24/25 0908), Weight Method: Bed Scale  BMI (Calculated): 32.7  BMI Classification: obese grade I (BMI 30-34.9)        Ideal Body Weight (IBW), Male: 190 lb     % Ideal Body Weight, Male (lb): 134.21 %                 Usual Body Weight (UBW), kg:  (pt not sure UBW)        Usual Weight Provided By: patient and EMR weight history    Wt Readings from Last 5 Encounters:   03/24/25 115.7 kg (255 lb)   03/26/24 128.2 kg (282 lb 11.2 oz)   03/23/24 128 kg (282 lb 3 oz)   02/14/24 126.3 kg (278 lb 8 oz)   09/21/23 123.2 kg (271 lb 9.6 oz)     Weight Change(s) Since Admission:pt unsure any recent wt change   Wt Readings from Last 1 Encounters:   03/24/25 0908 115.7 kg (255 lb)   03/23/25 2349 115.8 kg (255 lb 3.2 oz)   03/23/25 1918 127 kg (280 lb)   Admit Weight: 127 kg (280 lb) (03/23/25 1918), Weight Method: Estimated    Estimated Needs    Weight Used For Calorie Calculations: 115.8 kg (255 lb 4.7 oz)  Energy Calorie Requirements (kcal): 2084 kcal/d; 18 humaira/kg Obese  Energy Need Method: Kcal/kg  Weight Used For Protein Calculations: 86.3 kg (190 lb 4.1 oz) (based IBW/obese)  Protein Requirements: 112 gm protein/d; 1.3 gm/kg IBW  Fluid Requirements (mL): 2084 ml/d; 1ml/humaira  CHO Requirement: 234 gm CHO/d     Enteral Nutrition     Patient not receiving enteral nutrition " at this time.    Parenteral Nutrition     Patient not receiving parenteral nutrition support at this time.    Evaluation of Received Nutrient Intake    Calories: meeting estimated needs  Protein: meeting estimated needs    Patient Education     Education Provided: diabetic diet and heart healthy diet  Teaching Method: explanation and printed materials  Comprehension: verbalizes understanding  Barriers to Learning: none evident  Expected Compliance: fair  Comments: All questions were answered and dietitian's contact information was provided.     Nutrition Diagnosis     PES: Altered nutrition related laboratory values related to chronic illness as evidenced by Gluc (H). (active)     PES:            Nutrition Interventions     Intervention(s): modified composition of meals/snacks, commercial beverage, multivitamin/mineral supplement therapy, purpose of nutrition education, and collaboration with other providers  Intervention(s):      Goal: Meet greater than 80% of nutritional needs by follow-up. (goal progressing)  Goal: Maintain weight throughout hospitalization. (goal progressing)    Nutrition Goals & Monitoring     Dietitian will monitor: food and beverage intake, weight, and food/nutrition knowledge skill  Discharge planning: continue diabetic/ card  diet  Nutrition Risk/Follow-Up: low (follow-up in 5-7 days)   Please consult if re-assessment needed sooner.

## 2025-03-28 NOTE — PLAN OF CARE
Problem: Adult Inpatient Plan of Care  Goal: Plan of Care Review  Outcome: Progressing  Goal: Patient-Specific Goal (Individualized)  Outcome: Progressing  Goal: Absence of Hospital-Acquired Illness or Injury  Outcome: Progressing  Goal: Optimal Comfort and Wellbeing  Outcome: Progressing  Goal: Readiness for Transition of Care  Outcome: Progressing     Problem: Skin Injury Risk Increased  Goal: Skin Health and Integrity  Outcome: Progressing     Problem: Diabetes Comorbidity  Goal: Blood Glucose Level Within Targeted Range  Outcome: Progressing     Problem: Heart Failure  Goal: Optimal Coping  Outcome: Progressing  Goal: Optimal Cardiac Output  Outcome: Progressing  Goal: Stable Heart Rate and Rhythm  Outcome: Progressing  Goal: Optimal Functional Ability  Outcome: Progressing  Goal: Fluid and Electrolyte Balance  Outcome: Progressing  Goal: Improved Oral Intake  Outcome: Progressing  Goal: Effective Breathing Pattern During Sleep  Outcome: Progressing     Problem: Wound  Goal: Optimal Coping  Outcome: Progressing  Goal: Optimal Functional Ability  Outcome: Progressing  Goal: Absence of Infection Signs and Symptoms  Outcome: Progressing  Goal: Improved Oral Intake  Outcome: Progressing  Goal: Optimal Pain Control and Function  Outcome: Progressing  Goal: Skin Health and Integrity  Outcome: Progressing  Goal: Optimal Wound Healing  Outcome: Progressing

## 2025-03-28 NOTE — PT/OT/SLP PROGRESS
Physical Therapy Treatment    Patient Name:  Vaibhav Vargas   MRN:  [id number]    Recommendations     Therapy Intensity Recommendations at Discharge: High Intensity Therapy  Discharge Equipment Recommendations: to be determined by next level of care   Barriers to discharge: fall risk, severity of deficits, level of skilled assistance required, decreased endurance, decreased safety awareness, insight into deficits, and medical diagnosis    Assessment     Vaibhav Vargas is a 74 y.o. male admitted with a medical diagnosis of :  1. Non-traumatic rhabdomyolysis    2. Screening due    3. PRIYA (acute kidney injury)    4. NSTEMI (non-ST elevated myocardial infarction)    5. Chest pain    6. Syncope       Problem List[1]   He presents with the following impairments/functional limitations:  weakness, impaired endurance, impaired sensation, impaired self care skills, impaired functional mobility, gait instability, impaired balance, pain, decreased safety awareness, edema, decreased lower extremity function, impaired skin, decreased upper extremity function, decreased coordination, impaired cognition.    Pt. Participated in seated reaching activities with B upper extremities to promote weight shifting and sitting balance.  Pt. Demonstrated posterior loss of balance, and required cues for anterior weight shifting.  Pt. Required Max A x 2 people for standing with frequent cues to use B upper extremities to push on RW to obtain upright posture.  Pt. Also required verbal cues for weight shifting towards L LE and to advance R LE in stepping from bed to recliner chair.  Pt. Exhibited fear of falling with R knee crepitus and SI joint.  Pt. Continues to be able to correct deficits with verbal cues, however, exhibited difficulty maintaining, therefore, requires constant verbal and tactile cues.      Rehab Prognosis: Good.    Patient would benefit from continued skilled acute PT services to: address above listed impairments/functional  "limitations; receive patient/caregiver education; reduce fall risk; and maximize independency/safety with functional mobility.    Recent Surgery: * No surgery found *      Plan     During this hospitalization, patient to be seen 5 x/week to address the identified impairments/functional limitations via therapeutic activities, therapeutic exercises, neuromuscular re-education and progress toward the established goals.    Plan of Care Expires:  04/23/25    Subjective     Communicated with patient's nurse (Roberto) prior to session.    Patient agreeable to participate in treatment session.    Chief Complaint: R knee "cracking" during step transfer into bedside chair  Patient/Family Comments/goals: Sit up in the chair  Pain/Comfort:  Pain Rating 1: 0/10  Location - Side 1: Right  Location 1: knee  Pain Addressed 1: Cessation of Activity  Pain Rating Post-Intervention 1: 8/10 (pain during standing and taking steps)    Objective     Patient found supine in bed, with HOB elevated, and with bed rails up bilateral HOB, left FOB, and right FOB with SCD, telemetry, peripheral IV, PureWick  upon PT entry to room.    General Precautions: Standard, fall   Orthopedic Precautions:N/A   Braces:  N/A  Respiratory Status: room air    Functional Mobility:    Bed Mobility:  Supine to Sit: moderate assistance towards L side    Transfers:  Sit to Stand from elevated bed: moderate assistance and of 2 persons with rolling walker  Stand to Sit: moderate assistance and of 2 persons with rolling walker    Gait:  Patient ambulated 3 steps with no assistive device and maximal assistance and of 2 persons.    Other Mobility:  Therapeutic Activities performed:        -standing balance activities:              Pt. Required Max A x 2 people for standing, and a 3rd person to assist with wiping and changing diaper.    Patient left sitting in recliner chair w/ LE's elevated with all lines intact, call button in reach, tray table at bedside, and patient's " nurse notified.    Pt. Seen from 14:05-14:15:  Pt. Sat up in chair x 3 hours with no complaints.  Sit>stand from recliner chair with Max A x 2  SPT from recliner chair to bed with Max A x 2-pt. Requiring max verbal cues.  Pt. Holding onto wall, secondary to fear of falling, making it a difficult transfer.  Sit>supine with Max A x 2  Pt. Able to scoot to head of bed with SBA using bilateral upper extremities pulling on bed rails.    Pt. Left semi-supine in bed with lines intact, call button and all other needs within reach.  Nurse notified.    DME Justifications:  To be determined by next level of care.    Education     Patient educated on and assisted with functional mobility as noted above.  Patient educated on PT Plan of Care.  Patient was instructed to utilize staff assistance for mobility/transfers.  White board updated regarding patient's safest level of mobility with staff assistance    Goals     Multidisciplinary Problems       Physical Therapy Goals          Problem: Physical Therapy    Goal Priority Disciplines Outcome Interventions   Physical Therapy Goal     PT, PT/OT Progressing    Description: Goals to be met by: 2025     Patient will increase functional independence with mobility by performin. Supine to sit with Contact Guard Assistance  2. Sit to stand transfer with Contact Guard Assistance  3. Bed to chair transfer with Contact Guard Assistance using Rolling Walker vs HemiWalker  4. Gait  x 50 feet with Moderate Assistance using Rolling Walker vs HemiWalker  5. Sitting at edge of bed x10 minutes with Stand-by Assistance  6. Stand for 10 minutes with Contact Guard Assistance using Rolling Walker vs HemiWalker  Reviewed 3/26/2025                         Time Tracking     PT Received On: 25  PT Start Time: 1024     PT Stop Time: 1051  PT Total Time (min): 27 min     PT Start Time: 1405  PT Stop Time: 1415  PT Total Time (min): 10 min    Billable Minutes: AM: Therapeutic Activity 27  minutes ; PM: Therapeutic Activity 10 minutes    *Co- treatment performed due to patient's multiple deficits requiring 2 skilled therapists to appropriately and safely assess patient's strength and endurance while facilitating functional tasks in addition to accommodating for patient's activity tolerance.     03/28/2025         [1]   Patient Active Problem List  Diagnosis    Chronic low back pain    Edema    Obesity    Osteoarthritis of knee    Primary hypertension    Type 2 diabetes mellitus    Coronary artery disease    Myocardial infarction

## 2025-03-28 NOTE — PROGRESS NOTES
[facility]  [facility]    Patient's Name: Vaibhav Vargas  : 1950  MRN: [id number]    Admission Date: 3/23/2025  Length of Stay: 4  Attending Physician: Valentine Victor MD  Resident: Joaquim  Intern: Woodrow    SUBJECTIVE     Chief Complaint   Patient presents with    Fall     Pt. Arrives via EMS after being found down by family this evening. States he was putting on pajamas last night when he tripped and fell. C/o R shoulder pain, abrasion/swelling noted to R side face. Pt. GCS 15 at present     History of Present Illness:  Vaibhav Vargas is a 74 y.o. male with a history of CAD s/p LAD stent 2017, venous insuffiency, HTN, 1st Degree AVB, NIDDM2 who presented to [facility] ED on 3/23/2025  with complaint of a fall earlier in the day.  Patient is a difficult historian, daughter at bedside provides collateral information.  Patient arrives to the ED via EMS after being found down on the floor for an unknown period of time; patient states that he was attempting to put on his pajamas when he tripped and fell earlier in the evening.  He is unable to relay how long he was on the floor but states that he was wedged between the wall in his bed lying on his right side for prolonged period of time.  He denies any pain with extraocular motion, loss of consciousness, dizziness, chest pain, shortness of breath.  States that this is his 2nd fall over the course of the past 2 weeks.  States that he takes all of his medications but is unable to state which ones he is taking. Follows with Dr Steel for PVD, compliant with plavix.     In the ED, vital signs are stable.  CBC within normal limits.  CMP notable for AST//29, otherwise within normal limits.  , troponin 0.122, EKG NSR first-degree AV block and right bundle-branch block without any signs acute ischemia.  CPK 4104. UA positive for 1+ ketones, 2+ blood, 500 leukocyte esterase, >1000 WBC, trace bacteria.  Patient admitted to Hospital Medicine for continued monitoring and management.    Interval History:  NAEON. Hypertensive 150's/80's - will continue to monitor. Seen at bedside this am. No acute complaints. Denies any headache, dizziness, weakness, chest pain, SOB, nausea, vomiting, urinary or other complaints. Marginally improved weakness in RLE on exam, improved flexion at R hip. Labs  consistent with macrocytic anemia; CPK improving 282 this am. Patient is awaiting placement.  IM team to conduct peer to peer evaluation today for inpatient rehab placement.    Review of Systems:  A 12-pt ROS was conducted and was negative unless stated above.    OBJECTIVE     VITAL SIGNS: 24 HR MIN & MAX Most Recent Vitals   Temp  Min: 97.5 °F (36.4 °C)  Max: 98.3 °F (36.8 °C)  97.5 °F (36.4 °C)   BP  Min: 145/76  Max: 164/72  (!) 152/79    Pulse  Min: 69  Max: 99  69   Resp  Min: 16  Max: 19  19    SpO2  Min: 95 %  Max: 100 %  99 %    Body mass index is 32.74 kg/m².    Intake/Output:  I/O last 3 completed shifts:  In: 2340 [P.O.:2340]  Out: 2950 [Urine:2950]    Physical Examination:  General: Nontoxic-appearing, well nourished, in no acute distress  Eye: PERRL, EOMI  HENT: Normocephalic, atraumatic, moist mucous membranes  Neck: Supple, nontender, no JVD  Respiratory: Clear to auscultation bilaterally, no wheezes, rales, or rhonchi. Normal work of breathing  Cardiovascular: Regular rate and rhythm, no murmur, rubs, or gallops. Positive for leg swelling, 2+ radial pulses  Gastrointestinal: Soft, nontender, nondistended  Musculoskeletal: No calf tenderness. Reports pain and restriction of movement of R shoulder. Positive for falls, 2 known episodes over past 2 weeks  Integumentary: Warm, dry, intact. No rashes  Neurologic: Alert and oriented x3. Normal speech. CN grossly intact. Marginally improving paralysis of R lower limb, recent onset since yesterday as well as reduced motor strength in RUE 4/5 vs 5/5 on L side.   Psychiatric:  Appropriate mood and affect            Current Medications:  Scheduled:   aspirin  81 mg Oral Daily    clopidogreL  75 mg Oral Daily    cyanocobalamin  1,000 mcg Oral Daily    enoxparin  40 mg Subcutaneous Daily    losartan  50 mg Oral Daily    verapamiL  240 mg Oral Daily      Infusions:    PRNs:    Current Facility-Administered Medications:     acetaminophen, 650 mg, Oral, Q4H PRN    dextrose 50%, 12.5 g, Intravenous, PRN    dextrose 50%, 25 g, Intravenous, PRN    glucagon (human recombinant), 1 mg, Intramuscular, PRN    glucose, 16 g, Oral, PRN    glucose, 24 g, Oral, PRN    insulin aspart U-100, 0-5 Units, Subcutaneous, QID (AC + HS) PRN    naloxone, 0.02 mg, Intravenous, PRN    ondansetron, 4 mg, Intravenous, Q8H PRN    prochlorperazine, 5 mg, Intravenous, Q6H PRN    sodium chloride 0.9%, 10 mL, Intravenous, Q12H PRN  Labs:  CBC:  Recent Labs   Lab 03/26/25 0317 03/27/25 0305 03/28/25  0429   WBC 7.04 6.11 6.09   HGB 10.8* 11.6* 11.9*   HCT 31.9* 33.7* 34.9*    214 204   .6* 107.0* 108.7*   RDW 14.0 13.9 14.0     BMP/CMP:  Recent Labs   Lab 03/26/25 0317 03/27/25 0305 03/28/25  0428    139 139   K 3.4* 3.8 4.1   * 107 108*   CO2 27 26 26   BUN 10.8 9.0 8.7   CREATININE 0.84 0.78 0.81   GLUCOSE 133* 137* 131*   EGFRNORACEVR >60 >60 >60     RFTs/LFTs:  Recent Labs   Lab 03/26/25 0317 03/27/25 0305 03/28/25  0428   CALCIUM 8.3* 8.5* 8.6*   LABPROT 5.6* 6.0 6.2   ALBUMIN 2.6* 2.7* 2.7*   AST 44 34 28   ALT 23 23 23   ALKPHOS 41 45 44   BILITOT 0.5 0.5 0.6     Recent Labs   Lab 03/26/25 0317 03/27/25 0305 03/28/25  0428   MG 2.10 2.20 2.30   PHOS 3.4 3.0 3.0     Cardiac Panel:  Recent Labs   Lab 03/23/25  1939 03/24/25  0123 03/24/25  0510   TROPONINI 0.122* 0.130* 0.119*   .6*  --   --      Interval Imaging:  X-Ray Hand 2 View Right  Narrative: EXAMINATION:  XR HAND 2 VIEW RIGHT    CLINICAL HISTORY:  s/p fall, dislocated R 3rd digit and 'placed it back  in';    COMPARISON:  None.    FINDINGS:  No acute displaced fractures or dislocations.    There is some narrowing of the proximal and distal interphalangeal joints articular spaces otherwise preserved with smooth articular surfaces    No blastic or lytic lesions.    Soft tissues within normal limits.  Impression: No acute osseous abnormality..    Degenerative changes    Electronically signed by: Singh Woodruff  Date:    03/25/2025  Time:    05:51     Microbiology:  Microbiology Results (last 7 days)       Procedure Component Value Units Date/Time    Blood Culture [[id number]]  (Normal) Collected: 03/24/25 0704    Order Status: Completed Specimen: Blood from Hand, Left Updated: 03/28/25 1100     Blood Culture No Growth At 96 Hours    Blood Culture [[id number]]  (Normal) Collected: 03/24/25 0623    Order Status: Completed Specimen: Blood from Hand, Right Updated: 03/27/25 1201     Blood Culture No Growth At 72 Hours    Urine culture [[id number]] Collected: 03/24/25 0011    Order Status: Completed Specimen: Urine Updated: 03/26/25 1359     Urine Culture Multiple organisms present indicate probable contamination. Recommend recollection.          Antibiotics:  Antibiotics (From admission, onward)      None             ASSESSMENT AND PLAN   Recurrent Syncope   Acute ischemic infarct of the left CINTHYA   R sided neurological deficits - Paralysis in RLE, 4/5 strength in RUE  HTN  - Initial , Trop 0.122, EKG SR with 1st AVB and RBBB  - TSH wnl, A1c 5.3, UDS negative  - Carotid US 2/2023 neg   - Trop downtrending 0.119 this am.   - Echo revealed absence of intracardiac shunt  - MRI brain w w/ocontrast:  Acute ischemic infarct of the left CINTHYA vascular territory involving the left posterior parasagittal frontal cortex.   Encephalomalacia of the left greater than right cerebellar hemispheres.  Chronic lacunar infarct of the left paramedian gary.  Moderate chronic small-vessel ischemic changes of the supratentorial white  matter.  Mild generalized brain atrophy.   - Continue home DAPT, atorvastatin 20 mg, Verapamil; holding home propranolol, cozaar.  - Orthostatic vitals ordered, fall precautions in place  - PT/OT on board, appreciate assistance  - CTA head and neck:  Minimal flow throughout the left vertebral artery. No large vessel occlusion or flow-limiting stenosis. Discussed with neurology staff.  Enlarged multinodular thyroid gland with rightward tracheal deviation and tracheal narrowing. ENT consulted: Recommend dedicated thyroid ultrasound with FNA if indicated based on TIRADS criteria. Will set up patient for follow up in ENT clinic once discharged from hospital and recovered from acute event.   - CM consulted for inpatient rehab referral vs SNF placement; appreciate their assistance.    Type 2 NSTEMI  CHF Exacerbation (EF 55% 4/2024)  CAD s/p Stenting to LAD   1st Degree AV Block and RBBB  Hx of PVD with stents  - Initial , Trop 0.122, EKG SR with 1st AVB and RBBB  - Lasix 80 IV x1 in ED, will monitor lytes and response to diuresis; holding home lasix 40 PO for now   - Strict I's and O's  - Trop downtrended  - Patient follows with Dr Steel [facility] for PVD  - Multiple runs of vtach overnight, longest being a 12 beat run  - Continue telemetry monitoring  - Cardiology consulted - per them, patient is okay to follow with Dr Steel outpatient and obtain stress test when possible. This has been attempted twice before but patient was unable to tolerate it.     Rhabdomyolysis   Transaminitis  - Diuresed with 80 Lasix IV  on Day 0  - CPK improving - 461 this am - trending  - Avoiding nephrotoxins, hepatotoxins   - Statin discontinued      Vit B12 deficiency  Mild macrocytic anemia  - H, H 10.8, 31.9,    - Denies alcohol use  - Vit B12 supplementation ordered, 1000 mcg daily    HTN  - Has been hypertensive   - Continue home verapamil 240 mg  - Resume home losartan 50 mg     CODE STATUS:  Full  Access:  PIV   Antibiotics:   None  Diet:  Heart Healthy, Fluid Restriction 1800 cc  DVT Prophylaxis:  LVX 40  GI Prophylaxis:  None  Fluids:  None      Dispo:  A 74 year-old male admitted for type 2 NSTEMI and rhabdomyolysis in the setting of recurrent syncope and fall yesterday. MRI positive for acute ischemic infarct of left CINTHYA - exam consistent with R sided deficits. Dispo pending response. Patient awaiting placement.    Sraah Troy MD  Internal Medicine - PGY-1

## 2025-03-28 NOTE — PT/OT/SLP PROGRESS
Occupational Therapy   Treatment    Name: Vaibhav Vargas  MRN: [id number]  Admitting Diagnosis:  acute ischemic infarct of Left CINTHYA    Recommendations:     Discharge Recommendations: High Intensity Therapy  Discharge Equipment Recommendations:  to be determined by next level of care  Barriers to discharge:  None    Assessment:     Vaibhav Vargas is a 74 y.o. male with a medical diagnosis of acute ischemic CVA. .    He presents with improving functional mobility, but continues to require frequent cuing to maintain attention to RUE and RLE in sitting and standing along with cuing for midline orientation.  R LE giving out during attempts to take steps with LLE, limited by weakness and also arthritic crepitus noted at L knee and SI joint.  Pt very motivated and easy to cue/direct with good responses to cuing and able to correct R inattention easily with cuing.      . Performance deficits affecting function are weakness, impaired endurance, impaired sensation, impaired self care skills, impaired functional mobility, gait instability, impaired balance, impaired cognition, decreased upper extremity function, decreased lower extremity function, decreased safety awareness, decreased ROM, impaired coordination, impaired fine motor, edema.     Rehab Prognosis:  Good; patient would benefit from acute skilled OT services to address these deficits and reach maximum level of function.       Plan:     Patient to be seen 4 x/week to address the above listed problems via self-care/home management, neuromuscular re-education, therapeutic activities, therapeutic exercises  Plan of Care Expires:  (d/c)  Plan of Care Reviewed with: patient    Subjective     Chief Complaint: RLE weakness, R knee pain with weightbearing  Patient/Family Comments/goals: get stronger, return to independence  Pain/Comfort:  Pain Rating 1: 0/10    Objective:     Communicated with: nurse Mejia prior to session.  Patient found up in chair with telemetry,  peripheral IV, PureWick upon OT entry to room.    General Precautions: Standard, fall    Orthopedic Precautions:N/A  Braces: N/A  Respiratory Status: Room air     Occupational Performance:     Bed Mobility:    Patient completed Scooting/Bridging with maximal assistance and limited understanding/awareness of R side during attempts to scoot R hip forward to edge of chair; pt able to scoot L hip with SBA only  Patient completed Sit to Supine with maximal assistance and to bring RLE into bed      Functional Mobility/Transfers:  Patient completed Sit <> Stand Transfer with maximal assistance and of 2 persons  with  hand-held assist and max cuing for anterior weight shift to stand, maintaining weightbearing at RLE in standing   Patient completed Bed <> Chair Transfer using Stand Pivot technique with maximal assistance and of 2 persons with hand-held assist and frequent cues for technique, sequencing, attention to RUE and RLE, attention to erect posture  Functional Mobility: NA    Treatment & Education:  Pt with noted significant deficits in dual cognitive thinking tasks; most significantly losing postural control and also losing motor control/coordination/strength at RLE during standing when attempting to converse simultaneously.       Pt. educated on OT goals, POC, orientation to environment, use of call bell for assist with transfers OOB or for any other needs due to fall risk.     Patient left HOB elevated with all lines intact, call button in reach, and nurse notified    GOALS:   Multidisciplinary Problems       Occupational Therapy Goals          Problem: Occupational Therapy    Goal Priority Disciplines Outcome Interventions   Occupational Therapy Goal     OT, PT/OT Progressing    Description: Goals to be met by: d/c     Patient will increase functional independence with ADLs by performing:    UE Dressing with Stand-by Assistance while seated EOB .  LE Dressing with Moderate Assistance.  Grooming while seated EOB  with Stand-by Assistance.  Pt will improve sitting balance EOB to SBA > 8 min to impact performance in self care tasks  Toileting from bedside commode with  Moderate Assistance for hygiene and clothing management.   Toilet transfer to bedside commode with Minimal Assistance.  Pt will increase R shoulder strength to 3/3+/5 as needed to impact efficiency during self care tasks                         DME Justifications:  No DME recommended requiring DME justifications    Time Tracking:     OT Date of Treatment: 03/28/25  OT Start Time: 1404  OT Stop Time: 1417  OT Total Time (min): 13 min    Billable Minutes:Therapeutic Activity 13  Co- treatment performed due to patient's multiple deficits requiring two skilled therapists to appropriately and safely assess patient's strength and endurance while facilitating functional tasks in addition to accommodating for patient's activity tolerance    OT/TETE: OT          3/28/2025

## 2025-03-28 NOTE — PT/OT/SLP PROGRESS
Occupational Therapy   Treatment    Name: Vaibhav Vargas  MRN: [id number]  Admitting Diagnosis:  acute ischemic infarct of Left CINTHYA    Recommendations:     Discharge Recommendations: High Intensity Therapy  Discharge Equipment Recommendations:  to be determined by next level of care  Barriers to discharge:  None    Assessment:     Vaibhav Vargas is a 74 y.o. male with a medical diagnosis of acute ischemic CVA. .    He presents with improving functional mobility, but continues to require frequent cuing to maintain attention to RUE and RLE in sitting and standing along with cuing for midline orientation.  R LE giving out during attempts to take steps with LLE, limited by weakness and also arthritic crepitus noted at L knee and SI joint.  Pt very motivated and easy to cue/direct with good responses to cuing and able to correct R inattention easily with cuing.      . Performance deficits affecting function are weakness, impaired endurance, impaired sensation, impaired self care skills, impaired functional mobility, gait instability, impaired balance, impaired cognition, decreased upper extremity function, decreased lower extremity function, decreased safety awareness, decreased ROM, impaired coordination, impaired fine motor, edema.     Rehab Prognosis:  Good; patient would benefit from acute skilled OT services to address these deficits and reach maximum level of function.       Plan:     Patient to be seen 4 x/week to address the above listed problems via self-care/home management, neuromuscular re-education, therapeutic activities, therapeutic exercises  Plan of Care Expires:  (d/c)  Plan of Care Reviewed with: patient    Subjective     Chief Complaint: RLE weakness, R knee pain with weightbearing  Patient/Family Comments/goals: get stronger, return to independence  Pain/Comfort:  Pain Rating 1: 0/10    Objective:     Communicated with: nurse Mejia prior to session.  Patient found HOB elevated with SCD, telemetry,  peripheral IV, PureWick upon OT entry to room.    General Precautions: Standard, fall    Orthopedic Precautions:N/A  Braces: N/A  Respiratory Status: Room air     Occupational Performance:     Bed Mobility:    Patient completed Supine to Sit with moderate assistance     Functional Mobility/Transfers:  Patient completed Sit <> Stand Transfer with moderate assistance and of 2 persons  with  rolling walker and elevated bed surface, and cues for anterior weight shift to c ome to standing   Patient completed Bed <> Chair Transfer using Step Transfer technique with maximal assistance and of 2 persons with rolling walker and frequent cues for technique, sequencing, attention to RUE and RLE, attention to erect posture  Functional Mobility: NA    Activities of Daily Living:  Upper Body Dressing: minimum assistance seated EOB, able to reach to nearly 90 degrees shoulder flexion with RUE for threading into gown    Treatment & Education:  Facilitated midline orientation, upright posture and balance while seated EOB and during B UE functional reaching varied planes with fluctuating balance CGA to min A x 10 min, focused on RUE reaching to midline and targets at L side to help facilitate weight shift onto L hip.  Visual scanning activities performed; pt with consistent midline jump during scanning across midline, but able to track targets fully in both visual fields, inconsistent though and apparently limited by inattention.     Pt with noted significant deficits in dual cognitive thinking tasks; most significantly losing postural control in sitting EOB when focusing on other tasks/conversation, and also losing motor control/coordination/strength at RLE during standing when attempting to converse simultaneously.       Pt. educated on OT goals, POC, orientation to environment, use of call bell for assist with transfers OOB or for any other needs due to fall risk.     Patient left up in chair with all lines intact, call button in  reach, and nurse notified    GOALS:   Multidisciplinary Problems       Occupational Therapy Goals          Problem: Occupational Therapy    Goal Priority Disciplines Outcome Interventions   Occupational Therapy Goal     OT, PT/OT Progressing    Description: Goals to be met by: d/c     Patient will increase functional independence with ADLs by performing:    UE Dressing with Stand-by Assistance while seated EOB .  LE Dressing with Moderate Assistance.  Grooming while seated EOB with Stand-by Assistance.  Pt will improve sitting balance EOB to SBA > 8 min to impact performance in self care tasks  Toileting from bedside commode with  Moderate Assistance for hygiene and clothing management.   Toilet transfer to bedside commode with Minimal Assistance.  Pt will increase R shoulder strength to 3/3+/5 as needed to impact efficiency during self care tasks                         DME Justifications:  No DME recommended requiring DME justifications    Time Tracking:     OT Date of Treatment: 03/28/25  OT Start Time: 1025  OT Stop Time: 1051  OT Total Time (min): 26 min    Billable Minutes:Self Care/Home Management 13  Neuromuscular Re-education 13  Co- treatment performed due to patient's multiple deficits requiring two skilled therapists to appropriately and safely assess patient's strength and endurance while facilitating functional tasks in addition to accommodating for patient's activity tolerance    OT/TETE: OT          3/28/2025

## 2025-03-29 LAB
ALBUMIN SERPL-MCNC: 2.7 G/DL (ref 3.4–4.8)
ALBUMIN/GLOB SERPL: 0.8 RATIO (ref 1.1–2)
ALP SERPL-CCNC: 43 UNIT/L (ref 40–150)
ALT SERPL-CCNC: 20 UNIT/L (ref 0–55)
ANION GAP SERPL CALC-SCNC: 7 MEQ/L
AST SERPL-CCNC: 27 UNIT/L (ref 11–45)
BACTERIA BLD CULT: NORMAL
BACTERIA BLD CULT: NORMAL
BASOPHILS # BLD AUTO: 0.03 X10(3)/MCL
BASOPHILS NFR BLD AUTO: 0.5 %
BILIRUB SERPL-MCNC: 0.7 MG/DL
BUN SERPL-MCNC: 9 MG/DL (ref 8.4–25.7)
CALCIUM SERPL-MCNC: 8.4 MG/DL (ref 8.8–10)
CHLORIDE SERPL-SCNC: 106 MMOL/L (ref 98–107)
CO2 SERPL-SCNC: 27 MMOL/L (ref 23–31)
CREAT SERPL-MCNC: 0.8 MG/DL (ref 0.72–1.25)
CREAT/UREA NIT SERPL: 11
EOSINOPHIL # BLD AUTO: 0.36 X10(3)/MCL (ref 0–0.9)
EOSINOPHIL NFR BLD AUTO: 6.2 %
ERYTHROCYTE [DISTWIDTH] IN BLOOD BY AUTOMATED COUNT: 13.5 % (ref 11.5–17)
GFR SERPLBLD CREATININE-BSD FMLA CKD-EPI: >60 ML/MIN/1.73/M2
GLOBULIN SER-MCNC: 3.5 GM/DL (ref 2.4–3.5)
GLUCOSE SERPL-MCNC: 127 MG/DL (ref 82–115)
HCT VFR BLD AUTO: 34.3 % (ref 42–52)
HGB BLD-MCNC: 11.7 G/DL (ref 14–18)
IMM GRANULOCYTES # BLD AUTO: 0.04 X10(3)/MCL (ref 0–0.04)
IMM GRANULOCYTES NFR BLD AUTO: 0.7 %
LYMPHOCYTES # BLD AUTO: 1.59 X10(3)/MCL (ref 0.6–4.6)
LYMPHOCYTES NFR BLD AUTO: 27.3 %
MAGNESIUM SERPL-MCNC: 2.3 MG/DL (ref 1.6–2.6)
MCH RBC QN AUTO: 36.2 PG (ref 27–31)
MCHC RBC AUTO-ENTMCNC: 34.1 G/DL (ref 33–36)
MCV RBC AUTO: 106.2 FL (ref 80–94)
MONOCYTES # BLD AUTO: 0.99 X10(3)/MCL (ref 0.1–1.3)
MONOCYTES NFR BLD AUTO: 17 %
NEUTROPHILS # BLD AUTO: 2.82 X10(3)/MCL (ref 2.1–9.2)
NEUTROPHILS NFR BLD AUTO: 48.3 %
NRBC BLD AUTO-RTO: 0 %
PHOSPHATE SERPL-MCNC: 3.1 MG/DL (ref 2.3–4.7)
PLATELET # BLD AUTO: 210 X10(3)/MCL (ref 130–400)
PMV BLD AUTO: 9.5 FL (ref 7.4–10.4)
POCT GLUCOSE: 123 MG/DL (ref 70–110)
POCT GLUCOSE: 141 MG/DL (ref 70–110)
POCT GLUCOSE: 152 MG/DL (ref 70–110)
POCT GLUCOSE: 162 MG/DL (ref 70–110)
POTASSIUM SERPL-SCNC: 3.8 MMOL/L (ref 3.5–5.1)
PROT SERPL-MCNC: 6.2 GM/DL (ref 5.8–7.6)
RBC # BLD AUTO: 3.23 X10(6)/MCL (ref 4.7–6.1)
SODIUM SERPL-SCNC: 140 MMOL/L (ref 136–145)
WBC # BLD AUTO: 5.83 X10(3)/MCL (ref 4.5–11.5)

## 2025-03-29 PROCEDURE — 25000003 PHARM REV CODE 250

## 2025-03-29 PROCEDURE — 83735 ASSAY OF MAGNESIUM: CPT

## 2025-03-29 PROCEDURE — 21400001 HC TELEMETRY ROOM

## 2025-03-29 PROCEDURE — 85025 COMPLETE CBC W/AUTO DIFF WBC: CPT

## 2025-03-29 PROCEDURE — 25000003 PHARM REV CODE 250: Performed by: INTERNAL MEDICINE

## 2025-03-29 PROCEDURE — 63600175 PHARM REV CODE 636 W HCPCS

## 2025-03-29 PROCEDURE — 84100 ASSAY OF PHOSPHORUS: CPT

## 2025-03-29 PROCEDURE — 80053 COMPREHEN METABOLIC PANEL: CPT

## 2025-03-29 PROCEDURE — 97110 THERAPEUTIC EXERCISES: CPT

## 2025-03-29 PROCEDURE — 36415 COLL VENOUS BLD VENIPUNCTURE: CPT

## 2025-03-29 RX ADMIN — CLOPIDOGREL BISULFATE 75 MG: 75 TABLET, FILM COATED ORAL at 08:03

## 2025-03-29 RX ADMIN — ASPIRIN 81 MG: 81 TABLET, CHEWABLE ORAL at 08:03

## 2025-03-29 RX ADMIN — ENOXAPARIN SODIUM 40 MG: 40 INJECTION SUBCUTANEOUS at 05:03

## 2025-03-29 RX ADMIN — LOSARTAN POTASSIUM 50 MG: 25 TABLET, FILM COATED ORAL at 08:03

## 2025-03-29 RX ADMIN — VERAPAMIL HYDROCHLORIDE 240 MG: 120 TABLET, FILM COATED, EXTENDED RELEASE ORAL at 09:03

## 2025-03-29 RX ADMIN — CYANOCOBALAMIN TAB 1000 MCG 1000 MCG: 1000 TAB at 08:03

## 2025-03-29 NOTE — PT/OT/SLP PROGRESS
Physical Therapy Treatment    Patient Name:  Vaibhav Vargas   MRN:  [id number]    Recommendations     Therapy Intensity Recommendations at Discharge: High Intensity Therapy  Discharge Equipment Recommendations: to be determined by next level of care   Barriers to discharge: fall risk, severity of deficits, medical diagnosis, past medical history, and complicated medical history    Assessment     Vaibhav Vargas is a 74 y.o. male admitted with a medical diagnosis of .  1. Non-traumatic rhabdomyolysis    2. Screening due    3. PRIYA (acute kidney injury)    4. NSTEMI (non-ST elevated myocardial infarction)    5. Chest pain    6. Syncope       Problem List[1]   He presents with the following impairments/functional limitations:  weakness, impaired endurance, impaired sensation, gait instability, impaired balance, pain.    Rehab Prognosis: Good.    Patient would benefit from continued skilled acute PT services to: address above listed impairments/functional limitations; receive patient/caregiver education; reduce fall risk; and maximize independency/safety with functional mobility.    Recent Surgery: * No surgery found *      Plan     During this hospitalization, patient to be seen 5 x/week to address the identified impairments/functional limitations via gait training, therapeutic activities, therapeutic exercises, neuromuscular re-education and progress toward the established goals.    Plan of Care Expires:  04/29/25    Subjective     Communicated with patient's nurse  prior to session.    Patient agreeable to participate in treatment session.    Chief Complaint: Patient reports inc right leg pain limiting his ability to transfer  Patient/Family Comments/goals: to dec pain  Pain/Comfort:  Pain Rating 1: 9/10  Location 1: leg  Pain Addressed 1: Cessation of Activity  Pain Rating Post-Intervention 1: 9/10    Objective     Patient found supine in bed with peripheral IV  upon PT entry to room.    General Precautions: Standard,  fall   Orthopedic Precautions:N/A   Braces:  N/A  Respiratory Status: room air    Functional Mobility:    Bed Mobility:  Rolling Left: moderate assistance  Rolling Right: moderate assistance  Supine to Sit: moderate assistance  Sit to Supine: moderate assistance      Other Mobility:  Therapeutic Exercises performed:   1 set times 15 repetitions  active range of motion  bilat lower extremity       -seated exercises:              hip flexion            hip adduction            hip abduction            long arc quads            ankle pumps    Patient left supine in bed with all lines intact, call button in reach, tray table at bedside, bedside commode at bedside, and patient's nurse notified.    DME Justifications:  No DME recommended requiring DME justifications    Education     Patient educated on and assisted with functional mobility as noted above.  Patient educated on PT Plan of Care.  Patient was instructed to utilize staff assistance for mobility/transfers.  White board updated regarding patient's safest level of mobility with staff assistance    Goals     Multidisciplinary Problems       Physical Therapy Goals          Problem: Physical Therapy    Goal Priority Disciplines Outcome Interventions   Physical Therapy Goal     PT, PT/OT Progressing    Description: Goals to be met by: 2025     Patient will increase functional independence with mobility by performin. Supine to sit with Contact Guard Assistance  2. Sit to stand transfer with Contact Guard Assistance  3. Bed to chair transfer with Contact Guard Assistance using Rolling Walker vs HemiWalker  4. Gait  x 50 feet with Moderate Assistance using Rolling Walker vs HemiWalker  5. Sitting at edge of bed x10 minutes with Stand-by Assistance  6. Stand for 10 minutes with Contact Guard Assistance using Rolling Walker vs HemiWalker  Reviewed 3/26/2025                         Time Tracking     PT Received On: 25  PT Start Time: 1038     PT Stop  Time: 1055  PT Total Time (min): 17 min     Billable Minutes: Therapeutic Exercise 17 03/29/2025       [1]   Patient Active Problem List  Diagnosis    Chronic low back pain    Edema    Obesity    Osteoarthritis of knee    Primary hypertension    Type 2 diabetes mellitus    Coronary artery disease    Myocardial infarction

## 2025-03-29 NOTE — PROGRESS NOTES
[facility]  [facility]    Patient's Name: Vaibhav Vargas  : 1950  MRN: [id number]    Admission Date: 3/23/2025  Length of Stay: 5  Attending Physician: Valentine Victor MD  Resident: Joaquim  Intern: Woodrow    SUBJECTIVE     Chief Complaint   Patient presents with    Fall     Pt. Arrives via EMS after being found down by family this evening. States he was putting on pajamas last night when he tripped and fell. C/o R shoulder pain, abrasion/swelling noted to R side face. Pt. GCS 15 at present     History of Present Illness:  Vaibhav Vargas is a 74 y.o. male with a history of CAD s/p LAD stent 2017, venous insuffiency, HTN, 1st Degree AVB, NIDDM2 who presented to [facility] ED on 3/23/2025  with complaint of a fall earlier in the day.  Patient is a difficult historian, daughter at bedside provides collateral information.  Patient arrives to the ED via EMS after being found down on the floor for an unknown period of time; patient states that he was attempting to put on his pajamas when he tripped and fell earlier in the evening.  He is unable to relay how long he was on the floor but states that he was wedged between the wall in his bed lying on his right side for prolonged period of time.  He denies any pain with extraocular motion, loss of consciousness, dizziness, chest pain, shortness of breath.  States that this is his 2nd fall over the course of the past 2 weeks.  States that he takes all of his medications but is unable to state which ones he is taking. Follows with Dr Steel for PVD, compliant with plavix.     In the ED, vital signs are stable.  CBC within normal limits.  CMP notable for AST//29, otherwise within normal limits.  , troponin 0.122, EKG NSR first-degree AV block and right bundle-branch block without any signs acute ischemia.  CPK 4104. UA positive for 1+ ketones, 2+ blood, 500 leukocyte esterase, >1000 WBC, trace bacteria.  Patient admitted to Hospital Medicine for continued monitoring and management.    Interval History:  NAEON. Hypertensive 150's/80's - will continue to monitor. Seen at bedside this am. No acute complaints. Denies any headache, dizziness, weakness, chest pain, SOB, nausea, vomiting, urinary or other complaints. Marginally improved weakness in RLE on exam, improved flexion at R hip. Labs  consistent with macrocytic anemia; CPK improved. Patient is awaiting rehab placement.      Review of Systems:  A 12-pt ROS was conducted and was negative unless stated above.    OBJECTIVE     VITAL SIGNS: 24 HR MIN & MAX Most Recent Vitals   Temp  Min: 97.3 °F (36.3 °C)  Max: 98.5 °F (36.9 °C)  97.7 °F (36.5 °C)   BP  Min: 147/82  Max: 158/96  (!) 149/78    Pulse  Min: 70  Max: 85  79   Resp  Min: 16  Max: 20  20    SpO2  Min: 97 %  Max: 100 %  100 %    Body mass index is 32.74 kg/m².    Intake/Output:  I/O last 3 completed shifts:  In: 720 [P.O.:720]  Out: 2300 [Urine:2300]    Physical Examination:  General: Nontoxic-appearing, well nourished, in no acute distress  Eye: PERRL, EOMI  HENT: Normocephalic, atraumatic, moist mucous membranes  Neck: Supple, nontender, no JVD  Respiratory: Clear to auscultation bilaterally, no wheezes, rales, or rhonchi. Normal work of breathing  Cardiovascular: Regular rate and rhythm, no murmur, rubs, or gallops. Positive for leg swelling, 2+ radial pulses  Gastrointestinal: Soft, nontender, nondistended  Musculoskeletal: No calf tenderness. Reports pain and restriction of movement of R shoulder. Positive for falls, 2 known episodes over past 2 weeks  Integumentary: Warm, dry, intact. No rashes  Neurologic: Alert and oriented x3. Normal speech. CN grossly intact. Marginally improving paralysis of R lower limb, recent onset since yesterday as well as reduced motor strength in RUE 4/5 vs 5/5 on L side.   Psychiatric: Appropriate mood and affect            Current Medications:  Scheduled:   aspirin  81 mg  Oral Daily    clopidogreL  75 mg Oral Daily    cyanocobalamin  1,000 mcg Oral Daily    enoxparin  40 mg Subcutaneous Daily    losartan  50 mg Oral Daily    verapamiL  240 mg Oral Daily      Infusions:    PRNs:    Current Facility-Administered Medications:     acetaminophen, 650 mg, Oral, Q4H PRN    dextrose 50%, 12.5 g, Intravenous, PRN    dextrose 50%, 25 g, Intravenous, PRN    glucagon (human recombinant), 1 mg, Intramuscular, PRN    glucose, 16 g, Oral, PRN    glucose, 24 g, Oral, PRN    insulin aspart U-100, 0-5 Units, Subcutaneous, QID (AC + HS) PRN    naloxone, 0.02 mg, Intravenous, PRN    ondansetron, 4 mg, Intravenous, Q8H PRN    prochlorperazine, 5 mg, Intravenous, Q6H PRN    sodium chloride 0.9%, 10 mL, Intravenous, Q12H PRN  Labs:  CBC:  Recent Labs   Lab 03/27/25 0305 03/28/25 0429 03/29/25  0456   WBC 6.11 6.09 5.83   HGB 11.6* 11.9* 11.7*   HCT 33.7* 34.9* 34.3*    204 210   .0* 108.7* 106.2*   RDW 13.9 14.0 13.5     BMP/CMP:  Recent Labs   Lab 03/27/25 0305 03/28/25 0428 03/29/25  0456    139 140   K 3.8 4.1 3.8    108* 106   CO2 26 26 27   BUN 9.0 8.7 9.0   CREATININE 0.78 0.81 0.80   GLUCOSE 137* 131* 127*   EGFRNORACEVR >60 >60 >60     RFTs/LFTs:  Recent Labs   Lab 03/27/25 0305 03/28/25 0428 03/29/25  0456   CALCIUM 8.5* 8.6* 8.4*   LABPROT 6.0 6.2 6.2   ALBUMIN 2.7* 2.7* 2.7*   AST 34 28 27   ALT 23 23 20   ALKPHOS 45 44 43   BILITOT 0.5 0.6 0.7     Recent Labs   Lab 03/27/25 0305 03/28/25 0428 03/29/25  0456   MG 2.20 2.30 2.30   PHOS 3.0 3.0 3.1     Cardiac Panel:  Recent Labs   Lab 03/23/25  1939 03/24/25  0123 03/24/25  0510   TROPONINI 0.122* 0.130* 0.119*   .6*  --   --      Interval Imaging:  X-Ray Hand 2 View Right  Narrative: EXAMINATION:  XR HAND 2 VIEW RIGHT    CLINICAL HISTORY:  s/p fall, dislocated R 3rd digit and 'placed it back in';    COMPARISON:  None.    FINDINGS:  No acute displaced fractures or dislocations.    There is some narrowing  of the proximal and distal interphalangeal joints articular spaces otherwise preserved with smooth articular surfaces    No blastic or lytic lesions.    Soft tissues within normal limits.  Impression: No acute osseous abnormality..    Degenerative changes    Electronically signed by: Singh Woodruff  Date:    03/25/2025  Time:    05:51     Microbiology:  Microbiology Results (last 7 days)       Procedure Component Value Units Date/Time    Blood Culture [[id number]]  (Normal) Collected: 03/24/25 0623    Order Status: Completed Specimen: Blood from Hand, Right Updated: 03/28/25 1201     Blood Culture No Growth At 96 Hours    Blood Culture [[id number]]  (Normal) Collected: 03/24/25 0704    Order Status: Completed Specimen: Blood from Hand, Left Updated: 03/28/25 1100     Blood Culture No Growth At 96 Hours    Urine culture [[id number]] Collected: 03/24/25 0011    Order Status: Completed Specimen: Urine Updated: 03/26/25 1359     Urine Culture Multiple organisms present indicate probable contamination. Recommend recollection.          Antibiotics:  Antibiotics (From admission, onward)      None             ASSESSMENT AND PLAN     Recurrent Syncope   Acute ischemic infarct of the left CINTHYA   R sided neurological deficits - Paralysis in RLE, 4/5 strength in RUE- improved   HTN  - Initial , Trop 0.122, EKG SR with 1st AVB and RBBB  - TSH wnl, A1c 5.3, UDS negative  - Carotid US 2/2023 neg   - Trop downtrending   - Echo revealed absence of intracardiac shunt  - MRI brain w w/ocontrast:  Acute ischemic infarct of the left CINTHYA vascular territory involving the left posterior parasagittal frontal cortex.   Encephalomalacia of the left greater than right cerebellar hemispheres.  Chronic lacunar infarct of the left paramedian gary.  Moderate chronic small-vessel ischemic changes of the supratentorial white matter.  Mild generalized brain atrophy.   - Continue home DAPT, atorvastatin 20 mg, Verapamil; holding home  propranolol, cozaar.   - Orthostatic vitals ordered, fall precautions in place  - PT/OT on board, appreciate assistance  - CTA head and neck:  Minimal flow throughout the left vertebral artery. No large vessel occlusion or flow-limiting stenosis. Discussed with neurology staff.  Enlarged multinodular thyroid gland with rightward tracheal deviation and tracheal narrowing. ENT consulted: Recommend dedicated thyroid ultrasound with FNA if indicated based on TIRADS criteria. Will set up patient for follow up in ENT clinic once discharged from hospital and recovered from acute event.   - CM consulted for inpatient rehab, pending placement     Type 2 NSTEMI  CHF Exacerbation (EF 55% 4/2024)  CAD s/p Stenting to LAD   1st Degree AV Block and RBBB  Hx of PVD with stents  - Initial , Trop 0.122, EKG SR with 1st AVB and RBBB  - Lasix 80 IV x1 in ED, will monitor lytes and response to diuresis; holding home lasix 40 PO for now   - Strict I's and O's  - Trop downtrended  - Patient follows with Dr Steel [facility] for PVD  - Continue telemetry monitoring  - Cardiology consulted - per them, patient is okay to follow with Dr Steel outpatient and obtain stress test when possible. This has been attempted twice before but patient was unable to tolerate it.     Rhabdomyolysis   Transaminitis  - Diuresed with 80 Lasix IV  on Day 0  - CPK improved  - Avoiding nephrotoxins, hepatotoxins   - Statin discontinued      Vit B12 deficiency  Mild macrocytic anemia  - H, H 10.8, 31.9,    - Denies alcohol use  - Vit B12 supplementation ordered, 1000 mcg daily    HTN  - Has been hypertensive   - Continue home verapamil 240 mg  - Resume home losartan 50 mg     CODE STATUS:  Full  Access:  PIV   Antibiotics:  None  Diet:  Heart Healthy, Fluid Restriction 1800 cc  DVT Prophylaxis:  LVX 40  GI Prophylaxis:  None  Fluids:  None      Dispo:  A 74 year-old male admitted for type 2 NSTEMII and rhabdomyolysis in the setting of recurrent syncope and  fall. MRI positive for acute ischemic infarct of left CINTHYA - exam consistent with R sided deficits. Pending rehab placement.    Lu March MD  [facility], PGY-3

## 2025-03-30 LAB
ALBUMIN SERPL-MCNC: 2.7 G/DL (ref 3.4–4.8)
ALBUMIN/GLOB SERPL: 0.8 RATIO (ref 1.1–2)
ALP SERPL-CCNC: 45 UNIT/L (ref 40–150)
ALT SERPL-CCNC: 20 UNIT/L (ref 0–55)
ANION GAP SERPL CALC-SCNC: 7 MEQ/L
AST SERPL-CCNC: 24 UNIT/L (ref 11–45)
BASOPHILS # BLD AUTO: 0.05 X10(3)/MCL
BASOPHILS NFR BLD AUTO: 0.8 %
BILIRUB SERPL-MCNC: 0.6 MG/DL
BUN SERPL-MCNC: 8.9 MG/DL (ref 8.4–25.7)
CALCIUM SERPL-MCNC: 8.5 MG/DL (ref 8.8–10)
CHLORIDE SERPL-SCNC: 109 MMOL/L (ref 98–107)
CO2 SERPL-SCNC: 26 MMOL/L (ref 23–31)
CREAT SERPL-MCNC: 0.77 MG/DL (ref 0.72–1.25)
CREAT/UREA NIT SERPL: 12
EOSINOPHIL # BLD AUTO: 0.44 X10(3)/MCL (ref 0–0.9)
EOSINOPHIL NFR BLD AUTO: 7 %
ERYTHROCYTE [DISTWIDTH] IN BLOOD BY AUTOMATED COUNT: 13.7 % (ref 11.5–17)
GFR SERPLBLD CREATININE-BSD FMLA CKD-EPI: >60 ML/MIN/1.73/M2
GLOBULIN SER-MCNC: 3.4 GM/DL (ref 2.4–3.5)
GLUCOSE SERPL-MCNC: 130 MG/DL (ref 82–115)
GLUCOSE SERPL-MCNC: 152 MG/DL (ref 70–110)
HCT VFR BLD AUTO: 33.2 % (ref 42–52)
HGB BLD-MCNC: 11.2 G/DL (ref 14–18)
IMM GRANULOCYTES # BLD AUTO: 0.03 X10(3)/MCL (ref 0–0.04)
IMM GRANULOCYTES NFR BLD AUTO: 0.5 %
LYMPHOCYTES # BLD AUTO: 1.95 X10(3)/MCL (ref 0.6–4.6)
LYMPHOCYTES NFR BLD AUTO: 31.2 %
MAGNESIUM SERPL-MCNC: 2.2 MG/DL (ref 1.6–2.6)
MCH RBC QN AUTO: 36.4 PG (ref 27–31)
MCHC RBC AUTO-ENTMCNC: 33.7 G/DL (ref 33–36)
MCV RBC AUTO: 107.8 FL (ref 80–94)
MONOCYTES # BLD AUTO: 1.05 X10(3)/MCL (ref 0.1–1.3)
MONOCYTES NFR BLD AUTO: 16.8 %
NEUTROPHILS # BLD AUTO: 2.73 X10(3)/MCL (ref 2.1–9.2)
NEUTROPHILS NFR BLD AUTO: 43.7 %
NRBC BLD AUTO-RTO: 0 %
PHOSPHATE SERPL-MCNC: 3.1 MG/DL (ref 2.3–4.7)
PLATELET # BLD AUTO: 241 X10(3)/MCL (ref 130–400)
PMV BLD AUTO: 9.3 FL (ref 7.4–10.4)
POCT GLUCOSE: 119 MG/DL (ref 70–110)
POCT GLUCOSE: 146 MG/DL (ref 70–110)
POCT GLUCOSE: 188 MG/DL (ref 70–110)
POTASSIUM SERPL-SCNC: 3.9 MMOL/L (ref 3.5–5.1)
PROT SERPL-MCNC: 6.1 GM/DL (ref 5.8–7.6)
RBC # BLD AUTO: 3.08 X10(6)/MCL (ref 4.7–6.1)
SODIUM SERPL-SCNC: 142 MMOL/L (ref 136–145)
WBC # BLD AUTO: 6.25 X10(3)/MCL (ref 4.5–11.5)

## 2025-03-30 PROCEDURE — 21400001 HC TELEMETRY ROOM

## 2025-03-30 PROCEDURE — 63600175 PHARM REV CODE 636 W HCPCS

## 2025-03-30 PROCEDURE — 36415 COLL VENOUS BLD VENIPUNCTURE: CPT

## 2025-03-30 PROCEDURE — 25000003 PHARM REV CODE 250: Performed by: INTERNAL MEDICINE

## 2025-03-30 PROCEDURE — 25000003 PHARM REV CODE 250

## 2025-03-30 PROCEDURE — 83735 ASSAY OF MAGNESIUM: CPT

## 2025-03-30 PROCEDURE — 84100 ASSAY OF PHOSPHORUS: CPT

## 2025-03-30 PROCEDURE — 80053 COMPREHEN METABOLIC PANEL: CPT

## 2025-03-30 PROCEDURE — 85025 COMPLETE CBC W/AUTO DIFF WBC: CPT

## 2025-03-30 RX ORDER — LOSARTAN POTASSIUM 25 MG/1
75 TABLET ORAL DAILY
Status: DISCONTINUED | OUTPATIENT
Start: 2025-03-31 | End: 2025-03-31

## 2025-03-30 RX ADMIN — CYANOCOBALAMIN TAB 1000 MCG 1000 MCG: 1000 TAB at 08:03

## 2025-03-30 RX ADMIN — ENOXAPARIN SODIUM 40 MG: 40 INJECTION SUBCUTANEOUS at 04:03

## 2025-03-30 RX ADMIN — CLOPIDOGREL BISULFATE 75 MG: 75 TABLET, FILM COATED ORAL at 08:03

## 2025-03-30 RX ADMIN — LOSARTAN POTASSIUM 50 MG: 25 TABLET, FILM COATED ORAL at 08:03

## 2025-03-30 RX ADMIN — ASPIRIN 81 MG: 81 TABLET, CHEWABLE ORAL at 08:03

## 2025-03-30 RX ADMIN — VERAPAMIL HYDROCHLORIDE 240 MG: 120 TABLET, FILM COATED, EXTENDED RELEASE ORAL at 08:03

## 2025-03-30 NOTE — PROGRESS NOTES
[facility]  PROGRESS NOTE    Patient's Name: Vaibhav Vargas  : 1950  MRN: [id number]    Admission Date: 3/23/2025  Length of Stay: 6  Attending Physician: Valentine Victor MD  Resident: Joaquim  Intern: Woodrow    SUBJECTIVE     Chief Complaint   Patient presents with    Fall     Pt. Arrives via EMS after being found down by family this evening. States he was putting on pajamas last night when he tripped and fell. C/o R shoulder pain, abrasion/swelling noted to R side face. Pt. GCS 15 at present     History of Present Illness:  Vaibhav Vargas is a 74 y.o. male with a history of CAD s/p LAD stent 2017, venous insuffiency, HTN, 1st Degree AVB, NIDDM2 who presented to [facility] ED on 3/23/2025  with complaint of a fall earlier in the day.  Patient is a difficult historian, daughter at bedside provides collateral information.  Patient arrives to the ED via EMS after being found down on the floor for an unknown period of time; patient states that he was attempting to put on his pajamas when he tripped and fell earlier in the evening.  He is unable to relay how long he was on the floor but states that he was wedged between the wall in his bed lying on his right side for prolonged period of time.  He denies any pain with extraocular motion, loss of consciousness, dizziness, chest pain, shortness of breath.  States that this is his 2nd fall over the course of the past 2 weeks.  States that he takes all of his medications but is unable to state which ones he is taking. Follows with Dr Steel for PVD, compliant with plavix.     In the ED, vital signs are stable.  CBC within normal limits.  CMP notable for AST//29, otherwise within normal limits.  , troponin 0.122, EKG NSR first-degree AV block and right bundle-branch block without any signs acute ischemia.  CPK 4104. UA positive for 1+ ketones, 2+ blood, 500 leukocyte esterase, >1000 WBC, trace bacteria.  Patient admitted to Hospital Medicine for continued monitoring and management.    Interval History:  NAEON. Hypertensive 150's-160/70's - increasing losartan to 75 mg daily. Seen at bedside this am. No acute complaints. Denies any headache, dizziness, weakness, chest pain, SOB, nausea, vomiting, urinary or other complaints. Marginally improved weakness in RLE on exam, improved flexion at R hip. Labs  consistent with macrocytic anemia; CPK improved. Patient has been approved for IP rehab placement - plan for D/C tomorrow, Monday.      Review of Systems:  A 12-pt ROS was conducted and was negative unless stated above.    OBJECTIVE     VITAL SIGNS: 24 HR MIN & MAX Most Recent Vitals   Temp  Min: 98 °F (36.7 °C)  Max: 98.4 °F (36.9 °C)  98 °F (36.7 °C)   BP  Min: 137/84  Max: 161/76  (!) 152/70    Pulse  Min: 68  Max: 89  78   Resp  Min: 16  Max: 20  20    SpO2  Min: 95 %  Max: 100 %  99 %    Body mass index is 32.74 kg/m².    Intake/Output:  I/O last 3 completed shifts:  In: 640 [P.O.:640]  Out: 1525 [Urine:1525]    Physical Examination:  General: Nontoxic-appearing, well nourished, in no acute distress  Eye: PERRL, EOMI  HENT: Normocephalic, atraumatic, moist mucous membranes  Neck: Supple, nontender, no JVD  Respiratory: Clear to auscultation bilaterally, no wheezes, rales, or rhonchi. Normal work of breathing  Cardiovascular: Regular rate and rhythm, no murmur, rubs, or gallops. Positive for leg swelling, 2+ radial pulses  Gastrointestinal: Soft, nontender, nondistended  Musculoskeletal: No calf tenderness. Reports pain and restriction of movement of R shoulder. Positive for falls, 2 known episodes over past 2 weeks  Integumentary: Warm, dry, intact. No rashes  Neurologic: Alert and oriented x3. Normal speech. CN grossly intact. Improving paralysis of R lower limb, pt is able to lift leg against gravity now. RUE motor weakness continues to improve as patient works with PT  Psychiatric: Appropriate mood and affect             Current Medications:  Scheduled:   aspirin  81 mg Oral Daily    clopidogreL  75 mg Oral Daily    cyanocobalamin  1,000 mcg Oral Daily    enoxparin  40 mg Subcutaneous Daily    [START ON 3/31/2025] losartan  75 mg Oral Daily    verapamiL  240 mg Oral Daily      Infusions:    PRNs:    Current Facility-Administered Medications:     acetaminophen, 650 mg, Oral, Q4H PRN    dextrose 50%, 12.5 g, Intravenous, PRN    dextrose 50%, 25 g, Intravenous, PRN    glucagon (human recombinant), 1 mg, Intramuscular, PRN    glucose, 16 g, Oral, PRN    glucose, 24 g, Oral, PRN    insulin aspart U-100, 0-5 Units, Subcutaneous, QID (AC + HS) PRN    naloxone, 0.02 mg, Intravenous, PRN    ondansetron, 4 mg, Intravenous, Q8H PRN    prochlorperazine, 5 mg, Intravenous, Q6H PRN    sodium chloride 0.9%, 10 mL, Intravenous, Q12H PRN  Labs:  CBC:  Recent Labs   Lab 03/28/25 0429 03/29/25 0456 03/30/25  0333   WBC 6.09 5.83 6.25   HGB 11.9* 11.7* 11.2*   HCT 34.9* 34.3* 33.2*    210 241   .7* 106.2* 107.8*   RDW 14.0 13.5 13.7     BMP/CMP:  Recent Labs   Lab 03/28/25 0428 03/29/25 0456 03/30/25  0333    140 142   K 4.1 3.8 3.9   * 106 109*   CO2 26 27 26   BUN 8.7 9.0 8.9   CREATININE 0.81 0.80 0.77   GLUCOSE 131* 127* 130*   EGFRNORACEVR >60 >60 >60     RFTs/LFTs:  Recent Labs   Lab 03/28/25 0428 03/29/25  0456 03/30/25  0333   CALCIUM 8.6* 8.4* 8.5*   LABPROT 6.2 6.2 6.1   ALBUMIN 2.7* 2.7* 2.7*   AST 28 27 24   ALT 23 20 20   ALKPHOS 44 43 45   BILITOT 0.6 0.7 0.6     Recent Labs   Lab 03/28/25 0428 03/29/25  0456 03/30/25  0333   MG 2.30 2.30 2.20   PHOS 3.0 3.1 3.1     Cardiac Panel:  Recent Labs   Lab 03/23/25  1939 03/24/25  0123 03/24/25  0510   TROPONINI 0.122* 0.130* 0.119*   .6*  --   --      Interval Imaging:  X-Ray Hand 2 View Right  Narrative: EXAMINATION:  XR HAND 2 VIEW RIGHT    CLINICAL HISTORY:  s/p fall, dislocated R 3rd digit and 'placed it back  in';    COMPARISON:  None.    FINDINGS:  No acute displaced fractures or dislocations.    There is some narrowing of the proximal and distal interphalangeal joints articular spaces otherwise preserved with smooth articular surfaces    No blastic or lytic lesions.    Soft tissues within normal limits.  Impression: No acute osseous abnormality..    Degenerative changes    Electronically signed by: Singh Woodruff  Date:    03/25/2025  Time:    05:51     Microbiology:  Microbiology Results (last 7 days)       Procedure Component Value Units Date/Time    Blood Culture [[id number]]  (Normal) Collected: 03/24/25 0623    Order Status: Completed Specimen: Blood from Hand, Right Updated: 03/29/25 1201     Blood Culture No Growth at 5 days    Blood Culture [[id number]]  (Normal) Collected: 03/24/25 0704    Order Status: Completed Specimen: Blood from Hand, Left Updated: 03/29/25 1100     Blood Culture No Growth at 5 days    Urine culture [[id number]] Collected: 03/24/25 0011    Order Status: Completed Specimen: Urine Updated: 03/26/25 1359     Urine Culture Multiple organisms present indicate probable contamination. Recommend recollection.          Antibiotics:  Antibiotics (From admission, onward)      None             ASSESSMENT AND PLAN     Recurrent Syncope   Acute ischemic infarct of the left CINTHYA   R sided neurological deficits - Paralysis in RLE, 4/5 strength in RUE- improved   HTN  - Initial , Trop 0.122, EKG SR with 1st AVB and RBBB  - TSH wnl, A1c 5.3, UDS negative  - Carotid US 2/2023 neg   - Trop downtrending   - Echo revealed absence of intracardiac shunt  - MRI brain w w/ocontrast:  Acute ischemic infarct of the left CINTHYA vascular territory involving the left posterior parasagittal frontal cortex.   Encephalomalacia of the left greater than right cerebellar hemispheres.  Chronic lacunar infarct of the left paramedian gary.  Moderate chronic small-vessel ischemic changes of the supratentorial white  matter.  Mild generalized brain atrophy.   - Continue home DAPT, atorvastatin 20 mg, Verapamil; holding home propranolol, cozaar.   - Orthostatic vitals ordered, fall precautions in place  - PT/OT on board, appreciate assistance  - CTA head and neck:  Minimal flow throughout the left vertebral artery. No large vessel occlusion or flow-limiting stenosis. Discussed with neurology staff.  Enlarged multinodular thyroid gland with rightward tracheal deviation and tracheal narrowing. ENT consulted: Recommend dedicated thyroid ultrasound with FNA if indicated based on TIRADS criteria. Will set up patient for follow up in ENT clinic once discharged from hospital and recovered from acute event.   - Patient has been approved for IP rehab placement - plan for D/C tomorrow, Monday. Appreciate case management's assistance regarding the same.     Type 2 NSTEMI  CHF Exacerbation (EF 55% 4/2024)  CAD s/p Stenting to LAD   1st Degree AV Block and RBBB  Hx of PVD with stents  - Initial , Trop 0.122, EKG SR with 1st AVB and RBBB  - Lasix 80 IV x1 in ED, will monitor lytes and response to diuresis; holding home lasix 40 PO for now   - Strict I's and O's  - Trop downtrended  - Patient follows with Dr Steel [facility] for PVD  - Continue telemetry monitoring  - Cardiology consulted - per them, patient is okay to follow with Dr Steel outpatient and obtain stress test when possible. This has been attempted twice before but patient was unable to tolerate it.     Rhabdomyolysis   Transaminitis  - Diuresed with 80 Lasix IV  on Day 0  - CPK improved  - Avoiding nephrotoxins, hepatotoxins   - Statin discontinued      Vit B12 deficiency  Mild macrocytic anemia  - H, H 11.2, 33.2, .8   - Denies alcohol use  - Vit B12 supplementation ordered, 1000 mcg daily    HTN  - Has been hypertensive   - Continue home verapamil 240 mg  - Increased losartan to 75 mg due to persistent -160's systolic     CODE STATUS:  Full  Access:  ANITA    Antibiotics:  None  Diet:  Heart Healthy, Fluid Restriction 1800 cc  DVT Prophylaxis:  LVX 40  GI Prophylaxis:  None  Fluids:  None      Dispo:  A 74 year-old male admitted for type 2 NSTEMII and rhabdomyolysis in the setting of recurrent syncope and fall. MRI positive for acute ischemic infarct of left CINTHYA - exam consistent with R sided deficits. Approved for placement - plan for discharge tomorrow.     Sarah Troy MD  Internal Medicine - PGY-1

## 2025-03-31 VITALS
HEIGHT: 74 IN | BODY MASS INDEX: 32.73 KG/M2 | RESPIRATION RATE: 16 BRPM | WEIGHT: 255 LBS | DIASTOLIC BLOOD PRESSURE: 78 MMHG | OXYGEN SATURATION: 95 % | HEART RATE: 74 BPM | SYSTOLIC BLOOD PRESSURE: 148 MMHG | TEMPERATURE: 98 F

## 2025-03-31 PROBLEM — I63.9 ACUTE STROKE DUE TO ISCHEMIA: Status: ACTIVE | Noted: 2025-03-31

## 2025-03-31 LAB
ALBUMIN SERPL-MCNC: 2.8 G/DL (ref 3.4–4.8)
ALBUMIN/GLOB SERPL: 0.8 RATIO (ref 1.1–2)
ALP SERPL-CCNC: 48 UNIT/L (ref 40–150)
ALT SERPL-CCNC: 18 UNIT/L (ref 0–55)
ANION GAP SERPL CALC-SCNC: 7 MEQ/L
AST SERPL-CCNC: 22 UNIT/L (ref 11–45)
BASOPHILS # BLD AUTO: 0.02 X10(3)/MCL
BASOPHILS NFR BLD AUTO: 0.3 %
BILIRUB SERPL-MCNC: 0.6 MG/DL
BUN SERPL-MCNC: 6.5 MG/DL (ref 8.4–25.7)
CALCIUM SERPL-MCNC: 8.6 MG/DL (ref 8.8–10)
CHLORIDE SERPL-SCNC: 109 MMOL/L (ref 98–107)
CO2 SERPL-SCNC: 25 MMOL/L (ref 23–31)
CREAT SERPL-MCNC: 0.78 MG/DL (ref 0.72–1.25)
CREAT/UREA NIT SERPL: 8
EOSINOPHIL # BLD AUTO: 0.34 X10(3)/MCL (ref 0–0.9)
EOSINOPHIL NFR BLD AUTO: 5.9 %
ERYTHROCYTE [DISTWIDTH] IN BLOOD BY AUTOMATED COUNT: 13.3 % (ref 11.5–17)
GFR SERPLBLD CREATININE-BSD FMLA CKD-EPI: >60 ML/MIN/1.73/M2
GLOBULIN SER-MCNC: 3.5 GM/DL (ref 2.4–3.5)
GLUCOSE SERPL-MCNC: 129 MG/DL (ref 82–115)
HCT VFR BLD AUTO: 33.5 % (ref 42–52)
HGB BLD-MCNC: 11.3 G/DL (ref 14–18)
IMM GRANULOCYTES # BLD AUTO: 0.02 X10(3)/MCL (ref 0–0.04)
IMM GRANULOCYTES NFR BLD AUTO: 0.3 %
LYMPHOCYTES # BLD AUTO: 1.52 X10(3)/MCL (ref 0.6–4.6)
LYMPHOCYTES NFR BLD AUTO: 26.3 %
MAGNESIUM SERPL-MCNC: 2.2 MG/DL (ref 1.6–2.6)
MCH RBC QN AUTO: 36.2 PG (ref 27–31)
MCHC RBC AUTO-ENTMCNC: 33.7 G/DL (ref 33–36)
MCV RBC AUTO: 107.4 FL (ref 80–94)
MONOCYTES # BLD AUTO: 0.99 X10(3)/MCL (ref 0.1–1.3)
MONOCYTES NFR BLD AUTO: 17.1 %
NEUTROPHILS # BLD AUTO: 2.9 X10(3)/MCL (ref 2.1–9.2)
NEUTROPHILS NFR BLD AUTO: 50.1 %
NRBC BLD AUTO-RTO: 0 %
PHOSPHATE SERPL-MCNC: 2.9 MG/DL (ref 2.3–4.7)
PLATELET # BLD AUTO: 280 X10(3)/MCL (ref 130–400)
PMV BLD AUTO: 9.1 FL (ref 7.4–10.4)
POCT GLUCOSE: 105 MG/DL (ref 70–110)
POCT GLUCOSE: 246 MG/DL (ref 70–110)
POTASSIUM SERPL-SCNC: 3.8 MMOL/L (ref 3.5–5.1)
PROT SERPL-MCNC: 6.3 GM/DL (ref 5.8–7.6)
RBC # BLD AUTO: 3.12 X10(6)/MCL (ref 4.7–6.1)
SODIUM SERPL-SCNC: 141 MMOL/L (ref 136–145)
WBC # BLD AUTO: 5.79 X10(3)/MCL (ref 4.5–11.5)

## 2025-03-31 PROCEDURE — 97112 NEUROMUSCULAR REEDUCATION: CPT

## 2025-03-31 PROCEDURE — 85025 COMPLETE CBC W/AUTO DIFF WBC: CPT

## 2025-03-31 PROCEDURE — 25000003 PHARM REV CODE 250: Performed by: INTERNAL MEDICINE

## 2025-03-31 PROCEDURE — 97164 PT RE-EVAL EST PLAN CARE: CPT

## 2025-03-31 PROCEDURE — 36415 COLL VENOUS BLD VENIPUNCTURE: CPT

## 2025-03-31 PROCEDURE — 83735 ASSAY OF MAGNESIUM: CPT

## 2025-03-31 PROCEDURE — 84100 ASSAY OF PHOSPHORUS: CPT

## 2025-03-31 PROCEDURE — 97530 THERAPEUTIC ACTIVITIES: CPT

## 2025-03-31 PROCEDURE — 97535 SELF CARE MNGMENT TRAINING: CPT

## 2025-03-31 PROCEDURE — 25000003 PHARM REV CODE 250

## 2025-03-31 PROCEDURE — 97168 OT RE-EVAL EST PLAN CARE: CPT

## 2025-03-31 PROCEDURE — 80053 COMPREHEN METABOLIC PANEL: CPT

## 2025-03-31 RX ORDER — LANOLIN ALCOHOL/MO/W.PET/CERES
1000 CREAM (GRAM) TOPICAL EVERY OTHER DAY
Qty: 15 TABLET | Refills: 1 | Status: SHIPPED | OUTPATIENT
Start: 2025-03-31 | End: 2025-05-30

## 2025-03-31 RX ORDER — LOSARTAN POTASSIUM 100 MG/1
100 TABLET ORAL DAILY
Qty: 90 TABLET | Refills: 1 | Status: SHIPPED | OUTPATIENT
Start: 2025-03-31

## 2025-03-31 RX ORDER — LOSARTAN POTASSIUM 50 MG/1
75 TABLET ORAL DAILY
Qty: 135 TABLET | Refills: 1 | Status: SHIPPED | OUTPATIENT
Start: 2025-03-31 | End: 2025-03-31

## 2025-03-31 RX ORDER — LOSARTAN POTASSIUM 25 MG/1
100 TABLET ORAL DAILY
Status: DISCONTINUED | OUTPATIENT
Start: 2025-03-31 | End: 2025-03-31 | Stop reason: HOSPADM

## 2025-03-31 RX ADMIN — VERAPAMIL HYDROCHLORIDE 240 MG: 120 TABLET, FILM COATED, EXTENDED RELEASE ORAL at 09:03

## 2025-03-31 RX ADMIN — LOSARTAN POTASSIUM 100 MG: 25 TABLET, FILM COATED ORAL at 09:03

## 2025-03-31 RX ADMIN — CLOPIDOGREL BISULFATE 75 MG: 75 TABLET, FILM COATED ORAL at 09:03

## 2025-03-31 RX ADMIN — CYANOCOBALAMIN TAB 1000 MCG 1000 MCG: 1000 TAB at 09:03

## 2025-03-31 RX ADMIN — ASPIRIN 81 MG: 81 TABLET, CHEWABLE ORAL at 09:03

## 2025-03-31 NOTE — DISCHARGE SUMMARY
[facility]   DISCHARGE SUMMARY    Patient's Name: Vaibhav Vargas  : 1950  MRN: [id number]  Code Status: Full Code    Admitting Physician: Valentine Victor MD  Attending Physician: Valentine Victor MD  Primary Care Provider: Shameka Rooney DO  Date of Admission: 3/23/2025  Date of Discharge: 3/31/2025    Condition: Good  Outcome: Condition has improved and patient is now ready for discharge.  Disposition: Skilled Nursing Facility    Discharge Diagnoses   Problem List[1]    Principal Problem:  Acute stroke due to ischemia      Consultants and Procedures   Consultants:  IP CONSULT TO HOSPITAL MEDICINE  IP CONSULT TO CARDIOLOGY  IP CONSULT TO ENT  IP CONSULT TO SOCIAL WORK/CASE MANAGEMENT  IP CONSULT TO SOCIAL WORK/CASE MANAGEMENT  WOUND CARE CONSULT    Procedures:   * No surgery found *       Brief Admission History   Vaibhav Vargas is a 74 y.o. male with a history of CAD s/p LAD stent 2017, venous insuffiency, HTN, 1st Degree AVB, NIDDM2 who presented to [facility] ED on 3/23/2025  with complaint of a fall earlier in the day.  Patient is a difficult historian, daughter at bedside provides collateral information.  Patient arrives to the ED via EMS after being found down on the floor for an unknown period of time; patient states that he was attempting to put on his pajamas when he tripped and fell earlier in the evening.  He is unable to relay how long he was on the floor but states that he was wedged between the wall in his bed lying on his right side for prolonged period of time.  He denies any pain with extraocular motion, loss of consciousness, dizziness, chest pain, shortness of breath.  States that this is his 2nd fall over the course of the past 2 weeks.  States that he takes all of his medications but is unable to state which ones he is taking. Follows with Dr Steel for PVD, compliant with plavix.     In the ED, vital signs are stable.  CBC within normal  "limits.  CMP notable for AST//29, otherwise within normal limits.  , troponin 0.122, EKG NSR first-degree AV block and right bundle-branch block without any signs acute ischemia.  CPK 4104. UA positive for 1+ ketones, 2+ blood, 500 leukocyte esterase, >1000 WBC, trace bacteria. Patient admitted to Hospital Medicine for continued monitoring and management.    Hospital Course with Pertinent Findings   Patient was admitted 2/2 an acute stroke. MRI revealed Acute ischemic infarct of the left CINTHYA vascular territory involving the left posterior parasagittal frontal cortex along with other findings that can be found in the report. Due to being out of the window for thrombolysis, he was continued on home aspirin and plavix and encouraged to work with PT/OT. He did really well with them with notable improvement in his initial neurological deficits that were right lower extremity paralysis and 4/5 motor strength in right upper extremity. His CPK 2/2 rhabdomyolysis from his fall was monitored and fluids given till it downtrended to normal range. Troponin was closely monitored to normalcy. While inpatient, his comorbidities were managed appropriately. Relevant consults were placed as needed, and neurology, ENT, cardiology and case management were onboard for his care. It is believed that he will benefit greatly from being discharged to a skilled nursing facility that will enable him to regain his strength. Today, he is in good condition and stable to be discharged to the SNF. Per cardiology evaluation, it is recommended that he follows up with [facility] and obtains stress test whenever possible.     Discharge Physical Exam:  Vitals  BP: (!) 148/78  Temp: 97.7 °F (36.5 °C)  Temp Source: Oral  Pulse: 74  Resp: 16  SpO2: 95 %  Height: 6' 2" (188 cm)  Weight: 115.7 kg (255 lb)    Physical Examination:  General: Nontoxic-appearing, well nourished, in no acute distress  Eye: PERRL, EOMI  HENT: Normocephalic, atraumatic, moist " mucous membranes  Neck: Supple, nontender, no JVD  Respiratory: Clear to auscultation bilaterally, no wheezes, rales, or rhonchi. Normal work of breathing  Cardiovascular: Regular rate and rhythm, no murmur, rubs, or gallops. Positive for leg swelling, 2+ radial pulses  Gastrointestinal: Soft, nontender, nondistended  Musculoskeletal: No calf tenderness. Reports pain and restriction of movement of R shoulder. Positive for falls, 2 known episodes over past 2 weeks  Integumentary: Warm, dry, intact. No rashes  Neurologic: Alert and oriented x3. Normal speech. CN grossly intact. Improving paralysis of R lower limb, pt is able to lift leg against gravity now. RUE motor weakness continues to improve as patient works with PT  Psychiatric: Appropriate mood and affect       Discharge Medications        Medication List        START taking these medications      cyanocobalamin 1000 MCG tablet  Commonly known as: VITAMIN B-12  Take 1 tablet (1,000 mcg total) by mouth every other day.            CHANGE how you take these medications      losartan 50 MG tablet  Commonly known as: COZAAR  Take 1.5 tablets (75 mg total) by mouth once daily.  What changed: how much to take            CONTINUE taking these medications      aspirin 81 MG EC tablet  Commonly known as: ECOTRIN     atorvastatin 20 MG tablet  Commonly known as: LIPITOR  Take 1 tablet (20 mg total) by mouth once daily.     clopidogreL 75 mg tablet  Commonly known as: PLAVIX  Take 1 tablet (75 mg total) by mouth once daily.     GVOKE HYPOPEN 2-PACK 1 mg/0.2 mL Atin  Generic drug: glucagon  Inject 0.2 mLs into the skin daily as needed (low blood sugar). May repeat dose in 15 minutes     metFORMIN 1000 MG tablet  Commonly known as: GLUCOPHAGE  Take 1 tablet (1,000 mg total) by mouth 2 (two) times daily.     verapamiL 240 MG CR tablet  Commonly known as: CALAN-SR  Take 1 tablet (240 mg total) by mouth once daily.            STOP taking these medications      furosemide 40 MG  tablet  Commonly known as: LASIX     propranoloL 40 MG tablet  Commonly known as: INDERAL               Where to Get Your Medications        [facility] [address] [facility] [facility] [address] [facility] [address]      Phone: [phone]   cyanocobalamin 1000 MCG tablet  losartan 50 MG tablet           Discharge Information   Diet:  As tolerated    Physical Activity:  As tolerated    Counseling Provided to Patient and Family:  YES    Instructions:  1. Take all medications as prescribed.  2. Keep all follow-up appointments.  3. Return to the hospital or call your primary care physician if any worsening symptoms occur.  4. The above information was discussed with the patient in clear terms. he was able to repeat the instructions to me in his own words. All questions answered.  Patient verbalized understanding. ED precautions provided.    Follow-Up Appointments:  With PCP as needed  With [facility] as scheduled    Future Appointments:  Future Appointments   Date Time Provider Department Center   5/29/2025 10:00 AM RESIDENT 2, [facility]OLOGY [facility] ENT [facility]       Follow up items to address with PCP and pending results at time of discharge:  Compliance with DAPT  Blood pressure control and medication adjustment   Status of residual deficits from stroke  DM control  Resuming propranolol and Cozaar    TIME SPENT ON DISCHARGE: 60 minutes      Case discussed with Dr. Victor. Please appreciate attending's attestation to follow.    Sarah Troy MD  PGY-1 Resident  [facility] Internal Medicine Team 4  03/31/2025             [1]  Patient Active Problem List  Diagnosis    Chronic low back pain    Edema    Obesity    Osteoarthritis of knee    Primary hypertension    Type 2 diabetes mellitus    Coronary artery disease    Myocardial infarction    Acute stroke due to ischemia
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
## 2025-03-31 NOTE — PROGRESS NOTES
Clinical updates submitted to [facility]. Message sent re available bed today for discharge.

## 2025-03-31 NOTE — PT/OT/SLP PROGRESS
Occupational Therapy   Treatment and Discharge Note    Name: Vaibhav Vargas  MRN: [id number]  Admitting Diagnosis:  Acute stroke due to ischemia       Recommendations:     Discharge Recommendations: High Intensity Therapy  Discharge Equipment Recommendations:  to be determined by next level of care  Barriers to discharge:  None    Assessment:     Vaibhav Vargas is a 74 y.o. male with a medical diagnosis of acute ischemic CVA. .    He presents with improving functional mobility, but continues to require frequent cuing to maintain attention to RUE and RLE in sitting and standing along with cuing for midline orientation.  R LE giving out during attempts to take steps with LLE, limited by weakness and also arthritic crepitus noted at R knee and SI joint.  Pt very motivated and easy to cue/direct with good responses to cuing and able to correct R inattention easily with cuing.       Pt tolerated over 3 hours up to WC with good endurance, but did stated his buttocks were sore and will benefit from a WC cushion when OOB.  Pt demonstrated notable improvement with sit to stand and transfer back to bed; able for the first time to bear weight at RLE without bracing to take 2 steps for transfer back to bed using RW and continued cues for sequencing and technique.    Performance deficits affecting function are weakness, impaired endurance, impaired self care skills, impaired functional mobility, impaired balance, impaired cognition, decreased upper extremity function, decreased lower extremity function, decreased safety awareness, pain, decreased ROM, impaired coordination, impaired fine motor, edema, impaired cardiopulmonary response to activity.     Rehab Prognosis:   excellent ; patient would benefit from acute skilled OT services to address these deficits and reach maximum level of function.       Plan:     Anticipate DC to [facility] today.     Subjective     Chief Complaint: buttocks soreness after sitting up  to WC  Patient/Family Comments/goals: get stronger at rehab  Pain/Comfort:  Pain Rating 1: 0/10  Location - Side 1: Right  Location 1: knee  Pain Addressed 1: Reposition, Distraction, Cessation of Activity  Pain Rating Post-Intervention 1: 0/10    Objective:     Communicated with: nurse Garcia prior to session.  Patient found up in chair with peripheral IV, PureWick, telemetry upon OT entry to room.    General Precautions: Standard, fall    Orthopedic Precautions:N/A  Braces: N/A  Respiratory Status: Room air     Occupational Performance:     Bed Mobility:    Patient completed Sit to Supine with moderate assistance     Functional Mobility/Transfers:  Patient completed Sit <> Stand Transfer with moderate assistance and of 2 persons  with  rolling walker   Patient completed Bed <> Chair Transfer using Step Transfer technique with moderate assistance and of 2 persons with rolling walker  Functional Mobility: NA    Activities of Daily Living:  Lower Body Dressing: moderate assistance to don socks; pt able with encouragement and min assist holding LLE in figure 4 position in order to don socks with B UE, required min assist to shift weight forward for hip flexion to reach his foot.  RLE donning sock max assist; pt able to assist with lifting R foot off of ground.      Treatment & Education:  Pt. educated on updated OT goals, POC, and reiterated use of call bell for assist with transfers OOB or for any other needs due to fall risk.  Training also focused on attention strategies for RUE and RLE during sitting and standing to improve midline orientation and postural control.     Patient left HOB elevated with all lines intact, call button in reach, nurse notified, and pt's sister present    GOALS:   Multidisciplinary Problems       Occupational Therapy Goals          Problem: Occupational Therapy    Goal Priority Disciplines Outcome Interventions   Occupational Therapy Goal     OT, PT/OT Adequate for Care Transition     Description: Goals to be met by: d/c, updated at ReEval 3/31, pt progressing with all goals as follows:     Patient will increase functional independence with ADLs by performing:    UE Dressing with Stand-by Assistance while seated EOB .  Progressing, continue  LE Dressing with Moderate Assistance.  Grooming while seated EOB with Stand-by Assistance.  Met, SBA for grooming tasks seated at sink at WC level.  Pt will improve sitting balance EOB to SBA > 8 min to impact performance in self care tasks.  Progressing, continue toward SBA for 8 min.  Toileting from bedside commode with  Moderate Assistance for hygiene and clothing management. Continue, not yet safe to attempt, progressing with transfer training.  Toilet transfer to bedside commode with Minimal Assistance.  Progressing, continue  Pt will increase R shoulder strength to 3/3+/5 as needed to impact efficiency during self care tasks  Met 3/31                         DME Justifications:  To be determined by next level of care    Time Tracking:     OT Date of Treatment: 03/31/25  OT Start Time: 1324  OT Stop Time: 1336  OT Total Time (min): 12 min    Billable Minutes:Self Care/Home Management 12  Co- treatment performed due to patient's multiple deficits requiring two skilled therapists to appropriately and safely assess patient's strength and endurance while facilitating functional tasks in addition to accommodating for patient's activity tolerance    OT/TETE: OT          3/31/2025

## 2025-03-31 NOTE — PHYSICIAN QUERY
Please further specify the heart failure diagnosis.     Other (please specify): Diastolic dysfunction

## 2025-03-31 NOTE — PLAN OF CARE
03/31/25 1023   Medicare Message   Important Message from Medicare regarding Discharge Appeal Rights Given to patient/caregiver;Explained to patient/caregiver;Signed/date by patient/caregiver   Date IMM was signed 03/31/25   Time IMM was signed 1023

## 2025-03-31 NOTE — PROGRESS NOTES
Pt approved for discharge to [facility] today via facility van. Discharge Orders requested.

## 2025-03-31 NOTE — PLAN OF CARE
Problem: Adult Inpatient Plan of Care  Goal: Plan of Care Review  Outcome: Met  Goal: Patient-Specific Goal (Individualized)  Outcome: Met  Goal: Absence of Hospital-Acquired Illness or Injury  Outcome: Met  Goal: Optimal Comfort and Wellbeing  Outcome: Met  Goal: Readiness for Transition of Care  Outcome: Met     Problem: Skin Injury Risk Increased  Goal: Skin Health and Integrity  Outcome: Met     Problem: Diabetes Comorbidity  Goal: Blood Glucose Level Within Targeted Range  Outcome: Met     Problem: Heart Failure  Goal: Optimal Coping  Outcome: Met  Goal: Optimal Cardiac Output  Outcome: Met  Goal: Stable Heart Rate and Rhythm  Outcome: Met  Goal: Optimal Functional Ability  Outcome: Met  Goal: Fluid and Electrolyte Balance  Outcome: Met  Goal: Improved Oral Intake  Outcome: Met  Goal: Effective Breathing Pattern During Sleep  Outcome: Met     Problem: Fall Injury Risk  Goal: Absence of Fall and Fall-Related Injury  Outcome: Met     Problem: Wound  Goal: Optimal Coping  Outcome: Met  Goal: Optimal Functional Ability  Outcome: Met  Goal: Absence of Infection Signs and Symptoms  Outcome: Met  Goal: Improved Oral Intake  Outcome: Met  Goal: Optimal Pain Control and Function  Outcome: Met  Goal: Skin Health and Integrity  Outcome: Met  Goal: Optimal Wound Healing  Outcome: Met

## 2025-03-31 NOTE — PROGRESS NOTES
Discharge Summary submitted to [facility] via [employer] fax. [facility], Tay Longoria ([phone]), informed of discharge plan.

## 2025-05-08 ENCOUNTER — TELEPHONE (OUTPATIENT)
Dept: FAMILY MEDICINE | Facility: CLINIC | Age: 75
End: 2025-05-08
Payer: MEDICARE

## 2025-05-08 NOTE — TELEPHONE ENCOUNTER
Copied from CRM #[id number]. Topic: General Inquiry - Patient Advice  >> May 8, 2025  4:26 PM Josefa wrote:  Who Called: Joaquin with [facility]    Caller is requesting assistance/information from provider's office.    Symptoms (please be specific): n/a     How long has patient had these symptoms:  n/a    List of preferred pharmacies on file (remove unneeded):     Preferred Method of Contact: Phone Call    Patient's Preferred Phone Number on File: [phone]     Best Call Back Number, if different: [phone] opt [phone]    Additional Information:  Joaquin with [facility] stated he attempted to call the pt several times to schedule the referral appt, but he never answered and never returned the call. I verified he had the same phone number as we have.

## 2025-05-23 ENCOUNTER — HOSPITAL ENCOUNTER (INPATIENT)
Facility: HOSPITAL | Age: 75
LOS: 20 days | Discharge: ANOTHER HOSPITAL | DRG: 853 | End: 2025-06-12
Attending: INTERNAL MEDICINE | Admitting: STUDENT IN AN ORGANIZED HEALTH CARE EDUCATION/TRAINING PROGRAM
Payer: MEDICARE

## 2025-05-23 DIAGNOSIS — E87.20 LACTIC ACIDOSIS: ICD-10-CM

## 2025-05-23 DIAGNOSIS — I48.91 A-FIB: Primary | ICD-10-CM

## 2025-05-23 DIAGNOSIS — R00.0 TACHYCARDIA: ICD-10-CM

## 2025-05-23 DIAGNOSIS — Z00.00 ROUTINE CHECK-UP: ICD-10-CM

## 2025-05-23 DIAGNOSIS — K92.2 GASTROINTESTINAL HEMORRHAGE, UNSPECIFIED GASTROINTESTINAL HEMORRHAGE TYPE: ICD-10-CM

## 2025-05-23 DIAGNOSIS — N17.9 ACUTE RENAL FAILURE, UNSPECIFIED ACUTE RENAL FAILURE TYPE: ICD-10-CM

## 2025-05-23 DIAGNOSIS — I44.30 AV BLOCK: ICD-10-CM

## 2025-05-23 DIAGNOSIS — E43 SEVERE MALNUTRITION: ICD-10-CM

## 2025-05-23 DIAGNOSIS — R06.02 SHORTNESS OF BREATH: ICD-10-CM

## 2025-05-23 DIAGNOSIS — I73.9 PAD (PERIPHERAL ARTERY DISEASE): ICD-10-CM

## 2025-05-23 DIAGNOSIS — I51.3 INTRACARDIAC THROMBUS: ICD-10-CM

## 2025-05-23 DIAGNOSIS — R94.31 QT PROLONGATION: ICD-10-CM

## 2025-05-23 DIAGNOSIS — N39.0 COMPLICATED UTI (URINARY TRACT INFECTION): ICD-10-CM

## 2025-05-23 DIAGNOSIS — E87.5 HYPERKALEMIA: ICD-10-CM

## 2025-05-23 DIAGNOSIS — R65.20 SEVERE SEPSIS: ICD-10-CM

## 2025-05-23 DIAGNOSIS — I51.89 CARDIAC MASS: ICD-10-CM

## 2025-05-23 DIAGNOSIS — N32.0 BLADDER OUTLET OBSTRUCTION: ICD-10-CM

## 2025-05-23 DIAGNOSIS — R06.02 SOB (SHORTNESS OF BREATH): ICD-10-CM

## 2025-05-23 DIAGNOSIS — R07.9 CHEST PAIN: ICD-10-CM

## 2025-05-23 DIAGNOSIS — I26.99 PULMONARY EMBOLI: ICD-10-CM

## 2025-05-23 DIAGNOSIS — A41.9 SEPSIS: ICD-10-CM

## 2025-05-23 DIAGNOSIS — A41.9 SEVERE SEPSIS: ICD-10-CM

## 2025-05-23 DIAGNOSIS — I26.99 PULMONARY EMBOLISM: ICD-10-CM

## 2025-05-23 LAB
ABS NEUT CALC (OHS): 25.75 X10(3)/MCL (ref 2.1–9.2)
ALBUMIN SERPL-MCNC: 3.1 G/DL (ref 3.4–4.8)
ALBUMIN/GLOB SERPL: 0.7 RATIO (ref 1.1–2)
ALP SERPL-CCNC: 85 UNIT/L (ref 40–150)
ALT SERPL-CCNC: 40 UNIT/L (ref 0–55)
ANION GAP SERPL CALC-SCNC: 18 MEQ/L
ANION GAP SERPL CALC-SCNC: 21 MEQ/L
AST SERPL-CCNC: 44 UNIT/L (ref 11–45)
BACTERIA #/AREA URNS AUTO: ABNORMAL /HPF
BILIRUB SERPL-MCNC: 0.4 MG/DL
BILIRUB UR QL STRIP.AUTO: NEGATIVE
BNP BLD-MCNC: 1332.9 PG/ML
BUN SERPL-MCNC: 141.5 MG/DL (ref 8.4–25.7)
BUN SERPL-MCNC: 143.9 MG/DL (ref 8.4–25.7)
CALCIUM SERPL-MCNC: 9.1 MG/DL (ref 8.8–10)
CALCIUM SERPL-MCNC: 9.7 MG/DL (ref 8.8–10)
CHLORIDE SERPL-SCNC: 101 MMOL/L (ref 98–107)
CHLORIDE SERPL-SCNC: 104 MMOL/L (ref 98–107)
CK SERPL-CCNC: 132 U/L (ref 30–200)
CLARITY UR: ABNORMAL
CO2 SERPL-SCNC: 14 MMOL/L (ref 23–31)
CO2 SERPL-SCNC: 14 MMOL/L (ref 23–31)
COLOR UR AUTO: ABNORMAL
CREAT SERPL-MCNC: 5.58 MG/DL (ref 0.72–1.25)
CREAT SERPL-MCNC: 5.84 MG/DL (ref 0.72–1.25)
CREAT/UREA NIT SERPL: 25
CREAT/UREA NIT SERPL: 25
ERYTHROCYTE [DISTWIDTH] IN BLOOD BY AUTOMATED COUNT: 14.8 % (ref 11.5–17)
GFR SERPLBLD CREATININE-BSD FMLA CKD-EPI: 10 ML/MIN/1.73/M2
GFR SERPLBLD CREATININE-BSD FMLA CKD-EPI: 10 ML/MIN/1.73/M2
GLOBULIN SER-MCNC: 4.7 GM/DL (ref 2.4–3.5)
GLUCOSE SERPL-MCNC: 180 MG/DL (ref 82–115)
GLUCOSE SERPL-MCNC: 202 MG/DL (ref 82–115)
GLUCOSE UR QL STRIP: NORMAL
HCT VFR BLD AUTO: 37.2 % (ref 42–52)
HGB BLD-MCNC: 11.9 G/DL (ref 14–18)
HGB UR QL STRIP: ABNORMAL
HYALINE CASTS #/AREA URNS LPF: ABNORMAL /LPF
KETONES UR QL STRIP: ABNORMAL
LACTATE SERPL-SCNC: 6.6 MMOL/L (ref 0.5–2.2)
LACTATE SERPL-SCNC: 7.1 MMOL/L (ref 0.5–2.2)
LEUKOCYTE ESTERASE UR QL STRIP: 500
LYMPHOCYTES NFR BLD MANUAL: 0.54 X10(3)/MCL (ref 0.6–4.6)
LYMPHOCYTES NFR BLD MANUAL: 2 % (ref 13–40)
MAGNESIUM SERPL-MCNC: 3.1 MG/DL (ref 1.6–2.6)
MCH RBC QN AUTO: 31.4 PG (ref 27–31)
MCHC RBC AUTO-ENTMCNC: 32 G/DL (ref 33–36)
MCV RBC AUTO: 98.2 FL (ref 80–94)
MONOCYTES NFR BLD MANUAL: 0.54 X10(3)/MCL (ref 0.1–1.3)
MONOCYTES NFR BLD MANUAL: 2 % (ref 2–11)
NEUTROPHILS NFR BLD MANUAL: 94 % (ref 47–80)
NEUTS BAND NFR BLD MANUAL: 2 % (ref 0–11)
NITRITE UR QL STRIP: NEGATIVE
NRBC BLD AUTO-RTO: 0 %
PH UR STRIP: 8 [PH]
PHOSPHATE SERPL-MCNC: 5.3 MG/DL (ref 2.3–4.7)
PLATELET # BLD AUTO: 416 X10(3)/MCL (ref 130–400)
PLATELET # BLD EST: ABNORMAL 10*3/UL
PMV BLD AUTO: 9.4 FL (ref 7.4–10.4)
POTASSIUM SERPL-SCNC: 6.8 MMOL/L (ref 3.5–5.1)
POTASSIUM SERPL-SCNC: 7.1 MMOL/L (ref 3.5–5.1)
PROT SERPL-MCNC: 7.8 GM/DL (ref 5.8–7.6)
PROT UR QL STRIP: ABNORMAL
RBC # BLD AUTO: 3.79 X10(6)/MCL (ref 4.7–6.1)
RBC #/AREA URNS AUTO: ABNORMAL /HPF
RBC MORPH BLD: NORMAL
SODIUM SERPL-SCNC: 136 MMOL/L (ref 136–145)
SODIUM SERPL-SCNC: 136 MMOL/L (ref 136–145)
SP GR UR STRIP.AUTO: 1.02 (ref 1–1.03)
SQUAMOUS #/AREA URNS LPF: ABNORMAL /HPF
TRI-PHOS CRY UR QL COMP ASSIST: ABNORMAL
TROPONIN I SERPL-MCNC: 0.28 NG/ML (ref 0–0.04)
UROBILINOGEN UR STRIP-ACNC: ABNORMAL
WBC # BLD AUTO: 26.82 X10(3)/MCL (ref 4.5–11.5)
WBC #/AREA URNS AUTO: >100 /HPF
WBC CLUMPS UR QL AUTO: ABNORMAL
WBC VACUOLES (OHS): SLIGHT

## 2025-05-23 PROCEDURE — 25000003 PHARM REV CODE 250: Performed by: STUDENT IN AN ORGANIZED HEALTH CARE EDUCATION/TRAINING PROGRAM

## 2025-05-23 PROCEDURE — 83735 ASSAY OF MAGNESIUM: CPT | Performed by: INTERNAL MEDICINE

## 2025-05-23 PROCEDURE — 21400001 HC TELEMETRY ROOM

## 2025-05-23 PROCEDURE — 96374 THER/PROPH/DIAG INJ IV PUSH: CPT

## 2025-05-23 PROCEDURE — 84484 ASSAY OF TROPONIN QUANT: CPT | Performed by: INTERNAL MEDICINE

## 2025-05-23 PROCEDURE — 63600175 PHARM REV CODE 636 W HCPCS: Performed by: INTERNAL MEDICINE

## 2025-05-23 PROCEDURE — 84132 ASSAY OF SERUM POTASSIUM: CPT

## 2025-05-23 PROCEDURE — 11000001 HC ACUTE MED/SURG PRIVATE ROOM

## 2025-05-23 PROCEDURE — 25000003 PHARM REV CODE 250

## 2025-05-23 PROCEDURE — 82550 ASSAY OF CK (CPK): CPT

## 2025-05-23 PROCEDURE — 84100 ASSAY OF PHOSPHORUS: CPT | Performed by: INTERNAL MEDICINE

## 2025-05-23 PROCEDURE — 83605 ASSAY OF LACTIC ACID: CPT

## 2025-05-23 PROCEDURE — 94640 AIRWAY INHALATION TREATMENT: CPT

## 2025-05-23 PROCEDURE — 80053 COMPREHEN METABOLIC PANEL: CPT | Performed by: INTERNAL MEDICINE

## 2025-05-23 PROCEDURE — 63600175 PHARM REV CODE 636 W HCPCS

## 2025-05-23 PROCEDURE — 36415 COLL VENOUS BLD VENIPUNCTURE: CPT

## 2025-05-23 PROCEDURE — 99291 CRITICAL CARE FIRST HOUR: CPT

## 2025-05-23 PROCEDURE — 93005 ELECTROCARDIOGRAM TRACING: CPT

## 2025-05-23 PROCEDURE — 25000003 PHARM REV CODE 250: Performed by: INTERNAL MEDICINE

## 2025-05-23 PROCEDURE — 96375 TX/PRO/DX INJ NEW DRUG ADDON: CPT

## 2025-05-23 PROCEDURE — 87086 URINE CULTURE/COLONY COUNT: CPT | Performed by: INTERNAL MEDICINE

## 2025-05-23 PROCEDURE — 81001 URINALYSIS AUTO W/SCOPE: CPT | Performed by: INTERNAL MEDICINE

## 2025-05-23 PROCEDURE — 94761 N-INVAS EAR/PLS OXIMETRY MLT: CPT

## 2025-05-23 PROCEDURE — 83880 ASSAY OF NATRIURETIC PEPTIDE: CPT | Performed by: INTERNAL MEDICINE

## 2025-05-23 PROCEDURE — 51702 INSERT TEMP BLADDER CATH: CPT

## 2025-05-23 PROCEDURE — 85027 COMPLETE CBC AUTOMATED: CPT | Performed by: INTERNAL MEDICINE

## 2025-05-23 PROCEDURE — 87040 BLOOD CULTURE FOR BACTERIA: CPT | Performed by: INTERNAL MEDICINE

## 2025-05-23 PROCEDURE — 25000242 PHARM REV CODE 250 ALT 637 W/ HCPCS: Performed by: INTERNAL MEDICINE

## 2025-05-23 PROCEDURE — 83605 ASSAY OF LACTIC ACID: CPT | Performed by: INTERNAL MEDICINE

## 2025-05-23 RX ORDER — CEFTRIAXONE 1 G/1
1 INJECTION, POWDER, FOR SOLUTION INTRAMUSCULAR; INTRAVENOUS
Status: DISCONTINUED | OUTPATIENT
Start: 2025-05-23 | End: 2025-05-24

## 2025-05-23 RX ORDER — CEFTRIAXONE 1 G/1
1 INJECTION, POWDER, FOR SOLUTION INTRAMUSCULAR; INTRAVENOUS
Status: COMPLETED | OUTPATIENT
Start: 2025-05-23 | End: 2025-05-23

## 2025-05-23 RX ORDER — ACETAMINOPHEN 325 MG/1
650 TABLET ORAL EVERY 4 HOURS PRN
Status: DISCONTINUED | OUTPATIENT
Start: 2025-05-23 | End: 2025-06-12 | Stop reason: HOSPADM

## 2025-05-23 RX ORDER — SODIUM CHLORIDE 0.9 % (FLUSH) 0.9 %
10 SYRINGE (ML) INJECTION
Status: DISCONTINUED | OUTPATIENT
Start: 2025-05-23 | End: 2025-06-12 | Stop reason: HOSPADM

## 2025-05-23 RX ORDER — ASPIRIN 81 MG/1
81 TABLET ORAL DAILY
Status: DISCONTINUED | OUTPATIENT
Start: 2025-05-24 | End: 2025-06-01

## 2025-05-23 RX ORDER — SODIUM CHLORIDE 9 MG/ML
INJECTION, SOLUTION INTRAVENOUS CONTINUOUS
Status: DISCONTINUED | OUTPATIENT
Start: 2025-05-23 | End: 2025-05-24

## 2025-05-23 RX ORDER — TALC
6 POWDER (GRAM) TOPICAL NIGHTLY PRN
Status: DISCONTINUED | OUTPATIENT
Start: 2025-05-23 | End: 2025-06-12 | Stop reason: HOSPADM

## 2025-05-23 RX ORDER — CALCIUM GLUCONATE 20 MG/ML
1 INJECTION, SOLUTION INTRAVENOUS ONCE
Status: COMPLETED | OUTPATIENT
Start: 2025-05-23 | End: 2025-05-23

## 2025-05-23 RX ORDER — ALBUTEROL SULFATE 0.83 MG/ML
10 SOLUTION RESPIRATORY (INHALATION)
Status: COMPLETED | OUTPATIENT
Start: 2025-05-23 | End: 2025-05-23

## 2025-05-23 RX ORDER — MUPIROCIN 20 MG/G
OINTMENT TOPICAL 2 TIMES DAILY
Status: DISPENSED | OUTPATIENT
Start: 2025-05-23 | End: 2025-05-28

## 2025-05-23 RX ORDER — ATORVASTATIN CALCIUM 20 MG/1
20 TABLET, FILM COATED ORAL DAILY
Status: DISCONTINUED | OUTPATIENT
Start: 2025-05-24 | End: 2025-06-12 | Stop reason: HOSPADM

## 2025-05-23 RX ORDER — HEPARIN SODIUM 5000 [USP'U]/ML
5000 INJECTION, SOLUTION INTRAVENOUS; SUBCUTANEOUS EVERY 12 HOURS
Status: DISCONTINUED | OUTPATIENT
Start: 2025-05-23 | End: 2025-05-26

## 2025-05-23 RX ORDER — FUROSEMIDE 10 MG/ML
20 INJECTION INTRAMUSCULAR; INTRAVENOUS
Status: COMPLETED | OUTPATIENT
Start: 2025-05-23 | End: 2025-05-23

## 2025-05-23 RX ORDER — CLOPIDOGREL BISULFATE 75 MG/1
75 TABLET ORAL DAILY
Status: DISCONTINUED | OUTPATIENT
Start: 2025-05-24 | End: 2025-05-26

## 2025-05-23 RX ADMIN — SODIUM CHLORIDE: 9 INJECTION, SOLUTION INTRAVENOUS at 09:05

## 2025-05-23 RX ADMIN — HUMAN INSULIN 10 UNITS: 100 INJECTION, SOLUTION SUBCUTANEOUS at 09:05

## 2025-05-23 RX ADMIN — ALBUTEROL SULFATE 10 MG: 2.5 SOLUTION RESPIRATORY (INHALATION) at 09:05

## 2025-05-23 RX ADMIN — SODIUM CHLORIDE 500 ML: 9 INJECTION, SOLUTION INTRAVENOUS at 09:05

## 2025-05-23 RX ADMIN — SODIUM CHLORIDE 500 ML: 9 INJECTION, SOLUTION INTRAVENOUS at 08:05

## 2025-05-23 RX ADMIN — MUPIROCIN: 20 OINTMENT TOPICAL at 11:05

## 2025-05-23 RX ADMIN — SODIUM ZIRCONIUM CYCLOSILICATE 10 G: 10 POWDER, FOR SUSPENSION ORAL at 08:05

## 2025-05-23 RX ADMIN — CEFTRIAXONE SODIUM 1 G: 1 INJECTION, POWDER, FOR SOLUTION INTRAMUSCULAR; INTRAVENOUS at 08:05

## 2025-05-23 RX ADMIN — FUROSEMIDE 20 MG: 10 INJECTION, SOLUTION INTRAMUSCULAR; INTRAVENOUS at 08:05

## 2025-05-23 RX ADMIN — CALCIUM GLUCONATE 1 G: 20 INJECTION, SOLUTION INTRAVENOUS at 09:05

## 2025-05-23 RX ADMIN — HEPARIN SODIUM 5000 UNITS: 5000 INJECTION, SOLUTION INTRAVENOUS; SUBCUTANEOUS at 11:05

## 2025-05-23 RX ADMIN — DEXTROSE MONOHYDRATE 25 G: 25 INJECTION, SOLUTION INTRAVENOUS at 09:05
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
## 2025-05-23 NOTE — Clinical Note
Phone Number Called: [phone] (home)       Call outcome: Spoke to patient regarding message below.    Message: Called and spoke with mom, informed of results.   Phone report was given to Penelope on [facility]

## 2025-05-24 PROBLEM — R65.20 SEVERE SEPSIS: Status: ACTIVE | Noted: 2025-05-24

## 2025-05-24 PROBLEM — A41.9 SEVERE SEPSIS: Status: ACTIVE | Noted: 2025-05-24

## 2025-05-24 LAB
ALBUMIN SERPL-MCNC: 2.6 G/DL (ref 3.4–4.8)
ALBUMIN/GLOB SERPL: 0.7 RATIO (ref 1.1–2)
ALP SERPL-CCNC: 73 UNIT/L (ref 40–150)
ALT SERPL-CCNC: 37 UNIT/L (ref 0–55)
ANION GAP SERPL CALC-SCNC: 12 MEQ/L
ANION GAP SERPL CALC-SCNC: 15 MEQ/L
AST SERPL-CCNC: 41 UNIT/L (ref 11–45)
BASOPHILS # BLD AUTO: 0.05 X10(3)/MCL
BASOPHILS NFR BLD AUTO: 0.2 %
BILIRUB SERPL-MCNC: 0.2 MG/DL
BUN SERPL-MCNC: 126.3 MG/DL (ref 8.4–25.7)
BUN SERPL-MCNC: 131.3 MG/DL (ref 8.4–25.7)
CALCIUM SERPL-MCNC: 9.1 MG/DL (ref 8.8–10)
CALCIUM SERPL-MCNC: 9.3 MG/DL (ref 8.8–10)
CHLORIDE SERPL-SCNC: 106 MMOL/L (ref 98–107)
CHLORIDE SERPL-SCNC: 108 MMOL/L (ref 98–107)
CO2 SERPL-SCNC: 18 MMOL/L (ref 23–31)
CO2 SERPL-SCNC: 21 MMOL/L (ref 23–31)
CREAT SERPL-MCNC: 4.4 MG/DL (ref 0.72–1.25)
CREAT SERPL-MCNC: 4.94 MG/DL (ref 0.72–1.25)
CREAT/UREA NIT SERPL: 27
CREAT/UREA NIT SERPL: 29
EOSINOPHIL # BLD AUTO: 0.02 X10(3)/MCL (ref 0–0.9)
EOSINOPHIL NFR BLD AUTO: 0.1 %
ERYTHROCYTE [DISTWIDTH] IN BLOOD BY AUTOMATED COUNT: 14.8 % (ref 11.5–17)
EST. AVERAGE GLUCOSE BLD GHB EST-MCNC: 114 MG/DL
GFR SERPLBLD CREATININE-BSD FMLA CKD-EPI: 12 ML/MIN/1.73/M2
GFR SERPLBLD CREATININE-BSD FMLA CKD-EPI: 13 ML/MIN/1.73/M2
GLOBULIN SER-MCNC: 3.9 GM/DL (ref 2.4–3.5)
GLUCOSE SERPL-MCNC: 127 MG/DL (ref 82–115)
GLUCOSE SERPL-MCNC: 144 MG/DL (ref 82–115)
HBA1C MFR BLD: 5.6 %
HCT VFR BLD AUTO: 31.2 % (ref 42–52)
HGB BLD-MCNC: 10 G/DL (ref 14–18)
HOLD SPECIMEN: NORMAL
IMM GRANULOCYTES # BLD AUTO: 0.32 X10(3)/MCL (ref 0–0.04)
IMM GRANULOCYTES NFR BLD AUTO: 1.3 %
LACTATE SERPL-SCNC: 2.6 MMOL/L (ref 0.5–2.2)
LACTATE SERPL-SCNC: 6.6 MMOL/L (ref 0.5–2.2)
LYMPHOCYTES # BLD AUTO: 1.6 X10(3)/MCL (ref 0.6–4.6)
LYMPHOCYTES NFR BLD AUTO: 6.6 %
MAGNESIUM SERPL-MCNC: 2.8 MG/DL (ref 1.6–2.6)
MCH RBC QN AUTO: 31.3 PG (ref 27–31)
MCHC RBC AUTO-ENTMCNC: 32.1 G/DL (ref 33–36)
MCV RBC AUTO: 97.8 FL (ref 80–94)
MONOCYTES # BLD AUTO: 1.72 X10(3)/MCL (ref 0.1–1.3)
MONOCYTES NFR BLD AUTO: 7.1 %
NEUTROPHILS # BLD AUTO: 20.64 X10(3)/MCL (ref 2.1–9.2)
NEUTROPHILS NFR BLD AUTO: 84.7 %
NRBC BLD AUTO-RTO: 0 %
PHOSPHATE SERPL-MCNC: 5.3 MG/DL (ref 2.3–4.7)
PLATELET # BLD AUTO: 331 X10(3)/MCL (ref 130–400)
PMV BLD AUTO: 9.7 FL (ref 7.4–10.4)
POCT GLUCOSE: 164 MG/DL (ref 70–110)
POCT GLUCOSE: 190 MG/DL (ref 70–110)
POCT GLUCOSE: 193 MG/DL (ref 70–110)
POCT GLUCOSE: 249 MG/DL (ref 70–110)
POTASSIUM SERPL-SCNC: 5.1 MMOL/L (ref 3.5–5.1)
POTASSIUM SERPL-SCNC: 5.2 MMOL/L (ref 3.5–5.1)
POTASSIUM SERPL-SCNC: 5.5 MMOL/L (ref 3.5–5.1)
PROT SERPL-MCNC: 6.5 GM/DL (ref 5.8–7.6)
RBC # BLD AUTO: 3.19 X10(6)/MCL (ref 4.7–6.1)
SODIUM SERPL-SCNC: 139 MMOL/L (ref 136–145)
SODIUM SERPL-SCNC: 141 MMOL/L (ref 136–145)
TROPONIN I SERPL-MCNC: 0.24 NG/ML (ref 0–0.04)
WBC # BLD AUTO: 24.35 X10(3)/MCL (ref 4.5–11.5)

## 2025-05-24 PROCEDURE — 21400001 HC TELEMETRY ROOM

## 2025-05-24 PROCEDURE — 85025 COMPLETE CBC W/AUTO DIFF WBC: CPT

## 2025-05-24 PROCEDURE — 25000003 PHARM REV CODE 250

## 2025-05-24 PROCEDURE — 94761 N-INVAS EAR/PLS OXIMETRY MLT: CPT

## 2025-05-24 PROCEDURE — 63600175 PHARM REV CODE 636 W HCPCS

## 2025-05-24 PROCEDURE — 80053 COMPREHEN METABOLIC PANEL: CPT

## 2025-05-24 PROCEDURE — 83735 ASSAY OF MAGNESIUM: CPT

## 2025-05-24 PROCEDURE — 84484 ASSAY OF TROPONIN QUANT: CPT

## 2025-05-24 PROCEDURE — 84100 ASSAY OF PHOSPHORUS: CPT

## 2025-05-24 PROCEDURE — 83036 HEMOGLOBIN GLYCOSYLATED A1C: CPT

## 2025-05-24 PROCEDURE — 94760 N-INVAS EAR/PLS OXIMETRY 1: CPT

## 2025-05-24 PROCEDURE — 36415 COLL VENOUS BLD VENIPUNCTURE: CPT

## 2025-05-24 PROCEDURE — 83605 ASSAY OF LACTIC ACID: CPT

## 2025-05-24 RX ORDER — POLYETHYLENE GLYCOL 3350 17 G/17G
17 POWDER, FOR SOLUTION ORAL DAILY PRN
Status: DISCONTINUED | OUTPATIENT
Start: 2025-05-24 | End: 2025-06-09

## 2025-05-24 RX ORDER — AMOXICILLIN 250 MG
1 CAPSULE ORAL DAILY PRN
Status: DISCONTINUED | OUTPATIENT
Start: 2025-05-24 | End: 2025-06-09

## 2025-05-24 RX ORDER — GLUCAGON 1 MG
1 KIT INJECTION
Status: DISCONTINUED | OUTPATIENT
Start: 2025-05-24 | End: 2025-06-12 | Stop reason: HOSPADM

## 2025-05-24 RX ORDER — INSULIN ASPART 100 [IU]/ML
0-5 INJECTION, SOLUTION INTRAVENOUS; SUBCUTANEOUS
Status: DISCONTINUED | OUTPATIENT
Start: 2025-05-24 | End: 2025-06-12 | Stop reason: HOSPADM

## 2025-05-24 RX ORDER — SODIUM CHLORIDE 9 MG/ML
INJECTION, SOLUTION INTRAVENOUS ONCE
Status: COMPLETED | OUTPATIENT
Start: 2025-05-24 | End: 2025-05-24

## 2025-05-24 RX ORDER — IBUPROFEN 200 MG
16 TABLET ORAL
Status: DISCONTINUED | OUTPATIENT
Start: 2025-05-24 | End: 2025-06-12 | Stop reason: HOSPADM

## 2025-05-24 RX ORDER — SODIUM CHLORIDE 9 MG/ML
INJECTION, SOLUTION INTRAVENOUS CONTINUOUS
Status: DISCONTINUED | OUTPATIENT
Start: 2025-05-24 | End: 2025-05-25

## 2025-05-24 RX ORDER — IBUPROFEN 200 MG
24 TABLET ORAL
Status: DISCONTINUED | OUTPATIENT
Start: 2025-05-24 | End: 2025-06-12 | Stop reason: HOSPADM

## 2025-05-24 RX ADMIN — SODIUM ZIRCONIUM CYCLOSILICATE 10 G: 10 POWDER, FOR SUSPENSION ORAL at 07:05

## 2025-05-24 RX ADMIN — MUPIROCIN: 20 OINTMENT TOPICAL at 09:05

## 2025-05-24 RX ADMIN — PIPERACILLIN AND TAZOBACTAM 4.5 G: 4; .5 INJECTION, POWDER, LYOPHILIZED, FOR SOLUTION INTRAVENOUS; PARENTERAL at 09:05

## 2025-05-24 RX ADMIN — SODIUM ZIRCONIUM CYCLOSILICATE 10 G: 10 POWDER, FOR SUSPENSION ORAL at 09:05

## 2025-05-24 RX ADMIN — CLOPIDOGREL BISULFATE 75 MG: 75 TABLET, FILM COATED ORAL at 09:05

## 2025-05-24 RX ADMIN — SODIUM ZIRCONIUM CYCLOSILICATE 10 G: 10 POWDER, FOR SUSPENSION ORAL at 05:05

## 2025-05-24 RX ADMIN — HEPARIN SODIUM 5000 UNITS: 5000 INJECTION, SOLUTION INTRAVENOUS; SUBCUTANEOUS at 09:05

## 2025-05-24 RX ADMIN — SENNOSIDES AND DOCUSATE SODIUM 1 TABLET: 50; 8.6 TABLET ORAL at 08:05

## 2025-05-24 RX ADMIN — SODIUM CHLORIDE: 9 INJECTION, SOLUTION INTRAVENOUS at 08:05

## 2025-05-24 RX ADMIN — SODIUM CHLORIDE: 9 INJECTION, SOLUTION INTRAVENOUS at 07:05

## 2025-05-24 RX ADMIN — ASPIRIN 81 MG: 81 TABLET, COATED ORAL at 09:05

## 2025-05-24 RX ADMIN — SODIUM CHLORIDE: 9 INJECTION, SOLUTION INTRAVENOUS at 10:05

## 2025-05-24 RX ADMIN — POLYETHYLENE GLYCOL 3350 17 G: 17 POWDER, FOR SOLUTION ORAL at 08:05

## 2025-05-24 RX ADMIN — ATORVASTATIN CALCIUM 20 MG: 20 TABLET, FILM COATED ORAL at 09:05

## 2025-05-24 RX ADMIN — VANCOMYCIN HYDROCHLORIDE 750 MG: 750 INJECTION, POWDER, LYOPHILIZED, FOR SOLUTION INTRAVENOUS at 10:05

## 2025-05-24 RX ADMIN — SODIUM CHLORIDE: 9 INJECTION, SOLUTION INTRAVENOUS at 05:05
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
## 2025-05-24 NOTE — PLAN OF CARE
Problem: Infection  Goal: Absence of Infection Signs and Symptoms  Outcome: Progressing     Problem: Adult Inpatient Plan of Care  Goal: Plan of Care Review  Outcome: Progressing  Goal: Patient-Specific Goal (Individualized)  Outcome: Progressing  Goal: Absence of Hospital-Acquired Illness or Injury  Outcome: Progressing  Goal: Optimal Comfort and Wellbeing  Outcome: Progressing  Goal: Readiness for Transition of Care  Outcome: Progressing     Problem: Diabetes Comorbidity  Goal: Blood Glucose Level Within Targeted Range  Outcome: Progressing     Problem: Wound  Goal: Optimal Coping  Outcome: Progressing  Goal: Optimal Functional Ability  Outcome: Progressing  Goal: Absence of Infection Signs and Symptoms  Outcome: Progressing  Goal: Improved Oral Intake  Outcome: Progressing  Goal: Optimal Pain Control and Function  Outcome: Progressing  Goal: Skin Health and Integrity  Outcome: Progressing  Goal: Optimal Wound Healing  Outcome: Progressing     Problem: Skin Injury Risk Increased  Goal: Skin Health and Integrity  Outcome: Progressing     Problem: Heart Failure  Goal: Optimal Coping  Outcome: Progressing  Goal: Optimal Cardiac Output  Outcome: Progressing  Goal: Stable Heart Rate and Rhythm  Outcome: Progressing  Goal: Optimal Functional Ability  Outcome: Progressing  Goal: Fluid and Electrolyte Balance  Outcome: Progressing  Goal: Improved Oral Intake  Outcome: Progressing  Goal: Effective Oxygenation and Ventilation  Outcome: Progressing  Goal: Effective Breathing Pattern During Sleep  Outcome: Progressing
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
## 2025-05-24 NOTE — CARE UPDATE
BMP ordered for 2230 obtained @2124 prior to insulin given for potassium shift. Will obtain another potassium at this time to determine further management.

## 2025-05-25 LAB
ALBUMIN SERPL-MCNC: 2.5 G/DL (ref 3.4–4.8)
ALBUMIN/GLOB SERPL: 0.6 RATIO (ref 1.1–2)
ALP SERPL-CCNC: 101 UNIT/L (ref 40–150)
ALT SERPL-CCNC: 51 UNIT/L (ref 0–55)
ANION GAP SERPL CALC-SCNC: 9 MEQ/L
AST SERPL-CCNC: 48 UNIT/L (ref 11–45)
BASOPHILS # BLD AUTO: 0.07 X10(3)/MCL
BASOPHILS NFR BLD AUTO: 0.4 %
BILIRUB SERPL-MCNC: 0.3 MG/DL
BUN SERPL-MCNC: 103.5 MG/DL (ref 8.4–25.7)
CALCIUM SERPL-MCNC: 8.6 MG/DL (ref 8.8–10)
CHLORIDE SERPL-SCNC: 111 MMOL/L (ref 98–107)
CO2 SERPL-SCNC: 22 MMOL/L (ref 23–31)
CREAT SERPL-MCNC: 3.09 MG/DL (ref 0.72–1.25)
CREAT/UREA NIT SERPL: 33
EOSINOPHIL # BLD AUTO: 0.31 X10(3)/MCL (ref 0–0.9)
EOSINOPHIL NFR BLD AUTO: 1.9 %
ERYTHROCYTE [DISTWIDTH] IN BLOOD BY AUTOMATED COUNT: 15.2 % (ref 11.5–17)
GFR SERPLBLD CREATININE-BSD FMLA CKD-EPI: 20 ML/MIN/1.73/M2
GLOBULIN SER-MCNC: 3.9 GM/DL (ref 2.4–3.5)
GLUCOSE SERPL-MCNC: 140 MG/DL (ref 82–115)
HCT VFR BLD AUTO: 30.2 % (ref 42–52)
HGB BLD-MCNC: 9.6 G/DL (ref 14–18)
IMM GRANULOCYTES # BLD AUTO: 0.18 X10(3)/MCL (ref 0–0.04)
IMM GRANULOCYTES NFR BLD AUTO: 1.1 %
LYMPHOCYTES # BLD AUTO: 1.54 X10(3)/MCL (ref 0.6–4.6)
LYMPHOCYTES NFR BLD AUTO: 9.5 %
MAGNESIUM SERPL-MCNC: 2.6 MG/DL (ref 1.6–2.6)
MCH RBC QN AUTO: 31.3 PG (ref 27–31)
MCHC RBC AUTO-ENTMCNC: 31.8 G/DL (ref 33–36)
MCV RBC AUTO: 98.4 FL (ref 80–94)
MONOCYTES # BLD AUTO: 1.39 X10(3)/MCL (ref 0.1–1.3)
MONOCYTES NFR BLD AUTO: 8.5 %
NEUTROPHILS # BLD AUTO: 12.79 X10(3)/MCL (ref 2.1–9.2)
NEUTROPHILS NFR BLD AUTO: 78.6 %
NRBC BLD AUTO-RTO: 0 %
PHOSPHATE SERPL-MCNC: 3.7 MG/DL (ref 2.3–4.7)
PLATELET # BLD AUTO: 296 X10(3)/MCL (ref 130–400)
PMV BLD AUTO: 9.8 FL (ref 7.4–10.4)
POCT GLUCOSE: 153 MG/DL (ref 70–110)
POCT GLUCOSE: 158 MG/DL (ref 70–110)
POCT GLUCOSE: 163 MG/DL (ref 70–110)
POCT GLUCOSE: 179 MG/DL (ref 70–110)
POCT GLUCOSE: 183 MG/DL (ref 70–110)
POCT GLUCOSE: 196 MG/DL (ref 70–110)
POTASSIUM SERPL-SCNC: 4.5 MMOL/L (ref 3.5–5.1)
PROT SERPL-MCNC: 6.4 GM/DL (ref 5.8–7.6)
RBC # BLD AUTO: 3.07 X10(6)/MCL (ref 4.7–6.1)
SODIUM SERPL-SCNC: 142 MMOL/L (ref 136–145)
WBC # BLD AUTO: 16.28 X10(3)/MCL (ref 4.5–11.5)

## 2025-05-25 PROCEDURE — 25000003 PHARM REV CODE 250

## 2025-05-25 PROCEDURE — 93005 ELECTROCARDIOGRAM TRACING: CPT

## 2025-05-25 PROCEDURE — 63600175 PHARM REV CODE 636 W HCPCS

## 2025-05-25 PROCEDURE — 84100 ASSAY OF PHOSPHORUS: CPT

## 2025-05-25 PROCEDURE — 85025 COMPLETE CBC W/AUTO DIFF WBC: CPT

## 2025-05-25 PROCEDURE — 83735 ASSAY OF MAGNESIUM: CPT

## 2025-05-25 PROCEDURE — 21400001 HC TELEMETRY ROOM

## 2025-05-25 PROCEDURE — 94760 N-INVAS EAR/PLS OXIMETRY 1: CPT

## 2025-05-25 PROCEDURE — 80053 COMPREHEN METABOLIC PANEL: CPT

## 2025-05-25 PROCEDURE — 36415 COLL VENOUS BLD VENIPUNCTURE: CPT

## 2025-05-25 PROCEDURE — 94761 N-INVAS EAR/PLS OXIMETRY MLT: CPT

## 2025-05-25 RX ORDER — METOPROLOL TARTRATE 1 MG/ML
5 INJECTION, SOLUTION INTRAVENOUS ONCE
Status: COMPLETED | OUTPATIENT
Start: 2025-05-25 | End: 2025-05-25

## 2025-05-25 RX ORDER — ONDANSETRON HYDROCHLORIDE 2 MG/ML
4 INJECTION, SOLUTION INTRAVENOUS EVERY 6 HOURS PRN
Status: DISCONTINUED | OUTPATIENT
Start: 2025-05-25 | End: 2025-06-12 | Stop reason: HOSPADM

## 2025-05-25 RX ORDER — HYDROCODONE BITARTRATE AND ACETAMINOPHEN 5; 325 MG/1; MG/1
1 TABLET ORAL EVERY 6 HOURS PRN
Status: DISCONTINUED | OUTPATIENT
Start: 2025-05-25 | End: 2025-06-12 | Stop reason: HOSPADM

## 2025-05-25 RX ADMIN — HEPARIN SODIUM 5000 UNITS: 5000 INJECTION, SOLUTION INTRAVENOUS; SUBCUTANEOUS at 09:05

## 2025-05-25 RX ADMIN — HYDROCODONE BITARTRATE AND ACETAMINOPHEN 1 TABLET: 5; 325 TABLET ORAL at 09:05

## 2025-05-25 RX ADMIN — CLOPIDOGREL BISULFATE 75 MG: 75 TABLET, FILM COATED ORAL at 09:05

## 2025-05-25 RX ADMIN — VANCOMYCIN HYDROCHLORIDE 750 MG: 750 INJECTION, POWDER, LYOPHILIZED, FOR SOLUTION INTRAVENOUS at 10:05

## 2025-05-25 RX ADMIN — SODIUM ZIRCONIUM CYCLOSILICATE 10 G: 10 POWDER, FOR SUSPENSION ORAL at 04:05

## 2025-05-25 RX ADMIN — SODIUM ZIRCONIUM CYCLOSILICATE 10 G: 10 POWDER, FOR SUSPENSION ORAL at 09:05

## 2025-05-25 RX ADMIN — ASPIRIN 81 MG: 81 TABLET, COATED ORAL at 09:05

## 2025-05-25 RX ADMIN — PIPERACILLIN AND TAZOBACTAM 4.5 G: 4; .5 INJECTION, POWDER, LYOPHILIZED, FOR SOLUTION INTRAVENOUS; PARENTERAL at 09:05

## 2025-05-25 RX ADMIN — MUPIROCIN: 20 OINTMENT TOPICAL at 08:05

## 2025-05-25 RX ADMIN — PIPERACILLIN AND TAZOBACTAM 4.5 G: 4; .5 INJECTION, POWDER, LYOPHILIZED, FOR SOLUTION INTRAVENOUS; PARENTERAL at 08:05

## 2025-05-25 RX ADMIN — SODIUM ZIRCONIUM CYCLOSILICATE 10 G: 10 POWDER, FOR SUSPENSION ORAL at 08:05

## 2025-05-25 RX ADMIN — METOPROLOL TARTRATE 5 MG: 1 INJECTION, SOLUTION INTRAVENOUS at 10:05

## 2025-05-25 RX ADMIN — MUPIROCIN: 20 OINTMENT TOPICAL at 09:05

## 2025-05-25 RX ADMIN — ONDANSETRON 4 MG: 2 INJECTION INTRAMUSCULAR; INTRAVENOUS at 04:05

## 2025-05-25 RX ADMIN — HEPARIN SODIUM 5000 UNITS: 5000 INJECTION, SOLUTION INTRAVENOUS; SUBCUTANEOUS at 08:05

## 2025-05-25 RX ADMIN — ATORVASTATIN CALCIUM 20 MG: 20 TABLET, FILM COATED ORAL at 09:05
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
## 2025-05-25 NOTE — PLAN OF CARE
Problem: Infection  Goal: Absence of Infection Signs and Symptoms  Outcome: Progressing     Problem: Adult Inpatient Plan of Care  Goal: Plan of Care Review  Outcome: Progressing  Goal: Patient-Specific Goal (Individualized)  Outcome: Progressing  Goal: Absence of Hospital-Acquired Illness or Injury  Outcome: Progressing  Goal: Optimal Comfort and Wellbeing  Outcome: Progressing  Goal: Readiness for Transition of Care  Outcome: Progressing     Problem: Diabetes Comorbidity  Goal: Blood Glucose Level Within Targeted Range  Outcome: Progressing     Problem: Wound  Goal: Optimal Coping  Outcome: Progressing  Goal: Optimal Functional Ability  Outcome: Progressing  Goal: Absence of Infection Signs and Symptoms  Outcome: Progressing  Goal: Improved Oral Intake  Outcome: Progressing  Goal: Optimal Pain Control and Function  Outcome: Progressing  Goal: Skin Health and Integrity  Outcome: Progressing  Goal: Optimal Wound Healing  Outcome: Progressing     Problem: Skin Injury Risk Increased  Goal: Skin Health and Integrity  Outcome: Progressing     Problem: Heart Failure  Goal: Optimal Coping  Outcome: Progressing  Goal: Optimal Cardiac Output  Outcome: Progressing  Goal: Stable Heart Rate and Rhythm  Outcome: Progressing  Goal: Optimal Functional Ability  Outcome: Progressing  Goal: Fluid and Electrolyte Balance  Outcome: Progressing  Goal: Improved Oral Intake  Outcome: Progressing  Goal: Effective Oxygenation and Ventilation  Outcome: Progressing  Goal: Effective Breathing Pattern During Sleep  Outcome: Progressing     Problem: Sepsis/Septic Shock  Goal: Optimal Coping  Outcome: Progressing  Goal: Absence of Bleeding  Outcome: Progressing  Goal: Blood Glucose Level Within Targeted Range  Outcome: Progressing  Goal: Absence of Infection Signs and Symptoms  Outcome: Progressing  Goal: Optimal Nutrition Intake  Outcome: Progressing

## 2025-05-26 PROBLEM — E43 SEVERE MALNUTRITION: Status: ACTIVE | Noted: 2025-05-26

## 2025-05-26 LAB
ALBUMIN SERPL-MCNC: 2.8 G/DL (ref 3.4–4.8)
ALBUMIN/GLOB SERPL: 0.6 RATIO (ref 1.1–2)
ALP SERPL-CCNC: 128 UNIT/L (ref 40–150)
ALT SERPL-CCNC: 157 UNIT/L (ref 0–55)
ANION GAP SERPL CALC-SCNC: 9 MEQ/L
APTT PPP: 32.2 SECONDS (ref 23.2–33.7)
AST SERPL-CCNC: 158 UNIT/L (ref 11–45)
BACTERIA UR CULT: ABNORMAL
BASOPHILS # BLD AUTO: 0.06 X10(3)/MCL
BASOPHILS # BLD AUTO: 0.09 X10(3)/MCL
BASOPHILS NFR BLD AUTO: 0.5 %
BASOPHILS NFR BLD AUTO: 0.6 %
BILIRUB SERPL-MCNC: 0.3 MG/DL
BSA FOR ECHO PROCEDURE: 2.5 M2
BUN SERPL-MCNC: 74 MG/DL (ref 8.4–25.7)
CALCIUM SERPL-MCNC: 8.8 MG/DL (ref 8.8–10)
CHLORIDE SERPL-SCNC: 111 MMOL/L (ref 98–107)
CO2 SERPL-SCNC: 22 MMOL/L (ref 23–31)
CREAT SERPL-MCNC: 2.18 MG/DL (ref 0.72–1.25)
CREAT/UREA NIT SERPL: 34
CV ECHO LV RWT: 0.63 CM
DOP CALC LVOT AREA: 3.1 CM2
DOP CALC LVOT DIAMETER: 2 CM
ECHO LV POSTERIOR WALL: 1.1 CM (ref 0.6–1.1)
EOSINOPHIL # BLD AUTO: 0.24 X10(3)/MCL (ref 0–0.9)
EOSINOPHIL # BLD AUTO: 0.31 X10(3)/MCL (ref 0–0.9)
EOSINOPHIL NFR BLD AUTO: 1.9 %
EOSINOPHIL NFR BLD AUTO: 2 %
ERYTHROCYTE [DISTWIDTH] IN BLOOD BY AUTOMATED COUNT: 14.9 % (ref 11.5–17)
ERYTHROCYTE [DISTWIDTH] IN BLOOD BY AUTOMATED COUNT: 14.9 % (ref 11.5–17)
FRACTIONAL SHORTENING: 28.6 % (ref 28–44)
GFR SERPLBLD CREATININE-BSD FMLA CKD-EPI: 31 ML/MIN/1.73/M2
GLOBULIN SER-MCNC: 4.7 GM/DL (ref 2.4–3.5)
GLUCOSE SERPL-MCNC: 124 MG/DL (ref 82–115)
HCT VFR BLD AUTO: 31.7 % (ref 42–52)
HCT VFR BLD AUTO: 33.8 % (ref 42–52)
HGB BLD-MCNC: 10.3 G/DL (ref 14–18)
HGB BLD-MCNC: 10.8 G/DL (ref 14–18)
HOLD SPECIMEN: NORMAL
IMM GRANULOCYTES # BLD AUTO: 0.15 X10(3)/MCL (ref 0–0.04)
IMM GRANULOCYTES # BLD AUTO: 0.21 X10(3)/MCL (ref 0–0.04)
IMM GRANULOCYTES NFR BLD AUTO: 0.9 %
IMM GRANULOCYTES NFR BLD AUTO: 1.6 %
INR PPP: 1.3
INTERVENTRICULAR SEPTUM: 1 CM (ref 0.6–1.1)
IVC DIAMETER: 2.5 CM
LEFT ATRIUM SIZE: 4.1 CM
LEFT INTERNAL DIMENSION IN SYSTOLE: 2.5 CM (ref 2.1–4)
LEFT VENTRICLE DIASTOLIC VOLUME INDEX: 20.82 ML/M2
LEFT VENTRICLE DIASTOLIC VOLUME: 51 ML
LEFT VENTRICLE MASS INDEX: 45.3 G/M2
LEFT VENTRICLE SYSTOLIC VOLUME INDEX: 9.4 ML/M2
LEFT VENTRICLE SYSTOLIC VOLUME: 23 ML
LEFT VENTRICULAR INTERNAL DIMENSION IN DIASTOLE: 3.5 CM (ref 3.5–6)
LEFT VENTRICULAR MASS: 111 G
LVED V (TEICH): 50.63 ML
LVES V (TEICH): 22.84 ML
LYMPHOCYTES # BLD AUTO: 1.5 X10(3)/MCL (ref 0.6–4.6)
LYMPHOCYTES # BLD AUTO: 1.85 X10(3)/MCL (ref 0.6–4.6)
LYMPHOCYTES NFR BLD AUTO: 11.6 %
LYMPHOCYTES NFR BLD AUTO: 11.7 %
MAGNESIUM SERPL-MCNC: 2.4 MG/DL (ref 1.6–2.6)
MCH RBC QN AUTO: 31.4 PG (ref 27–31)
MCH RBC QN AUTO: 31.8 PG (ref 27–31)
MCHC RBC AUTO-ENTMCNC: 32 G/DL (ref 33–36)
MCHC RBC AUTO-ENTMCNC: 32.5 G/DL (ref 33–36)
MCV RBC AUTO: 97.8 FL (ref 80–94)
MCV RBC AUTO: 98.3 FL (ref 80–94)
MONOCYTES # BLD AUTO: 1.29 X10(3)/MCL (ref 0.1–1.3)
MONOCYTES # BLD AUTO: 1.46 X10(3)/MCL (ref 0.1–1.3)
MONOCYTES NFR BLD AUTO: 10 %
MONOCYTES NFR BLD AUTO: 9.2 %
NEUTROPHILS # BLD AUTO: 11.99 X10(3)/MCL (ref 2.1–9.2)
NEUTROPHILS # BLD AUTO: 9.62 X10(3)/MCL (ref 2.1–9.2)
NEUTROPHILS NFR BLD AUTO: 74.4 %
NEUTROPHILS NFR BLD AUTO: 75.6 %
NRBC BLD AUTO-RTO: 0.3 %
NRBC BLD AUTO-RTO: 0.9 %
OHS QRS DURATION: 134 MS
OHS QRS DURATION: 138 MS
OHS QRS DURATION: 144 MS
OHS QTC CALCULATION: 470 MS
OHS QTC CALCULATION: 511 MS
OHS QTC CALCULATION: 517 MS
PHOSPHATE SERPL-MCNC: 3.6 MG/DL (ref 2.3–4.7)
PISA TR MAX VEL: 2.9 M/S
PLATELET # BLD AUTO: 362 X10(3)/MCL (ref 130–400)
PLATELET # BLD AUTO: 365 X10(3)/MCL (ref 130–400)
PMV BLD AUTO: 9.3 FL (ref 7.4–10.4)
PMV BLD AUTO: 9.7 FL (ref 7.4–10.4)
POCT GLUCOSE: 127 MG/DL (ref 70–110)
POCT GLUCOSE: 166 MG/DL (ref 70–110)
POCT GLUCOSE: 240 MG/DL (ref 70–110)
POCT GLUCOSE: 246 MG/DL (ref 70–110)
POTASSIUM SERPL-SCNC: 4.3 MMOL/L (ref 3.5–5.1)
PROT SERPL-MCNC: 7.5 GM/DL (ref 5.8–7.6)
PROTHROMBIN TIME: 16.6 SECONDS (ref 11.4–14)
RA MAJOR: 4.8 CM
RA PRESSURE ESTIMATED: 15 MMHG
RBC # BLD AUTO: 3.24 X10(6)/MCL (ref 4.7–6.1)
RBC # BLD AUTO: 3.44 X10(6)/MCL (ref 4.7–6.1)
RV TB RVSP: 18 MMHG
SODIUM SERPL-SCNC: 142 MMOL/L (ref 136–145)
TR MAX PG: 34 MMHG
TRICUSPID ANNULAR PLANE SYSTOLIC EXCURSION: 0.9 CM
TV REST PULMONARY ARTERY PRESSURE: 49 MMHG
VANCOMYCIN TROUGH SERPL-MCNC: 8.7 UG/ML (ref 15–20)
WBC # BLD AUTO: 12.92 X10(3)/MCL (ref 4.5–11.5)
WBC # BLD AUTO: 15.85 X10(3)/MCL (ref 4.5–11.5)
Z-SCORE OF LEFT VENTRICULAR DIMENSION IN END DIASTOLE: -12.55
Z-SCORE OF LEFT VENTRICULAR DIMENSION IN END SYSTOLE: -8.49

## 2025-05-26 PROCEDURE — 85025 COMPLETE CBC W/AUTO DIFF WBC: CPT

## 2025-05-26 PROCEDURE — 63600175 PHARM REV CODE 636 W HCPCS: Performed by: INTERNAL MEDICINE

## 2025-05-26 PROCEDURE — 63600175 PHARM REV CODE 636 W HCPCS: Performed by: STUDENT IN AN ORGANIZED HEALTH CARE EDUCATION/TRAINING PROGRAM

## 2025-05-26 PROCEDURE — 80202 ASSAY OF VANCOMYCIN: CPT

## 2025-05-26 PROCEDURE — 63600175 PHARM REV CODE 636 W HCPCS

## 2025-05-26 PROCEDURE — 94761 N-INVAS EAR/PLS OXIMETRY MLT: CPT

## 2025-05-26 PROCEDURE — 84100 ASSAY OF PHOSPHORUS: CPT

## 2025-05-26 PROCEDURE — 83735 ASSAY OF MAGNESIUM: CPT

## 2025-05-26 PROCEDURE — 25000003 PHARM REV CODE 250

## 2025-05-26 PROCEDURE — 85730 THROMBOPLASTIN TIME PARTIAL: CPT

## 2025-05-26 PROCEDURE — 36415 COLL VENOUS BLD VENIPUNCTURE: CPT

## 2025-05-26 PROCEDURE — 80053 COMPREHEN METABOLIC PANEL: CPT

## 2025-05-26 PROCEDURE — 94760 N-INVAS EAR/PLS OXIMETRY 1: CPT

## 2025-05-26 PROCEDURE — 25000003 PHARM REV CODE 250: Performed by: STUDENT IN AN ORGANIZED HEALTH CARE EDUCATION/TRAINING PROGRAM

## 2025-05-26 PROCEDURE — 25000003 PHARM REV CODE 250: Performed by: INTERNAL MEDICINE

## 2025-05-26 PROCEDURE — 85610 PROTHROMBIN TIME: CPT

## 2025-05-26 PROCEDURE — 21400001 HC TELEMETRY ROOM

## 2025-05-26 PROCEDURE — 93005 ELECTROCARDIOGRAM TRACING: CPT

## 2025-05-26 RX ORDER — VERAPAMIL HYDROCHLORIDE 120 MG/1
240 CAPSULE, EXTENDED RELEASE ORAL DAILY
Status: DISCONTINUED | OUTPATIENT
Start: 2025-05-27 | End: 2025-05-26

## 2025-05-26 RX ORDER — QUETIAPINE FUMARATE 25 MG/1
25 TABLET, FILM COATED ORAL ONCE
Status: DISCONTINUED | OUTPATIENT
Start: 2025-05-26 | End: 2025-05-26

## 2025-05-26 RX ORDER — OLANZAPINE 10 MG/2ML
2.5 INJECTION, POWDER, FOR SOLUTION INTRAMUSCULAR ONCE
Status: COMPLETED | OUTPATIENT
Start: 2025-05-26 | End: 2025-05-26

## 2025-05-26 RX ORDER — HEPARIN SODIUM,PORCINE/D5W 25000/250
0-40 INTRAVENOUS SOLUTION INTRAVENOUS CONTINUOUS
Status: DISCONTINUED | OUTPATIENT
Start: 2025-05-26 | End: 2025-05-27

## 2025-05-26 RX ORDER — METOPROLOL TARTRATE 1 MG/ML
5 INJECTION, SOLUTION INTRAVENOUS
Status: COMPLETED | OUTPATIENT
Start: 2025-05-26 | End: 2025-05-28

## 2025-05-26 RX ORDER — METOPROLOL TARTRATE 50 MG/1
50 TABLET ORAL 2 TIMES DAILY
Status: DISCONTINUED | OUTPATIENT
Start: 2025-05-26 | End: 2025-05-27

## 2025-05-26 RX ORDER — FUROSEMIDE 10 MG/ML
40 INJECTION INTRAMUSCULAR; INTRAVENOUS ONCE
Status: COMPLETED | OUTPATIENT
Start: 2025-05-26 | End: 2025-05-26

## 2025-05-26 RX ORDER — METOPROLOL TARTRATE 50 MG/1
50 TABLET ORAL 2 TIMES DAILY
Status: CANCELLED | OUTPATIENT
Start: 2025-05-26

## 2025-05-26 RX ADMIN — OLANZAPINE 2.5 MG: 10 INJECTION, POWDER, FOR SOLUTION INTRAMUSCULAR at 11:05

## 2025-05-26 RX ADMIN — ASPIRIN 81 MG: 81 TABLET, COATED ORAL at 09:05

## 2025-05-26 RX ADMIN — HEPARIN SODIUM 5000 UNITS: 5000 INJECTION, SOLUTION INTRAVENOUS; SUBCUTANEOUS at 09:05

## 2025-05-26 RX ADMIN — MUPIROCIN: 20 OINTMENT TOPICAL at 10:05

## 2025-05-26 RX ADMIN — CLOPIDOGREL BISULFATE 75 MG: 75 TABLET, FILM COATED ORAL at 09:05

## 2025-05-26 RX ADMIN — OLANZAPINE 2.5 MG: 10 INJECTION, POWDER, FOR SOLUTION INTRAMUSCULAR at 04:05

## 2025-05-26 RX ADMIN — AMPICILLIN SODIUM 1 G: 1 INJECTION, POWDER, FOR SOLUTION INTRAMUSCULAR; INTRAVENOUS at 05:05

## 2025-05-26 RX ADMIN — AMPICILLIN SODIUM 1 G: 1 INJECTION, POWDER, FOR SOLUTION INTRAMUSCULAR; INTRAVENOUS at 10:05

## 2025-05-26 RX ADMIN — ATORVASTATIN CALCIUM 20 MG: 20 TABLET, FILM COATED ORAL at 09:05

## 2025-05-26 RX ADMIN — HEPARIN SODIUM 18 UNITS/KG/HR: 10000 INJECTION, SOLUTION INTRAVENOUS at 06:05

## 2025-05-26 RX ADMIN — FUROSEMIDE 40 MG: 10 INJECTION, SOLUTION INTRAVENOUS at 05:05

## 2025-05-26 RX ADMIN — METOPROLOL TARTRATE 5 MG: 5 INJECTION, SOLUTION INTRAVENOUS at 05:05

## 2025-05-26 RX ADMIN — METOPROLOL TARTRATE 50 MG: 50 TABLET, FILM COATED ORAL at 05:05

## 2025-05-26 RX ADMIN — VANCOMYCIN HYDROCHLORIDE 1000 MG: 1 INJECTION, POWDER, LYOPHILIZED, FOR SOLUTION INTRAVENOUS at 10:05

## 2025-05-26 NOTE — CONSULTS
Inpatient Nutrition Assessment    Admit Date: 5/23/2025   Total duration of encounter: 3 days   Patient Age: 74 y.o.    Nutrition Recommendation/Prescription     Low Na, Carb consistent, Soft & Bite size for ease of chewing  Boost Breeze (provides 250 kcal, 9 g protein per serving) TID  Consider appetite stimulant  Daily Weight    Communication of Recommendations: reviewed with nurse, reviewed with patient, and reviewed with family    Nutrition Assessment     Malnutrition Assessment/Nutrition-Focused Physical Exam    Malnutrition Context: acute illness or injury (05/26/25 1105)  Malnutrition Level: severe (05/26/25 1105)  Energy Intake (Malnutrition): less than or equal to 50% for greater than or equal to 5 days (05/26/25 1105)  Weight Loss (Malnutrition): other (see comments) (Does not meet criteria) (05/26/25 1105)     Or[place] Region (Subcutaneous Fat Loss): well nourished           [place] (Muscle Loss): well nourished                       Fluid Accumulation (Malnutrition): moderate to severe (05/26/25 1105)        A minimum of two characteristics is recommended for diagnosis of either severe or non-severe malnutrition.    Chart Review    Reason Seen: continuous nutrition monitoring and physician consult for CHF    Malnutrition Screening Tool Results   Have you recently lost weight without trying?: No  Have you been eating poorly because of a decreased appetite?: No   MST Score: 0   Diagnosis:  Severe sepsis 2/2 UTI   ARF, obstructive   Hyperkalemia, resolving  Hx of CVA  CHF  HTN    Relevant Medical History: CVA, CAD, Venous insufficiency, HTN, DM, Chronic lumbar pain, edema    Scheduled Medications:  aspirin, 81 mg, Daily  atorvastatin, 20 mg, Daily  clopidogreL, 75 mg, Daily  heparin (porcine), 5,000 Units, Q12H  mupirocin, , BID  vancomycin (VANCOCIN) IV (PEDS and ADULTS), 1,000 mg, Q24H    Continuous Infusions:   PRN Medications:  acetaminophen, 650 mg, Q4H PRN  dextrose 50%, 12.5 g, PRN  dextrose  50%, 25 g, PRN  glucagon (human recombinant), 1 mg, PRN  glucose, 16 g, PRN  glucose, 24 g, PRN  HYDROcodone-acetaminophen, 1 tablet, Q6H PRN  insulin aspart U-100, 0-5 Units, QID (AC + HS) PRN  melatonin, 6 mg, Nightly PRN  ondansetron, 4 mg, Q6H PRN  polyethylene glycol, 17 g, Daily PRN  senna-docusate, 1 tablet, Daily PRN  sodium chloride 0.9%, 10 mL, PRN    Calorie Containing IV Medications: no significant kcals from medications at this time    Recent Labs   Lab 05/23/25  1923 05/23/25  2124 05/23/25  2331 05/24/25  0446 05/24/25  0451 05/24/25  0809 05/25/25  0330 05/26/25  0702    136  --  139  --  141 142 142   K 7.1* 6.8* 5.5* 5.2*  --  5.1 4.5 4.3   CALCIUM 9.7 9.1  --  9.3  --  9.1 8.6* 8.8   PHOS 5.3*  --   --  5.3*  --   --  3.7 3.6   MG 3.10*  --   --  2.80*  --   --  2.60 2.40    104  --  106  --  108* 111* 111*   CO2 14* 14*  --  18*  --  21* 22* 22*   .9* 141.5*  --  131.3*  --  126.3* 103.5* 74.0*   CREATININE 5.84* 5.58*  --  4.94*  --  4.40* 3.09* 2.18*   EGFRNORACEVR 10 10  --  12  --  13 20 31   * 180*  --  144*  --  127* 140* 124*   BILITOT 0.4  --   --  0.2  --   --  0.3 0.3   ALKPHOS 85  --   --  73  --   --  101 128   ALT 40  --   --  37  --   --  51 157*   AST 44  --   --  41  --   --  48* 158*   ALBUMIN 3.1*  --   --  2.6*  --   --  2.5* 2.8*   HGBA1C  --   --   --   --  5.6  --   --   --    WBC 26.82*  --   --  24.35*  --   --  16.28* 15.85*   HGB 11.9*  --   --  10.0*  --   --  9.6* 10.8*   HCT 37.2*  --   --  31.2*  --   --  30.2* 33.8*     Nutrition Orders:  Diet Soft & Bite Sized (IDDSI Level 6)  Dietary nutrition supplements TID; Boost Breeze - Any flavor    Appetite/Oral Intake: poor/0-25% of meals  Factors Affecting Nutritional Intake: impaired cognitive status/motor control, chewing difficulty, and decreased appetite  Social Needs Impacting Access to Food: none identified  Food/Scientologist/Cultural Preferences: dislikes rice, gravy, milk; Likes applesauce,  "fruit cups, mashed potatoes, non daily ONS  Food Allergies: no known food allergies  Last Bowel Movement: 25  Wound(s):     Wound 25 Other (comment) Penis-Tissue loss description: Partial thickness       Wound 25 1900 Pressure Injury Left dorsal Foot-Tissue loss description: Full thickness     Comments    25 -- Pt with poor oral intake of 0-25% since admit, family at bedside reports decreased po intake > 1 week, reports does not eat rice, gravies, milk; food preferences obtained & reported to kitchen, agreeable to Boost Breeze to supplement nutrition; denies swallowing difficulty; chewing difficulty related to not having dentures this admit; no n/v/d; no indication of significant weight changes, UBW appears to be ~ 270 lb; Most recent labs/meds reviewed, suggest low Na, carb consistent diet; Monitor need for renal diet restriction    Anthropometrics    Height: 6' 2" (188 cm), Height Method: Stated  Last Weight: 120 kg (264 lb 8.8 oz) (25 1059), Weight Method: Bed Scale  BMI (Calculated): 34  BMI Classification: obese grade I (BMI 30-34.9)        Ideal Body Weight (IBW), Male: 190 lb     % Ideal Body Weight, Male (lb): 139.24 %                 Usual Body Weight (UBW), k kg  % Usual Body Weight: 97.76  % Weight Change From Usual Weight: -2.44 %  Usual Weight Provided By: EMR weight history    Wt Readings from Last 5 Encounters:   25 120 kg (264 lb 8.8 oz)   25 115.7 kg (255 lb)   24 128.2 kg (282 lb 11.2 oz)   24 128 kg (282 lb 3 oz)   24 126.3 kg (278 lb 8 oz)     Weight Change(s) Since Admission: new admit  Wt Readings from Last 1 Encounters:   25 1059 120 kg (264 lb 8.8 oz)   25 0600 120.5 kg (265 lb 11.2 oz)   25 0700 120.2 kg (264 lb 14.4 oz)   25 0517 120.2 kg (264 lb 14.4 oz)   255 117.9 kg (260 lb)   25 181 126.6 kg (279 lb)   25 1813 108.9 kg (240 lb)   Admit Weight: 108.9 kg (240 lb) (25 " 1813), Weight Method: Stated    Estimated Needs    Weight Used For Calorie Calculations: 120 kg (264 lb 8.8 oz)  Energy Calorie Requirements (kcal): 2989-5385 kcal (20 - 22 kcal/kg)  Energy Need Method: Kcal/kg  Weight Used For Protein Calculations: 86 kg (189 lb 9.5 oz)  Protein Requirements:  gm (1 - 1.2 gm/kg/IBW)  Fluid Requirements (mL): 4883-0775 ml (1ml/kcal)  CHO Requirement: 270-290 gm/d (45% EER)     Enteral Nutrition     Patient not receiving enteral nutrition at this time.    Parenteral Nutrition     Patient not receiving parenteral nutrition support at this time.    Evaluation of Received Nutrient Intake    Calories: not meeting estimated needs  Protein: not meeting estimated needs    Patient Education     Not applicable.    Nutrition Diagnosis     PES: Inadequate oral intake related to acute illness as evidenced by < 50% po intake > 5 days. (new)     PES: Severe acute disease or injury related malnutrition Related to acute malnutrition As Evidenced by:  - energy intake: <= 50% for 10 days (meets criteria for <= 50% >= 5 days (severe - acute)) - fluid accumulation: 1 area of moderate or severe fluid accumulation (Lower extremities edema) new    Nutrition Interventions     Intervention(s): Oral diet/nutrient modifications;Care coordination or referral;Oral nutritional supplement    Goal: Meet greater than 80% of nutritional needs by follow-up. (new)  Goal: Maintain weight throughout hospitalization. (new)    Nutrition Goals & Monitoring     Dietitian will monitor: food and beverage intake, weight, and electrolyte/renal panel  Discharge planning: continue Soft & Bite size, Low Na, Carb consistent  diet with Boost Breeze or equivalent oral supplements  Nutrition Risk/Follow-Up: patient at increased nutrition risk; dietitian will follow-up twice weekly   Please consult if re-assessment needed sooner.

## 2025-05-26 NOTE — CARE UPDATE
"3:25 AM: Nurse reported patient being agitated and angry while he was pulling out his IV lines. Examined and spoke with patient. He seems to be delirious at this time, he stated, "I'm going to report you and call the police, I know many policemen, you don't want me to leave, you just want me in residential." He is oriented to self but not to place. I asked if he is in pain and he stated his legs are swollen, uncomfortable and he cannot move it (on examination, he was able to spontaneously move his right leg). I offered pain medications however he refused because he stated "that's going to make me sleep and you want me to be asleep, you want to keep me here." I explained to the patient that we are trying to help him and to let us know if he will allow us to give him pain medications. Vital signs currently stable. Will continue to reorient patient and monitor closely.     3:50 AM: Nurse reported patient is more agitated and is refusing labs at this time. He currently does not have IV access and has been having nonsustained episodes of tachycardia. Will give 2.5 mg IM Zyprexa at this time. Continue to reorient patient.      Quinton Gutiérrez MD  [facility] PGY-III    "

## 2025-05-26 NOTE — PLAN OF CARE
05/26/25 0845   Discharge Assessment   Assessment Type Discharge Planning Assessment   Confirmed/corrected address, phone number and insurance Yes   Confirmed Demographics Correct on Facesheet   Source of Information patient   People in Home alone   Do you expect to return to your current living situation? Yes   Do you have help at home or someone to help you manage your care at home? Yes   Who are your caregiver(s) and their phone number(s)? Tay Longoria (Daughter)  [phone]   Prior to hospitilization cognitive status: Alert/Oriented   Current cognitive status: Alert/Oriented   Walking or Climbing Stairs Difficulty yes   Walking or Climbing Stairs ambulation difficulty, requires equipment   Mobility Management cane, rollator   Dressing/Bathing Difficulty no   Home Layout Able to live on 1st floor   Equipment Currently Used at Home rollator;cane, straight   Readmission within 30 days? No   Patient currently being followed by outpatient case management? No   Do you currently have service(s) that help you manage your care at home? No   Do you take prescription medications? Yes   Do you have prescription coverage? Yes   Do you have any problems affording any of your prescribed medications? No   Is the patient taking medications as prescribed? yes   Who is going to help you get home at discharge? family   How do you get to doctors appointments? family or friend will provide   Are you on dialysis? No   Discharge Plan A Home with family;Home Health   DME Needed Upon Discharge  none   Discharge Plan discussed with: Patient   Transition of Care Barriers None   Physical Activity   On average, how many days per week do you engage in moderate to strenuous exercise (like a brisk walk)? 0 days   On average, how many minutes do you engage in exercise at this level? 0 min   Financial Resource Strain   How hard is it for you to pay for the very basics like food, housing, medical care, and heating? Not very   Housing  Stability   In the last 12 months, was there a time when you were not able to pay the mortgage or rent on time? N   At any time in the past 12 months, were you homeless or living in a shelter (including now)? N   Transportation Needs   In the past 12 months, has lack of transportation kept you from medical appointments or from getting medications? no   In the past 12 months, has lack of transportation kept you from meetings, work, or from getting things needed for daily living? No   Food Insecurity   Within the past 12 months, you worried that your food would run out before you got the money to buy more. Never true   Within the past 12 months, the food you bought just didn't last and you didn't have money to get more. Never true   Stress   Do you feel stress - tense, restless, nervous, or anxious, or unable to sleep at night because your mind is troubled all the time - these days? Not at all   Social Isolation   How often do you feel lonely or isolated from those around you?  Never   Alcohol Use   Q1: How often do you have a drink containing alcohol? Never   Q2: How many drinks containing alcohol do you have on a typical day when you are drinking? None   Q3: How often do you have six or more drinks on one occasion? Never   Utilities   In the past 12 months has the electric, gas, oil, or water company threatened to shut off services in your home? No   Health Literacy   How often do you need to have someone help you when you read instructions, pamphlets, or other written material from your doctor or pharmacy? Never

## 2025-05-26 NOTE — PROGRESS NOTES
Pharmacokinetic Assessment Follow Up: IV Vancomycin    Vancomycin serum concentration assessment(s):    The trough level was drawn correctly and can be used to guide therapy at this time. The measurement is below the desired definitive target range of 10 to 20 mcg/mL.    Vancomycin Regimen Plan:    Change regimen to Vancomycin 1000 mg IV every 24 hours with next serum trough concentration measured at 0900 prior to 1000 dose on 5/27/2025    Drug levels (last 3 results):  Recent Labs   Lab Result Units 05/26/25  0813   Vancomycin Trough ug/ml 8.7*       Pharmacy will continue to follow and monitor vancomycin.    Please contact pharmacy at extension [phone] for questions regarding this assessment.    Thank you for the consult,   Mare Elder       Patient brief summary:  Vaibhav Vargas is a 74 y.o. male initiated on antimicrobial therapy with IV Vancomycin for treatment of sepsis    The patient's current regimen is vancomycin 1000 mg q 24 h     Drug Allergies:   Review of patient's allergies indicates:  No Known Allergies    Actual Body Weight:   120.5 kg    Renal Function:   Estimated Creatinine Clearance: 41 mL/min (A) (based on SCr of 2.18 mg/dL (H)).,     Dialysis Method (if applicable):  N/A    CBC (last 72 hours):  Recent Labs   Lab Result Units 05/23/25 1923 05/24/25  0446 05/24/25  0451 05/25/25  0330 05/26/25  0702   WBC x10(3)/mcL 26.82* 24.35*  --  16.28* 15.85*   Hgb g/dL 11.9* 10.0*  --  9.6* 10.8*   Hemoglobin A1c %  --   --  5.6  --   --    Hct % 37.2* 31.2*  --  30.2* 33.8*   Platelet x10(3)/mcL 416* 331  --  296 362   Mono % %  --  7.1  --  8.5 9.2   Monocytes % % 2  --   --   --   --    Eos % %  --  0.1  --  1.9 2.0   Basophil % %  --  0.2  --  0.4 0.6       Metabolic Panel (last 72 hours):  Recent Labs   Lab Result Units 05/23/25  1923 05/23/25  1929 05/23/25 2124 05/23/25  2331 05/24/25  0446 05/24/25  0809 05/25/25  0330 05/26/25  0702   Sodium mmol/L 136  --  136  --  139 141 142 142    Potassium mmol/L 7.1*  --  6.8* 5.5* 5.2* 5.1 4.5 4.3   Chloride mmol/L 101  --  104  --  106 108* 111* 111*   CO2 mmol/L 14*  --  14*  --  18* 21* 22* 22*   Glucose mg/dL 202*  --  180*  --  144* 127* 140* 124*   Glucose, UA   --  Normal  --   --   --   --   --   --    Blood Urea Nitrogen mg/dL 143.9*  --  141.5*  --  131.3* 126.3* 103.5* 74.0*   Creatinine mg/dL 5.84*  --  5.58*  --  4.94* 4.40* 3.09* 2.18*   Albumin g/dL 3.1*  --   --   --  2.6*  --  2.5* 2.8*   Bilirubin Total mg/dL 0.4  --   --   --  0.2  --  0.3 0.3   ALP unit/L 85  --   --   --  73  --  101 128   AST unit/L 44  --   --   --  41  --  48* 158*   ALT unit/L 40  --   --   --  37  --  51 157*   Magnesium Level mg/dL 3.10*  --   --   --  2.80*  --  2.60 2.40   Phosphorus Level mg/dL 5.3*  --   --   --  5.3*  --  3.7 3.6       Vancomycin Administrations:  vancomycin given in the last 96 hours                     vancomycin 750 mg in 0.9% NaCl 250 mL IVPB (admixture device) (mg) 750 mg New Bag 05/25/25 1011     750 mg New Bag 05/24/25 1025                    Microbiologic Results:  Microbiology Results (last 7 days)       Procedure Component Value Units Date/Time    Blood Culture #1 **CANNOT BE ORDERED STAT** [[id number]]  (Normal) Collected: 05/23/25 1923    Order Status: Completed Specimen: Blood Updated: 05/26/25 0002     Blood Culture No Growth At 48 Hours    Blood Culture #2 **CANNOT BE ORDERED STAT** [[id number]]  (Normal) Collected: 05/23/25 1957    Order Status: Completed Specimen: Blood from Arm, Left Updated: 05/26/25 0002     Blood Culture No Growth At 48 Hours    Urine culture [[id number]]  (Abnormal) Collected: 05/23/25 1929    Order Status: Completed Specimen: Urine Updated: 05/25/25 0815     Urine Culture >/= 100,000 colonies/ml Gram-positive Cocci
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
## 2025-05-27 ENCOUNTER — TELEPHONE (OUTPATIENT)
Dept: NEUROLOGY | Facility: CLINIC | Age: 75
End: 2025-05-27
Payer: MEDICARE

## 2025-05-27 LAB
ALBUMIN SERPL-MCNC: 2.8 G/DL (ref 3.4–4.8)
ALBUMIN/GLOB SERPL: 0.6 RATIO (ref 1.1–2)
ALP SERPL-CCNC: 269 UNIT/L (ref 40–150)
ALT SERPL-CCNC: 353 UNIT/L (ref 0–55)
ANION GAP SERPL CALC-SCNC: 11 MEQ/L
ANION GAP SERPL CALC-SCNC: 11 MEQ/L
APTT PPP: >200 SECONDS (ref 23.2–33.7)
APTT PPP: >200 SECONDS (ref 23.2–33.7)
AST SERPL-CCNC: 381 UNIT/L (ref 11–45)
BASOPHILS # BLD AUTO: 0.12 X10(3)/MCL
BASOPHILS NFR BLD AUTO: 0.7 %
BILIRUB SERPL-MCNC: 0.6 MG/DL
BNP BLD-MCNC: 1451 PG/ML
BUN SERPL-MCNC: 63.9 MG/DL (ref 8.4–25.7)
BUN SERPL-MCNC: 65.1 MG/DL (ref 8.4–25.7)
CALCIUM SERPL-MCNC: 8.9 MG/DL (ref 8.8–10)
CALCIUM SERPL-MCNC: 9.1 MG/DL (ref 8.8–10)
CHLORIDE SERPL-SCNC: 112 MMOL/L (ref 98–107)
CHLORIDE SERPL-SCNC: 113 MMOL/L (ref 98–107)
CO2 SERPL-SCNC: 22 MMOL/L (ref 23–31)
CO2 SERPL-SCNC: 23 MMOL/L (ref 23–31)
CREAT SERPL-MCNC: 2.02 MG/DL (ref 0.72–1.25)
CREAT SERPL-MCNC: 2.1 MG/DL (ref 0.72–1.25)
CREAT/UREA NIT SERPL: 31
CREAT/UREA NIT SERPL: 32
EOSINOPHIL # BLD AUTO: 0.25 X10(3)/MCL (ref 0–0.9)
EOSINOPHIL NFR BLD AUTO: 1.5 %
ERYTHROCYTE [DISTWIDTH] IN BLOOD BY AUTOMATED COUNT: 15 % (ref 11.5–17)
GFR SERPLBLD CREATININE-BSD FMLA CKD-EPI: 32 ML/MIN/1.73/M2
GFR SERPLBLD CREATININE-BSD FMLA CKD-EPI: 34 ML/MIN/1.73/M2
GLOBULIN SER-MCNC: 4.8 GM/DL (ref 2.4–3.5)
GLUCOSE SERPL-MCNC: 154 MG/DL (ref 82–115)
GLUCOSE SERPL-MCNC: 155 MG/DL (ref 82–115)
HCT VFR BLD AUTO: 32.9 % (ref 42–52)
HGB BLD-MCNC: 10.5 G/DL (ref 14–18)
HOLD SPECIMEN: NORMAL
IMM GRANULOCYTES # BLD AUTO: 0.25 X10(3)/MCL (ref 0–0.04)
IMM GRANULOCYTES NFR BLD AUTO: 1.5 %
LACTATE SERPL-SCNC: 1.6 MMOL/L (ref 0.5–2.2)
LYMPHOCYTES # BLD AUTO: 2.02 X10(3)/MCL (ref 0.6–4.6)
LYMPHOCYTES NFR BLD AUTO: 12.2 %
MAGNESIUM SERPL-MCNC: 2.2 MG/DL (ref 1.6–2.6)
MAGNESIUM SERPL-MCNC: 2.2 MG/DL (ref 1.6–2.6)
MCH RBC QN AUTO: 30.9 PG (ref 27–31)
MCHC RBC AUTO-ENTMCNC: 31.9 G/DL (ref 33–36)
MCV RBC AUTO: 96.8 FL (ref 80–94)
MONOCYTES # BLD AUTO: 1.4 X10(3)/MCL (ref 0.1–1.3)
MONOCYTES NFR BLD AUTO: 8.4 %
NEUTROPHILS # BLD AUTO: 12.57 X10(3)/MCL (ref 2.1–9.2)
NEUTROPHILS NFR BLD AUTO: 75.7 %
NRBC BLD AUTO-RTO: 1 %
PHOSPHATE SERPL-MCNC: 3.7 MG/DL (ref 2.3–4.7)
PLATELET # BLD AUTO: 348 X10(3)/MCL (ref 130–400)
PMV BLD AUTO: 9.4 FL (ref 7.4–10.4)
POCT GLUCOSE: 150 MG/DL (ref 70–110)
POCT GLUCOSE: 154 MG/DL (ref 70–110)
POCT GLUCOSE: 159 MG/DL (ref 70–110)
POCT GLUCOSE: 184 MG/DL (ref 70–110)
POTASSIUM SERPL-SCNC: 3.8 MMOL/L (ref 3.5–5.1)
POTASSIUM SERPL-SCNC: 4.2 MMOL/L (ref 3.5–5.1)
PROT SERPL-MCNC: 7.6 GM/DL (ref 5.8–7.6)
RBC # BLD AUTO: 3.4 X10(6)/MCL (ref 4.7–6.1)
SODIUM SERPL-SCNC: 146 MMOL/L (ref 136–145)
SODIUM SERPL-SCNC: 146 MMOL/L (ref 136–145)
WBC # BLD AUTO: 16.61 X10(3)/MCL (ref 4.5–11.5)

## 2025-05-27 PROCEDURE — 36415 COLL VENOUS BLD VENIPUNCTURE: CPT | Performed by: STUDENT IN AN ORGANIZED HEALTH CARE EDUCATION/TRAINING PROGRAM

## 2025-05-27 PROCEDURE — 63600175 PHARM REV CODE 636 W HCPCS: Performed by: INTERNAL MEDICINE

## 2025-05-27 PROCEDURE — 25000003 PHARM REV CODE 250: Performed by: STUDENT IN AN ORGANIZED HEALTH CARE EDUCATION/TRAINING PROGRAM

## 2025-05-27 PROCEDURE — 85025 COMPLETE CBC W/AUTO DIFF WBC: CPT

## 2025-05-27 PROCEDURE — 25000003 PHARM REV CODE 250

## 2025-05-27 PROCEDURE — 83735 ASSAY OF MAGNESIUM: CPT

## 2025-05-27 PROCEDURE — 83605 ASSAY OF LACTIC ACID: CPT | Performed by: INTERNAL MEDICINE

## 2025-05-27 PROCEDURE — 85730 THROMBOPLASTIN TIME PARTIAL: CPT | Performed by: STUDENT IN AN ORGANIZED HEALTH CARE EDUCATION/TRAINING PROGRAM

## 2025-05-27 PROCEDURE — 94761 N-INVAS EAR/PLS OXIMETRY MLT: CPT

## 2025-05-27 PROCEDURE — 84100 ASSAY OF PHOSPHORUS: CPT

## 2025-05-27 PROCEDURE — 21400001 HC TELEMETRY ROOM

## 2025-05-27 PROCEDURE — 25000003 PHARM REV CODE 250: Performed by: INTERNAL MEDICINE

## 2025-05-27 PROCEDURE — 36415 COLL VENOUS BLD VENIPUNCTURE: CPT

## 2025-05-27 PROCEDURE — 63600175 PHARM REV CODE 636 W HCPCS

## 2025-05-27 PROCEDURE — 80053 COMPREHEN METABOLIC PANEL: CPT

## 2025-05-27 PROCEDURE — 83880 ASSAY OF NATRIURETIC PEPTIDE: CPT | Performed by: STUDENT IN AN ORGANIZED HEALTH CARE EDUCATION/TRAINING PROGRAM

## 2025-05-27 PROCEDURE — 63600175 PHARM REV CODE 636 W HCPCS: Mod: JZ,TB | Performed by: STUDENT IN AN ORGANIZED HEALTH CARE EDUCATION/TRAINING PROGRAM

## 2025-05-27 RX ORDER — BUMETANIDE 0.25 MG/ML
2 INJECTION, SOLUTION INTRAMUSCULAR; INTRAVENOUS DAILY
Status: DISCONTINUED | OUTPATIENT
Start: 2025-05-27 | End: 2025-05-27

## 2025-05-27 RX ORDER — METOPROLOL TARTRATE 25 MG/1
25 TABLET, FILM COATED ORAL 4 TIMES DAILY
Status: DISCONTINUED | OUTPATIENT
Start: 2025-05-27 | End: 2025-05-28

## 2025-05-27 RX ORDER — METOPROLOL TARTRATE 25 MG/1
25 TABLET, FILM COATED ORAL 3 TIMES DAILY
Status: DISCONTINUED | OUTPATIENT
Start: 2025-05-27 | End: 2025-05-27

## 2025-05-27 RX ORDER — FUROSEMIDE 10 MG/ML
40 INJECTION INTRAMUSCULAR; INTRAVENOUS ONCE
Status: DISCONTINUED | OUTPATIENT
Start: 2025-05-27 | End: 2025-05-27

## 2025-05-27 RX ORDER — ENOXAPARIN SODIUM 100 MG/ML
1 INJECTION SUBCUTANEOUS EVERY 12 HOURS
Status: DISCONTINUED | OUTPATIENT
Start: 2025-05-27 | End: 2025-05-30

## 2025-05-27 RX ORDER — BUMETANIDE 0.25 MG/ML
2 INJECTION, SOLUTION INTRAMUSCULAR; INTRAVENOUS 2 TIMES DAILY
Status: DISCONTINUED | OUTPATIENT
Start: 2025-05-27 | End: 2025-05-29

## 2025-05-27 RX ORDER — POTASSIUM CHLORIDE 20 MEQ/1
20 TABLET, EXTENDED RELEASE ORAL ONCE
Status: COMPLETED | OUTPATIENT
Start: 2025-05-27 | End: 2025-05-27

## 2025-05-27 RX ADMIN — ASPIRIN 81 MG: 81 TABLET, COATED ORAL at 08:05

## 2025-05-27 RX ADMIN — AMPICILLIN SODIUM 1 G: 1 INJECTION, POWDER, FOR SOLUTION INTRAMUSCULAR; INTRAVENOUS at 01:05

## 2025-05-27 RX ADMIN — ATORVASTATIN CALCIUM 20 MG: 20 TABLET, FILM COATED ORAL at 08:05

## 2025-05-27 RX ADMIN — MUPIROCIN: 20 OINTMENT TOPICAL at 08:05

## 2025-05-27 RX ADMIN — ENOXAPARIN SODIUM 90 MG: 100 INJECTION SUBCUTANEOUS at 12:05

## 2025-05-27 RX ADMIN — METOPROLOL TARTRATE 5 MG: 5 INJECTION, SOLUTION INTRAVENOUS at 11:05

## 2025-05-27 RX ADMIN — ENOXAPARIN SODIUM 90 MG: 100 INJECTION SUBCUTANEOUS at 08:05

## 2025-05-27 RX ADMIN — METOPROLOL TARTRATE 25 MG: 25 TABLET, FILM COATED ORAL at 08:05

## 2025-05-27 RX ADMIN — METOPROLOL TARTRATE 25 MG: 25 TABLET, FILM COATED ORAL at 04:05

## 2025-05-27 RX ADMIN — HEPARIN SODIUM 14 UNITS/KG/HR: 10000 INJECTION, SOLUTION INTRAVENOUS at 04:05

## 2025-05-27 RX ADMIN — HYDRALAZINE HYDROCHLORIDE: 10 TABLET, FILM COATED ORAL at 03:05

## 2025-05-27 RX ADMIN — POTASSIUM CHLORIDE 20 MEQ: 1500 TABLET, EXTENDED RELEASE ORAL at 08:05

## 2025-05-27 RX ADMIN — EMPAGLIFLOZIN 10 MG: 10 TABLET, FILM COATED ORAL at 06:05

## 2025-05-27 RX ADMIN — MUPIROCIN: 20 OINTMENT TOPICAL at 09:05

## 2025-05-27 RX ADMIN — AMPICILLIN SODIUM 2 G: 2 INJECTION, POWDER, FOR SOLUTION INTRAMUSCULAR; INTRAVENOUS at 08:05

## 2025-05-27 RX ADMIN — BUMETANIDE 2 MG: 0.25 INJECTION INTRAMUSCULAR; INTRAVENOUS at 06:05

## 2025-05-27 RX ADMIN — AMPICILLIN SODIUM 1 G: 1 INJECTION, POWDER, FOR SOLUTION INTRAMUSCULAR; INTRAVENOUS at 05:05

## 2025-05-27 RX ADMIN — AMPICILLIN SODIUM 1 G: 1 INJECTION, POWDER, FOR SOLUTION INTRAMUSCULAR; INTRAVENOUS at 09:05

## 2025-05-27 RX ADMIN — BUMETANIDE 2 MG: 0.25 INJECTION INTRAMUSCULAR; INTRAVENOUS at 08:05

## 2025-05-27 RX ADMIN — AMPICILLIN SODIUM 2 G: 2 INJECTION, POWDER, FOR SOLUTION INTRAMUSCULAR; INTRAVENOUS at 03:05

## 2025-05-27 NOTE — MEDICAL/APP STUDENT
[facility]  PROGRESS NOTE    Patient's Name: Vaibhav Vargas  : 1950  MRN: [id number]    Admission Date: 2025  Length of Stay: 4  Attending Physician: Jayme Gilbert MD  Resident: Bernice Lipscomb MD  Intern: Laura Fernandez MD     SUBJECTIVE     Chief Complaint   Patient presents with    Shortness of Breath     From [facility] with complaints of SOB and lower back pain for about 3 days. Abdominal distension and bilateral leg edema noted. Fowler in place upon arrival with cloudy urine noted.     History of Present Illness:  74 y.o. male with PMH CVA 2025, CAD s/p LAD stent , venous insuffiency, HTN, 1st Degree AVB, NIDDM2  presents to ED by daughter from NH for weakness. Onset 1 week and worsening over last few days. Associated symptoms include AMS, back pain, abdominal pain. States suffered stroke in march and has chronic indwelling fowler since then. Last replaced 1 week ago. History obtained from daughter as patient with difficulty expressing self.      In ED, vitals stable. CMP shows significantly elevated K 7.1 and ARF with Cr 5. He received rocephin, and shifted with insulin, lokelma, and albuterol. UA consistent with UTI. Received rocephin. CT a/p shows malposition of fowler and b/l hydronephrosis. Fowler replaced in ED draining over 1L.     Interval History:  Patient remains hemodynamically stable. Pt was combative last night and given Zyprexa 2.5mg injection. Patient was still agitated this morning. Urine culture positive for Enterococcus Faecium, sensitive to ampicillin. Leukocytosis trending up from 12.92 to 16.61. Discontinue Vancomycin. Increase ampicillin to 2g. LFTs continue to be elevated. Ultrasound Abdomen was ordered. BNP 1,451, RFT trending down, continue diuresis. TTE Echo revealed EF of 35-40% and small mobile echogenic mass present. PTT >200, discontinued heparin and started Lovenox 90mg.Cardiology  decides to not proceed with YO at this time. Given R atrial mobile mass, will closely monitor signs for PE.     Review of Systems:  ROS    OBJECTIVE     VITAL SIGNS: 24 HR MIN & MAX Most Recent Vitals   Temp  Min: 97.1 °F (36.2 °C)  Max: 99.7 °F (37.6 °C)  98 °F (36.7 °C)   BP  Min: 118/76  Max: 161/112  (!) 147/88    Pulse  Min: 60  Max: 162  (!) 138   Resp  Min: 16  Max: 18  18    SpO2  Min: 90 %  Max: 97 %  (!) 91 %    Body mass index is 26.73 kg/m².    Intake/Output:  I/O last 3 completed shifts:  In: 125 [P.O.:125]  Out: 1800 [Urine:1800]  Physical Exam    Current Medications:  Scheduled:   ampicillin IV (PEDS and ADULTS)  2 g Intravenous Q6H    aspirin  81 mg Oral Daily    atorvastatin  20 mg Oral Daily    bumetanide  2 mg Intravenous Daily    enoxaparin  1 mg/kg Subcutaneous Q12H    hydrALAZINE 10 mg 1 tablet, hydrALAZINE 25 mg 1 tablet, isorsorbide dinitrate 20 mg 1 tablet combination   Oral TID    metoprolol tartrate  25 mg Oral TID    mupirocin   Nasal BID      Infusions:    PRNs:    Current Facility-Administered Medications:     acetaminophen, 650 mg, Oral, Q4H PRN    dextrose 50%, 12.5 g, Intravenous, PRN    dextrose 50%, 25 g, Intravenous, PRN    glucagon (human recombinant), 1 mg, Intramuscular, PRN    glucose, 16 g, Oral, PRN    glucose, 24 g, Oral, PRN    HYDROcodone-acetaminophen, 1 tablet, Oral, Q6H PRN    insulin aspart U-100, 0-5 Units, Subcutaneous, QID (AC + HS) PRN    melatonin, 6 mg, Oral, Nightly PRN    metoprolol, 5 mg, Intravenous, Q15 Min PRN    ondansetron, 4 mg, Intravenous, Q6H PRN    polyethylene glycol, 17 g, Oral, Daily PRN    senna-docusate, 1 tablet, Oral, Daily PRN    sodium chloride 0.9%, 10 mL, Intravenous, PRN    Labs:  CBC:  Recent Labs   Lab 05/26/25  0702 05/26/25  1704 05/27/25  0516   WBC 15.85* 12.92* 16.61*   HGB 10.8* 10.3* 10.5*   HCT 33.8* 31.7* 32.9*    365 348   MCV 98.3* 97.8* 96.8*   RDW 14.9 14.9 15.0     BMP/CMP:  Recent Labs   Lab 05/25/25  4234  05/26/25  0702 05/27/25  0516    142 146*   K 4.5 4.3 3.8   * 111* 113*   CO2 22* 22* 22*   .5* 74.0* 65.1*   CREATININE 3.09* 2.18* 2.10*   EGFRNORACEVR 20 31 32     RFTs/LFTs:  Recent Labs   Lab 05/25/25  0330 05/26/25  0702 05/27/25  0516   CALCIUM 8.6* 8.8 8.9   ALBUMIN 2.5* 2.8* 2.8*   AST 48* 158* 381*   ALT 51 157* 353*   ALKPHOS 101 128 269*   BILITOT 0.3 0.3 0.6     Recent Labs   Lab 05/25/25  0330 05/26/25  0702 05/27/25  0516   MG 2.60 2.40 2.20   PHOS 3.7 3.6 3.7     Cardiac Panel:  Recent Labs   Lab 05/23/25 1923 05/24/25  0956 05/27/25  0516   TROPONINI 0.284* 0.241*  --    BNP 1,332.9*  --  1,451.0*       Interval Imaging:  Echo Saline Bubble? No    Left Ventricle: The left ventricle is normal in size. Normal wall   thickness. There is moderately reduced systolic function with a visually   estimated ejection fraction of 35 - 40%. Unable to assess diastolic   function due to atrial fibrillation.    Right Ventricle: The right ventricle is moderately dilated Systolic   function is moderately reduced. Prominent moderator band in the right   ventricle.    Left Atrium: The left atrium is mildly dilated    Right Atrium: The right atrium is mildly dilated . 1.9 x 1.1 x 1.8   small mobile echogenic mass present.    Aortic Valve: There is mild aortic regurgitation.    Tricuspid Valve: There is mild regurgitation.    Pulmonary Artery: The estimated pulmonary artery systolic pressure is   49 mmHg.    IVC/SVC: Elevated venous pressure at 15 mmHg.       Microbiology:  Microbiology Results (last 7 days)       Procedure Component Value Units Date/Time    Blood Culture #1 **CANNOT BE ORDERED STAT** [[id number]]  (Normal) Collected: 05/23/25 1923    Order Status: Completed Specimen: Blood Updated: 05/27/25 0013     Blood Culture No Growth At 72 Hours    Blood Culture #2 **CANNOT BE ORDERED STAT** [[id number]]  (Normal) Collected: 05/23/25 1957    Order Status: Completed Specimen: Blood from Arm,  Left Updated: 05/27/25 0013     Blood Culture No Growth At 72 Hours    Urine culture [[id number]]  (Abnormal)  (Susceptibility) Collected: 05/23/25 1929    Order Status: Completed Specimen: Urine Updated: 05/26/25 1218     Urine Culture >/= 100,000 colonies/ml Enterococcus faecium     Comment: Ampicillin results may be used to predict susceptibility to amoxicillin-clavulanate, ampicillin-sulbactam, and piperacillin-tazobactam.               Antibiotics:  Antibiotics (From admission, onward)      Start     Stop Route Frequency Ordered    05/27/25 1400  ampicillin (OMNIPEN) 2 g in 0.9% NaCl 100 mL IVPB (MB+)         -- IV Every 6 hours (non-standard times) 05/27/25 0947    05/23/25 2230  mupirocin 2 % ointment         05/28/25 2059 Nasl 2 times daily 05/23/25 2124             Pathology:  Pathology Results  (Last 365 days)      None                ASSESSMENT AND PLAN   Severe sepsis 2/2 UTI  SIRS 2/4:  Leukocytosis downtrending  Lactate 7.1, resolved  UA c/w cystitis with hematuria    CT a/p with b/l hydro, replaced fowler  Urine culture resulted gram positive cocci,Enterococcus Faecium   Blood culture preliminary at 72 hrs shows no growth  Discontinued vancomycin. Start Ampicillin 2g per culture sensitivity  Has had multiple UTIs in the past growing Staphylococcus?  Received 1L NS in ED and hemodynamically stable  DC fluids, encourage oral hydration     ARF, obstructive   Hyperkalemia, resolving  Potassium this morning 4.3  Continue Lokelma t.i.d.  Cr 5.84 downtrending, today creatinine is 2.8  CT a/p show malpositioned fowler and bilateral hydronephrosis with a severely distended bladder  Fowler replaced in ED  Strict I/Os     Hx of CVA  Continue home asa, plavix, lipitor      CHF  BNP 1300, suspect elevated a result of ARF  Troponin down trended  EKG with tall T waves, RBB, 1st AVB  EF 55% in 03/2025, repeat echo pending  Per cards rec in 03/2025, arrange for outpatient stress trest with Dr Miliva Bailon I/Os  Holding  diuretics d/t renal function  Place on tele     HTN  Hold home losartan, propanolol, and verapamil   Will consider resuming verapamil if becomes hypertensive as minimally renally excreted      DVT Prophylaxis: ***  GI Prophylaxis: ***  Abx: ***  Access: ***  Fluids: ***  Diet: Diet NPO    Code Status: Full Code    Disposition: ***       Please appreciate attending's attestation to follow.    Nydia Lopez MD  [facility] Internal Medicine, PGY-1  05/27/2025

## 2025-05-27 NOTE — CONSULTS
Consult note already written. Duplicate. See note from 5/27/2025 at 0712 AM.     Kenney Hurley MD  [facility] Fellow

## 2025-05-28 LAB
ALBUMIN SERPL-MCNC: 2.6 G/DL (ref 3.4–4.8)
ALBUMIN/GLOB SERPL: 0.6 RATIO (ref 1.1–2)
ALP SERPL-CCNC: 272 UNIT/L (ref 40–150)
ALT SERPL-CCNC: 321 UNIT/L (ref 0–55)
ANION GAP SERPL CALC-SCNC: 12 MEQ/L
AST SERPL-CCNC: 177 UNIT/L (ref 11–45)
BASOPHILS # BLD AUTO: 0.06 X10(3)/MCL
BASOPHILS NFR BLD AUTO: 0.4 %
BILIRUB SERPL-MCNC: 0.6 MG/DL
BUN SERPL-MCNC: 63.9 MG/DL (ref 8.4–25.7)
C TRACH DNA SPEC QL NAA+PROBE: NOT DETECTED
CALCIUM SERPL-MCNC: 8.6 MG/DL (ref 8.8–10)
CHLORIDE SERPL-SCNC: 111 MMOL/L (ref 98–107)
CO2 SERPL-SCNC: 23 MMOL/L (ref 23–31)
CREAT SERPL-MCNC: 1.97 MG/DL (ref 0.72–1.25)
CREAT/UREA NIT SERPL: 32
EOSINOPHIL # BLD AUTO: 0.21 X10(3)/MCL (ref 0–0.9)
EOSINOPHIL NFR BLD AUTO: 1.3 %
ERYTHROCYTE [DISTWIDTH] IN BLOOD BY AUTOMATED COUNT: 15.3 % (ref 11.5–17)
GFR SERPLBLD CREATININE-BSD FMLA CKD-EPI: 35 ML/MIN/1.73/M2
GLOBULIN SER-MCNC: 4.7 GM/DL (ref 2.4–3.5)
GLUCOSE SERPL-MCNC: 142 MG/DL (ref 82–115)
HAV IGM SERPL QL IA: NONREACTIVE
HBV CORE IGM SERPL QL IA: NONREACTIVE
HBV SURFACE AB SER-ACNC: 0.08 MIU/ML
HBV SURFACE AB SERPL IA-ACNC: NONREACTIVE M[IU]/ML
HBV SURFACE AG SERPL QL IA: NONREACTIVE
HCT VFR BLD AUTO: 29.5 % (ref 42–52)
HCV AB SERPL QL IA: NONREACTIVE
HGB BLD-MCNC: 9.6 G/DL (ref 14–18)
IMM GRANULOCYTES # BLD AUTO: 0.32 X10(3)/MCL (ref 0–0.04)
IMM GRANULOCYTES NFR BLD AUTO: 1.9 %
LYMPHOCYTES # BLD AUTO: 2.02 X10(3)/MCL (ref 0.6–4.6)
LYMPHOCYTES NFR BLD AUTO: 12.1 %
MAGNESIUM SERPL-MCNC: 2.1 MG/DL (ref 1.6–2.6)
MCH RBC QN AUTO: 31.9 PG (ref 27–31)
MCHC RBC AUTO-ENTMCNC: 32.5 G/DL (ref 33–36)
MCV RBC AUTO: 98 FL (ref 80–94)
MONOCYTES # BLD AUTO: 1.35 X10(3)/MCL (ref 0.1–1.3)
MONOCYTES NFR BLD AUTO: 8.1 %
N GONORRHOEA DNA SPEC QL NAA+PROBE: NOT DETECTED
NEUTROPHILS # BLD AUTO: 12.74 X10(3)/MCL (ref 2.1–9.2)
NEUTROPHILS NFR BLD AUTO: 76.2 %
NRBC BLD AUTO-RTO: 2.3 %
OHS QRS DURATION: 136 MS
OHS QTC CALCULATION: 523 MS
PHOSPHATE SERPL-MCNC: 4.2 MG/DL (ref 2.3–4.7)
PLATELET # BLD AUTO: 336 X10(3)/MCL (ref 130–400)
PMV BLD AUTO: 9.3 FL (ref 7.4–10.4)
POCT GLUCOSE: 136 MG/DL (ref 70–110)
POCT GLUCOSE: 137 MG/DL (ref 70–110)
POCT GLUCOSE: 167 MG/DL (ref 70–110)
POCT GLUCOSE: 184 MG/DL (ref 70–110)
POTASSIUM SERPL-SCNC: 3.9 MMOL/L (ref 3.5–5.1)
PROT SERPL-MCNC: 7.3 GM/DL (ref 5.8–7.6)
RBC # BLD AUTO: 3.01 X10(6)/MCL (ref 4.7–6.1)
SODIUM SERPL-SCNC: 146 MMOL/L (ref 136–145)
SPECIMEN SOURCE: NORMAL
T4 FREE SERPL-MCNC: 0.93 NG/DL (ref 0.7–1.48)
TSH SERPL-ACNC: 0.16 UIU/ML (ref 0.35–4.94)
WBC # BLD AUTO: 16.7 X10(3)/MCL (ref 4.5–11.5)

## 2025-05-28 PROCEDURE — 85025 COMPLETE CBC W/AUTO DIFF WBC: CPT

## 2025-05-28 PROCEDURE — 87591 N.GONORRHOEAE DNA AMP PROB: CPT | Performed by: NURSE PRACTITIONER

## 2025-05-28 PROCEDURE — 93005 ELECTROCARDIOGRAM TRACING: CPT

## 2025-05-28 PROCEDURE — 94761 N-INVAS EAR/PLS OXIMETRY MLT: CPT

## 2025-05-28 PROCEDURE — 84443 ASSAY THYROID STIM HORMONE: CPT

## 2025-05-28 PROCEDURE — 80053 COMPREHEN METABOLIC PANEL: CPT

## 2025-05-28 PROCEDURE — 80074 ACUTE HEPATITIS PANEL: CPT | Performed by: NURSE PRACTITIONER

## 2025-05-28 PROCEDURE — 87070 CULTURE OTHR SPECIMN AEROBIC: CPT

## 2025-05-28 PROCEDURE — 63600175 PHARM REV CODE 636 W HCPCS: Performed by: INTERNAL MEDICINE

## 2025-05-28 PROCEDURE — 25000003 PHARM REV CODE 250

## 2025-05-28 PROCEDURE — 83735 ASSAY OF MAGNESIUM: CPT

## 2025-05-28 PROCEDURE — 86706 HEP B SURFACE ANTIBODY: CPT | Performed by: NURSE PRACTITIONER

## 2025-05-28 PROCEDURE — 84100 ASSAY OF PHOSPHORUS: CPT

## 2025-05-28 PROCEDURE — 97162 PT EVAL MOD COMPLEX 30 MIN: CPT

## 2025-05-28 PROCEDURE — 97166 OT EVAL MOD COMPLEX 45 MIN: CPT

## 2025-05-28 PROCEDURE — 25000003 PHARM REV CODE 250: Performed by: INTERNAL MEDICINE

## 2025-05-28 PROCEDURE — 84439 ASSAY OF FREE THYROXINE: CPT

## 2025-05-28 PROCEDURE — 97530 THERAPEUTIC ACTIVITIES: CPT

## 2025-05-28 PROCEDURE — 25000003 PHARM REV CODE 250: Performed by: STUDENT IN AN ORGANIZED HEALTH CARE EDUCATION/TRAINING PROGRAM

## 2025-05-28 PROCEDURE — 63600175 PHARM REV CODE 636 W HCPCS: Mod: JZ,TB

## 2025-05-28 PROCEDURE — 21400001 HC TELEMETRY ROOM

## 2025-05-28 PROCEDURE — 63600175 PHARM REV CODE 636 W HCPCS

## 2025-05-28 PROCEDURE — 36415 COLL VENOUS BLD VENIPUNCTURE: CPT

## 2025-05-28 RX ORDER — METOLAZONE 5 MG/1
5 TABLET ORAL ONCE
Status: COMPLETED | OUTPATIENT
Start: 2025-05-28 | End: 2025-05-28

## 2025-05-28 RX ORDER — METOLAZONE 5 MG/1
5 TABLET ORAL ONCE
Status: DISCONTINUED | OUTPATIENT
Start: 2025-05-28 | End: 2025-05-28

## 2025-05-28 RX ORDER — METOPROLOL TARTRATE 50 MG/1
50 TABLET ORAL 4 TIMES DAILY
Status: DISCONTINUED | OUTPATIENT
Start: 2025-05-28 | End: 2025-05-31

## 2025-05-28 RX ADMIN — HYDROCODONE BITARTRATE AND ACETAMINOPHEN 1 TABLET: 5; 325 TABLET ORAL at 04:05

## 2025-05-28 RX ADMIN — METOPROLOL TARTRATE 25 MG: 25 TABLET, FILM COATED ORAL at 02:05

## 2025-05-28 RX ADMIN — BUMETANIDE 2 MG: 0.25 INJECTION INTRAMUSCULAR; INTRAVENOUS at 10:05

## 2025-05-28 RX ADMIN — METOPROLOL TARTRATE 5 MG: 5 INJECTION, SOLUTION INTRAVENOUS at 02:05

## 2025-05-28 RX ADMIN — PENICILLIN G POTASSIUM 24 MILLION UNITS: 20000000 INJECTION, POWDER, FOR SOLUTION INTRAMUSCULAR; INTRAVENOUS at 09:05

## 2025-05-28 RX ADMIN — ENOXAPARIN SODIUM 90 MG: 100 INJECTION SUBCUTANEOUS at 08:05

## 2025-05-28 RX ADMIN — POTASSIUM BICARBONATE 25 MEQ: 978 TABLET, EFFERVESCENT ORAL at 10:05

## 2025-05-28 RX ADMIN — MUPIROCIN: 20 OINTMENT TOPICAL at 08:05

## 2025-05-28 RX ADMIN — EMPAGLIFLOZIN 10 MG: 10 TABLET, FILM COATED ORAL at 08:05

## 2025-05-28 RX ADMIN — HYDRALAZINE HYDROCHLORIDE: 10 TABLET, FILM COATED ORAL at 04:05

## 2025-05-28 RX ADMIN — AMPICILLIN SODIUM 2 G: 2 INJECTION, POWDER, FOR SOLUTION INTRAMUSCULAR; INTRAVENOUS at 10:05

## 2025-05-28 RX ADMIN — METOPROLOL TARTRATE 50 MG: 50 TABLET, FILM COATED ORAL at 09:05

## 2025-05-28 RX ADMIN — BUMETANIDE 2 MG: 0.25 INJECTION INTRAMUSCULAR; INTRAVENOUS at 05:05

## 2025-05-28 RX ADMIN — HYDRALAZINE HYDROCHLORIDE: 10 TABLET, FILM COATED ORAL at 08:05

## 2025-05-28 RX ADMIN — AMPICILLIN SODIUM 2 G: 2 INJECTION, POWDER, FOR SOLUTION INTRAMUSCULAR; INTRAVENOUS at 02:05

## 2025-05-28 RX ADMIN — METOPROLOL TARTRATE 25 MG: 25 TABLET, FILM COATED ORAL at 08:05

## 2025-05-28 RX ADMIN — Medication 6 MG: at 09:05

## 2025-05-28 RX ADMIN — METOLAZONE 5 MG: 5 TABLET ORAL at 09:05

## 2025-05-28 RX ADMIN — HYDRALAZINE HYDROCHLORIDE: 10 TABLET, FILM COATED ORAL at 09:05

## 2025-05-28 RX ADMIN — ATORVASTATIN CALCIUM 20 MG: 20 TABLET, FILM COATED ORAL at 08:05

## 2025-05-28 RX ADMIN — ASPIRIN 81 MG: 81 TABLET, COATED ORAL at 08:05

## 2025-05-28 RX ADMIN — ENOXAPARIN SODIUM 90 MG: 100 INJECTION SUBCUTANEOUS at 09:05

## 2025-05-28 RX ADMIN — ACETAMINOPHEN 650 MG: 325 TABLET, FILM COATED ORAL at 09:05
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
## 2025-05-28 NOTE — CONSULTS
Spoke to sara mcneill with home health. No preference indicated. Referral submitted to [facility] via [employer].

## 2025-05-28 NOTE — PLAN OF CARE
Problem: Occupational Therapy  Goal: Occupational Therapy Goal  Description:   Pt will complete grooming seated EOB SBA.  Pt will complete UB dressing with SBA.  Pt will complete sit>stand with mod A with RW for prepare for BSC transfer.  Pt will engage/tolerate BUE general therex x 10 minutes to increase activity tolerance seated EOB.  Pt will tolerate static standing x 1 minute to assist with ADL tasks  Outcome: Progressing
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
## 2025-05-29 LAB
ALBUMIN SERPL-MCNC: 2.5 G/DL (ref 3.4–4.8)
ALBUMIN/GLOB SERPL: 0.5 RATIO (ref 1.1–2)
ALP SERPL-CCNC: 245 UNIT/L (ref 40–150)
ALT SERPL-CCNC: 226 UNIT/L (ref 0–55)
ANION GAP SERPL CALC-SCNC: 13 MEQ/L
AST SERPL-CCNC: 72 UNIT/L (ref 11–45)
BACTERIA #/AREA URNS AUTO: ABNORMAL /HPF
BACTERIA BLD CULT: NORMAL
BACTERIA BLD CULT: NORMAL
BASOPHILS # BLD AUTO: 0.06 X10(3)/MCL
BASOPHILS NFR BLD AUTO: 0.3 %
BILIRUB SERPL-MCNC: 0.5 MG/DL
BILIRUB UR QL STRIP.AUTO: NEGATIVE
BUN SERPL-MCNC: 71.1 MG/DL (ref 8.4–25.7)
CALCIUM SERPL-MCNC: 8.9 MG/DL (ref 8.8–10)
CHLORIDE SERPL-SCNC: 106 MMOL/L (ref 98–107)
CLARITY UR: CLEAR
CO2 SERPL-SCNC: 26 MMOL/L (ref 23–31)
COLOR UR AUTO: COLORLESS
CREAT SERPL-MCNC: 1.98 MG/DL (ref 0.72–1.25)
CREAT/UREA NIT SERPL: 36
CRP SERPL-MCNC: 67.3 MG/L
EOSINOPHIL # BLD AUTO: 0.38 X10(3)/MCL (ref 0–0.9)
EOSINOPHIL NFR BLD AUTO: 2.2 %
ERYTHROCYTE [DISTWIDTH] IN BLOOD BY AUTOMATED COUNT: 15.5 % (ref 11.5–17)
ERYTHROCYTE [SEDIMENTATION RATE] IN BLOOD: 26 MM/HR (ref 0–15)
GFR SERPLBLD CREATININE-BSD FMLA CKD-EPI: 35 ML/MIN/1.73/M2
GLOBULIN SER-MCNC: 4.7 GM/DL (ref 2.4–3.5)
GLUCOSE SERPL-MCNC: 135 MG/DL (ref 82–115)
GLUCOSE UR QL STRIP: ABNORMAL
HAV AB SER QL IA: NONREACTIVE
HCT VFR BLD AUTO: 28.2 % (ref 42–52)
HGB BLD-MCNC: 9 G/DL (ref 14–18)
HGB UR QL STRIP: ABNORMAL
HIV 1+2 AB+HIV1 P24 AG SERPL QL IA: NONREACTIVE
HYALINE CASTS #/AREA URNS LPF: ABNORMAL /LPF
IMM GRANULOCYTES # BLD AUTO: 0.32 X10(3)/MCL (ref 0–0.04)
IMM GRANULOCYTES NFR BLD AUTO: 1.8 %
KETONES UR QL STRIP: NEGATIVE
LEUKOCYTE ESTERASE UR QL STRIP: 250
LYMPHOCYTES # BLD AUTO: 2.27 X10(3)/MCL (ref 0.6–4.6)
LYMPHOCYTES NFR BLD AUTO: 13.1 %
MAGNESIUM SERPL-MCNC: 2 MG/DL (ref 1.6–2.6)
MCH RBC QN AUTO: 31.5 PG (ref 27–31)
MCHC RBC AUTO-ENTMCNC: 31.9 G/DL (ref 33–36)
MCV RBC AUTO: 98.6 FL (ref 80–94)
MONOCYTES # BLD AUTO: 1.24 X10(3)/MCL (ref 0.1–1.3)
MONOCYTES NFR BLD AUTO: 7.2 %
NEUTROPHILS # BLD AUTO: 13.05 X10(3)/MCL (ref 2.1–9.2)
NEUTROPHILS NFR BLD AUTO: 75.4 %
NITRITE UR QL STRIP: NEGATIVE
NRBC BLD AUTO-RTO: 4 %
PH UR STRIP: 5 [PH]
PHOSPHATE SERPL-MCNC: 4.2 MG/DL (ref 2.3–4.7)
PLATELET # BLD AUTO: 353 X10(3)/MCL (ref 130–400)
PMV BLD AUTO: 9.7 FL (ref 7.4–10.4)
POCT GLUCOSE: 139 MG/DL (ref 70–110)
POCT GLUCOSE: 184 MG/DL (ref 70–110)
POCT GLUCOSE: 196 MG/DL (ref 70–110)
POCT GLUCOSE: 203 MG/DL (ref 70–110)
POTASSIUM SERPL-SCNC: 3.6 MMOL/L (ref 3.5–5.1)
PROT SERPL-MCNC: 7.2 GM/DL (ref 5.8–7.6)
PROT UR QL STRIP: NEGATIVE
RBC # BLD AUTO: 2.86 X10(6)/MCL (ref 4.7–6.1)
RBC #/AREA URNS AUTO: ABNORMAL /HPF
SODIUM SERPL-SCNC: 145 MMOL/L (ref 136–145)
SODIUM UR-SCNC: 120 MMOL/L
SP GR UR STRIP.AUTO: 1.01 (ref 1–1.03)
SQUAMOUS #/AREA URNS LPF: ABNORMAL /HPF
T PALLIDUM AB SER QL: NONREACTIVE
UNIDENT CRYS #/AREA URNS HPF: ABNORMAL /HPF
UROBILINOGEN UR STRIP-ACNC: NORMAL
WBC # BLD AUTO: 17.32 X10(3)/MCL (ref 4.5–11.5)
WBC #/AREA URNS AUTO: ABNORMAL /HPF

## 2025-05-29 PROCEDURE — 63600175 PHARM REV CODE 636 W HCPCS: Performed by: INTERNAL MEDICINE

## 2025-05-29 PROCEDURE — 25000003 PHARM REV CODE 250: Performed by: STUDENT IN AN ORGANIZED HEALTH CARE EDUCATION/TRAINING PROGRAM

## 2025-05-29 PROCEDURE — 86140 C-REACTIVE PROTEIN: CPT | Performed by: NURSE PRACTITIONER

## 2025-05-29 PROCEDURE — 21400001 HC TELEMETRY ROOM

## 2025-05-29 PROCEDURE — 63600175 PHARM REV CODE 636 W HCPCS

## 2025-05-29 PROCEDURE — 25000003 PHARM REV CODE 250: Performed by: NURSE PRACTITIONER

## 2025-05-29 PROCEDURE — 81015 MICROSCOPIC EXAM OF URINE: CPT | Performed by: INTERNAL MEDICINE

## 2025-05-29 PROCEDURE — 25000003 PHARM REV CODE 250

## 2025-05-29 PROCEDURE — 83735 ASSAY OF MAGNESIUM: CPT

## 2025-05-29 PROCEDURE — 85652 RBC SED RATE AUTOMATED: CPT | Performed by: NURSE PRACTITIONER

## 2025-05-29 PROCEDURE — 97110 THERAPEUTIC EXERCISES: CPT

## 2025-05-29 PROCEDURE — 86708 HEPATITIS A ANTIBODY: CPT | Performed by: NURSE PRACTITIONER

## 2025-05-29 PROCEDURE — 85025 COMPLETE CBC W/AUTO DIFF WBC: CPT

## 2025-05-29 PROCEDURE — 86780 TREPONEMA PALLIDUM: CPT | Performed by: NURSE PRACTITIONER

## 2025-05-29 PROCEDURE — 97530 THERAPEUTIC ACTIVITIES: CPT

## 2025-05-29 PROCEDURE — 63600175 PHARM REV CODE 636 W HCPCS: Mod: JZ,TB

## 2025-05-29 PROCEDURE — 87389 HIV-1 AG W/HIV-1&-2 AB AG IA: CPT | Performed by: NURSE PRACTITIONER

## 2025-05-29 PROCEDURE — 94761 N-INVAS EAR/PLS OXIMETRY MLT: CPT

## 2025-05-29 PROCEDURE — 25000003 PHARM REV CODE 250: Performed by: INTERNAL MEDICINE

## 2025-05-29 PROCEDURE — 87086 URINE CULTURE/COLONY COUNT: CPT | Performed by: INTERNAL MEDICINE

## 2025-05-29 PROCEDURE — 36415 COLL VENOUS BLD VENIPUNCTURE: CPT

## 2025-05-29 PROCEDURE — 27000221 HC OXYGEN, UP TO 24 HOURS

## 2025-05-29 PROCEDURE — 80053 COMPREHEN METABOLIC PANEL: CPT

## 2025-05-29 PROCEDURE — 84300 ASSAY OF URINE SODIUM: CPT | Performed by: INTERNAL MEDICINE

## 2025-05-29 PROCEDURE — 84100 ASSAY OF PHOSPHORUS: CPT

## 2025-05-29 RX ORDER — SODIUM CHLORIDE 450 MG/100ML
INJECTION, SOLUTION INTRAVENOUS CONTINUOUS
Status: DISCONTINUED | OUTPATIENT
Start: 2025-05-29 | End: 2025-05-30

## 2025-05-29 RX ADMIN — HYDRALAZINE HYDROCHLORIDE: 10 TABLET, FILM COATED ORAL at 05:05

## 2025-05-29 RX ADMIN — SODIUM CHLORIDE: 4.5 INJECTION, SOLUTION INTRAVENOUS at 10:05

## 2025-05-29 RX ADMIN — BUMETANIDE 2 MG: 0.25 INJECTION INTRAMUSCULAR; INTRAVENOUS at 08:05

## 2025-05-29 RX ADMIN — METOPROLOL TARTRATE 50 MG: 50 TABLET, FILM COATED ORAL at 10:05

## 2025-05-29 RX ADMIN — ENOXAPARIN SODIUM 90 MG: 100 INJECTION SUBCUTANEOUS at 08:05

## 2025-05-29 RX ADMIN — ENOXAPARIN SODIUM 90 MG: 100 INJECTION SUBCUTANEOUS at 10:05

## 2025-05-29 RX ADMIN — HYDRALAZINE HYDROCHLORIDE: 10 TABLET, FILM COATED ORAL at 10:05

## 2025-05-29 RX ADMIN — ATORVASTATIN CALCIUM 20 MG: 20 TABLET, FILM COATED ORAL at 08:05

## 2025-05-29 RX ADMIN — HYDRALAZINE HYDROCHLORIDE: 10 TABLET, FILM COATED ORAL at 08:05

## 2025-05-29 RX ADMIN — INSULIN ASPART 1 UNITS: 100 INJECTION, SOLUTION INTRAVENOUS; SUBCUTANEOUS at 10:05

## 2025-05-29 RX ADMIN — ASPIRIN 81 MG: 81 TABLET, COATED ORAL at 08:05

## 2025-05-29 RX ADMIN — PENICILLIN G POTASSIUM 24 MILLION UNITS: 20000000 INJECTION, POWDER, FOR SOLUTION INTRAMUSCULAR; INTRAVENOUS at 10:05

## 2025-05-29 RX ADMIN — METOPROLOL TARTRATE 50 MG: 50 TABLET, FILM COATED ORAL at 05:05

## 2025-05-29 RX ADMIN — METOPROLOL TARTRATE 50 MG: 50 TABLET, FILM COATED ORAL at 08:05

## 2025-05-29 RX ADMIN — METOPROLOL TARTRATE 50 MG: 50 TABLET, FILM COATED ORAL at 01:05
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
## 2025-05-29 NOTE — PROGRESS NOTES
[facility]  Infectious Diseases Progress Note        SUBJECTIVE:   HPI: 74-year-old male with PMH CAD, prior CVA in 03/2025, chronic indwelling Fowler catheter who was admitted to Medicine on 5/23 for workup/management of weakness.  Urine culture was obtained on admission, appears to be from old Fowler catheter, which showed growth of Enterococcus faecium.  He has been started on ampicillin as organism was not VRE and sensitive to beta lactams.  CT abdomen and pelvis showed malpositioned Fowler catheter with balloon of the prostate.  Per discussion with primary team there is noted purulence in his urine.  Infectious disease service has been consulted for management of UTI.     On admission he was noted to have leukocytosis of 26.8 however was afebrile.  He has remained afebrile throughout his entire hospitalization.  His leukocytosis has improved now to 16.7.  He was initially started on ceftriaxone empirically followed by vancomycin and Zosyn, however now has been transitioned to ampicillin.  TTE was completed which showed a mobile echogenic mass in the right atrium, cardiology has been consulted who is planning for YO to further evaluate this.     History was obtained from both the patient and his sister who was present at bedside. She reports that the patient's mentation decreased a few days prior to his admission along with generalized weakness to where he was unable to participate with PT at his nursing home where he resides. His fowler catheter apparently has not been exchanged since 3/2025. The patient reports noticing R midback pain that began about 3 days prior to his admission as well however this has since improved. He denies fever, chills, N/V/D.       Interval History:  Patient sitting in bed.  He is c/o generalized pain and SOB.  On oxygen at 2 L per nasal cannula.  Endorses cough.  Denies fever, chills, night sweats, rash, HA, vision changes, dizziness, CP, N/V/D, abdominal  pain, or urinary complaints.  Patient is also c/o pain to bilateral ankles.  Noted with gross edema to lower extremities.  Patient is afebrile with persistent leukocytosis; WBC 17.3.  PRIYA is improving; BUN 71/creatinine 1.98.  Transaminitis improving; AST 72/.  Previous BNP 1451.  Screening for hepatitis/HIV/syphilis/gonorrhea/chlamydia are all negative.  Blood culture x1 NGTD.  Urine culture with Enterococcus faecium.  Sputum culture NGTD.  Chest x-ray with no acute cardiopulmonary process identified.  Repeat CT abdomen pelvis without contrast was done yesterday and showed concerns for resolved hypoattenuated area of the prostate that was previously seen on last exam.  Calcifications were noted through the prostate.  Previous TTE had concerns for right atrial echogenic mass.  Cardiology is now evaluating patient.  YO is likely plan.  Patient is now on Penicillin G IV daily per continuous infusion.      Antibiotic / Antiviral / Antifungal History:  Ceftriaxone 1 g IV x1 dose - 05/23/2025   Zosyn 4.5 g IV q.8 hours - 05/24/2025 to 05/25/2025   Vancomycin IV trough 15-20 - 05/24/2025 to 05/26/2025   Ampicillin 1 g IV q.4 hours - 05/26/2025 to 05/28/2025   Penicillin G 24,000,000 units IV daily per continuous infusion - 05/28/2025 to present       MEDICATIONS:   Reviewed in EMR    REVIEW OF SYSTEMS:   Except as documented, all other systems reviewed and negative     PHYSICAL EXAM:   T 97.5 °F (36.4 °C)   /66   P 67   RR 18   O2 97 %  GENERAL: Elderly BM who is A&Ox3, NAD, appears chronically ill; on O2 per nasal cannula 2 L  HEENT: atraumatic, normocephalic, anicteric, moist oral mucosa without lesions, exudate, or erythema; no thrush  LUNGS: breathing unlabored, lungs coarse with rhonchi bilateral  HEART: RRR; no murmur, rub, or gallop  ABDOMEN: abdomen soft, obese, nondistended, nontender to palpation  : Palomo catheter in place  EXTREMITIES: 2-3+ bilateral lower extremity pitting edema  SKIN: Open  "wound to left medial great toe without purulence, erythema, or necrosis  NEURO: speech fluent and intact, facial symmetry preserved, no tremor  PSYCH: cooperative, normal mood and affect  LINES: PIV       LABS AND IMAGING:     Recent Labs     05/28/25 0439 05/29/25 0413   WBC 16.70* 17.32*   RBC 3.01* 2.86*   HGB 9.6* 9.0*   HCT 29.5* 28.2*   MCV 98.0* 98.6*   MCH 31.9* 31.5*   MCHC 32.5* 31.9*   RDW 15.3 15.5    353     No results for input(s): "LACTIC" in the last 72 hours.  Recent Labs     05/26/25  1704 05/27/25  0044 05/27/25  0938   INR 1.3  --   --    APTT 32.2   < > >200.0*    < > = values in this interval not displayed.     No results for input(s): "HGBA1C", "CHOL", "TRIG", "LDL", "VLDL", "HDL" in the last 72 hours.   Recent Labs     05/28/25 0439 05/29/25 0413   * 145   K 3.9 3.6   CO2 23 26   BUN 63.9* 71.1*   CREATININE 1.97* 1.98*   CALCIUM 8.6* 8.9   MG 2.10 2.00   PHOS 4.2 4.2   ALBUMIN 2.6* 2.5*   GLOBULIN 4.7* 4.7*   ALKPHOS 272* 245*   * 226*   * 72*   BILITOT 0.6 0.5   CRP  --  67.30*   TSH 0.158*  --      Recent Labs     05/27/25  0516   BNP 1,451.0*          Estimated Creatinine Clearance: 38.1 mL/min (A) (based on SCr of 1.98 mg/dL (H)).   Lab Results   Component Value Date    SEDRATE 26 (H) 05/29/2025      Lab Results   Component Value Date    CRP 67.30 (H) 05/29/2025        Micro:   Colonization:  No results found for this or any previous visit.     5/23 pBCx 2/2: NGTD  5/23 Urine cx: Enterococcus faecium      05/28/2025 sputum culture NGTD        X-Ray Chest 1 View  Narrative: EXAMINATION:  XR CHEST 1 VIEW    CLINICAL HISTORY:  hypoxic respiratory failure;    TECHNIQUE:  One view    COMPARISON:  May 23, 2025.    FINDINGS:  Cardiopericardial silhouette is within normal limits.  Right basilar atelectasis.  No convincing acute dense focal or segmental consolidation, congestive process, significant pleural effusions or pneumothorax.  Impression: No acute " cardiopulmonary process identified.    Electronically signed by: Sumit Núñez  Date:    05/29/2025  Time:    09:06    TTE 05/26/2025:  Summary  Show Result Comparison     Left Ventricle: The left ventricle is normal in size. Normal wall thickness. There is moderately reduced systolic function with a visually estimated ejection fraction of 35 - 40%. Unable to assess diastolic function due to atrial fibrillation.    Right Ventricle: The right ventricle is moderately dilated Systolic function is moderately reduced. Prominent moderator band in the right ventricle.    Left Atrium: The left atrium is mildly dilated    Right Atrium: The right atrium is mildly dilated . 1.9 x 1.1 x 1.8 small mobile echogenic mass present.    Aortic Valve: There is mild aortic regurgitation.    Tricuspid Valve: There is mild regurgitation.    Pulmonary Artery: The estimated pulmonary artery systolic pressure is 49 mmHg.    IVC/SVC: Elevated venous pressure at 15 mmHg.        ASSESSMENT & PLAN:     74-year-old male with PMH CAD, prior CVA in 03/2025, chronic indwelling Fowler catheter who was admitted to Medicine on 5/23 for workup/management of weakness.  Urine culture was obtained on admission, appears to be from old Fowler catheter, which showed growth of Enterococcus faecium.  He has been started on ampicillin as organism was not VRE and sensitive to beta lactams.  CT abdomen and pelvis showed malpositioned Fowler catheter with balloon of the prostate.  Per discussion with primary team there is noted purulence in his urine.  Infectious disease service has been consulted for management of UTI.     1) Enterococcus faecium cystitis  - Organism PCN sensitive, non-VRE  2) Possible acute prostatitis  3) Malpositioned fowler catheter, resolved  4) Sepsis  5) Right atrial echogenic mass  6) CVA  7) CAD  8) hepatomegaly   9) transaminitis   10) PRIYA (improving)  11) dyspnea  12) anemia        RECOMMENDATIONS:       Follow up culture results  Continue  Penicillin G-K 24 million units IV daily per continuous infusion    Purulent material seen in fowler could represent possible prostate abscess vs acute prostatitis vs urethritis.  Again recommend to repeat CT abd/pelvis WITH IV contrast when able, and would also add CT chest when patient goes for this  Concern for prostatitis due to malpositioned fowler catheter seen on admission  Awaiting YO for right atrial echogenic mass.  Blood cultures so far are negative to do not feel that right atrial mass is reflective of an infectious source.  Monitor liver enzymes   Renal protective measures  ID will continue to follow          KIRSTIN Hedrick  [facility] Infectious Diseases     *Portions of this note dictated using EMR integrated voice recognition software, and may be subject to voice recognition errors not corrected at proofreading. Please contact writer for clarification if needed. *
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
## 2025-05-29 NOTE — CONSULTS
Inpatient Nutrition Assessment    Admit Date: 5/23/2025   Total duration of encounter: 6 days   Patient Age: 74 y.o.    Nutrition Recommendation/Prescription     Suggest Low Na, Carb consistent, Soft & Bite size for ease of chewing  Continue Boost Breeze (provides 250 kcal, 9 g protein per serving) TID  Consider appetite stimulant  Daily Weight    Communication of Recommendations: reviewed with nurse, reviewed with patient, and reviewed with family    Nutrition Assessment     Malnutrition Assessment/Nutrition-Focused Physical Exam    Malnutrition Context: acute illness or injury (05/29/25 1252)  Malnutrition Level: severe (05/29/25 1252)  Energy Intake (Malnutrition): less than or equal to 50% for greater than or equal to 5 days (05/29/25 1252)  Weight Loss (Malnutrition): other (see comments) (Does not meet criteria) (05/29/25 1252)     Orbital Region (Subcutaneous Fat Loss): well nourished        Muscle Mass (Malnutrition): moderate depletion (05/29/25 1252)  [place] Region (Muscle Loss): moderate depletion                       Fluid Accumulation (Malnutrition): moderate to severe (05/29/25 1252)        A minimum of two characteristics is recommended for diagnosis of either severe or non-severe malnutrition.    Chart Review    Reason Seen: continuous nutrition monitoring, physician consult for CHF, and follow-up    Malnutrition Screening Tool Results   Have you recently lost weight without trying?: No  Have you been eating poorly because of a decreased appetite?: No   MST Score: 0   Diagnosis:  Severe sepsis 2/2 UTI   ARF, obstructive   Hyperkalemia, resolving  Hx of CVA  CHF  HTN    Relevant Medical History: CVA, CAD, Venous insufficiency, HTN, DM, Chronic lumbar pain, edema    Scheduled Medications:  aspirin, 81 mg, Daily  atorvastatin, 20 mg, Daily  enoxaparin, 1 mg/kg, Q12H  hydrALAZINE 10 mg 1 tablet, hydrALAZINE 25 mg 1 tablet, isorsorbide dinitrate 20 mg 1 tablet combination, , TID  metoprolol tartrate, 50  mg, QID  penicillin G potassium 24 Million Units in D5W 500 mL CONTINUOUS INFUSION, 24 Million Units, Q24H    Continuous Infusions:   PRN Medications:  acetaminophen, 650 mg, Q4H PRN  dextrose 50%, 12.5 g, PRN  dextrose 50%, 25 g, PRN  glucagon (human recombinant), 1 mg, PRN  glucose, 16 g, PRN  glucose, 24 g, PRN  HYDROcodone-acetaminophen, 1 tablet, Q6H PRN  insulin aspart U-100, 0-5 Units, QID (AC + HS) PRN  melatonin, 6 mg, Nightly PRN  ondansetron, 4 mg, Q6H PRN  polyethylene glycol, 17 g, Daily PRN  senna-docusate, 1 tablet, Daily PRN  sodium chloride 0.9%, 10 mL, PRN    Calorie Containing IV Medications: no significant kcals from medications at this time    Recent Labs   Lab 05/23/25  1923 05/23/25  2124 05/24/25  0446 05/24/25  0451 05/24/25  0809 05/25/25  0330 05/26/25  0702 05/26/25  1704 05/27/25  0516 05/27/25  1517 05/28/25  0439 05/29/25  0413      < > 139  --  141 142 142  --  146* 146* 146* 145   K 7.1*   < > 5.2*  --  5.1 4.5 4.3  --  3.8 4.2 3.9 3.6   CALCIUM 9.7   < > 9.3  --  9.1 8.6* 8.8  --  8.9 9.1 8.6* 8.9   PHOS 5.3*  --  5.3*  --   --  3.7 3.6  --  3.7  --  4.2 4.2   MG 3.10*  --  2.80*  --   --  2.60 2.40  --  2.20 2.20 2.10 2.00      < > 106  --  108* 111* 111*  --  113* 112* 111* 106   CO2 14*   < > 18*  --  21* 22* 22*  --  22* 23 23 26   .9*   < > 131.3*  --  126.3* 103.5* 74.0*  --  65.1* 63.9* 63.9* 71.1*   CREATININE 5.84*   < > 4.94*  --  4.40* 3.09* 2.18*  --  2.10* 2.02* 1.97* 1.98*   EGFRNORACEVR 10   < > 12  --  13 20 31  --  32 34 35 35   *   < > 144*  --  127* 140* 124*  --  155* 154* 142* 135*   BILITOT 0.4  --  0.2  --   --  0.3 0.3  --  0.6  --  0.6 0.5   ALKPHOS 85  --  73  --   --  101 128  --  269*  --  272* 245*   ALT 40  --  37  --   --  51 157*  --  353*  --  321* 226*   AST 44  --  41  --   --  48* 158*  --  381*  --  177* 72*   ALBUMIN 3.1*  --  2.6*  --   --  2.5* 2.8*  --  2.8*  --  2.6* 2.5*   CRP  --   --   --   --   --   --   --   --    --   --   --  67.30*   HGBA1C  --   --   --  5.6  --   --   --   --   --   --   --   --    WBC 26.82*  --  24.35*  --   --  16.28* 15.85* 12.92* 16.61*  --  16.70* 17.32*   HGB 11.9*  --  10.0*  --   --  9.6* 10.8* 10.3* 10.5*  --  9.6* 9.0*   HCT 37.2*  --  31.2*  --   --  30.2* 33.8* 31.7* 32.9*  --  29.5* 28.2*    < > = values in this interval not displayed.     Nutrition Orders:  Diet Heart Healthy  Dietary nutrition supplements TID; Boost Breeze - Any flavor    Appetite/Oral Intake: poor/0-25% of meals  Factors Affecting Nutritional Intake: chewing difficulty, decreased appetite, and pain  Social Needs Impacting Access to Food: none identified  Food/Denominational/Cultural Preferences: dislikes rice, gravy, milk; Likes applesauce, fruit cups, mashed potatoes, non daily ONS  Food Allergies: no known food allergies  Last Bowel Movement: 05/26/25  Wound(s):     Wound 05/23/25 2110 Other (comment) Penis-Tissue loss description: Partial thickness       Wound 03/29/25 1900 Pressure Injury Left dorsal Foot-Tissue loss description: Full thickness     Comments    5/29/25 -- Pt continues with poor oral intake, denies n/v; reports decreased oral intake related to pain & not liking foods -- preferences obtained by kitchen  daily; pt reports drinking Boost Breeze as ordered; weight fluctuation noted, 3+ LE edema - diuresing; most recent labs/meds reviewed, suggest low Na, carb consistent diet    5/26/25 -- Pt with poor oral intake of 0-25% since admit, family at bedside reports decreased po intake > 1 week, reports does not eat rice, gravies, milk; food preferences obtained & reported to kitchen, agreeable to Boost Breeze to supplement nutrition; denies swallowing difficulty; chewing difficulty related to not having dentures this admit; no n/v/d; no indication of significant weight changes, UBW appears to be ~ 270 lb; Most recent labs/meds reviewed, suggest low Na, carb consistent diet; Monitor need for renal diet  "restriction    Anthropometrics    Height: 6' 2" (188 cm), Height Method: Stated  Last Weight: 115 kg (253 lb 8.5 oz) (25 1251), Weight Method: Bed Scale  BMI (Calculated): 32.5  BMI Classification: obese grade I (BMI 30-34.9)        Ideal Body Weight (IBW), Male: 190 lb     % Ideal Body Weight, Male (lb): 139.24 %                 Usual Body Weight (UBW), k kg  % Usual Body Weight: 97.76  % Weight Change From Usual Weight: -2.44 %  Usual Weight Provided By: EMR weight history    Wt Readings from Last 5 Encounters:   25 115 kg (253 lb 8.5 oz)   25 115.7 kg (255 lb)   24 128.2 kg (282 lb 11.2 oz)   24 128 kg (282 lb 3 oz)   24 126.3 kg (278 lb 8 oz)     Weight Change(s) Since Admission: new admit  Wt Readings from Last 1 Encounters:   25 1251 115 kg (253 lb 8.5 oz)   25 0700 96.2 kg (212 lb 1.3 oz)   25 0104 96.2 kg (212 lb 1.3 oz)   25 0640 95.5 kg (210 lb 8.6 oz)   25 0639 95.5 kg (210 lb 8.6 oz)   25 0645 94.4 kg (208 lb 3.2 oz)   25 1059 120 kg (264 lb 8.8 oz)   25 0600 120.5 kg (265 lb 11.2 oz)   25 0700 120.2 kg (264 lb 14.4 oz)   25 0517 120.2 kg (264 lb 14.4 oz)   25 2225 117.9 kg (260 lb)   25 1816 126.6 kg (279 lb)   25 108.9 kg (240 lb)   Admit Weight: 108.9 kg (240 lb) (25), Weight Method: Stated    Estimated Needs    Weight Used For Calorie Calculations: 120 kg (264 lb 8.8 oz)  Energy Calorie Requirements (kcal): 9095-1709 kcal (20 - 22 kcal/kg)  Energy Need Method: Kcal/kg  Weight Used For Protein Calculations: 86 kg (189 lb 9.5 oz)  Protein Requirements:  gm (1 - 1.2 gm/kg/IBW)  Fluid Requirements (mL): 5735-2764 ml (1ml/kcal)  CHO Requirement: 270-290 gm/d (45% EER)     Enteral Nutrition     Patient not receiving enteral nutrition at this time.    Parenteral Nutrition     Patient not receiving parenteral nutrition support at this time.    Evaluation of Received " Nutrient Intake    Calories: not meeting estimated needs  Protein: not meeting estimated needs    Patient Education     Not applicable.    Nutrition Diagnosis     PES: Inadequate oral intake related to acute illness as evidenced by < 50% po intake > 5 days. (active)     PES: Severe acute disease or injury related malnutrition Related to acute malnutrition As Evidenced by:  - energy intake: <= 50% for 10 days (meets criteria for <= 50% >= 5 days (severe - acute)) - muscle mass depletion: 1 area of moderate muscle loss (Temporalis) - fluid accumulation: 1 area of moderate or severe fluid accumulation (Lower extremities edema) active    Nutrition Interventions     Intervention(s): Care coordination or referral;Oral diet/nutrient modifications;Oral nutritional supplement    Goal: Meet greater than 80% of nutritional needs by follow-up. (goal progressing)  Goal: Maintain weight throughout hospitalization. (goal progressing)    Nutrition Goals & Monitoring     Dietitian will monitor: food and beverage intake, weight, and electrolyte/renal panel  Discharge planning: continue Soft & Bite size, Low Na, Carb consistent  diet with Boost Breeze or equivalent oral supplements  Nutrition Risk/Follow-Up: patient at increased nutrition risk; dietitian will follow-up twice weekly   Please consult if re-assessment needed sooner.
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
## 2025-05-29 NOTE — PLAN OF CARE
Problem: Infection  Goal: Absence of Infection Signs and Symptoms  Outcome: Progressing     Problem: Adult Inpatient Plan of Care  Goal: Plan of Care Review  Outcome: Progressing  Goal: Patient-Specific Goal (Individualized)  Outcome: Progressing  Goal: Absence of Hospital-Acquired Illness or Injury  Outcome: Progressing  Goal: Optimal Comfort and Wellbeing  Outcome: Progressing  Goal: Readiness for Transition of Care  Outcome: Progressing     Problem: Diabetes Comorbidity  Goal: Blood Glucose Level Within Targeted Range  Outcome: Progressing     Problem: Wound  Goal: Optimal Coping  Outcome: Progressing  Goal: Optimal Functional Ability  Outcome: Progressing  Goal: Absence of Infection Signs and Symptoms  Outcome: Progressing  Goal: Improved Oral Intake  Outcome: Progressing  Goal: Optimal Pain Control and Function  Outcome: Progressing  Goal: Skin Health and Integrity  Outcome: Progressing  Goal: Optimal Wound Healing  Outcome: Progressing     Problem: Skin Injury Risk Increased  Goal: Skin Health and Integrity  Outcome: Progressing     Problem: Heart Failure  Goal: Optimal Coping  Outcome: Progressing  Goal: Optimal Cardiac Output  Outcome: Progressing  Goal: Stable Heart Rate and Rhythm  Outcome: Progressing  Goal: Optimal Functional Ability  Outcome: Progressing  Goal: Fluid and Electrolyte Balance  Outcome: Progressing  Goal: Improved Oral Intake  Outcome: Progressing  Goal: Effective Oxygenation and Ventilation  Outcome: Progressing  Goal: Effective Breathing Pattern During Sleep  Outcome: Progressing     Problem: Sepsis/Septic Shock  Goal: Optimal Coping  Outcome: Progressing  Goal: Absence of Bleeding  Outcome: Progressing  Goal: Blood Glucose Level Within Targeted Range  Outcome: Progressing  Goal: Absence of Infection Signs and Symptoms  Outcome: Progressing  Goal: Optimal Nutrition Intake  Outcome: Progressing     Problem: Fall Injury Risk  Goal: Absence of Fall and Fall-Related Injury  Outcome:  Progressing     Problem: Comorbidity Management  Goal: Blood Pressure in Desired Range  Outcome: Progressing

## 2025-05-29 NOTE — PT/OT/SLP PROGRESS
Physical Therapy Treatment    Patient Name:  Vaibhav Vargas   MRN:  [id number]    Recommendations     Therapy Intensity Recommendations at Discharge: Moderate Intensity Therapy  Discharge Equipment Recommendations: to be determined by next level of care   Barriers to discharge: fall risk, severity of deficits, level of skilled assistance required, and decreased endurance     Assessment     Vaibhav Vargas is a 74 y.o. male admitted with a medical diagnosis of Severe sepsis.  1. Acute renal failure, unspecified acute renal failure type    2. SOB (shortness of breath)    3. Shortness of breath    4. Hyperkalemia    5. Lactic acidosis    6. Complicated UTI (urinary tract infection)    7. Severe sepsis    8. Bladder outlet obstruction    9. Routine check-up    10. Tachycardia    11. Sepsis    12. QT prolongation       Problem List[1]   He presents with the following impairments/functional limitations:  weakness, impaired endurance, impaired cardiopulmonary response to activity, impaired balance, decreased lower extremity function, impaired self care skills, impaired functional mobility, impaired coordination, impaired fine motor.    Rehab Prognosis: Good-Fair.    Patient would benefit from continued skilled acute PT services to: address above listed impairments/functional limitations; receive patient/caregiver education; reduce fall risk; and maximize independency/safety with functional mobility.    -continued: cardiac chair positioning in bed, bed level activities, sitting edge of bed, as tolerated/appropriate, with assistance and supervision, therapeutic exercises    Recent Surgery: * No surgery found *      Plan     During this hospitalization, patient to be seen 5 x/week to address the identified impairments/functional limitations via gait training, therapeutic activities, therapeutic exercises, neuromuscular re-education, wheelchair management/training and progress toward the established goals.    Plan of Care  Expires:  06/25/25    Subjective     Communicated with patient's nurse Diane prior to session.    Patient agreeable to participate in treatment session.    Chief Complaint: tired, I can't  Patient/Family Comments/goals: none verbalized  Pain/Comfort:  Pain Rating 1: 6/10  Location 1:  (all over)  Pain Addressed 1: Nurse notified  Pain Rating Post-Intervention 1: 6/10    Objective     Patient found supine in bed, with HOB elevated, and with bed rails up bilateral HOB, left FOB, and right FOB with peripheral IV, telemetry, fowler catheter, Lt UE elevated on pillow, upon PT entry to room.    General Precautions: Standard, NPO, fall   Orthopedic Precautions:N/A   Braces: N/A  Respiratory Status: 2 liters/min O2 via nasal cannula  SAT O2 Check: n/a    Functional Mobility:    Bed Mobility:  Scooting: minimum assistance  Repositioning to head-of-bed: minimum assistance  Repositioning to center-of-bed: minimum assistance  with cues for proper technique  with firm mattress, trendelenburg positioning, and head board  *patient utilized bilateral Ue's (head board) w/ bed assist (firm mattress and trendelenburg) to reposition to HOB and center of bed w/ little to no Rt LE use to assist  *patient able to long sit in bed w/ bilateral UE support on FOB hand rails for repositioning of pillow    Transfers:  N/A    Gait:  N/A    Other Mobility:  Therapeutic Exercises performed:1 set times 10 repetitions  active-assist range of motion  bilat lower extremity       -bed level exercises:              hip/knee flexion            heel slides            short arc quads            ankle pumps            quad sets            hip internal/external rotation    Balance:  Sit  static balance - N/A  dynamic balance - N/A  Stand  static balance - N/A    Patient left in semi-cardiac chair position in bed (slight trendelenburg - ok'ed w/ patients nurse), with HOB elevated, and with bed rails up bilateral HOB, left FOB, and right FOB with peripheral IV,  telemetry, fowler catheter, bilateral UE's elevated on pillows, bilateral heel protectors, with all lines intact, call button in reach, tray table at bedside, patient's nurse notified, and bed alarm on.    DME Justifications: - pending progress  No DME recommended requiring DME justifications    Education     Patient educated on and assisted with functional mobility as noted above.  Patient educated on PT Plan of Care  Patient was instructed to utilize staff assistance for mobility/transfers.  White board updated regarding patient's safest level of mobility with staff assistance.    Goals     Multidisciplinary Problems       Physical Therapy Goals       Problem: Physical Therapy    Goal Priority Disciplines Outcome Interventions   Physical Therapy Goal     PT, PT/OT Progressing    Description: REVIEWED 05/29/2025  Goals to be met by: DISCHARGE  Patient will increase functional independence with mobility by performing:  -. Supine to sit with MInimal Assistance - ONGOING  -. Sit to supine with MInimal Assistance - ONGOING  -. Rolling to Left and Right with Minimal Assistance - ONGOING  -. Sit to stand transfer with Minimal Assistance w/ rolling walker - ONGOING  -. Gait  x 25 feet with Minimal Assistance using Rolling Walker w/ wheelchair in tow - ONGOING           Time Tracking     PT Received On: 05/29/25  PT Start Time: 1400     PT Stop Time: 1433  PT Total Time (min): 33 min     Billable Minutes: Therapeutic Activity 13 and Therapeutic Exercise 20  Non-Billable Minutes: N/A  05/29/2025       [1]   Patient Active Problem List  Diagnosis    Chronic low back pain    Edema    Obesity    Osteoarthritis of knee    Primary hypertension    Type 2 diabetes mellitus    Coronary artery disease    Myocardial infarction    Acute stroke due to ischemia    Severe sepsis    Severe malnutrition

## 2025-05-30 ENCOUNTER — ANESTHESIA (OUTPATIENT)
Dept: CARDIOLOGY | Facility: HOSPITAL | Age: 75
DRG: 853 | End: 2025-05-30
Payer: MEDICARE

## 2025-05-30 ENCOUNTER — ANESTHESIA EVENT (OUTPATIENT)
Dept: CARDIOLOGY | Facility: HOSPITAL | Age: 75
DRG: 853 | End: 2025-05-30
Payer: MEDICARE

## 2025-05-30 LAB
ALBUMIN SERPL-MCNC: 2.3 G/DL (ref 3.4–4.8)
ALBUMIN/GLOB SERPL: 0.5 RATIO (ref 1.1–2)
ALP SERPL-CCNC: 168 UNIT/L (ref 40–150)
ALT SERPL-CCNC: 153 UNIT/L (ref 0–55)
ANION GAP SERPL CALC-SCNC: 12 MEQ/L
AST SERPL-CCNC: 47 UNIT/L (ref 11–45)
BACTERIA SPEC CULT: NORMAL
BASOPHILS # BLD AUTO: 0.06 X10(3)/MCL
BASOPHILS NFR BLD AUTO: 0.4 %
BILIRUB SERPL-MCNC: 0.4 MG/DL
BUN SERPL-MCNC: 63.8 MG/DL (ref 8.4–25.7)
CALCIUM SERPL-MCNC: 8.6 MG/DL (ref 8.8–10)
CHLORIDE SERPL-SCNC: 100 MMOL/L (ref 98–107)
CO2 SERPL-SCNC: 29 MMOL/L (ref 23–31)
CREAT SERPL-MCNC: 1.85 MG/DL (ref 0.72–1.25)
CREAT/UREA NIT SERPL: 34
EOSINOPHIL # BLD AUTO: 0.79 X10(3)/MCL (ref 0–0.9)
EOSINOPHIL NFR BLD AUTO: 4.7 %
ERYTHROCYTE [DISTWIDTH] IN BLOOD BY AUTOMATED COUNT: 15.8 % (ref 11.5–17)
GFR SERPLBLD CREATININE-BSD FMLA CKD-EPI: 38 ML/MIN/1.73/M2
GLOBULIN SER-MCNC: 4.3 GM/DL (ref 2.4–3.5)
GLUCOSE SERPL-MCNC: 145 MG/DL (ref 82–115)
GRAM STN SPEC: NORMAL
GRAM STN SPEC: NORMAL
HCT VFR BLD AUTO: 25.8 % (ref 42–52)
HGB BLD-MCNC: 8.1 G/DL (ref 14–18)
IMM GRANULOCYTES # BLD AUTO: 0.35 X10(3)/MCL (ref 0–0.04)
IMM GRANULOCYTES NFR BLD AUTO: 2.1 %
LYMPHOCYTES # BLD AUTO: 2.15 X10(3)/MCL (ref 0.6–4.6)
LYMPHOCYTES NFR BLD AUTO: 12.8 %
MAGNESIUM SERPL-MCNC: 1.8 MG/DL (ref 1.6–2.6)
MCH RBC QN AUTO: 30.7 PG (ref 27–31)
MCHC RBC AUTO-ENTMCNC: 31.4 G/DL (ref 33–36)
MCV RBC AUTO: 97.7 FL (ref 80–94)
MONOCYTES # BLD AUTO: 1.28 X10(3)/MCL (ref 0.1–1.3)
MONOCYTES NFR BLD AUTO: 7.6 %
NEUTROPHILS # BLD AUTO: 12.14 X10(3)/MCL (ref 2.1–9.2)
NEUTROPHILS NFR BLD AUTO: 72.4 %
NRBC BLD AUTO-RTO: 3.6 %
PHOSPHATE SERPL-MCNC: 3.6 MG/DL (ref 2.3–4.7)
PLATELET # BLD AUTO: 367 X10(3)/MCL (ref 130–400)
PMV BLD AUTO: 9.9 FL (ref 7.4–10.4)
POCT GLUCOSE: 130 MG/DL (ref 70–110)
POCT GLUCOSE: 164 MG/DL (ref 70–110)
POCT GLUCOSE: 172 MG/DL (ref 70–110)
POCT GLUCOSE: 191 MG/DL (ref 70–110)
POTASSIUM SERPL-SCNC: 3.3 MMOL/L (ref 3.5–5.1)
PROT SERPL-MCNC: 6.6 GM/DL (ref 5.8–7.6)
RBC # BLD AUTO: 2.64 X10(6)/MCL (ref 4.7–6.1)
SODIUM SERPL-SCNC: 141 MMOL/L (ref 136–145)
TROPONIN I SERPL-MCNC: 0.14 NG/ML (ref 0–0.04)
TROPONIN I SERPL-MCNC: 0.16 NG/ML (ref 0–0.04)
WBC # BLD AUTO: 16.77 X10(3)/MCL (ref 4.5–11.5)

## 2025-05-30 PROCEDURE — 25000003 PHARM REV CODE 250: Performed by: NURSE ANESTHETIST, CERTIFIED REGISTERED

## 2025-05-30 PROCEDURE — 63600175 PHARM REV CODE 636 W HCPCS: Performed by: INTERNAL MEDICINE

## 2025-05-30 PROCEDURE — 63600175 PHARM REV CODE 636 W HCPCS: Performed by: STUDENT IN AN ORGANIZED HEALTH CARE EDUCATION/TRAINING PROGRAM

## 2025-05-30 PROCEDURE — 25000003 PHARM REV CODE 250: Performed by: NURSE PRACTITIONER

## 2025-05-30 PROCEDURE — 25000003 PHARM REV CODE 250: Performed by: INTERNAL MEDICINE

## 2025-05-30 PROCEDURE — 94761 N-INVAS EAR/PLS OXIMETRY MLT: CPT

## 2025-05-30 PROCEDURE — 25000003 PHARM REV CODE 250

## 2025-05-30 PROCEDURE — 63600175 PHARM REV CODE 636 W HCPCS

## 2025-05-30 PROCEDURE — 25000003 PHARM REV CODE 250: Performed by: STUDENT IN AN ORGANIZED HEALTH CARE EDUCATION/TRAINING PROGRAM

## 2025-05-30 PROCEDURE — 63600175 PHARM REV CODE 636 W HCPCS: Performed by: NURSE ANESTHETIST, CERTIFIED REGISTERED

## 2025-05-30 PROCEDURE — 21400001 HC TELEMETRY ROOM

## 2025-05-30 PROCEDURE — 97535 SELF CARE MNGMENT TRAINING: CPT

## 2025-05-30 PROCEDURE — 36415 COLL VENOUS BLD VENIPUNCTURE: CPT

## 2025-05-30 PROCEDURE — 97530 THERAPEUTIC ACTIVITIES: CPT

## 2025-05-30 PROCEDURE — 27000221 HC OXYGEN, UP TO 24 HOURS

## 2025-05-30 PROCEDURE — 36415 COLL VENOUS BLD VENIPUNCTURE: CPT | Performed by: INTERNAL MEDICINE

## 2025-05-30 PROCEDURE — 80053 COMPREHEN METABOLIC PANEL: CPT

## 2025-05-30 PROCEDURE — 84484 ASSAY OF TROPONIN QUANT: CPT | Performed by: INTERNAL MEDICINE

## 2025-05-30 PROCEDURE — 83735 ASSAY OF MAGNESIUM: CPT

## 2025-05-30 PROCEDURE — 84100 ASSAY OF PHOSPHORUS: CPT

## 2025-05-30 PROCEDURE — 85025 COMPLETE CBC W/AUTO DIFF WBC: CPT

## 2025-05-30 RX ORDER — PROPOFOL 10 MG/ML
VIAL (ML) INTRAVENOUS
Status: DISCONTINUED | OUTPATIENT
Start: 2025-05-30 | End: 2025-05-30

## 2025-05-30 RX ORDER — POTASSIUM CHLORIDE 20 MEQ/1
40 TABLET, EXTENDED RELEASE ORAL ONCE
Status: COMPLETED | OUTPATIENT
Start: 2025-05-30 | End: 2025-05-30

## 2025-05-30 RX ORDER — FUROSEMIDE 10 MG/ML
40 INJECTION INTRAMUSCULAR; INTRAVENOUS ONCE
Status: COMPLETED | OUTPATIENT
Start: 2025-05-30 | End: 2025-05-30

## 2025-05-30 RX ORDER — LIDOCAINE HYDROCHLORIDE 20 MG/ML
INJECTION, SOLUTION EPIDURAL; INFILTRATION; INTRACAUDAL; PERINEURAL
Status: DISCONTINUED | OUTPATIENT
Start: 2025-05-30 | End: 2025-05-30

## 2025-05-30 RX ORDER — LIDOCAINE HYDROCHLORIDE 10 MG/ML
INJECTION, SOLUTION INFILTRATION; PERINEURAL
Status: DISCONTINUED | OUTPATIENT
Start: 2025-05-30 | End: 2025-05-30 | Stop reason: HOSPADM

## 2025-05-30 RX ORDER — SODIUM CHLORIDE 9 MG/ML
INJECTION, SOLUTION INTRAVENOUS CONTINUOUS
Status: DISCONTINUED | OUTPATIENT
Start: 2025-05-30 | End: 2025-05-30

## 2025-05-30 RX ORDER — ENOXAPARIN SODIUM 100 MG/ML
1 INJECTION SUBCUTANEOUS EVERY 12 HOURS
Status: DISCONTINUED | OUTPATIENT
Start: 2025-05-30 | End: 2025-06-01

## 2025-05-30 RX ORDER — ETOMIDATE 2 MG/ML
INJECTION INTRAVENOUS
Status: DISCONTINUED | OUTPATIENT
Start: 2025-05-30 | End: 2025-05-30

## 2025-05-30 RX ADMIN — PROPOFOL 30 MG: 10 INJECTION, EMULSION INTRAVENOUS at 02:05

## 2025-05-30 RX ADMIN — ETOMIDATE INJECTION 4 MG: 2 SOLUTION INTRAVENOUS at 02:05

## 2025-05-30 RX ADMIN — HYDRALAZINE HYDROCHLORIDE: 10 TABLET, FILM COATED ORAL at 08:05

## 2025-05-30 RX ADMIN — ATORVASTATIN CALCIUM 20 MG: 20 TABLET, FILM COATED ORAL at 08:05

## 2025-05-30 RX ADMIN — ENOXAPARIN SODIUM 90 MG: 100 INJECTION SUBCUTANEOUS at 08:05

## 2025-05-30 RX ADMIN — PENICILLIN G POTASSIUM 24 MILLION UNITS: 20000000 INJECTION, POWDER, FOR SOLUTION INTRAMUSCULAR; INTRAVENOUS at 09:05

## 2025-05-30 RX ADMIN — SODIUM CHLORIDE: 4.5 INJECTION, SOLUTION INTRAVENOUS at 08:05

## 2025-05-30 RX ADMIN — PROPOFOL 20 MG: 10 INJECTION, EMULSION INTRAVENOUS at 02:05

## 2025-05-30 RX ADMIN — METOPROLOL TARTRATE 50 MG: 50 TABLET, FILM COATED ORAL at 08:05

## 2025-05-30 RX ADMIN — ETOMIDATE INJECTION 2 MG: 2 SOLUTION INTRAVENOUS at 02:05

## 2025-05-30 RX ADMIN — LIDOCAINE HYDROCHLORIDE 50 MG: 20 INJECTION, SOLUTION EPIDURAL; INFILTRATION; INTRACAUDAL; PERINEURAL at 02:05

## 2025-05-30 RX ADMIN — SODIUM CHLORIDE: 9 INJECTION, SOLUTION INTRAVENOUS at 10:05

## 2025-05-30 RX ADMIN — FUROSEMIDE 40 MG: 10 INJECTION, SOLUTION INTRAVENOUS at 08:05

## 2025-05-30 RX ADMIN — LIDOCAINE HYDROCHLORIDE 5 ML: 10 INJECTION, SOLUTION INFILTRATION; PERINEURAL at 01:05

## 2025-05-30 RX ADMIN — SODIUM CHLORIDE: 9 INJECTION, SOLUTION INTRAVENOUS at 02:05

## 2025-05-30 RX ADMIN — METOPROLOL TARTRATE 50 MG: 50 TABLET, FILM COATED ORAL at 04:05

## 2025-05-30 RX ADMIN — ENOXAPARIN SODIUM 120 MG: 100 INJECTION SUBCUTANEOUS at 08:05

## 2025-05-30 RX ADMIN — POTASSIUM CHLORIDE 40 MEQ: 1500 TABLET, EXTENDED RELEASE ORAL at 06:05

## 2025-05-30 RX ADMIN — Medication 6 MG: at 08:05

## 2025-05-30 RX ADMIN — ASPIRIN 81 MG: 81 TABLET, COATED ORAL at 08:05
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
## 2025-05-30 NOTE — PT/OT/SLP PROGRESS
Occupational Therapy   Treatment    Name: Vaibhav Vargas  MRN: [id number]  Admitting Diagnosis:  Severe sepsis       Recommendations:     Discharge Recommendations: Moderate Intensity Therapy  Discharge Equipment Recommendations:  to be determined by next level of care  Barriers to discharge:  Decreased caregiver support    Assessment:     Vaibhav Vargas is a 74 y.o. male with a medical diagnosis of Severe sepsis.  He presents with functional decline and impaired occupational performance. Performance deficits affecting function are weakness, impaired endurance, impaired self care skills, impaired functional mobility, gait instability, impaired balance, impaired cognition, decreased coordination, decreased lower extremity function, decreased safety awareness, pain, decreased upper extremity function, impaired coordination, decreased ROM, impaired skin, edema.     Rehab Prognosis:  Good; patient would benefit from acute skilled OT services to address these deficits and reach maximum level of function.       Plan:     Patient to be seen 3 x/week to address the above listed problems via self-care/home management, therapeutic activities, therapeutic exercises, neuromuscular re-education, wheelchair management/training  Plan of Care Expires:  (d/c)  Plan of Care Reviewed with: patient    Subjective     Chief Complaint: weakness   Patient/Family Comments/goals: return to PLOF and maximize independence   Pain/Comfort:  Pain Rating 1: 6/10  Location 1:  (generalized all over)  Pain Addressed 1: Reposition, Cessation of Activity, Distraction, Nurse notified  Pain Rating Post-Intervention 1: 6/10    Objective:     Communicated with: nurse prior to session.  Patient found supine with bed alarm, peripheral IV, telemetry, oxygen, fowler catheter, pressure relief boots upon OT entry to room.    General Precautions: Standard, fall, NPO    Orthopedic Precautions:N/A  Braces: N/A  Respiratory Status: Room air     Occupational  Performance:     Bed Mobility:    Patient completed Rolling/Turning to Left with  maximal assistance and 1 persons  Patient completed Rolling/Turning to Right with maximal assistance  Patient completed Scooting/Bridging with maximal assistance and 1 persons  Patient completed Supine to Sit with moderate assistance and 1 persons  Patient completed Sit to Supine with moderate assistance and 2 persons     Functional Mobility/Transfers:  Patient completed Sit <> Stand Transfer with moderate assistance and of 2 persons  with  rolling walker   Functional Mobility: attempts to stand and take lateral steps with RW but unable to take steps requiring assistance to scoot laterally towards HOB for optimal positioning in bed.     Activities of Daily Living:  Grooming: contact guard assistance for trunk stability due to decreased strength for EOB mouth wash rinse and wiping face with washcloth    Treatment & Education:  Pt. educated on OT goals, POC, orientation to environment, use of call bell for assist with transfers OOB or for any other needs due to fall risk.    Patient left with bed in chair position with all lines intact, call button in reach, and nurse notified    GOALS:   Multidisciplinary Problems       Occupational Therapy Goals          Problem: Occupational Therapy    Goal Priority Disciplines Outcome Interventions   Occupational Therapy Goal     OT, PT/OT Progressing    Description: Pt will complete grooming seated EOB SBA.  Pt will complete UB dressing with SBA.  Pt will complete sit>stand with mod A with RW for prepare for BSC transfer.  Pt will engage/tolerate BUE general therex x 10 minutes to increase activity tolerance seated EOB.  Pt will tolerate static standing x 1 minute to assist with ADL tasks                       DME Justifications:  No DME recommended requiring DME justifications    Time Tracking:     OT Date of Treatment: 05/30/25  OT Start Time: 0936  OT Stop Time: 1010  OT Total Time (min): 34  min    Billable Minutes:Self Care/Home Management 14  Therapeutic Activity 20    OT/TETE: OT          5/30/2025
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
## 2025-05-30 NOTE — ANESTHESIA PREPROCEDURE EVALUATION
05/30/2025  Vaibhav Vargas is a 74 y.o., male with PMHx of obesity, CAD/stents/MI, DM, CVA (3/2025) presents for YO secondary to new onset HFrEF, afib.    Metoprolol--last dose 5/30/25 @ 0808  Aspirin--last dose 5/30/25 @ 0808    Active Ambulatory Problems     Diagnosis Date Noted    Chronic low back pain 11/08/2022    Edema 11/08/2022    Obesity 11/08/2022    Osteoarthritis of knee 11/08/2022    Primary hypertension 11/08/2022    Type 2 diabetes mellitus 11/08/2022    Coronary artery disease 06/16/2023    Myocardial infarction 03/24/2024    Acute stroke due to ischemia 03/31/2025     Resolved Ambulatory Problems     Diagnosis Date Noted    Hypoglycemia 03/24/2024    Hospital discharge follow-up 03/26/2024     Past Medical History:   Diagnosis Date    Chronic lumbar pain     Diabetes mellitus, type 2     Hypertension     Obesity, unspecified     Osteoarthritis of multiple joints        Pre-op Assessment    I have reviewed the NPO Status.      Review of Systems  Anesthesia Hx:  No problems with previous Anesthesia                Social:  Former Smoker       Cardiovascular:     Hypertension, well controlled  Past MI CAD   CABG/stent Dysrhythmias atrial fibrillation  CHF                                   Pulmonary:  Pulmonary Normal                       Renal/:  Renal/ Normal                 Hepatic/GI:  Hepatic/GI Normal                    Neurological:   CVA, residual symptoms                                    Endocrine:  Diabetes, well controlled, type 2         Obesity / BMI > 30    Vitals:    05/30/25 1146 05/30/25 1335 05/30/25 1340 05/30/25 1350   BP: (!) 130/57 127/61 110/63 119/63   BP Location:  Left arm Left arm Left arm   Patient Position:  Lying Lying Lying   Pulse: 63 105 101 96   Resp: 18 (!) 22 (!) 22 (!) 22   Temp: 36.4 °C (97.6 °F)      TempSrc: Oral      SpO2: 100% 96% (!) 93% 96%    Weight:       Height:             Physical Exam  General: Alert, Cooperative and Well nourished    Airway:  Mallampati: II   Mouth Opening: Normal  TM Distance: Normal  Tongue: Normal  Neck ROM: Normal ROM    Dental:  Intact    Chest/Lungs:  Clear to auscultation, Normal Respiratory Rate    Heart:  Rate: Normal  Rhythm: Irregularly Irregular  Sounds: Normal      Lab Results   Component Value Date    WBC 16.77 (H) 05/30/2025    HGB 8.1 (L) 05/30/2025    HCT 25.8 (L) 05/30/2025    MCV 97.7 (H) 05/30/2025     05/30/2025       CMP  Sodium   Date Value Ref Range Status   05/30/2025 141 136 - 145 mmol/L Final     Potassium   Date Value Ref Range Status   05/30/2025 3.3 (L) 3.5 - 5.1 mmol/L Final     Chloride   Date Value Ref Range Status   05/30/2025 100 98 - 107 mmol/L Final     CO2   Date Value Ref Range Status   05/30/2025 29 23 - 31 mmol/L Final     Glucose   Date Value Ref Range Status   05/30/2025 145 (H) 82 - 115 mg/dL Final     Blood Urea Nitrogen   Date Value Ref Range Status   05/30/2025 63.8 (H) 8.4 - 25.7 mg/dL Final     Creatinine   Date Value Ref Range Status   05/30/2025 1.85 (H) 0.72 - 1.25 mg/dL Final     Calcium   Date Value Ref Range Status   05/30/2025 8.6 (L) 8.8 - 10.0 mg/dL Final     Protein Total   Date Value Ref Range Status   05/30/2025 6.6 5.8 - 7.6 gm/dL Final     Albumin   Date Value Ref Range Status   05/30/2025 2.3 (L) 3.4 - 4.8 g/dL Final     Bilirubin Total   Date Value Ref Range Status   05/30/2025 0.4 <=1.5 mg/dL Final     ALP   Date Value Ref Range Status   05/30/2025 168 (H) 40 - 150 unit/L Final     AST   Date Value Ref Range Status   05/30/2025 47 (H) 11 - 45 unit/L Final     ALT   Date Value Ref Range Status   05/30/2025 153 (H) 0 - 55 unit/L Final     eGFR   Date Value Ref Range Status   05/30/2025 38 mL/min/1.73/m2 Final     Comment:     Estimated GFR calculated using the CKD-EPI creatinine (2021) equation.     Lab Results   Component Value Date    HGBA1C 5.6 05/24/2025          [facility] NOTE 5/30/25        Anesthesia Plan  Type of Anesthesia, risks & benefits discussed:    Anesthesia Type: Gen Natural Airway  Intra-op Monitoring Plan: Standard ASA Monitors  Post Op Pain Control Plan: IV/PO Opioids PRN  Induction:  IV  Informed Consent: Informed consent signed with the Patient and all parties understand the risks and agree with anesthesia plan.  All questions answered.   ASA Score: 4  Day of Surgery Review of History & Physical: H&P Update referred to the surgeon/provider.    Ready For Surgery From Anesthesia Perspective.     .

## 2025-05-30 NOTE — PROGRESS NOTES
Pharmacist Renal Dose Adjustment Note    Vaibhav Vargas is a 74 y.o. male being treated with the medication enoxaparin    Patient Data:    Vital Signs (Most Recent):  Temp: 97.7 °F (36.5 °C) (05/30/25 1546)  Pulse: 70 (05/30/25 1546)  Resp: 18 (05/30/25 1546)  BP: 117/64 (05/30/25 1546)  SpO2: 95 % (05/30/25 1546) Vital Signs (72h Range):  Temp:  [97.4 °F (36.3 °C)-98.6 °F (37 °C)]   Pulse:  []   Resp:  [17-22]   BP: (102-160)/(52-96)   SpO2:  [92 %-100 %]      Recent Labs   Lab 05/28/25  0439 05/29/25  0413 05/30/25  0414   CREATININE 1.97* 1.98* 1.85*     Serum creatinine: 1.85 mg/dL (H) 05/30/25 0414  Estimated creatinine clearance: 47.5 mL/min (A)    Medication: Enoxaparin 90 mg q 12 hours to enoxaparin 120 mg q 12 hours based on patients weight.    Pharmacist's Name: Alexa Bai  Pharmacist's Extension: [phone]

## 2025-05-30 NOTE — PT/OT/SLP PROGRESS
Physical Therapy Treatment    Patient Name:  Vaibhav Vargas   MRN:  [id number]    Recommendations     Therapy Intensity Recommendations at Discharge: Moderate Intensity Therapy  Discharge Equipment Recommendations: to be determined by next level of care   Barriers to discharge: fall risk, severity of deficits, level of skilled assistance required, and decreased endurance     Assessment     Vaihbav Vargas is a 74 y.o. male admitted with a medical diagnosis of Severe sepsis.  1. Acute renal failure, unspecified acute renal failure type    2. SOB (shortness of breath)    3. Shortness of breath    4. Hyperkalemia    5. Lactic acidosis    6. Complicated UTI (urinary tract infection)    7. Severe sepsis    8. Bladder outlet obstruction    9. Routine check-up    10. Tachycardia    11. Sepsis    12. QT prolongation    13. Cardiac mass       Problem List[1]   He presents with the following impairments/functional limitations:  weakness, impaired endurance, impaired cardiopulmonary response to activity, impaired balance, decreased lower extremity function, impaired self care skills, impaired functional mobility, impaired coordination, impaired fine motor.    Rehab Prognosis: Good-Fair.    Patient would benefit from continued skilled acute PT services to: address above listed impairments/functional limitations; receive patient/caregiver education; reduce fall risk; and maximize independency/safety with functional mobility.    -continued: cardiac chair positioning in bed, bed level activities, sitting edge of bed, standing edge of bed, as tolerated/appropriate, with assistance and supervision, therapeutic exercises    Recent Surgery: * No surgery found *      Plan     During this hospitalization, patient to be seen 5 x/week to address the identified impairments/functional limitations via gait training, therapeutic activities, therapeutic exercises, neuromuscular re-education, wheelchair management/training and progress toward  the established goals.    Plan of Care Expires:  06/25/25    Subjective     Communicated with patient's nurse Vonnie prior to session.    Patient agreeable to participate in treatment session.    Chief Complaint: pain  Patient/Family Comments/goals: none verablized  Pain/Comfort:  Pain Rating 1: 6/10  Location 1:  (all over)  Pain Addressed 1: Cessation of Activity, Distraction, Reposition  Pain Rating Post-Intervention 1: 6/10    Objective     OT Isidoro and PM&R TECH Branden in room for therapy session    Patient found supine in bed, with HOB elevated, and with bed rails up bilateral HOB, left FOB, and right FOB with bed alarm, peripheral IV, telemetry, oxygen, fowler catheter, pressure relief boots  upon PT entry to room.    General Precautions: Standard, fall, NPO   Orthopedic Precautions:N/A   Braces: N/A  Respiratory Status: 2 liters/min O2 via nasal cannula  SAT O2 Check: n/a    Functional Mobility:    Bed Mobility:  Rolling Right: maximal assistance  Scooting: maximal assistance  Supine to Sit: moderate assistance  Sit to Supine: moderated assistance and of 2 persons  Repositioning to head-of-bed: minimum assistance w/ bed assist  Repositioning to center-of-bed: minimum assistance w/ bed assist  with cues for proper technique  with HOB elevated, hand rail, firm mattress, and trendelenburg (repositioning to HOB and center of bed) positioning    Transfers:  Sit to Stand: moderate assistance and of 2 persons with rolling walker  Stand to Sit: moderate assistance and of 2 persons with rolling walker  with cues for hand placement, foot placement, and posture  with firm mattress and elevated surface x6 inches  *2 sessions of standing w/ lateral shift of hip toward HOB w/ maximal assist of therapist  *patient unable to side step (no mvmt of either foot on/from floor surface    Gait:  N/A    Other Mobility:  Therapeutic Activities performed:        -sitting balance activities:              weight shifting:                    -laterally (left)                 -laterally (right)            scooting:                   -forward                 -backward            repositioning at edge of bed            patient grooming, wash washing, teeth brushing, mouthwash activities demonstrated w/ OT assist       and therapy assist w/ maintain upright posture and safety concerns    Balance:  Sit  Patient demonstrated static balance on level surface with contact guard assistance with minimal verbal cues.  Patient demonstrated dynamic balance on level surface with minimum assistance with minimal verbal cues during minimal excursions.  Stand  Patient demonstrated static balance on level surface  using rolling walker with moderate assistance and of 2 persons with moderate verbal cues.    Patient left in semi-cardiac chair position w/ slight trendelenburg (prevent slide toward FOB) - notified patients nurse,  with HOB elevated, and with bed rails up bilateral HOB, left FOB, and right FOB (per patient request) with peripheral IV, telemetry, oxygen, fowler catheter, pressure relief boots (heel protectors) with all lines intact, call button in reach, tray table at bedside, patient's nurse notified, and bed alarm on.    DME Justifications: - pending progress  No DME recommended requiring DME justifications    Education     Patient educated on and assisted with functional mobility as noted above.  Patient educated on PT Plan of Care  Patient was instructed to utilize staff assistance for mobility/transfers.  White board updated regarding patient's safest level of mobility with staff assistance.    Goals     Multidisciplinary Problems       Physical Therapy Goals       Problem: Physical Therapy    Goal Priority Disciplines Outcome Interventions   Physical Therapy Goal     PT, PT/OT Progressing    Description: REVIEWED 05/30/2025  Goals to be met by: DISCHARGE  Patient will increase functional independence with mobility by performing:  -. Supine to sit with  MInimal Assistance - ONGOING  -. Sit to supine with MInimal Assistance - ONGOING  -. Rolling to Left and Right with Minimal Assistance - ONGOING  -. Sit to stand transfer with Minimal Assistance w/ rolling walker - ONGOING  -. Gait  x 25 feet with Minimal Assistance using Rolling Walker w/ wheelchair in tow - ONGOING           Time Tracking     PT Received On: 05/30/25  PT Start Time: 0943     PT Stop Time: 1010  PT Total Time (min): 27 min     Billable Minutes: Therapeutic Activity 27  *Co- treatment performed due to patient's multiple deficits requiring 2 skilled therapists to appropriately and safely assess patient's strength and endurance while facilitating functional tasks in addition to accommodating for patient's activity tolerance     Non-Billable Minutes: N/A  05/30/2025         [1]   Patient Active Problem List  Diagnosis    Chronic low back pain    Edema    Obesity    Osteoarthritis of knee    Primary hypertension    Type 2 diabetes mellitus    Coronary artery disease    Myocardial infarction    Acute stroke due to ischemia    Severe sepsis    Severe malnutrition

## 2025-05-30 NOTE — TRANSFER OF CARE
Anesthesia Transfer of Care Note    Patient: Vaibhav Vargas    Procedure(s) Performed: * No procedures listed *    Patient location: Cath Lab    Anesthesia Type: general    Transport from OR: Transported from OR on 6-10 L/min O2 by face mask with adequate spontaneous ventilation    Post pain: adequate analgesia    Post assessment: no apparent anesthetic complications and tolerated procedure well    Post vital signs: stable    Level of consciousness: awake    Nausea/Vomiting: no nausea/vomiting    Complications: none    Transfer of care protocol was followed

## 2025-05-30 NOTE — ANESTHESIA POSTPROCEDURE EVALUATION
Anesthesia Post Evaluation    Patient: Vaibhav Vargas    Procedure(s) Performed: * No procedures listed *    Final Anesthesia Type: general      Patient location during evaluation: GI PACU  Patient participation: Yes- Able to Participate  Level of consciousness: awake and alert  Post-procedure vital signs: reviewed and stable  Pain management: adequate  Airway patency: patent    PONV status at discharge: No PONV  Anesthetic complications: no      Cardiovascular status: hemodynamically stable  Respiratory status: unassisted and room air  Follow-up not needed.

## 2025-05-30 NOTE — PROGRESS NOTES
"[facility]  Nephrology Progress Note    Patient Name: Vaibhav Vargas  Age: 74 y.o.  : 1950  MRN: [id number]  Admission Date: 2025    Chief complaint: Shortness of Breath (From [facility] with complaints of SOB and lower back pain for about 3 days. Abdominal distension and bilateral leg edema noted. Fowler in place upon arrival with cloudy urine noted.)      Hospital course  Vaibhav Vargas is a 74 y.o. Black or  male with past medical history of CVA 2025, CAD s/p LAD stent , venous insuffiency, HTN, 1st Degree AVB, Type 2 Diabetes Mellitus.  Presented to ER on 25 with poor appetite, leg swelling, back pain, abdominal pain and cloudy urine in Fowler x 3 days.  Ct revealed a malpositioned fowler and bilateral hydronephrosis.  Labs were unremarkable for hyperkalemia, PRIYA( Cr 5)  and a UTI . A new fowler was inserted in the ED and patient was admitted to Medicine Services. Renal Services was consulted for PRIYA and recommendations prior to contrast studies.     Subjective  Patient denies any complaints  Review of Systems  Respiratory: negative for dyspnea  Cardiovascular: negative for chest pain, positive for leg swelling.   Objective  /68   Pulse 103   Temp 97.9 °F (36.6 °C) (Oral)   Resp 18   Ht 6' 2" (1.88 m)   Wt 116.2 kg (256 lb 2.8 oz)   SpO2 98%   BMI 32.89 kg/m²     Intake/Output Summary (Last 24 hours) at 2025 0932  Last data filed at 2025 0557  Gross per 24 hour   Intake 118 ml   Output 3800 ml   Net -3682 ml       Physical Exam  General appearance: Patient is in no acute distress.  Skin: No rashes or wounds.  HEENT: PERRLA, EOMI, no scleral icterus, no JVD. Neck is supple.  Chest: Respirations are unlabored. Lungs sounds are clear.   Heart: S1, S2.   Abdomen: Benign.  : Fowler draining to gravity.   Extremities:  BLE edema, peripheral pulses are palpable.   Neuro: No focal deficits. " "    Medications  Scheduled Meds:   aspirin  81 mg Oral Daily    atorvastatin  20 mg Oral Daily    enoxaparin  1 mg/kg Subcutaneous Q12H    hydrALAZINE 10 mg 1 tablet, hydrALAZINE 25 mg 1 tablet, isorsorbide dinitrate 20 mg 1 tablet combination   Oral TID    metoprolol tartrate  50 mg Oral QID    penicillin G potassium 24 Million Units in D5W 500 mL CONTINUOUS INFUSION  24 Million Units Intravenous Q24H     Continuous Infusions:   0.45% NaCl   Intravenous Continuous 100 mL/hr at 05/30/25 0813 New Bag at 05/30/25 0813     PRN Meds:.  Current Facility-Administered Medications:     acetaminophen, 650 mg, Oral, Q4H PRN    dextrose 50%, 12.5 g, Intravenous, PRN    dextrose 50%, 25 g, Intravenous, PRN    glucagon (human recombinant), 1 mg, Intramuscular, PRN    glucose, 16 g, Oral, PRN    glucose, 24 g, Oral, PRN    HYDROcodone-acetaminophen, 1 tablet, Oral, Q6H PRN    insulin aspart U-100, 0-5 Units, Subcutaneous, QID (AC + HS) PRN    melatonin, 6 mg, Oral, Nightly PRN    ondansetron, 4 mg, Intravenous, Q6H PRN    polyethylene glycol, 17 g, Oral, Daily PRN    senna-docusate, 1 tablet, Oral, Daily PRN    sodium chloride 0.9%, 10 mL, Intravenous, PRN     Imaging:    Reviewed    Laboratory Data:    Chemistry  Recent Labs     05/27/25  1517 05/28/25  0439 05/29/25  0413 05/30/25  0414   * 146* 145 141   K 4.2 3.9 3.6 3.3*   CO2 23 23 26 29   BUN 63.9* 63.9* 71.1* 63.8*   CREATININE 2.02* 1.97* 1.98* 1.85*   EGFRNORACEVR 34 35 35 38   CALCIUM 9.1 8.6* 8.9 8.6*   MG 2.20 2.10 2.00 1.80   PHOS  --  4.2 4.2 3.6      LFT  Lab Results   Component Value Date    ALKPHOS 168 (H) 05/30/2025    AST 47 (H) 05/30/2025     (H) 05/30/2025    BILIDIR 0.3 04/22/2022    IBILI 0.30 04/22/2022    BILITOT 0.4 05/30/2025    ALBUMIN 2.3 (L) 05/30/2025      CKD-MBD  No results found for: "PTH", "[id number]"   Hematology  Recent Labs     05/28/25  0439 05/29/25  0413 05/30/25  0414   WBC 16.70* 17.32* 16.77*   HGB 9.6* 9.0* 8.1*   HCT " 29.5* 28.2* 25.8*    353 367      Lab Results   Component Value Date    FOLATE 12.6 03/24/2025    LHQCCUAS17 <148 (L) 03/24/2025      ID  Lab Results   Component Value Date    HIV Nonreactive 05/29/2025    HEPCAB Nonreactive 05/28/2025    HEPBSAB Nonreactive 05/28/2025      Additional serology  Lab Results   Component Value Date    HGBA1C 5.6 05/24/2025       Urine studies  Lab Results   Component Value Date    APPEARANCEUA Clear 05/29/2025    SGUA 1.009 05/29/2025    PROTEINUA Negative 05/29/2025    KETONESUA Negative 05/29/2025    LEUKOCYTESUR 250 (A) 05/29/2025    RBCUA 0-5 05/29/2025    WBCUA 21-50 (A) 05/29/2025    BACTERIA None Seen 05/29/2025    SQEPUA None Seen 05/29/2025    HYALINECASTS None Seen 05/29/2025    CREATRANDUR 57.5 (L) 02/14/2024        Impression/Plan  bAcute Kidney Injury Prerenal Azotemia  Renal indices are improving. Patient with increased amount of urine output likely due to postobstructive diuresis. Will continue   0.45% NaCl infusion and monitor CMP.    2. Hypokalemia  Patient with decreased potassium today , agree with replacement.     3. Urinary Tract Infection   Continue antibiotics per Primary Team      Talia Carrington NP  [facility]   5/30/2025

## 2025-05-31 LAB
ABORH RETYPE: NORMAL
ALBUMIN SERPL-MCNC: 2.2 G/DL (ref 3.4–4.8)
ALBUMIN/GLOB SERPL: 0.6 RATIO (ref 1.1–2)
ALP SERPL-CCNC: 115 UNIT/L (ref 40–150)
ALT SERPL-CCNC: 105 UNIT/L (ref 0–55)
ANION GAP SERPL CALC-SCNC: 10 MEQ/L
ANION GAP SERPL CALC-SCNC: 12 MEQ/L
AST SERPL-CCNC: 34 UNIT/L (ref 11–45)
BASOPHILS # BLD AUTO: 0.04 X10(3)/MCL
BASOPHILS NFR BLD AUTO: 0.3 %
BILIRUB SERPL-MCNC: 0.3 MG/DL
BUN SERPL-MCNC: 71.3 MG/DL (ref 8.4–25.7)
BUN SERPL-MCNC: 75.4 MG/DL (ref 8.4–25.7)
CALCIUM SERPL-MCNC: 8.3 MG/DL (ref 8.8–10)
CALCIUM SERPL-MCNC: 8.3 MG/DL (ref 8.8–10)
CHLORIDE SERPL-SCNC: 101 MMOL/L (ref 98–107)
CHLORIDE SERPL-SCNC: 102 MMOL/L (ref 98–107)
CO2 SERPL-SCNC: 26 MMOL/L (ref 23–31)
CO2 SERPL-SCNC: 30 MMOL/L (ref 23–31)
CREAT SERPL-MCNC: 1.66 MG/DL (ref 0.72–1.25)
CREAT SERPL-MCNC: 1.68 MG/DL (ref 0.72–1.25)
CREAT/UREA NIT SERPL: 42
CREAT/UREA NIT SERPL: 45
CRP SERPL-MCNC: 24.7 MG/L
EOSINOPHIL # BLD AUTO: 0.52 X10(3)/MCL (ref 0–0.9)
EOSINOPHIL NFR BLD AUTO: 3.3 %
ERYTHROCYTE [DISTWIDTH] IN BLOOD BY AUTOMATED COUNT: 16.1 % (ref 11.5–17)
ERYTHROCYTE [SEDIMENTATION RATE] IN BLOOD: 46 MM/HR (ref 0–15)
GFR SERPLBLD CREATININE-BSD FMLA CKD-EPI: 42 ML/MIN/1.73/M2
GFR SERPLBLD CREATININE-BSD FMLA CKD-EPI: 43 ML/MIN/1.73/M2
GLOBULIN SER-MCNC: 3.7 GM/DL (ref 2.4–3.5)
GLUCOSE SERPL-MCNC: 169 MG/DL (ref 82–115)
GLUCOSE SERPL-MCNC: 228 MG/DL (ref 82–115)
GROUP & RH: NORMAL
HCT VFR BLD AUTO: 20.9 % (ref 42–52)
HCT VFR BLD AUTO: 23.5 % (ref 42–52)
HGB BLD-MCNC: 6.6 G/DL (ref 14–18)
HGB BLD-MCNC: 7.5 G/DL (ref 14–18)
IMM GRANULOCYTES # BLD AUTO: 0.37 X10(3)/MCL (ref 0–0.04)
IMM GRANULOCYTES NFR BLD AUTO: 2.3 %
INDIRECT COOMBS: NORMAL
LYMPHOCYTES # BLD AUTO: 2.51 X10(3)/MCL (ref 0.6–4.6)
LYMPHOCYTES NFR BLD AUTO: 15.9 %
MAGNESIUM SERPL-MCNC: 1.7 MG/DL (ref 1.6–2.6)
MAGNESIUM SERPL-MCNC: 2.1 MG/DL (ref 1.6–2.6)
MCH RBC QN AUTO: 31.6 PG (ref 27–31)
MCHC RBC AUTO-ENTMCNC: 31.6 G/DL (ref 33–36)
MCV RBC AUTO: 100 FL (ref 80–94)
MONOCYTES # BLD AUTO: 1.19 X10(3)/MCL (ref 0.1–1.3)
MONOCYTES NFR BLD AUTO: 7.5 %
NEUTROPHILS # BLD AUTO: 11.15 X10(3)/MCL (ref 2.1–9.2)
NEUTROPHILS NFR BLD AUTO: 70.7 %
NRBC BLD AUTO-RTO: 2.6 %
PHOSPHATE SERPL-MCNC: 3.6 MG/DL (ref 2.3–4.7)
PLATELET # BLD AUTO: 310 X10(3)/MCL (ref 130–400)
PMV BLD AUTO: 9.9 FL (ref 7.4–10.4)
POCT GLUCOSE: 203 MG/DL (ref 70–110)
POCT GLUCOSE: 204 MG/DL (ref 70–110)
POCT GLUCOSE: 211 MG/DL (ref 70–110)
POCT GLUCOSE: 216 MG/DL (ref 70–110)
POTASSIUM SERPL-SCNC: 3.7 MMOL/L (ref 3.5–5.1)
POTASSIUM SERPL-SCNC: 4.5 MMOL/L (ref 3.5–5.1)
PROT SERPL-MCNC: 5.9 GM/DL (ref 5.8–7.6)
RBC # BLD AUTO: 2.09 X10(6)/MCL (ref 4.7–6.1)
SODIUM SERPL-SCNC: 139 MMOL/L (ref 136–145)
SODIUM SERPL-SCNC: 142 MMOL/L (ref 136–145)
SPECIMEN OUTDATE: NORMAL
TROPONIN I SERPL-MCNC: 0.12 NG/ML (ref 0–0.04)
WBC # BLD AUTO: 15.78 X10(3)/MCL (ref 4.5–11.5)

## 2025-05-31 PROCEDURE — 63600175 PHARM REV CODE 636 W HCPCS

## 2025-05-31 PROCEDURE — 84484 ASSAY OF TROPONIN QUANT: CPT | Performed by: INTERNAL MEDICINE

## 2025-05-31 PROCEDURE — 83735 ASSAY OF MAGNESIUM: CPT | Performed by: INTERNAL MEDICINE

## 2025-05-31 PROCEDURE — 25000003 PHARM REV CODE 250: Performed by: STUDENT IN AN ORGANIZED HEALTH CARE EDUCATION/TRAINING PROGRAM

## 2025-05-31 PROCEDURE — 63600175 PHARM REV CODE 636 W HCPCS: Mod: JZ,TB

## 2025-05-31 PROCEDURE — 83735 ASSAY OF MAGNESIUM: CPT

## 2025-05-31 PROCEDURE — 25000003 PHARM REV CODE 250: Performed by: INTERNAL MEDICINE

## 2025-05-31 PROCEDURE — 36415 COLL VENOUS BLD VENIPUNCTURE: CPT | Performed by: INTERNAL MEDICINE

## 2025-05-31 PROCEDURE — 63600175 PHARM REV CODE 636 W HCPCS: Performed by: INTERNAL MEDICINE

## 2025-05-31 PROCEDURE — 85014 HEMATOCRIT: CPT | Performed by: INTERNAL MEDICINE

## 2025-05-31 PROCEDURE — 25000003 PHARM REV CODE 250

## 2025-05-31 PROCEDURE — 86901 BLOOD TYPING SEROLOGIC RH(D): CPT

## 2025-05-31 PROCEDURE — 85652 RBC SED RATE AUTOMATED: CPT | Performed by: NURSE PRACTITIONER

## 2025-05-31 PROCEDURE — P9016 RBC LEUKOCYTES REDUCED: HCPCS

## 2025-05-31 PROCEDURE — 94761 N-INVAS EAR/PLS OXIMETRY MLT: CPT

## 2025-05-31 PROCEDURE — 63600175 PHARM REV CODE 636 W HCPCS: Performed by: STUDENT IN AN ORGANIZED HEALTH CARE EDUCATION/TRAINING PROGRAM

## 2025-05-31 PROCEDURE — 27000221 HC OXYGEN, UP TO 24 HOURS

## 2025-05-31 PROCEDURE — 85025 COMPLETE CBC W/AUTO DIFF WBC: CPT

## 2025-05-31 PROCEDURE — 93005 ELECTROCARDIOGRAM TRACING: CPT

## 2025-05-31 PROCEDURE — 80053 COMPREHEN METABOLIC PANEL: CPT

## 2025-05-31 PROCEDURE — 36415 COLL VENOUS BLD VENIPUNCTURE: CPT

## 2025-05-31 PROCEDURE — 21400001 HC TELEMETRY ROOM

## 2025-05-31 PROCEDURE — 84100 ASSAY OF PHOSPHORUS: CPT

## 2025-05-31 PROCEDURE — 86923 COMPATIBILITY TEST ELECTRIC: CPT

## 2025-05-31 PROCEDURE — 30233N1 TRANSFUSION OF NONAUTOLOGOUS RED BLOOD CELLS INTO PERIPHERAL VEIN, PERCUTANEOUS APPROACH: ICD-10-PCS | Performed by: INTERNAL MEDICINE

## 2025-05-31 PROCEDURE — 86140 C-REACTIVE PROTEIN: CPT | Performed by: NURSE PRACTITIONER

## 2025-05-31 RX ORDER — BUMETANIDE 0.25 MG/ML
2 INJECTION, SOLUTION INTRAMUSCULAR; INTRAVENOUS 2 TIMES DAILY
Status: DISCONTINUED | OUTPATIENT
Start: 2025-05-31 | End: 2025-05-31

## 2025-05-31 RX ORDER — METOPROLOL TARTRATE 25 MG/1
25 TABLET, FILM COATED ORAL 4 TIMES DAILY
Status: DISCONTINUED | OUTPATIENT
Start: 2025-05-31 | End: 2025-06-01

## 2025-05-31 RX ORDER — SODIUM CHLORIDE 9 MG/ML
INJECTION, SOLUTION INTRAVENOUS ONCE
Status: DISCONTINUED | OUTPATIENT
Start: 2025-05-31 | End: 2025-06-11

## 2025-05-31 RX ORDER — DIGOXIN 0.25 MG/ML
500 INJECTION INTRAMUSCULAR; INTRAVENOUS ONCE
Status: DISCONTINUED | OUTPATIENT
Start: 2025-05-31 | End: 2025-05-31

## 2025-05-31 RX ORDER — HYDROCODONE BITARTRATE AND ACETAMINOPHEN 500; 5 MG/1; MG/1
TABLET ORAL
Status: DISCONTINUED | OUTPATIENT
Start: 2025-05-31 | End: 2025-06-01

## 2025-05-31 RX ORDER — POTASSIUM CHLORIDE 7.45 MG/ML
10 INJECTION INTRAVENOUS
Status: COMPLETED | OUTPATIENT
Start: 2025-05-31 | End: 2025-05-31

## 2025-05-31 RX ORDER — DIGOXIN 0.25 MG/ML
INJECTION INTRAMUSCULAR; INTRAVENOUS
Status: CANCELLED | OUTPATIENT
Start: 2025-05-31

## 2025-05-31 RX ORDER — BUMETANIDE 0.25 MG/ML
2 INJECTION, SOLUTION INTRAMUSCULAR; INTRAVENOUS DAILY
Status: DISCONTINUED | OUTPATIENT
Start: 2025-05-31 | End: 2025-05-31

## 2025-05-31 RX ORDER — MAGNESIUM SULFATE 1 G/100ML
1 INJECTION INTRAVENOUS ONCE
Status: COMPLETED | OUTPATIENT
Start: 2025-05-31 | End: 2025-05-31

## 2025-05-31 RX ORDER — LIDOCAINE 50 MG/G
1 PATCH TOPICAL
Status: DISCONTINUED | OUTPATIENT
Start: 2025-05-31 | End: 2025-06-12 | Stop reason: HOSPADM

## 2025-05-31 RX ADMIN — ASPIRIN 81 MG: 81 TABLET, COATED ORAL at 08:05

## 2025-05-31 RX ADMIN — LIDOCAINE 5% 1 PATCH: 700 PATCH TOPICAL at 10:05

## 2025-05-31 RX ADMIN — INSULIN ASPART 2 UNITS: 100 INJECTION, SOLUTION INTRAVENOUS; SUBCUTANEOUS at 04:05

## 2025-05-31 RX ADMIN — METOPROLOL TARTRATE 50 MG: 50 TABLET, FILM COATED ORAL at 08:05

## 2025-05-31 RX ADMIN — METOPROLOL TARTRATE 25 MG: 25 TABLET, FILM COATED ORAL at 08:05

## 2025-05-31 RX ADMIN — PENICILLIN G POTASSIUM 24 MILLION UNITS: 20000000 INJECTION, POWDER, FOR SOLUTION INTRAMUSCULAR; INTRAVENOUS at 01:05

## 2025-05-31 RX ADMIN — INSULIN ASPART 2 UNITS: 100 INJECTION, SOLUTION INTRAVENOUS; SUBCUTANEOUS at 01:05

## 2025-05-31 RX ADMIN — MAGNESIUM SULFATE IN DEXTROSE 1 G: 10 INJECTION, SOLUTION INTRAVENOUS at 03:05

## 2025-05-31 RX ADMIN — ATORVASTATIN CALCIUM 20 MG: 20 TABLET, FILM COATED ORAL at 08:05

## 2025-05-31 RX ADMIN — POTASSIUM CHLORIDE 10 MEQ: 7.46 INJECTION, SOLUTION INTRAVENOUS at 03:05

## 2025-05-31 RX ADMIN — POTASSIUM CHLORIDE 10 MEQ: 7.46 INJECTION, SOLUTION INTRAVENOUS at 04:05

## 2025-05-31 RX ADMIN — ENOXAPARIN SODIUM 120 MG: 100 INJECTION SUBCUTANEOUS at 08:05

## 2025-05-31 RX ADMIN — HYDRALAZINE HYDROCHLORIDE: 10 TABLET, FILM COATED ORAL at 08:05

## 2025-05-31 RX ADMIN — INSULIN ASPART 2 UNITS: 100 INJECTION, SOLUTION INTRAVENOUS; SUBCUTANEOUS at 08:05

## 2025-05-31 RX ADMIN — BUMETANIDE 1 MG: 0.25 INJECTION INTRAMUSCULAR; INTRAVENOUS at 09:05

## 2025-05-31 RX ADMIN — SODIUM CHLORIDE, POTASSIUM CHLORIDE, SODIUM LACTATE AND CALCIUM CHLORIDE 500 ML: 600; 310; 30; 20 INJECTION, SOLUTION INTRAVENOUS at 08:05

## 2025-05-31 RX ADMIN — METOPROLOL TARTRATE 25 MG: 25 TABLET, FILM COATED ORAL at 04:05
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
## 2025-05-31 NOTE — CARE UPDATE
The patient went into RVR again with rate in the 180s intermittently, we were about to upgrade to the ICU and start Digoxin before he converted to sinus rhythm with PACs and 1st degree AV block, he has T wave abnormalities that have been present on previous EKGs.    Informed the nurse about the risk for embolic event after auto cardioversion, will order neuro checks q4, he remains on full dose Lovenox.    Bernice Lipscomb MD  Internal Medicine - PGY-2

## 2025-05-31 NOTE — PROGRESS NOTES
"  Nephrology Progress Note    Patient Name: Vaibhav Vargas  Age: 74 y.o.  : 1950  MRN: [id number]  Admission Date: 2025    Chief complaint: Shortness of Breath (From [facility] with complaints of SOB and lower back pain for about 3 days. Abdominal distension and bilateral leg edema noted. Palomo in place upon arrival with cloudy urine noted.)      Hospital course  Vaibhav Vargas is a 74 y.o. Black or  male with past medical history of cerebrovascular accident (2025), coronary artery disease status post LAD stent (), venous insufficiency, hypertension, first-degree AV block, and type 2 diabetes mellitus, who presented to the ED from a nursing home due to progressive weakness over the past week. His daughter provided history due to his communication difficulties. Associated symptoms included altered mental status, back pain, and abdominal discomfort. In the ED, vitals were stable, but lab results were remarkable for severe hyperkalemia and azotemia.  He was treated with Lokelma, insulin, and albuterol, started on Rocephin for a urinary tract infection.  Imaging of the abdomen revealed hydronephrosis and malpositioned Palomo catheter.  The Palomo catheter has been subsequently replaced and renal function has improved.  Nephrology service continues to follow for assistance with management of PRIYA.      Subjective  Patient is resting in bed, alert, complains of back pain.      Review of Systems  Cardiovascular: Negative for chest pain   Respiratory: Negative for shortness of breath.  Objective  BP (!) 113/59   Pulse 64   Temp 97.6 °F (36.4 °C) (Oral)   Resp 18   Ht 6' 2" (1.88 m)   Wt 116.1 kg (256 lb)   SpO2 100%   BMI 32.87 kg/m²     Intake/Output Summary (Last 24 hours) at 2025 0747  Last data filed at 2025 0517  Gross per 24 hour   Intake 540 ml   Output 3450 ml   Net -2910 ml       Physical Exam  General appearance: Patient is in no acute " distress.  Skin: No rashes or wounds.  HEENT: PERRLA, EOMI, no scleral icterus, no JVD. Neck is supple.  Chest: Respirations are unlabored. Lungs sounds are clear.   Heart: S1, S2.   Abdomen: Benign.  :  Palomo catheter to gravity.  Extremities: No edema, peripheral pulses are palpable.   Neuro: No focal deficits.     Medications  Scheduled Meds:   aspirin  81 mg Oral Daily    atorvastatin  20 mg Oral Daily    bumetanide  2 mg Intravenous Daily    enoxaparin  1 mg/kg Subcutaneous Q12H    hydrALAZINE 10 mg 1 tablet, hydrALAZINE 25 mg 1 tablet, isorsorbide dinitrate 20 mg 1 tablet combination   Oral TID    metoprolol tartrate  50 mg Oral QID    penicillin G potassium 24 Million Units in D5W 500 mL CONTINUOUS INFUSION  24 Million Units Intravenous Q24H     Continuous Infusions:  PRN Meds:.  Current Facility-Administered Medications:     0.9%  NaCl infusion (for blood administration), , Intravenous, Q24H PRN    acetaminophen, 650 mg, Oral, Q4H PRN    dextrose 50%, 12.5 g, Intravenous, PRN    dextrose 50%, 25 g, Intravenous, PRN    glucagon (human recombinant), 1 mg, Intramuscular, PRN    glucose, 16 g, Oral, PRN    glucose, 24 g, Oral, PRN    HYDROcodone-acetaminophen, 1 tablet, Oral, Q6H PRN    insulin aspart U-100, 0-5 Units, Subcutaneous, QID (AC + HS) PRN    melatonin, 6 mg, Oral, Nightly PRN    ondansetron, 4 mg, Intravenous, Q6H PRN    polyethylene glycol, 17 g, Oral, Daily PRN    senna-docusate, 1 tablet, Oral, Daily PRN    sodium chloride 0.9%, 10 mL, Intravenous, PRN     Imaging:    Reviewed    Laboratory Data:    Chemistry  Recent Labs     05/29/25  0413 05/30/25  0414 05/31/25  0324    141 142   K 3.6 3.3* 3.7   CO2 26 29 30   BUN 71.1* 63.8* 71.3*   CREATININE 1.98* 1.85* 1.68*   EGFRNORACEVR 35 38 42   CALCIUM 8.9 8.6* 8.3*   MG 2.00 1.80 1.70   PHOS 4.2 3.6 3.6      LFT  Lab Results   Component Value Date    ALKPHOS 115 05/31/2025    AST 34 05/31/2025     (H) 05/31/2025    BILIDIR 0.3  04/22/2022    IBILI 0.30 04/22/2022    BILITOT 0.3 05/31/2025    ALBUMIN 2.2 (L) 05/31/2025        Hematology  Recent Labs     05/29/25  0413 05/30/25  0414 05/31/25  0324   WBC 17.32* 16.77* 15.78*   HGB 9.0* 8.1* 6.6*   HCT 28.2* 25.8* 20.9*    367 310      Lab Results   Component Value Date    FOLATE 12.6 03/24/2025    DRCLGRGT21 <148 (L) 03/24/2025      ID  Lab Results   Component Value Date    HIV Nonreactive 05/29/2025    HEPCAB Nonreactive 05/28/2025    HEPBSAB Nonreactive 05/28/2025      Additional serology  Lab Results   Component Value Date    HGBA1C 5.6 05/24/2025       Urine studies  Lab Results   Component Value Date    APPEARANCEUA Clear 05/29/2025    SGUA 1.009 05/29/2025    PROTEINUA Negative 05/29/2025    KETONESUA Negative 05/29/2025    LEUKOCYTESUR 250 (A) 05/29/2025    RBCUA 0-5 05/29/2025    WBCUA 21-50 (A) 05/29/2025    BACTERIA None Seen 05/29/2025    SQEPUA None Seen 05/29/2025    HYALINECASTS None Seen 05/29/2025    CREATRANDUR 57.5 (L) 02/14/2024        Impression  Acute kidney injury, nonoliguric   Metabolic alkalosis  Urinary tract infection   Atrial fibrillation with rapid ventricular response   Acute decompensated heart failure   Hypertension   Acute anemia   Transaminitis    Plan  Renal indices are stable, urinary output is excellent, but metabolic alkalosis is worsening.  Will hold diuretic therapy for now.  Consider PRBC transfusion since hemoglobin level this morning is 6.6 grams/deciliter, consider evaluation for etiology of blood loss.    Skye Galindo NP  Nephrology Service   5/31/2025

## 2025-06-01 LAB
A-ADO2 BLOOD GAS (OHS): 326 MMHG
ABO + RH BLD: NORMAL
ALBUMIN SERPL-MCNC: 2.3 G/DL (ref 3.4–4.8)
ALBUMIN SERPL-MCNC: 2.3 G/DL (ref 3.4–4.8)
ALBUMIN/GLOB SERPL: 0.5 RATIO (ref 1.1–2)
ALBUMIN/GLOB SERPL: 0.6 RATIO (ref 1.1–2)
ALLENS TEST BLOOD GAS (OHS): YES
ALP SERPL-CCNC: 68 UNIT/L (ref 40–150)
ALP SERPL-CCNC: 88 UNIT/L (ref 40–150)
ALT SERPL-CCNC: 62 UNIT/L (ref 0–55)
ALT SERPL-CCNC: 77 UNIT/L (ref 0–55)
ANION GAP SERPL CALC-SCNC: 13 MEQ/L
ANION GAP SERPL CALC-SCNC: 14 MEQ/L
ANISOCYTOSIS BLD QL SMEAR: ABNORMAL
AST SERPL-CCNC: 27 UNIT/L (ref 11–45)
AST SERPL-CCNC: 29 UNIT/L (ref 11–45)
BACTERIA UR CULT: NO GROWTH
BASE EXCESS BLD CALC-SCNC: 7.6 MMOL/L (ref -2–2)
BASOPHILS # BLD AUTO: 0.07 X10(3)/MCL
BASOPHILS NFR BLD AUTO: 0.4 %
BILIRUB SERPL-MCNC: 0.4 MG/DL
BILIRUB SERPL-MCNC: 0.8 MG/DL
BLD PROD TYP BPU: NORMAL
BLOOD GAS SAMPLE TYPE (OHS): ABNORMAL
BLOOD UNIT EXPIRATION DATE: NORMAL
BLOOD UNIT TYPE CODE: 600
BLOOD UNIT TYPE CODE: 600
BLOOD UNIT TYPE CODE: 6200
BUN SERPL-MCNC: 72.3 MG/DL (ref 8.4–25.7)
BUN SERPL-MCNC: 75.4 MG/DL (ref 8.4–25.7)
CALCIUM SERPL-MCNC: 8.4 MG/DL (ref 8.8–10)
CALCIUM SERPL-MCNC: 8.5 MG/DL (ref 8.8–10)
CHLORIDE SERPL-SCNC: 101 MMOL/L (ref 98–107)
CHLORIDE SERPL-SCNC: 102 MMOL/L (ref 98–107)
CO2 BLDA-SCNC: 31.7 MMOL/L (ref 22–26)
CO2 SERPL-SCNC: 24 MMOL/L (ref 23–31)
CO2 SERPL-SCNC: 26 MMOL/L (ref 23–31)
COHGB MFR BLDA: 2.3 % (ref 0.5–1.5)
CREAT SERPL-MCNC: 1.46 MG/DL (ref 0.72–1.25)
CREAT SERPL-MCNC: 1.69 MG/DL (ref 0.72–1.25)
CREAT/UREA NIT SERPL: 45
CREAT/UREA NIT SERPL: 50
CROSSMATCH INTERPRETATION: NORMAL
DISPENSE STATUS: NORMAL
DRAWN BY BLOOD GAS (OHS): ABNORMAL
EOSINOPHIL # BLD AUTO: 0.22 X10(3)/MCL (ref 0–0.9)
EOSINOPHIL NFR BLD AUTO: 1.3 %
ERYTHROCYTE [DISTWIDTH] IN BLOOD BY AUTOMATED COUNT: 18.4 % (ref 11.5–17)
FERRITIN SERPL-MCNC: 238.32 NG/ML (ref 21.81–274.66)
FOLATE SERPL-MCNC: 10.1 NG/ML (ref 7–31.4)
GAS PNL BLD: 669 MMHG
GFR SERPLBLD CREATININE-BSD FMLA CKD-EPI: 42 ML/MIN/1.73/M2
GFR SERPLBLD CREATININE-BSD FMLA CKD-EPI: 50 ML/MIN/1.73/M2
GLOBULIN SER-MCNC: 3.8 GM/DL (ref 2.4–3.5)
GLOBULIN SER-MCNC: 4.2 GM/DL (ref 2.4–3.5)
GLUCOSE SERPL-MCNC: 192 MG/DL (ref 82–115)
GLUCOSE SERPL-MCNC: 199 MG/DL (ref 82–115)
HAPTOGLOB SERPL-MCNC: 199 MG/DL (ref 40–368)
HCO3 BLDA-SCNC: 30.6 MMOL/L (ref 22–26)
HCT VFR BLD AUTO: 21.5 % (ref 42–52)
HCT VFR BLD AUTO: 22.5 % (ref 42–52)
HCT VFR BLD AUTO: 23.9 % (ref 42–52)
HCT VFR BLD AUTO: 24.3 % (ref 42–52)
HEMOCCULT SP1 STL QL: POSITIVE
HGB BLD-MCNC: 6.9 G/DL (ref 14–18)
HGB BLD-MCNC: 7.3 G/DL (ref 14–18)
HGB BLD-MCNC: 7.5 G/DL (ref 14–18)
HGB BLD-MCNC: 7.8 G/DL (ref 14–18)
HOLD SPECIMEN: NORMAL
IMM GRANULOCYTES # BLD AUTO: 0.26 X10(3)/MCL (ref 0–0.04)
IMM GRANULOCYTES NFR BLD AUTO: 1.5 %
INHALED O2 CONCENTRATION: 100 %
IRON SATN MFR SERPL: 33 % (ref 20–50)
IRON SERPL-MCNC: 71 UG/DL (ref 65–175)
LDH SERPL-CCNC: 251 U/L (ref 125–220)
LPM (OHS): 15
LYMPHOCYTES # BLD AUTO: 2.08 X10(3)/MCL (ref 0.6–4.6)
LYMPHOCYTES NFR BLD AUTO: 12.4 %
MAGNESIUM SERPL-MCNC: 1.9 MG/DL (ref 1.6–2.6)
MAGNESIUM SERPL-MCNC: 1.9 MG/DL (ref 1.6–2.6)
MCH RBC QN AUTO: 31.5 PG (ref 27–31)
MCHC RBC AUTO-ENTMCNC: 32.1 G/DL (ref 33–36)
MCV RBC AUTO: 98.2 FL (ref 80–94)
METHGB MFR BLDA: 1.5 % (ref 0–1.5)
MONOCYTES # BLD AUTO: 1.01 X10(3)/MCL (ref 0.1–1.3)
MONOCYTES NFR BLD AUTO: 6 %
NEUTROPHILS # BLD AUTO: 13.2 X10(3)/MCL (ref 2.1–9.2)
NEUTROPHILS NFR BLD AUTO: 78.4 %
NRBC BLD AUTO-RTO: 0.9 %
O2 HB BLOOD GAS (OHS): 96.5 % (ref 94–100)
OXYGEN DEVICE BLOOD GAS (OHS): ABNORMAL
OXYHGB MFR BLDA: 7.1 G/DL (ref 12–18)
PCO2 BLDA: 35 MMHG (ref 35–45)
PH BLDA: 7.55 [PH] (ref 7.35–7.45)
PHOSPHATE SERPL-MCNC: 2.3 MG/DL (ref 2.3–4.7)
PHOSPHATE SERPL-MCNC: 3.2 MG/DL (ref 2.3–4.7)
PLATELET # BLD AUTO: 248 X10(3)/MCL (ref 130–400)
PLATELET # BLD EST: ADEQUATE 10*3/UL
PLATELETS.RETICULATED NFR BLD AUTO: 3 % (ref 0.9–11.2)
PMV BLD AUTO: 10.1 FL (ref 7.4–10.4)
PO2 BLDA: 343 MMHG (ref 75–100)
POCT GLUCOSE: 150 MG/DL (ref 70–110)
POCT GLUCOSE: 175 MG/DL (ref 70–110)
POCT GLUCOSE: 182 MG/DL (ref 70–110)
POIKILOCYTOSIS BLD QL SMEAR: SLIGHT
POTASSIUM SERPL-SCNC: 3.2 MMOL/L (ref 3.5–5.1)
POTASSIUM SERPL-SCNC: 3.7 MMOL/L (ref 3.5–5.1)
PROT SERPL-MCNC: 6.1 GM/DL (ref 5.8–7.6)
PROT SERPL-MCNC: 6.5 GM/DL (ref 5.8–7.6)
RBC # BLD AUTO: 2.19 X10(6)/MCL (ref 4.7–6.1)
RBC MORPH BLD: ABNORMAL
SAMPLE SITE BLOOD GAS (OHS): ABNORMAL
SAO2 % BLDA: 100.4 %
SCHISTOCYTE (OLG): SLIGHT
SODIUM SERPL-SCNC: 140 MMOL/L (ref 136–145)
SODIUM SERPL-SCNC: 140 MMOL/L (ref 136–145)
TIBC SERPL-MCNC: 144 UG/DL (ref 60–240)
TIBC SERPL-MCNC: 215 UG/DL (ref 250–450)
TRANSFERRIN SERPL-MCNC: 198 MG/DL (ref 163–344)
TRANSFERRIN SERPL-MCNC: 198 MG/DL (ref 163–344)
TROPONIN I SERPL-MCNC: 0.1 NG/ML (ref 0–0.04)
UNIT NUMBER: NORMAL
VIT B12 SERPL-MCNC: 212 PG/ML (ref 213–816)
WBC # BLD AUTO: 16.84 X10(3)/MCL (ref 4.5–11.5)

## 2025-06-01 PROCEDURE — 83010 ASSAY OF HAPTOGLOBIN QUANT: CPT

## 2025-06-01 PROCEDURE — 86923 COMPATIBILITY TEST ELECTRIC: CPT

## 2025-06-01 PROCEDURE — 80053 COMPREHEN METABOLIC PANEL: CPT

## 2025-06-01 PROCEDURE — 83615 LACTATE (LD) (LDH) ENZYME: CPT

## 2025-06-01 PROCEDURE — 93005 ELECTROCARDIOGRAM TRACING: CPT

## 2025-06-01 PROCEDURE — 82272 OCCULT BLD FECES 1-3 TESTS: CPT

## 2025-06-01 PROCEDURE — 86923 COMPATIBILITY TEST ELECTRIC: CPT | Mod: 91

## 2025-06-01 PROCEDURE — 36415 COLL VENOUS BLD VENIPUNCTURE: CPT

## 2025-06-01 PROCEDURE — 83735 ASSAY OF MAGNESIUM: CPT

## 2025-06-01 PROCEDURE — 84100 ASSAY OF PHOSPHORUS: CPT

## 2025-06-01 PROCEDURE — 27000221 HC OXYGEN, UP TO 24 HOURS

## 2025-06-01 PROCEDURE — 84484 ASSAY OF TROPONIN QUANT: CPT

## 2025-06-01 PROCEDURE — 25000003 PHARM REV CODE 250

## 2025-06-01 PROCEDURE — 85014 HEMATOCRIT: CPT

## 2025-06-01 PROCEDURE — 20000000 HC ICU ROOM

## 2025-06-01 PROCEDURE — 82728 ASSAY OF FERRITIN: CPT

## 2025-06-01 PROCEDURE — 63600175 PHARM REV CODE 636 W HCPCS

## 2025-06-01 PROCEDURE — 82746 ASSAY OF FOLIC ACID SERUM: CPT

## 2025-06-01 PROCEDURE — P9016 RBC LEUKOCYTES REDUCED: HCPCS

## 2025-06-01 PROCEDURE — 84466 ASSAY OF TRANSFERRIN: CPT

## 2025-06-01 PROCEDURE — 83550 IRON BINDING TEST: CPT

## 2025-06-01 PROCEDURE — 85025 COMPLETE CBC W/AUTO DIFF WBC: CPT

## 2025-06-01 PROCEDURE — 94761 N-INVAS EAR/PLS OXIMETRY MLT: CPT

## 2025-06-01 PROCEDURE — 82607 VITAMIN B-12: CPT

## 2025-06-01 RX ORDER — PANTOPRAZOLE SODIUM 40 MG/10ML
40 INJECTION, POWDER, LYOPHILIZED, FOR SOLUTION INTRAVENOUS DAILY
Status: DISCONTINUED | OUTPATIENT
Start: 2025-06-01 | End: 2025-06-01

## 2025-06-01 RX ORDER — METOPROLOL SUCCINATE 50 MG/1
100 TABLET, EXTENDED RELEASE ORAL DAILY
Status: DISCONTINUED | OUTPATIENT
Start: 2025-06-02 | End: 2025-06-09

## 2025-06-01 RX ORDER — DIGOXIN 0.25 MG/ML
250 INJECTION INTRAMUSCULAR; INTRAVENOUS ONCE
Status: COMPLETED | OUTPATIENT
Start: 2025-06-01 | End: 2025-06-01

## 2025-06-01 RX ORDER — PANTOPRAZOLE SODIUM 40 MG/10ML
80 INJECTION, POWDER, LYOPHILIZED, FOR SOLUTION INTRAVENOUS ONCE
Status: COMPLETED | OUTPATIENT
Start: 2025-06-01 | End: 2025-06-01

## 2025-06-01 RX ORDER — HYDROCODONE BITARTRATE AND ACETAMINOPHEN 500; 5 MG/1; MG/1
TABLET ORAL
Status: DISCONTINUED | OUTPATIENT
Start: 2025-06-01 | End: 2025-06-01

## 2025-06-01 RX ORDER — DIGOXIN 0.25 MG/ML
250 INJECTION INTRAMUSCULAR; INTRAVENOUS ONCE
Status: DISCONTINUED | OUTPATIENT
Start: 2025-06-01 | End: 2025-06-01

## 2025-06-01 RX ORDER — METOPROLOL SUCCINATE 50 MG/1
50 TABLET, EXTENDED RELEASE ORAL ONCE
Status: COMPLETED | OUTPATIENT
Start: 2025-06-01 | End: 2025-06-01

## 2025-06-01 RX ORDER — METOPROLOL SUCCINATE 50 MG/1
50 TABLET, EXTENDED RELEASE ORAL NIGHTLY
Status: DISCONTINUED | OUTPATIENT
Start: 2025-06-01 | End: 2025-06-09

## 2025-06-01 RX ORDER — PANTOPRAZOLE SODIUM 40 MG/10ML
40 INJECTION, POWDER, LYOPHILIZED, FOR SOLUTION INTRAVENOUS 2 TIMES DAILY
Status: DISCONTINUED | OUTPATIENT
Start: 2025-06-01 | End: 2025-06-10

## 2025-06-01 RX ORDER — METOPROLOL SUCCINATE 50 MG/1
100 TABLET, EXTENDED RELEASE ORAL DAILY
Status: DISCONTINUED | OUTPATIENT
Start: 2025-06-01 | End: 2025-06-01

## 2025-06-01 RX ORDER — FAMOTIDINE 10 MG/ML
20 INJECTION, SOLUTION INTRAVENOUS DAILY
Status: DISCONTINUED | OUTPATIENT
Start: 2025-06-01 | End: 2025-06-01

## 2025-06-01 RX ORDER — HYDROCODONE BITARTRATE AND ACETAMINOPHEN 500; 5 MG/1; MG/1
TABLET ORAL
Status: DISCONTINUED | OUTPATIENT
Start: 2025-06-01 | End: 2025-06-03

## 2025-06-01 RX ORDER — POTASSIUM CHLORIDE 7.45 MG/ML
10 INJECTION INTRAVENOUS
Status: COMPLETED | OUTPATIENT
Start: 2025-06-01 | End: 2025-06-01

## 2025-06-01 RX ORDER — ENOXAPARIN SODIUM 150 MG/ML
1 INJECTION SUBCUTANEOUS EVERY 12 HOURS
Status: DISCONTINUED | OUTPATIENT
Start: 2025-06-01 | End: 2025-06-01

## 2025-06-01 RX ADMIN — PANTOPRAZOLE SODIUM 40 MG: 40 INJECTION, POWDER, FOR SOLUTION INTRAVENOUS at 05:06

## 2025-06-01 RX ADMIN — LIDOCAINE 5% 1 PATCH: 700 PATCH TOPICAL at 11:06

## 2025-06-01 RX ADMIN — ATORVASTATIN CALCIUM 20 MG: 20 TABLET, FILM COATED ORAL at 07:06

## 2025-06-01 RX ADMIN — DIGOXIN 250 MCG: 0.25 INJECTION INTRAMUSCULAR; INTRAVENOUS at 03:06

## 2025-06-01 RX ADMIN — PENICILLIN G POTASSIUM 24 MILLION UNITS: 20000000 INJECTION, POWDER, FOR SOLUTION INTRAMUSCULAR; INTRAVENOUS at 02:06

## 2025-06-01 RX ADMIN — METOPROLOL SUCCINATE 50 MG: 50 TABLET, EXTENDED RELEASE ORAL at 07:06

## 2025-06-01 RX ADMIN — METOPROLOL SUCCINATE ER TABLETS 50 MG: 50 TABLET, FILM COATED, EXTENDED RELEASE ORAL at 04:06

## 2025-06-01 RX ADMIN — POTASSIUM CHLORIDE 10 MEQ: 7.46 INJECTION, SOLUTION INTRAVENOUS at 09:06

## 2025-06-01 RX ADMIN — POTASSIUM CHLORIDE 10 MEQ: 7.46 INJECTION, SOLUTION INTRAVENOUS at 08:06

## 2025-06-01 RX ADMIN — Medication 6 MG: at 08:06

## 2025-06-01 RX ADMIN — POTASSIUM BICARBONATE 40 MEQ: 391 TABLET, EFFERVESCENT ORAL at 07:06

## 2025-06-01 RX ADMIN — POTASSIUM CHLORIDE 10 MEQ: 7.46 INJECTION, SOLUTION INTRAVENOUS at 07:06

## 2025-06-01 RX ADMIN — PANTOPRAZOLE SODIUM 80 MG: 40 INJECTION, POWDER, FOR SOLUTION INTRAVENOUS at 07:06

## 2025-06-01 RX ADMIN — PANTOPRAZOLE SODIUM 40 MG: 40 INJECTION, POWDER, FOR SOLUTION INTRAVENOUS at 08:06

## 2025-06-01 RX ADMIN — METOPROLOL SUCCINATE ER TABLETS 50 MG: 50 TABLET, FILM COATED, EXTENDED RELEASE ORAL at 08:06

## 2025-06-01 RX ADMIN — POTASSIUM CHLORIDE 10 MEQ: 7.46 INJECTION, SOLUTION INTRAVENOUS at 10:06

## 2025-06-01 NOTE — NURSING TRANSFER
Nursing Transfer Note      6/1/2025   4:05 AM    Nurse giving handoff:raymundo SCHWARTZ Lpn  Nurse receiving handoff:Julieth YEH    Reason patient is being transferred: Higher Level of care    Transfer to: ICU    Transfer via  bed    Transported by House sup and staff    Transfer Vitals: see chart    Order for Tele Monitor? Yes      Patient belongings transferred with patient: yes    Chart send with patient:   Yes    Notified Dr will call family

## 2025-06-01 NOTE — PROGRESS NOTES
"  Nephrology Progress Note    Patient Name: Vaibhav Vargas  Age: 74 y.o.  : 1950  MRN: [id number]  Admission Date: 2025    Chief complaint: Shortness of Breath (From [facility] with complaints of SOB and lower back pain for about 3 days. Abdominal distension and bilateral leg edema noted. Palomo in place upon arrival with cloudy urine noted.)      Hospital course  Vaibhav Vargas is a 74 y.o. Black or  male with past medical history of cerebrovascular accident (2025), coronary artery disease status post LAD stent (), venous insufficiency, hypertension, first-degree AV block, and type 2 diabetes mellitus, who presented to the ED from a nursing home due to progressive weakness over the past week. His daughter provided history due to his communication difficulties. Associated symptoms included altered mental status, back pain, and abdominal discomfort. In the ED, vitals were stable, but lab results were remarkable for severe hyperkalemia and azotemia.  He was treated with Lokelma, insulin, and albuterol, started on Rocephin for a urinary tract infection.  Imaging of the abdomen revealed hydronephrosis and malpositioned Palomo catheter.  The Palomo catheter has been subsequently replaced and renal function has improved.  Nephrology service continues to follow for assistance with management of PRIYA.      Subjective  Patient developed PSVT yesterday, upgraded to the intensive care unit and started on digoxin.  He is resting quietly in bed and receiving PRBC transfusion at the time of my rounds.      Review of Systems  Cardiovascular: Negative for chest pain   Respiratory: Negative for shortness of breath.    Objective  BP (!) 104/57   Pulse 85   Temp 97.9 °F (36.6 °C)   Resp 20   Ht 6' 2" (1.88 m)   Wt 114 kg (251 lb 5.2 oz)   SpO2 99%   BMI 32.27 kg/m²     Intake/Output Summary (Last 24 hours) at 2025 1043  Last data filed at 2025 0600  Gross per 24 hour "   Intake 241.25 ml   Output 1600 ml   Net -1358.75 ml       Physical Exam  General appearance: Patient is in no acute distress.  Skin: No rashes or wounds.  HEENT: PERRLA, EOMI, no scleral icterus, no JVD. Neck is supple.  Chest: Respirations are unlabored. Lungs sounds are clear.   Heart: S1, S2.   Abdomen: Benign.  :  Palomo catheter to gravity.  Extremities: No edema, peripheral pulses are palpable.   Neuro: No focal deficits.     Medications  Scheduled Meds:   0.9% NaCl   Intravenous Once    atorvastatin  20 mg Oral Daily    LIDOcaine  1 patch Transdermal Q24H    [START ON 6/2/2025] metoprolol succinate  100 mg Oral Daily    metoprolol succinate  50 mg Oral QHS    pantoprazole  40 mg Intravenous BID    penicillin G potassium 24 Million Units in D5W 500 mL CONTINUOUS INFUSION  24 Million Units Intravenous Q24H    potassium chloride  10 mEq Intravenous Q1H     Continuous Infusions:  PRN Meds:.  Current Facility-Administered Medications:     0.9%  NaCl infusion (for blood administration), , Intravenous, Q24H PRN    acetaminophen, 650 mg, Oral, Q4H PRN    dextrose 50%, 12.5 g, Intravenous, PRN    dextrose 50%, 25 g, Intravenous, PRN    glucagon (human recombinant), 1 mg, Intramuscular, PRN    glucose, 16 g, Oral, PRN    glucose, 24 g, Oral, PRN    HYDROcodone-acetaminophen, 1 tablet, Oral, Q6H PRN    insulin aspart U-100, 0-5 Units, Subcutaneous, QID (AC + HS) PRN    melatonin, 6 mg, Oral, Nightly PRN    ondansetron, 4 mg, Intravenous, Q6H PRN    polyethylene glycol, 17 g, Oral, Daily PRN    senna-docusate, 1 tablet, Oral, Daily PRN    sodium chloride 0.9%, 10 mL, Intravenous, PRN     Imaging:    Reviewed    Laboratory Data:    Chemistry  Recent Labs     05/30/25  0414 05/31/25  0324 05/31/25  1711 06/01/25  0428    142 139 140   K 3.3* 3.7 4.5 3.2*   CO2 29 30 26 24   BUN 63.8* 71.3* 75.4* 75.4*   CREATININE 1.85* 1.68* 1.66* 1.69*   EGFRNORACEVR 38 42 43 42   CALCIUM 8.6* 8.3* 8.3* 8.4*   MG 1.80 1.70 2.10  1.90   PHOS 3.6 3.6  --  3.2      LFT  Lab Results   Component Value Date    ALKPHOS 88 06/01/2025    AST 29 06/01/2025    ALT 77 (H) 06/01/2025    BILIDIR 0.3 04/22/2022    IBILI 0.30 04/22/2022    BILITOT 0.4 06/01/2025    ALBUMIN 2.3 (L) 06/01/2025        Hematology  Recent Labs     05/30/25  0414 05/31/25  0324 05/31/25  1711 06/01/25  0427 06/01/25  0913   WBC 16.77* 15.78*  --  16.84*  --    HGB 8.1* 6.6* 7.5* 6.9* 7.8*   HCT 25.8* 20.9* 23.5* 21.5* 24.3*    310  --  248  --       Lab Results   Component Value Date    IRON 71 06/01/2025    TIBC 215 (L) 06/01/2025    FERRITIN 238.32 06/01/2025    FOLATE 10.1 06/01/2025    [id number] 212 (L) 06/01/2025     (H) 06/01/2025      ID  Lab Results   Component Value Date    HIV Nonreactive 05/29/2025    HEPCAB Nonreactive 05/28/2025    HEPBSAB Nonreactive 05/28/2025      Additional serology  Lab Results   Component Value Date    HGBA1C 5.6 05/24/2025       Urine studies  Lab Results   Component Value Date    APPEARANCEUA Clear 05/29/2025    SGUA 1.009 05/29/2025    PROTEINUA Negative 05/29/2025    KETONESUA Negative 05/29/2025    LEUKOCYTESUR 250 (A) 05/29/2025    RBCUA 0-5 05/29/2025    WBCUA 21-50 (A) 05/29/2025    BACTERIA None Seen 05/29/2025    SQEPUA None Seen 05/29/2025    HYALINECASTS None Seen 05/29/2025    CREATRANDUR 57.5 (L) 02/14/2024        Impression  Acute kidney injury, nonoliguric   Hypokalemia  Urinary tract infection   Atrial fibrillation with rapid ventricular response   Acute decompensated heart failure   Hypertension   Acute anemia   Transaminitis    Plan  Renal indices are stable, urinary output is excellent, alkalosis has resolved.  Potassium replacement is in progress.  Agree with PRBC transfusion.    Skye Galindo NP  Nephrology Service   6/1/2025

## 2025-06-01 NOTE — PLAN OF CARE
Problem: Infection  Goal: Absence of Infection Signs and Symptoms  Outcome: Progressing     Problem: Adult Inpatient Plan of Care  Goal: Plan of Care Review  Outcome: Progressing  Goal: Patient-Specific Goal (Individualized)  Outcome: Progressing  Goal: Absence of Hospital-Acquired Illness or Injury  Outcome: Progressing  Goal: Optimal Comfort and Wellbeing  Outcome: Progressing  Goal: Readiness for Transition of Care  Outcome: Progressing     Problem: Diabetes Comorbidity  Goal: Blood Glucose Level Within Targeted Range  Outcome: Progressing     Problem: Wound  Goal: Optimal Coping  Outcome: Progressing  Goal: Optimal Functional Ability  Outcome: Progressing  Goal: Absence of Infection Signs and Symptoms  Outcome: Progressing  Goal: Improved Oral Intake  Outcome: Progressing  Goal: Optimal Pain Control and Function  Outcome: Progressing  Goal: Skin Health and Integrity  Outcome: Progressing  Goal: Optimal Wound Healing  Outcome: Progressing     Problem: Skin Injury Risk Increased  Goal: Skin Health and Integrity  Outcome: Progressing     Problem: Heart Failure  Goal: Optimal Coping  Outcome: Progressing  Goal: Optimal Cardiac Output  Outcome: Progressing  Goal: Stable Heart Rate and Rhythm  Outcome: Progressing  Goal: Optimal Functional Ability  Outcome: Progressing  Goal: Fluid and Electrolyte Balance  Outcome: Progressing  Goal: Effective Oxygenation and Ventilation  Outcome: Progressing     Problem: Sepsis/Septic Shock  Goal: Optimal Coping  Outcome: Progressing  Goal: Absence of Bleeding  Outcome: Progressing  Goal: Blood Glucose Level Within Targeted Range  Outcome: Progressing  Goal: Absence of Infection Signs and Symptoms  Outcome: Progressing     Problem: Fall Injury Risk  Goal: Absence of Fall and Fall-Related Injury  Outcome: Progressing     Problem: Comorbidity Management  Goal: Blood Pressure in Desired Range  Outcome: Progressing
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
## 2025-06-01 NOTE — PT/OT/SLP PROGRESS
Physical Therapy    Missed Treatment Session - cancel note - 06/01/2025    Patient Name:  Vaibhav Vargas   MRN:  [id number]      Patient not seen at this time secondary to Transfer to ICU, await clearance.    Will follow-up as patient is appropriate/available/agreeable to participate and as therapists' schedule allows.

## 2025-06-02 ENCOUNTER — ANESTHESIA EVENT (OUTPATIENT)
Dept: ENDOSCOPY | Facility: HOSPITAL | Age: 75
End: 2025-06-02
Payer: MEDICARE

## 2025-06-02 ENCOUNTER — ANESTHESIA (OUTPATIENT)
Dept: ENDOSCOPY | Facility: HOSPITAL | Age: 75
End: 2025-06-02
Payer: MEDICARE

## 2025-06-02 LAB
ABO + RH BLD: NORMAL
ALBUMIN SERPL-MCNC: 2.2 G/DL (ref 3.4–4.8)
ALBUMIN/GLOB SERPL: 0.6 RATIO (ref 1.1–2)
ALP SERPL-CCNC: 61 UNIT/L (ref 40–150)
ALT SERPL-CCNC: 57 UNIT/L (ref 0–55)
ANION GAP SERPL CALC-SCNC: 11 MEQ/L
AST SERPL-CCNC: 27 UNIT/L (ref 11–45)
BASOPHILS # BLD AUTO: 0.06 X10(3)/MCL
BASOPHILS NFR BLD AUTO: 0.3 %
BILIRUB SERPL-MCNC: 0.6 MG/DL
BLD PROD TYP BPU: NORMAL
BLOOD UNIT EXPIRATION DATE: NORMAL
BLOOD UNIT TYPE CODE: 6200
BUN SERPL-MCNC: 65.8 MG/DL (ref 8.4–25.7)
CALCIUM SERPL-MCNC: 8.3 MG/DL (ref 8.8–10)
CHLORIDE SERPL-SCNC: 102 MMOL/L (ref 98–107)
CO2 SERPL-SCNC: 27 MMOL/L (ref 23–31)
CREAT SERPL-MCNC: 1.49 MG/DL (ref 0.72–1.25)
CREAT/UREA NIT SERPL: 44
CROSSMATCH INTERPRETATION: NORMAL
CRP SERPL-MCNC: 9.5 MG/L
DISPENSE STATUS: NORMAL
EOSINOPHIL # BLD AUTO: 0.29 X10(3)/MCL (ref 0–0.9)
EOSINOPHIL NFR BLD AUTO: 1.7 %
ERYTHROCYTE [DISTWIDTH] IN BLOOD BY AUTOMATED COUNT: 18.4 % (ref 11.5–17)
ERYTHROCYTE [SEDIMENTATION RATE] IN BLOOD: 8 MM/HR (ref 0–15)
GFR SERPLBLD CREATININE-BSD FMLA CKD-EPI: 49 ML/MIN/1.73/M2
GLOBULIN SER-MCNC: 3.6 GM/DL (ref 2.4–3.5)
GLUCOSE SERPL-MCNC: 193 MG/DL (ref 82–115)
HCT VFR BLD AUTO: 21.8 % (ref 42–52)
HCT VFR BLD AUTO: 22.3 % (ref 42–52)
HEMATOLOGIST REVIEW: NORMAL
HGB BLD-MCNC: 7.1 G/DL (ref 14–18)
HGB BLD-MCNC: 7.3 G/DL (ref 14–18)
IMM GRANULOCYTES # BLD AUTO: 0.26 X10(3)/MCL (ref 0–0.04)
IMM GRANULOCYTES NFR BLD AUTO: 1.5 %
LYMPHOCYTES # BLD AUTO: 1.68 X10(3)/MCL (ref 0.6–4.6)
LYMPHOCYTES NFR BLD AUTO: 9.7 %
MAGNESIUM SERPL-MCNC: 1.8 MG/DL (ref 1.6–2.6)
MCH RBC QN AUTO: 31.6 PG (ref 27–31)
MCHC RBC AUTO-ENTMCNC: 32.7 G/DL (ref 33–36)
MCV RBC AUTO: 96.5 FL (ref 80–94)
MONOCYTES # BLD AUTO: 1.24 X10(3)/MCL (ref 0.1–1.3)
MONOCYTES NFR BLD AUTO: 7.2 %
NEUTROPHILS # BLD AUTO: 13.77 X10(3)/MCL (ref 2.1–9.2)
NEUTROPHILS NFR BLD AUTO: 79.6 %
NRBC BLD AUTO-RTO: 1.4 %
OHS QRS DURATION: 136 MS
OHS QRS DURATION: 138 MS
OHS QRS DURATION: 140 MS
OHS QRS DURATION: 142 MS
OHS QRS DURATION: 142 MS
OHS QTC CALCULATION: 450 MS
OHS QTC CALCULATION: 471 MS
OHS QTC CALCULATION: 497 MS
OHS QTC CALCULATION: 541 MS
OHS QTC CALCULATION: 592 MS
PHOSPHATE SERPL-MCNC: 2.6 MG/DL (ref 2.3–4.7)
PLATELET # BLD AUTO: 232 X10(3)/MCL (ref 130–400)
PMV BLD AUTO: 9.6 FL (ref 7.4–10.4)
POCT GLUCOSE: 145 MG/DL (ref 70–110)
POCT GLUCOSE: 170 MG/DL (ref 70–110)
POCT GLUCOSE: 183 MG/DL (ref 70–110)
POCT GLUCOSE: 188 MG/DL (ref 70–110)
POCT GLUCOSE: 192 MG/DL (ref 70–110)
POTASSIUM SERPL-SCNC: 3.3 MMOL/L (ref 3.5–5.1)
PROT SERPL-MCNC: 5.8 GM/DL (ref 5.8–7.6)
RBC # BLD AUTO: 2.31 X10(6)/MCL (ref 4.7–6.1)
SODIUM SERPL-SCNC: 140 MMOL/L (ref 136–145)
UNIT NUMBER: NORMAL
WBC # BLD AUTO: 17.3 X10(3)/MCL (ref 4.5–11.5)

## 2025-06-02 PROCEDURE — 63600175 PHARM REV CODE 636 W HCPCS

## 2025-06-02 PROCEDURE — 43255 EGD CONTROL BLEEDING ANY: CPT | Performed by: INTERNAL MEDICINE

## 2025-06-02 PROCEDURE — 80053 COMPREHEN METABOLIC PANEL: CPT

## 2025-06-02 PROCEDURE — 86923 COMPATIBILITY TEST ELECTRIC: CPT

## 2025-06-02 PROCEDURE — 63600175 PHARM REV CODE 636 W HCPCS: Performed by: NURSE ANESTHETIST, CERTIFIED REGISTERED

## 2025-06-02 PROCEDURE — 21400001 HC TELEMETRY ROOM

## 2025-06-02 PROCEDURE — 0DB68ZX EXCISION OF STOMACH, VIA NATURAL OR ARTIFICIAL OPENING ENDOSCOPIC, DIAGNOSTIC: ICD-10-PCS | Performed by: INTERNAL MEDICINE

## 2025-06-02 PROCEDURE — 87075 CULTR BACTERIA EXCEPT BLOOD: CPT

## 2025-06-02 PROCEDURE — 88312 SPECIAL STAINS GROUP 1: CPT | Mod: TC | Performed by: INTERNAL MEDICINE

## 2025-06-02 PROCEDURE — P9016 RBC LEUKOCYTES REDUCED: HCPCS

## 2025-06-02 PROCEDURE — C1751 CATH, INF, PER/CENT/MIDLINE: HCPCS

## 2025-06-02 PROCEDURE — 86140 C-REACTIVE PROTEIN: CPT | Performed by: NURSE PRACTITIONER

## 2025-06-02 PROCEDURE — 25000003 PHARM REV CODE 250

## 2025-06-02 PROCEDURE — 87070 CULTURE OTHR SPECIMN AEROBIC: CPT

## 2025-06-02 PROCEDURE — 36415 COLL VENOUS BLD VENIPUNCTURE: CPT | Performed by: INTERNAL MEDICINE

## 2025-06-02 PROCEDURE — 36415 COLL VENOUS BLD VENIPUNCTURE: CPT | Performed by: NURSE PRACTITIONER

## 2025-06-02 PROCEDURE — 85652 RBC SED RATE AUTOMATED: CPT | Performed by: NURSE PRACTITIONER

## 2025-06-02 PROCEDURE — 0W3P8ZZ CONTROL BLEEDING IN GASTROINTESTINAL TRACT, VIA NATURAL OR ARTIFICIAL OPENING ENDOSCOPIC: ICD-10-PCS | Performed by: INTERNAL MEDICINE

## 2025-06-02 PROCEDURE — 25000003 PHARM REV CODE 250: Performed by: NURSE ANESTHETIST, CERTIFIED REGISTERED

## 2025-06-02 PROCEDURE — 99223 1ST HOSP IP/OBS HIGH 75: CPT | Mod: GC,,, | Performed by: INTERNAL MEDICINE

## 2025-06-02 PROCEDURE — 02HV33Z INSERTION OF INFUSION DEVICE INTO SUPERIOR VENA CAVA, PERCUTANEOUS APPROACH: ICD-10-PCS | Performed by: INTERNAL MEDICINE

## 2025-06-02 PROCEDURE — 82962 GLUCOSE BLOOD TEST: CPT | Performed by: INTERNAL MEDICINE

## 2025-06-02 PROCEDURE — 85014 HEMATOCRIT: CPT | Performed by: INTERNAL MEDICINE

## 2025-06-02 PROCEDURE — 43239 EGD BIOPSY SINGLE/MULTIPLE: CPT | Mod: XS | Performed by: INTERNAL MEDICINE

## 2025-06-02 PROCEDURE — 5A0935A ASSISTANCE WITH RESPIRATORY VENTILATION, LESS THAN 24 CONSECUTIVE HOURS, HIGH NASAL FLOW/VELOCITY: ICD-10-PCS | Performed by: STUDENT IN AN ORGANIZED HEALTH CARE EDUCATION/TRAINING PROGRAM

## 2025-06-02 PROCEDURE — 37000008 HC ANESTHESIA 1ST 15 MINUTES: Performed by: INTERNAL MEDICINE

## 2025-06-02 PROCEDURE — 27000221 HC OXYGEN, UP TO 24 HOURS

## 2025-06-02 PROCEDURE — 37000009 HC ANESTHESIA EA ADD 15 MINS: Performed by: INTERNAL MEDICINE

## 2025-06-02 PROCEDURE — 83735 ASSAY OF MAGNESIUM: CPT

## 2025-06-02 PROCEDURE — 0KBW0ZZ EXCISION OF LEFT FOOT MUSCLE, OPEN APPROACH: ICD-10-PCS

## 2025-06-02 PROCEDURE — 27100171 HC OXYGEN HIGH FLOW UP TO 24 HOURS

## 2025-06-02 PROCEDURE — 94761 N-INVAS EAR/PLS OXIMETRY MLT: CPT

## 2025-06-02 PROCEDURE — 84100 ASSAY OF PHOSPHORUS: CPT

## 2025-06-02 PROCEDURE — 27201423 OPTIME MED/SURG SUP & DEVICES STERILE SUPPLY: Performed by: INTERNAL MEDICINE

## 2025-06-02 PROCEDURE — 85025 COMPLETE CBC W/AUTO DIFF WBC: CPT

## 2025-06-02 PROCEDURE — 87077 CULTURE AEROBIC IDENTIFY: CPT

## 2025-06-02 PROCEDURE — 36569 INSJ PICC 5 YR+ W/O IMAGING: CPT

## 2025-06-02 RX ORDER — ETOMIDATE 2 MG/ML
INJECTION INTRAVENOUS
Status: DISCONTINUED | OUTPATIENT
Start: 2025-06-02 | End: 2025-06-02

## 2025-06-02 RX ORDER — HYDROCODONE BITARTRATE AND ACETAMINOPHEN 500; 5 MG/1; MG/1
TABLET ORAL
Status: DISCONTINUED | OUTPATIENT
Start: 2025-06-02 | End: 2025-06-12 | Stop reason: HOSPADM

## 2025-06-02 RX ORDER — PROPOFOL 10 MG/ML
VIAL (ML) INTRAVENOUS
Status: DISCONTINUED | OUTPATIENT
Start: 2025-06-02 | End: 2025-06-02

## 2025-06-02 RX ORDER — LIDOCAINE HYDROCHLORIDE 20 MG/ML
INJECTION INTRAVENOUS
Status: DISCONTINUED | OUTPATIENT
Start: 2025-06-02 | End: 2025-06-02

## 2025-06-02 RX ORDER — ENOXAPARIN SODIUM 150 MG/ML
1 INJECTION SUBCUTANEOUS EVERY 12 HOURS
Status: DISCONTINUED | OUTPATIENT
Start: 2025-06-02 | End: 2025-06-04

## 2025-06-02 RX ORDER — POTASSIUM CHLORIDE 7.45 MG/ML
10 INJECTION INTRAVENOUS
Status: DISPENSED | OUTPATIENT
Start: 2025-06-02 | End: 2025-06-02

## 2025-06-02 RX ORDER — METRONIDAZOLE 500 MG/1
500 TABLET ORAL EVERY 8 HOURS
Status: DISCONTINUED | OUTPATIENT
Start: 2025-06-02 | End: 2025-06-07

## 2025-06-02 RX ORDER — PHENYLEPHRINE HYDROCHLORIDE 10 MG/ML
INJECTION INTRAVENOUS
Status: DISCONTINUED | OUTPATIENT
Start: 2025-06-02 | End: 2025-06-02

## 2025-06-02 RX ORDER — SODIUM CHLORIDE 9 MG/ML
INJECTION, SOLUTION INTRAVENOUS ONCE
Status: COMPLETED | OUTPATIENT
Start: 2025-06-02 | End: 2025-06-02

## 2025-06-02 RX ORDER — SODIUM CHLORIDE 0.9 % (FLUSH) 0.9 %
10 SYRINGE (ML) INJECTION EVERY 12 HOURS PRN
Status: DISCONTINUED | OUTPATIENT
Start: 2025-06-02 | End: 2025-06-12 | Stop reason: HOSPADM

## 2025-06-02 RX ORDER — POTASSIUM CHLORIDE 7.45 MG/ML
10 INJECTION INTRAVENOUS
Status: COMPLETED | OUTPATIENT
Start: 2025-06-02 | End: 2025-06-02

## 2025-06-02 RX ORDER — SODIUM CHLORIDE 9 MG/ML
INJECTION, SOLUTION INTRAVENOUS ONCE
Status: COMPLETED | OUTPATIENT
Start: 2025-06-02 | End: 2025-06-03

## 2025-06-02 RX ORDER — CEFEPIME HYDROCHLORIDE 2 G/1
2 INJECTION, POWDER, FOR SOLUTION INTRAVENOUS
Status: DISCONTINUED | OUTPATIENT
Start: 2025-06-02 | End: 2025-06-05

## 2025-06-02 RX ADMIN — PHENYLEPHRINE HYDROCHLORIDE 100 MCG: 10 INJECTION INTRAVENOUS at 01:06

## 2025-06-02 RX ADMIN — VANCOMYCIN HYDROCHLORIDE 2000 MG: 2 INJECTION, POWDER, LYOPHILIZED, FOR SOLUTION INTRAVENOUS at 03:06

## 2025-06-02 RX ADMIN — SODIUM CHLORIDE: 9 INJECTION, SOLUTION INTRAVENOUS at 09:06

## 2025-06-02 RX ADMIN — PROPOFOL 20 MG: 10 INJECTION, EMULSION INTRAVENOUS at 01:06

## 2025-06-02 RX ADMIN — ETOMIDATE INJECTION 2 MG: 2 SOLUTION INTRAVENOUS at 01:06

## 2025-06-02 RX ADMIN — PANTOPRAZOLE SODIUM 40 MG: 40 INJECTION, POWDER, FOR SOLUTION INTRAVENOUS at 11:06

## 2025-06-02 RX ADMIN — METRONIDAZOLE 500 MG: 500 TABLET ORAL at 10:06

## 2025-06-02 RX ADMIN — PROPOFOL 10 MG: 10 INJECTION, EMULSION INTRAVENOUS at 01:06

## 2025-06-02 RX ADMIN — CEFEPIME 2 G: 2 INJECTION, POWDER, FOR SOLUTION INTRAVENOUS at 10:06

## 2025-06-02 RX ADMIN — METOPROLOL SUCCINATE ER TABLETS 100 MG: 50 TABLET, FILM COATED, EXTENDED RELEASE ORAL at 10:06

## 2025-06-02 RX ADMIN — METOPROLOL SUCCINATE ER TABLETS 50 MG: 50 TABLET, FILM COATED, EXTENDED RELEASE ORAL at 11:06

## 2025-06-02 RX ADMIN — CEFEPIME 2 G: 2 INJECTION, POWDER, FOR SOLUTION INTRAVENOUS at 03:06

## 2025-06-02 RX ADMIN — PHENYLEPHRINE HYDROCHLORIDE 200 MCG: 10 INJECTION INTRAVENOUS at 01:06

## 2025-06-02 RX ADMIN — LIDOCAINE HYDROCHLORIDE 50 MG: 20 INJECTION INTRAVENOUS at 01:06

## 2025-06-02 RX ADMIN — ENOXAPARIN SODIUM 120 MG: 120 INJECTION SUBCUTANEOUS at 10:06

## 2025-06-02 RX ADMIN — POTASSIUM CHLORIDE 10 MEQ: 7.46 INJECTION, SOLUTION INTRAVENOUS at 04:06

## 2025-06-02 RX ADMIN — POTASSIUM CHLORIDE 10 MEQ: 7.46 INJECTION, SOLUTION INTRAVENOUS at 07:06

## 2025-06-02 RX ADMIN — PANTOPRAZOLE SODIUM 40 MG: 40 INJECTION, POWDER, FOR SOLUTION INTRAVENOUS at 10:06

## 2025-06-02 RX ADMIN — POTASSIUM CHLORIDE 10 MEQ: 7.46 INJECTION, SOLUTION INTRAVENOUS at 11:06

## 2025-06-02 RX ADMIN — POTASSIUM CHLORIDE 10 MEQ: 7.46 INJECTION, SOLUTION INTRAVENOUS at 06:06

## 2025-06-02 RX ADMIN — ATORVASTATIN CALCIUM 20 MG: 20 TABLET, FILM COATED ORAL at 10:06

## 2025-06-02 RX ADMIN — POTASSIUM CHLORIDE 10 MEQ: 7.46 INJECTION, SOLUTION INTRAVENOUS at 03:06

## 2025-06-02 RX ADMIN — PROPOFOL 40 MG: 10 INJECTION, EMULSION INTRAVENOUS at 01:06

## 2025-06-02 NOTE — PROGRESS NOTES
Inpatient Nutrition Assessment    Admit Date: 5/23/2025   Total duration of encounter: 10 days   Patient Age: 74 y.o.    Nutrition Recommendation/Prescription     Post scope--suggest resume--Low Na, Carb consistent, Soft & Bite size for ease of chewing diet; encourage oral intake   Post scope--resume-- Boost Breeze (provides 250 kcal, 9 g protein per serving) TID  Suggest kavin bid; wound healing--Kavin (provides 90 kcal, 2.5 g protein per serving)   Continue electrolyte replacement as needed  Last BM 5/26; consider bowel regimen  Consider appetite stimulant  Daily Weight    Communication of Recommendations: reviewed with nurse, reviewed with patient, and reviewed with family    Nutrition Assessment     Malnutrition Assessment/Nutrition-Focused Physical Exam    Malnutrition Context: acute illness or injury (05/29/25 1252)  Malnutrition Level: severe (05/29/25 1252)  Energy Intake (Malnutrition): less than or equal to 50% for greater than or equal to 5 days (05/29/25 1252)  Weight Loss (Malnutrition): other (see comments) (Does not meet criteria) (05/29/25 1252)     Orbital Region (Subcutaneous Fat Loss): well nourished        Muscle Mass (Malnutrition): moderate depletion (05/29/25 1252)  [place] Region (Muscle Loss): moderate depletion         Fluid Accumulation (Malnutrition): moderate to severe (05/29/25 1252)        A minimum of two characteristics is recommended for diagnosis of either severe or non-severe malnutrition.    Chart Review    Reason Seen: continuous nutrition monitoring, physician consult for CHF, and follow-up    Malnutrition Screening Tool Results   Have you recently lost weight without trying?: No  Have you been eating poorly because of a decreased appetite?: No   MST Score: 0   Diagnosis:  Severe sepsis 2/2 UTI   ARF, obstructive   Hyperkalemia, resolving  Hx of CVA  CHF  HTN  PRIYA,   Afib  Atrial thrombus  Anemia; ? GI bleed  Relevant Medical History: CVA, CAD, Venous insufficiency, HTN, DM,  Chronic lumbar pain, edema    Scheduled Medications:  0.9% NaCl, , Once  atorvastatin, 20 mg, Daily  LIDOcaine, 1 patch, Q24H  metoprolol succinate, 100 mg, Daily  metoprolol succinate, 50 mg, QHS  pantoprazole, 40 mg, BID  penicillin G potassium 24 Million Units in D5W 500 mL CONTINUOUS INFUSION, 24 Million Units, Q24H    Continuous Infusions:   PRN Medications:  0.9%  NaCl infusion (for blood administration), , Q24H PRN  acetaminophen, 650 mg, Q4H PRN  dextrose 50%, 12.5 g, PRN  dextrose 50%, 25 g, PRN  glucagon (human recombinant), 1 mg, PRN  glucose, 16 g, PRN  glucose, 24 g, PRN  HYDROcodone-acetaminophen, 1 tablet, Q6H PRN  insulin aspart U-100, 0-5 Units, QID (AC + HS) PRN  melatonin, 6 mg, Nightly PRN  ondansetron, 4 mg, Q6H PRN  polyethylene glycol, 17 g, Daily PRN  senna-docusate, 1 tablet, Daily PRN  sodium chloride 0.9%, 10 mL, PRN    Calorie Containing IV Medications: no significant kcals from medications at this time    Recent Labs   Lab 05/27/25  0516 05/27/25  1517 05/28/25  0439 05/29/25  0413 05/30/25  0414 05/31/25  0324 05/31/25  1711 06/01/25  0427 06/01/25  0428 06/01/25  0913 06/01/25  1521 06/01/25  1716 06/01/25  1943 06/02/25  0333   *   < > 146* 145 141 142 139  --  140  --   --  140  --  140   K 3.8   < > 3.9 3.6 3.3* 3.7 4.5  --  3.2*  --   --  3.7  --  3.3*   CALCIUM 8.9   < > 8.6* 8.9 8.6* 8.3* 8.3*  --  8.4*  --   --  8.5*  --  8.3*   PHOS 3.7  --  4.2 4.2 3.6 3.6  --   --  3.2  --   --  2.3  --  2.6   MG 2.20   < > 2.10 2.00 1.80 1.70 2.10  --  1.90  --   --  1.90  --  1.80   *   < > 111* 106 100 102 101  --  102  --   --  101  --  102   CO2 22*   < > 23 26 29 30 26  --  24  --   --  26  --  27   BUN 65.1*   < > 63.9* 71.1* 63.8* 71.3* 75.4*  --  75.4*  --   --  72.3*  --  65.8*   CREATININE 2.10*   < > 1.97* 1.98* 1.85* 1.68* 1.66*  --  1.69*  --   --  1.46*  --  1.49*   EGFRNORACEVR 32   < > 35 35 38 42 43  --  42  --   --  50  --  49   *   < > 142* 135* 145*  169* 228*  --  199*  --   --  192*  --  193*   BILITOT 0.6  --  0.6 0.5 0.4 0.3  --   --  0.4  --   --  0.8  --  0.6   ALKPHOS 269*  --  272* 245* 168* 115  --   --  88  --   --  68  --  61   *  --  321* 226* 153* 105*  --   --  77*  --   --  62*  --  57*   *  --  177* 72* 47* 34  --   --  29  --   --  27  --  27   ALBUMIN 2.8*  --  2.6* 2.5* 2.3* 2.2*  --   --  2.3*  --   --  2.3*  --  2.2*   CRP  --   --   --  67.30*  --  24.70*  --   --   --   --   --   --   --  9.50*   WBC 16.61*  --  16.70* 17.32* 16.77* 15.78*  --  16.84*  --   --   --   --   --  17.30*   HGB 10.5*  --  9.6* 9.0* 8.1* 6.6* 7.5* 6.9*  --  7.8* 7.5*  --  7.3* 7.3*   HCT 32.9*  --  29.5* 28.2* 25.8* 20.9* 23.5* 21.5*  --  24.3* 23.9*  --  22.5* 22.3*    < > = values in this interval not displayed.     Nutrition Orders:  Diet NPO  Dietary nutrition supplements TID; Boost Breeze - Any flavor    Appetite/Oral Intake: poor/0-25% of meals; (6/2) NPO for scope  Factors Affecting Nutritional Intake: chewing difficulty, decreased appetite, NPO, and pain  Social Needs Impacting Access to Food: none identified  Food/[place]/Cultural Preferences: dislikes rice, gravy, milk; Likes applesauce, fruit cups, mashed potatoes, non daily ONS  Food Allergies: no known food allergies  Last Bowel Movement: 05/26/25  Wound(s):     Wound 05/23/25 2110 Other (comment) Penis-Tissue loss description: Partial thickness       Wound 03/29/25 1900 Pressure Injury Left dorsal Foot-Tissue loss description: Full thickness WCN--treating foot/sacral wound     Comments  (6/2) Pt upgraded to ICU on 6-1 2 to Afib/SOB, decline in cardiopulmonary status; pt NPO--for scope with GI; ? GI bleed. Pt not very talkative; noted pt has not been eating much previously; when diet resumes--encourage oral intake; ONS use; pt with foot/sacral wound; WCN on case; also suggest Kavin for wound healing. Wt decreased to 112.5kg; fluid/ tracking wt. Labs acknowledged: Bun/Cr (H);  "GFR(L)--PRIYA/ nephrology on case    25 -- Pt continues with poor oral intake, denies n/v; reports decreased oral intake related to pain & not liking foods -- preferences obtained by kitchen  daily; pt reports drinking Boost Breeze as ordered; weight fluctuation noted, 3+ LE edema - diuresing; most recent labs/meds reviewed, suggest low Na, carb consistent diet    25 -- Pt with poor oral intake of 0-25% since admit, family at bedside reports decreased po intake > 1 week, reports does not eat rice, gravies, milk; food preferences obtained & reported to kitchen, agreeable to Boost Breeze to supplement nutrition; denies swallowing difficulty; chewing difficulty related to not having dentures this admit; no n/v/d; no indication of significant weight changes, UBW appears to be ~ 270 lb; Most recent labs/meds reviewed, suggest low Na, carb consistent diet; Monitor need for renal diet restriction    Anthropometrics    Height: 6' 2" (188 cm), Height Method: Stated  Last Weight: 112.5 kg (248 lb 0.3 oz) (25 05), Weight Method: Bed Scale  BMI (Calculated): 31.8  BMI Classification: obese grade I (BMI 30-34.9)        Ideal Body Weight (IBW), Male: 190 lb     % Ideal Body Weight, Male (lb): 134.74 %         Usual Body Weight (UBW), k kg  % Usual Body Weight: 97.76  % Weight Change From Usual Weight: -2.44 %  Usual Weight Provided By: EMR weight history    Wt Readings from Last 5 Encounters:   25 112.5 kg (248 lb 0.3 oz)   25 115.7 kg (255 lb)   24 128.2 kg (282 lb 11.2 oz)   24 128 kg (282 lb 3 oz)   24 126.3 kg (278 lb 8 oz)     Weight Change(s) Since Admission: new admit  Wt Readings from Last 1 Encounters:   25 0527 112.5 kg (248 lb 0.3 oz)   25 0541 114 kg (251 lb 5.2 oz)   25 1351 116.1 kg (256 lb)   25 0625 116.2 kg (256 lb 2.8 oz)   25 1251 115 kg (253 lb 8.5 oz)   25 0700 96.2 kg (212 lb 1.3 oz)   25 0104 96.2 kg (212 lb " 1.3 oz)   05/28/25 0640 95.5 kg (210 lb 8.6 oz)   05/28/25 0639 95.5 kg (210 lb 8.6 oz)   05/27/25 0645 94.4 kg (208 lb 3.2 oz)   05/26/25 1059 120 kg (264 lb 8.8 oz)   05/26/25 0600 120.5 kg (265 lb 11.2 oz)   05/25/25 0700 120.2 kg (264 lb 14.4 oz)   05/25/25 0517 120.2 kg (264 lb 14.4 oz)   05/23/25 2225 117.9 kg (260 lb)   05/23/25 1816 126.6 kg (279 lb)   05/23/25 1813 108.9 kg (240 lb)   Admit Weight: 108.9 kg (240 lb) (05/23/25 1813), Weight Method: Stated    Estimated Needs    Weight Used For Calorie Calculations: 112.5 kg (248 lb 0.3 oz) (based on updated wt (6-2))  Energy Calorie Requirements (kcal): 2250kcal/d; 20 humaira/kg--obese  Energy Need Method: Kcal/kg  Weight Used For Protein Calculations: 86 kg (189 lb 9.5 oz) (based on IBW)  Protein Requirements:  gm protein/d; 1-1.2 gm/kg IBW  Fluid Requirements (mL): 2250 ml/d; 1ml/humaira  CHO Requirement: 253 gm CHO/d     Enteral Nutrition     Patient not receiving enteral nutrition at this time.    Parenteral Nutrition     Patient not receiving parenteral nutrition support at this time.    Evaluation of Received Nutrient Intake    Calories: not meeting estimated needs  Protein: not meeting estimated needs    Patient Education     Not applicable.    Nutrition Diagnosis     PES: Inadequate oral intake related to acute illness as evidenced by < 50% po intake > 5 days. (active)     PES: Severe acute disease or injury related malnutrition Related to acute malnutrition As Evidenced by:  - energy intake: <= 50% for 10 days (meets criteria for <= 50% >= 5 days (severe - acute)) - muscle mass depletion: 1 area of moderate muscle loss (Temporalis) - fluid accumulation: 1 area of moderate or severe fluid accumulation (Lower extremities edema) active    Nutrition Interventions     Intervention(s): Care coordination or referral;Oral diet/nutrient modifications;Oral nutritional supplement    Goal: Meet greater than 80% of nutritional needs by follow-up. (goal not  met)  Goal: Maintain weight throughout hospitalization. (goal progressing)    Nutrition Goals & Monitoring     Dietitian will monitor: food and beverage intake, weight, and electrolyte/renal panel  Discharge planning: continue Soft & Bite size, Low Na, Carb consistent  diet with Boost Breeze or equivalent oral supplements  Nutrition Risk/Follow-Up: patient at increased nutrition risk; dietitian will follow-up twice weekly   Please consult if re-assessment needed sooner.

## 2025-06-02 NOTE — PROCEDURES
Procedure: Conservative Sharps Debridement of Left medial 1st MPJ    Indication: Debridement performed for optimizing healthy wound healing environment by removing nonviable tissue. Also to obtain tissue sample to culture per discussion with ID     Phone Consent Obtained (Daughter), procedure explained and all questions answered. Time out performed.    Conservative Sharps Debridement:  Type of debridement: Selective Sharps Debridement  Local Anesthetic:Lidocaine 5% topical   Instrument used: Scissors and Forceps  Tissue removed: Tissue type: Muscle from inside the wound margins (sent for culture)  Tissue layer exposed: Tissue type: Bone  Bleeding: Minimal   Hemostasis Achieved via: Direct pressure  Pain: No    Pre-procedure      Post-procedure
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
## 2025-06-02 NOTE — PROGRESS NOTES
06/02/25 1343   Missed Time Reason   OT Attempted Eval Date 06/02/25   OT Attempted Eval Time 1344   Missed Treatment Reason Off the floor for procedure/surgery     Pt off floor for GI procedure, then anticipate downgrade to room 608 per nurse.  OT will f/u for eval as schedule allows.

## 2025-06-02 NOTE — PROGRESS NOTES
[facility]  Infectious Diseases Progress Note        SUBJECTIVE:   HPI: 74-year-old male with PMH CAD, prior CVA in 03/2025, chronic indwelling Fowler catheter who was admitted to Medicine on 5/23 for workup/management of weakness.  Urine culture was obtained on admission, appears to be from old Fowler catheter, which showed growth of Enterococcus faecium.  He has been started on ampicillin as organism was not VRE and sensitive to beta lactams.  CT abdomen and pelvis showed malpositioned Fowler catheter with balloon of the prostate.  Per discussion with primary team there is noted purulence in his urine.  Infectious disease service has been consulted for management of UTI.     On admission he was noted to have leukocytosis of 26.8 however was afebrile.  He has remained afebrile throughout his entire hospitalization.  His leukocytosis has improved now to 16.7.  He was initially started on ceftriaxone empirically followed by vancomycin and Zosyn, however now has been transitioned to ampicillin.  TTE was completed which showed a mobile echogenic mass in the right atrium, cardiology has been consulted who is planning for YO to further evaluate this.     History was obtained from both the patient and his sister who was present at bedside. She reports that the patient's mentation decreased a few days prior to his admission along with generalized weakness to where he was unable to participate with PT at his nursing home where he resides. His fowler catheter apparently has not been exchanged since 3/2025. The patient reports noticing R midback pain that began about 3 days prior to his admission as well however this has since improved. He denies fever, chills, N/V/D.       Interval History:  Patient now in ICU. Was moved to ICU due to episode of Atrial fib with RVR.  Patient is on oxygen at 1 L per nasal cannula.  No sedation.  No pressor support.  He is grimacing upon exam.  Complaining he is  nauseated.  Denies fever, chills, night sweats, rash, HA, vision changes, dizziness, CP, V/D, abdominal pain, or urinary complaints.  He is afebrile with persistent leukocytosis; WBC 17.3.  Remains anemic; hemoglobin 7.3/hematocrit 22.3.  PRIYA is improving; BUN 65/creatinine 1.49.  Transaminitis improving; AST 27/ALT 57.  Blood culture x1 NGTD.  Urine culture with Enterococcus faecium.  Repeat urine culture 05/29/2025 NGTD.  Sputum culture NGTD.  Previous TTE had concerns for right atrial echogenic mass.  YO done and showed highly mobile echogenic thrombus right atrium (1.5 cm).  Now noted with LT toe wound that probes to bone which is indicative of osteomyelitis.  While concerning, do not feel this is source of leukocytosis.  Cultures of toe have been sent to micro and are pending.  Patient remains on Penicillin G IV daily per continuous infusion. Tdap not up to date       Antibiotic / Antiviral / Antifungal History:  Ceftriaxone 1 g IV x1 dose - 05/23/2025   Zosyn 4.5 g IV q.8 hours - 05/24/2025 to 05/25/2025   Vancomycin IV trough 15-20 - 05/24/2025 to 05/26/2025   Ampicillin 1 g IV q.4 hours - 05/26/2025 to 05/28/2025   Penicillin G 24,000,000 units IV daily per continuous infusion - 05/28/2025 to present       MEDICATIONS:   Reviewed in EMR    REVIEW OF SYSTEMS:   Except as documented, all other systems reviewed and negative     PHYSICAL EXAM:   T 97.6 °F (36.4 °C)   BP (!) 110/53   P 98   RR (!) 21   O2 96 %  GENERAL: Elderly BM who is A&Ox3, NAD, appears chronically ill; on O2 per nasal cannula   HEENT: atraumatic, normocephalic, anicteric, moist oral mucosa without lesions, exudate, or erythema; no thrush  LUNGS: breathing unlabored, lungs coarse bilateral  HEART: RRR; no murmur, rub, or gallop  ABDOMEN: abdomen soft, obese, nondistended, nontender to palpation  : Palomo catheter in place  EXTREMITIES: 2-3+ bilateral lower extremity pitting edema  SKIN: Open wound to left medial great toe without purulence,  "erythema, or necrosis  NEURO: speech fluent and intact, facial symmetry preserved, no tremor  PSYCH: cooperative, normal mood and affect  LINES: PIV       LABS AND IMAGING:     Recent Labs     06/01/25  0427 06/01/25  0913 06/01/25  1943 06/02/25  0333   WBC 16.84*  --   --  17.30*   RBC 2.19*  --   --  2.31*   HGB 6.9*   < > 7.3* 7.3*   HCT 21.5*   < > 22.5* 22.3*   MCV 98.2*  --   --  96.5*   MCH 31.5*  --   --  31.6*   MCHC 32.1*  --   --  32.7*   RDW 18.4*  --   --  18.4*     --   --  232    < > = values in this interval not displayed.     No results for input(s): "LACTIC" in the last 72 hours.  No results for input(s): "INR", "APTT", "D-DIMER" in the last 72 hours.    No results for input(s): "HGBA1C", "CHOL", "TRIG", "LDL", "VLDL", "HDL" in the last 72 hours.   Recent Labs     06/01/25 0428 06/01/25  1716 06/02/25  0333    140 140   K 3.2* 3.7 3.3*   CO2 24 26 27   BUN 75.4* 72.3* 65.8*   CREATININE 1.69* 1.46* 1.49*   CALCIUM 8.4* 8.5* 8.3*   MG 1.90 1.90 1.80   PHOS 3.2 2.3 2.6   ALBUMIN 2.3* 2.3* 2.2*   GLOBULIN 4.2* 3.8* 3.6*   ALKPHOS 88 68 61   ALT 77* 62* 57*   AST 29 27 27   BILITOT 0.4 0.8 0.6   CRP  --   --  9.50*   *  --   --    HAPTOGLOBIN 199  --   --    FERRITIN 238.32  --   --    IRON 71  --   --    TIBC 215*  --   --      Recent Labs     05/30/25  2159 05/31/25 0324 06/01/25  0428   TROPONINI 0.141* 0.119* 0.105*          Estimated Creatinine Clearance: 58 mL/min (A) (based on SCr of 1.49 mg/dL (H)).   Lab Results   Component Value Date    SEDRATE 8 06/02/2025      Lab Results   Component Value Date    CRP 9.50 (H) 06/02/2025        Micro:   Colonization:  No results found for this or any previous visit.     5/23 pBCx 2/2: NGTD  5/23 Urine cx: Enterococcus faecium      05/28/2025 sputum culture NGTD  05/29/2025 urine culture NGTD  06/02/2025 left foot wound culture pending        X-Ray Chest 1 View  Narrative: EXAMINATION:  XR CHEST 1 VIEW    CLINICAL " HISTORY:  Sob;    TECHNIQUE:  Single frontal view of the chest was performed.    COMPARISON:  05/31/2025    FINDINGS:  LINES AND TUBES: EKG/telemetry leads overlie the chest.    MEDIASTINUM AND CHACE: The cardiac silhouette is normal.    LUNGS: Lung volumes are low with associated atelectatic change.  Mild improved aeration at the right lung base.    PLEURA:No pleural effusion. No pneumothorax.    OTHER: No acute osseous abnormality.  Impression: Mild improved aeration at the right lung base.    Electronically signed by: Ann Armstrong  Date:    06/01/2025  Time:    12:46    TTE 05/26/2025:  Summary  Show Result Comparison     Left Ventricle: The left ventricle is normal in size. Normal wall thickness. There is moderately reduced systolic function with a visually estimated ejection fraction of 35 - 40%. Unable to assess diastolic function due to atrial fibrillation.    Right Ventricle: The right ventricle is moderately dilated Systolic function is moderately reduced. Prominent moderator band in the right ventricle.    Left Atrium: The left atrium is mildly dilated    Right Atrium: The right atrium is mildly dilated . 1.9 x 1.1 x 1.8 small mobile echogenic mass present.    Aortic Valve: There is mild aortic regurgitation.    Tricuspid Valve: There is mild regurgitation.    Pulmonary Artery: The estimated pulmonary artery systolic pressure is 49 mmHg.    IVC/SVC: Elevated venous pressure at 15 mmHg.      YO 05/30/2025:   Summary  Show Result Comparison     Left Ventricle: The left ventricle is normal in size. Normal wall motion. There is normal systolic function. Quantitated ejection fraction is 56%. Elevated left ventricular filling pressure.    Right Ventricle: The right ventricle is mildly dilated Systolic function is normal.    Left Atrium: The left atrium is dilated Agitated saline study of the atrial septum is negative, suggesting absence of intracardiac shunt at the atrial level. No patent foramen ovale.     Right Atrium: The right atrium is dilated. There is a prominent Eustachian valve with a highly mobile echogenic structure attached to it most likely representing a thrombus and measuring 1.5 cm in its longest dimension. Dense spontaneous echo contrast visualized in the right atrial cavity.    Aortic Valve: The aortic valve is a trileaflet valve. There is no stenosis. There is no significant regurgitation.    Mitral Valve: The mitral valve is structurally normal. There is no stenosis. There is trace regurgitation.    Tricuspid Valve: There is trace regurgitation.    Aorta: The aortic root is normal in size measuring 3.5 cm. The ascending aorta is normal in size measuring 2.9 cm. The aortic arch is normal measuring 2.6 cm. The descending aorta is normal measuring 2.3 cm.    Pericardium: There is no pericardial effusion.     YO obtained for further evaluation of right atrial mass noted on transthoracic echocardiogram.        ASSESSMENT & PLAN:     74-year-old male with PMH CAD, prior CVA in 03/2025, chronic indwelling Fowler catheter who was admitted to Medicine on 5/23 for workup/management of weakness.  Urine culture was obtained on admission, appears to be from old Fowler catheter, which showed growth of Enterococcus faecium.  He has been started on ampicillin as organism was not VRE and sensitive to beta lactams.  CT abdomen and pelvis showed malpositioned Fowler catheter with balloon of the prostate.  Per discussion with primary team there is noted purulence in his urine.  Infectious disease service has been consulted for management of UTI.     1) Enterococcus faecium cystitis  - Organism PCN sensitive, non-VRE  2) Possible acute prostatitis  3) Malpositioned fowler catheter, resolved  4) Sepsis  5) Right atrial echogenic thrombus   6) CVA  7) CAD  8) hepatomegaly   9) transaminitis   10) PRIYA (improving)  11) dyspnea (improving)  12) anemia   13) persistent leukocytosis   14) atrial fibrillation with RVR  15) LT foot  osteomyelitis       Persistent leukocytosis in the setting of Enterococcus faecium cystitis.  Need to rule out acute prostatitis/prostate abscess.  Patient noted with episodes of atrial fib with RVR.  TTE showed concerns for a mobile right atrial mass.  YO was followed up and she confirmed a highly mobile thrombus to the right atrium (1.5 cm).  Now noted with LT toe wound that probes to bone which is indicative of osteomyelitis.  While concerning, do not feel this is source of leukocytosis.  Cultures of toe have been sent to [place] and are pending.         RECOMMENDATIONS:       Follow up culture results.  Have discussed with wound care NP and she will try to obtain bone / tissue cultures  May need to expand antibiotic coverage depending on culture results   Recommend LT foot Xray at this time   Recommend arterial vascular studies   Awaiting contrasted imaging to rule out prostatitis / prostate abscess  Continue Penicillin G-K 24 million units IV daily per continuous infusion   Purulent material was seen in fowler could represent possible prostate abscess vs acute prostatitis vs urethritis.  Again recommend to repeat CT abd/pelvis WITH IV contrast, along with CT chest when patient goes for this  Concern for prostatitis due to malpositioned fowler catheter seen on admission  Okay to place PICC line  Monitor liver enzymes   Renal protective measures  Patient needs continued wound care and strict glucose control   Recommend Tdap as this is not up to date   ID will continue to follow          KIRSTIN Hedrick  [facility] Infectious Diseases     *Portions of this note dictated using EMR integrated voice recognition software, and may be subject to voice recognition errors not corrected at proofreading. Please contact writer for clarification if needed. *

## 2025-06-02 NOTE — PROGRESS NOTES
06/02/25 0725   Missed Time Reason   Missed Treatment Reason Transfer to ICU, await clearance     Re consult for OT and PT  as appropriate

## 2025-06-02 NOTE — PROCEDURES
"Vaibhav Vargas is a 74 y.o. male patient.    Temp: 97.5 °F (36.4 °C) (06/02/25 1537)  Pulse: 93 (06/02/25 1608)  Resp: 17 (06/02/25 1537)  BP: (!) 116/57 (06/02/25 1608)  SpO2: 100 % (06/02/25 1537)  Weight: 112.5 kg (248 lb 0.3 oz) (06/02/25 0527)  Height: 6' 2" (188 cm) (05/30/25 1351)    PICC  Date/Time: 6/2/2025 4:26 PM  Performed by: Miguelangel Gonzalez, RN  Consent Done: Yes  Time out: Immediately prior to procedure a time out was called to verify the correct patient, procedure, equipment, support staff and site/side marked as required  Indications: med administration and vascular access  Anesthesia: local infiltration  Local anesthetic: lidocaine 1% without epinephrine  Anesthetic Total (mL): 3  Preparation: skin prepped with ChloraPrep  Skin prep agent dried: skin prep agent completely dried prior to procedure  Sterile barriers: all five maximum sterile barriers used - cap, mask, sterile gown, sterile gloves, and large sterile sheet  Hand hygiene: hand hygiene performed prior to central venous catheter insertion  Location details: right brachial  Catheter type: double lumen  Catheter size: 5 Fr  Catheter Length: 38cm    Ultrasound guidance: yes  Vessel Caliber: patent, compressibility normal  Vascular Doppler: not done  Needle advanced into vessel with real time Ultrasound guidance.  Guidewire confirmed in vessel.  Sterile sheath used.  no esophageal manometryNumber of attempts: 1  Post-procedure: blood return through all ports, sterile dressing applied and chlorhexidine patch    Assessment: placement verified by x-ray  Complications: none  Comments: 6wks iv abx per ID note          Miguelangel Gonzalez RN  6/2/2025    "

## 2025-06-02 NOTE — PLAN OF CARE
Problem: Infection  Goal: Absence of Infection Signs and Symptoms  Outcome: Progressing     Problem: Adult Inpatient Plan of Care  Goal: Plan of Care Review  Outcome: Progressing  Goal: Patient-Specific Goal (Individualized)  Outcome: Progressing  Goal: Absence of Hospital-Acquired Illness or Injury  Outcome: Progressing  Goal: Optimal Comfort and Wellbeing  Outcome: Progressing  Goal: Readiness for Transition of Care  Outcome: Progressing     Problem: Diabetes Comorbidity  Goal: Blood Glucose Level Within Targeted Range  Outcome: Progressing     Problem: Wound  Goal: Optimal Coping  Outcome: Progressing  Goal: Optimal Functional Ability  Outcome: Progressing  Goal: Absence of Infection Signs and Symptoms  Outcome: Progressing  Goal: Improved Oral Intake  Outcome: Progressing  Goal: Optimal Pain Control and Function  Outcome: Progressing  Goal: Skin Health and Integrity  Outcome: Progressing  Goal: Optimal Wound Healing  Outcome: Progressing     Problem: Skin Injury Risk Increased  Goal: Skin Health and Integrity  Outcome: Progressing     Problem: Heart Failure  Goal: Optimal Coping  Outcome: Progressing  Goal: Optimal Cardiac Output  Outcome: Progressing  Goal: Stable Heart Rate and Rhythm  Outcome: Progressing  Goal: Optimal Functional Ability  Outcome: Progressing  Goal: Fluid and Electrolyte Balance  Outcome: Progressing  Goal: Effective Oxygenation and Ventilation  Outcome: Progressing  Goal: Effective Breathing Pattern During Sleep  Outcome: Progressing     Problem: Sepsis/Septic Shock  Goal: Optimal Coping  Outcome: Progressing  Goal: Absence of Bleeding  Outcome: Progressing  Goal: Blood Glucose Level Within Targeted Range  Outcome: Progressing  Goal: Absence of Infection Signs and Symptoms  Outcome: Progressing     Problem: Fall Injury Risk  Goal: Absence of Fall and Fall-Related Injury  Outcome: Progressing     Problem: Comorbidity Management  Goal: Blood Pressure in Desired Range  Outcome: Progressing
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
## 2025-06-02 NOTE — PROGRESS NOTES
"  Nephrology Progress Note    Patient Name: Vaibhav Vargas  Age: 74 y.o.  : 1950  MRN: [id number]  Admission Date: 2025    Chief complaint: Shortness of Breath (From [facility] with complaints of SOB and lower back pain for about 3 days. Abdominal distension and bilateral leg edema noted. Palomo in place upon arrival with cloudy urine noted.)      Hospital course  Vaibhav Vargas is a 74 y.o. Black or  male with past medical history of cerebrovascular accident (2025), coronary artery disease status post LAD stent (), venous insufficiency, hypertension, first-degree AV block, and type 2 diabetes mellitus, who presented to the ED from a nursing home due to progressive weakness over the past week. His daughter provided history due to his communication difficulties. Associated symptoms included altered mental status, back pain, and abdominal discomfort. In the ED, vitals were stable, but lab results were remarkable for severe hyperkalemia and azotemia.  He was treated with Lokelma, insulin, and albuterol, started on Rocephin for a urinary tract infection.  Imaging of the abdomen revealed hydronephrosis and malpositioned Palomo catheter.  The Palomo catheter has been subsequently replaced and renal function has improved.  Nephrology service continues to follow for assistance with management of PRIYA.      Interval Hx  Received 1u pRBC yesterday. No acute complaints or concerns this morning. Continues to have LE edema.     Review of Systems  Cardiovascular: Negative for chest pain   Respiratory: Negative for shortness of breath.    Objective  /66   Pulse 101   Temp 97.6 °F (36.4 °C) (Oral)   Resp (!) 21   Ht 6' 2" (1.88 m)   Wt 112.5 kg (248 lb 0.3 oz)   SpO2 96%   BMI 31.84 kg/m²     Intake/Output Summary (Last 24 hours) at 2025 1043  Last data filed at 2025 0705  Gross per 24 hour   Intake 2453.45 ml   Output 2670 ml   Net -216.55 ml       Physical " Exam  General appearance: Patient is in no acute distress.  Skin: No rashes or wounds.  HEENT: no scleral icterus, no JVD. Neck is supple.  Chest: Respirations are unlabored. Lungs sounds are clear.   Heart: S1, S2.  : Palomo catheter to gravity.  Extremities: b/l LE edema.  Neuro: No focal deficits.     Medications  Scheduled Meds:   0.9% NaCl   Intravenous Once    atorvastatin  20 mg Oral Daily    LIDOcaine  1 patch Transdermal Q24H    metoprolol succinate  100 mg Oral Daily    metoprolol succinate  50 mg Oral QHS    pantoprazole  40 mg Intravenous BID    penicillin G potassium 24 Million Units in D5W 500 mL CONTINUOUS INFUSION  24 Million Units Intravenous Q24H    potassium chloride  10 mEq Intravenous Q1H     Continuous Infusions:  PRN Meds:.  Current Facility-Administered Medications:     0.9%  NaCl infusion (for blood administration), , Intravenous, Q24H PRN    acetaminophen, 650 mg, Oral, Q4H PRN    dextrose 50%, 12.5 g, Intravenous, PRN    dextrose 50%, 25 g, Intravenous, PRN    glucagon (human recombinant), 1 mg, Intramuscular, PRN    glucose, 16 g, Oral, PRN    glucose, 24 g, Oral, PRN    HYDROcodone-acetaminophen, 1 tablet, Oral, Q6H PRN    insulin aspart U-100, 0-5 Units, Subcutaneous, QID (AC + HS) PRN    melatonin, 6 mg, Oral, Nightly PRN    ondansetron, 4 mg, Intravenous, Q6H PRN    polyethylene glycol, 17 g, Oral, Daily PRN    senna-docusate, 1 tablet, Oral, Daily PRN    sodium chloride 0.9%, 10 mL, Intravenous, PRN     Imaging:    Reviewed    Laboratory Data:    Chemistry  Recent Labs     05/31/25  0324 05/31/25  1711 06/01/25  0428 06/01/25  1716 06/02/25  0333    139 140 140 140   K 3.7 4.5 3.2* 3.7 3.3*   CO2 30 26 24 26 27   BUN 71.3* 75.4* 75.4* 72.3* 65.8*   CREATININE 1.68* 1.66* 1.69* 1.46* 1.49*   EGFRNORACEVR 42 43 42 50 49   CALCIUM 8.3* 8.3* 8.4* 8.5* 8.3*   MG 1.70 2.10 1.90 1.90 1.80   PHOS 3.6  --  3.2 2.3 2.6      LFT  Lab Results   Component Value Date    ALKPHOS 61  06/02/2025    AST 27 06/02/2025    ALT 57 (H) 06/02/2025    BILIDIR 0.3 04/22/2022    IBILI 0.30 04/22/2022    BILITOT 0.6 06/02/2025    ALBUMIN 2.2 (L) 06/02/2025        Hematology  Recent Labs     05/31/25  0324 05/31/25  1711 06/01/25  0427 06/01/25  0913 06/01/25  1521 06/01/25  1943 06/02/25  0333   WBC 15.78*  --  16.84*  --   --   --  17.30*   HGB 6.6*   < > 6.9* 7.8* 7.5* 7.3* 7.3*   HCT 20.9*   < > 21.5* 24.3* 23.9* 22.5* 22.3*     --  248  --   --   --  232    < > = values in this interval not displayed.      Lab Results   Component Value Date    IRON 71 06/01/2025    TIBC 215 (L) 06/01/2025    FERRITIN 238.32 06/01/2025    FOLATE 10.1 06/01/2025    [id number] 212 (L) 06/01/2025     (H) 06/01/2025      ID  Lab Results   Component Value Date    HIV Nonreactive 05/29/2025    HEPCAB Nonreactive 05/28/2025    HEPBSAB Nonreactive 05/28/2025      Additional serology  Lab Results   Component Value Date    HGBA1C 5.6 05/24/2025       Urine studies  Lab Results   Component Value Date    APPEARANCEUA Clear 05/29/2025    SGUA 1.009 05/29/2025    PROTEINUA Negative 05/29/2025    KETONESUA Negative 05/29/2025    LEUKOCYTESUR 250 (A) 05/29/2025    RBCUA 0-5 05/29/2025    WBCUA 21-50 (A) 05/29/2025    BACTERIA None Seen 05/29/2025    SQEPUA None Seen 05/29/2025    HYALINECASTS None Seen 05/29/2025    CREATRANDUR 57.5 (L) 02/14/2024        Impression  Acute kidney injury, nonoliguric   Hypokalemia  Urinary tract infection   Atrial fibrillation with rapid ventricular response   Acute decompensated heart failure   Hypertension   Acute anemia   Transaminitis    Plan  Renal indices continue to remain stable with appropriate urinary output  alkalosis resolved.   Recommend Potassium replacement.   H/H stable today.       Piedad Brady MD  [employer] , [facility]

## 2025-06-02 NOTE — NURSING
Report given to 6th floor nurse and then GI nurse. Pt was brought to GI lab per GI and then will go to 608. Family came to visit and was notified of new room and that pt was in procedure.
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
## 2025-06-02 NOTE — PROGRESS NOTES
06/02/25 1424   Missed Time Reason   OT Attempted Eval Date 06/02/25   OT Attempted Eval Time 1424   Missed Treatment Reason Off the floor for procedure/surgery     Pt still not back room 608, OT will f/u for reeval tomorrow.

## 2025-06-02 NOTE — PROVATION PATIENT INSTRUCTIONS
Discharge Summary/Instructions after an Endoscopic Procedure  Patient Name: Vaibhav Vargas  Patient MRN: [id number]  Patient YOB: 1950  Monday, June 2, 2025  Chapis Lane MD  Dear patient,  As a result of recent federal legislation (The Federal Cures Act), you may   receive lab or pathology results from your procedure in your [facility]   account before your physician is able to contact you. Your physician or   their representative will relay the results to you with their   recommendations at their soonest availability.  Thank you,  RESTRICTIONS:  During your procedure today, you received medications for sedation.  These   medications may affect your judgment, balance and coordination.  Therefore,   for 24 hours, you have the following restrictions:   - DO NOT drive a car, operate machinery, make legal/financial decisions,   sign important papers or drink alcohol.    ACTIVITY:  Today: no heavy lifting, straining or running due to procedural   sedation/anesthesia.  The following day: return to full activity including work.  DIET:  Eat and drink normally unless instructed otherwise.     TREATMENT FOR COMMON SIDE EFFECTS:  - Mild abdominal pain, nausea, belching, bloating or excessive gas:  rest,   eat lightly and use a heating pad.  - Sore Throat: treat with throat lozenges and/or gargle with warm salt   water.  - Because air was used during the procedure, expelling large amounts of air   from your rectum or belching is normal.  - If a bowel prep was taken, you may not have a bowel movement for 1-3 days.    This is normal.  SYMPTOMS TO WATCH FOR AND REPORT TO YOUR PHYSICIAN:  1. Abdominal pain or bloating, other than gas cramps.  2. Chest pain.  3. Back pain.  4. Signs of infection such as: chills or fever occurring within 24 hours   after the procedure.  5. Rectal bleeding, which would show as bright red, maroon, or black stools.   (A tablespoon of blood from the rectum is not serious, especially  if   hemorrhoids are present.)  6. Vomiting.  7. Weakness or dizziness.  GO DIRECTLY TO THE NEAREST EMERGENCY ROOM IF YOU HAVE ANY OF THE FOLLOWING:      Difficulty breathing              Chills and/or fever over 101 F   Persistent vomiting and/or vomiting blood   Severe abdominal pain   Severe chest pain   Black, tarry stools   Bleeding- more than one tablespoon   Any other symptom or condition that you feel may need urgent attention  Your doctor recommends these additional instructions:  If any biopsies were taken, your doctors clinic will contact you in 1 to 2   weeks with any results.  Recommendations:  - [facility] for [facility] care.   - Soft diet.   - Continue present medications.   - Use Protonix (pantoprazole) 40 mg IV BID.   - Await pathology results.   - Monitor hemoglobin.  Ok to restart anticoagulation once hemoglobin   stabilizes.  Impressions:  - Normal esophagus.   - Z-line regular, 40 cm from the incisors.   - Normal stomach.  Biopsied.   - Non-obstructing non-bleeding medial wall duodenal ulcer with a clean ulcer   base ([place] Class III).   - Non-obstructing non-bleeding posterior wall duodenal ulcer with a clean   ulcer base ([place] Class III).  There was persistent oozing from the   friable mucosa at the edges of the ulcer.  Treated with a heater probe.    Treated with argon plasma coagulation (APC).   - Non-obstructing non-bleeding lateral wall duodenal ulcer with an adherent   clot ([place] Class IIb).  Treated with a heater probe.   - Normal second portion of the duodenum.  For questions, problems or results please call your physician - Chapis Lane MD at Work:  [phone], Work:  [phone].  [facility] , EMERGENCY ROOM PHONE NUMBER: [phone]  IF A COMPLICATION OR EMERGENCY SITUATION ARISES AND YOU ARE UNABLE TO REACH   YOUR PHYSICIAN - GO DIRECTLY TO THE EMERGENCY ROOM.  Chapis Lane MD  6/2/2025 2:16:20 PM  This report  has been verified and signed electronically.  Dear patient,  As a result of recent federal legislation (The Federal Cures Act), you may   receive lab or pathology results from your procedure in your [facility]   account before your physician is able to contact you. Your physician or   their representative will relay the results to you with their   recommendations at their soonest availability.  Thank you,  PROVATION

## 2025-06-02 NOTE — PROGRESS NOTES
Pharmacokinetic Initial Assessment: IV Vancomycin    Assessment/Plan:    Initiate intravenous vancomycin with loading dose of 2000 mg once followed by a maintenance dose of vancomycin 2000mg IV every 24 hours  Desired empiric serum trough concentration is 15 to 20 mcg/mL  Draw vancomycin trough level 60 min prior to third dose on 06/03 at approximately 1400  Pharmacy will continue to follow and monitor vancomycin.      Please contact pharmacy at extension [phone] with any questions regarding this assessment.     Thank you for the consult,   Sandra Martínez       Patient brief summary:  Vaibhav Vargas is a 74 y.o. male initiated on antimicrobial therapy with IV Vancomycin for treatment of suspected sepsis    Drug Allergies:   Review of patient's allergies indicates:  No Known Allergies    Actual Body Weight:   112.5 kg    Renal Function:   Estimated Creatinine Clearance: 58 mL/min (A) (based on SCr of 1.49 mg/dL (H)).,     Dialysis Method (if applicable):  N/A    CBC (last 72 hours):  Recent Labs   Lab Result Units 05/31/25 0324 05/31/25 1711 06/01/25  0427 06/01/25  0913 06/01/25  1521 06/01/25  1943 06/02/25  0333   WBC x10(3)/mcL 15.78*  --  16.84*  --   --   --  17.30*   Hgb g/dL 6.6* 7.5* 6.9* 7.8* 7.5* 7.3* 7.3*   Hct % 20.9* 23.5* 21.5* 24.3* 23.9* 22.5* 22.3*   Platelet x10(3)/mcL 310  --  248  --   --   --  232   Mono % % 7.5  --  6.0  --   --   --  7.2   Eos % % 3.3  --  1.3  --   --   --  1.7   Basophil % % 0.3  --  0.4  --   --   --  0.3       Metabolic Panel (last 72 hours):  Recent Labs   Lab Result Units 05/31/25 0324 05/31/25 1711 06/01/25  0428 06/01/25  1716 06/02/25  0333   Sodium mmol/L 142 139 140 140 140   Potassium mmol/L 3.7 4.5 3.2* 3.7 3.3*   Chloride mmol/L 102 101 102 101 102   CO2 mmol/L 30 26 24 26 27   Glucose mg/dL 169* 228* 199* 192* 193*   Blood Urea Nitrogen mg/dL 71.3* 75.4* 75.4* 72.3* 65.8*   Creatinine mg/dL 1.68* 1.66* 1.69* 1.46* 1.49*   Albumin g/dL 2.2*  --  2.3* 2.3*  "2.2*   Bilirubin Total mg/dL 0.3  --  0.4 0.8 0.6   ALP unit/L 115  --  88 68 61   AST unit/L 34  --  29 27 27   ALT unit/L 105*  --  77* 62* 57*   Magnesium Level mg/dL 1.70 2.10 1.90 1.90 1.80   Phosphorus Level mg/dL 3.6  --  3.2 2.3 2.6       Drug levels (last 3 results):  No results for input(s): "VANCOMYCINRA", "VANCORANDOM", "VANCOMYCINPE", "VANCOPEAK", "VANCOMYCINTR", "VANCOTROUGH" in the last 72 hours.    Microbiologic Results:  Microbiology Results (last 7 days)       Procedure Component Value Units Date/Time    Tissue Culture - Aerobic [[id number]] Collected: 06/02/25 1121    Order Status: Sent Specimen: Tissue from Foot, Left Updated: 06/02/25 1209    Anaerobic Culture [[id number]] Collected: 06/02/25 1121    Order Status: Sent Specimen: Tissue from Foot, Left Updated: 06/02/25 1209    Wound Culture [[id number]] Collected: 06/02/25 0919    Order Status: Sent Specimen: Wound from Foot, Left Updated: 06/02/25 0926    Anaerobic Culture [[id number]] Collected: 06/02/25 0919    Order Status: Sent Specimen: SWAB from Foot, Left Updated: 06/02/25 0926    Urine culture [[id number]] Collected: 05/29/25 1623    Order Status: Completed Specimen: Urine Updated: 06/01/25 1121     Urine Culture No Growth    Respiratory Culture [[id number]] Collected: 05/28/25 1357    Order Status: Completed Specimen: Sputum, Expectorated Updated: 05/30/25 0814     Respiratory Culture Normal respiratory jacy     GRAM STAIN Quality 3+      No bacteria seen    Blood Culture #1 **CANNOT BE ORDERED STAT** [[id number]]  (Normal) Collected: 05/23/25 1923    Order Status: Completed Specimen: Blood Updated: 05/29/25 0011     Blood Culture No Growth at 5 days    Blood Culture #2 **CANNOT BE ORDERED STAT** [[id number]]  (Normal) Collected: 05/23/25 1957    Order Status: Completed Specimen: Blood from Arm, Left Updated: 05/29/25 0011     Blood Culture No Growth at 5 days    Chlamydia/GC, PCR [[id number]] Collected: 05/28/25 1258    Order Status: " Completed Specimen: Urine Updated: 05/28/25 1536     Chlamydia trachomatis PCR Not Detected     N. gonorrhea PCR Not Detected     Source Urine    Narrative:      The Xpert CT/NG test, performed on the GeneXpert system is a qualitative in vitro real-time polymerase chain reaction (PCR) test for the automated detected and differentiation for genomic DNA from Chlamydia trachomatis (CT) and/or Neisseria gonorrhoeae (NG).

## 2025-06-02 NOTE — PT/OT/SLP PROGRESS
Physical Therapy    Missed Treatment Session - cancel note - 06/02/2025    Patient Name:  Vaibhav Vargas   MRN:  [id number]      Patient not seen at this time secondary to nursing trying to get IV in patient.    Will follow-up as patient is appropriate/available/agreeable to participate and as therapists' schedule allows.

## 2025-06-03 LAB
ALBUMIN SERPL-MCNC: 2.2 G/DL (ref 3.4–4.8)
ALBUMIN/GLOB SERPL: 0.6 RATIO (ref 1.1–2)
ALP SERPL-CCNC: 50 UNIT/L (ref 40–150)
ALT SERPL-CCNC: 42 UNIT/L (ref 0–55)
ANION GAP SERPL CALC-SCNC: 11 MEQ/L
AST SERPL-CCNC: 25 UNIT/L (ref 11–45)
BASOPHILS # BLD AUTO: 0.07 X10(3)/MCL
BASOPHILS NFR BLD AUTO: 0.5 %
BILIRUB SERPL-MCNC: 0.6 MG/DL
BUN SERPL-MCNC: 55.1 MG/DL (ref 8.4–25.7)
CALCIUM SERPL-MCNC: 8.2 MG/DL (ref 8.8–10)
CHLORIDE SERPL-SCNC: 106 MMOL/L (ref 98–107)
CO2 SERPL-SCNC: 23 MMOL/L (ref 23–31)
CREAT SERPL-MCNC: 1.26 MG/DL (ref 0.72–1.25)
CREAT/UREA NIT SERPL: 44
EOSINOPHIL # BLD AUTO: 0.64 X10(3)/MCL (ref 0–0.9)
EOSINOPHIL NFR BLD AUTO: 4.2 %
ERYTHROCYTE [DISTWIDTH] IN BLOOD BY AUTOMATED COUNT: 19.6 % (ref 11.5–17)
GFR SERPLBLD CREATININE-BSD FMLA CKD-EPI: 60 ML/MIN/1.73/M2
GLOBULIN SER-MCNC: 3.4 GM/DL (ref 2.4–3.5)
GLUCOSE SERPL-MCNC: 137 MG/DL (ref 82–115)
HCT VFR BLD AUTO: 22.6 % (ref 42–52)
HGB BLD-MCNC: 7.2 G/DL (ref 14–18)
IMM GRANULOCYTES # BLD AUTO: 0.19 X10(3)/MCL (ref 0–0.04)
IMM GRANULOCYTES NFR BLD AUTO: 1.2 %
LEFT ABI: 1.49
LEFT ARM BP: 98 MMHG
LEFT DORSALIS PEDIS: 146 MMHG
LEFT POSTERIOR TIBIAL: 124 MMHG
LYMPHOCYTES # BLD AUTO: 2.24 X10(3)/MCL (ref 0.6–4.6)
LYMPHOCYTES NFR BLD AUTO: 14.6 %
MAGNESIUM SERPL-MCNC: 2.2 MG/DL (ref 1.6–2.6)
MCH RBC QN AUTO: 31.3 PG (ref 27–31)
MCHC RBC AUTO-ENTMCNC: 31.9 G/DL (ref 33–36)
MCV RBC AUTO: 98.3 FL (ref 80–94)
MONOCYTES # BLD AUTO: 1.25 X10(3)/MCL (ref 0.1–1.3)
MONOCYTES NFR BLD AUTO: 8.2 %
NEUTROPHILS # BLD AUTO: 10.92 X10(3)/MCL (ref 2.1–9.2)
NEUTROPHILS NFR BLD AUTO: 71.3 %
NRBC BLD AUTO-RTO: 0.5 %
PHOSPHATE SERPL-MCNC: 2.9 MG/DL (ref 2.3–4.7)
PLATELET # BLD AUTO: 193 X10(3)/MCL (ref 130–400)
PMV BLD AUTO: 10 FL (ref 7.4–10.4)
POCT GLUCOSE: 159 MG/DL (ref 70–110)
POCT GLUCOSE: 197 MG/DL (ref 70–110)
POCT GLUCOSE: 198 MG/DL (ref 70–110)
POCT GLUCOSE: 234 MG/DL (ref 70–110)
POTASSIUM SERPL-SCNC: 4.2 MMOL/L (ref 3.5–5.1)
PROT SERPL-MCNC: 5.6 GM/DL (ref 5.8–7.6)
RBC # BLD AUTO: 2.3 X10(6)/MCL (ref 4.7–6.1)
RIGHT ABI: 2.64
RIGHT DORSALIS PEDIS: 259 MMHG
RIGHT POSTERIOR TIBIAL: 151 MMHG
SODIUM SERPL-SCNC: 140 MMOL/L (ref 136–145)
WBC # BLD AUTO: 15.31 X10(3)/MCL (ref 4.5–11.5)

## 2025-06-03 PROCEDURE — 21400001 HC TELEMETRY ROOM

## 2025-06-03 PROCEDURE — 94761 N-INVAS EAR/PLS OXIMETRY MLT: CPT

## 2025-06-03 PROCEDURE — 83735 ASSAY OF MAGNESIUM: CPT

## 2025-06-03 PROCEDURE — 84100 ASSAY OF PHOSPHORUS: CPT

## 2025-06-03 PROCEDURE — C1751 CATH, INF, PER/CENT/MIDLINE: HCPCS

## 2025-06-03 PROCEDURE — 80053 COMPREHEN METABOLIC PANEL: CPT

## 2025-06-03 PROCEDURE — 25500020 PHARM REV CODE 255: Performed by: INTERNAL MEDICINE

## 2025-06-03 PROCEDURE — 25000003 PHARM REV CODE 250

## 2025-06-03 PROCEDURE — 36415 COLL VENOUS BLD VENIPUNCTURE: CPT

## 2025-06-03 PROCEDURE — 63600175 PHARM REV CODE 636 W HCPCS

## 2025-06-03 PROCEDURE — 97163 PT EVAL HIGH COMPLEX 45 MIN: CPT

## 2025-06-03 PROCEDURE — 27000221 HC OXYGEN, UP TO 24 HOURS

## 2025-06-03 PROCEDURE — 97167 OT EVAL HIGH COMPLEX 60 MIN: CPT

## 2025-06-03 PROCEDURE — 85025 COMPLETE CBC W/AUTO DIFF WBC: CPT

## 2025-06-03 RX ORDER — NAPROXEN SODIUM 220 MG/1
81 TABLET, FILM COATED ORAL DAILY
Status: DISCONTINUED | OUTPATIENT
Start: 2025-06-04 | End: 2025-06-04

## 2025-06-03 RX ADMIN — ENOXAPARIN SODIUM 120 MG: 120 INJECTION SUBCUTANEOUS at 10:06

## 2025-06-03 RX ADMIN — INSULIN ASPART 2 UNITS: 100 INJECTION, SOLUTION INTRAVENOUS; SUBCUTANEOUS at 06:06

## 2025-06-03 RX ADMIN — LIDOCAINE 5% 1 PATCH: 700 PATCH TOPICAL at 03:06

## 2025-06-03 RX ADMIN — PANTOPRAZOLE SODIUM 40 MG: 40 INJECTION, POWDER, FOR SOLUTION INTRAVENOUS at 10:06

## 2025-06-03 RX ADMIN — CEFEPIME 2 G: 2 INJECTION, POWDER, FOR SOLUTION INTRAVENOUS at 05:06

## 2025-06-03 RX ADMIN — VANCOMYCIN HYDROCHLORIDE 1250 MG: 1.25 INJECTION, POWDER, LYOPHILIZED, FOR SOLUTION INTRAVENOUS at 10:06

## 2025-06-03 RX ADMIN — METRONIDAZOLE 500 MG: 500 TABLET ORAL at 09:06

## 2025-06-03 RX ADMIN — VANCOMYCIN HYDROCHLORIDE 1250 MG: 1.25 INJECTION, POWDER, LYOPHILIZED, FOR SOLUTION INTRAVENOUS at 12:06

## 2025-06-03 RX ADMIN — CEFEPIME 2 G: 2 INJECTION, POWDER, FOR SOLUTION INTRAVENOUS at 10:06

## 2025-06-03 RX ADMIN — METOPROLOL SUCCINATE ER TABLETS 50 MG: 50 TABLET, FILM COATED, EXTENDED RELEASE ORAL at 09:06

## 2025-06-03 RX ADMIN — ATORVASTATIN CALCIUM 20 MG: 20 TABLET, FILM COATED ORAL at 09:06

## 2025-06-03 RX ADMIN — METRONIDAZOLE 500 MG: 500 TABLET ORAL at 05:06

## 2025-06-03 RX ADMIN — CEFEPIME 2 G: 2 INJECTION, POWDER, FOR SOLUTION INTRAVENOUS at 03:06

## 2025-06-03 RX ADMIN — ENOXAPARIN SODIUM 120 MG: 120 INJECTION SUBCUTANEOUS at 09:06

## 2025-06-03 RX ADMIN — SODIUM CHLORIDE: 9 INJECTION, SOLUTION INTRAVENOUS at 12:06

## 2025-06-03 RX ADMIN — METOPROLOL SUCCINATE ER TABLETS 100 MG: 50 TABLET, FILM COATED, EXTENDED RELEASE ORAL at 09:06

## 2025-06-03 RX ADMIN — HYDROCODONE BITARTRATE AND ACETAMINOPHEN 1 TABLET: 5; 325 TABLET ORAL at 11:06

## 2025-06-03 RX ADMIN — IOHEXOL 95 ML: 350 INJECTION, SOLUTION INTRAVENOUS at 12:06

## 2025-06-03 RX ADMIN — METRONIDAZOLE 500 MG: 500 TABLET ORAL at 01:06

## 2025-06-03 NOTE — PROGRESS NOTES
Pharmacokinetic Initial Assessment: IV Vancomycin    Assessment/Plan:    Initiate intravenous vancomycin with loading dose of 2000 mg once followed by a maintenance dose of vancomycin 1250mg IV every 12 hours  Desired empiric serum trough concentration is 15 to 20 mcg/mL  Draw vancomycin trough level 60 min prior to third dose on 06/04 at approximately 1000  Pharmacy will continue to follow and monitor vancomycin.      Please contact pharmacy at extension [phone] with any questions regarding this assessment.     Thank you for the consult,   Sandra Martínez       Patient brief summary:  Vaibhav Vargas is a 74 y.o. male initiated on antimicrobial therapy with IV Vancomycin for treatment of suspected sepsis    Drug Allergies:   Review of patient's allergies indicates:  No Known Allergies    Actual Body Weight:   112.5 kg    Renal Function:   Estimated Creatinine Clearance: 68.6 mL/min (A) (based on SCr of 1.26 mg/dL (H)).,     Dialysis Method (if applicable):  N/A    CBC (last 72 hours):  Recent Labs   Lab Result Units 05/31/25 1711 06/01/25  0427 06/01/25  0913 06/01/25  1521 06/01/25  1943 06/02/25  0333 06/02/25  2154 06/03/25  0516   WBC x10(3)/mcL  --  16.84*  --   --   --  17.30*  --  15.31*   Hgb g/dL 7.5* 6.9* 7.8* 7.5* 7.3* 7.3* 7.1* 7.2*   Hct % 23.5* 21.5* 24.3* 23.9* 22.5* 22.3* 21.8* 22.6*   Platelet x10(3)/mcL  --  248  --   --   --  232  --  193   Mono % %  --  6.0  --   --   --  7.2  --  8.2   Eos % %  --  1.3  --   --   --  1.7  --  4.2   Basophil % %  --  0.4  --   --   --  0.3  --  0.5       Metabolic Panel (last 72 hours):  Recent Labs   Lab Result Units 05/31/25 1711 06/01/25  0428 06/01/25  1716 06/02/25  0333 06/03/25  0516   Sodium mmol/L 139 140 140 140 140   Potassium mmol/L 4.5 3.2* 3.7 3.3* 4.2   Chloride mmol/L 101 102 101 102 106   CO2 mmol/L 26 24 26 27 23   Glucose mg/dL 228* 199* 192* 193* 137*   Blood Urea Nitrogen mg/dL 75.4* 75.4* 72.3* 65.8* 55.1*   Creatinine mg/dL 1.66* 1.69*  "1.46* 1.49* 1.26*   Albumin g/dL  --  2.3* 2.3* 2.2* 2.2*   Bilirubin Total mg/dL  --  0.4 0.8 0.6 0.6   ALP unit/L  --  88 68 61 50   AST unit/L  --  29 27 27 25   ALT unit/L  --  77* 62* 57* 42   Magnesium Level mg/dL 2.10 1.90 1.90 1.80 2.20   Phosphorus Level mg/dL  --  3.2 2.3 2.6 2.9       Drug levels (last 3 results):  No results for input(s): "VANCOMYCINRA", "VANCORANDOM", "VANCOMYCINPE", "VANCOPEAK", "VANCOMYCINTR", "VANCOTROUGH" in the last 72 hours.    Microbiologic Results:  Microbiology Results (last 7 days)       Procedure Component Value Units Date/Time    Wound Culture [[id number]] Collected: 06/02/25 0919    Order Status: Completed Specimen: Wound from Foot, Left Updated: 06/03/25 0714     Wound Culture Normal Skin Jacy, no further workup.    Tissue Culture - Aerobic [[id number]] Collected: 06/02/25 1121    Order Status: Completed Specimen: Tissue from Foot, Left Updated: 06/03/25 0659     Tissue - Aerobic Culture No Growth At 24 Hours    Anaerobic Culture [[id number]] Collected: 06/02/25 1121    Order Status: Sent Specimen: Tissue from Foot, Left Updated: 06/02/25 1209    Anaerobic Culture [[id number]] Collected: 06/02/25 0919    Order Status: Sent Specimen: SWAB from Foot, Left Updated: 06/02/25 0926    Urine culture [[id number]] Collected: 05/29/25 1623    Order Status: Completed Specimen: Urine Updated: 06/01/25 1121     Urine Culture No Growth    Respiratory Culture [[id number]] Collected: 05/28/25 1357    Order Status: Completed Specimen: Sputum, Expectorated Updated: 05/30/25 0814     Respiratory Culture Normal respiratory jacy     GRAM STAIN Quality 3+      No bacteria seen    Blood Culture #1 **CANNOT BE ORDERED STAT** [[id number]]  (Normal) Collected: 05/23/25 1923    Order Status: Completed Specimen: Blood Updated: 05/29/25 0011     Blood Culture No Growth at 5 days    Blood Culture #2 **CANNOT BE ORDERED STAT** [[id number]]  (Normal) Collected: 05/23/25 1957    Order Status: Completed " Specimen: Blood from Arm, Left Updated: 05/29/25 0011     Blood Culture No Growth at 5 days    Chlamydia/GC, PCR [[id number]] Collected: 05/28/25 1258    Order Status: Completed Specimen: Urine Updated: 05/28/25 1536     Chlamydia trachomatis PCR Not Detected     N. gonorrhea PCR Not Detected     Source Urine    Narrative:      The Xpert CT/NG test, performed on the GeneXpert system is a qualitative in vitro real-time polymerase chain reaction (PCR) test for the automated detected and differentiation for genomic DNA from Chlamydia trachomatis (CT) and/or Neisseria gonorrhoeae (NG).

## 2025-06-03 NOTE — PROGRESS NOTES
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 discharge  - transfuse hemoglobin less than 7    Alexandru Jones MD  [facility] IM PGY-3, GI         [1]   Medications Prior to Admission   Medication Sig Dispense Refill Last Dose/Taking    aspirin (ECOTRIN) 81 MG EC tablet Take 81 mg by mouth.       atorvastatin (LIPITOR) 20 MG tablet Take 1 tablet (20 mg total) by mouth once daily. 90 tablet 3     clopidogreL (PLAVIX) 75 mg tablet Take 1 tablet (75 mg total) by mouth once daily. 90 tablet 3     [] cyanocobalamin (VITAMIN B-12) 1000 MCG tablet Take 1 tablet (1,000 mcg total) by mouth every other day. 15 tablet 1     glucagon (GVOKE HYPOPEN 2-PACK) 1 mg/0.2 mL AtIn Inject 0.2 mLs into the skin daily as needed (low blood sugar). May repeat dose in 15 minutes 0.4 mL 0     losartan (COZAAR) 100 MG tablet Take 1 tablet (100 mg total) by mouth once daily. 90 tablet 1     metFORMIN (GLUCOPHAGE) 1000 MG tablet Take 1 tablet (1,000 mg total) by mouth 2 (two) times daily. 180 tablet 3     verapamiL (CALAN-SR) 240 MG CR tablet Take 1 tablet (240 mg total) by mouth once daily. 90 tablet 4
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
## 2025-06-03 NOTE — PROGRESS NOTES
[facility]  Infectious Diseases Progress Note        SUBJECTIVE:   HPI: 74-year-old male with PMH CAD, prior CVA in 03/2025, chronic indwelling Fowler catheter who was admitted to Medicine on 5/23 for workup/management of weakness.  Urine culture was obtained on admission, appears to be from old Fowler catheter, which showed growth of Enterococcus faecium.  He has been started on ampicillin as organism was not VRE and sensitive to beta lactams.  CT abdomen and pelvis showed malpositioned Fowler catheter with balloon of the prostate.  Per discussion with primary team there is noted purulence in his urine.  Infectious disease service has been consulted for management of UTI.     On admission he was noted to have leukocytosis of 26.8 however was afebrile.  He has remained afebrile throughout his entire hospitalization.  His leukocytosis has improved now to 16.7.  He was initially started on ceftriaxone empirically followed by vancomycin and Zosyn, however now has been transitioned to ampicillin.  TTE was completed which showed a mobile echogenic mass in the right atrium, cardiology has been consulted who is planning for YO to further evaluate this.     History was obtained from both the patient and his sister who was present at bedside. She reports that the patient's mentation decreased a few days prior to his admission along with generalized weakness to where he was unable to participate with PT at his nursing home where he resides. His fowler catheter apparently has not been exchanged since 3/2025. The patient reports noticing R midback pain that began about 3 days prior to his admission as well however this has since improved. He denies fever, chills, N/V/D.       Interval History:  Patient now downgraded to the floor. He remains on oxygen;  2.5 liters per nasal cannula.  He was on venti mask overnight.  He reports he is still SOB and is still complaining he is nauseated.  Denies fever,  chills, night sweats, rash, HA, vision changes, dizziness, CP, V/D, abdominal pain, or urinary complaints.  He is afebrile with persistent leukocytosis; WBC 15.3.  CT pelvis with contrast was done and showed only concerns for cystitis; no prostate abscess.  Unfortunately, CT chest and abdomen with IV contrast were not included in imaging.  Remains anemic; hemoglobin 7.2/hematocrit 22.6.  PRIYA resolved.  GI now following.  Noted plans for EGD.  Transaminitis resolved. Noted with LT toe wound that probes to bone which is indicative of osteomyelitis.  While concerning, do not feel this is source of leukocytosis.  Tissue cultures of left foot were sent also and NGTD x 24 hours.  ABIs were done and showed concerns for noncompressible tibial vessels bilateral.  Antibiotic coverage was expanded to Vancomycin, Cefepime, and Flagyl. Patient now with PICC.      Antibiotic / Antiviral / Antifungal History:  Ceftriaxone 1 g IV x1 dose - 05/23/2025   Zosyn 4.5 g IV q.8 hours - 05/24/2025 to 05/25/2025   Vancomycin IV trough 15-20 - 05/24/2025 to 05/26/2025   Ampicillin 1 g IV q.4 hours - 05/26/2025 to 05/28/2025   Penicillin G 24,000,000 units IV daily per continuous infusion - 05/28/2025 to 06/01/2025   Vancomycin IV trough 15-20 - 06/02/2025 to present   Cefepime 2 g IV q.8 hours - 06/02/2025 to present   Flagyl 500 mg p.o. q.8 hours - 06/02/2025 to present      MEDICATIONS:   Reviewed in EMR    REVIEW OF SYSTEMS:   Except as documented, all other systems reviewed and negative     PHYSICAL EXAM:   T 97.5 °F (36.4 °C)   BP (!) 110/49   P 77   RR 18   O2 99 %  GENERAL: Elderly BM who is A&Ox3, NAD, appears chronically ill; on O2 per nasal cannula   HEENT: atraumatic, normocephalic, anicteric, moist oral mucosa without lesions, exudate, or erythema; no thrush  LUNGS: breathing unlabored, lungs coarse bilateral  HEART: RRR; no murmur, rub, or gallop  ABDOMEN: abdomen soft, obese, nondistended, nontender to palpation  : Palomo  "catheter in place  EXTREMITIES: 2-3+ bilateral lower extremity pitting edema  SKIN: Open wound to left medial great toe without purulence, erythema, or necrosis (see wound care note for pictures)  NEURO: speech fluent and intact, facial symmetry preserved, no tremor  PSYCH: cooperative, normal mood and affect  LINES: PICC to right upper arm without s/s infection       LABS AND IMAGING:     Recent Labs     06/02/25  0333 06/02/25  2154 06/03/25  0516   WBC 17.30*  --  15.31*   RBC 2.31*  --  2.30*   HGB 7.3* 7.1* 7.2*   HCT 22.3* 21.8* 22.6*   MCV 96.5*  --  98.3*   MCH 31.6*  --  31.3*   MCHC 32.7*  --  31.9*   RDW 18.4*  --  19.6*     --  193     No results for input(s): "LACTIC" in the last 72 hours.  No results for input(s): "INR", "APTT", "D-DIMER" in the last 72 hours.    No results for input(s): "HGBA1C", "CHOL", "TRIG", "LDL", "VLDL", "HDL" in the last 72 hours.   Recent Labs     06/01/25  0428 06/01/25  1716 06/02/25  0333 06/03/25  0516      < > 140 140   K 3.2*   < > 3.3* 4.2   CO2 24   < > 27 23   BUN 75.4*   < > 65.8* 55.1*   CREATININE 1.69*   < > 1.49* 1.26*   CALCIUM 8.4*   < > 8.3* 8.2*   MG 1.90   < > 1.80 2.20   PHOS 3.2   < > 2.6 2.9   ALBUMIN 2.3*   < > 2.2* 2.2*   GLOBULIN 4.2*   < > 3.6* 3.4   ALKPHOS 88   < > 61 50   ALT 77*   < > 57* 42   AST 29   < > 27 25   BILITOT 0.4   < > 0.6 0.6   CRP  --   --  9.50*  --    *  --   --   --    HAPTOGLOBIN 199  --   --   --    FERRITIN 238.32  --   --   --    IRON 71  --   --   --    TIBC 215*  --   --   --     < > = values in this interval not displayed.     Recent Labs     06/01/25  0428   TROPONINI 0.105*          Estimated Creatinine Clearance: 68.6 mL/min (A) (based on SCr of 1.26 mg/dL (H)).   Lab Results   Component Value Date    SEDRATE 8 06/02/2025      Lab Results   Component Value Date    CRP 9.50 (H) 06/02/2025        Micro:   Colonization:  No results found for this or any previous visit.     5/23 pBCx 2/2: NGTD  5/23 " Urine cx: Enterococcus faecium      05/28/2025 sputum culture NGTD  05/29/2025 urine culture NGTD  06/02/2025 left foot wound culture NGTD  06/02/2025 left foot tissue culture NGTD        CT Pelvis With IV Contrast NO Oral Contrast  Narrative: EXAMINATION:  CT PELVIS WITH IV CONTRAST    CLINICAL HISTORY:  concern for prostatitis/prostatic abscess;    Chief complaint: Shortness of Breath (From [facility] with complaints of SOB and lower back pain for about 3 days. Abdominal distension and bilateral leg edema noted. Palomo in place upon arrival with cloudy urine noted.)hospitalsVaibhav Vargas is a 74 y.o. Black or  male with past medical history of cerebrovascular accident (March 2025), coronary artery disease status post LAD stent (2017), venous insufficiency, hypertension, first-degree AV block, and type 2 diabetes mellitus, who presented to the ED from a nursing home due to progressive weakness over the past week. His daughter provided history due to his communication difficulties. Associated symptoms included altered mental status, back pain, and abdominal discomfort. In the ED, vitals were stable, but lab results were remarkable for severe hyperkalemia and azotemia.  He was treated with Lokelma, insulin, and albuterol, started on Rocephin for a urinary tract infection.  Imaging of the abdomen revealed hydronephrosis and malpositioned Palomo catheter.  The Palomo catheter has been subsequently replaced and renal function has improved.  Nephrology service continues to follow for assistance with management of PRIYA.    TECHNIQUE:  Helical axial images are acquired through the pelvis after the IV administration 95 mL of Omnipaque 350.  Images were reconstructed into the coronal sagittal plane.  Dose automated exposure control, dose radiation lowering technique was utilized.    COMPARISON:  CT abdomen and pelvis without contrast from 05/28/2025    CT abdomen and pelvis without contrast  from 05/23/2025    FINDINGS:  Pelvis: The bony structures of the pelvis are intact. A metallic density is seen in the left ilium.    Hip: There is mild degenerative change in the bilateral hip.    Right: No fracture dislocation or subluxation is seen involving the visualized right hip bony structures.    Left: No fracture dislocation or subluxation is seen involving the visualized left hip bony structures.    Femur:    Proximal Femur: The visualized proximal right and left femurs appear intact.    Pelvic structures:    Bladder: A fowler catheter in place. The bladder wall appears thickened even though the bladder is collapsed.    Visualized distal ureters: Normal.    Visualized small bowel loops: Normal.    Rectosigmoid colon: Multiple diverticulum are seen in the sigmoid colon without surrounding inflammation to suggest diverticulitis.    Prostate and seminal vesicles: There are multiple calcifications in the prostate gland. No peripherally enhancing fluid collection identified to suggest an abscess.    Pelvic cavity: Normal.    Pelvic wall: Normal.    Systemic arteries and veins: There is mild atheromatous calcification in the bilateral common iliac arteries and imaged bilateral superficial femoral arteries.    Osseous structures: Mild to moderate degenerative change is seen in the imaged osseous structures.  Impression: 1. A fowler catheter in place. The bladder wall appears thickened even though the bladder is collapsed. This could reflect an element of cystitis. Correlate with clinical and laboratory findings.    2. No acute pelvic solid organ or bowel pathology identified. Details and other findings as discussed above    [employer] concurrence    Electronically signed by: Jayme Hagan MD  Date:    06/03/2025  Time:    07:54  Ankle Brachial Indices (KAIT)  The right lower extremity ankle brachial index is 2.64 suggesting non   compressible tibial vessels.    The left lower extremity ankle brachial index is  1.49 suggesting non   compressible tibial vessels.        TTE 05/26/2025:  Summary  Show Result Comparison     Left Ventricle: The left ventricle is normal in size. Normal wall thickness. There is moderately reduced systolic function with a visually estimated ejection fraction of 35 - 40%. Unable to assess diastolic function due to atrial fibrillation.    Right Ventricle: The right ventricle is moderately dilated Systolic function is moderately reduced. Prominent moderator band in the right ventricle.    Left Atrium: The left atrium is mildly dilated    Right Atrium: The right atrium is mildly dilated . 1.9 x 1.1 x 1.8 small mobile echogenic mass present.    Aortic Valve: There is mild aortic regurgitation.    Tricuspid Valve: There is mild regurgitation.    Pulmonary Artery: The estimated pulmonary artery systolic pressure is 49 mmHg.    IVC/SVC: Elevated venous pressure at 15 mmHg.      YO 05/30/2025:   Summary  Show Result Comparison     Left Ventricle: The left ventricle is normal in size. Normal wall motion. There is normal systolic function. Quantitated ejection fraction is 56%. Elevated left ventricular filling pressure.    Right Ventricle: The right ventricle is mildly dilated Systolic function is normal.    Left Atrium: The left atrium is dilated Agitated saline study of the atrial septum is negative, suggesting absence of intracardiac shunt at the atrial level. No patent foramen ovale.    Right Atrium: The right atrium is dilated. There is a prominent Eustachian valve with a highly mobile echogenic structure attached to it most likely representing a thrombus and measuring 1.5 cm in its longest dimension. Dense spontaneous echo contrast visualized in the right atrial cavity.    Aortic Valve: The aortic valve is a trileaflet valve. There is no stenosis. There is no significant regurgitation.    Mitral Valve: The mitral valve is structurally normal. There is no stenosis. There is trace regurgitation.     Tricuspid Valve: There is trace regurgitation.    Aorta: The aortic root is normal in size measuring 3.5 cm. The ascending aorta is normal in size measuring 2.9 cm. The aortic arch is normal measuring 2.6 cm. The descending aorta is normal measuring 2.3 cm.    Pericardium: There is no pericardial effusion.     YO obtained for further evaluation of right atrial mass noted on transthoracic echocardiogram.    KAIT 06/02/2025:   Interpretation Summary  Show Result ComparisonThe right lower extremity ankle brachial index is 2.64 suggesting non compressible tibial vessels.    The left lower extremity ankle brachial index is 1.49 suggesting non compressible tibial vessels.        ASSESSMENT & PLAN:     74-year-old male with PMH CAD, prior CVA in 03/2025, chronic indwelling Fowler catheter who was admitted to Medicine on 5/23 for workup/management of weakness.  Urine culture was obtained on admission, appears to be from old Fowler catheter, which showed growth of Enterococcus faecium.  He has been started on ampicillin as organism was not VRE and sensitive to beta lactams.  CT abdomen and pelvis showed malpositioned Fowler catheter with balloon of the prostate.  Per discussion with primary team there is noted purulence in his urine.  Infectious disease service has been consulted for management of UTI.     1) Enterococcus faecium cystitis  - Organism PCN sensitive, non-VRE  2) Possible acute prostatitis  3) Malpositioned fowler catheter, resolved  4) Sepsis  5) Right atrial echogenic thrombus   6) CVA  7) CAD  8) hepatomegaly   9) transaminitis   10) PRIYA (improving)  11) dyspnea (improving)  12) anemia   13) persistent leukocytosis   14) atrial fibrillation with RVR  15) LT foot osteomyelitis       Persistent leukocytosis in the setting of Enterococcus faecium cystitis.  CT pelvis with contrast was done and showed only concerns for cystitis; no prostate abscess.    Urine culture with Enterococcus faecium.  Repeat urine culture  05/29/2025 NGTD.    Sputum culture NGTD.  Patient noted with episodes of atrial fib with RVR.  TTE showed concerns for a mobile right atrial mass.  YO was followed up and she confirmed a highly mobile thrombus to the right atrium (1.5 cm).  Now noted with LT toe wound that probes to bone which is indicative of osteomyelitis.  While concerning, do not feel this is source of leukocytosis.  Cultures of toe, to include tissue culture, so far NGTD.  ABIs were done and showed concerns for noncompressible tibial vessels bilateral  Leukocytosis persists.  Exact etiology unknown           RECOMMENDATIONS:       Follow up culture results.    Again recommend CT chest / abdomen with IV contrast as this was not done.  Also recommend CTA with runnoff to assess for blood flow   Follow up results of EGD   Continue Vancomycin IV (trough 15-20), Cefepime 2 g IV q.8 hours, and Flagyl 500 mg p.o. q.8 hours  Need to watch nausea complaint.  May be worsened with addition of Flagyl   Monitor liver enzymes   Renal protective measures  Patient needs continued wound care and strict glucose control   Recommend Tdap as this is not up to date   ID will continue to follow          KIRSTIN Hedrick  [facility]     *Portions of this note dictated using EMR integrated voice recognition software, and may be subject to voice recognition errors not corrected at proofreading. Please contact writer for clarification if needed. *

## 2025-06-03 NOTE — PROGRESS NOTES
"  Nephrology Progress Note    Patient Name: Vaibhav Vargas  Age: 74 y.o.  : 1950  MRN: [id number]  Admission Date: 2025    Chief complaint: Shortness of Breath (From [facility] with complaints of SOB and lower back pain for about 3 days. Abdominal distension and bilateral leg edema noted. Palomo in place upon arrival with cloudy urine noted.)      Hospital course  Vaibhav Vargas is a 74 y.o. Black or  male with past medical history of cerebrovascular accident (2025), coronary artery disease status post LAD stent (), venous insufficiency, hypertension, first-degree AV block, and type 2 diabetes mellitus, who presented to the ED from a nursing home due to progressive weakness over the past week. His daughter provided history due to his communication difficulties. Associated symptoms included altered mental status, back pain, and abdominal discomfort. In the ED, vitals were stable, but lab results were remarkable for severe hyperkalemia and azotemia.  He was treated with Lokelma, insulin, and albuterol, started on Rocephin for a urinary tract infection.  Imaging of the abdomen revealed hydronephrosis and malpositioned Palomo catheter.  The Palomo catheter has been subsequently replaced and renal function has improved.  Nephrology service continues to follow for assistance with management of PRIYA.      Interval Hx  No acute complaints or concerns this morning. Continues to have LE edema, although appears improved     Review of Systems  Cardiovascular: Negative for chest pain   Respiratory: Negative for shortness of breath.    Objective  BP (!) 110/49   Pulse 77   Temp 97.5 °F (36.4 °C) (Oral)   Resp 18   Ht 6' 2" (1.88 m)   Wt 112.5 kg (248 lb 0.3 oz)   SpO2 99%   BMI 31.84 kg/m²     Intake/Output Summary (Last 24 hours) at 6/3/2025 1050  Last data filed at 2025  Gross per 24 hour   Intake 566.25 ml   Output 1175 ml   Net -608.75 ml       Physical " Exam  General appearance: Patient is in no acute distress.  Skin: No rashes or wounds.  HEENT: no scleral icterus, no JVD. Neck is supple.  Chest: Respirations are unlabored. Lungs sounds are clear.   Heart: S1, S2.  : Palomo catheter to gravity.  Extremities: b/l LE edema.  Neuro: No focal deficits.     Medications  Scheduled Meds:   0.9% NaCl   Intravenous Once    atorvastatin  20 mg Oral Daily    ceFEPime IV (PEDS and ADULTS)  2 g Intravenous Q8H    enoxparin  1 mg/kg Subcutaneous Q12H (prophylaxis, 0900/2100)    LIDOcaine  1 patch Transdermal Q24H    metoprolol succinate  100 mg Oral Daily    metoprolol succinate  50 mg Oral QHS    metroNIDAZOLE  500 mg Oral Q8H    pantoprazole  40 mg Intravenous BID    vancomycin (VANCOCIN) IV (PEDS and ADULTS)  1,250 mg Intravenous Q12H     Continuous Infusions:  PRN Meds:.  Current Facility-Administered Medications:     0.9%  NaCl infusion (for blood administration), , Intravenous, Q24H PRN    0.9%  NaCl infusion (for blood administration), , Intravenous, Q24H PRN    acetaminophen, 650 mg, Oral, Q4H PRN    dextrose 50%, 12.5 g, Intravenous, PRN    dextrose 50%, 25 g, Intravenous, PRN    glucagon (human recombinant), 1 mg, Intramuscular, PRN    glucose, 16 g, Oral, PRN    glucose, 24 g, Oral, PRN    HYDROcodone-acetaminophen, 1 tablet, Oral, Q6H PRN    insulin aspart U-100, 0-5 Units, Subcutaneous, QID (AC + HS) PRN    melatonin, 6 mg, Oral, Nightly PRN    ondansetron, 4 mg, Intravenous, Q6H PRN    polyethylene glycol, 17 g, Oral, Daily PRN    senna-docusate, 1 tablet, Oral, Daily PRN    sodium chloride 0.9%, 10 mL, Intravenous, PRN    Flushing PICC/Midline Protocol, , , Until Discontinued **AND** sodium chloride 0.9%, 10 mL, Intravenous, Q12H PRN    Pharmacy to dose Vancomycin consult, , , Once **AND** vancomycin - pharmacy to dose, , Intravenous, pharmacy to manage frequency     Imaging:    Reviewed    Laboratory Data:    Chemistry  Recent Labs     05/31/25  0140  06/01/25  0428 06/01/25  1716 06/02/25  0333 06/03/25  0516    140 140 140 140   K 4.5 3.2* 3.7 3.3* 4.2   CO2 26 24 26 27 23   BUN 75.4* 75.4* 72.3* 65.8* 55.1*   CREATININE 1.66* 1.69* 1.46* 1.49* 1.26*   EGFRNORACEVR 43 42 50 49 60   CALCIUM 8.3* 8.4* 8.5* 8.3* 8.2*   MG 2.10 1.90 1.90 1.80 2.20   PHOS  --  3.2 2.3 2.6 2.9      LFT  Lab Results   Component Value Date    ALKPHOS 50 06/03/2025    AST 25 06/03/2025    ALT 42 06/03/2025    BILIDIR 0.3 04/22/2022    IBILI 0.30 04/22/2022    BILITOT 0.6 06/03/2025    ALBUMIN 2.2 (L) 06/03/2025        Hematology  Recent Labs     06/01/25  0427 06/01/25  0913 06/01/25  1521 06/01/25  1943 06/02/25  0333 06/02/25  2154 06/03/25  0516   WBC 16.84*  --   --   --  17.30*  --  15.31*   HGB 6.9*   < > 7.5* 7.3* 7.3* 7.1* 7.2*   HCT 21.5*   < > 23.9* 22.5* 22.3* 21.8* 22.6*     --   --   --  232  --  193    < > = values in this interval not displayed.      Lab Results   Component Value Date    IRON 71 06/01/2025    TIBC 215 (L) 06/01/2025    FERRITIN 238.32 06/01/2025    FOLATE 10.1 06/01/2025    [id number] 212 (L) 06/01/2025     (H) 06/01/2025      ID  Lab Results   Component Value Date    HIV Nonreactive 05/29/2025    HEPCAB Nonreactive 05/28/2025    HEPBSAB Nonreactive 05/28/2025      Additional serology  Lab Results   Component Value Date    HGBA1C 5.6 05/24/2025       Urine studies  Lab Results   Component Value Date    APPEARANCEUA Clear 05/29/2025    SGUA 1.009 05/29/2025    PROTEINUA Negative 05/29/2025    KETONESUA Negative 05/29/2025    LEUKOCYTESUR 250 (A) 05/29/2025    RBCUA 0-5 05/29/2025    WBCUA 21-50 (A) 05/29/2025    BACTERIA None Seen 05/29/2025    SQEPUA None Seen 05/29/2025    HYALINECASTS None Seen 05/29/2025    CREATRANDUR 57.5 (L) 02/14/2024        Impression  Acute kidney injury, nonoliguric   Hypokalemia  Urinary tract infection   Atrial fibrillation with rapid ventricular response   Acute decompensated heart failure   Hypertension    Acute anemia   Transaminitis    Plan  Renal indices continue to improve with appropriate urinary output  alkalosis resolved.   CMT potassium and replace prn  H/H stable    We will sign off at this time. Thank you for your consult.    Piedad Brady MD  [employer] , -III
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
## 2025-06-03 NOTE — PLAN OF CARE
Problem: Occupational Therapy  Goal: Occupational Therapy Goal  Description: Goals to be met for DC, set at initial evaluation, updated on Evaluation 6/3 s/p ICU upgrade with return to floor and EGD yesterday.  Note significant improvement in functional and ADL status requiring revision of goals as follows:    Pt will demonstrate feeding himself seated at recliner chair/WC with setup.  Pt will complete grooming seated at sink at WC level with SBA.  Pt will complete UB dressing with SBA.  Pt will complete sit>stand with min A with RW for prepare for BSC transfer.  Pt will toilet with moderate assistance for clothing management and hygiene at BS.  Pt will engage/tolerate BUE general therex x 10 minutes to increase activity tolerance seated EOB.  Pt will tolerate static standing x 1 minute to assist with ADL tasks  Outcome: Progressing

## 2025-06-04 ENCOUNTER — ANESTHESIA (OUTPATIENT)
Dept: ENDOSCOPY | Facility: HOSPITAL | Age: 75
DRG: 853 | End: 2025-06-04
Payer: MEDICARE

## 2025-06-04 ENCOUNTER — ANESTHESIA EVENT (OUTPATIENT)
Dept: ENDOSCOPY | Facility: HOSPITAL | Age: 75
DRG: 853 | End: 2025-06-04
Payer: MEDICARE

## 2025-06-04 LAB
ABO + RH BLD: NORMAL
ALBUMIN SERPL-MCNC: 1.8 G/DL (ref 3.4–4.8)
ALBUMIN/GLOB SERPL: 0.7 RATIO (ref 1.1–2)
ALP SERPL-CCNC: 46 UNIT/L (ref 40–150)
ALT SERPL-CCNC: 44 UNIT/L (ref 0–55)
ANION GAP SERPL CALC-SCNC: 8 MEQ/L
ANION GAP SERPL CALC-SCNC: 9 MEQ/L
AST SERPL-CCNC: 29 UNIT/L (ref 11–45)
BASOPHILS # BLD AUTO: 0.03 X10(3)/MCL
BASOPHILS NFR BLD AUTO: 0.2 %
BILIRUB SERPL-MCNC: 0.3 MG/DL
BLD PROD TYP BPU: NORMAL
BLOOD UNIT EXPIRATION DATE: NORMAL
BLOOD UNIT TYPE CODE: 6200
BUN SERPL-MCNC: 55.3 MG/DL (ref 8.4–25.7)
BUN SERPL-MCNC: 59.4 MG/DL (ref 8.4–25.7)
CALCIUM SERPL-MCNC: 7.5 MG/DL (ref 8.8–10)
CALCIUM SERPL-MCNC: 7.6 MG/DL (ref 8.8–10)
CHLORIDE SERPL-SCNC: 107 MMOL/L (ref 98–107)
CHLORIDE SERPL-SCNC: 110 MMOL/L (ref 98–107)
CO2 SERPL-SCNC: 23 MMOL/L (ref 23–31)
CO2 SERPL-SCNC: 25 MMOL/L (ref 23–31)
CREAT SERPL-MCNC: 1.33 MG/DL (ref 0.72–1.25)
CREAT SERPL-MCNC: 1.39 MG/DL (ref 0.72–1.25)
CREAT/UREA NIT SERPL: 40
CREAT/UREA NIT SERPL: 45
CROSSMATCH INTERPRETATION: NORMAL
CRP SERPL-MCNC: 7.5 MG/L
DISPENSE STATUS: NORMAL
EOSINOPHIL # BLD AUTO: 0.25 X10(3)/MCL (ref 0–0.9)
EOSINOPHIL NFR BLD AUTO: 1.8 %
ERYTHROCYTE [DISTWIDTH] IN BLOOD BY AUTOMATED COUNT: 20.2 % (ref 11.5–17)
ERYTHROCYTE [SEDIMENTATION RATE] IN BLOOD: <1 MM/HR (ref 0–15)
GFR SERPLBLD CREATININE-BSD FMLA CKD-EPI: 53 ML/MIN/1.73/M2
GFR SERPLBLD CREATININE-BSD FMLA CKD-EPI: 56 ML/MIN/1.73/M2
GLOBULIN SER-MCNC: 2.6 GM/DL (ref 2.4–3.5)
GLUCOSE SERPL-MCNC: 184 MG/DL (ref 82–115)
GLUCOSE SERPL-MCNC: 195 MG/DL (ref 82–115)
GROUP & RH: NORMAL
HCT VFR BLD AUTO: 15 % (ref 42–52)
HCT VFR BLD AUTO: 22.7 % (ref 42–52)
HGB BLD-MCNC: 4.7 G/DL (ref 14–18)
HGB BLD-MCNC: 7.4 G/DL (ref 14–18)
HOLD SPECIMEN: NORMAL
IMM GRANULOCYTES # BLD AUTO: 0.17 X10(3)/MCL (ref 0–0.04)
IMM GRANULOCYTES NFR BLD AUTO: 1.2 %
INDIRECT COOMBS: NORMAL
LACTATE SERPL-SCNC: 1.5 MMOL/L (ref 0.5–2.2)
LYMPHOCYTES # BLD AUTO: 1.44 X10(3)/MCL (ref 0.6–4.6)
LYMPHOCYTES NFR BLD AUTO: 10.5 %
MAGNESIUM SERPL-MCNC: 1.6 MG/DL (ref 1.6–2.6)
MAGNESIUM SERPL-MCNC: 1.7 MG/DL (ref 1.6–2.6)
MCH RBC QN AUTO: 31.3 PG (ref 27–31)
MCHC RBC AUTO-ENTMCNC: 31.3 G/DL (ref 33–36)
MCV RBC AUTO: 100 FL (ref 80–94)
MONOCYTES # BLD AUTO: 0.86 X10(3)/MCL (ref 0.1–1.3)
MONOCYTES NFR BLD AUTO: 6.3 %
NEUTROPHILS # BLD AUTO: 10.97 X10(3)/MCL (ref 2.1–9.2)
NEUTROPHILS NFR BLD AUTO: 80 %
NRBC BLD AUTO-RTO: 0.4 %
OHS QRS DURATION: 126 MS
OHS QTC CALCULATION: 492 MS
PHOSPHATE SERPL-MCNC: 3.2 MG/DL (ref 2.3–4.7)
PLATELET # BLD AUTO: 193 X10(3)/MCL (ref 130–400)
PMV BLD AUTO: 9.8 FL (ref 7.4–10.4)
POCT GLUCOSE: 146 MG/DL (ref 70–110)
POCT GLUCOSE: 171 MG/DL (ref 70–110)
POCT GLUCOSE: 194 MG/DL (ref 70–110)
POCT GLUCOSE: 274 MG/DL (ref 70–110)
POTASSIUM SERPL-SCNC: 3.4 MMOL/L (ref 3.5–5.1)
POTASSIUM SERPL-SCNC: 4 MMOL/L (ref 3.5–5.1)
PROT SERPL-MCNC: 4.4 GM/DL (ref 5.8–7.6)
RBC # BLD AUTO: 1.5 X10(6)/MCL (ref 4.7–6.1)
RBCS: NORMAL
SODIUM SERPL-SCNC: 140 MMOL/L (ref 136–145)
SODIUM SERPL-SCNC: 142 MMOL/L (ref 136–145)
SPECIMEN OUTDATE: NORMAL
TROPONIN I SERPL-MCNC: 0.08 NG/ML (ref 0–0.04)
UNIT NUMBER: NORMAL
VANCOMYCIN SERPL-MCNC: 23 UG/ML (ref 15–20)
VANCOMYCIN TROUGH SERPL-MCNC: 24.3 UG/ML (ref 15–20)
WBC # BLD AUTO: 13.72 X10(3)/MCL (ref 4.5–11.5)

## 2025-06-04 PROCEDURE — 86923 COMPATIBILITY TEST ELECTRIC: CPT | Mod: 91

## 2025-06-04 PROCEDURE — 25000003 PHARM REV CODE 250

## 2025-06-04 PROCEDURE — 85025 COMPLETE CBC W/AUTO DIFF WBC: CPT

## 2025-06-04 PROCEDURE — 93005 ELECTROCARDIOGRAM TRACING: CPT

## 2025-06-04 PROCEDURE — 84484 ASSAY OF TROPONIN QUANT: CPT | Performed by: INTERNAL MEDICINE

## 2025-06-04 PROCEDURE — 27201423 OPTIME MED/SURG SUP & DEVICES STERILE SUPPLY: Performed by: INTERNAL MEDICINE

## 2025-06-04 PROCEDURE — 36415 COLL VENOUS BLD VENIPUNCTURE: CPT

## 2025-06-04 PROCEDURE — 94761 N-INVAS EAR/PLS OXIMETRY MLT: CPT

## 2025-06-04 PROCEDURE — 80202 ASSAY OF VANCOMYCIN: CPT

## 2025-06-04 PROCEDURE — 63600175 PHARM REV CODE 636 W HCPCS

## 2025-06-04 PROCEDURE — 37000008 HC ANESTHESIA 1ST 15 MINUTES: Performed by: INTERNAL MEDICINE

## 2025-06-04 PROCEDURE — 20000000 HC ICU ROOM

## 2025-06-04 PROCEDURE — 83605 ASSAY OF LACTIC ACID: CPT

## 2025-06-04 PROCEDURE — 37000009 HC ANESTHESIA EA ADD 15 MINS: Performed by: INTERNAL MEDICINE

## 2025-06-04 PROCEDURE — 25000003 PHARM REV CODE 250: Performed by: NURSE ANESTHETIST, CERTIFIED REGISTERED

## 2025-06-04 PROCEDURE — 80053 COMPREHEN METABOLIC PANEL: CPT

## 2025-06-04 PROCEDURE — P9017 PLASMA 1 DONOR FRZ W/IN 8 HR: HCPCS

## 2025-06-04 PROCEDURE — 27000221 HC OXYGEN, UP TO 24 HOURS

## 2025-06-04 PROCEDURE — 0W3P8ZZ CONTROL BLEEDING IN GASTROINTESTINAL TRACT, VIA NATURAL OR ARTIFICIAL OPENING ENDOSCOPIC: ICD-10-PCS | Performed by: INTERNAL MEDICINE

## 2025-06-04 PROCEDURE — 99233 SBSQ HOSP IP/OBS HIGH 50: CPT | Mod: GC,,, | Performed by: INTERNAL MEDICINE

## 2025-06-04 PROCEDURE — 83735 ASSAY OF MAGNESIUM: CPT

## 2025-06-04 PROCEDURE — 83735 ASSAY OF MAGNESIUM: CPT | Performed by: INTERNAL MEDICINE

## 2025-06-04 PROCEDURE — 86901 BLOOD TYPING SEROLOGIC RH(D): CPT

## 2025-06-04 PROCEDURE — 43255 EGD CONTROL BLEEDING ANY: CPT | Performed by: INTERNAL MEDICINE

## 2025-06-04 PROCEDURE — P9016 RBC LEUKOCYTES REDUCED: HCPCS

## 2025-06-04 PROCEDURE — 30243K1 TRANSFUSION OF NONAUTOLOGOUS FROZEN PLASMA INTO CENTRAL VEIN, PERCUTANEOUS APPROACH: ICD-10-PCS | Performed by: INTERNAL MEDICINE

## 2025-06-04 PROCEDURE — 36415 COLL VENOUS BLD VENIPUNCTURE: CPT | Performed by: NURSE PRACTITIONER

## 2025-06-04 PROCEDURE — 85652 RBC SED RATE AUTOMATED: CPT | Performed by: NURSE PRACTITIONER

## 2025-06-04 PROCEDURE — 63600175 PHARM REV CODE 636 W HCPCS: Performed by: NURSE ANESTHETIST, CERTIFIED REGISTERED

## 2025-06-04 PROCEDURE — 80202 ASSAY OF VANCOMYCIN: CPT | Performed by: INTERNAL MEDICINE

## 2025-06-04 PROCEDURE — 86140 C-REACTIVE PROTEIN: CPT | Performed by: NURSE PRACTITIONER

## 2025-06-04 PROCEDURE — 84100 ASSAY OF PHOSPHORUS: CPT

## 2025-06-04 PROCEDURE — 85018 HEMOGLOBIN: CPT

## 2025-06-04 PROCEDURE — 36415 COLL VENOUS BLD VENIPUNCTURE: CPT | Performed by: INTERNAL MEDICINE

## 2025-06-04 RX ORDER — LIDOCAINE HYDROCHLORIDE 10 MG/ML
1 INJECTION, SOLUTION EPIDURAL; INFILTRATION; INTRACAUDAL; PERINEURAL ONCE
Status: CANCELLED | OUTPATIENT
Start: 2025-06-04 | End: 2025-06-04

## 2025-06-04 RX ORDER — HYDROCODONE BITARTRATE AND ACETAMINOPHEN 500; 5 MG/1; MG/1
TABLET ORAL
Status: DISCONTINUED | OUTPATIENT
Start: 2025-06-04 | End: 2025-06-12 | Stop reason: HOSPADM

## 2025-06-04 RX ORDER — PROPOFOL 10 MG/ML
INJECTION, EMULSION INTRAVENOUS CONTINUOUS PRN
Status: DISCONTINUED | OUTPATIENT
Start: 2025-06-04 | End: 2025-06-04

## 2025-06-04 RX ORDER — FUROSEMIDE 10 MG/ML
40 INJECTION INTRAMUSCULAR; INTRAVENOUS ONCE
Status: COMPLETED | OUTPATIENT
Start: 2025-06-04 | End: 2025-06-04

## 2025-06-04 RX ORDER — HYDROMORPHONE HYDROCHLORIDE 1 MG/ML
0.5 INJECTION, SOLUTION INTRAMUSCULAR; INTRAVENOUS; SUBCUTANEOUS ONCE AS NEEDED
Status: COMPLETED | OUTPATIENT
Start: 2025-06-04 | End: 2025-06-04

## 2025-06-04 RX ORDER — LIDOCAINE HYDROCHLORIDE 20 MG/ML
INJECTION INTRAVENOUS
Status: DISCONTINUED | OUTPATIENT
Start: 2025-06-04 | End: 2025-06-04

## 2025-06-04 RX ORDER — PROPOFOL 10 MG/ML
VIAL (ML) INTRAVENOUS
Status: DISCONTINUED | OUTPATIENT
Start: 2025-06-04 | End: 2025-06-04

## 2025-06-04 RX ORDER — SODIUM CHLORIDE, SODIUM LACTATE, POTASSIUM CHLORIDE, CALCIUM CHLORIDE 600; 310; 30; 20 MG/100ML; MG/100ML; MG/100ML; MG/100ML
INJECTION, SOLUTION INTRAVENOUS CONTINUOUS PRN
Status: DISCONTINUED | OUTPATIENT
Start: 2025-06-04 | End: 2025-06-04

## 2025-06-04 RX ORDER — ETOMIDATE 2 MG/ML
INJECTION INTRAVENOUS
Status: DISCONTINUED | OUTPATIENT
Start: 2025-06-04 | End: 2025-06-04

## 2025-06-04 RX ORDER — NOREPINEPHRINE BITARTRATE/D5W 4MG/250ML
0-1 PLASTIC BAG, INJECTION (ML) INTRAVENOUS CONTINUOUS
Status: DISCONTINUED | OUTPATIENT
Start: 2025-06-04 | End: 2025-06-04

## 2025-06-04 RX ORDER — SODIUM CHLORIDE 9 MG/ML
INJECTION, SOLUTION INTRAVENOUS ONCE
Status: COMPLETED | OUTPATIENT
Start: 2025-06-04 | End: 2025-06-04

## 2025-06-04 RX ORDER — DIGOXIN 0.25 MG/ML
250 INJECTION INTRAMUSCULAR; INTRAVENOUS ONCE
Status: DISCONTINUED | OUTPATIENT
Start: 2025-06-04 | End: 2025-06-04

## 2025-06-04 RX ORDER — SODIUM CHLORIDE, SODIUM LACTATE, POTASSIUM CHLORIDE, CALCIUM CHLORIDE 600; 310; 30; 20 MG/100ML; MG/100ML; MG/100ML; MG/100ML
INJECTION, SOLUTION INTRAVENOUS CONTINUOUS
Status: CANCELLED | OUTPATIENT
Start: 2025-06-04

## 2025-06-04 RX ADMIN — METOPROLOL SUCCINATE ER TABLETS 100 MG: 50 TABLET, FILM COATED, EXTENDED RELEASE ORAL at 09:06

## 2025-06-04 RX ADMIN — HYDROMORPHONE HYDROCHLORIDE 0.5 MG: 1 INJECTION, SOLUTION INTRAMUSCULAR; INTRAVENOUS; SUBCUTANEOUS at 11:06

## 2025-06-04 RX ADMIN — PANTOPRAZOLE SODIUM 40 MG: 40 INJECTION, POWDER, FOR SOLUTION INTRAVENOUS at 09:06

## 2025-06-04 RX ADMIN — CEFEPIME 2 G: 2 INJECTION, POWDER, FOR SOLUTION INTRAVENOUS at 04:06

## 2025-06-04 RX ADMIN — SODIUM CHLORIDE, POTASSIUM CHLORIDE, SODIUM LACTATE AND CALCIUM CHLORIDE: 600; 310; 30; 20 INJECTION, SOLUTION INTRAVENOUS at 02:06

## 2025-06-04 RX ADMIN — FUROSEMIDE 40 MG: 10 INJECTION, SOLUTION INTRAVENOUS at 03:06

## 2025-06-04 RX ADMIN — LIDOCAINE 5% 1 PATCH: 700 PATCH TOPICAL at 12:06

## 2025-06-04 RX ADMIN — PROPOFOL 20 MG: 10 INJECTION, EMULSION INTRAVENOUS at 02:06

## 2025-06-04 RX ADMIN — CEFEPIME 2 G: 2 INJECTION, POWDER, FOR SOLUTION INTRAVENOUS at 11:06

## 2025-06-04 RX ADMIN — ETOMIDATE INJECTION 6 MG: 2 SOLUTION INTRAVENOUS at 02:06

## 2025-06-04 RX ADMIN — ETOMIDATE INJECTION 4 MG: 2 SOLUTION INTRAVENOUS at 02:06

## 2025-06-04 RX ADMIN — ATORVASTATIN CALCIUM 20 MG: 20 TABLET, FILM COATED ORAL at 09:06

## 2025-06-04 RX ADMIN — METRONIDAZOLE 500 MG: 500 TABLET ORAL at 03:06

## 2025-06-04 RX ADMIN — SODIUM CHLORIDE 500 ML: 9 INJECTION, SOLUTION INTRAVENOUS at 02:06

## 2025-06-04 RX ADMIN — LIDOCAINE HYDROCHLORIDE 60 MG: 20 INJECTION INTRAVENOUS at 02:06

## 2025-06-04 RX ADMIN — METRONIDAZOLE 500 MG: 500 TABLET ORAL at 06:06

## 2025-06-04 RX ADMIN — SODIUM CHLORIDE: 9 INJECTION, SOLUTION INTRAVENOUS at 08:06

## 2025-06-04 RX ADMIN — PROPOFOL 30 MG: 10 INJECTION, EMULSION INTRAVENOUS at 02:06

## 2025-06-04 RX ADMIN — ETOMIDATE INJECTION 2 MG: 2 SOLUTION INTRAVENOUS at 02:06

## 2025-06-04 NOTE — TRANSFER OF CARE
"Anesthesia Transfer of Care Note    Patient: Vaibhav Vargas    Procedure(s) Performed: Procedure(s) (LRB):  EGD (ESOPHAGOGASTRODUODENOSCOPY) (Left)    Patient location: GI    Anesthesia Type: general    Transport from OR: Transported from OR on room air with adequate spontaneous ventilation    Post pain: adequate analgesia    Post assessment: no apparent anesthetic complications    Post vital signs: stable    Level of consciousness: awake    Nausea/Vomiting: no nausea/vomiting    Complications: none    Transfer of care protocol was followed      Last vitals: Visit Vitals  BP (!) 98/43   Pulse 94   Temp 36.7 °C (98.1 °F) (Oral)   Resp 18   Ht 6' 2" (1.88 m)   Wt 112.5 kg (248 lb 0.3 oz)   SpO2 100%   BMI 31.84 kg/m²     "

## 2025-06-04 NOTE — PROGRESS NOTES
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 hemodynamic instability this morning, with severe symptomatic anemia and given2 units PRBCs and FFP and bolused 1.5L with improvement    GI bleed?  - hemoglobin from 7.2-->4.7 overnight without noted melanotic stools or hematochezia/hematemesis  - Transfused 2 unit pRBC and FFP per mass transfusion protocol and bolused 1.5L due to hemodynamic instability, vitals stabilized afterwardsand transferred to ICU for close monitoring  - EGD on 2025 showed multiple nonbleeding duodenal ulcerations, 1 oozing blood which was fulgurated  - transfuse hemoglobin less than 7  - continue to hold anticoagulation at this time until source of bleed is identified and repaired  - EGD to be performed today, we will update team with findings  - in the absence of any findings on EGD, will need further imaging to assess for intraabdominal source    Alexandru Jones MD  [facility] IM PGY-3, GI         [1]   Medications Prior to Admission   Medication Sig Dispense Refill Last Dose/Taking    aspirin (ECOTRIN) 81 MG EC tablet Take 81 mg by mouth.       atorvastatin (LIPITOR) 20 MG tablet Take 1 tablet (20 mg total) by mouth once daily. 90 tablet 3     clopidogreL (PLAVIX) 75 mg tablet Take 1 tablet (75 mg total) by mouth once daily. 90 tablet 3     []  cyanocobalamin (VITAMIN B-12) 1000 MCG tablet Take 1 tablet (1,000 mcg total) by mouth every other day. 15 tablet 1     glucagon (GVOKE HYPOPEN 2-PACK) 1 mg/0.2 mL AtIn Inject 0.2 mLs into the skin daily as needed (low blood sugar). May repeat dose in 15 minutes 0.4 mL 0     losartan (COZAAR) 100 MG tablet Take 1 tablet (100 mg total) by mouth once daily. 90 tablet 1     metFORMIN (GLUCOPHAGE) 1000 MG tablet Take 1 tablet (1,000 mg total) by mouth 2 (two) times daily. 180 tablet 3     verapamiL (CALAN-SR) 240 MG CR tablet Take 1 tablet (240 mg total) by mouth once daily. 90 tablet 4

## 2025-06-04 NOTE — ANESTHESIA PREPROCEDURE EVALUATION
06/04/2025  Vaibhav Vargas is a 74 y.o., male with PMHx of obesity, HTN, CAD/stents/MI, HFrEF, afib, DM, CVA (3/2025) presents for EGD secondary to malnutrition.    NO BETA BLOCKER USE  Plavix--last dose    Active Ambulatory Problems     Diagnosis Date Noted    Chronic low back pain 11/08/2022    Edema 11/08/2022    Obesity 11/08/2022    Osteoarthritis of knee 11/08/2022    Primary hypertension 11/08/2022    Type 2 diabetes mellitus 11/08/2022    Coronary artery disease 06/16/2023    Myocardial infarction 03/24/2024    Acute stroke due to ischemia 03/31/2025     Resolved Ambulatory Problems     Diagnosis Date Noted    Hypoglycemia 03/24/2024    Hospital discharge follow-up 03/26/2024     Past Medical History:   Diagnosis Date    Chronic lumbar pain     Diabetes mellitus, type 2     Hypertension     Obesity, unspecified     Osteoarthritis of multiple joints        Pre-op Assessment    I have reviewed the NPO Status.      Review of Systems  Anesthesia Hx:  No problems with previous Anesthesia                Social:  Non-Smoker       Cardiovascular:     Hypertension, well controlled  Past MI CAD  asymptomatic CABG/stent Dysrhythmias atrial fibrillation  CHF                                   Pulmonary:  Pulmonary Normal                       Renal/:  Renal/ Normal                 Hepatic/GI:  Hepatic/GI Normal                    Neurological:   CVA, residual symptoms                                    Endocrine:  Diabetes, poorly controlled, type 2         Obesity / BMI > 30    Vitals:    06/04/25 1115 06/04/25 1130 06/04/25 1132 06/04/25 1145   BP: 100/64 (!) 97/53  (!) 112/48   Pulse: 89 88  93   Resp: 20 15  (!) 26   Temp: 36.7 °C (98.1 °F)  36.7 °C (98.1 °F)    TempSrc:   Oral    SpO2: 100% 100%  100%   Weight:       Height:             Physical Exam  General: Alert, Cooperative and Well  nourished    Airway:  Mallampati: II   Mouth Opening: Normal  TM Distance: Normal  Tongue: Normal  Neck ROM: Normal ROM    Dental:  Intact    Chest/Lungs:  Clear to auscultation, Normal Respiratory Rate    Heart:  Rate: Normal  Rhythm: Regular Rhythm  Sounds: Normal       Latest Reference Range & Units 06/04/25 11:28   POCT Glucose 70 - 110 mg/dL 274 (H)   (H): Data is abnormally high  Lab Results   Component Value Date    WBC 13.72 (H) 06/04/2025    HGB 4.7 (LL) 06/04/2025    HCT 15.0 (LL) 06/04/2025    .0 (H) 06/04/2025     06/04/2025       CMP  Sodium   Date Value Ref Range Status   06/04/2025 140 136 - 145 mmol/L Final     Potassium   Date Value Ref Range Status   06/04/2025 4.0 3.5 - 5.1 mmol/L Final     Chloride   Date Value Ref Range Status   06/04/2025 107 98 - 107 mmol/L Final     CO2   Date Value Ref Range Status   06/04/2025 25 23 - 31 mmol/L Final     Glucose   Date Value Ref Range Status   06/04/2025 195 (H) 82 - 115 mg/dL Final     Blood Urea Nitrogen   Date Value Ref Range Status   06/04/2025 55.3 (H) 8.4 - 25.7 mg/dL Final     Creatinine   Date Value Ref Range Status   06/04/2025 1.39 (H) 0.72 - 1.25 mg/dL Final     Calcium   Date Value Ref Range Status   06/04/2025 7.6 (L) 8.8 - 10.0 mg/dL Final     Protein Total   Date Value Ref Range Status   06/04/2025 4.4 (L) 5.8 - 7.6 gm/dL Final     Albumin   Date Value Ref Range Status   06/04/2025 1.8 (L) 3.4 - 4.8 g/dL Final     Bilirubin Total   Date Value Ref Range Status   06/04/2025 0.3 <=1.5 mg/dL Final     ALP   Date Value Ref Range Status   06/04/2025 46 40 - 150 unit/L Final     AST   Date Value Ref Range Status   06/04/2025 29 11 - 45 unit/L Final     ALT   Date Value Ref Range Status   06/04/2025 44 0 - 55 unit/L Final     eGFR   Date Value Ref Range Status   06/04/2025 53 mL/min/1.73/m2 Final     Comment:     Estimated GFR calculated using the CKD-EPI creatinine (2021) equation.     Lab Results   Component Value Date    HGBA1C 5.6  05/24/2025             Anesthesia Plan  Type of Anesthesia, risks & benefits discussed:    Anesthesia Type: Gen Natural Airway  Intra-op Monitoring Plan: Standard ASA Monitors  Post Op Pain Control Plan: IV/PO Opioids PRN  Induction:  IV  Informed Consent: Informed consent signed with the Patient and all parties understand the risks and agree with anesthesia plan.  All questions answered.   ASA Score: 4  Day of Surgery Review of History & Physical: H&P Update referred to the surgeon/provider.    Ready For Surgery From Anesthesia Perspective.     .

## 2025-06-04 NOTE — PROGRESS NOTES
[facility]  Infectious Diseases Progress Note        SUBJECTIVE:   HPI: 74-year-old male with PMH CAD, prior CVA in 03/2025, chronic indwelling Fowler catheter who was admitted to Medicine on 5/23 for workup/management of weakness.  Urine culture was obtained on admission, appears to be from old Fowler catheter, which showed growth of Enterococcus faecium.  He has been started on ampicillin as organism was not VRE and sensitive to beta lactams.  CT abdomen and pelvis showed malpositioned Fowler catheter with balloon of the prostate.  Per discussion with primary team there is noted purulence in his urine.  Infectious disease service has been consulted for management of UTI.     On admission he was noted to have leukocytosis of 26.8 however was afebrile.  He has remained afebrile throughout his entire hospitalization.  His leukocytosis has improved now to 16.7.  He was initially started on ceftriaxone empirically followed by vancomycin and Zosyn, however now has been transitioned to ampicillin.  TTE was completed which showed a mobile echogenic mass in the right atrium, cardiology has been consulted who is planning for YO to further evaluate this.     History was obtained from both the patient and his sister who was present at bedside. She reports that the patient's mentation decreased a few days prior to his admission along with generalized weakness to where he was unable to participate with PT at his nursing home where he resides. His fowler catheter apparently has not been exchanged since 3/2025. The patient reports noticing R midback pain that began about 3 days prior to his admission as well however this has since improved. He denies fever, chills, N/V/D.       Interval History:      - Remains afebrile  - WBC decreased today, now at 13.7  - Hemoglobin dropped significantly overnight, at 4.7 today. He has now been upgraded to the ICU but not requiring pressors  - Likely plan for EGD  "today, CTA on hold until source of bleeding is addressed  - Remains on vancomycin, cefepime, and flagyl  - Cultures from toe wound showing NGTD  - Today patient reports feeling more SOB and dizzy. Denies fever, chills, N/V/D    Antibiotic / Antiviral / Antifungal History:  Ceftriaxone 1 g IV x1 dose - 05/23/2025   Zosyn 4.5 g IV q.8 hours - 05/24/2025 to 05/25/2025   Vancomycin IV trough 15-20 - 05/24/2025 to 05/26/2025   Ampicillin 1 g IV q.4 hours - 05/26/2025 to 05/28/2025   Penicillin G 24,000,000 units IV daily per continuous infusion - 05/28/2025 to 06/01/2025   Vancomycin IV trough 15-20 - 06/02/2025 to present   Cefepime 2 g IV q.8 hours - 06/02/2025 to present   Flagyl 500 mg p.o. q.8 hours - 06/02/2025 to present      MEDICATIONS:   Reviewed in EMR    REVIEW OF SYSTEMS:   Except as documented, all other systems reviewed and negative     PHYSICAL EXAM:   T 98 °F (36.7 °C)   BP (!) 101/57   P 89   RR 19   O2 95 %  GENERAL: Elderly BM who is A&Ox3, NAD, appears chronically ill; on O2 per nasal cannula   HEENT: atraumatic, normocephalic, anicteric, moist oral mucosa without lesions, exudate, or erythema; no thrush  LUNGS: breathing unlabored, lungs coarse bilateral  HEART: RRR; no murmur, rub, or gallop  ABDOMEN: abdomen soft, obese, nondistended, nontender to palpation  : Palomo catheter in place  EXTREMITIES: 2-3+ bilateral lower extremity pitting edema  SKIN: Open wound to left medial great toe without purulence, erythema, or necrosis (see wound care note for pictures)  NEURO: speech fluent and intact, facial symmetry preserved, no tremor  PSYCH: cooperative, normal mood and affect  LINES: PICC to right upper arm without s/s infection       LABS AND IMAGING:     Recent Labs     06/03/25  0516 06/04/25  0303   WBC 15.31* 13.72*   RBC 2.30* 1.50*   HGB 7.2* 4.7*   HCT 22.6* 15.0*   MCV 98.3* 100.0*   MCH 31.3* 31.3*   MCHC 31.9* 31.3*   RDW 19.6* 20.2*    193     No results for input(s): "LACTIC" in " "the last 72 hours.  No results for input(s): "INR", "APTT", "D-DIMER" in the last 72 hours.    No results for input(s): "HGBA1C", "CHOL", "TRIG", "LDL", "VLDL", "HDL" in the last 72 hours.   Recent Labs     06/03/25  0516 06/04/25  0303    140   K 4.2 4.0   CO2 23 25   BUN 55.1* 55.3*   CREATININE 1.26* 1.39*   CALCIUM 8.2* 7.6*   MG 2.20 1.70   PHOS 2.9 3.2   ALBUMIN 2.2* 1.8*   GLOBULIN 3.4 2.6   ALKPHOS 50 46   ALT 42 44   AST 25 29   BILITOT 0.6 0.3   CRP  --  7.50*     No results for input(s): "BNP", "CPK", "TROPONINI" in the last 72 hours.         Estimated Creatinine Clearance: 62.2 mL/min (A) (based on SCr of 1.39 mg/dL (H)).   Lab Results   Component Value Date    SEDRATE <1 06/04/2025      Lab Results   Component Value Date    CRP 7.50 (H) 06/04/2025        Micro:   Colonization:  No results found for this or any previous visit.     5/23 pBCx 2/2: NGTD  5/23 Urine cx: Enterococcus faecium      05/28/2025 sputum culture NGTD  05/29/2025 urine culture NGTD  06/02/2025 left foot wound culture NGTD  06/02/2025 left foot tissue culture NGTD        CT Pelvis With IV Contrast NO Oral Contrast  Narrative: EXAMINATION:  CT PELVIS WITH IV CONTRAST    CLINICAL HISTORY:  concern for prostatitis/prostatic abscess;    Chief complaint: Shortness of Breath (From [facility] with complaints of SOB and lower back pain for about 3 days. Abdominal distension and bilateral leg edema noted. Palomo in place upon arrival with cloudy urine noted.)[facility] 74 y.o[facility] Black or  male with past medical history of cerebrovascular accident (March 2025), coronary artery disease status post LAD stent (2017), venous insufficiency, hypertension, first-degree AV block, and type 2 diabetes mellitus, who presented to the ED from a nursing home due to progressive weakness over the past week. His daughter provided history due to his communication difficulties. Associated symptoms " included altered mental status, back pain, and abdominal discomfort. In the ED, vitals were stable, but lab results were remarkable for severe hyperkalemia and azotemia.  He was treated with Lokelma, insulin, and albuterol, started on Rocephin for a urinary tract infection.  Imaging of the abdomen revealed hydronephrosis and malpositioned Fowler catheter.  The Fowler catheter has been subsequently replaced and renal function has improved.  Nephrology service continues to follow for assistance with management of PRIYA.    TECHNIQUE:  Helical axial images are acquired through the pelvis after the IV administration 95 mL of Omnipaque 350.  Images were reconstructed into the coronal sagittal plane.  Dose automated exposure control, dose radiation lowering technique was utilized.    COMPARISON:  CT abdomen and pelvis without contrast from 05/28/2025    CT abdomen and pelvis without contrast from 05/23/2025    FINDINGS:  Pelvis: The bony structures of the pelvis are intact. A metallic density is seen in the left ilium.    Hip: There is mild degenerative change in the bilateral hip.    Right: No fracture dislocation or subluxation is seen involving the visualized right hip bony structures.    Left: No fracture dislocation or subluxation is seen involving the visualized left hip bony structures.    Femur:    Proximal Femur: The visualized proximal right and left femurs appear intact.    Pelvic structures:    Bladder: A fowler catheter in place. The bladder wall appears thickened even though the bladder is collapsed.    Visualized distal ureters: Normal.    Visualized small bowel loops: Normal.    Rectosigmoid colon: Multiple diverticulum are seen in the sigmoid colon without surrounding inflammation to suggest diverticulitis.    Prostate and seminal vesicles: There are multiple calcifications in the prostate gland. No peripherally enhancing fluid collection identified to suggest an abscess.    Pelvic cavity: Normal.    Pelvic  wall: Normal.    Systemic arteries and veins: There is mild atheromatous calcification in the bilateral common iliac arteries and imaged bilateral superficial femoral arteries.    Osseous structures: Mild to moderate degenerative change is seen in the imaged osseous structures.  Impression: 1. A fowler catheter in place. The bladder wall appears thickened even though the bladder is collapsed. This could reflect an element of cystitis. Correlate with clinical and laboratory findings.    2. No acute pelvic solid organ or bowel pathology identified. Details and other findings as discussed above    [employer] concurrence    Electronically signed by: Jayme Hagan MD  Date:    06/03/2025  Time:    07:54  Ankle Brachial Indices (KAIT)  The right lower extremity ankle brachial index is 2.64 suggesting non   compressible tibial vessels.    The left lower extremity ankle brachial index is 1.49 suggesting non   compressible tibial vessels.        TTE 05/26/2025:  Summary  Show Result Comparison     Left Ventricle: The left ventricle is normal in size. Normal wall thickness. There is moderately reduced systolic function with a visually estimated ejection fraction of 35 - 40%. Unable to assess diastolic function due to atrial fibrillation.    Right Ventricle: The right ventricle is moderately dilated Systolic function is moderately reduced. Prominent moderator band in the right ventricle.    Left Atrium: The left atrium is mildly dilated    Right Atrium: The right atrium is mildly dilated . 1.9 x 1.1 x 1.8 small mobile echogenic mass present.    Aortic Valve: There is mild aortic regurgitation.    Tricuspid Valve: There is mild regurgitation.    Pulmonary Artery: The estimated pulmonary artery systolic pressure is 49 mmHg.    IVC/SVC: Elevated venous pressure at 15 mmHg.      YO 05/30/2025:   Summary  Show Result Comparison     Left Ventricle: The left ventricle is normal in size. Normal wall motion. There is normal systolic  function. Quantitated ejection fraction is 56%. Elevated left ventricular filling pressure.    Right Ventricle: The right ventricle is mildly dilated Systolic function is normal.    Left Atrium: The left atrium is dilated Agitated saline study of the atrial septum is negative, suggesting absence of intracardiac shunt at the atrial level. No patent foramen ovale.    Right Atrium: The right atrium is dilated. There is a prominent Eustachian valve with a highly mobile echogenic structure attached to it most likely representing a thrombus and measuring 1.5 cm in its longest dimension. Dense spontaneous echo contrast visualized in the right atrial cavity.    Aortic Valve: The aortic valve is a trileaflet valve. There is no stenosis. There is no significant regurgitation.    Mitral Valve: The mitral valve is structurally normal. There is no stenosis. There is trace regurgitation.    Tricuspid Valve: There is trace regurgitation.    Aorta: The aortic root is normal in size measuring 3.5 cm. The ascending aorta is normal in size measuring 2.9 cm. The aortic arch is normal measuring 2.6 cm. The descending aorta is normal measuring 2.3 cm.    Pericardium: There is no pericardial effusion.     YO obtained for further evaluation of right atrial mass noted on transthoracic echocardiogram.    KAIT 06/02/2025:   Interpretation Summary  Show Result ComparisonThe right lower extremity ankle brachial index is 2.64 suggesting non compressible tibial vessels.    The left lower extremity ankle brachial index is 1.49 suggesting non compressible tibial vessels.        ASSESSMENT & PLAN:     74-year-old male with PMH CAD, prior CVA in 03/2025, chronic indwelling Palomo catheter who was admitted to Medicine on 5/23 for workup/management of weakness.  Urine culture was obtained on admission, appears to be from old Palomo catheter, which showed growth of Enterococcus faecium.  He has been started on ampicillin as organism was not VRE and  sensitive to beta lactams.  CT abdomen and pelvis showed malpositioned Fowler catheter with balloon of the prostate.  Per discussion with primary team there is noted purulence in his urine.  Infectious disease service has been consulted for management of UTI.     1) Enterococcus faecium cystitis  - Organism PCN sensitive, non-VRE  2) Possible acute prostatitis, although not seen on CT pelvis  3) Malpositioned fowler catheter, resolved  4) Sepsis  5) Right atrial echogenic thrombus   6) CVA  7) CAD  8) hepatomegaly   9) transaminitis   10) PRIYA (improving)  11) dyspnea (improving)  12) anemia   13) persistent leukocytosis, improving  14) atrial fibrillation with RVR  15) LT foot osteomyelitis   - Tissue cx NGTD  16) Significant anemia with hemodynamic instablity      Persistent leukocytosis in the setting of Enterococcus faecium cystitis.  CT pelvis with contrast was done and showed only concerns for cystitis; no prostate abscess.    Urine culture with Enterococcus faecium.  Repeat urine culture 05/29/2025 NGTD.    Sputum culture NGTD.  Patient noted with episodes of atrial fib with RVR.  TTE showed concerns for a mobile right atrial mass.  YO was followed up and she confirmed a highly mobile thrombus to the right atrium (1.5 cm).  Now noted with LT toe wound that probes to bone which is indicative of osteomyelitis.  While concerning, do not feel this is source of leukocytosis.  Cultures of toe, to include tissue culture, so far NGTD.  ABIs were done and showed concerns for noncompressible tibial vessels bilateral           RECOMMENDATIONS:     - Follow up culture results.    - WBC improved today  - Would obtain CT chest, OK without IV contrast given PRIYA  - Would obtain CTA with runoff to evaluate arterial blood flow to LLE  - Continue Vancomycin IV (trough 15-20), Cefepime 2 g IV q.8 hours, and Flagyl 500 mg p.o. q.8 hours  Need to watch nausea complaint.  May be worsened with addition of Flagyl   - Monitor liver  enzymes   - Renal protective measures  - Patient needs continued wound care and strict glucose control   - Recommend Tdap as this is not up to date   - Defer workup/management of anemia to the primary team/GI service      ID will continue to follow      Jayme Mccallum MD  Infectious Diseases Faculty   of Medicine

## 2025-06-04 NOTE — PT/OT/SLP PROGRESS
Physical Therapy    Missed Treatment Session - cancel note - 1106 - 06/04/2025    Patient Name:  Vaibhav Vargas   MRN:  [id number]      -patient not seen at this time secondary to Transfer to ICU, await clearance  -upgrade to ICU 0903-06/04/2025  -current PT orders discontinued  -PT SERVICES will need 'new' orders to resume/continue therapy

## 2025-06-04 NOTE — ANESTHESIA POSTPROCEDURE EVALUATION
Anesthesia Post Evaluation    Patient: Vaibhav Vargas    Procedure(s) Performed: Procedure(s) (LRB):  EGD (ESOPHAGOGASTRODUODENOSCOPY) (Left)    Final Anesthesia Type: general      Patient location during evaluation: GI PACU  Patient participation: Yes- Able to Participate  Level of consciousness: awake and alert  Post-procedure vital signs: reviewed and stable  Pain management: adequate  Airway patency: patent    PONV status at discharge: No PONV  Anesthetic complications: no      Cardiovascular status: blood pressure returned to baseline  Respiratory status: unassisted  Hydration status: euvolemic  Follow-up not needed.              Vitals Value Taken Time   BP 97/53 06/04/25 11:31   Temp 36.7 °C (98.1 °F) 06/04/25 11:15   Pulse 87 06/04/25 11:31   Resp 14 06/04/25 11:31   SpO2 100 % 06/04/25 11:31   Vitals shown include unfiled device data.      No case tracking events are documented in the log.      Pain/Josefina Score: Pain Rating Prior to Med Admin: 10 (6/3/2025 11:27 PM)  Pain Rating Post Med Admin: 0 (6/4/2025 12:27 AM)

## 2025-06-04 NOTE — PROVATION PATIENT INSTRUCTIONS
Discharge Summary/Instructions after an Endoscopic Procedure  Patient Name: Vaibhav Vargas  Patient MRN: [id number]  Patient YOB: 1950  Wednesday, June 4, 2025  Chapis Lane MD  Dear patient,  As a result of recent federal legislation (The Federal Cures Act), you may   receive lab or pathology results from your procedure in your [facility]   account before your physician is able to contact you. Your physician or   their representative will relay the results to you with their   recommendations at their soonest availability.  Thank you,  RESTRICTIONS:  During your procedure today, you received medications for sedation.  These   medications may affect your judgment, balance and coordination.  Therefore,   for 24 hours, you have the following restrictions:   - DO NOT drive a car, operate machinery, make legal/financial decisions,   sign important papers or drink alcohol.    ACTIVITY:  Today: no heavy lifting, straining or running due to procedural   sedation/anesthesia.  The following day: return to full activity including work.  DIET:  Eat and drink normally unless instructed otherwise.     TREATMENT FOR COMMON SIDE EFFECTS:  - Mild abdominal pain, nausea, belching, bloating or excessive gas:  rest,   eat lightly and use a heating pad.  - Sore Throat: treat with throat lozenges and/or gargle with warm salt   water.  - Because air was used during the procedure, expelling large amounts of air   from your rectum or belching is normal.  - If a bowel prep was taken, you may not have a bowel movement for 1-3 days.    This is normal.  SYMPTOMS TO WATCH FOR AND REPORT TO YOUR PHYSICIAN:  1. Abdominal pain or bloating, other than gas cramps.  2. Chest pain.  3. Back pain.  4. Signs of infection such as: chills or fever occurring within 24 hours   after the procedure.  5. Rectal bleeding, which would show as bright red, maroon, or black stools.   (A tablespoon of blood from the rectum is not serious, especially  if   hemorrhoids are present.)  6. Vomiting.  7. Weakness or dizziness.  GO DIRECTLY TO THE NEAREST EMERGENCY ROOM IF YOU HAVE ANY OF THE FOLLOWING:      Difficulty breathing              Chills and/or fever over 101 F   Persistent vomiting and/or vomiting blood   Severe abdominal pain   Severe chest pain   Black, tarry stools   Bleeding- more than one tablespoon   Any other symptom or condition that you feel may need urgent attention  Your doctor recommends these additional instructions:  If any biopsies were taken, your doctors clinic will contact you in 1 to 2   weeks with any results.  Recommendations:  - Return patient to ICU for ongoing care.   - Use Protonix (pantoprazole) 40 mg IV BID.   - NPO today.   - Hold anticoagulation until Hgb stabilizes and no further bleeding.   - Observe patient's clinical course following today's procedure with   therapeutic intervention.  Impressions:  - Normal esophagus.   - Normal stomach.   - A single bleeding Dieulafoy lesion in the duodenum.  Clips were placed.    Clip : Exos.   - Non-obstructing non-bleeding duodenal ulcer with a nonbleeding visible   vessel (Kenton Class IIa).  Treated with bipolar cautery.  Clips were   placed.  Clip : Breaks Ui Link.   - Non-obstructing non-bleeding duodenal ulcer with a clean ulcer base   (Kenton Class III).   - No specimens collected.  For questions, problems or results please call your physician - Chapis Lane MD at Work:  [phone], Work:  [phone].  [facility] , EMERGENCY ROOM PHONE NUMBER: [phone]  IF A COMPLICATION OR EMERGENCY SITUATION ARISES AND YOU ARE UNABLE TO REACH   YOUR PHYSICIAN - GO DIRECTLY TO THE EMERGENCY ROOM.  Chapis Lane MD  6/4/2025 4:05:41 PM  This report has been verified and signed electronically.  Dear patient,  As a result of recent federal legislation (The Federal Cures Act), you may   receive lab or pathology  results from your procedure in your [facility]   account before your physician is able to contact you. Your physician or   their representative will relay the results to you with their   recommendations at their soonest availability.  Thank you,  PROVATION

## 2025-06-04 NOTE — PLAN OF CARE
Problem: Infection  Goal: Absence of Infection Signs and Symptoms  Outcome: Progressing     Problem: Adult Inpatient Plan of Care  Goal: Plan of Care Review  Outcome: Progressing  Goal: Patient-Specific Goal (Individualized)  Outcome: Progressing  Goal: Absence of Hospital-Acquired Illness or Injury  Outcome: Progressing  Goal: Optimal Comfort and Wellbeing  Outcome: Progressing  Goal: Readiness for Transition of Care  Outcome: Progressing     Problem: Diabetes Comorbidity  Goal: Blood Glucose Level Within Targeted Range  Outcome: Progressing     Problem: Wound  Goal: Optimal Coping  Outcome: Progressing  Goal: Optimal Functional Ability  Outcome: Progressing  Goal: Absence of Infection Signs and Symptoms  Outcome: Progressing  Goal: Improved Oral Intake  Outcome: Progressing  Goal: Optimal Pain Control and Function  Outcome: Progressing  Goal: Skin Health and Integrity  Outcome: Progressing  Goal: Optimal Wound Healing  Outcome: Progressing     Problem: Skin Injury Risk Increased  Goal: Skin Health and Integrity  Outcome: Progressing     Problem: Heart Failure  Goal: Optimal Coping  Outcome: Progressing  Goal: Optimal Cardiac Output  Outcome: Progressing  Goal: Stable Heart Rate and Rhythm  Outcome: Progressing  Goal: Optimal Functional Ability  Outcome: Progressing  Goal: Fluid and Electrolyte Balance  Outcome: Progressing  Goal: Improved Oral Intake  Outcome: Progressing  Goal: Effective Oxygenation and Ventilation  Outcome: Progressing  Goal: Effective Breathing Pattern During Sleep  Outcome: Progressing     Problem: Sepsis/Septic Shock  Goal: Optimal Coping  Outcome: Progressing  Goal: Absence of Bleeding  Outcome: Progressing  Goal: Blood Glucose Level Within Targeted Range  Outcome: Progressing  Goal: Absence of Infection Signs and Symptoms  Outcome: Progressing  Goal: Optimal Nutrition Intake  Outcome: Progressing     Problem: Fall Injury Risk  Goal: Absence of Fall and Fall-Related Injury  Outcome:  Progressing     Problem: Comorbidity Management  Goal: Blood Pressure in Desired Range  Outcome: Progressing     Problem: Infection  Goal: Absence of Infection Signs and Symptoms  Outcome: Progressing     Problem: Adult Inpatient Plan of Care  Goal: Plan of Care Review  Outcome: Progressing  Goal: Patient-Specific Goal (Individualized)  Outcome: Progressing  Goal: Absence of Hospital-Acquired Illness or Injury  Outcome: Progressing  Goal: Optimal Comfort and Wellbeing  Outcome: Progressing  Goal: Readiness for Transition of Care  Outcome: Progressing     Problem: Diabetes Comorbidity  Goal: Blood Glucose Level Within Targeted Range  Outcome: Progressing     Problem: Wound  Goal: Optimal Coping  Outcome: Progressing  Goal: Optimal Functional Ability  Outcome: Progressing  Goal: Absence of Infection Signs and Symptoms  Outcome: Progressing  Goal: Improved Oral Intake  Outcome: Progressing  Goal: Optimal Pain Control and Function  Outcome: Progressing  Goal: Skin Health and Integrity  Outcome: Progressing  Goal: Optimal Wound Healing  Outcome: Progressing     Problem: Skin Injury Risk Increased  Goal: Skin Health and Integrity  Outcome: Progressing     Problem: Heart Failure  Goal: Optimal Coping  Outcome: Progressing  Goal: Optimal Cardiac Output  Outcome: Progressing  Goal: Stable Heart Rate and Rhythm  Outcome: Progressing  Goal: Optimal Functional Ability  Outcome: Progressing  Goal: Fluid and Electrolyte Balance  Outcome: Progressing  Goal: Improved Oral Intake  Outcome: Progressing  Goal: Effective Oxygenation and Ventilation  Outcome: Progressing  Goal: Effective Breathing Pattern During Sleep  Outcome: Progressing     Problem: Sepsis/Septic Shock  Goal: Optimal Coping  Outcome: Progressing  Goal: Absence of Bleeding  Outcome: Progressing  Goal: Blood Glucose Level Within Targeted Range  Outcome: Progressing  Goal: Absence of Infection Signs and Symptoms  Outcome: Progressing  Goal: Optimal Nutrition  Intake  Outcome: Progressing     Problem: Fall Injury Risk  Goal: Absence of Fall and Fall-Related Injury  Outcome: Progressing     Problem: Comorbidity Management  Goal: Blood Pressure in Desired Range  Outcome: Progressing

## 2025-06-04 NOTE — ANESTHESIA POSTPROCEDURE EVALUATION
Anesthesia Post Evaluation    Patient: Vaibhav Vargas    Procedure(s) Performed: Procedure(s) (LRB):  EGD (ESOPHAGOGASTRODUODENOSCOPY) (Left)    Final Anesthesia Type: general      Patient location during evaluation: ICU  Patient participation: Yes- Able to Participate  Level of consciousness: oriented  Post-procedure vital signs: reviewed and stable  Pain management: adequate  Airway patency: patent    PONV status at discharge: No PONV  Anesthetic complications: no      Cardiovascular status: blood pressure returned to baseline  Respiratory status: unassisted  Hydration status: euvolemic  Follow-up not needed.              Vitals Value Taken Time   BP 97/53 06/04/25 11:31   Temp 36.7 °C (98.1 °F) 06/04/25 11:15   Pulse 87 06/04/25 11:31   Resp 14 06/04/25 11:31   SpO2 100 % 06/04/25 11:31   Vitals shown include unfiled device data.      No case tracking events are documented in the log.      Pain/Josefina Score: Pain Rating Prior to Med Admin: 10 (6/3/2025 11:27 PM)  Pain Rating Post Med Admin: 0 (6/4/2025 12:27 AM)
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
## 2025-06-04 NOTE — TRANSFER OF CARE
"Anesthesia Transfer of Care Note    Patient: Vaibhav Vargas    Procedure(s) Performed: Procedure(s) (LRB):  EGD (ESOPHAGOGASTRODUODENOSCOPY) (Left)    Patient location: GI    Anesthesia Type: general    Transport from OR: Transported from OR on room air with adequate spontaneous ventilation    Post pain: adequate analgesia    Post assessment: no apparent anesthetic complications    Post vital signs: stable    Level of consciousness: awake    Nausea/Vomiting: no nausea/vomiting    Complications: none    Transfer of care protocol was followed      Last vitals: Visit Vitals  /64   Pulse 89   Temp 36.7 °C (98.1 °F)   Resp 20   Ht 6' 2" (1.88 m)   Wt 112.5 kg (248 lb 0.3 oz)   SpO2 100%   BMI 31.84 kg/m²     "

## 2025-06-05 LAB
ABO + RH BLD: NORMAL
ALBUMIN SERPL-MCNC: 2.1 G/DL (ref 3.4–4.8)
ALBUMIN/GLOB SERPL: 0.8 RATIO (ref 1.1–2)
ALP SERPL-CCNC: 40 UNIT/L (ref 40–150)
ALT SERPL-CCNC: 28 UNIT/L (ref 0–55)
ANION GAP SERPL CALC-SCNC: 11 MEQ/L
AST SERPL-CCNC: 15 UNIT/L (ref 11–45)
BACTERIA TISS AEROBE CULT: NORMAL
BASOPHILS # BLD AUTO: 0.04 X10(3)/MCL
BASOPHILS NFR BLD AUTO: 0.3 %
BILIRUB SERPL-MCNC: 0.4 MG/DL
BLD PROD TYP BPU: NORMAL
BLOOD UNIT EXPIRATION DATE: NORMAL
BLOOD UNIT TYPE CODE: 6200
BSA FOR ECHO PROCEDURE: 2.35 M2
BUN SERPL-MCNC: 57.6 MG/DL (ref 8.4–25.7)
CALCIUM SERPL-MCNC: 7.7 MG/DL (ref 8.8–10)
CHLORIDE SERPL-SCNC: 111 MMOL/L (ref 98–107)
CO2 SERPL-SCNC: 23 MMOL/L (ref 23–31)
CREAT SERPL-MCNC: 1.48 MG/DL (ref 0.72–1.25)
CREAT/UREA NIT SERPL: 39
CROSSMATCH INTERPRETATION: NORMAL
DISPENSE STATUS: NORMAL
EOSINOPHIL # BLD AUTO: 0.14 X10(3)/MCL (ref 0–0.9)
EOSINOPHIL NFR BLD AUTO: 1 %
ERYTHROCYTE [DISTWIDTH] IN BLOOD BY AUTOMATED COUNT: 18.1 % (ref 11.5–17)
ESTROGEN SERPL-MCNC: NORMAL PG/ML
GFR SERPLBLD CREATININE-BSD FMLA CKD-EPI: 49 ML/MIN/1.73/M2
GLOBULIN SER-MCNC: 2.6 GM/DL (ref 2.4–3.5)
GLUCOSE SERPL-MCNC: 178 MG/DL (ref 82–115)
HCT VFR BLD AUTO: 21.2 % (ref 42–52)
HCT VFR BLD AUTO: 25.6 % (ref 42–52)
HGB BLD-MCNC: 7 G/DL (ref 14–18)
HGB BLD-MCNC: 8.5 G/DL (ref 14–18)
IMM GRANULOCYTES # BLD AUTO: 0.14 X10(3)/MCL (ref 0–0.04)
IMM GRANULOCYTES NFR BLD AUTO: 1 %
INSULIN SERPL-ACNC: NORMAL U[IU]/ML
LAB AP CLINICAL INFORMATION: NORMAL
LAB AP GROSS DESCRIPTION: NORMAL
LAB AP REPORT FOOTNOTES: NORMAL
LYMPHOCYTES # BLD AUTO: 1.68 X10(3)/MCL (ref 0.6–4.6)
LYMPHOCYTES NFR BLD AUTO: 11.7 %
MAGNESIUM SERPL-MCNC: 1.8 MG/DL (ref 1.6–2.6)
MCH RBC QN AUTO: 31.5 PG (ref 27–31)
MCHC RBC AUTO-ENTMCNC: 33 G/DL (ref 33–36)
MCV RBC AUTO: 95.5 FL (ref 80–94)
MONOCYTES # BLD AUTO: 1.34 X10(3)/MCL (ref 0.1–1.3)
MONOCYTES NFR BLD AUTO: 9.3 %
NEUTROPHILS # BLD AUTO: 11.05 X10(3)/MCL (ref 2.1–9.2)
NEUTROPHILS NFR BLD AUTO: 76.7 %
NRBC BLD AUTO-RTO: 0.5 %
PHOSPHATE SERPL-MCNC: 2.5 MG/DL (ref 2.3–4.7)
PISA TR MAX VEL: 3.6 M/S
PLATELET # BLD AUTO: 175 X10(3)/MCL (ref 130–400)
PMV BLD AUTO: 9.5 FL (ref 7.4–10.4)
POCT GLUCOSE: 154 MG/DL (ref 70–110)
POCT GLUCOSE: 156 MG/DL (ref 70–110)
POCT GLUCOSE: 174 MG/DL (ref 70–110)
POCT GLUCOSE: 196 MG/DL (ref 70–110)
POTASSIUM SERPL-SCNC: 3.7 MMOL/L (ref 3.5–5.1)
PROT SERPL-MCNC: 4.7 GM/DL (ref 5.8–7.6)
RBC # BLD AUTO: 2.22 X10(6)/MCL (ref 4.7–6.1)
RIGHT VENTRICLE DIASTOLIC BASEL DIMENSION: 4.4 CM
RIGHT VENTRICULAR END-DIASTOLIC DIMENSION: 4.39 CM
SODIUM SERPL-SCNC: 145 MMOL/L (ref 136–145)
TR MAX PG: 50 MMHG
TRICUSPID ANNULAR PLANE SYSTOLIC EXCURSION: 1.5 CM
UNIT NUMBER: NORMAL
VANCOMYCIN TROUGH SERPL-MCNC: 20.3 UG/ML (ref 15–20)
WBC # BLD AUTO: 14.39 X10(3)/MCL (ref 4.5–11.5)

## 2025-06-05 PROCEDURE — 63600175 PHARM REV CODE 636 W HCPCS: Performed by: INTERNAL MEDICINE

## 2025-06-05 PROCEDURE — 25000003 PHARM REV CODE 250

## 2025-06-05 PROCEDURE — 84100 ASSAY OF PHOSPHORUS: CPT

## 2025-06-05 PROCEDURE — 83735 ASSAY OF MAGNESIUM: CPT

## 2025-06-05 PROCEDURE — 36415 COLL VENOUS BLD VENIPUNCTURE: CPT

## 2025-06-05 PROCEDURE — 63600175 PHARM REV CODE 636 W HCPCS

## 2025-06-05 PROCEDURE — 25500020 PHARM REV CODE 255: Performed by: INTERNAL MEDICINE

## 2025-06-05 PROCEDURE — P9016 RBC LEUKOCYTES REDUCED: HCPCS

## 2025-06-05 PROCEDURE — 85014 HEMATOCRIT: CPT

## 2025-06-05 PROCEDURE — 25000003 PHARM REV CODE 250: Performed by: INTERNAL MEDICINE

## 2025-06-05 PROCEDURE — 20000000 HC ICU ROOM

## 2025-06-05 PROCEDURE — 85025 COMPLETE CBC W/AUTO DIFF WBC: CPT

## 2025-06-05 PROCEDURE — 80202 ASSAY OF VANCOMYCIN: CPT | Performed by: INTERNAL MEDICINE

## 2025-06-05 PROCEDURE — 80053 COMPREHEN METABOLIC PANEL: CPT

## 2025-06-05 PROCEDURE — 94761 N-INVAS EAR/PLS OXIMETRY MLT: CPT

## 2025-06-05 RX ORDER — MAGNESIUM SULFATE 1 G/100ML
1 INJECTION INTRAVENOUS ONCE
Status: COMPLETED | OUTPATIENT
Start: 2025-06-05 | End: 2025-06-05

## 2025-06-05 RX ORDER — HYDROCODONE BITARTRATE AND ACETAMINOPHEN 500; 5 MG/1; MG/1
TABLET ORAL
Status: DISCONTINUED | OUTPATIENT
Start: 2025-06-05 | End: 2025-06-12 | Stop reason: HOSPADM

## 2025-06-05 RX ORDER — SODIUM,POTASSIUM PHOSPHATES 280-250MG
2 POWDER IN PACKET (EA) ORAL ONCE
Status: COMPLETED | OUTPATIENT
Start: 2025-06-05 | End: 2025-06-05

## 2025-06-05 RX ORDER — METRONIDAZOLE 500 MG/100ML
500 INJECTION, SOLUTION INTRAVENOUS ONCE
Status: COMPLETED | OUTPATIENT
Start: 2025-06-05 | End: 2025-06-05

## 2025-06-05 RX ORDER — CEFEPIME HYDROCHLORIDE 2 G/1
2 INJECTION, POWDER, FOR SOLUTION INTRAVENOUS
Status: DISCONTINUED | OUTPATIENT
Start: 2025-06-06 | End: 2025-06-06

## 2025-06-05 RX ADMIN — LIDOCAINE 5% 1 PATCH: 700 PATCH TOPICAL at 11:06

## 2025-06-05 RX ADMIN — VANCOMYCIN HYDROCHLORIDE 1500 MG: 1.5 INJECTION, POWDER, LYOPHILIZED, FOR SOLUTION INTRAVENOUS at 05:06

## 2025-06-05 RX ADMIN — METRONIDAZOLE 500 MG: 500 TABLET ORAL at 09:06

## 2025-06-05 RX ADMIN — METOPROLOL SUCCINATE ER TABLETS 50 MG: 50 TABLET, FILM COATED, EXTENDED RELEASE ORAL at 09:06

## 2025-06-05 RX ADMIN — MAGNESIUM SULFATE IN DEXTROSE 1 G: 10 INJECTION, SOLUTION INTRAVENOUS at 06:06

## 2025-06-05 RX ADMIN — PANTOPRAZOLE SODIUM 40 MG: 40 INJECTION, POWDER, FOR SOLUTION INTRAVENOUS at 09:06

## 2025-06-05 RX ADMIN — POTASSIUM & SODIUM PHOSPHATES POWDER PACK 280-160-250 MG 2 PACKET: 280-160-250 PACK at 11:06

## 2025-06-05 RX ADMIN — METRONIDAZOLE 500 MG: 500 INJECTION, SOLUTION INTRAVENOUS at 07:06

## 2025-06-05 RX ADMIN — ATORVASTATIN CALCIUM 20 MG: 20 TABLET, FILM COATED ORAL at 11:06

## 2025-06-05 RX ADMIN — CEFEPIME 2 G: 2 INJECTION, POWDER, FOR SOLUTION INTRAVENOUS at 05:06

## 2025-06-05 RX ADMIN — CEFEPIME 2 G: 2 INJECTION, POWDER, FOR SOLUTION INTRAVENOUS at 01:06

## 2025-06-05 RX ADMIN — METRONIDAZOLE 500 MG: 500 TABLET ORAL at 01:06

## 2025-06-05 RX ADMIN — METOPROLOL SUCCINATE ER TABLETS 100 MG: 50 TABLET, FILM COATED, EXTENDED RELEASE ORAL at 11:06

## 2025-06-05 RX ADMIN — IOHEXOL 100 ML: 350 INJECTION, SOLUTION INTRAVENOUS at 01:06
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
## 2025-06-05 NOTE — PLAN OF CARE
Problem: Infection  Goal: Absence of Infection Signs and Symptoms  Outcome: Ongoing     Problem: Adult Inpatient Plan of Care  Goal: Plan of Care Review  Outcome: Ongoing  Goal: Patient-Specific Goal (Individualized)  Outcome: Ongoing  Goal: Absence of Hospital-Acquired Illness or Injury  Outcome: Ongoing  Goal: Optimal Comfort and Wellbeing  Outcome: Ongoing  Goal: Readiness for Transition of Care  Outcome: Ongoing
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
## 2025-06-05 NOTE — PROGRESS NOTES
Pharmacokinetic Assessment Follow Up: IV Vancomycin    Vancomycin serum concentration assessment(s):    The random level was drawn correctly and can be used to guide therapy at this time. The measurement is above the desired definitive target range of 15 to 20 mcg/mL.    Vancomycin Regimen Plan:    Change regimen to Vancomycin 1500 mg IV every 24 hours with next serum trough concentration measured at 0500 prior to 0600 dose on 6/6    Drug levels (last 3 results):  Recent Labs   Lab Result Units 06/04/25  1113 06/04/25 2000 06/05/25  0147   Vancomycin Random ug/ml  --  23.0*  --    Vancomycin Trough ug/ml 24.3*  --  20.3*       Pharmacy will continue to follow and monitor vancomycin.    Please contact pharmacy at [email or web address] [phone] for questions regarding this assessment.    Thank you for the consult,   Mable Kirk       Patient brief summary:  Vaibhav Vargas is a 74 y.o. male initiated on antimicrobial therapy with IV Vancomycin for treatment of bone/joint infection    The patient's current regimen is vancomycin 1500mg q24h    Drug Allergies:   Review of patient's allergies indicates:  No Known Allergies    Actual Body Weight:   112.5kg    Renal Function:   Estimated Creatinine Clearance: 58.4 mL/min (A) (based on SCr of 1.48 mg/dL (H)).,     Dialysis Method (if applicable):  N/A    CBC (last 72 hours):  Recent Labs   Lab Result Units 06/02/25  0333 06/02/25  2154 06/03/25  0516 06/04/25  0303 06/04/25 2000 06/05/25  0147   WBC x10(3)/mcL 17.30*  --  15.31* 13.72*  --  14.39*   Hgb g/dL 7.3* 7.1* 7.2* 4.7* 7.4* 7.0*   Hct % 22.3* 21.8* 22.6* 15.0* 22.7* 21.2*   Platelet x10(3)/mcL 232  --  193 193  --  175   Mono % % 7.2  --  8.2 6.3  --  9.3   Eos % % 1.7  --  4.2 1.8  --  1.0   Basophil % % 0.3  --  0.5 0.2  --  0.3       Metabolic Panel (last 72 hours):  Recent Labs   Lab Result Units 06/02/25  0333 06/03/25  0516 06/04/25  0303 06/04/25  1755 06/05/25  0147   Sodium mmol/L 140 140 140 142 145   Potassium  mmol/L 3.3* 4.2 4.0 3.4* 3.7   Chloride mmol/L 102 106 107 110* 111*   CO2 mmol/L 27 23 25 23 23   Glucose mg/dL 193* 137* 195* 184* 178*   Blood Urea Nitrogen mg/dL 65.8* 55.1* 55.3* 59.4* 57.6*   Creatinine mg/dL 1.49* 1.26* 1.39* 1.33* 1.48*   Albumin g/dL 2.2* 2.2* 1.8*  --  2.1*   Bilirubin Total mg/dL 0.6 0.6 0.3  --  0.4   ALP unit/L 61 50 46  --  40   AST unit/L 27 25 29  --  15   ALT unit/L 57* 42 44  --  28   Magnesium Level mg/dL 1.80 2.20 1.70 1.60 1.80   Phosphorus Level mg/dL 2.6 2.9 3.2  --  2.5       Vancomycin Administrations:  vancomycin given in the last 96 hours                     vancomycin 1,250 mg in 0.9% NaCl 250 mL IVPB (admixture device) (mg) 1,250 mg New Bag 06/03/25 2259     1,250 mg New Bag  1216    vancomycin 2,000 mg in 0.9% NaCl 500 mL IVPB (mg) 2,000 mg New Bag 06/02/25 1548                    Microbiologic Results:  Microbiology Results (last 7 days)       Procedure Component Value Units Date/Time    Anaerobic Culture [[id number]] Collected: 06/02/25 0919    Order Status: Completed Specimen: SWAB from Foot, Left Updated: 06/04/25 0933     Anaerobe Culture No Anaerobes Isolated    Anaerobic Culture [[id number]] Collected: 06/02/25 1121    Order Status: Completed Specimen: Tissue from Foot, Left Updated: 06/04/25 0930     Anaerobe Culture No Anaerobes Isolated    Wound Culture [[id number]] Collected: 06/02/25 0919    Order Status: Completed Specimen: Wound from Foot, Left Updated: 06/03/25 0714     Wound Culture Normal Skin Manisha, no further workup.    Tissue Culture - Aerobic [[id number]] Collected: 06/02/25 1121    Order Status: Completed Specimen: Tissue from Foot, Left Updated: 06/03/25 0659     Tissue - Aerobic Culture No Growth At 24 Hours    Urine culture [[id number]] Collected: 05/29/25 1623    Order Status: Completed Specimen: Urine Updated: 06/01/25 1121     Urine Culture No Growth    Respiratory Culture [[id number]] Collected: 05/28/25 1357    Order Status: Completed  Specimen: Sputum, Expectorated Updated: 05/30/25 0814     Respiratory Culture Normal respiratory jacy     GRAM STAIN Quality 3+      No bacteria seen

## 2025-06-05 NOTE — PROGRESS NOTES
Pharmacokinetic Assessment Follow Up: IV Vancomycin    Vancomycin serum concentration assessment(s):    The random level was drawn correctly and can be used to guide therapy at this time. The measurement is above the desired definitive target range of 15 to 20 mcg/mL.    Vancomycin Regimen Plan:    Re-dose when the random level is less than 15 mcg/mL, next level to be drawn at 0430 on 6/5/25    Drug levels (last 3 results):  Recent Labs   Lab Result Units 06/04/25  1113 06/04/25  2000   Vancomycin Random ug/ml  --  23.0*   Vancomycin Trough ug/ml 24.3*  --        Pharmacy will continue to follow and monitor vancomycin.    Please contact pharmacy at extension [phone] for questions regarding this assessment.    Thank you for the consult,   Alexa Bai       Patient brief summary:  Vaibhav Vargas is a 74 y.o. male initiated on antimicrobial therapy with IV Vancomycin for treatment of bone/joint infection    Drug Allergies:   Review of patient's allergies indicates:  No Known Allergies    Actual Body Weight:   112.5 kg    Renal Function:   Estimated Creatinine Clearance: 65 mL/min (A) (based on SCr of 1.33 mg/dL (H)).,     Dialysis Method (if applicable):  N/A    CBC (last 72 hours):  Recent Labs   Lab Result Units 06/02/25  0333 06/02/25  2154 06/03/25  0516 06/04/25  0303 06/04/25  2000   WBC x10(3)/mcL 17.30*  --  15.31* 13.72*  --    Hgb g/dL 7.3* 7.1* 7.2* 4.7* 7.4*   Hct % 22.3* 21.8* 22.6* 15.0* 22.7*   Platelet x10(3)/mcL 232  --  193 193  --    Mono % % 7.2  --  8.2 6.3  --    Eos % % 1.7  --  4.2 1.8  --    Basophil % % 0.3  --  0.5 0.2  --        Metabolic Panel (last 72 hours):  Recent Labs   Lab Result Units 06/02/25  0333 06/03/25  0516 06/04/25  0303 06/04/25  1755   Sodium mmol/L 140 140 140 142   Potassium mmol/L 3.3* 4.2 4.0 3.4*   Chloride mmol/L 102 106 107 110*   CO2 mmol/L 27 23 25 23   Glucose mg/dL 193* 137* 195* 184*   Blood Urea Nitrogen mg/dL 65.8* 55.1* 55.3* 59.4*   Creatinine mg/dL 1.49* 1.26*  1.39* 1.33*   Albumin g/dL 2.2* 2.2* 1.8*  --    Bilirubin Total mg/dL 0.6 0.6 0.3  --    ALP unit/L 61 50 46  --    AST unit/L 27 25 29  --    ALT unit/L 57* 42 44  --    Magnesium Level mg/dL 1.80 2.20 1.70 1.60   Phosphorus Level mg/dL 2.6 2.9 3.2  --        Vancomycin Administrations:  vancomycin given in the last 96 hours                     vancomycin 1,250 mg in 0.9% NaCl 250 mL IVPB (admixture device) (mg) 1,250 mg New Bag 06/03/25 2259     1,250 mg New Bag  1216    vancomycin 2,000 mg in 0.9% NaCl 500 mL IVPB (mg) 2,000 mg New Bag 06/02/25 1548                    Microbiologic Results:  Microbiology Results (last 7 days)       Procedure Component Value Units Date/Time    Anaerobic Culture [[id number]] Collected: 06/02/25 0919    Order Status: Completed Specimen: SWAB from Foot, Left Updated: 06/04/25 0933     Anaerobe Culture No Anaerobes Isolated    Anaerobic Culture [[id number]] Collected: 06/02/25 1121    Order Status: Completed Specimen: Tissue from Foot, Left Updated: 06/04/25 0930     Anaerobe Culture No Anaerobes Isolated    Wound Culture [[id number]] Collected: 06/02/25 0919    Order Status: Completed Specimen: Wound from Foot, Left Updated: 06/03/25 0714     Wound Culture Normal Skin Jacy, no further workup.    Tissue Culture - Aerobic [[id number]] Collected: 06/02/25 1121    Order Status: Completed Specimen: Tissue from Foot, Left Updated: 06/03/25 0659     Tissue - Aerobic Culture No Growth At 24 Hours    Urine culture [[id number]] Collected: 05/29/25 1623    Order Status: Completed Specimen: Urine Updated: 06/01/25 1121     Urine Culture No Growth    Respiratory Culture [[id number]] Collected: 05/28/25 1357    Order Status: Completed Specimen: Sputum, Expectorated Updated: 05/30/25 0814     Respiratory Culture Normal respiratory jacy     GRAM STAIN Quality 3+      No bacteria seen    Blood Culture #1 **CANNOT BE ORDERED STAT** [[id number]]  (Normal) Collected: 05/23/25 1923    Order Status:  Completed Specimen: Blood Updated: 05/29/25 0011     Blood Culture No Growth at 5 days    Blood Culture #2 **CANNOT BE ORDERED STAT** [[id number]]  (Normal) Collected: 05/23/25 1957    Order Status: Completed Specimen: Blood from Arm, Left Updated: 05/29/25 0011     Blood Culture No Growth at 5 days

## 2025-06-05 NOTE — CARE UPDATE
Segmental PE was found on CTA abdomen, will get stat ECHO and CTPE.  While this can be contributing to his RVR and recent decompensation, unfortunately he is at high risk for bleeding. Will start anticoagulation once his H&H is stable and GI is agreeable with restarting it. Will communicate cardiology to discuss interventional thrombectomy.    Bernice Lipscomb MD  Internal Medicine - PGY-2
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
## 2025-06-05 NOTE — PROGRESS NOTES
Inpatient Nutrition Assessment    Admit Date: 5/23/2025   Total duration of encounter: 13 days   Patient Age: 74 y.o.    Nutrition Recommendation/Prescription     Pt had repeat EGD yesterday; + GI bleed; diet progressed to full liquids; when appropriate ADAT to Low Na, Carb consistent, Soft & Bite size for ease of chewing diet; encourage oral intake   Continue Boost Breeze (provides 250 kcal, 9 g protein per serving) TID  Will order  kavin bid; wound healing--Kavin (provides 90 kcal, 2.5 g protein per serving)   If pt unable to tolerate oral diet; or needs to be placed back on NPO status/(active Gi bleed)--pt has PICC--consider TPN: Clinimix 5/20 @ 75ml/hr with lipids 20% 250 ml daily to provide 2084 calories, 90 gm protein.   Continue electrolyte replacement as needed  Consider appetite stimulant  Daily Weight    Communication of Recommendations: reviewed with nurse and reviewed with patient    Nutrition Assessment     Malnutrition Assessment/Nutrition-Focused Physical Exam    Malnutrition Context: acute illness or injury (05/29/25 1252)  Malnutrition Level: severe (05/29/25 1252)  Energy Intake (Malnutrition): less than or equal to 50% for greater than or equal to 5 days (05/29/25 1252)  Weight Loss (Malnutrition): other (see comments) (Does not meet criteria) (05/29/25 1252)     Orbital Region (Subcutaneous Fat Loss): well nourished        Muscle Mass (Malnutrition): moderate depletion (05/29/25 1252)  Hinduism Region (Muscle Loss): moderate depletion         Fluid Accumulation (Malnutrition): moderate to severe (05/29/25 1252)        A minimum of two characteristics is recommended for diagnosis of either severe or non-severe malnutrition.    Chart Review    Reason Seen: continuous nutrition monitoring, physician consult for CHF, and follow-up    Malnutrition Screening Tool Results   Have you recently lost weight without trying?: No  Have you been eating poorly because of a decreased appetite?: No   MST Score: 0    Diagnosis: (6/4) EGD--active GI bleed/lesion duodenum /clipped  Severe sepsis 2/2 UTI   ARF, obstructive   Hyperkalemia, resolving  Hx of CVA  CHF  HTN  PRIYA,   Afib  Atrial thrombus  Anemia; ? GI bleed  osteomyelitis  Relevant Medical History: CVA, CAD, Venous insufficiency, HTN, DM, Chronic lumbar pain, edema    Scheduled Medications:  0.9% NaCl, , Once  atorvastatin, 20 mg, Daily  ceFEPime IV (PEDS and ADULTS), 2 g, Q8H  LIDOcaine, 1 patch, Q24H  metoprolol succinate, 100 mg, Daily  metoprolol succinate, 50 mg, QHS  metroNIDAZOLE, 500 mg, Q8H  pantoprazole, 40 mg, BID  vancomycin (VANCOCIN) IV (PEDS and ADULTS), 1,500 mg, Q24H    Continuous Infusions:   PRN Medications:  0.9%  NaCl infusion (for blood administration), , Q24H PRN  0.9%  NaCl infusion (for blood administration), , Q24H PRN  0.9%  NaCl infusion (for blood administration), , Q24H PRN  0.9%  NaCl infusion (for blood administration), , Q24H PRN  0.9%  NaCl infusion (for blood administration), , Q24H PRN  0.9%  NaCl infusion (for blood administration), , Q24H PRN  acetaminophen, 650 mg, Q4H PRN  dextrose 50%, 12.5 g, PRN  dextrose 50%, 25 g, PRN  glucagon (human recombinant), 1 mg, PRN  glucose, 16 g, PRN  glucose, 24 g, PRN  HYDROcodone-acetaminophen, 1 tablet, Q6H PRN  insulin aspart U-100, 0-5 Units, QID (AC + HS) PRN  melatonin, 6 mg, Nightly PRN  ondansetron, 4 mg, Q6H PRN  polyethylene glycol, 17 g, Daily PRN  senna-docusate, 1 tablet, Daily PRN  sodium chloride 0.9%, 10 mL, PRN  sodium chloride 0.9%, 10 mL, Q12H PRN  vancomycin - pharmacy to dose, , pharmacy to manage frequency    Calorie Containing IV Medications: no significant kcals from medications at this time    Recent Labs   Lab 05/30/25  0414 05/31/25  0324 05/31/25  1711 06/01/25  0427 06/01/25  0428 06/01/25  0913 06/01/25  1716 06/01/25  1943 06/02/25  0333 06/02/25  2154 06/03/25  0516 06/04/25  0303 06/04/25  1755 06/04/25 2000 06/05/25  0147    142   < >  --  140  --  140   --  140  --  140 140 142  --  145   K 3.3* 3.7   < >  --  3.2*  --  3.7  --  3.3*  --  4.2 4.0 3.4*  --  3.7   CALCIUM 8.6* 8.3*   < >  --  8.4*  --  8.5*  --  8.3*  --  8.2* 7.6* 7.5*  --  7.7*   PHOS 3.6 3.6  --   --  3.2  --  2.3  --  2.6  --  2.9 3.2  --   --  2.5   MG 1.80 1.70   < >  --  1.90  --  1.90  --  1.80  --  2.20 1.70 1.60  --  1.80    102   < >  --  102  --  101  --  102  --  106 107 110*  --  111*   CO2 29 30   < >  --  24  --  26  --  27  --  23 25 23  --  23   BUN 63.8* 71.3*   < >  --  75.4*  --  72.3*  --  65.8*  --  55.1* 55.3* 59.4*  --  57.6*   CREATININE 1.85* 1.68*   < >  --  1.69*  --  1.46*  --  1.49*  --  1.26* 1.39* 1.33*  --  1.48*   EGFRNORACEVR 38 42   < >  --  42  --  50  --  49  --  60 53 56  --  49   * 169*   < >  --  199*  --  192*  --  193*  --  137* 195* 184*  --  178*   BILITOT 0.4 0.3  --   --  0.4  --  0.8  --  0.6  --  0.6 0.3  --   --  0.4   ALKPHOS 168* 115  --   --  88  --  68  --  61  --  50 46  --   --  40   * 105*  --   --  77*  --  62*  --  57*  --  42 44  --   --  28   AST 47* 34  --   --  29  --  27  --  27  --  25 29  --   --  15   ALBUMIN 2.3* 2.2*  --   --  2.3*  --  2.3*  --  2.2*  --  2.2* 1.8*  --   --  2.1*   CRP  --  24.70*  --   --   --   --   --   --  9.50*  --   --  7.50*  --   --   --    WBC 16.77* 15.78*  --  16.84*  --   --   --   --  17.30*  --  15.31* 13.72*  --   --  14.39*   HGB 8.1* 6.6*   < > 6.9*  --    < >  --  7.3* 7.3* 7.1* 7.2* 4.7*  --  7.4* 7.0*   HCT 25.8* 20.9*   < > 21.5*  --    < >  --  22.5* 22.3* 21.8* 22.6* 15.0*  --  22.7* 21.2*    < > = values in this interval not displayed.     Nutrition Orders:  Diet Full Liquid  Dietary nutrition supplements TID; Boost Breeze - Any flavor    Appetite/Oral Intake: poor/0-25% of meals;  Factors Affecting Nutritional Intake: altered gastrointestinal function, chewing difficulty, decreased appetite, NPO, and pain  Social Needs Impacting Access to Food: none  identified  Food/Amish/Cultural Preferences: dislikes rice, gravy, milk; Likes applesauce, fruit cups, mashed potatoes, non daily ONS  Food Allergies: no known food allergies  Last Bowel Movement: 06/05/25  Wound(s):     Wound 05/23/25 2110 Other (comment) Penis-Tissue loss description: Partial thickness       Wound 03/29/25 1900 Pressure Injury Left dorsal Foot-Tissue loss description: Full thickness WCN--treating foot/sacral wound     Comments  (6/5) PT had EGD yesterday; + active GI bleed; duodenal ulcer clipped; diet progressed to full liquid today; boost breeze ONS ordered. Noted wound care nurse--treating foot/sacral wounds; will order kavin bid. Wt continue to decrease; 106.1kg; tracking. Pt has PICC--TPN recs included if oral intake remains poor or in pt needs to be NPO for reoccurance of GI bleed. Abnormal labs noted: Bun/Cr (H)--PRIYA. H/H (L)--receiving transfusion during rounds.     (6/2) Pt upgraded to ICU on 6-1 2 to Afib/SOB, decline in cardiopulmonary status; pt NPO--for scope with GI; ? GI bleed. Pt not very talkative; noted pt has not been eating much previously; when diet resumes--encourage oral intake; ONS use; pt with foot/sacral wound; WCN on case; also suggest Kavin for wound healing. Wt decreased to 112.5kg; fluid/ tracking wt. Labs acknowledged: Bun/Cr (H); GFR(L)--PRIYA/ nephrology on case    5/29/25 -- Pt continues with poor oral intake, denies n/v; reports decreased oral intake related to pain & not liking foods -- preferences obtained by kitchen  daily; pt reports drinking Boost Breeze as ordered; weight fluctuation noted, 3+ LE edema - diuresing; most recent labs/meds reviewed, suggest low Na, carb consistent diet    5/26/25 -- Pt with poor oral intake of 0-25% since admit, family at bedside reports decreased po intake > 1 week, reports does not eat rice, gravies, milk; food preferences obtained & reported to kitchen, agreeable to Boost Breeze to supplement nutrition; denies  "swallowing difficulty; chewing difficulty related to not having dentures this admit; no n/v/d; no indication of significant weight changes, UBW appears to be ~ 270 lb; Most recent labs/meds reviewed, suggest low Na, carb consistent diet; Monitor need for renal diet restriction    Anthropometrics    Height: 6' 2" (188 cm), Height Method: Stated  Last Weight: 106.1 kg (234 lb) (25 0900), Weight Method: Bed Scale  BMI (Calculated): 30  BMI Classification: obese grade I (BMI 30-34.9)        Ideal Body Weight (IBW), Male: 190 lb     % Ideal Body Weight, Male (lb): 123.16 %         Usual Body Weight (UBW), k kg  % Usual Body Weight: 97.76  % Weight Change From Usual Weight: -2.44 %  Usual Weight Provided By: EMR weight history    Wt Readings from Last 5 Encounters:   25 106.1 kg (234 lb)   25 115.7 kg (255 lb)   24 128.2 kg (282 lb 11.2 oz)   24 128 kg (282 lb 3 oz)   24 126.3 kg (278 lb 8 oz)     Weight Change(s) Since Admission: new admit  Wt Readings from Last 1 Encounters:   25 0900 106.1 kg (234 lb)   25 0600 106.1 kg (234 lb)   25 0527 112.5 kg (248 lb 0.3 oz)   25 0541 114 kg (251 lb 5.2 oz)   25 1351 116.1 kg (256 lb)   25 0625 116.2 kg (256 lb 2.8 oz)   25 1251 115 kg (253 lb 8.5 oz)   25 0700 96.2 kg (212 lb 1.3 oz)   25 0104 96.2 kg (212 lb 1.3 oz)   25 0640 95.5 kg (210 lb 8.6 oz)   25 0639 95.5 kg (210 lb 8.6 oz)   25 0645 94.4 kg (208 lb 3.2 oz)   25 1059 120 kg (264 lb 8.8 oz)   25 0600 120.5 kg (265 lb 11.2 oz)   25 0700 120.2 kg (264 lb 14.4 oz)   25 0517 120.2 kg (264 lb 14.4 oz)   25 2225 117.9 kg (260 lb)   25 181 126.6 kg (279 lb)   25 1813 108.9 kg (240 lb)   Admit Weight: 108.9 kg (240 lb) (25), Weight Method: Stated    Estimated Needs    Weight Used For Calorie Calculations: 112.5 kg (248 lb 0.3 oz) (based on updated wt " (6-2))  Energy Calorie Requirements (kcal): 2250kcal/d; 20 humaira/kg--obese  Energy Need Method: Kcal/kg  Weight Used For Protein Calculations: 86 kg (189 lb 9.5 oz) (based on IBW)  Protein Requirements:  gm protein/d; 1-1.2 gm/kg IBW  Fluid Requirements (mL): 2250 ml/d; 1ml/humaira  CHO Requirement: 253 gm CHO/d     Enteral Nutrition     Patient not receiving enteral nutrition at this time.    Parenteral Nutrition     Patient not receiving parenteral nutrition support at this time.    Evaluation of Received Nutrient Intake    Calories: not meeting estimated needs  Protein: not meeting estimated needs    Patient Education     Not applicable.    Nutrition Diagnosis     PES: Inadequate oral intake related to acute illness as evidenced by < 50% po intake > 5 days. (active)     PES: Severe acute disease or injury related malnutrition Related to acute malnutrition As Evidenced by:  - energy intake: <= 50% for 10 days (meets criteria for <= 50% >= 5 days (severe - acute)) - muscle mass depletion: 1 area of moderate muscle loss (Temporalis) - fluid accumulation: 1 area of moderate or severe fluid accumulation (Lower extremities edema) active    Nutrition Interventions     Intervention(s): Care coordination or referral;Oral diet/nutrient modifications;Oral nutritional supplement    Goal: Meet greater than 80% of nutritional needs by follow-up. (goal not met)  Goal: Maintain weight throughout hospitalization. (goal progressing)    Nutrition Goals & Monitoring     Dietitian will monitor: food and beverage intake, parenteral nutrition intake, weight, and electrolyte/renal panel  Discharge planning: continue Soft & Bite size, Low Na, Carb consistent  diet with Boost Breeze or equivalent oral supplements  Nutrition Risk/Follow-Up: patient at increased nutrition risk; dietitian will follow-up twice weekly   Please consult if re-assessment needed sooner.

## 2025-06-05 NOTE — PROGRESS NOTES
[facility]  Infectious Diseases Progress Note        SUBJECTIVE:   HPI: 74-year-old male with PMH CAD, prior CVA in 03/2025, chronic indwelling Fowler catheter who was admitted to Medicine on 5/23 for workup/management of weakness.  Urine culture was obtained on admission, appears to be from old Fowler catheter, which showed growth of Enterococcus faecium.  He has been started on ampicillin as organism was not VRE and sensitive to beta lactams.  CT abdomen and pelvis showed malpositioned Fowler catheter with balloon of the prostate.  Per discussion with primary team there is noted purulence in his urine.  Infectious disease service has been consulted for management of UTI.     On admission he was noted to have leukocytosis of 26.8 however was afebrile.  He has remained afebrile throughout his entire hospitalization.  His leukocytosis has improved now to 16.7.  He was initially started on ceftriaxone empirically followed by vancomycin and Zosyn, however now has been transitioned to ampicillin.  TTE was completed which showed a mobile echogenic mass in the right atrium, cardiology has been consulted who is planning for YO to further evaluate this.     History was obtained from both the patient and his sister who was present at bedside. She reports that the patient's mentation decreased a few days prior to his admission along with generalized weakness to where he was unable to participate with PT at his nursing home where he resides. His fowler catheter apparently has not been exchanged since 3/2025. The patient reports noticing R midback pain that began about 3 days prior to his admission as well however this has since improved. He denies fever, chills, N/V/D.       Interval History:  Patient back in ICU.  Not ventilated/no pressor support/no sedation.  Currently is not on any oxygen.  He continues to complain he is nauseated.  Nurses report dark tarry stools last night and this a.m..  Slight  drop in H&H; hemoglobin 7.0/hematocrit 21.2.  Nurses about to start another transfusion of PRBCs.  Denies fever, chills, night sweats, rash, HA, vision changes, dizziness, CP, SOB, V/D, abdominal pain, or urinary complaints.  Patient is afebrile with persistent leukocytosis; WBC 14.3.  Blood pressures are soft inpatient is tachycardic.  Inflammatory markers are improving or have normalized.  PRIYA remains; BUN 57/creatinine 1.48.  Repeat EGD 06/04/2025 with a single Dieulafoy vessel bleeding and known duodenal ulcer.  Noted with LT toe wound that probes to bone which is indicative of osteomyelitis.  While concerning, do not feel this is source of leukocytosis.  Left foot wound culture with Staphylococcus aureus.  Tissue cultures of left foot with normal skin jacy.  ABIs were done and showed concerns for noncompressible tibial vessels bilateral.  CTA with runoff 06/05/2025 showed a PE at the left lower lung lobe, along with diffuse arthrosclerotic calcifications.  CTA of the chest without contrast 06/05/2025 showed collapse and consolidation of the right middle lobe and right lower lobe, along with tree-in-bud nodularity.  Patient remains on Vancomycin, Cefepime, and Flagyl.        Antibiotic / Antiviral / Antifungal History:  Ceftriaxone 1 g IV x1 dose - 05/23/2025   Zosyn 4.5 g IV q.8 hours - 05/24/2025 to 05/25/2025   Vancomycin IV trough 15-20 - 05/24/2025 to 05/26/2025   Ampicillin 1 g IV q.4 hours - 05/26/2025 to 05/28/2025   Penicillin G 24,000,000 units IV daily per continuous infusion - 05/28/2025 to 06/01/2025   Vancomycin IV trough 15-20 - 06/02/2025 to present   Cefepime 2 g IV q.8 hours - 06/02/2025 to present   Flagyl 500 mg p.o. q.8 hours - 06/02/2025 to present      MEDICATIONS:   Reviewed in EMR    REVIEW OF SYSTEMS:   Except as documented, all other systems reviewed and negative     PHYSICAL EXAM:   T 97.8 °F (36.6 °C)   BP (!) 111/58   P 103   RR (!) 21   O2 100 %  GENERAL: Elderly BM who is A&Ox3,  "NAD, appears chronically ill   HEENT: atraumatic, normocephalic, anicteric, moist oral mucosa without lesions, exudate, or erythema; no thrush  LUNGS: breathing unlabored, lungs clear to auscultation bilateral  HEART: RRR; no murmur, rub, or gallop  ABDOMEN: abdomen soft, obese, nondistended, nontender to palpation  : Palomo catheter in place  EXTREMITIES: 2-3+ bilateral lower extremity pitting edema  SKIN: Open wound to left medial great toe without purulence, erythema, or necrosis (see wound care note for pictures)  NEURO: speech fluent and intact, facial symmetry preserved, no tremor  PSYCH: cooperative, normal mood and affect  LINES: PICC to right upper arm without s/s infection       LABS AND IMAGING:     Recent Labs     06/04/25 0303 06/04/25 2000 06/05/25  0147   WBC 13.72*  --  14.39*   RBC 1.50*  --  2.22*   HGB 4.7* 7.4* 7.0*   HCT 15.0* 22.7* 21.2*   .0*  --  95.5*   MCH 31.3*  --  31.5*   MCHC 31.3*  --  33.0   RDW 20.2*  --  18.1*     --  175     No results for input(s): "LACTIC" in the last 72 hours.  No results for input(s): "INR", "APTT", "D-DIMER" in the last 72 hours.    No results for input(s): "HGBA1C", "CHOL", "TRIG", "LDL", "VLDL", "HDL" in the last 72 hours.   Recent Labs     06/04/25  0303 06/04/25  1755 06/05/25  0147    142 145   K 4.0 3.4* 3.7   CO2 25 23 23   BUN 55.3* 59.4* 57.6*   CREATININE 1.39* 1.33* 1.48*   CALCIUM 7.6* 7.5* 7.7*   MG 1.70 1.60 1.80   PHOS 3.2  --  2.5   ALBUMIN 1.8*  --  2.1*   GLOBULIN 2.6  --  2.6   ALKPHOS 46  --  40   ALT 44  --  28   AST 29  --  15   BILITOT 0.3  --  0.4   CRP 7.50*  --   --      Recent Labs     06/04/25  1113   TROPONINI 0.082*          Estimated Creatinine Clearance: 56.9 mL/min (A) (based on SCr of 1.48 mg/dL (H)).   Lab Results   Component Value Date    SEDRATE <1 06/04/2025      Lab Results   Component Value Date    CRP 7.50 (H) 06/04/2025        Micro:   Colonization:  No results found for this or any previous " visit.     5/23 pBCx 2/2: NGTD  5/23 Urine cx: Enterococcus faecium      05/28/2025 sputum culture NGTD  05/29/2025 urine culture NGTD  06/02/2025 left foot wound culture with Staphylococcus aureus  06/02/2025 left foot tissue culture NGTD; normal skin jacy          CTA Runoff ABD PEL Bilat Lower Ext  Narrative: EXAMINATION:  CTA RUNOFF ABD PEL BILAT LOWER EXT    CLINICAL HISTORY:  Peripheral arterial disease (PAD), asymptomatic;    TECHNIQUE:  Helically acquired images were obtained from the lung bases through the lower extremities prior to and after IV administration of contrast. Axial, sagittal, coronal and MIP reformations were interpreted.    Automated tube current modulation, weight-based exposure dosing, and/or iterative reconstruction technique utilized to reach lowest reasonably achievable exposure rate.    DLP: 1709 mGy*cm    COMPARISON:  CT abdomen pelvis 05/28/2025    FINDINGS:  VASCULATURE:    Pulmonary arteries: Segmental pulmonary embolus at the left lower lobe.    Aorta: Mild atherosclerosis without aneurysm or occlusion.    Mesenteric arteries: No significant stenosis.    Renal arteries:No significant stenosis.    Right:    Iliac arteries: No significant stenosis.    Femoral: Diffuse atherosclerotic change at the SFA and deep femoral branches.  Mild stenosis at the distal SFA.    Popliteal: Atherosclerosis with mild stenosis.    Tibioperoneal trunk: Patent tibioperoneal trunk.  Diminutive caliber vessels with calcific atherosclerosis below the trifurcation.  This makes it challenging to evaluate luminal patency of diminutive vessels below the knee.  Diminutive peroneal artery with enhancement fading at the ankle.  There does appear to be flow at the anterior tibial and posterior tibial artery at the ankle.  Dorsalis pedis artery enhances.    Left:    Iliac arteries: No significant stenosis.    Femoral: Diffuse atherosclerotic change at the SFA and deep femoral branches.  Mild  stenosis.    Popliteal: Atherosclerosis with mild stenosis.    Tibioperoneal trunk: Patent tibioperoneal trunk.  Diminutive caliber vessels with calcific atherosclerosis below the trifurcation.  This makes it challenging to evaluate luminal patency of diminutive vessels below the knee.  Peroneal artery does appear to be patent to the ankle.  Unable to confidently assess tibial arteries.  Defer to sonogram.    HEART: There are coronary artery calcifications.    LUNG BASES: See separate report for CT chest.    LIVER: No arterially enhancing mass.    BILIARY: No calcified gallstones.    PANCREAS: No inflammatory change.    SPLEEN: Normal in size    ADRENALS: No mass.    KIDNEYS/URETERS: No hydronephrosis.  Possible cyst at the upper pole right kidney, obscured by beam hardening artifact.    GI TRACT/MESENTERY: Clip seen at the level of the duodenum.  No active extravasation appreciable.  Colonic diverticulosis without acute inflammatory change.    PERITONEUM: No free fluid.No free air.    LYMPH NODES: No enlarged lymph nodes by size criteria.    BLADDER: Collapsed about a Palomo catheter.    REPRODUCTIVE ORGANS: There are prostate calcifications.    SOFT TISSUES: Ventral hernia mesh.  Body wall edema.  Bilateral lower extremity edema.    BONES: Note large field of view runoff exam not designed to evaluate fine osseous details.  No acute osseous abnormality seen.  Degenerative change at the spine.  Degenerative change at the feet  Impression: 1. Segmental pulmonary embolus at the left lower lobe.  Findings discussed with  Bernice Lipscomb, the physician caring for patient 6/5/2025 09:11.  2. Diffuse atherosclerotic calcifications.  No appreciable significant stenosis above the knee.  Below the knee evaluation is limited due to diminutive caliber vessels and calcific atherosclerosis.  Defer to sonogram.    Electronically signed by: Ann Armstrong  Date:    06/05/2025  Time:    09:12  CT Chest Without Contrast  Narrative:  EXAMINATION:  CT CHEST WITHOUT CONTRAST    CLINICAL HISTORY:  Sepsis;    TECHNIQUE:  Helically acquired axial images, sagittal and coronal reformations were obtained from the thoracic inlet to the lung bases without the IV administration of contrast.    Automated tube current modulation, weight-based exposure dosing, and/or iterative reconstruction technique utilized to reach lowest reasonably achievable exposure rate.    DLP: 1708 mGy*cm    COMPARISON:  Chest radiograph 06/04/2025    FINDINGS:  BASE OF NECK: Multinodular goiter.  This can be evaluated with outpatient thyroid sonogram after resolution of patient's acute illness.    AORTA: Atherosclerosis.    HEART: Heart at the upper limits of normal in size.  There are coronary artery calcifications.    CHAEC/MEDIASTINUM: Small nonspecific mediastinal lymph nodes.  Evaluation of hilar lymphadenopathy is limited without intravenous contrast.    AIRWAYS: Deviation of the trachea to the right at the thoracic inlet related to nodular enlargement of the left lobe of the thyroid.    LUNGS: Respiratory motion artifact.  Complete collapse and consolidation of the right middle lobe.  Moderate, near complete collapse and consolidation at the right lower lobe.  Tree-in-bud nodularity at the posterior left upper lobe and at the superior segment left lower lobe compatible with bronchiolitis.    PLEURA: Trace right pleural effusion.    UPPER ABDOMEN: See separate report for CT abdomen    THORACIC SOFT TISSUES: Right upper extremity PICC terminates at the SVC.    BONES: Flowing osteophytes of the thoracic spine as can be seen with DISH.  Impression: 1. Collapse and consolidation at the right middle lobe and right lower lobe.  Atelectasis versus pneumonia  2. Tree-in-bud nodularity at the left lung.  Bronchiolitis    Electronically signed by: Ann Armstrong  Date:    06/05/2025  Time:    08:52      TTE 05/26/2025:  Summary  Show Result Comparison     Left Ventricle: The left  ventricle is normal in size. Normal wall thickness. There is moderately reduced systolic function with a visually estimated ejection fraction of 35 - 40%. Unable to assess diastolic function due to atrial fibrillation.    Right Ventricle: The right ventricle is moderately dilated Systolic function is moderately reduced. Prominent moderator band in the right ventricle.    Left Atrium: The left atrium is mildly dilated    Right Atrium: The right atrium is mildly dilated . 1.9 x 1.1 x 1.8 small mobile echogenic mass present.    Aortic Valve: There is mild aortic regurgitation.    Tricuspid Valve: There is mild regurgitation.    Pulmonary Artery: The estimated pulmonary artery systolic pressure is 49 mmHg.    IVC/SVC: Elevated venous pressure at 15 mmHg.      YO 05/30/2025:   Summary  Show Result Comparison     Left Ventricle: The left ventricle is normal in size. Normal wall motion. There is normal systolic function. Quantitated ejection fraction is 56%. Elevated left ventricular filling pressure.    Right Ventricle: The right ventricle is mildly dilated Systolic function is normal.    Left Atrium: The left atrium is dilated Agitated saline study of the atrial septum is negative, suggesting absence of intracardiac shunt at the atrial level. No patent foramen ovale.    Right Atrium: The right atrium is dilated. There is a prominent Eustachian valve with a highly mobile echogenic structure attached to it most likely representing a thrombus and measuring 1.5 cm in its longest dimension. Dense spontaneous echo contrast visualized in the right atrial cavity.    Aortic Valve: The aortic valve is a trileaflet valve. There is no stenosis. There is no significant regurgitation.    Mitral Valve: The mitral valve is structurally normal. There is no stenosis. There is trace regurgitation.    Tricuspid Valve: There is trace regurgitation.    Aorta: The aortic root is normal in size measuring 3.5 cm. The ascending aorta is normal  in size measuring 2.9 cm. The aortic arch is normal measuring 2.6 cm. The descending aorta is normal measuring 2.3 cm.    Pericardium: There is no pericardial effusion.     YO obtained for further evaluation of right atrial mass noted on transthoracic echocardiogram.    KAIT 06/02/2025:   Interpretation Summary  Show Result ComparisonThe right lower extremity ankle brachial index is 2.64 suggesting non compressible tibial vessels.    The left lower extremity ankle brachial index is 1.49 suggesting non compressible tibial vessels.        ASSESSMENT & PLAN:     74-year-old male with PMH CAD, prior CVA in 03/2025, chronic indwelling Fowler catheter who was admitted to Medicine on 5/23 for workup/management of weakness.  Urine culture was obtained on admission, appears to be from old Fowler catheter, which showed growth of Enterococcus faecium.  He has been started on ampicillin as organism was not VRE and sensitive to beta lactams.  CT abdomen and pelvis showed malpositioned Fowler catheter with balloon of the prostate.  Per discussion with primary team there is noted purulence in his urine.  Infectious disease service has been consulted for management of UTI.     1) Enterococcus faecium cystitis  - Organism PCN sensitive, non-VRE  2) Possible acute prostatitis  3) Malpositioned fowler catheter, resolved  4) Sepsis  5) Right atrial echogenic thrombus   6) CVA  7) CAD  8) hepatomegaly   9) transaminitis (resolved)  10) PRIYA    11) dyspnea (improving)  12) anemia   13) persistent leukocytosis   14) atrial fibrillation with RVR  15) LT foot osteomyelitis    Tissue culture foot NGTD   16) Significant anemia with hemodynamic instablity   17) Pulmonary embolus left lung  18) atelectasis vs pneumonia with bronchiolitis  17) diffuse arthrosclerotic calcifications      Persistent leukocytosis in the setting of Enterococcus faecium cystitis.  CT pelvis with contrast was done and showed only concerns for cystitis; no prostate abscess.     Urine culture with Enterococcus faecium.  Repeat urine culture 05/29/2025 NGTD.    Sputum culture NGTD.  CTA of the chest without contrast 06/05/2025 showed collapse and consolidation of the right middle lobe and right lower lobe, along with tree-in-bud nodularity.    Patient noted with episodes of atrial fib with RVR.  TTE showed concerns for a mobile right atrial mass.  YO was followed up and she confirmed a highly mobile thrombus to the right atrium (1.5 cm).  LT toe wound that probes to bone which is indicative of osteomyelitis.  While concerning, do not feel this is source of leukocytosis.  Left foot wound culture with Staphylococcus aureus.  Left toe tissue culture NGTD; normal skin jacy.  ABIs were done and showed concerns for noncompressible tibial vessels bilateral.  CTA with runoff 06/05/2025 showed a PE at the left lower lung lobe, along with diffuse arthrosclerotic calcifications.    Leukocytosis persists.  Exact etiology unknown         RECOMMENDATIONS:       Follow up culture results.    Recommend vascular evaluation   Continue empiric therapy with Vancomycin IV (trough 15-20), Cefepime 2 g IV q.8 hours, and Flagyl 500 mg p.o. q.8 hours  Need to watch nausea complaint.  May be worsened with addition of Flagyl   Monitor liver enzymes   Renal protective measures  Patient needs continued wound care and strict glucose control   Recommend Tdap as this is not up to date   Defer workup/management of anemia to primary team/GI service  ID will continue to follow        KIRSTIN Hedrick  [facility] Infectious Diseases     *Portions of this note dictated using EMR integrated voice recognition software, and may be subject to voice recognition errors not corrected at proofreading. Please contact writer for clarification if needed. *

## 2025-06-05 NOTE — PLAN OF CARE
Problem: Infection  Goal: Absence of Infection Signs and Symptoms  Outcome: Progressing     Problem: Adult Inpatient Plan of Care  Goal: Plan of Care Review  Outcome: Progressing  Goal: Patient-Specific Goal (Individualized)  Outcome: Progressing  Goal: Absence of Hospital-Acquired Illness or Injury  Outcome: Progressing  Goal: Optimal Comfort and Wellbeing  Outcome: Progressing  Goal: Readiness for Transition of Care  Outcome: Progressing     Problem: Diabetes Comorbidity  Goal: Blood Glucose Level Within Targeted Range  Outcome: Progressing     Problem: Wound  Goal: Optimal Coping  Outcome: Progressing  Goal: Optimal Functional Ability  Outcome: Progressing  Goal: Absence of Infection Signs and Symptoms  Outcome: Progressing  Goal: Improved Oral Intake  Outcome: Progressing  Goal: Optimal Pain Control and Function  Outcome: Progressing  Goal: Skin Health and Integrity  Outcome: Progressing  Goal: Optimal Wound Healing  Outcome: Progressing     Problem: Skin Injury Risk Increased  Goal: Skin Health and Integrity  Outcome: Progressing     Problem: Heart Failure  Goal: Optimal Coping  Outcome: Progressing  Goal: Optimal Cardiac Output  Outcome: Progressing  Goal: Stable Heart Rate and Rhythm  Outcome: Progressing  Goal: Optimal Functional Ability  Outcome: Progressing  Goal: Fluid and Electrolyte Balance  Outcome: Progressing  Goal: Improved Oral Intake  Outcome: Progressing  Goal: Effective Oxygenation and Ventilation  Outcome: Progressing  Goal: Effective Breathing Pattern During Sleep  Outcome: Progressing     Problem: Sepsis/Septic Shock  Goal: Optimal Coping  Outcome: Progressing  Goal: Absence of Bleeding  Outcome: Progressing  Goal: Blood Glucose Level Within Targeted Range  Outcome: Progressing  Goal: Absence of Infection Signs and Symptoms  Outcome: Progressing  Goal: Optimal Nutrition Intake  Outcome: Progressing     Problem: Fall Injury Risk  Goal: Absence of Fall and Fall-Related Injury  Outcome:  Recheck a chest xray --was in [place]    Progressing     Problem: Comorbidity Management  Goal: Blood Pressure in Desired Range  Outcome: Progressing

## 2025-06-06 LAB
ALBUMIN SERPL-MCNC: 2.1 G/DL (ref 3.4–4.8)
ALBUMIN/GLOB SERPL: 0.7 RATIO (ref 1.1–2)
ALP SERPL-CCNC: 43 UNIT/L (ref 40–150)
ALT SERPL-CCNC: 27 UNIT/L (ref 0–55)
ANION GAP SERPL CALC-SCNC: 8 MEQ/L
AST SERPL-CCNC: 21 UNIT/L (ref 11–45)
BACTERIA SPEC ANAEROBE CULT: ABNORMAL
BACTERIA SPEC ANAEROBE CULT: NORMAL
BACTERIA WND CULT: ABNORMAL
BASOPHILS # BLD AUTO: 0.04 X10(3)/MCL
BASOPHILS NFR BLD AUTO: 0.3 %
BILIRUB SERPL-MCNC: 0.6 MG/DL
BUN SERPL-MCNC: 38.5 MG/DL (ref 8.4–25.7)
CALCIUM SERPL-MCNC: 8.3 MG/DL (ref 8.8–10)
CHLORIDE SERPL-SCNC: 113 MMOL/L (ref 98–107)
CO2 SERPL-SCNC: 24 MMOL/L (ref 23–31)
CREAT SERPL-MCNC: 1.29 MG/DL (ref 0.72–1.25)
CREAT/UREA NIT SERPL: 30
CRP SERPL-MCNC: 12 MG/L
EOSINOPHIL # BLD AUTO: 0.3 X10(3)/MCL (ref 0–0.9)
EOSINOPHIL NFR BLD AUTO: 2.3 %
ERYTHROCYTE [DISTWIDTH] IN BLOOD BY AUTOMATED COUNT: 18.2 % (ref 11.5–17)
ERYTHROCYTE [SEDIMENTATION RATE] IN BLOOD: 2 MM/HR (ref 0–15)
GFR SERPLBLD CREATININE-BSD FMLA CKD-EPI: 58 ML/MIN/1.73/M2
GLOBULIN SER-MCNC: 3.1 GM/DL (ref 2.4–3.5)
GLUCOSE SERPL-MCNC: 145 MG/DL (ref 82–115)
HCT VFR BLD AUTO: 25.1 % (ref 42–52)
HCT VFR BLD AUTO: 27.3 % (ref 42–52)
HGB BLD-MCNC: 8.2 G/DL (ref 14–18)
HGB BLD-MCNC: 8.7 G/DL (ref 14–18)
IMM GRANULOCYTES # BLD AUTO: 0.09 X10(3)/MCL (ref 0–0.04)
IMM GRANULOCYTES NFR BLD AUTO: 0.7 %
LYMPHOCYTES # BLD AUTO: 1.63 X10(3)/MCL (ref 0.6–4.6)
LYMPHOCYTES NFR BLD AUTO: 12.7 %
MAGNESIUM SERPL-MCNC: 1.9 MG/DL (ref 1.6–2.6)
MCH RBC QN AUTO: 31.7 PG (ref 27–31)
MCHC RBC AUTO-ENTMCNC: 32.7 G/DL (ref 33–36)
MCV RBC AUTO: 96.9 FL (ref 80–94)
MONOCYTES # BLD AUTO: 1.27 X10(3)/MCL (ref 0.1–1.3)
MONOCYTES NFR BLD AUTO: 9.9 %
NEUTROPHILS # BLD AUTO: 9.46 X10(3)/MCL (ref 2.1–9.2)
NEUTROPHILS NFR BLD AUTO: 74.1 %
NRBC BLD AUTO-RTO: 0.7 %
PHOSPHATE SERPL-MCNC: 2.8 MG/DL (ref 2.3–4.7)
PLATELET # BLD AUTO: 212 X10(3)/MCL (ref 130–400)
PMV BLD AUTO: 9.3 FL (ref 7.4–10.4)
POCT GLUCOSE: 117 MG/DL (ref 70–110)
POCT GLUCOSE: 122 MG/DL (ref 70–110)
POCT GLUCOSE: 139 MG/DL (ref 70–110)
POTASSIUM SERPL-SCNC: 4 MMOL/L (ref 3.5–5.1)
PROT SERPL-MCNC: 5.2 GM/DL (ref 5.8–7.6)
RBC # BLD AUTO: 2.59 X10(6)/MCL (ref 4.7–6.1)
SODIUM SERPL-SCNC: 145 MMOL/L (ref 136–145)
VANCOMYCIN TROUGH SERPL-MCNC: 20.5 UG/ML (ref 15–20)
WBC # BLD AUTO: 12.79 X10(3)/MCL (ref 4.5–11.5)

## 2025-06-06 PROCEDURE — 63600175 PHARM REV CODE 636 W HCPCS

## 2025-06-06 PROCEDURE — 94761 N-INVAS EAR/PLS OXIMETRY MLT: CPT

## 2025-06-06 PROCEDURE — 85025 COMPLETE CBC W/AUTO DIFF WBC: CPT

## 2025-06-06 PROCEDURE — 83735 ASSAY OF MAGNESIUM: CPT

## 2025-06-06 PROCEDURE — 25000003 PHARM REV CODE 250

## 2025-06-06 PROCEDURE — 20000000 HC ICU ROOM

## 2025-06-06 PROCEDURE — 86140 C-REACTIVE PROTEIN: CPT | Performed by: NURSE PRACTITIONER

## 2025-06-06 PROCEDURE — 85652 RBC SED RATE AUTOMATED: CPT | Performed by: NURSE PRACTITIONER

## 2025-06-06 PROCEDURE — 85018 HEMOGLOBIN: CPT

## 2025-06-06 PROCEDURE — 80053 COMPREHEN METABOLIC PANEL: CPT

## 2025-06-06 PROCEDURE — 36415 COLL VENOUS BLD VENIPUNCTURE: CPT

## 2025-06-06 PROCEDURE — 27000207 HC ISOLATION

## 2025-06-06 PROCEDURE — 99233 SBSQ HOSP IP/OBS HIGH 50: CPT | Mod: GC,,, | Performed by: INTERNAL MEDICINE

## 2025-06-06 PROCEDURE — 80202 ASSAY OF VANCOMYCIN: CPT | Performed by: INTERNAL MEDICINE

## 2025-06-06 PROCEDURE — 84100 ASSAY OF PHOSPHORUS: CPT

## 2025-06-06 RX ORDER — ENOXAPARIN SODIUM 150 MG/ML
110 INJECTION SUBCUTANEOUS EVERY 12 HOURS
Status: DISCONTINUED | OUTPATIENT
Start: 2025-06-06 | End: 2025-06-09

## 2025-06-06 RX ORDER — FUROSEMIDE 10 MG/ML
40 INJECTION INTRAMUSCULAR; INTRAVENOUS ONCE
Status: COMPLETED | OUTPATIENT
Start: 2025-06-06 | End: 2025-06-06

## 2025-06-06 RX ORDER — CEFEPIME HYDROCHLORIDE 2 G/1
2 INJECTION, POWDER, FOR SOLUTION INTRAVENOUS
Status: DISCONTINUED | OUTPATIENT
Start: 2025-06-06 | End: 2025-06-07

## 2025-06-06 RX ORDER — ENOXAPARIN SODIUM 150 MG/ML
110 INJECTION SUBCUTANEOUS EVERY 12 HOURS
Status: DISCONTINUED | OUTPATIENT
Start: 2025-06-06 | End: 2025-06-06

## 2025-06-06 RX ADMIN — HYDROCODONE BITARTRATE AND ACETAMINOPHEN 1 TABLET: 5; 325 TABLET ORAL at 09:06

## 2025-06-06 RX ADMIN — CEFEPIME 2 G: 2 INJECTION, POWDER, FOR SOLUTION INTRAVENOUS at 04:06

## 2025-06-06 RX ADMIN — PANTOPRAZOLE SODIUM 40 MG: 40 INJECTION, POWDER, FOR SOLUTION INTRAVENOUS at 09:06

## 2025-06-06 RX ADMIN — CEFEPIME 2 G: 2 INJECTION, POWDER, FOR SOLUTION INTRAVENOUS at 01:06

## 2025-06-06 RX ADMIN — LIDOCAINE 5% 1 PATCH: 700 PATCH TOPICAL at 11:06

## 2025-06-06 RX ADMIN — ENOXAPARIN SODIUM 105 MG: 120 INJECTION SUBCUTANEOUS at 04:06

## 2025-06-06 RX ADMIN — FUROSEMIDE 40 MG: 10 INJECTION, SOLUTION INTRAVENOUS at 09:06

## 2025-06-06 RX ADMIN — METRONIDAZOLE 500 MG: 500 TABLET ORAL at 01:06

## 2025-06-06 RX ADMIN — CEFEPIME 2 G: 2 INJECTION, POWDER, FOR SOLUTION INTRAVENOUS at 09:06

## 2025-06-06 RX ADMIN — METRONIDAZOLE 500 MG: 500 TABLET ORAL at 05:06

## 2025-06-06 RX ADMIN — VANCOMYCIN HYDROCHLORIDE 1250 MG: 1.25 INJECTION, POWDER, LYOPHILIZED, FOR SOLUTION INTRAVENOUS at 06:06

## 2025-06-06 RX ADMIN — ATORVASTATIN CALCIUM 20 MG: 20 TABLET, FILM COATED ORAL at 09:06

## 2025-06-06 RX ADMIN — METOPROLOL SUCCINATE ER TABLETS 50 MG: 50 TABLET, FILM COATED, EXTENDED RELEASE ORAL at 09:06

## 2025-06-06 RX ADMIN — METRONIDAZOLE 500 MG: 500 TABLET ORAL at 09:06

## 2025-06-06 RX ADMIN — METOPROLOL SUCCINATE ER TABLETS 100 MG: 50 TABLET, FILM COATED, EXTENDED RELEASE ORAL at 09:06

## 2025-06-06 NOTE — PLAN OF CARE
Problem: Infection  Goal: Absence of Infection Signs and Symptoms  Outcome: Ongoing     Problem: Adult Inpatient Plan of Care  Goal: Plan of Care Review  Outcome: Ongoing  Goal: Patient-Specific Goal (Individualized)  Outcome: Ongoing  Goal: Absence of Hospital-Acquired Illness or Injury  Outcome: Ongoing  Goal: Optimal Comfort and Wellbeing  Outcome: Ongoing  Goal: Readiness for Transition of Care  Outcome: Ongoing     Problem: Diabetes Comorbidity  Goal: Blood Glucose Level Within Targeted Range  Outcome: Ongoing     Problem: Wound  Goal: Optimal Coping  Outcome: Ongoing  Goal: Optimal Functional Ability  Outcome: Ongoing  Goal: Absence of Infection Signs and Symptoms  Outcome: Ongoing  Goal: Improved Oral Intake  Outcome: Ongoing  Goal: Optimal Pain Control and Function  Outcome: Ongoing  Goal: Skin Health and Integrity  Outcome: Ongoing  Goal: Optimal Wound Healing  Outcome: Ongoing     Problem: Skin Injury Risk Increased  Goal: Skin Health and Integrity  Outcome: Ongoing     Problem: Heart Failure  Goal: Optimal Coping  Outcome: Ongoing  Goal: Optimal Cardiac Output  Outcome: Ongoing  Goal: Stable Heart Rate and Rhythm  Outcome: Ongoing  Goal: Optimal Functional Ability  Outcome: Ongoing  Goal: Fluid and Electrolyte Balance  Outcome: Ongoing  Goal: Improved Oral Intake  Outcome: Ongoing  Goal: Effective Oxygenation and Ventilation  Outcome: Ongoing  Goal: Effective Breathing Pattern During Sleep  Outcome: Ongoing     Problem: Sepsis/Septic Shock  Goal: Optimal Coping  Outcome: Ongoing  Goal: Absence of Bleeding  Outcome: Ongoing  Goal: Blood Glucose Level Within Targeted Range  Outcome: Ongoing  Goal: Absence of Infection Signs and Symptoms  Outcome: Ongoing  Goal: Optimal Nutrition Intake  Outcome: Ongoing     Problem: Fall Injury Risk  Goal: Absence of Fall and Fall-Related Injury  Outcome: Ongoing     Problem: Comorbidity Management  Goal: Blood Pressure in Desired Range  Outcome: Ongoing
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
## 2025-06-06 NOTE — PROGRESS NOTES
Pharmacokinetic Assessment Follow Up: IV Vancomycin    Vancomycin serum concentration assessment(s):    The trough level was drawn correctly and can be used to guide therapy at this time. The measurement is above the desired definitive target range of 15 to 20 mcg/mL.    Vancomycin Regimen Plan:    Change regimen to Vancomycin 1250 mg IV every 24 hours with next serum trough concentration measured at 0600 prior to 0700 dose on 6/7    Drug levels (last 3 results):  Recent Labs   Lab Result Units 06/04/25  1113 06/04/25 2000 06/05/25 0147 06/06/25  0510   Vancomycin Random ug/ml  --  23.0*  --   --    Vancomycin Trough ug/ml 24.3*  --  20.3* 20.5*       Pharmacy will continue to follow and monitor vancomycin.    Please contact pharmacy at extension [phone] for questions regarding this assessment.    Thank you for the consult,   Mable Kirk       Patient brief summary:  Vaibhav Vargas is a 74 y.o. male initiated on antimicrobial therapy with IV Vancomycin for treatment of sepsis    The patient's current regimen is vancomycin 1250mg q24h    Drug Allergies:   Review of patient's allergies indicates:  No Known Allergies    Actual Body Weight:   106.1kg    Renal Function:   Estimated Creatinine Clearance: 65.2 mL/min (A) (based on SCr of 1.29 mg/dL (H)).,     Dialysis Method (if applicable):  N/A    CBC (last 72 hours):  Recent Labs   Lab Result Units 06/04/25  0303 06/04/25  2000 06/05/25  0147 06/05/25  1400 06/06/25  0510   WBC x10(3)/mcL 13.72*  --  14.39*  --  12.79*   Hgb g/dL 4.7* 7.4* 7.0* 8.5* 8.2*   Hct % 15.0* 22.7* 21.2* 25.6* 25.1*   Platelet x10(3)/mcL 193  --  175  --  212   Mono % % 6.3  --  9.3  --  9.9   Eos % % 1.8  --  1.0  --  2.3   Basophil % % 0.2  --  0.3  --  0.3       Metabolic Panel (last 72 hours):  Recent Labs   Lab Result Units 06/04/25 0303 06/04/25 1755 06/05/25 0147 06/06/25  0510   Sodium mmol/L 140 142 145 145   Potassium mmol/L 4.0 3.4* 3.7 4.0   Chloride mmol/L 107 110* 111*  113*   CO2 mmol/L 25 23 23 24   Glucose mg/dL 195* 184* 178* 145*   Blood Urea Nitrogen mg/dL 55.3* 59.4* 57.6* 38.5*   Creatinine mg/dL 1.39* 1.33* 1.48* 1.29*   Albumin g/dL 1.8*  --  2.1* 2.1*   Bilirubin Total mg/dL 0.3  --  0.4 0.6   ALP unit/L 46  --  40 43   AST unit/L 29  --  15 21   ALT unit/L 44  --  28 27   Magnesium Level mg/dL 1.70 1.60 1.80 1.90   Phosphorus Level mg/dL 3.2  --  2.5 2.8       Vancomycin Administrations:  vancomycin given in the last 96 hours                     vancomycin 1,500 mg in 0.9% NaCl 250 mL IVPB (admixture device) (mg) 1,500 mg New Bag 06/05/25 0540    vancomycin 1,250 mg in 0.9% NaCl 250 mL IVPB (admixture device) (mg) 1,250 mg New Bag 06/03/25 2259     1,250 mg New Bag  1216    vancomycin 2,000 mg in 0.9% NaCl 500 mL IVPB (mg) 2,000 mg New Bag 06/02/25 1548                    Microbiologic Results:  Microbiology Results (last 7 days)       Procedure Component Value Units Date/Time    Tissue Culture - Aerobic [[id number]] Collected: 06/02/25 1121    Order Status: Completed Specimen: Tissue from Foot, Left Updated: 06/05/25 1046     Tissue - Aerobic Culture Normal Skin Manisha, no further workup.    Wound Culture [[id number]]  (Abnormal) Collected: 06/02/25 0919    Order Status: Completed Specimen: Wound from Foot, Left Updated: 06/05/25 1021     Wound Culture Few Staphylococcus aureus    Anaerobic Culture [[id number]] Collected: 06/02/25 0919    Order Status: Completed Specimen: SWAB from Foot, Left Updated: 06/05/25 0831     Anaerobe Culture No Anaerobes Isolated    Anaerobic Culture [[id number]] Collected: 06/02/25 1121    Order Status: Completed Specimen: Tissue from Foot, Left Updated: 06/05/25 0830     Anaerobe Culture No Anaerobes Isolated    Urine culture [[id number]] Collected: 05/29/25 1623    Order Status: Completed Specimen: Urine Updated: 06/01/25 1121     Urine Culture No Growth    Respiratory Culture [[id number]] Collected: 05/28/25 1357    Order Status:  Completed Specimen: Sputum, Expectorated Updated: 05/30/25 0814     Respiratory Culture Normal respiratory jacy     GRAM STAIN Quality 3+      No bacteria seen

## 2025-06-06 NOTE — PROGRESS NOTES
[facility]  Infectious Diseases Progress Note        SUBJECTIVE:   HPI: 74-year-old male with PMH CAD, prior CVA in 03/2025, chronic indwelling Fowler catheter who was admitted to Medicine on 5/23 for workup/management of weakness.  Urine culture was obtained on admission, appears to be from old Fowler catheter, which showed growth of Enterococcus faecium.  He has been started on ampicillin as organism was not VRE and sensitive to beta lactams.  CT abdomen and pelvis showed malpositioned Fowler catheter with balloon of the prostate.  Per discussion with primary team there is noted purulence in his urine.  Infectious disease service has been consulted for management of UTI.     On admission he was noted to have leukocytosis of 26.8 however was afebrile.  He has remained afebrile throughout his entire hospitalization.  His leukocytosis has improved now to 16.7.  He was initially started on ceftriaxone empirically followed by vancomycin and Zosyn, however now has been transitioned to ampicillin.  TTE was completed which showed a mobile echogenic mass in the right atrium, cardiology has been consulted who is planning for YO to further evaluate this.     History was obtained from both the patient and his sister who was present at bedside. She reports that the patient's mentation decreased a few days prior to his admission along with generalized weakness to where he was unable to participate with PT at his nursing home where he resides. His fowler catheter apparently has not been exchanged since 3/2025. The patient reports noticing R midback pain that began about 3 days prior to his admission as well however this has since improved. He denies fever, chills, N/V/D.       Interval History:  Patient back in ICU.  Not ventilated/no pressor support/no sedation.  Not on any oxygen.  He continues to complain he is nauseated and states was SOB earlier.  + dizziness.  Denies fever, chills, night sweats,  rash, HA, vision changes, CP, V/D, abdominal pain, or urinary complaints.  Transfused yesterday.   Hemoglobin 8.2/hematocrit 25.1.  Patient is afebrile with persistent leukocytosis; WBC 12.7..  CRP increased to 12; ESR remains normalized at 2.  Blood pressures improved, but remains tachycardic.  PRIYA improved; BUN 38/creatinine 1.29.  Noted with LT toe wound that probes to bone which is indicative of osteomyelitis.  While concerning, do not feel this is source of leukocytosis.  Left foot wound culture with Staphylococcus aureus.  Tissue cultures of left foot with normal skin jacy.  Patient remains on Vancomycin, Cefepime, and Flagyl.        Antibiotic / Antiviral / Antifungal History:  Ceftriaxone 1 g IV x1 dose - 05/23/2025   Zosyn 4.5 g IV q.8 hours - 05/24/2025 to 05/25/2025   Vancomycin IV trough 15-20 - 05/24/2025 to 05/26/2025   Ampicillin 1 g IV q.4 hours - 05/26/2025 to 05/28/2025   Penicillin G 24,000,000 units IV daily per continuous infusion - 05/28/2025 to 06/01/2025   Vancomycin IV trough 15-20 - 06/02/2025 to present   Cefepime 2 g IV q.8 hours - 06/02/2025 to present   Flagyl 500 mg p.o. q.8 hours - 06/02/2025 to present      MEDICATIONS:   Reviewed in EMR    REVIEW OF SYSTEMS:   Except as documented, all other systems reviewed and negative     PHYSICAL EXAM:   T 98.4 °F (36.9 °C)   /67   P 103   RR 14   O2 (!) 94 %  GENERAL: Elderly BM who is A&Ox3, NAD, appears chronically ill   HEENT: atraumatic, normocephalic, anicteric, moist oral mucosa without lesions, exudate, or erythema; no thrush  LUNGS: breathing unlabored, lungs clear to auscultation bilateral  HEART: RRR; no murmur, rub, or gallop  ABDOMEN: abdomen soft, obese, nondistended, nontender to palpation  : Palomo catheter in place  EXTREMITIES: 2+ bilateral lower extremity pitting edema  SKIN: Open wound to left medial great toe without purulence, erythema, or necrosis (see wound care note for pictures)  NEURO: speech fluent and intact,  "facial symmetry preserved, no tremor  PSYCH: cooperative, normal mood and affect  LINES: PICC to right upper arm without s/s infection       LABS AND IMAGING:     Recent Labs     06/05/25  0147 06/05/25  1400 06/06/25  0510   WBC 14.39*  --  12.79*   RBC 2.22*  --  2.59*   HGB 7.0* 8.5* 8.2*   HCT 21.2* 25.6* 25.1*   MCV 95.5*  --  96.9*   MCH 31.5*  --  31.7*   MCHC 33.0  --  32.7*   RDW 18.1*  --  18.2*     --  212     No results for input(s): "LACTIC" in the last 72 hours.  No results for input(s): "INR", "APTT", "D-DIMER" in the last 72 hours.    No results for input(s): "HGBA1C", "CHOL", "TRIG", "LDL", "VLDL", "HDL" in the last 72 hours.   Recent Labs     06/05/25  0147 06/06/25  0510    145   K 3.7 4.0   CO2 23 24   BUN 57.6* 38.5*   CREATININE 1.48* 1.29*   CALCIUM 7.7* 8.3*   MG 1.80 1.90   PHOS 2.5 2.8   ALBUMIN 2.1* 2.1*   GLOBULIN 2.6 3.1   ALKPHOS 40 43   ALT 28 27   AST 15 21   BILITOT 0.4 0.6   CRP  --  12.00*     Recent Labs     06/04/25  1113   TROPONINI 0.082*          Estimated Creatinine Clearance: 65.2 mL/min (A) (based on SCr of 1.29 mg/dL (H)).   Lab Results   Component Value Date    SEDRATE 2 06/06/2025      Lab Results   Component Value Date    CRP 12.00 (H) 06/06/2025        Micro:   Colonization:  No results found for this or any previous visit.     5/23 pBCx 2/2: NGTD  5/23 Urine cx: Enterococcus faecium      05/28/2025 sputum culture NGTD  05/29/2025 urine culture NGTD  06/02/2025 left foot wound culture with Staphylococcus aureus  06/02/2025 left foot tissue culture NGTD; normal skin jacy          Echo    Limited echo to r/o right heart strain.    Complete echo 5/26/25. YO 5/30/25)    Right Ventricle: The right ventricle is moderately dilated Systolic   function is moderately reduced.    Right Atrium: The right atrium is normal in size.    Tricuspid Valve: There is mild regurgitation.    Pulmonary Artery: PASP is at least 50mmhg.    IVC/SVC: IVC was not well visualized due " to poor acoustic window.  CTA Runoff ABD PEL Bilat Lower Ext  Narrative: EXAMINATION:  CTA RUNOFF ABD PEL BILAT LOWER EXT    CLINICAL HISTORY:  Peripheral arterial disease (PAD), asymptomatic;    TECHNIQUE:  Helically acquired images were obtained from the lung bases through the lower extremities prior to and after IV administration of contrast. Axial, sagittal, coronal and MIP reformations were interpreted.    Automated tube current modulation, weight-based exposure dosing, and/or iterative reconstruction technique utilized to reach lowest reasonably achievable exposure rate.    DLP: 1709 mGy*cm    COMPARISON:  CT abdomen pelvis 05/28/2025    FINDINGS:  VASCULATURE:    Pulmonary arteries: Segmental pulmonary embolus at the left lower lobe.    Aorta: Mild atherosclerosis without aneurysm or occlusion.    Mesenteric arteries: No significant stenosis.    Renal arteries:No significant stenosis.    Right:    Iliac arteries: No significant stenosis.    Femoral: Diffuse atherosclerotic change at the SFA and deep femoral branches.  Mild stenosis at the distal SFA.    Popliteal: Atherosclerosis with mild stenosis.    Tibioperoneal trunk: Patent tibioperoneal trunk.  Diminutive caliber vessels with calcific atherosclerosis below the trifurcation.  This makes it challenging to evaluate luminal patency of diminutive vessels below the knee.  Diminutive peroneal artery with enhancement fading at the ankle.  There does appear to be flow at the anterior tibial and posterior tibial artery at the ankle.  Dorsalis pedis artery enhances.    Left:    Iliac arteries: No significant stenosis.    Femoral: Diffuse atherosclerotic change at the SFA and deep femoral branches.  Mild stenosis.    Popliteal: Atherosclerosis with mild stenosis.    Tibioperoneal trunk: Patent tibioperoneal trunk.  Diminutive caliber vessels with calcific atherosclerosis below the trifurcation.  This makes it challenging to evaluate luminal patency of diminutive  vessels below the knee.  Peroneal artery does appear to be patent to the ankle.  Unable to confidently assess tibial arteries.  Defer to sonogram.    HEART: There are coronary artery calcifications.    LUNG BASES: See separate report for CT chest.    LIVER: No arterially enhancing mass.    BILIARY: No calcified gallstones.    PANCREAS: No inflammatory change.    SPLEEN: Normal in size    ADRENALS: No mass.    KIDNEYS/URETERS: No hydronephrosis.  Possible cyst at the upper pole right kidney, obscured by beam hardening artifact.    GI TRACT/MESENTERY: Clip seen at the level of the duodenum.  No active extravasation appreciable.  Colonic diverticulosis without acute inflammatory change.    PERITONEUM: No free fluid.No free air.    LYMPH NODES: No enlarged lymph nodes by size criteria.    BLADDER: Collapsed about a Palomo catheter.    REPRODUCTIVE ORGANS: There are prostate calcifications.    SOFT TISSUES: Ventral hernia mesh.  Body wall edema.  Bilateral lower extremity edema.    BONES: Note large field of view runoff exam not designed to evaluate fine osseous details.  No acute osseous abnormality seen.  Degenerative change at the spine.  Degenerative change at the feet  Impression: 1. Segmental pulmonary embolus at the left lower lobe.  Findings discussed with  Bernice Lipscomb, the physician caring for patient 6/5/2025 09:11.  2. Diffuse atherosclerotic calcifications.  No appreciable significant stenosis above the knee.  Below the knee evaluation is limited due to diminutive caliber vessels and calcific atherosclerosis.  Defer to sonogram.    Electronically signed by: Ann Armstrong  Date:    06/05/2025  Time:    09:12  CT Chest Without Contrast  Narrative: EXAMINATION:  CT CHEST WITHOUT CONTRAST    CLINICAL HISTORY:  Sepsis;    TECHNIQUE:  Helically acquired axial images, sagittal and coronal reformations were obtained from the thoracic inlet to the lung bases without the IV administration of contrast.    Automated  tube current modulation, weight-based exposure dosing, and/or iterative reconstruction technique utilized to reach lowest reasonably achievable exposure rate.    DLP: 1708 mGy*cm    COMPARISON:  Chest radiograph 06/04/2025    FINDINGS:  BASE OF NECK: Multinodular goiter.  This can be evaluated with outpatient thyroid sonogram after resolution of patient's acute illness.    AORTA: Atherosclerosis.    HEART: Heart at the upper limits of normal in size.  There are coronary artery calcifications.    CHACE/MEDIASTINUM: Small nonspecific mediastinal lymph nodes.  Evaluation of hilar lymphadenopathy is limited without intravenous contrast.    AIRWAYS: Deviation of the trachea to the right at the thoracic inlet related to nodular enlargement of the left lobe of the thyroid.    LUNGS: Respiratory motion artifact.  Complete collapse and consolidation of the right middle lobe.  Moderate, near complete collapse and consolidation at the right lower lobe.  Tree-in-bud nodularity at the posterior left upper lobe and at the superior segment left lower lobe compatible with bronchiolitis.    PLEURA: Trace right pleural effusion.    UPPER ABDOMEN: See separate report for CT abdomen    THORACIC SOFT TISSUES: Right upper extremity PICC terminates at the SVC.    BONES: Flowing osteophytes of the thoracic spine as can be seen with DISH.  Impression: 1. Collapse and consolidation at the right middle lobe and right lower lobe.  Atelectasis versus pneumonia  2. Tree-in-bud nodularity at the left lung.  Bronchiolitis    Electronically signed by: Ann Armstrong  Date:    06/05/2025  Time:    08:52      TTE 05/26/2025:  Summary  Show Result Comparison     Left Ventricle: The left ventricle is normal in size. Normal wall thickness. There is moderately reduced systolic function with a visually estimated ejection fraction of 35 - 40%. Unable to assess diastolic function due to atrial fibrillation.    Right Ventricle: The right ventricle is  moderately dilated Systolic function is moderately reduced. Prominent moderator band in the right ventricle.    Left Atrium: The left atrium is mildly dilated    Right Atrium: The right atrium is mildly dilated . 1.9 x 1.1 x 1.8 small mobile echogenic mass present.    Aortic Valve: There is mild aortic regurgitation.    Tricuspid Valve: There is mild regurgitation.    Pulmonary Artery: The estimated pulmonary artery systolic pressure is 49 mmHg.    IVC/SVC: Elevated venous pressure at 15 mmHg.      YO 05/30/2025:   Summary  Show Result Comparison     Left Ventricle: The left ventricle is normal in size. Normal wall motion. There is normal systolic function. Quantitated ejection fraction is 56%. Elevated left ventricular filling pressure.    Right Ventricle: The right ventricle is mildly dilated Systolic function is normal.    Left Atrium: The left atrium is dilated Agitated saline study of the atrial septum is negative, suggesting absence of intracardiac shunt at the atrial level. No patent foramen ovale.    Right Atrium: The right atrium is dilated. There is a prominent Eustachian valve with a highly mobile echogenic structure attached to it most likely representing a thrombus and measuring 1.5 cm in its longest dimension. Dense spontaneous echo contrast visualized in the right atrial cavity.    Aortic Valve: The aortic valve is a trileaflet valve. There is no stenosis. There is no significant regurgitation.    Mitral Valve: The mitral valve is structurally normal. There is no stenosis. There is trace regurgitation.    Tricuspid Valve: There is trace regurgitation.    Aorta: The aortic root is normal in size measuring 3.5 cm. The ascending aorta is normal in size measuring 2.9 cm. The aortic arch is normal measuring 2.6 cm. The descending aorta is normal measuring 2.3 cm.    Pericardium: There is no pericardial effusion.     YO obtained for further evaluation of right atrial mass noted on transthoracic  echocardiogram.    KAIT 06/02/2025:   Interpretation Summary  Show Result ComparisonThe right lower extremity ankle brachial index is 2.64 suggesting non compressible tibial vessels.    The left lower extremity ankle brachial index is 1.49 suggesting non compressible tibial vessels.        ASSESSMENT & PLAN:     74-year-old male with PMH CAD, prior CVA in 03/2025, chronic indwelling Fowler catheter who was admitted to Medicine on 5/23 for workup/management of weakness.  Urine culture was obtained on admission, appears to be from old Fowler catheter, which showed growth of Enterococcus faecium.  He has been started on ampicillin as organism was not VRE and sensitive to beta lactams.  CT abdomen and pelvis showed malpositioned Fowler catheter with balloon of the prostate.  Per discussion with primary team there is noted purulence in his urine.  Infectious disease service has been consulted for management of UTI.     1) Enterococcus faecium cystitis  - Organism PCN sensitive, non-VRE  2) Possible acute prostatitis  3) Malpositioned fowler catheter, resolved  4) Sepsis  5) Right atrial echogenic thrombus   6) CVA  7) CAD  8) hepatomegaly   9) transaminitis (resolved)  10) PRIYA    11) dyspnea (improving)  12) anemia   13) persistent leukocytosis   14) atrial fibrillation with RVR  15) LT foot osteomyelitis    Tissue culture foot NGTD   16) Significant anemia with hemodynamic instablity   17) Pulmonary embolus left lung  18) atelectasis vs pneumonia with bronchiolitis  17) diffuse arthrosclerotic calcifications      Persistent leukocytosis in the setting of Enterococcus faecium cystitis.  CT pelvis with contrast was done and showed only concerns for cystitis; no prostate abscess.    Urine culture with Enterococcus faecium.  Repeat urine culture 05/29/2025 NGTD.    Sputum culture NGTD.  CTA of the chest without contrast 06/05/2025 showed collapse and consolidation of the right middle lobe and right lower lobe, along with  tree-in-bud nodularity.    Patient noted with episodes of atrial fib with RVR.  TTE showed concerns for a mobile right atrial mass.  YO was followed up and she confirmed a highly mobile thrombus to the right atrium (1.5 cm).  LT toe wound that probes to bone which is indicative of osteomyelitis.  While concerning, do not feel this is source of leukocytosis.  Left foot wound culture with Staphylococcus aureus.  Left toe tissue culture NGTD; normal skin jacy.  ABIs were done and showed concerns for noncompressible tibial vessels bilateral.  CTA with runoff 06/05/2025 showed a PE at the left lower lung lobe, along with diffuse arthrosclerotic calcifications.    Leukocytosis persists.  Exact etiology unknown         RECOMMENDATIONS:       Follow up culture results.    Again recommend vascular evaluation   Continue empiric therapy with Vancomycin IV (trough 15-20), Cefepime 2 g IV q.8 hours, and Flagyl 500 mg p.o. q.8 hours  Need to watch nausea complaint.  May be worsened with addition of Flagyl   Monitor liver enzymes   Renal protective measures  Patient needs continued wound care and strict glucose control   Recommend Tdap as this is not up to date   Defer workup/management of anemia to primary team/GI service  Defer management PE to primary team  ID will continue to follow        KIRSTIN Hedrick  [facility] Infectious Diseases     *Portions of this note dictated using EMR integrated voice recognition software, and may be subject to voice recognition errors not corrected at proofreading. Please contact writer for clarification if needed. *
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
## 2025-06-07 LAB
ALBUMIN SERPL-MCNC: 2.2 G/DL (ref 3.4–4.8)
ALBUMIN/GLOB SERPL: 0.7 RATIO (ref 1.1–2)
ALP SERPL-CCNC: 46 UNIT/L (ref 40–150)
ALT SERPL-CCNC: 30 UNIT/L (ref 0–55)
ANION GAP SERPL CALC-SCNC: 10 MEQ/L
AST SERPL-CCNC: 24 UNIT/L (ref 11–45)
BASOPHILS # BLD AUTO: 0.06 X10(3)/MCL
BASOPHILS NFR BLD AUTO: 0.6 %
BILIRUB SERPL-MCNC: 0.5 MG/DL
BUN SERPL-MCNC: 30.5 MG/DL (ref 8.4–25.7)
CALCIUM SERPL-MCNC: 8.3 MG/DL (ref 8.8–10)
CHLORIDE SERPL-SCNC: 112 MMOL/L (ref 98–107)
CO2 SERPL-SCNC: 22 MMOL/L (ref 23–31)
CREAT SERPL-MCNC: 1.25 MG/DL (ref 0.72–1.25)
CREAT/UREA NIT SERPL: 24
EOSINOPHIL # BLD AUTO: 0.37 X10(3)/MCL (ref 0–0.9)
EOSINOPHIL NFR BLD AUTO: 3.9 %
ERYTHROCYTE [DISTWIDTH] IN BLOOD BY AUTOMATED COUNT: 17.7 % (ref 11.5–17)
GFR SERPLBLD CREATININE-BSD FMLA CKD-EPI: 60 ML/MIN/1.73/M2
GLOBULIN SER-MCNC: 3.2 GM/DL (ref 2.4–3.5)
GLUCOSE SERPL-MCNC: 109 MG/DL (ref 82–115)
HCT VFR BLD AUTO: 26.8 % (ref 42–52)
HGB BLD-MCNC: 8.6 G/DL (ref 14–18)
IMM GRANULOCYTES # BLD AUTO: 0.08 X10(3)/MCL (ref 0–0.04)
IMM GRANULOCYTES NFR BLD AUTO: 0.8 %
LYMPHOCYTES # BLD AUTO: 1.31 X10(3)/MCL (ref 0.6–4.6)
LYMPHOCYTES NFR BLD AUTO: 13.7 %
MAGNESIUM SERPL-MCNC: 2 MG/DL (ref 1.6–2.6)
MCH RBC QN AUTO: 31.2 PG (ref 27–31)
MCHC RBC AUTO-ENTMCNC: 32.1 G/DL (ref 33–36)
MCV RBC AUTO: 97.1 FL (ref 80–94)
MONOCYTES # BLD AUTO: 1.02 X10(3)/MCL (ref 0.1–1.3)
MONOCYTES NFR BLD AUTO: 10.7 %
NEUTROPHILS # BLD AUTO: 6.7 X10(3)/MCL (ref 2.1–9.2)
NEUTROPHILS NFR BLD AUTO: 70.3 %
NRBC BLD AUTO-RTO: 0.5 %
PHOSPHATE SERPL-MCNC: 2.8 MG/DL (ref 2.3–4.7)
PLATELET # BLD AUTO: 272 X10(3)/MCL (ref 130–400)
PMV BLD AUTO: 9.9 FL (ref 7.4–10.4)
POCT GLUCOSE: 107 MG/DL (ref 70–110)
POCT GLUCOSE: 122 MG/DL (ref 70–110)
POTASSIUM SERPL-SCNC: 3.1 MMOL/L (ref 3.5–5.1)
PROT SERPL-MCNC: 5.4 GM/DL (ref 5.8–7.6)
RBC # BLD AUTO: 2.76 X10(6)/MCL (ref 4.7–6.1)
SODIUM SERPL-SCNC: 144 MMOL/L (ref 136–145)
VANCOMYCIN TROUGH SERPL-MCNC: 21.3 UG/ML (ref 15–20)
WBC # BLD AUTO: 9.54 X10(3)/MCL (ref 4.5–11.5)

## 2025-06-07 PROCEDURE — 36415 COLL VENOUS BLD VENIPUNCTURE: CPT

## 2025-06-07 PROCEDURE — 80202 ASSAY OF VANCOMYCIN: CPT

## 2025-06-07 PROCEDURE — 63600175 PHARM REV CODE 636 W HCPCS

## 2025-06-07 PROCEDURE — 20000000 HC ICU ROOM

## 2025-06-07 PROCEDURE — 85025 COMPLETE CBC W/AUTO DIFF WBC: CPT

## 2025-06-07 PROCEDURE — 80053 COMPREHEN METABOLIC PANEL: CPT

## 2025-06-07 PROCEDURE — 25000003 PHARM REV CODE 250

## 2025-06-07 PROCEDURE — 83735 ASSAY OF MAGNESIUM: CPT

## 2025-06-07 PROCEDURE — 84100 ASSAY OF PHOSPHORUS: CPT

## 2025-06-07 PROCEDURE — 27000207 HC ISOLATION

## 2025-06-07 PROCEDURE — 94761 N-INVAS EAR/PLS OXIMETRY MLT: CPT

## 2025-06-07 RX ADMIN — METOPROLOL SUCCINATE ER TABLETS 50 MG: 50 TABLET, FILM COATED, EXTENDED RELEASE ORAL at 09:06

## 2025-06-07 RX ADMIN — ENOXAPARIN SODIUM 105 MG: 120 INJECTION SUBCUTANEOUS at 06:06

## 2025-06-07 RX ADMIN — HYDROCODONE BITARTRATE AND ACETAMINOPHEN 1 TABLET: 5; 325 TABLET ORAL at 04:06

## 2025-06-07 RX ADMIN — PANTOPRAZOLE SODIUM 40 MG: 40 INJECTION, POWDER, FOR SOLUTION INTRAVENOUS at 09:06

## 2025-06-07 RX ADMIN — METRONIDAZOLE 500 MG: 500 TABLET ORAL at 05:06

## 2025-06-07 RX ADMIN — ENOXAPARIN SODIUM 105 MG: 120 INJECTION SUBCUTANEOUS at 05:06

## 2025-06-07 RX ADMIN — ATORVASTATIN CALCIUM 20 MG: 20 TABLET, FILM COATED ORAL at 09:06

## 2025-06-07 RX ADMIN — LIDOCAINE 5% 1 PATCH: 700 PATCH TOPICAL at 11:06

## 2025-06-07 RX ADMIN — VANCOMYCIN HYDROCHLORIDE 1000 MG: 1 INJECTION, POWDER, LYOPHILIZED, FOR SOLUTION INTRAVENOUS at 04:06

## 2025-06-07 RX ADMIN — CEFEPIME 2 G: 2 INJECTION, POWDER, FOR SOLUTION INTRAVENOUS at 12:06

## 2025-06-07 RX ADMIN — METOPROLOL SUCCINATE ER TABLETS 100 MG: 50 TABLET, FILM COATED, EXTENDED RELEASE ORAL at 09:06
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
## 2025-06-07 NOTE — PROGRESS NOTES
Pharmacokinetic Assessment Follow Up: IV Vancomycin    Vancomycin serum concentration assessment(s):    The trough level was drawn correctly and can be used to guide therapy at this time. The measurement is above the desired definitive target range of 15 to 20 mcg/mL.    Vancomycin Regimen Plan:    Change regimen to Vancomycin 1,000 mg IV every 24 hours with next serum trough concentration measured at 60 minutes prior to 3rd dose on 6/9 at 1500    Scheduled Administration Times    1600    Drug levels (last 3 results):  Recent Labs   Lab Result Units 06/04/25 2000 06/05/25 0147 06/06/25  0510 06/07/25  0700   Vancomycin Random ug/ml 23.0*  --   --   --    Vancomycin Trough ug/ml  --  20.3* 20.5* 21.3*       Vancomycin Administrations:  vancomycin given in the last 96 hours                     vancomycin 1,250 mg in 0.9% NaCl 250 mL IVPB (admixture device) (mg) 1,250 mg New Bag 06/06/25 0628    vancomycin 1,500 mg in 0.9% NaCl 250 mL IVPB (admixture device) (mg) 1,500 mg New Bag 06/05/25 0540    vancomycin 1,250 mg in 0.9% NaCl 250 mL IVPB (admixture device) (mg) 1,250 mg New Bag 06/03/25 2259     1,250 mg New Bag  1216                    Pharmacy will continue to follow and monitor vancomycin.    Please contact pharmacy at extension [phone] for questions regarding this assessment.    Thank you for the consult,   Elie Nick       Patient brief summary:  Vaibhav Vargas is a 74 y.o. male initiated on antimicrobial therapy with IV Vancomycin for treatment of sepsis    The patient's current regimen is vancomycin 1,000 mg q24h    Drug Allergies:   Review of patient's allergies indicates:  No Known Allergies    Actual Body Weight:   Wt Readings from Last 1 Encounters:   06/05/25 106.1 kg (234 lb)       Renal Function:   Estimated Creatinine Clearance: 67.3 mL/min (based on SCr of 1.25 mg/dL).,       CBC (last 72 hours):  Recent Labs   Lab Result Units 06/04/25 2000 06/05/25 0147 06/05/25  1400 06/06/25  0510  06/06/25  1229 06/07/25  0700   WBC x10(3)/mcL  --  14.39*  --  12.79*  --  9.54   Hgb g/dL 7.4* 7.0* 8.5* 8.2* 8.7* 8.6*   Hct % 22.7* 21.2* 25.6* 25.1* 27.3* 26.8*   Platelet x10(3)/mcL  --  175  --  212  --  272   Mono % %  --  9.3  --  9.9  --  10.7   Eos % %  --  1.0  --  2.3  --  3.9   Basophil % %  --  0.3  --  0.3  --  0.6       Metabolic Panel (last 72 hours):  Recent Labs   Lab Result Units 06/04/25  1755 06/05/25  0147 06/06/25  0510 06/07/25  0700   Sodium mmol/L 142 145 145 144   Potassium mmol/L 3.4* 3.7 4.0 3.1*   Chloride mmol/L 110* 111* 113* 112*   CO2 mmol/L 23 23 24 22*   Glucose mg/dL 184* 178* 145* 109   Blood Urea Nitrogen mg/dL 59.4* 57.6* 38.5* 30.5*   Creatinine mg/dL 1.33* 1.48* 1.29* 1.25   Albumin g/dL  --  2.1* 2.1* 2.2*   Bilirubin Total mg/dL  --  0.4 0.6 0.5   ALP unit/L  --  40 43 46   AST unit/L  --  15 21 24   ALT unit/L  --  28 27 30   Magnesium Level mg/dL 1.60 1.80 1.90 2.00   Phosphorus Level mg/dL  --  2.5 2.8 2.8       Microbiologic Results:  Microbiology Results (last 7 days)       Procedure Component Value Units Date/Time    Wound Culture [[id number]]  (Abnormal)  (Susceptibility) Collected: 06/02/25 0919    Order Status: Completed Specimen: Wound from Foot, Left Updated: 06/06/25 1032     Wound Culture Few Methicillin resistant Staphylococcus aureus    Anaerobic Culture [[id number]]  (Abnormal) Collected: 06/02/25 0919    Order Status: Completed Specimen: SWAB from Foot, Left Updated: 06/06/25 1018     Anaerobe Culture Corynebacterium tuberculostearicum     Comment: Anaerobic sensitivity is not usually indicated except on single isolates from sterile body sites.       Anaerobic Culture [[id number]] Collected: 06/02/25 1121    Order Status: Completed Specimen: Tissue from Foot, Left Updated: 06/06/25 0753     Anaerobe Culture No Anaerobes Isolated    Tissue Culture - Aerobic [[id number]] Collected: 06/02/25 1121    Order Status: Completed Specimen: Tissue from Foot, Left  Updated: 06/05/25 1046     Tissue - Aerobic Culture Normal Skin Manisha, no further workup.    Urine culture [[id number]] Collected: 05/29/25 1623    Order Status: Completed Specimen: Urine Updated: 06/01/25 1121     Urine Culture No Growth

## 2025-06-08 LAB
ALBUMIN SERPL-MCNC: 2.1 G/DL (ref 3.4–4.8)
ALBUMIN/GLOB SERPL: 0.7 RATIO (ref 1.1–2)
ALP SERPL-CCNC: 40 UNIT/L (ref 40–150)
ALT SERPL-CCNC: 22 UNIT/L (ref 0–55)
ANION GAP SERPL CALC-SCNC: 9 MEQ/L
AST SERPL-CCNC: 16 UNIT/L (ref 11–45)
BASOPHILS # BLD AUTO: 0.07 X10(3)/MCL
BASOPHILS NFR BLD AUTO: 0.9 %
BILIRUB SERPL-MCNC: 0.5 MG/DL
BUN SERPL-MCNC: 23.7 MG/DL (ref 8.4–25.7)
CALCIUM SERPL-MCNC: 8.1 MG/DL (ref 8.8–10)
CHLORIDE SERPL-SCNC: 112 MMOL/L (ref 98–107)
CO2 SERPL-SCNC: 24 MMOL/L (ref 23–31)
CREAT SERPL-MCNC: 1.15 MG/DL (ref 0.72–1.25)
CREAT/UREA NIT SERPL: 21
CRP SERPL-MCNC: 6.1 MG/L
EOSINOPHIL # BLD AUTO: 0.34 X10(3)/MCL (ref 0–0.9)
EOSINOPHIL NFR BLD AUTO: 4.2 %
ERYTHROCYTE [DISTWIDTH] IN BLOOD BY AUTOMATED COUNT: 17 % (ref 11.5–17)
ERYTHROCYTE [SEDIMENTATION RATE] IN BLOOD: 5 MM/HR (ref 0–15)
GFR SERPLBLD CREATININE-BSD FMLA CKD-EPI: >60 ML/MIN/1.73/M2
GLOBULIN SER-MCNC: 3.1 GM/DL (ref 2.4–3.5)
GLUCOSE SERPL-MCNC: 84 MG/DL (ref 82–115)
GROUP & RH: NORMAL
HCT VFR BLD AUTO: 25.9 % (ref 42–52)
HCT VFR BLD AUTO: 28.2 % (ref 42–52)
HGB BLD-MCNC: 8.2 G/DL (ref 14–18)
HGB BLD-MCNC: 9.1 G/DL (ref 14–18)
HOLD SPECIMEN: NORMAL
IMM GRANULOCYTES # BLD AUTO: 0.08 X10(3)/MCL (ref 0–0.04)
IMM GRANULOCYTES NFR BLD AUTO: 1 %
INDIRECT COOMBS: NORMAL
LYMPHOCYTES # BLD AUTO: 1.47 X10(3)/MCL (ref 0.6–4.6)
LYMPHOCYTES NFR BLD AUTO: 18.1 %
MAGNESIUM SERPL-MCNC: 1.9 MG/DL (ref 1.6–2.6)
MCH RBC QN AUTO: 30.9 PG (ref 27–31)
MCHC RBC AUTO-ENTMCNC: 31.7 G/DL (ref 33–36)
MCV RBC AUTO: 97.7 FL (ref 80–94)
MONOCYTES # BLD AUTO: 0.97 X10(3)/MCL (ref 0.1–1.3)
MONOCYTES NFR BLD AUTO: 11.9 %
NEUTROPHILS # BLD AUTO: 5.19 X10(3)/MCL (ref 2.1–9.2)
NEUTROPHILS NFR BLD AUTO: 63.9 %
NRBC BLD AUTO-RTO: 0.4 %
PHOSPHATE SERPL-MCNC: 2.7 MG/DL (ref 2.3–4.7)
PLATELET # BLD AUTO: 317 X10(3)/MCL (ref 130–400)
PMV BLD AUTO: 9.4 FL (ref 7.4–10.4)
POCT GLUCOSE: 102 MG/DL (ref 70–110)
POCT GLUCOSE: 109 MG/DL (ref 70–110)
POCT GLUCOSE: 111 MG/DL (ref 70–110)
POCT GLUCOSE: 95 MG/DL (ref 70–110)
POTASSIUM SERPL-SCNC: 3.4 MMOL/L (ref 3.5–5.1)
PROT SERPL-MCNC: 5.2 GM/DL (ref 5.8–7.6)
RBC # BLD AUTO: 2.65 X10(6)/MCL (ref 4.7–6.1)
RBCS: NORMAL
RBCS: NORMAL
SODIUM SERPL-SCNC: 145 MMOL/L (ref 136–145)
SPECIMEN OUTDATE: NORMAL
WBC # BLD AUTO: 8.12 X10(3)/MCL (ref 4.5–11.5)

## 2025-06-08 PROCEDURE — 25000003 PHARM REV CODE 250

## 2025-06-08 PROCEDURE — 97164 PT RE-EVAL EST PLAN CARE: CPT

## 2025-06-08 PROCEDURE — 86140 C-REACTIVE PROTEIN: CPT | Performed by: NURSE PRACTITIONER

## 2025-06-08 PROCEDURE — 80053 COMPREHEN METABOLIC PANEL: CPT

## 2025-06-08 PROCEDURE — 97162 PT EVAL MOD COMPLEX 30 MIN: CPT

## 2025-06-08 PROCEDURE — 85014 HEMATOCRIT: CPT

## 2025-06-08 PROCEDURE — 94761 N-INVAS EAR/PLS OXIMETRY MLT: CPT

## 2025-06-08 PROCEDURE — 84100 ASSAY OF PHOSPHORUS: CPT

## 2025-06-08 PROCEDURE — 86850 RBC ANTIBODY SCREEN: CPT | Performed by: INTERNAL MEDICINE

## 2025-06-08 PROCEDURE — 85025 COMPLETE CBC W/AUTO DIFF WBC: CPT

## 2025-06-08 PROCEDURE — 36415 COLL VENOUS BLD VENIPUNCTURE: CPT

## 2025-06-08 PROCEDURE — 85652 RBC SED RATE AUTOMATED: CPT | Performed by: NURSE PRACTITIONER

## 2025-06-08 PROCEDURE — 63600175 PHARM REV CODE 636 W HCPCS

## 2025-06-08 PROCEDURE — 27000207 HC ISOLATION

## 2025-06-08 PROCEDURE — 11000001 HC ACUTE MED/SURG PRIVATE ROOM

## 2025-06-08 PROCEDURE — 83735 ASSAY OF MAGNESIUM: CPT

## 2025-06-08 PROCEDURE — 36415 COLL VENOUS BLD VENIPUNCTURE: CPT | Performed by: NURSE PRACTITIONER

## 2025-06-08 RX ORDER — METOPROLOL TARTRATE 1 MG/ML
5 INJECTION, SOLUTION INTRAVENOUS EVERY 5 MIN PRN
Status: COMPLETED | OUTPATIENT
Start: 2025-06-08 | End: 2025-06-09

## 2025-06-08 RX ORDER — POTASSIUM CHLORIDE 7.45 MG/ML
10 INJECTION INTRAVENOUS ONCE
Status: COMPLETED | OUTPATIENT
Start: 2025-06-08 | End: 2025-06-08

## 2025-06-08 RX ORDER — FUROSEMIDE 10 MG/ML
40 INJECTION INTRAMUSCULAR; INTRAVENOUS ONCE
Status: COMPLETED | OUTPATIENT
Start: 2025-06-08 | End: 2025-06-08

## 2025-06-08 RX ADMIN — PANTOPRAZOLE SODIUM 40 MG: 40 INJECTION, POWDER, FOR SOLUTION INTRAVENOUS at 07:06

## 2025-06-08 RX ADMIN — ENOXAPARIN SODIUM 105 MG: 120 INJECTION SUBCUTANEOUS at 05:06

## 2025-06-08 RX ADMIN — POTASSIUM PHOSPHATE, MONOBASIC POTASSIUM PHOSPHATE, DIBASIC 30 MMOL: 224; 236 INJECTION, SOLUTION, CONCENTRATE INTRAVENOUS at 09:06

## 2025-06-08 RX ADMIN — METOPROLOL SUCCINATE ER TABLETS 100 MG: 50 TABLET, FILM COATED, EXTENDED RELEASE ORAL at 09:06

## 2025-06-08 RX ADMIN — VANCOMYCIN HYDROCHLORIDE 1000 MG: 1 INJECTION, POWDER, LYOPHILIZED, FOR SOLUTION INTRAVENOUS at 05:06

## 2025-06-08 RX ADMIN — POTASSIUM CHLORIDE 10 MEQ: 7.46 INJECTION, SOLUTION INTRAVENOUS at 08:06

## 2025-06-08 RX ADMIN — ATORVASTATIN CALCIUM 20 MG: 20 TABLET, FILM COATED ORAL at 09:06

## 2025-06-08 RX ADMIN — PANTOPRAZOLE SODIUM 40 MG: 40 INJECTION, POWDER, FOR SOLUTION INTRAVENOUS at 09:06

## 2025-06-08 RX ADMIN — METOPROLOL TARTRATE 5 MG: 1 INJECTION, SOLUTION INTRAVENOUS at 05:06

## 2025-06-08 RX ADMIN — METOPROLOL SUCCINATE ER TABLETS 50 MG: 50 TABLET, FILM COATED, EXTENDED RELEASE ORAL at 07:06

## 2025-06-08 RX ADMIN — FUROSEMIDE 40 MG: 10 INJECTION, SOLUTION INTRAVENOUS at 09:06
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
## 2025-06-09 LAB
ALBUMIN SERPL-MCNC: 2.1 G/DL (ref 3.4–4.8)
ALBUMIN/GLOB SERPL: 0.6 RATIO (ref 1.1–2)
ALP SERPL-CCNC: 40 UNIT/L (ref 40–150)
ALT SERPL-CCNC: 18 UNIT/L (ref 0–55)
ANION GAP SERPL CALC-SCNC: 11 MEQ/L
AST SERPL-CCNC: 17 UNIT/L (ref 11–45)
BASOPHILS # BLD AUTO: 0.05 X10(3)/MCL
BASOPHILS NFR BLD AUTO: 0.5 %
BILIRUB SERPL-MCNC: 0.4 MG/DL
BUN SERPL-MCNC: 19 MG/DL (ref 8.4–25.7)
CALCIUM SERPL-MCNC: 8.1 MG/DL (ref 8.8–10)
CHLORIDE SERPL-SCNC: 109 MMOL/L (ref 98–107)
CO2 SERPL-SCNC: 24 MMOL/L (ref 23–31)
CREAT SERPL-MCNC: 1.09 MG/DL (ref 0.72–1.25)
CREAT/UREA NIT SERPL: 17
EOSINOPHIL # BLD AUTO: 0.14 X10(3)/MCL (ref 0–0.9)
EOSINOPHIL NFR BLD AUTO: 1.5 %
ERYTHROCYTE [DISTWIDTH] IN BLOOD BY AUTOMATED COUNT: 16.3 % (ref 11.5–17)
GFR SERPLBLD CREATININE-BSD FMLA CKD-EPI: >60 ML/MIN/1.73/M2
GLOBULIN SER-MCNC: 3.3 GM/DL (ref 2.4–3.5)
GLUCOSE SERPL-MCNC: 102 MG/DL (ref 82–115)
HCT VFR BLD AUTO: 25.7 % (ref 42–52)
HCT VFR BLD AUTO: 26.2 % (ref 42–52)
HGB BLD-MCNC: 8.3 G/DL (ref 14–18)
HGB BLD-MCNC: 8.4 G/DL (ref 14–18)
IMM GRANULOCYTES # BLD AUTO: 0.07 X10(3)/MCL (ref 0–0.04)
IMM GRANULOCYTES NFR BLD AUTO: 0.8 %
LYMPHOCYTES # BLD AUTO: 1.34 X10(3)/MCL (ref 0.6–4.6)
LYMPHOCYTES NFR BLD AUTO: 14.6 %
MAGNESIUM SERPL-MCNC: 1.8 MG/DL (ref 1.6–2.6)
MCH RBC QN AUTO: 31.3 PG (ref 27–31)
MCHC RBC AUTO-ENTMCNC: 32.7 G/DL (ref 33–36)
MCV RBC AUTO: 95.9 FL (ref 80–94)
MONOCYTES # BLD AUTO: 1.31 X10(3)/MCL (ref 0.1–1.3)
MONOCYTES NFR BLD AUTO: 14.3 %
NEUTROPHILS # BLD AUTO: 6.24 X10(3)/MCL (ref 2.1–9.2)
NEUTROPHILS NFR BLD AUTO: 68.3 %
NRBC BLD AUTO-RTO: 0 %
PHOSPHATE SERPL-MCNC: 3.1 MG/DL (ref 2.3–4.7)
PLATELET # BLD AUTO: 367 X10(3)/MCL (ref 130–400)
PMV BLD AUTO: 9 FL (ref 7.4–10.4)
POCT GLUCOSE: 103 MG/DL (ref 70–110)
POCT GLUCOSE: 84 MG/DL (ref 70–110)
POCT GLUCOSE: 98 MG/DL (ref 70–110)
POCT GLUCOSE: 98 MG/DL (ref 70–110)
POTASSIUM SERPL-SCNC: 3.4 MMOL/L (ref 3.5–5.1)
PROT SERPL-MCNC: 5.4 GM/DL (ref 5.8–7.6)
RBC # BLD AUTO: 2.68 X10(6)/MCL (ref 4.7–6.1)
SODIUM SERPL-SCNC: 144 MMOL/L (ref 136–145)
VANCOMYCIN TROUGH SERPL-MCNC: 18.4 UG/ML (ref 15–20)
WBC # BLD AUTO: 9.15 X10(3)/MCL (ref 4.5–11.5)

## 2025-06-09 PROCEDURE — 25000003 PHARM REV CODE 250

## 2025-06-09 PROCEDURE — 63600175 PHARM REV CODE 636 W HCPCS

## 2025-06-09 PROCEDURE — 11000001 HC ACUTE MED/SURG PRIVATE ROOM

## 2025-06-09 PROCEDURE — 80202 ASSAY OF VANCOMYCIN: CPT

## 2025-06-09 PROCEDURE — 94760 N-INVAS EAR/PLS OXIMETRY 1: CPT

## 2025-06-09 PROCEDURE — 85025 COMPLETE CBC W/AUTO DIFF WBC: CPT

## 2025-06-09 PROCEDURE — 97530 THERAPEUTIC ACTIVITIES: CPT

## 2025-06-09 PROCEDURE — 80053 COMPREHEN METABOLIC PANEL: CPT

## 2025-06-09 PROCEDURE — 25000003 PHARM REV CODE 250: Performed by: INTERNAL MEDICINE

## 2025-06-09 PROCEDURE — 36415 COLL VENOUS BLD VENIPUNCTURE: CPT

## 2025-06-09 PROCEDURE — 97168 OT RE-EVAL EST PLAN CARE: CPT

## 2025-06-09 PROCEDURE — 84100 ASSAY OF PHOSPHORUS: CPT

## 2025-06-09 PROCEDURE — 85018 HEMOGLOBIN: CPT

## 2025-06-09 PROCEDURE — 83735 ASSAY OF MAGNESIUM: CPT

## 2025-06-09 PROCEDURE — 27000207 HC ISOLATION

## 2025-06-09 PROCEDURE — 63600175 PHARM REV CODE 636 W HCPCS: Performed by: INTERNAL MEDICINE

## 2025-06-09 RX ORDER — FUROSEMIDE 20 MG/1
40 TABLET ORAL DAILY
Status: DISCONTINUED | OUTPATIENT
Start: 2025-06-09 | End: 2025-06-11

## 2025-06-09 RX ORDER — POLYETHYLENE GLYCOL 3350 17 G/17G
17 POWDER, FOR SOLUTION ORAL DAILY PRN
Status: DISCONTINUED | OUTPATIENT
Start: 2025-06-09 | End: 2025-06-12 | Stop reason: HOSPADM

## 2025-06-09 RX ORDER — ENOXAPARIN SODIUM 100 MG/ML
1 INJECTION SUBCUTANEOUS EVERY 12 HOURS
Status: DISCONTINUED | OUTPATIENT
Start: 2025-06-09 | End: 2025-06-10

## 2025-06-09 RX ORDER — METOPROLOL SUCCINATE 50 MG/1
100 TABLET, EXTENDED RELEASE ORAL 2 TIMES DAILY
Status: DISCONTINUED | OUTPATIENT
Start: 2025-06-09 | End: 2025-06-11

## 2025-06-09 RX ORDER — AMOXICILLIN 250 MG
1 CAPSULE ORAL DAILY
Status: DISCONTINUED | OUTPATIENT
Start: 2025-06-10 | End: 2025-06-12 | Stop reason: HOSPADM

## 2025-06-09 RX ORDER — METOPROLOL TARTRATE 1 MG/ML
5 INJECTION, SOLUTION INTRAVENOUS EVERY 5 MIN PRN
Status: COMPLETED | OUTPATIENT
Start: 2025-06-09 | End: 2025-06-10

## 2025-06-09 RX ORDER — POTASSIUM CHLORIDE 20 MEQ/1
40 TABLET, EXTENDED RELEASE ORAL 2 TIMES DAILY
Status: COMPLETED | OUTPATIENT
Start: 2025-06-09 | End: 2025-06-09

## 2025-06-09 RX ADMIN — ENOXAPARIN SODIUM 105 MG: 120 INJECTION SUBCUTANEOUS at 05:06

## 2025-06-09 RX ADMIN — ENOXAPARIN SODIUM 110 MG: 40 INJECTION SUBCUTANEOUS at 08:06

## 2025-06-09 RX ADMIN — VANCOMYCIN HYDROCHLORIDE 1000 MG: 1 INJECTION, POWDER, LYOPHILIZED, FOR SOLUTION INTRAVENOUS at 06:06

## 2025-06-09 RX ADMIN — POTASSIUM CHLORIDE 40 MEQ: 1500 TABLET, EXTENDED RELEASE ORAL at 08:06

## 2025-06-09 RX ADMIN — ATORVASTATIN CALCIUM 20 MG: 20 TABLET, FILM COATED ORAL at 08:06

## 2025-06-09 RX ADMIN — LIDOCAINE 5% 1 PATCH: 700 PATCH TOPICAL at 12:06

## 2025-06-09 RX ADMIN — PANTOPRAZOLE SODIUM 40 MG: 40 INJECTION, POWDER, FOR SOLUTION INTRAVENOUS at 08:06

## 2025-06-09 RX ADMIN — METOPROLOL SUCCINATE 100 MG: 50 TABLET, EXTENDED RELEASE ORAL at 08:06

## 2025-06-09 RX ADMIN — METOPROLOL TARTRATE 5 MG: 1 INJECTION, SOLUTION INTRAVENOUS at 12:06

## 2025-06-09 RX ADMIN — FUROSEMIDE 40 MG: 20 TABLET ORAL at 08:06

## 2025-06-09 RX ADMIN — METOPROLOL TARTRATE 5 MG: 1 INJECTION, SOLUTION INTRAVENOUS at 10:06

## 2025-06-09 NOTE — PLAN OF CARE
Problem: Adult Inpatient Plan of Care  Goal: Plan of Care Review  Outcome: Progressing  Goal: Patient-Specific Goal (Individualized)  Outcome: Progressing  Goal: Absence of Hospital-Acquired Illness or Injury  Outcome: Progressing  Goal: Optimal Comfort and Wellbeing  Outcome: Progressing  Goal: Readiness for Transition of Care  Outcome: Progressing     Problem: Fall Injury Risk  Goal: Absence of Fall and Fall-Related Injury  Outcome: Progressing     Problem: Heart Failure  Goal: Optimal Coping  Outcome: Progressing  Goal: Optimal Cardiac Output  Outcome: Progressing  Goal: Stable Heart Rate and Rhythm  Outcome: Progressing  Goal: Optimal Functional Ability  Outcome: Progressing  Goal: Fluid and Electrolyte Balance  Outcome: Progressing  Goal: Improved Oral Intake  Outcome: Progressing  Goal: Effective Oxygenation and Ventilation  Outcome: Progressing  Goal: Effective Breathing Pattern During Sleep  Outcome: Progressing     Problem: Infection  Goal: Absence of Infection Signs and Symptoms  Outcome: Progressing
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
## 2025-06-09 NOTE — PROGRESS NOTES
Pharmacokinetic Assessment Follow Up: IV Vancomycin    Vancomycin serum concentration assessment(s):    The trough level was drawn correctly and can be used to guide therapy at this time. The measurement is within the desired definitive target range of 15 to 20 mcg/mL.    Vancomycin Regimen Plan:    Continue regimen to Vancomycin 1000 mg IV every 24 hours with next serum trough concentration measured at 1700 prior to 1800 dose on 6/10    Drug levels (last 3 results):  Recent Labs   Lab Result Units 06/07/25  0700 06/09/25  1656   Vancomycin Trough ug/ml 21.3* 18.4       Pharmacy will continue to follow and monitor vancomycin.    Please contact pharmacy at extension [phone] for questions regarding this assessment.    Thank you for the consult,   Alexa Bai       Patient brief summary:  Vaibhav Vargas is a 74 y.o. male initiated on antimicrobial therapy with IV Vancomycin for treatment of bone/joint infection    The patient's current regimen is vancomycin 1000 mg iv q 24 hours    Drug Allergies:   Review of patient's allergies indicates:  No Known Allergies    Actual Body Weight:   107.7 kg    Renal Function:   Estimated Creatinine Clearance: 77.7 mL/min (based on SCr of 1.09 mg/dL).,     Dialysis Method (if applicable):  N/A    CBC (last 72 hours):  Recent Labs   Lab Result Units 06/07/25  0700 06/08/25  0320 06/08/25  1735 06/09/25  0417   WBC x10(3)/mcL 9.54 8.12  --  9.15   Hgb g/dL 8.6* 8.2* 9.1* 8.4*   Hct % 26.8* 25.9* 28.2* 25.7*   Platelet x10(3)/mcL 272 317  --  367   Mono % % 10.7 11.9  --  14.3   Eos % % 3.9 4.2  --  1.5   Basophil % % 0.6 0.9  --  0.5       Metabolic Panel (last 72 hours):  Recent Labs   Lab Result Units 06/07/25  0700 06/08/25  0320 06/09/25  0417   Sodium mmol/L 144 145 144   Potassium mmol/L 3.1* 3.4* 3.4*   Chloride mmol/L 112* 112* 109*   CO2 mmol/L 22* 24 24   Glucose mg/dL 109 84 102   Blood Urea Nitrogen mg/dL 30.5* 23.7 19.0   Creatinine mg/dL 1.25 1.15 1.09   Albumin g/dL 2.2* 2.1*  2.1*   Bilirubin Total mg/dL 0.5 0.5 0.4   ALP unit/L 46 40 40   AST unit/L 24 16 17   ALT unit/L 30 22 18   Magnesium Level mg/dL 2.00 1.90 1.80   Phosphorus Level mg/dL 2.8 2.7 3.1       Vancomycin Administrations:  vancomycin given in the last 96 hours                     vancomycin (VANCOCIN) 1,000 mg in 0.9% NaCl 250 mL IVPB (admixture device) (mg) 1,000 mg New Bag 06/08/25 1753     1,000 mg New Bag 06/07/25 1603    vancomycin 1,250 mg in 0.9% NaCl 250 mL IVPB (admixture device) (mg) 1,250 mg New Bag 06/06/25 0628                    Microbiologic Results:  Microbiology Results (last 7 days)       Procedure Component Value Units Date/Time    Wound Culture [[id number]]  (Abnormal)  (Susceptibility) Collected: 06/02/25 0919    Order Status: Completed Specimen: Wound from Foot, Left Updated: 06/06/25 1032     Wound Culture Few Methicillin resistant Staphylococcus aureus    Anaerobic Culture [[id number]]  (Abnormal) Collected: 06/02/25 0919    Order Status: Completed Specimen: SWAB from Foot, Left Updated: 06/06/25 1018     Anaerobe Culture Corynebacterium tuberculostearicum     Comment: Anaerobic sensitivity is not usually indicated except on single isolates from sterile body sites.       Anaerobic Culture [[id number]] Collected: 06/02/25 1121    Order Status: Completed Specimen: Tissue from Foot, Left Updated: 06/06/25 0753     Anaerobe Culture No Anaerobes Isolated    Tissue Culture - Aerobic [[id number]] Collected: 06/02/25 1121    Order Status: Completed Specimen: Tissue from Foot, Left Updated: 06/05/25 1046     Tissue - Aerobic Culture Normal Skin Manisha, no further workup.
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
## 2025-06-09 NOTE — NURSING
Attempted void trial for two hours. Patient unable to feel urge to urinate. 200 ml urine released once unclamped.
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
## 2025-06-09 NOTE — PLAN OF CARE
Problem: Adult Inpatient Plan of Care  Goal: Plan of Care Review  Outcome: Progressing  Goal: Patient-Specific Goal (Individualized)  Outcome: Progressing  Goal: Absence of Hospital-Acquired Illness or Injury  Outcome: Progressing  Goal: Optimal Comfort and Wellbeing  Outcome: Progressing  Goal: Readiness for Transition of Care  Outcome: Progressing     Problem: Fall Injury Risk  Goal: Absence of Fall and Fall-Related Injury  Outcome: Progressing     Problem: Heart Failure  Goal: Optimal Coping  Outcome: Progressing  Goal: Optimal Cardiac Output  Outcome: Progressing  Goal: Stable Heart Rate and Rhythm  Outcome: Progressing  Goal: Optimal Functional Ability  Outcome: Progressing  Goal: Fluid and Electrolyte Balance  Outcome: Progressing  Goal: Improved Oral Intake  Outcome: Progressing  Goal: Effective Oxygenation and Ventilation  Outcome: Progressing  Goal: Effective Breathing Pattern During Sleep  Outcome: Progressing     Problem: Infection  Goal: Absence of Infection Signs and Symptoms  Outcome: Progressing     Problem: Pain Acute  Goal: Optimal Pain Control and Function  Outcome: Progressing

## 2025-06-09 NOTE — PROGRESS NOTES
[facility]   Inpatient Wound Care Progress Note            HPI:     Vaibhav Vargas is a 74 y.o. Black or  male with past medical history of cerebrovascular accident (March 2025), coronary artery disease status post LAD stent (2017), venous insufficiency, hypertension, first-degree AV block, and type 2 diabetes mellitus, who presented to the ED from a nursing home due to progressive weakness over the past week. His daughter provided history due to his communication difficulties. Associated symptoms included altered mental status, back pain, and abdominal discomfort. Imaging of the abdomen revealed hydronephrosis and malpositioned Palomo catheter.  The Palomo catheter has been subsequently replaced and renal function has improved.  Nephrology service continues to follow for assistance with management of PRIYA.  Patient was noted with multiple wounds on admission and evaluated by wound care today. Pt is noted with a 2cm round ulceration overlying the left medial MPJ. This ulceration is noted with nonviable tissue and probes to bone. Per chart review this wound was present on previous admission (3/23/25) with dry eschar. Unable to verify further history of wound with pt. Case discussed with ID with requests for tissue culture which will be obtained during bedside debridement today. Also discussed the need for KAIT to assure antibiotic delivery. We do not feel that pt would be an ideal candidate for surgical management at this time.       Interval History:    Minimal change to left medial MPJ wound, continued palpable bone noted. No purulence or erythema noted. Continue local wound care. Sacral wound with minimal change. Pt remains on a specialty bed and is allowing staff to turn with wedge today. Malodor has resolved. Pt was recently upgraded to ICU again for GI bleed as well as hypotension and atrial fibrillation with rapid ventricular response. Pt is currently downgraded back  "to 6th floor. Recent CTA showing "1. Segmental pulmonary embolus at the left lower lobe.2. Diffuse atherosclerotic calcifications.  No appreciable significant stenosis above the knee.  Below the knee evaluation is limited due to diminutive caliber vessels and calcific atherosclerosis." Will defer recommendations for vascular evaluation of LE till patient is more stable. Today pt is noted to have not eaten breakfast of protein supplement. He states that he does not like the taste of anything. Albumin levels remain low which will negatively affect wound healing. Will discuss with IM team and dietician. Last dietician noted recommended appetite stimulant.         Past Medical History/Past Surgical History/Social History     See HPI for pertinent history.      ALLERGIES: Review of patient's allergies indicates:  No Known Allergies      Review of Systems:     Review of Systems   Integumentary:  Positive for wound (left foot ulcer, pressure injury to sacrum (both present on admission)).        MEDICATIONS: See MAR    Physical exam:     Temp:  [97.3 °F (36.3 °C)-98.3 °F (36.8 °C)] 98.3 °F (36.8 °C)  Pulse:  [] 159  Resp:  [18-23] 23  SpO2:  [91 %-100 %] 100 %  BP: ()/(59-78) 95/61     GENERAL: A&Ox3, NAD  HEENT: atraumatic, normocephalic, anicteric, moist oral mucosa without lesions, exudate, or erythema  LUNGS: breathing unlabored, breath sounds clear bilaterally  HEART: irregular rhythm; no murmur, rub, or gallop  ABDOMEN: abdomen soft, nondistended, BS noted x 4 quadrants, no tympany, no rebound, guarding, or organomegaly  : no CVA tenderness  EXTREMITIES: pitting edema to BLE (R>L), clubbing, or cyanosis   SKIN: left foot ulcer and sacral pressure injury  NEURO: speech fluent and intact, facial symmetry preserved, no tremor  PSYCH: cooperative, normal mood and affect           Labs:     I have personally reviewed patient's labs.  Pertinent results noted below.      Recent Labs     06/06/25  1229 " 06/07/25  0700 06/08/25  0320 06/08/25  1735 06/09/25  0417   WBC  --  9.54 8.12  --  9.15   HGB 8.7* 8.6* 8.2* 9.1* 8.4*   HCT 27.3* 26.8* 25.9* 28.2* 25.7*   PLT  --  272 317  --  367          Recent Labs     06/07/25  0700 06/08/25  0320 06/09/25  0417    145 144   K 3.1* 3.4* 3.4*   * 112* 109*   CO2 22* 24 24   BUN 30.5* 23.7 19.0   CREATININE 1.25 1.15 1.09          Results for orders placed or performed during the hospital encounter of 05/23/25   Sedimentation Rate   Result Value Ref Range    Sed Rate 8 0 - 15 mm/hr        Results for orders placed or performed during the hospital encounter of 05/23/25   C-Reactive Protein   Result Value Ref Range    CRP 9.50 (H) <5.00 mg/L       Recent Labs   Lab 04/25/25  0230 05/02/25  0316 05/24/25  0451   HGBA1C 5.3 5.3 5.6       Microbiology Results (last 7 days)       Procedure Component Value Units Date/Time    Wound Culture [[id number]]  (Abnormal)  (Susceptibility) Collected: 06/02/25 0919    Order Status: Completed Specimen: Wound from Foot, Left Updated: 06/06/25 1032     Wound Culture Few Methicillin resistant Staphylococcus aureus    Anaerobic Culture [[id number]]  (Abnormal) Collected: 06/02/25 0919    Order Status: Completed Specimen: SWAB from Foot, Left Updated: 06/06/25 1018     Anaerobe Culture Corynebacterium tuberculostearicum     Comment: Anaerobic sensitivity is not usually indicated except on single isolates from sterile body sites.       Anaerobic Culture [[id number]] Collected: 06/02/25 1121    Order Status: Completed Specimen: Tissue from Foot, Left Updated: 06/06/25 0753     Anaerobe Culture No Anaerobes Isolated    Tissue Culture - Aerobic [[id number]] Collected: 06/02/25 1121    Order Status: Completed Specimen: Tissue from Foot, Left Updated: 06/05/25 1046     Tissue - Aerobic Culture Normal Skin Manisha, no further workup.              Wound Assessment:   Minimal decrease in size noted to left great toe ulceration. Wound remains with  nonviable tissue, mostly tendon. Wound not recommend further debridement at this time due to proximity to exposed bone. Will initiate new dressing with debriding properties. Sacral/gluteal cleft with mild decrease in fibrinous tissue. Malodor no longer noted. Wound size has not decreased much likely related to poor protein intake.                  Imaging:     I have personally reviewed patient's imaging. Pertinent results noted below.  CTA Runoff ABD PEL Bilat Lower Ext   Final Result      1. Segmental pulmonary embolus at the left lower lobe.  Findings discussed with  Bernice Lipscomb, the physician caring for patient 6/5/2025 09:11.   2. Diffuse atherosclerotic calcifications.  No appreciable significant stenosis above the knee.  Below the knee evaluation is limited due to diminutive caliber vessels and calcific atherosclerosis.  Defer to sonogram.         Electronically signed by: Ann Armstrong   Date:    06/05/2025   Time:    09:12         Impression:     DM vs arterial ulceration to left medial 1st MPJ that probes to bone  Stage 3 sacral pressure injury    Plan/Recommendations:     New wound care regimen ordered. Preliminary cultures showing staph aureus. Clinical osteo treatment per ID. Will follow up on possible need for vascular consult when more stable.   BID and PRN wound care ordered to sacrum. Continue turning with wedge. Pt is on a specialty air bed. Discuss nutrition status with IM team and dietician.        The time spent including preparing to see the patient, obtaining patient history and assessment, evaluation of the plan of care, patient/caregiver counseling and education, orders, documentation, coordination of care, and other professional medical management activities for today's encounter was 35 minutes.        Anabella Hylton [facility]-BC, C, C  [facility] Inpatient Woundcare

## 2025-06-09 NOTE — PROGRESS NOTES
[facility]  Infectious Diseases Progress Note        SUBJECTIVE:   HPI: 74-year-old male with PMH CAD, prior CVA in 03/2025, chronic indwelling Fowler catheter who was admitted to Medicine on 5/23 for workup/management of weakness.  Urine culture was obtained on admission, appears to be from old Fowler catheter, which showed growth of Enterococcus faecium.  He has been started on ampicillin as organism was not VRE and sensitive to beta lactams.  CT abdomen and pelvis showed malpositioned Fowler catheter with balloon of the prostate.  Per discussion with primary team there is noted purulence in his urine.  Infectious disease service has been consulted for management of UTI.     On admission he was noted to have leukocytosis of 26.8 however was afebrile.  He has remained afebrile throughout his entire hospitalization.  His leukocytosis has improved now to 16.7.  He was initially started on ceftriaxone empirically followed by vancomycin and Zosyn, however now has been transitioned to ampicillin.  TTE was completed which showed a mobile echogenic mass in the right atrium, cardiology has been consulted who is planning for YO to further evaluate this.     History was obtained from both the patient and his sister who was present at bedside. She reports that the patient's mentation decreased a few days prior to his admission along with generalized weakness to where he was unable to participate with PT at his nursing home where he resides. His fowler catheter apparently has not been exchanged since 3/2025. The patient reports noticing R midback pain that began about 3 days prior to his admission as well however this has since improved. He denies fever, chills, N/V/D.       Interval History:  Patient now downgraded to floor over the weekend.  Not on any oxygen.  No current complaints.  Denies fever, chills, night sweats, rash, HA, vision changes, dizziness, CP/SOB, cough, N/V/D, abdominal pain, or  urinary complaints.  H&H appears to be stable.  Patient is afebrile.  Noted with tachycardic episodes in 140s-150s last night.  Leukocytosis resolved; WBC 9.15.  PRIYA resolved.  Noted with LT toe wound that probes to bone which is indicative of osteomyelitis.  Left foot wound culture with Corynebacterium tuberculostearicum and MRSA.  Tissue cultures of left foot with normal skin jacy.  Patient is on monotherapy with Vancomycin.        Antibiotic / Antiviral / Antifungal History:  Ceftriaxone 1 g IV x1 dose - 05/23/2025   Zosyn 4.5 g IV q.8 hours - 05/24/2025 to 05/25/2025   Vancomycin IV trough 15-20 - 05/24/2025 to 05/26/2025   Ampicillin 1 g IV q.4 hours - 05/26/2025 to 05/28/2025   Penicillin G 24,000,000 units IV daily per continuous infusion - 05/28/2025 to 06/01/2025   Vancomycin IV trough 15-20 - 06/02/2025 to present   Cefepime 2 g IV q.8 hours - 06/02/2025 to 06/07/2025  Flagyl 500 mg p.o. q.8 hours - 06/02/2025 to 06/07/2025      MEDICATIONS:   Reviewed in EMR    REVIEW OF SYSTEMS:   Except as documented, all other systems reviewed and negative     PHYSICAL EXAM:   T 98.3 °F (36.8 °C)   /73   P 72   RR (!) 23   O2 98 %  GENERAL: Elderly BM who is A&Ox3, NAD, appears chronically ill   HEENT: atraumatic, normocephalic, anicteric, moist oral mucosa without lesions, exudate, or erythema; no thrush  LUNGS: breathing unlabored, lungs clear to auscultation bilateral  HEART: RRR; no murmur, rub, or gallop  ABDOMEN: abdomen soft, obese, nondistended, nontender to palpation  : Palomo catheter in place  EXTREMITIES: 2+ bilateral lower extremity pitting edema  SKIN: Open wound to left medial great toe without purulence, erythema, or necrosis (see wound care note for pictures)  NEURO: speech fluent and intact, facial symmetry preserved, no tremor  PSYCH: cooperative, normal mood and affect  LINES: PICC to right upper arm without s/s infection       LABS AND IMAGING:     Recent Labs     06/08/25  0320  "06/08/25  1735 06/09/25  0417   WBC 8.12  --  9.15   RBC 2.65*  --  2.68*   HGB 8.2* 9.1* 8.4*   HCT 25.9* 28.2* 25.7*   MCV 97.7*  --  95.9*   MCH 30.9  --  31.3*   MCHC 31.7*  --  32.7*   RDW 17.0  --  16.3     --  367     No results for input(s): "LACTIC" in the last 72 hours.  No results for input(s): "INR", "APTT", "D-DIMER" in the last 72 hours.    No results for input(s): "HGBA1C", "CHOL", "TRIG", "LDL", "VLDL", "HDL" in the last 72 hours.   Recent Labs     06/08/25  0320 06/09/25  0417    144   K 3.4* 3.4*   CO2 24 24   BUN 23.7 19.0   CREATININE 1.15 1.09   CALCIUM 8.1* 8.1*   MG 1.90 1.80   PHOS 2.7 3.1   ALBUMIN 2.1* 2.1*   GLOBULIN 3.1 3.3   ALKPHOS 40 40   ALT 22 18   AST 16 17   BILITOT 0.5 0.4   CRP 6.10*  --      No results for input(s): "BNP", "CPK", "TROPONINI" in the last 72 hours.         Estimated Creatinine Clearance: 77.7 mL/min (based on SCr of 1.09 mg/dL).   Lab Results   Component Value Date    SEDRATE 5 06/08/2025      Lab Results   Component Value Date    CRP 6.10 (H) 06/08/2025        Micro:   Colonization:  No results found for this or any previous visit.     5/23 pBCx 2/2: NGTD  5/23 Urine cx: Enterococcus faecium      05/28/2025 sputum culture NGTD  05/29/2025 urine culture NGTD  06/02/2025 left foot wound culture with Corynebacterium tuberculostearicum and MRSA.  06/02/2025 left foot tissue culture NGTD; normal skin jacy      CV Ultrasound doppler venous legs bilat    There is no evidence of a right lower extremity DVT.    There is no evidence of a left lower extremity DVT.    Negative for deep and superficial vein thrombosis in bilateral lower   extremities.      TTE 05/26/2025:  Summary  Show Result Comparison     Left Ventricle: The left ventricle is normal in size. Normal wall thickness. There is moderately reduced systolic function with a visually estimated ejection fraction of 35 - 40%. Unable to assess diastolic function due to atrial fibrillation.    Right " Ventricle: The right ventricle is moderately dilated Systolic function is moderately reduced. Prominent moderator band in the right ventricle.    Left Atrium: The left atrium is mildly dilated    Right Atrium: The right atrium is mildly dilated . 1.9 x 1.1 x 1.8 small mobile echogenic mass present.    Aortic Valve: There is mild aortic regurgitation.    Tricuspid Valve: There is mild regurgitation.    Pulmonary Artery: The estimated pulmonary artery systolic pressure is 49 mmHg.    IVC/SVC: Elevated venous pressure at 15 mmHg.      YO 05/30/2025:   Summary  Show Result Comparison     Left Ventricle: The left ventricle is normal in size. Normal wall motion. There is normal systolic function. Quantitated ejection fraction is 56%. Elevated left ventricular filling pressure.    Right Ventricle: The right ventricle is mildly dilated Systolic function is normal.    Left Atrium: The left atrium is dilated Agitated saline study of the atrial septum is negative, suggesting absence of intracardiac shunt at the atrial level. No patent foramen ovale.    Right Atrium: The right atrium is dilated. There is a prominent Eustachian valve with a highly mobile echogenic structure attached to it most likely representing a thrombus and measuring 1.5 cm in its longest dimension. Dense spontaneous echo contrast visualized in the right atrial cavity.    Aortic Valve: The aortic valve is a trileaflet valve. There is no stenosis. There is no significant regurgitation.    Mitral Valve: The mitral valve is structurally normal. There is no stenosis. There is trace regurgitation.    Tricuspid Valve: There is trace regurgitation.    Aorta: The aortic root is normal in size measuring 3.5 cm. The ascending aorta is normal in size measuring 2.9 cm. The aortic arch is normal measuring 2.6 cm. The descending aorta is normal measuring 2.3 cm.    Pericardium: There is no pericardial effusion.     YO obtained for further evaluation of right atrial mass  noted on transthoracic echocardiogram.    KAIT 06/02/2025:   Interpretation Summary  Show Result ComparisonThe right lower extremity ankle brachial index is 2.64 suggesting non compressible tibial vessels.    The left lower extremity ankle brachial index is 1.49 suggesting non compressible tibial vessels.        ASSESSMENT & PLAN:     74-year-old male with PMH CAD, prior CVA in 03/2025, chronic indwelling Fowler catheter who was admitted to Medicine on 5/23 for workup/management of weakness.  Urine culture was obtained on admission, appears to be from old Fowler catheter, which showed growth of Enterococcus faecium.  He has been started on ampicillin as organism was not VRE and sensitive to beta lactams.  CT abdomen and pelvis showed malpositioned Fowler catheter with balloon of the prostate.  Per discussion with primary team there is noted purulence in his urine.  Infectious disease service has been consulted for management of UTI.     1) Enterococcus faecium cystitis  - Organism PCN sensitive, non-VRE  2) Possible acute prostatitis  3) Malpositioned fowler catheter, resolved  4) Sepsis  5) Right atrial echogenic thrombus   6) CVA  7) CAD  8) hepatomegaly   9) transaminitis (resolved)  10) PRIYA (resolved)  11) dyspnea (improving)  12) anemia   13) persistent leukocytosis (resolved)  14) atrial fibrillation with RVR  15) LT foot osteomyelitis   Tissue culture foot NGTD, but additional foot cultures with Corynebacterium tuberculostearicum and MRSA.  16) Significant anemia with hemodynamic instablity   17) Pulmonary embolus left lung  18) atelectasis vs pneumonia with bronchiolitis  17) diffuse arthrosclerotic calcifications      Persistent leukocytosis in the setting of Enterococcus faecium cystitis.  CT pelvis with contrast was done and showed only concerns for cystitis; no prostate abscess.    Urine culture with Enterococcus faecium.  Repeat urine culture 05/29/2025 NGTD.    Sputum culture NGTD.  CTA of the chest without  contrast 06/05/2025 showed collapse and consolidation of the right middle lobe and right lower lobe, along with tree-in-bud nodularity.    Patient noted with episodes of atrial fib with RVR.  TTE showed concerns for a mobile right atrial mass.  YO was followed up and she confirmed a highly mobile thrombus to the right atrium (1.5 cm).  LT toe wound that probes to bone which is indicative of osteomyelitis.  While concerning, did not feel this was source of leukocytosis.  Left foot wound culture with Corynebacterium tuberculostearicum and MRSA.  Left toe tissue culture NGTD; normal skin jayc.  ABIs were done and showed concerns for noncompressible tibial vessels bilateral.  CTA with runoff 06/05/2025 showed a PE at the left lower lung lobe, along with diffuse arthrosclerotic calcifications.    Leukocytosis resolved         RECOMMENDATIONS:       Again recommend vascular evaluation   Continue monotherapy with Vancomycin IV (trough 15-20).  Patient will require 6 weeks of IV antibiotics for treatment of osteomyelitis.   Renal protective measures  Patient needs continued wound care and strict glucose control   Recommend Tdap as this is not up to date   Defer workup/management of anemia to primary team/GI service  Defer management PE to primary team  ID will continue to follow        KIRSTIN Hedrick  [facility] Infectious Diseases     *Portions of this note dictated using EMR integrated voice recognition software, and may be subject to voice recognition errors not corrected at proofreading. Please contact writer for clarification if needed. *

## 2025-06-09 NOTE — PT/OT/SLP PROGRESS
Physical Therapy Treatment    Patient Name:  Vaibhav Vargas   MRN:  [id number]    Recommendations     Therapy Intensity Recommendations at Discharge: Moderate Intensity Therapy  Discharge Equipment Recommendations: other (see comments) (tbd)   Barriers to discharge: fall risk, severity of deficits, level of skilled assistance required, decreased endurance, and complicated medical history    Assessment     Vaibhav Vargas is a 74 y.o. male admitted with a medical diagnosis of  Severe sepsis.  1. Acute renal failure, unspecified acute renal failure type    2. SOB (shortness of breath)    3. Shortness of breath    4. Hyperkalemia    5. Lactic acidosis    6. Complicated UTI (urinary tract infection)    7. Severe sepsis    8. Bladder outlet obstruction    9. Routine check-up    10. Tachycardia    11. Sepsis    12. QT prolongation    13. Cardiac mass    14. A-fib    15. AV block    16. Chest pain    17. PAD (peripheral artery disease)    18. Severe malnutrition    19. Pulmonary embolism    20. Pulmonary emboli       Problem List[1]   He presents with the following impairments/functional limitations:  weakness, impaired endurance, impaired self care skills, impaired functional mobility, gait instability, impaired balance, decreased lower extremity function, decreased safety awareness, impaired cardiopulmonary response to activity, decreased upper extremity function, impaired skin.    Pt. Found right side lying, and PT observed wounds in buttocks covered with gauze.  Pt. With complaints of dizziness and shortness of breath upon seated on edge of bed and did not subside throughout the session.  Pt. Able to eat pudding, however, refused to eat anything else, secondary to no appetite.  Pt.'s heart ranged from 150's to 160's throughout session, which nurse is aware.  Rehab Prognosis: Fair.    Patient would benefit from continued skilled acute PT services to: address above listed impairments/functional limitations; receive  "patient/caregiver education; reduce fall risk; and maximize independency/safety with functional mobility.    Recent Surgery: Procedure(s) (LRB):  EGD,WITH HEMORRHAGE CONTROL (Left) 5 Days Post-Op    Plan     During this hospitalization, patient to be seen 5 x/week to address the identified impairments/functional limitations via gait training, therapeutic activities, therapeutic exercises, neuromuscular re-education, wheelchair management/training and progress toward the established goals.    Plan of Care Expires:  07/08/25    Subjective     Communicated with patient's nurse (Andrew) prior to session.    Patient agreeable to participate in treatment session.    Chief Complaint: "I am dizzy."  Patient/Family Comments/goals: feel better  Pain/Comfort:  Pain Rating 1: 0/10  Pain Rating Post-Intervention 1: 0/10    Objective     Patient found right side lying in bed, with HOB elevated, and with bed rails up bilateral HOB, left FOB, and right FOB with telemetry, pulse ox (continuous), PICC line, fowler catheter  upon PT entry to room.    General Precautions: Standard, fall, contact   Orthopedic Precautions:N/A   Braces:  N/A  Respiratory Status: room air    Functional Mobility:    Bed Mobility:  Rolling Left: stand by assistance  Rolling Right: stand by assistance  Supine to Sit: moderate assistance  Sit to Supine: maximal assistance  Repositioning to center-of-bed: maximal assistance    Transfers:  Sit to Stand: minimum assistance and of 2 persons with rolling walker from elevated bed  Stand to Sit: minimum assistance and of 2 persons with rolling walker    Gait:  Attempted to take sidesteps, however, pt. Suddenly sat down in the bed, secondary to complaints of dizziness    Other Mobility:  Therapeutic Exercises performed:   1 set times 10 repetitions  active-assist range of motion  bilat lower extremity       -seated exercises:              long arc quads    Patient left supine in bed, with HOB elevated, and with bed " rails up bilateral HOB, left FOB, and right FOB with all lines intact, call button in reach, tray table at bedside, and patient's nurse notified.    DME Justifications:  No DME recommended requiring DME justifications    Education     Patient educated on and assisted with functional mobility as noted above.  Patient educated on PT Plan of Care.  Patient was instructed to utilize staff assistance for mobility/transfers.  White board updated regarding patient's safest level of mobility with staff assistance    Goals     Multidisciplinary Problems       Physical Therapy Goals          Problem: Physical Therapy    Goal Priority Disciplines Outcome Interventions   Physical Therapy Goal     PT, PT/OT Progressing    Description: REVIEWED 06/03/2025  Goals to be met by: DISCHARGE  Patient will increase functional independence with mobility by performing:  -. Supine to sit with contact guard assistance - MET & REVISED 06/03/2025  -. Sit to supine with contact guard assistance - MET & REVISED 06/03/2025  -. Rolling to Left and Right with contact guard assistance - MET & REVISED 06/03/2025  -. Sit to stand transfer with Minimal Assistance w/ rolling walker - REVIEWED & CONTINUED  -. Gait  x 25 feet with Minimal Assistance using Rolling Walker w/ wheelchair in tow - REVIEWED & CONTINUED                     Time Tracking     PT Received On: 06/09/25  PT Start Time: 0927     PT Stop Time: 1000  PT Total Time (min): 33 min     Billable Minutes: Therapeutic Activity 33 minutes    *Co- treatment performed due to patient's multiple deficits requiring 2 skilled therapists to appropriately and safely assess patient's strength and endurance while facilitating functional tasks in addition to accommodating for patient's activity tolerance.     06/09/2025       [1]   Patient Active Problem List  Diagnosis    Chronic low back pain    Edema    Obesity    Osteoarthritis of knee    Primary hypertension    Type 2 diabetes mellitus    Coronary  artery disease    Myocardial infarction    Acute stroke due to ischemia    Severe sepsis    Severe malnutrition

## 2025-06-09 NOTE — PROGRESS NOTES
Inpatient Nutrition Assessment    Admit Date: 5/23/2025   Total duration of encounter: 17 days   Patient Age: 74 y.o.    Nutrition Recommendation/Prescription     Suggest liberalize diet to No added Salt, Carb consistent, Soft & Bite size for ease of chewing diet; encourage oral intake   Continue Boost Breeze (provides 250 kcal, 9 g protein per serving) TID  Continue Kavin (provides 90 kcal, 2.5 g protein per serving) BID for wound healing - may mix with pt's preference including orange juice  Consider Probiotic for stomach upset/nausea  Continue electrolyte replacement as needed  Consider Marinol as appetite stimulant as medically appropriate  Daily Weight    Communication of Recommendations: reviewed with nurse and reviewed with patient    Nutrition Assessment     Malnutrition Assessment/Nutrition-Focused Physical Exam    Malnutrition Context: acute illness or injury (05/29/25 1252)  Malnutrition Level: severe (05/29/25 1252)  Energy Intake (Malnutrition): less than or equal to 50% for greater than or equal to 5 days (05/29/25 1252)  Weight Loss (Malnutrition): other (see comments) (Does not meet criteria) (05/29/25 1252)     Orbital Region (Subcutaneous Fat Loss): well nourished        Muscle Mass (Malnutrition): moderate depletion (05/29/25 1252)  [place] Region (Muscle Loss): moderate depletion         Fluid Accumulation (Malnutrition): moderate to severe (05/29/25 1252)        A minimum of two characteristics is recommended for diagnosis of either severe or non-severe malnutrition.    Chart Review    Reason Seen: continuous nutrition monitoring, physician consult for CHF, and follow-up    Malnutrition Screening Tool Results   Have you recently lost weight without trying?: No  Have you been eating poorly because of a decreased appetite?: No   MST Score: 0   Diagnosis:   Atrial Thrombus   Segmental PE   Osteomyelitis of the right foot  Upper GI bleed  Dieulafoy lesion  HFrRF (EF 35-40%)  HTN  A fib with RVR    Post-renal azotemia with cardiorenal component - resolved   UTI in an indulging fowler catheter     Relevant Medical History: CVA, CAD, Venous insufficiency, HTN, DM, Chronic lumbar pain, edema    Scheduled Medications:  0.9% NaCl, , Once  atorvastatin, 20 mg, Daily  enoxaparin, 1 mg/kg, Q12H  furosemide, 40 mg, Daily  LIDOcaine, 1 patch, Q24H  metoprolol succinate, 100 mg, BID  pantoprazole, 40 mg, BID  potassium chloride, 40 mEq, BID  vancomycin (VANCOCIN) 1,000 mg in 0.9% NaCl 250 mL IVPB (admixture device), 1,000 mg, Q24H    Continuous Infusions:   PRN Medications:  0.9%  NaCl infusion (for blood administration), , Q24H PRN  0.9%  NaCl infusion (for blood administration), , Q24H PRN  0.9%  NaCl infusion (for blood administration), , Q24H PRN  0.9%  NaCl infusion (for blood administration), , Q24H PRN  0.9%  NaCl infusion (for blood administration), , Q24H PRN  0.9%  NaCl infusion (for blood administration), , Q24H PRN  acetaminophen, 650 mg, Q4H PRN  dextrose 50%, 12.5 g, PRN  dextrose 50%, 25 g, PRN  glucagon (human recombinant), 1 mg, PRN  glucose, 16 g, PRN  glucose, 24 g, PRN  HYDROcodone-acetaminophen, 1 tablet, Q6H PRN  insulin aspart U-100, 0-5 Units, QID (AC + HS) PRN  melatonin, 6 mg, Nightly PRN  metoprolol, 5 mg, Q5 Min PRN  ondansetron, 4 mg, Q6H PRN  polyethylene glycol, 17 g, Daily PRN  senna-docusate, 1 tablet, Daily PRN  sodium chloride 0.9%, 10 mL, PRN  sodium chloride 0.9%, 10 mL, Q12H PRN  vancomycin - pharmacy to dose, , pharmacy to manage frequency    Calorie Containing IV Medications: no significant kcals from medications at this time    Recent Labs   Lab 06/03/25  0516 06/04/25  0303 06/04/25  1755 06/04/25  2000 06/05/25  0147 06/05/25  1400 06/06/25  0510 06/06/25  1229 06/07/25  0700 06/08/25  0320 06/08/25  1735 06/09/25  0417    140 142  --  145  --  145  --  144 145  --  144   K 4.2 4.0 3.4*  --  3.7  --  4.0  --  3.1* 3.4*  --  3.4*   CALCIUM 8.2* 7.6* 7.5*  --  7.7*  --   8.3*  --  8.3* 8.1*  --  8.1*   PHOS 2.9 3.2  --   --  2.5  --  2.8  --  2.8 2.7  --  3.1   MG 2.20 1.70 1.60  --  1.80  --  1.90  --  2.00 1.90  --  1.80    107 110*  --  111*  --  113*  --  112* 112*  --  109*   CO2 23 25 23  --  23  --  24  --  22* 24  --  24   BUN 55.1* 55.3* 59.4*  --  57.6*  --  38.5*  --  30.5* 23.7  --  19.0   CREATININE 1.26* 1.39* 1.33*  --  1.48*  --  1.29*  --  1.25 1.15  --  1.09   EGFRNORACEVR 60 53 56  --  49  --  58  --  60 >60  --  >60   * 195* 184*  --  178*  --  145*  --  109 84  --  102   BILITOT 0.6 0.3  --   --  0.4  --  0.6  --  0.5 0.5  --  0.4   ALKPHOS 50 46  --   --  40  --  43  --  46 40  --  40   ALT 42 44  --   --  28  --  27  --  30 22  --  18   AST 25 29  --   --  15  --  21  --  24 16  --  17   ALBUMIN 2.2* 1.8*  --   --  2.1*  --  2.1*  --  2.2* 2.1*  --  2.1*   CRP  --  7.50*  --   --   --   --  12.00*  --   --  6.10*  --   --    WBC 15.31* 13.72*  --   --  14.39*  --  12.79*  --  9.54 8.12  --  9.15   HGB 7.2* 4.7*  --    < > 7.0* 8.5* 8.2* 8.7* 8.6* 8.2* 9.1* 8.4*   HCT 22.6* 15.0*  --    < > 21.2* 25.6* 25.1* 27.3* 26.8* 25.9* 28.2* 25.7*    < > = values in this interval not displayed.     Nutrition Orders:  Diet Heart Healthy Standard Tray  Dietary nutrition supplements BID; Kavin - Orange,Dietary nutrition supplements TID; Boost Breeze - Orange    Appetite/Oral Intake: poor/0-25% of meals;  Factors Affecting Nutritional Intake: altered gastrointestinal function, chewing difficulty, decreased appetite, and nausea  Social Needs Impacting Access to Food: none identified  Food/Rastafarian/Cultural Preferences: dislikes rice, gravy, milk; Likes applesauce, fruit cups, mashed potatoes, non dairy ONS  Food Allergies: no known food allergies  Last Bowel Movement: 06/07/25  Wound(s):     Wound 05/23/25 2110 Other (comment) Penis-Tissue loss description: Partial thickness       Wound 03/29/25 1900 Pressure Injury Left dorsal Foot-Tissue loss description: Full  thickness WCN--treating foot/sacral wound     Comments    6/9/25 -- Diet advanced to heart healthy diet on 6/6, continues with poor oral intake; pt reports no appetite & upset stomach with nausea no vomiting; consider Probiotic, consider Merinol as appetite stimulant as medically appropriate; encouraged use of Boost Breeze & Kavin as ordered, pt reports liking orange juice, will order ONS in orange flavor; ate pudding cup this am; weight fluctuations since admit fluid related, 1-2 + generalized edema currently present, improved; H/H (L) - stable    (6/5) PT had EGD yesterday; + active GI bleed; duodenal ulcer clipped; diet progressed to full liquid today; boost breeze ONS ordered. Noted wound care nurse--treating foot/sacral wounds; will order kavin bid. Wt continue to decrease; 106.1kg; tracking. Pt has PICC--TPN recs included if oral intake remains poor or in pt needs to be NPO for reoccurance of GI bleed. Abnormal labs noted: Bun/Cr (H)--PRIYA. H/H (L)--receiving transfusion during rounds.     (6/2) Pt upgraded to ICU on 6-1 2 to Afib/SOB, decline in cardiopulmonary status; pt NPO--for scope with GI; ? GI bleed. Pt not very talkative; noted pt has not been eating much previously; when diet resumes--encourage oral intake; ONS use; pt with foot/sacral wound; WCN on case; also suggest Kavin for wound healing. Wt decreased to 112.5kg; fluid/ tracking wt. Labs acknowledged: Bun/Cr (H); GFR(L)--PRIYA/ nephrology on case    5/29/25 -- Pt continues with poor oral intake, denies n/v; reports decreased oral intake related to pain & not liking foods -- preferences obtained by kitchen  daily; pt reports drinking Boost Breeze as ordered; weight fluctuation noted, 3+ LE edema - diuresing; most recent labs/meds reviewed, suggest low Na, carb consistent diet    5/26/25 -- Pt with poor oral intake of 0-25% since admit, family at bedside reports decreased po intake > 1 week, reports does not eat rice, gravies, milk; food  "preferences obtained & reported to kitchen, agreeable to Boost Breeze to supplement nutrition; denies swallowing difficulty; chewing difficulty related to not having dentures this admit; no n/v/d; no indication of significant weight changes, UBW appears to be ~ 270 lb; Most recent labs/meds reviewed, suggest low Na, carb consistent diet; Monitor need for renal diet restriction    Anthropometrics    Height: 6' 2" (188 cm), Height Method: Stated  Last Weight: 107.7 kg (237 lb 6.4 oz) (25 0646), Weight Method: Bed Scale  BMI (Calculated): 30.5  BMI Classification: obese grade I (BMI 30-34.9)        Ideal Body Weight (IBW), Male: 190 lb     % Ideal Body Weight, Male (lb): 123.16 %         Usual Body Weight (UBW), k kg  % Usual Body Weight: 97.76  % Weight Change From Usual Weight: -2.44 %  Usual Weight Provided By: EMR weight history    Wt Readings from Last 5 Encounters:   25 107.7 kg (237 lb 6.4 oz)   25 115.7 kg (255 lb)   24 128.2 kg (282 lb 11.2 oz)   24 128 kg (282 lb 3 oz)   24 126.3 kg (278 lb 8 oz)     Weight Change(s) Since Admission: new admit  Wt Readings from Last 1 Encounters:   25 0646 107.7 kg (237 lb 6.4 oz)   25 0509 107.5 kg (237 lb)   25 0900 106.1 kg (234 lb)   25 0600 106.1 kg (234 lb)   25 0527 112.5 kg (248 lb 0.3 oz)   25 0541 114 kg (251 lb 5.2 oz)   25 1351 116.1 kg (256 lb)   25 0625 116.2 kg (256 lb 2.8 oz)   25 1251 115 kg (253 lb 8.5 oz)   25 0700 96.2 kg (212 lb 1.3 oz)   25 0104 96.2 kg (212 lb 1.3 oz)   25 0640 95.5 kg (210 lb 8.6 oz)   25 0639 95.5 kg (210 lb 8.6 oz)   25 0645 94.4 kg (208 lb 3.2 oz)   25 1059 120 kg (264 lb 8.8 oz)   25 0600 120.5 kg (265 lb 11.2 oz)   25 0700 120.2 kg (264 lb 14.4 oz)   25 0517 120.2 kg (264 lb 14.4 oz)   25 2225 117.9 kg (260 lb)   25 1816 126.6 kg (279 lb)   25 1813 108.9 kg (240 lb) "   Admit Weight: 108.9 kg (240 lb) (05/23/25 1813), Weight Method: Stated    Estimated Needs    Weight Used For Calorie Calculations: 112.5 kg (248 lb 0.3 oz) (based on updated wt (6-2))  Energy Calorie Requirements (kcal): 2250kcal/d; 20 humaira/kg--obese  Energy Need Method: Kcal/kg  Weight Used For Protein Calculations: 86 kg (189 lb 9.5 oz) (based on IBW)  Protein Requirements:  gm protein/d; 1-1.2 gm/kg IBW  Fluid Requirements (mL): 2250 ml/d; 1ml/humaira  CHO Requirement: 253 gm CHO/d     Enteral Nutrition     Patient not receiving enteral nutrition at this time.    Parenteral Nutrition     Patient not receiving parenteral nutrition support at this time.    Evaluation of Received Nutrient Intake    Calories: not meeting estimated needs  Protein: not meeting estimated needs    Patient Education     Not applicable.    Nutrition Diagnosis     PES: Inadequate oral intake related to acute illness as evidenced by < 50% po intake > 5 days. (active)     PES: Severe acute disease or injury related malnutrition Related to acute malnutrition As Evidenced by:  - energy intake: <= 50% for 10 days (meets criteria for <= 50% >= 5 days (severe - acute)) - muscle mass depletion: 1 area of moderate muscle loss (Temporalis) - fluid accumulation: 1 area of moderate or severe fluid accumulation (Lower extremities edema) active    Nutrition Interventions     Intervention(s): Care coordination or referral;Oral diet/nutrient modifications;Oral nutritional supplement    Goal: Meet greater than 80% of nutritional needs by follow-up. (goal not met)  Goal: Maintain weight throughout hospitalization. (goal progressing)    Nutrition Goals & Monitoring     Dietitian will monitor: food and beverage intake, weight, and electrolyte/renal panel  Discharge planning: continue Soft & Bite size, No added salt,  Carb consistent  diet with Boost Breeze or equivalent oral supplements  Nutrition Risk/Follow-Up: patient at increased nutrition risk;  dietitian will follow-up twice weekly   Please consult if re-assessment needed sooner.

## 2025-06-09 NOTE — PROGRESS NOTES
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 CLINICAL HISTORY shortness of breath; TECHNIQUE A total of 1 frontal image(s) submitted of the chest. COMPARISON 23 March 2025 FINDINGS Lines/tubes/devices: ECG leads overlie the imaged region. The cardiac silhouette and central vascular structures are unchanged when allowing for differences in technique and severe rightward patient rotation.  The trachea is midline. No new or worsening consolidation is developed in the interval.  There is no large pleural effusion or convincing pneumothorax. Regional osseous structures and extrathoracic soft tissues are similar. IMPRESSION No acute process or other adverse interval change. Electronically signed by: Luis Enrique Posada Date:    05/23/2025 Time:    19:30        Assessment / Plan:     Vaibhav Vargas is a 74 y.o. male with CAD (remote PCI to LAD - 2017), HTN, T2DM (A1c 5.6), and a fib who presented on 5/23/25 with weakness from a nursing home found to have acute kidney injury secondary to obstruction (chronic fowler - tip lodge prostate) and urine culture growing Enterococcus faecium ( > 100 CFU).  Now with acute decompensated heart failure, a fib with RVR, and right atrial thrombosis, PTE.      Duodenal ulcer with bleeding:   - Hgb trend 6.6 --> 1 unit --> 7.5 -->6.9 --> 1 unit --> 7.8--> 7.5--> 1 unit --> 7.3 --> 7.3 --> 1  unit --> 7.1 -->7.2 --> 4.7 --> 3 units PRBCs --> 7.4 --> 7.0 g/dL --> 1 unit --> 8.5-->8.2-->8.7 --> 8.6--> 8.2--> 9.1 --> 8.4 g/dL .  Overall stable    06/02: EGD: 3 duodenal ulcers - 15 mm posterior wall with oozing s/p APC and 10 mm on lateral wall with adherent clot s/p heater probe  06/04: EGD: 13 mm posterior wall ulcer with VV s/p bipolar and clips x 2.  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         Chapis Lane MD, MPH  Gastroenterology and Hepatology   of Clinical Medicine  [employer] [employer] - [facility] and [facility]

## 2025-06-10 LAB
ALBUMIN SERPL-MCNC: 2.2 G/DL (ref 3.4–4.8)
ALBUMIN/GLOB SERPL: 0.6 RATIO (ref 1.1–2)
ALP SERPL-CCNC: 47 UNIT/L (ref 40–150)
ALT SERPL-CCNC: 20 UNIT/L (ref 0–55)
ANION GAP SERPL CALC-SCNC: 12 MEQ/L
AST SERPL-CCNC: 29 UNIT/L (ref 11–45)
BASOPHILS # BLD AUTO: 0.04 X10(3)/MCL
BASOPHILS NFR BLD AUTO: 0.4 %
BILIRUB SERPL-MCNC: 0.5 MG/DL
BNP BLD-MCNC: 511.7 PG/ML
BUN SERPL-MCNC: 20.3 MG/DL (ref 8.4–25.7)
CALCIUM SERPL-MCNC: 8.1 MG/DL (ref 8.8–10)
CHLORIDE SERPL-SCNC: 109 MMOL/L (ref 98–107)
CO2 SERPL-SCNC: 21 MMOL/L (ref 23–31)
CREAT SERPL-MCNC: 1.06 MG/DL (ref 0.72–1.25)
CREAT/UREA NIT SERPL: 19
CRP SERPL-MCNC: 26.9 MG/L
EOSINOPHIL # BLD AUTO: 0.03 X10(3)/MCL (ref 0–0.9)
EOSINOPHIL NFR BLD AUTO: 0.3 %
ERYTHROCYTE [DISTWIDTH] IN BLOOD BY AUTOMATED COUNT: 16 % (ref 11.5–17)
ERYTHROCYTE [SEDIMENTATION RATE] IN BLOOD: 27 MM/HR (ref 0–15)
GFR SERPLBLD CREATININE-BSD FMLA CKD-EPI: >60 ML/MIN/1.73/M2
GLOBULIN SER-MCNC: 3.6 GM/DL (ref 2.4–3.5)
GLUCOSE SERPL-MCNC: 167 MG/DL (ref 82–115)
HCT VFR BLD AUTO: 25.1 % (ref 42–52)
HGB BLD-MCNC: 8 G/DL (ref 14–18)
HOLD SPECIMEN: NORMAL
IMM GRANULOCYTES # BLD AUTO: 0.08 X10(3)/MCL (ref 0–0.04)
IMM GRANULOCYTES NFR BLD AUTO: 0.8 %
LEFT CFA PSV: 98 CM/S
LEFT DORSALIS PEDIS PSV: 23 CM/S
LEFT PERONEAL SYS PSV: 102 CM/S
LEFT POPLITEAL PSV: 65 CM/S
LEFT POST TIBIAL SYS PSV: 15 CM/S
LEFT PROFUNDA SYS PSV: 90 CM/S
LEFT SUPER FEMORAL DIST SYS PSV: 79 CM/S
LEFT SUPER FEMORAL MID SYS PSV: 94 CM/S
LEFT SUPER FEMORAL PROX SYS PSV: 87 CM/S
LYMPHOCYTES # BLD AUTO: 0.83 X10(3)/MCL (ref 0.6–4.6)
LYMPHOCYTES NFR BLD AUTO: 8 %
MAGNESIUM SERPL-MCNC: 1.9 MG/DL (ref 1.6–2.6)
MCH RBC QN AUTO: 30.8 PG (ref 27–31)
MCHC RBC AUTO-ENTMCNC: 31.9 G/DL (ref 33–36)
MCV RBC AUTO: 96.5 FL (ref 80–94)
MONOCYTES # BLD AUTO: 1.01 X10(3)/MCL (ref 0.1–1.3)
MONOCYTES NFR BLD AUTO: 9.7 %
NEUTROPHILS # BLD AUTO: 8.43 X10(3)/MCL (ref 2.1–9.2)
NEUTROPHILS NFR BLD AUTO: 80.8 %
NRBC BLD AUTO-RTO: 0.2 %
PHOSPHATE SERPL-MCNC: 2.6 MG/DL (ref 2.3–4.7)
PLATELET # BLD AUTO: 429 X10(3)/MCL (ref 130–400)
PMV BLD AUTO: 9.2 FL (ref 7.4–10.4)
POCT GLUCOSE: 146 MG/DL (ref 70–110)
POCT GLUCOSE: 151 MG/DL (ref 70–110)
POCT GLUCOSE: 172 MG/DL (ref 70–110)
POTASSIUM SERPL-SCNC: 4.5 MMOL/L (ref 3.5–5.1)
PROT SERPL-MCNC: 5.8 GM/DL (ref 5.8–7.6)
RBC # BLD AUTO: 2.6 X10(6)/MCL (ref 4.7–6.1)
RIGHT CFA PSV: 75 CM/S
RIGHT DORSALIS PEDIS PSV: 74 CM/S
RIGHT PERONEAL SYS PSV: 105 CM/S
RIGHT POPLITEAL PSV: 60 CM/S
RIGHT POST TIBIAL SYS PSV: 115 CM/S
RIGHT PROFUNDA SYS PSV: 55 CM/S
RIGHT SUPER FEMORAL DIST SYS PSV: 83 CM/S
RIGHT SUPER FEMORAL MID SYS PSV: 119 CM/S
RIGHT SUPER FEMORAL PROX SYS PSV: 81 CM/S
SODIUM SERPL-SCNC: 142 MMOL/L (ref 136–145)
VANCOMYCIN TROUGH SERPL-MCNC: 18.6 UG/ML (ref 15–20)
WBC # BLD AUTO: 10.42 X10(3)/MCL (ref 4.5–11.5)

## 2025-06-10 PROCEDURE — 25000003 PHARM REV CODE 250

## 2025-06-10 PROCEDURE — 80053 COMPREHEN METABOLIC PANEL: CPT

## 2025-06-10 PROCEDURE — 83735 ASSAY OF MAGNESIUM: CPT

## 2025-06-10 PROCEDURE — 94761 N-INVAS EAR/PLS OXIMETRY MLT: CPT

## 2025-06-10 PROCEDURE — 25000003 PHARM REV CODE 250: Performed by: INTERNAL MEDICINE

## 2025-06-10 PROCEDURE — 80202 ASSAY OF VANCOMYCIN: CPT | Performed by: INTERNAL MEDICINE

## 2025-06-10 PROCEDURE — 36415 COLL VENOUS BLD VENIPUNCTURE: CPT

## 2025-06-10 PROCEDURE — 85652 RBC SED RATE AUTOMATED: CPT | Performed by: NURSE PRACTITIONER

## 2025-06-10 PROCEDURE — 63600175 PHARM REV CODE 636 W HCPCS

## 2025-06-10 PROCEDURE — 84100 ASSAY OF PHOSPHORUS: CPT

## 2025-06-10 PROCEDURE — 27000207 HC ISOLATION

## 2025-06-10 PROCEDURE — 11000001 HC ACUTE MED/SURG PRIVATE ROOM

## 2025-06-10 PROCEDURE — 83880 ASSAY OF NATRIURETIC PEPTIDE: CPT

## 2025-06-10 PROCEDURE — 36415 COLL VENOUS BLD VENIPUNCTURE: CPT | Performed by: INTERNAL MEDICINE

## 2025-06-10 PROCEDURE — 85025 COMPLETE CBC W/AUTO DIFF WBC: CPT

## 2025-06-10 PROCEDURE — 86140 C-REACTIVE PROTEIN: CPT | Performed by: NURSE PRACTITIONER

## 2025-06-10 RX ORDER — PANTOPRAZOLE SODIUM 40 MG/1
40 TABLET, DELAYED RELEASE ORAL
Status: DISCONTINUED | OUTPATIENT
Start: 2025-06-10 | End: 2025-06-12 | Stop reason: HOSPADM

## 2025-06-10 RX ORDER — ENOXAPARIN SODIUM 100 MG/ML
1 INJECTION SUBCUTANEOUS EVERY 12 HOURS
Status: DISCONTINUED | OUTPATIENT
Start: 2025-06-11 | End: 2025-06-11

## 2025-06-10 RX ORDER — ENOXAPARIN SODIUM 100 MG/ML
1 INJECTION SUBCUTANEOUS EVERY 12 HOURS
Status: DISCONTINUED | OUTPATIENT
Start: 2025-06-10 | End: 2025-06-10

## 2025-06-10 RX ORDER — METOPROLOL TARTRATE 25 MG/1
25 TABLET, FILM COATED ORAL ONCE
Status: COMPLETED | OUTPATIENT
Start: 2025-06-10 | End: 2025-06-10

## 2025-06-10 RX ADMIN — LIDOCAINE 5% 1 PATCH: 700 PATCH TOPICAL at 11:06

## 2025-06-10 RX ADMIN — METOPROLOL SUCCINATE 100 MG: 50 TABLET, EXTENDED RELEASE ORAL at 08:06

## 2025-06-10 RX ADMIN — ENOXAPARIN SODIUM 110 MG: 40 INJECTION SUBCUTANEOUS at 03:06

## 2025-06-10 RX ADMIN — FUROSEMIDE 40 MG: 20 TABLET ORAL at 08:06

## 2025-06-10 RX ADMIN — METOPROLOL TARTRATE 5 MG: 1 INJECTION, SOLUTION INTRAVENOUS at 08:06

## 2025-06-10 RX ADMIN — PANTOPRAZOLE SODIUM 40 MG: 40 TABLET, DELAYED RELEASE ORAL at 08:06

## 2025-06-10 RX ADMIN — METOPROLOL SUCCINATE 100 MG: 50 TABLET, EXTENDED RELEASE ORAL at 09:06

## 2025-06-10 RX ADMIN — SENNOSIDES AND DOCUSATE SODIUM 1 TABLET: 50; 8.6 TABLET ORAL at 08:06

## 2025-06-10 RX ADMIN — VANCOMYCIN HYDROCHLORIDE 1000 MG: 1 INJECTION, POWDER, LYOPHILIZED, FOR SOLUTION INTRAVENOUS at 06:06

## 2025-06-10 RX ADMIN — METOPROLOL TARTRATE 5 MG: 1 INJECTION, SOLUTION INTRAVENOUS at 01:06

## 2025-06-10 RX ADMIN — ATORVASTATIN CALCIUM 20 MG: 20 TABLET, FILM COATED ORAL at 08:06

## 2025-06-10 RX ADMIN — METOPROLOL TARTRATE 25 MG: 25 TABLET, FILM COATED ORAL at 06:06

## 2025-06-10 NOTE — MEDICAL/APP STUDENT
"  [facility]  General Surgery - PURPLE Team  Progress Note  Admit Date: 5/23/2025  HD#18  POD#6 Days Post-Op    Subjective:   Interval history:    NAEON AF   Intermittent tachycardia  Tolerating diet, no BMs  Denies fever, chills, headaches, vision changes, CP, SOB, or N/V/D.     Objective:     VITAL SIGNS: 24 HR MIN & MAX LAST   Temp  Min: 97.4 °F (36.3 °C)  Max: 98.2 °F (36.8 °C)  97.6 °F (36.4 °C)   BP  Min: 95/61  Max: 121/60  113/67    Pulse  Min: 63  Max: 159  (!) 149    Resp  Min: 18  Max: 18  18    SpO2  Min: 94 %  Max: 100 %  100 %      HT: 6' 2" (188 cm)  WT: 107.7 kg (237 lb 6.4 oz)  BMI: 30.5       Physical examination:  Gen: NAD, AAOx3, answering questions appropriately  HEENT: normocephalic, mucous membranes moist, no scleral icterus   Neck: supple, symmetrical, no JVD   CV: tachycardiac on auscultation, no murmurs  Resp: NWOB  Abd: S/NT/ND  Ext:  minimal mobility in BLE. Femoral pulses 2+ bilaterally, DP 2+ bilaterally, PT undetectable using doppler.   Neuro: CN II-XII grossly intact    Labs:  Recent Labs     06/08/25  0320 06/08/25  1735 06/09/25  0417 06/09/25  2144 06/10/25  0408   WBC 8.12  --  9.15  --  10.42   HGB 8.2*   < > 8.4* 8.3* 8.0*   HCT 25.9*   < > 25.7* 26.2* 25.1*     --  367  --  429*     --  144  --  142   K 3.4*  --  3.4*  --  4.5   *  --  109*  --  109*   CO2 24  --  24  --  21*   BUN 23.7  --  19.0  --  20.3   CREATININE 1.15  --  1.09  --  1.06   BILITOT 0.5  --  0.4  --  0.5   AST 16  --  17  --  29   ALT 22  --  18  --  20   ALKPHOS 40  --  40  --  47   CALCIUM 8.1*  --  8.1*  --  8.1*   ALBUMIN 2.1*  --  2.1*  --  2.2*   PROT 5.2*  --  5.4*  --  5.8   MG 1.90  --  1.80  --  1.90   PHOS 2.7  --  3.1  --  2.6    < > = values in this interval not displayed.       Imaging:  CTA Runoff ABD PEL Bilat Lower Ext   Final Result      1. Segmental pulmonary embolus at the left lower lobe.  Findings discussed with  Bernice Lipscomb, the physician caring " for patient 6/5/2025 09:11.   2. Diffuse atherosclerotic calcifications.  No appreciable significant stenosis above the knee.  Below the knee evaluation is limited due to diminutive caliber vessels and calcific atherosclerosis.  Defer to sonogram.         Electronically signed by: Ann Armstrong   Date:    06/05/2025   Time:    09:12      CT Chest Without Contrast   Final Result      1. Collapse and consolidation at the right middle lobe and right lower lobe.  Atelectasis versus pneumonia   2. Tree-in-bud nodularity at the left lung.  Bronchiolitis         Electronically signed by: Ann Armstrong   Date:    06/05/2025   Time:    08:52      X-Ray Chest 1 View   Final Result      Stable exam without significant interval change         Electronically signed by: Cha Bueno   Date:    06/04/2025   Time:    15:50      CT Pelvis With IV Contrast NO Oral Contrast   Final Result      1. A fowler catheter in place. The bladder wall appears thickened even though the bladder is collapsed. This could reflect an element of cystitis. Correlate with clinical and laboratory findings.      2. No acute pelvic solid organ or bowel pathology identified. Details and other findings as discussed above      [employer] concurrence         Electronically signed by: Jayme Hagan MD   Date:    06/03/2025   Time:    07:54      X-Ray Chest 1 View for Line/Tube Placement   Final Result      X-Ray Chest 1 View   Final Result      Mild improved aeration at the right lung base.         Electronically signed by: Ann Armstrong   Date:    06/01/2025   Time:    12:46      X-Ray Chest 1 View   Final Result      Hypoaerated lungs         Electronically signed by: Lonnie Cornell   Date:    05/31/2025   Time:    16:57      X-Ray Chest 1 View   Final Result      No acute cardiopulmonary process identified.         Electronically signed by: Sumit Núñez   Date:    05/29/2025   Time:    09:06      CT Abdomen Pelvis  Without Contrast   Final  Result      The hypoattenuated area in the prostate that was seen on prior examination is not visualized on today's exam.  Additional imaging with contrast enhancement may be beneficial for better delineation.      Coarse heterotrophic calcifications seen throughout the prostate      Interval placement of a Palomo catheter in the urinary bladder with decompressed appearing urinary bladder      Interval development of right lower lobe atelectasis and moderate right-sided pleural effusion         Electronically signed by: Lonnie Cornell   Date:    05/28/2025   Time:    11:38      US Abdomen Limited_Liver   Final Result      1. Mild hepatomegaly.   2. Right kidney hydronephrosis improved compared to the prior CT.         Electronically signed by: Jesús Moura   Date:    05/27/2025   Time:    15:58      CT Abdomen Pelvis  Without Contrast   Final Result      X-Ray Chest 1 View   Final Result         I have reviewed all pertinent imaging results/findings within the past 24 hours.      Assessment & Plan:   Vaibhav Vagras is a 74 y.o. male with PMHx of DM type 2, hypertension, obesity, CAD who was admitted for sepsis now with atrial thrombus, segmental PE, AFib, heart failure exacerbation, and osteomyelitis of the right foot. Vascular surgery consulted for PAD. CTA shows atherosclerotic disease.     - Consider IV abx vs ampillicin   - Pending ABIs and BLE U/S  - Wound care following    Julio C Singh, MS3  [employer] General Surgery

## 2025-06-10 NOTE — PROGRESS NOTES
Pharmacokinetic Assessment Follow Up: IV Vancomycin    Vancomycin serum concentration assessment(s):    The trough level was drawn correctly and can be used to guide therapy at this time. The measurement is within the desired definitive target range of 15 to 20 mcg/mL.    Vancomycin Regimen Plan:    Continue regimen to Vancomycin 1000 mg IV every 24 hours with next serum trough concentration measured at 1700 prior to 1800 dose on 6/12    Drug levels (last 3 results):  Recent Labs   Lab Result Units 06/09/25  1656 06/10/25  1705   Vancomycin Trough ug/ml 18.4 18.6       Pharmacy will continue to follow and monitor vancomycin.    Please contact pharmacy at extension [phone] for questions regarding this assessment.    Thank you for the consult,   Alexa Bai       Patient brief summary:  Vaibhav Vargas is a 74 y.o. male initiated on antimicrobial therapy with IV Vancomycin for treatment of bone/joint infection    The patient's current regimen is vancomycin 1000 mg q 24 hours    Drug Allergies:   Review of patient's allergies indicates:  No Known Allergies    Actual Body Weight:   107.7 kg    Renal Function:   Estimated Creatinine Clearance: 79.9 mL/min (based on SCr of 1.06 mg/dL).,     Dialysis Method (if applicable):  N/A    CBC (last 72 hours):  Recent Labs   Lab Result Units 06/08/25  0320 06/08/25  1735 06/09/25  0417 06/09/25  2144 06/10/25  0408   WBC x10(3)/mcL 8.12  --  9.15  --  10.42   Hgb g/dL 8.2* 9.1* 8.4* 8.3* 8.0*   Hct % 25.9* 28.2* 25.7* 26.2* 25.1*   Platelet x10(3)/mcL 317  --  367  --  429*   Mono % % 11.9  --  14.3  --  9.7   Eos % % 4.2  --  1.5  --  0.3   Basophil % % 0.9  --  0.5  --  0.4       Metabolic Panel (last 72 hours):  Recent Labs   Lab Result Units 06/08/25  0320 06/09/25  0417 06/10/25  0408   Sodium mmol/L 145 144 142   Potassium mmol/L 3.4* 3.4* 4.5   Chloride mmol/L 112* 109* 109*   CO2 mmol/L 24 24 21*   Glucose mg/dL 84 102 167*   Blood Urea Nitrogen mg/dL 23.7 19.0 20.3    Creatinine mg/dL 1.15 1.09 1.06   Albumin g/dL 2.1* 2.1* 2.2*   Bilirubin Total mg/dL 0.5 0.4 0.5   ALP unit/L 40 40 47   AST unit/L 16 17 29   ALT unit/L 22 18 20   Magnesium Level mg/dL 1.90 1.80 1.90   Phosphorus Level mg/dL 2.7 3.1 2.6       Vancomycin Administrations:  vancomycin given in the last 96 hours                     vancomycin (VANCOCIN) 1,000 mg in 0.9% NaCl 250 mL IVPB (admixture device) (mg) 1,000 mg New Bag 06/09/25 1821     1,000 mg New Bag 06/08/25 1753     1,000 mg New Bag 06/07/25 1603                    Microbiologic Results:  Microbiology Results (last 7 days)       Procedure Component Value Units Date/Time    Wound Culture [[id number]]  (Abnormal)  (Susceptibility) Collected: 06/02/25 0919    Order Status: Completed Specimen: Wound from Foot, Left Updated: 06/06/25 1032     Wound Culture Few Methicillin resistant Staphylococcus aureus    Anaerobic Culture [[id number]]  (Abnormal) Collected: 06/02/25 0919    Order Status: Completed Specimen: SWAB from Foot, Left Updated: 06/06/25 1018     Anaerobe Culture Corynebacterium tuberculostearicum     Comment: Anaerobic sensitivity is not usually indicated except on single isolates from sterile body sites.       Anaerobic Culture [[id number]] Collected: 06/02/25 1121    Order Status: Completed Specimen: Tissue from Foot, Left Updated: 06/06/25 0753     Anaerobe Culture No Anaerobes Isolated    Tissue Culture - Aerobic [[id number]] Collected: 06/02/25 1121    Order Status: Completed Specimen: Tissue from Foot, Left Updated: 06/05/25 1046     Tissue - Aerobic Culture Normal Skin Manisha, no further workup.

## 2025-06-10 NOTE — PROGRESS NOTES
General Surgery  Daily Progress Note     HD#18  POD#6 Days Post-Op    SUBJECTIVE / INTERVAL EVENTS  NAEON AF VSS  Pain well controlled  Tolerating regular diet with no nausea or vomiting    OBJECTIVE    Vitals  Temp:  [97.4 °F (36.3 °C)-98.2 °F (36.8 °C)] 97.6 °F (36.4 °C)  Pulse:  [] 152  Resp:  [18] 18  SpO2:  [94 %-100 %] 99 %  BP: ()/(60-77) 112/69    Physical Exam:  General: NAD  HEENT: normocephalic, mucous membranes moist, no scleral icterus  Neck: supple, symmetrical, no JVD  Lungs:  clear to auscultation bilaterally and normal respiratory effort  Cardiovascular: RRR  Extremities: minimal mobility in BLE. Fem a 2+ bilaterally, DP 2+ bilaterally    Abdomen: S/ND/NT, no masses/hernias  Skin: turgor normal. No rashes or lesions  Psych/Behavioral:  A&Ox3, appropriate affect.       Labs    Lab Results   Component Value Date    WBC 10.42 06/10/2025    HGB 8.0 (L) 06/10/2025    HCT 25.1 (L) 06/10/2025    MCV 96.5 (H) 06/10/2025     (H) 06/10/2025           Recent Labs     06/08/25  0320 06/09/25  0417 06/10/25  0408    144 142   K 3.4* 3.4* 4.5   * 109* 109*   CO2 24 24 21*   BUN 23.7 19.0 20.3   CREATININE 1.15 1.09 1.06   GLU 84 102 167*   CALCIUM 8.1* 8.1* 8.1*   MG 1.90 1.80 1.90   PHOS 2.7 3.1 2.6   PROT 5.2* 5.4* 5.8   ALBUMIN 2.1* 2.1* 2.2*   BILITOT 0.5 0.4 0.5   AST 16 17 29   ALKPHOS 40 40 47   ALT 22 18 20        Micro  Microbiology Results (last 7 days)       Procedure Component Value Units Date/Time    Wound Culture [[id number]]  (Abnormal)  (Susceptibility) Collected: 06/02/25 0919    Order Status: Completed Specimen: Wound from Foot, Left Updated: 06/06/25 1032     Wound Culture Few Methicillin resistant Staphylococcus aureus    Anaerobic Culture [[id number]]  (Abnormal) Collected: 06/02/25 0919    Order Status: Completed Specimen: SWAB from Foot, Left Updated: 06/06/25 1018     Anaerobe Culture Corynebacterium tuberculostearicum     Comment: Anaerobic sensitivity is  not usually indicated except on single isolates from sterile body sites.       Anaerobic Culture [[id number]] Collected: 06/02/25 1121    Order Status: Completed Specimen: Tissue from Foot, Left Updated: 06/06/25 0753     Anaerobe Culture No Anaerobes Isolated    Tissue Culture - Aerobic [[id number]] Collected: 06/02/25 1121    Order Status: Completed Specimen: Tissue from Foot, Left Updated: 06/05/25 1046     Tissue - Aerobic Culture Normal Skin Manisha, no further workup.             Diagnostic studies:    US arterial BLE   The right lower extremity arterial system is patent with no evidence of focal stenosis or occlusion.  The left lower extremity arterial system is patent with no evidence of focal stenosis or occlusion.  The left distal posterior tibial artery demonstrated retrograde flow.    KAIT:  The right lower extremity ankle brachial index is 2.64 suggesting non compressible tibial vessels.    The left lower extremity ankle brachial index is 1.49 suggesting non compressible tibial vessels.      ASSESSMENT & PLAN  Vaibhav Vargas is a 74 y.o. 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 Vascular surgery consulted for PAD. KAIT 2.64 and 1.49 in RLE and LLE, respectively. Arterial US of BLE without evidence of focal occlusion and stenosis.     - Extensively talked to family about operative debridement. Family is not interested in operative debridement at this time.   - Recommend continuing IV abx for osteomyelitis in accordance with family wishes  -  Surgery will sign off at this time. Please reach back with any questions or concerns    Leonidas Snowden MD. PhD  [facility] General Surgery, PGY1  6/10/2025  8:55 AM
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
## 2025-06-10 NOTE — PROGRESS NOTES
06/10/25 4605   Missed Time Reason   Missed Treatment Reason Nurse/ AWAIS hold     Per nurse, pt's HR (while asleep) currently in 150s, anticipating cardioversion today.  OT/PT will hold therapy.  F/u after cardioversion as schedule allows.

## 2025-06-10 NOTE — PT/OT/SLP PROGRESS
Physical Therapy    Missed Treatment Session - cancel note - 1330 - 06/10/2025    Patient Name:  Vaibhav Vargas   MRN:  [id number]    [id number]  -patient not seen at this time secondary to patient not appropriate  -per MERRITT Amador-HR in 150's and awaiting cardioversion  -will follow-up as patient is appropriate/available/agreeable to participate and as therapists' schedule allows.

## 2025-06-11 LAB
ABO + RH BLD: NORMAL
ALBUMIN SERPL-MCNC: 2.1 G/DL (ref 3.4–4.8)
ALBUMIN/GLOB SERPL: 0.6 RATIO (ref 1.1–2)
ALP SERPL-CCNC: 52 UNIT/L (ref 40–150)
ALT SERPL-CCNC: 22 UNIT/L (ref 0–55)
ANION GAP SERPL CALC-SCNC: 14 MEQ/L
AST SERPL-CCNC: 33 UNIT/L (ref 11–45)
BASOPHILS # BLD AUTO: 0.04 X10(3)/MCL
BASOPHILS NFR BLD AUTO: 0.3 %
BILIRUB SERPL-MCNC: 0.6 MG/DL
BLD PROD TYP BPU: NORMAL
BLOOD UNIT EXPIRATION DATE: NORMAL
BLOOD UNIT TYPE CODE: 6200
BSA FOR ECHO PROCEDURE: 2.37 M2
BUN SERPL-MCNC: 30.7 MG/DL (ref 8.4–25.7)
CALCIUM SERPL-MCNC: 8.3 MG/DL (ref 8.8–10)
CHLORIDE SERPL-SCNC: 108 MMOL/L (ref 98–107)
CO2 SERPL-SCNC: 20 MMOL/L (ref 23–31)
CREAT SERPL-MCNC: 1.56 MG/DL (ref 0.72–1.25)
CREAT/UREA NIT SERPL: 20
CROSSMATCH INTERPRETATION: NORMAL
DISPENSE STATUS: NORMAL
EOSINOPHIL # BLD AUTO: 0.01 X10(3)/MCL (ref 0–0.9)
EOSINOPHIL NFR BLD AUTO: 0.1 %
ERYTHROCYTE [DISTWIDTH] IN BLOOD BY AUTOMATED COUNT: 16 % (ref 11.5–17)
GFR SERPLBLD CREATININE-BSD FMLA CKD-EPI: 46 ML/MIN/1.73/M2
GLOBULIN SER-MCNC: 3.6 GM/DL (ref 2.4–3.5)
GLUCOSE SERPL-MCNC: 147 MG/DL (ref 70–110)
GLUCOSE SERPL-MCNC: 226 MG/DL (ref 82–115)
HCT VFR BLD AUTO: 22.2 % (ref 42–52)
HCT VFR BLD AUTO: 26 % (ref 42–52)
HGB BLD-MCNC: 6.9 G/DL (ref 14–18)
HGB BLD-MCNC: 8.2 G/DL (ref 14–18)
HOLD SPECIMEN: NORMAL
HOLD SPECIMEN: NORMAL
IMM GRANULOCYTES # BLD AUTO: 0.1 X10(3)/MCL (ref 0–0.04)
IMM GRANULOCYTES NFR BLD AUTO: 0.8 %
LYMPHOCYTES # BLD AUTO: 1.31 X10(3)/MCL (ref 0.6–4.6)
LYMPHOCYTES NFR BLD AUTO: 10.7 %
MAGNESIUM SERPL-MCNC: 1.9 MG/DL (ref 1.6–2.6)
MCH RBC QN AUTO: 30.3 PG (ref 27–31)
MCHC RBC AUTO-ENTMCNC: 31.1 G/DL (ref 33–36)
MCV RBC AUTO: 97.4 FL (ref 80–94)
MONOCYTES # BLD AUTO: 1.29 X10(3)/MCL (ref 0.1–1.3)
MONOCYTES NFR BLD AUTO: 10.5 %
NEUTROPHILS # BLD AUTO: 9.51 X10(3)/MCL (ref 2.1–9.2)
NEUTROPHILS NFR BLD AUTO: 77.6 %
NRBC BLD AUTO-RTO: 0.7 %
PHOSPHATE SERPL-MCNC: 3.8 MG/DL (ref 2.3–4.7)
PISA TR MAX VEL: 3.1 M/S
PLATELET # BLD AUTO: 597 X10(3)/MCL (ref 130–400)
PMV BLD AUTO: 9.5 FL (ref 7.4–10.4)
POCT GLUCOSE: 182 MG/DL (ref 70–110)
POCT GLUCOSE: 184 MG/DL (ref 70–110)
POCT GLUCOSE: 201 MG/DL (ref 70–110)
POTASSIUM SERPL-SCNC: 4.6 MMOL/L (ref 3.5–5.1)
PROT SERPL-MCNC: 5.7 GM/DL (ref 5.8–7.6)
RBC # BLD AUTO: 2.28 X10(6)/MCL (ref 4.7–6.1)
SODIUM SERPL-SCNC: 142 MMOL/L (ref 136–145)
TR MAX PG: 38 MMHG
UNIT NUMBER: NORMAL
WBC # BLD AUTO: 12.26 X10(3)/MCL (ref 4.5–11.5)

## 2025-06-11 PROCEDURE — 43235 EGD DIAGNOSTIC BRUSH WASH: CPT | Performed by: INTERNAL MEDICINE

## 2025-06-11 PROCEDURE — 27000207 HC ISOLATION

## 2025-06-11 PROCEDURE — 27000221 HC OXYGEN, UP TO 24 HOURS

## 2025-06-11 PROCEDURE — 63600175 PHARM REV CODE 636 W HCPCS

## 2025-06-11 PROCEDURE — 86923 COMPATIBILITY TEST ELECTRIC: CPT

## 2025-06-11 PROCEDURE — 25000003 PHARM REV CODE 250

## 2025-06-11 PROCEDURE — 36415 COLL VENOUS BLD VENIPUNCTURE: CPT

## 2025-06-11 PROCEDURE — 85014 HEMATOCRIT: CPT | Performed by: INTERNAL MEDICINE

## 2025-06-11 PROCEDURE — 80053 COMPREHEN METABOLIC PANEL: CPT

## 2025-06-11 PROCEDURE — 36430 TRANSFUSION BLD/BLD COMPNT: CPT

## 2025-06-11 PROCEDURE — 94761 N-INVAS EAR/PLS OXIMETRY MLT: CPT

## 2025-06-11 PROCEDURE — 37000008 HC ANESTHESIA 1ST 15 MINUTES: Performed by: INTERNAL MEDICINE

## 2025-06-11 PROCEDURE — 36415 COLL VENOUS BLD VENIPUNCTURE: CPT | Performed by: INTERNAL MEDICINE

## 2025-06-11 PROCEDURE — 84100 ASSAY OF PHOSPHORUS: CPT

## 2025-06-11 PROCEDURE — 37000009 HC ANESTHESIA EA ADD 15 MINS: Performed by: INTERNAL MEDICINE

## 2025-06-11 PROCEDURE — 83735 ASSAY OF MAGNESIUM: CPT

## 2025-06-11 PROCEDURE — 85025 COMPLETE CBC W/AUTO DIFF WBC: CPT

## 2025-06-11 PROCEDURE — 11000001 HC ACUTE MED/SURG PRIVATE ROOM

## 2025-06-11 PROCEDURE — 25000003 PHARM REV CODE 250: Performed by: INTERNAL MEDICINE

## 2025-06-11 PROCEDURE — 0DJ08ZZ INSPECTION OF UPPER INTESTINAL TRACT, VIA NATURAL OR ARTIFICIAL OPENING ENDOSCOPIC: ICD-10-PCS | Performed by: INTERNAL MEDICINE

## 2025-06-11 PROCEDURE — P9016 RBC LEUKOCYTES REDUCED: HCPCS

## 2025-06-11 RX ORDER — SODIUM CHLORIDE, SODIUM LACTATE, POTASSIUM CHLORIDE, CALCIUM CHLORIDE 600; 310; 30; 20 MG/100ML; MG/100ML; MG/100ML; MG/100ML
INJECTION, SOLUTION INTRAVENOUS CONTINUOUS
OUTPATIENT
Start: 2025-06-11

## 2025-06-11 RX ORDER — NAPROXEN SODIUM 220 MG/1
81 TABLET, FILM COATED ORAL DAILY
Status: DISCONTINUED | OUTPATIENT
Start: 2025-06-12 | End: 2025-06-12

## 2025-06-11 RX ORDER — HYDROXYZINE PAMOATE 25 MG/1
25 CAPSULE ORAL ONCE
Status: COMPLETED | OUTPATIENT
Start: 2025-06-11 | End: 2025-06-11

## 2025-06-11 RX ORDER — HYDROCODONE BITARTRATE AND ACETAMINOPHEN 500; 5 MG/1; MG/1
TABLET ORAL
Status: DISCONTINUED | OUTPATIENT
Start: 2025-06-11 | End: 2025-06-12 | Stop reason: HOSPADM

## 2025-06-11 RX ORDER — METOPROLOL SUCCINATE 25 MG/1
25 TABLET, EXTENDED RELEASE ORAL 4 TIMES DAILY
Status: DISCONTINUED | OUTPATIENT
Start: 2025-06-11 | End: 2025-06-12

## 2025-06-11 RX ORDER — LIDOCAINE HYDROCHLORIDE 10 MG/ML
1 INJECTION, SOLUTION EPIDURAL; INFILTRATION; INTRACAUDAL; PERINEURAL ONCE
OUTPATIENT
Start: 2025-06-11 | End: 2025-06-11

## 2025-06-11 RX ORDER — SODIUM CHLORIDE 9 MG/ML
INJECTION, SOLUTION INTRAVENOUS ONCE
Status: COMPLETED | OUTPATIENT
Start: 2025-06-11 | End: 2025-06-11

## 2025-06-11 RX ADMIN — ATORVASTATIN CALCIUM 20 MG: 20 TABLET, FILM COATED ORAL at 09:06

## 2025-06-11 RX ADMIN — VANCOMYCIN HYDROCHLORIDE 500 MG: 500 INJECTION, POWDER, LYOPHILIZED, FOR SOLUTION INTRAVENOUS at 05:06

## 2025-06-11 RX ADMIN — SENNOSIDES AND DOCUSATE SODIUM 1 TABLET: 50; 8.6 TABLET ORAL at 09:06

## 2025-06-11 RX ADMIN — INSULIN ASPART 1 UNITS: 100 INJECTION, SOLUTION INTRAVENOUS; SUBCUTANEOUS at 09:06

## 2025-06-11 RX ADMIN — METOPROLOL SUCCINATE 100 MG: 50 TABLET, EXTENDED RELEASE ORAL at 08:06

## 2025-06-11 RX ADMIN — PANTOPRAZOLE SODIUM 40 MG: 40 TABLET, DELAYED RELEASE ORAL at 05:06

## 2025-06-11 RX ADMIN — SODIUM CHLORIDE: 9 INJECTION, SOLUTION INTRAVENOUS at 01:06

## 2025-06-11 RX ADMIN — HYDROXYZINE PAMOATE 25 MG: 25 CAPSULE ORAL at 01:06

## 2025-06-11 RX ADMIN — FUROSEMIDE 40 MG: 20 TABLET ORAL at 08:06

## 2025-06-11 RX ADMIN — LIDOCAINE 5% 1 PATCH: 700 PATCH TOPICAL at 12:06

## 2025-06-11 RX ADMIN — METOPROLOL SUCCINATE 25 MG: 25 TABLET, EXTENDED RELEASE ORAL at 05:06

## 2025-06-11 RX ADMIN — METOPROLOL SUCCINATE 25 MG: 25 TABLET, EXTENDED RELEASE ORAL at 08:06

## 2025-06-11 NOTE — ANESTHESIA POSTPROCEDURE EVALUATION
Anesthesia Post Evaluation    Patient: Vaibhav Vargas    Procedure(s) Performed: Procedure(s) (LRB):  EGD (ESOPHAGOGASTRODUODENOSCOPY) (Left)    Final Anesthesia Type: general      Patient location during evaluation: GI PACU  Patient participation: Yes- Able to Participate  Level of consciousness: awake and alert  Post-procedure vital signs: reviewed and stable  Pain management: adequate  Airway patency: patent    PONV status at discharge: No PONV  Anesthetic complications: no      Cardiovascular status: hemodynamically stable  Respiratory status: unassisted and room air  Follow-up not needed.              Vitals Value Taken Time   BP 92/67 06/11/25 11:07   Temp 36.8 °C (98.2 °F) 06/11/25 10:07   Pulse 168 06/11/25 11:07   Resp 18 06/11/25 07:45   SpO2 100 % 06/11/25 10:07         No case tracking events are documented in the log.      Pain/Josefina Score: No data recorded

## 2025-06-11 NOTE — ANESTHESIA PREPROCEDURE EVALUATION
06/11/2025  Vaibhav Vargas is a 74 y.o., male with PMHx of obesity, CAD/stents/MI, HTN, HLD, HFrEF, Afib, DM, CVA (3/2025) presents for EGD secondary to UGIB.    Metoprolol--last dose 6/11/25 @ 0858  Plavix--last dose  Aspirin--last dose    Active Ambulatory Problems     Diagnosis Date Noted    Chronic low back pain 11/08/2022    Edema 11/08/2022    Obesity 11/08/2022    Osteoarthritis of knee 11/08/2022    Primary hypertension 11/08/2022    Type 2 diabetes mellitus 11/08/2022    Coronary artery disease 06/16/2023    Myocardial infarction 03/24/2024    Acute stroke due to ischemia 03/31/2025     Resolved Ambulatory Problems     Diagnosis Date Noted    Hypoglycemia 03/24/2024    Hospital discharge follow-up 03/26/2024     Past Medical History:   Diagnosis Date    Chronic lumbar pain     Diabetes mellitus, type 2     Hypertension     Obesity, unspecified     Osteoarthritis of multiple joints        Pre-op Assessment    I have reviewed the NPO Status.      Review of Systems  Anesthesia Hx:  No problems with previous Anesthesia                Social:  Non-Smoker       Cardiovascular:     Hypertension, well controlled  Past MI CAD  asymptomatic CABG/stent Dysrhythmias atrial fibrillation  CHF    hyperlipidemia                               Pulmonary:  Pulmonary Normal                       Renal/:  Renal/ Normal                 Hepatic/GI:  Hepatic/GI Normal                    Neurological:   CVA, residual symptoms                                    Endocrine:  Diabetes, well controlled, type 2         Obesity / BMI > 30    Vitals:    06/11/25 0702 06/11/25 0745 06/11/25 0800 06/11/25 0801   BP: 92/62 103/64     Pulse: (!) 156 (!) 153 (!) 153    Resp:  18     Temp: 36.8 °C (98.2 °F) 36.7 °C (98.1 °F)     TempSrc: Oral Axillary     SpO2:  (!) 94%  98%   Weight:       Height:             Physical Exam  General:  Alert and Cooperative    Airway:  Mallampati: II   Mouth Opening: Normal  TM Distance: Normal  Tongue: Normal  Neck ROM: Normal ROM    Dental:  Intact    Chest/Lungs:  Clear to auscultation, Normal Respiratory Rate    Heart:  Rate: Normal  Rhythm: Regular Rhythm  Sounds: Normal       Latest Reference Range & Units 06/11/25 07:48   POCT Glucose 70 - 110 mg/dL 184 (H)   (H): Data is abnormally high  Lab Results   Component Value Date    WBC 12.26 (H) 06/11/2025    HGB 6.9 (L) 06/11/2025    HCT 22.2 (L) 06/11/2025    MCV 97.4 (H) 06/11/2025     (H) 06/11/2025       CMP  Sodium   Date Value Ref Range Status   06/11/2025 142 136 - 145 mmol/L Final     Potassium   Date Value Ref Range Status   06/11/2025 4.6 3.5 - 5.1 mmol/L Final     Chloride   Date Value Ref Range Status   06/11/2025 108 (H) 98 - 107 mmol/L Final     CO2   Date Value Ref Range Status   06/11/2025 20 (L) 23 - 31 mmol/L Final     Glucose   Date Value Ref Range Status   06/11/2025 226 (H) 82 - 115 mg/dL Final     Blood Urea Nitrogen   Date Value Ref Range Status   06/11/2025 30.7 (H) 8.4 - 25.7 mg/dL Final     Creatinine   Date Value Ref Range Status   06/11/2025 1.56 (H) 0.72 - 1.25 mg/dL Final     Calcium   Date Value Ref Range Status   06/11/2025 8.3 (L) 8.8 - 10.0 mg/dL Final     Protein Total   Date Value Ref Range Status   06/11/2025 5.7 (L) 5.8 - 7.6 gm/dL Final     Albumin   Date Value Ref Range Status   06/11/2025 2.1 (L) 3.4 - 4.8 g/dL Final     Bilirubin Total   Date Value Ref Range Status   06/11/2025 0.6 <=1.5 mg/dL Final     ALP   Date Value Ref Range Status   06/11/2025 52 40 - 150 unit/L Final     AST   Date Value Ref Range Status   06/11/2025 33 11 - 45 unit/L Final     ALT   Date Value Ref Range Status   06/11/2025 22 0 - 55 unit/L Final     eGFR   Date Value Ref Range Status   06/11/2025 46 mL/min/1.73/m2 Final     Comment:     Estimated GFR calculated using the CKD-EPI creatinine (2021) equation.     Lab Results   Component Value  Date    HGBA1C 5.6 05/24/2025             Anesthesia Plan  Type of Anesthesia, risks & benefits discussed:    Anesthesia Type: Gen Natural Airway  Intra-op Monitoring Plan: Standard ASA Monitors  Post Op Pain Control Plan: IV/PO Opioids PRN  Induction:  IV  Informed Consent: Informed consent signed with the Patient and all parties understand the risks and agree with anesthesia plan.  All questions answered.   ASA Score: 4  Day of Surgery Review of History & Physical: H&P Update referred to the surgeon/provider.    Ready For Surgery From Anesthesia Perspective.     .

## 2025-06-11 NOTE — PROGRESS NOTES
Attempted to see pt bedside for wound care follow up. Pt is currently off the floor in GI lab. Will attempt to follow up if schedule allows. Otherwise nursing was given supplies and instructions to perform wound care when he returns.
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
## 2025-06-11 NOTE — PROGRESS NOTES
Pharmacokinetic Assessment Follow Up: IV Vancomycin    Vancomycin regimen plan:    Due the change of renal function since yesterday, change the regimen to Vancomycin  mg once this evening followed by a vancomycin serum concentration at approximately 0500 on 6/12/25      Drug levels (last 3 results):  Recent Labs   Lab Result Units 06/09/25  1656 06/10/25  1705   Vancomycin Trough ug/ml 18.4 18.6       Pharmacy will continue to follow and monitor vancomycin.    Please contact pharmacy at extension [phone] for questions regarding this assessment.    Thank you for the consult,   Juliet Aguilar       Patient brief summary:  Vaibhav Vargas is a 74 y.o. male initiated on antimicrobial therapy with IV Vancomycin for treatment of bone/joint infection    The patient's current regimen is 1,000 mg q24h    Drug Allergies:   Review of patient's allergies indicates:  No Known Allergies    Actual Body Weight:   107.5 kg    Renal Function:   Estimated Creatinine Clearance: 54.2 mL/min (A) (based on SCr of 1.56 mg/dL (H)).,     Dialysis Method (if applicable):  N/A    CBC (last 72 hours):  Recent Labs   Lab Result Units 06/08/25  1735 06/09/25  0417 06/09/25  2144 06/10/25  0408 06/11/25  0117 06/11/25  1006   WBC x10(3)/mcL  --  9.15  --  10.42 12.26*  --    Hgb g/dL 9.1* 8.4* 8.3* 8.0* 6.9* 8.2*   Hct % 28.2* 25.7* 26.2* 25.1* 22.2* 26.0*   Platelet x10(3)/mcL  --  367  --  429* 597*  --    Mono % %  --  14.3  --  9.7 10.5  --    Eos % %  --  1.5  --  0.3 0.1  --    Basophil % %  --  0.5  --  0.4 0.3  --        Metabolic Panel (last 72 hours):  Recent Labs   Lab Result Units 06/09/25  0417 06/10/25  0408 06/11/25  0117   Sodium mmol/L 144 142 142   Potassium mmol/L 3.4* 4.5 4.6   Chloride mmol/L 109* 109* 108*   CO2 mmol/L 24 21* 20*   Glucose mg/dL 102 167* 226*   Blood Urea Nitrogen mg/dL 19.0 20.3 30.7*   Creatinine mg/dL 1.09 1.06 1.56*   Albumin g/dL 2.1* 2.2* 2.1*   Bilirubin Total mg/dL 0.4 0.5 0.6   ALP unit/L 40 47 52    AST unit/L 17 29 33   ALT unit/L 18 20 22   Magnesium Level mg/dL 1.80 1.90 1.90   Phosphorus Level mg/dL 3.1 2.6 3.8       Vancomycin Administrations:  vancomycin given in the last 96 hours                     vancomycin (VANCOCIN) 1,000 mg in 0.9% NaCl 250 mL IVPB (admixture device) (mg) 1,000 mg New Bag 06/10/25 1814     1,000 mg New Bag 06/09/25 1821     1,000 mg New Bag 06/08/25 1753     1,000 mg New Bag 06/07/25 1603                    Microbiologic Results:  Microbiology Results (last 7 days)       Procedure Component Value Units Date/Time    Wound Culture [[id number]]  (Abnormal)  (Susceptibility) Collected: 06/02/25 0919    Order Status: Completed Specimen: Wound from Foot, Left Updated: 06/06/25 1032     Wound Culture Few Methicillin resistant Staphylococcus aureus    Anaerobic Culture [[id number]]  (Abnormal) Collected: 06/02/25 0919    Order Status: Completed Specimen: SWAB from Foot, Left Updated: 06/06/25 1018     Anaerobe Culture Corynebacterium tuberculostearicum     Comment: Anaerobic sensitivity is not usually indicated except on single isolates from sterile body sites.       Anaerobic Culture [[id number]] Collected: 06/02/25 1121    Order Status: Completed Specimen: Tissue from Foot, Left Updated: 06/06/25 0753     Anaerobe Culture No Anaerobes Isolated    Tissue Culture - Aerobic [[id number]] Collected: 06/02/25 1121    Order Status: Completed Specimen: Tissue from Foot, Left Updated: 06/05/25 1046     Tissue - Aerobic Culture Normal Skin Manisha, no further workup.
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
## 2025-06-11 NOTE — PLAN OF CARE
ID plan of care note    Vascular surgery service has evaluated the patient after recommending arterial US which shows no occlusion or stenosis. Debridement was offered but family and patient declined.    Noted renal indices have worsened today, vancomycin trough from yesterday was a 18 which is at goal (15-20). Defer management of PRIYA to the primary team.    Recommend 6 weeks of vancomycin IV for treatment of R great toe OM. As patient is a resident of [facility] this can be done at his established facility.    EOT of vancomycin is 7/14/25.     Do not recommend PO suppression after completion of IV antibiotics as next step in management would be amputation if treatment fails.    Jayme Mccallum MD  Infectious Diseases Faculty   of Medicine
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
## 2025-06-11 NOTE — PT/OT/SLP PROGRESS
Physical Therapy    Missed Treatment Session - cancel note - 1030 - 06/11/2025    Patient Name:  Vaibhav Vargas   MRN:  [id number]      -patient not seen at this time secondary to off the floor for procedure  -patient not available 2/2 EGD ongoing  -will follow-up as patient is appropriate/available/agreeable to participate and as therapists' schedule allows.

## 2025-06-11 NOTE — CARE UPDATE
Pt returned from EGD. Alert but drowsy. Vitals initiated. . O2 at 2lnc with sat 100%. Three family members at bedside.

## 2025-06-11 NOTE — PROVATION PATIENT INSTRUCTIONS
Discharge Summary/Instructions after an Endoscopic Procedure  Patient Name: Vaibhav Vargas  Patient MRN: [id number]  Patient YOB: 1950  Wednesday, June 11, 2025  Chapis Lane MD  Dear patient,  As a result of recent federal legislation (The Federal Cures Act), you may   receive lab or pathology results from your procedure in your [facility]   account before your physician is able to contact you. Your physician or   their representative will relay the results to you with their   recommendations at their soonest availability.  Thank you,  RESTRICTIONS:  During your procedure today, you received medications for sedation.  These   medications may affect your judgment, balance and coordination.  Therefore,   for 24 hours, you have the following restrictions:   - DO NOT drive a car, operate machinery, make legal/financial decisions,   sign important papers or drink alcohol.    ACTIVITY:  Today: no heavy lifting, straining or running due to procedural   sedation/anesthesia.  The following day: return to full activity including work.  DIET:  Eat and drink normally unless instructed otherwise.     TREATMENT FOR COMMON SIDE EFFECTS:  - Mild abdominal pain, nausea, belching, bloating or excessive gas:  rest,   eat lightly and use a heating pad.  - Sore Throat: treat with throat lozenges and/or gargle with warm salt   water.  - Because air was used during the procedure, expelling large amounts of air   from your rectum or belching is normal.  - If a bowel prep was taken, you may not have a bowel movement for 1-3 days.    This is normal.  SYMPTOMS TO WATCH FOR AND REPORT TO YOUR PHYSICIAN:  1. Abdominal pain or bloating, other than gas cramps.  2. Chest pain.  3. Back pain.  4. Signs of infection such as: chills or fever occurring within 24 hours   after the procedure.  5. Rectal bleeding, which would show as bright red, maroon, or black stools.   (A tablespoon of blood from the rectum is not serious,  especially if   hemorrhoids are present.)  6. Vomiting.  7. Weakness or dizziness.  GO DIRECTLY TO THE NEAREST EMERGENCY ROOM IF YOU HAVE ANY OF THE FOLLOWING:      Difficulty breathing              Chills and/or fever over 101 F   Persistent vomiting and/or vomiting blood   Severe abdominal pain   Severe chest pain   Black, tarry stools   Bleeding- more than one tablespoon   Any other symptom or condition that you feel may need urgent attention  Your doctor recommends these additional instructions:  If any biopsies were taken, your doctors clinic will contact you in 1 to 2   weeks with any results.  Recommendations:  - Return patient to [facility] for [facility] care.   - Advance diet as tolerated.   - Continue present medications.  Impressions:  - Normal esophagus.   - Erythematous mucosa in the stomach.   - Non-obstructing non-bleeding duodenal ulcer with a clean ulcer base   ([place] Class III).  Prior clips seen.  No active bleeding or high risk   stigmata.   - Non-obstructing non-bleeding duodenal ulcer with a clean ulcer base   ([place] Class III).   - Normal second portion of the duodenum and third portion of the duodenum.   - No specimens collected.  For questions, problems or results please call your physician - Chapis Lane MD at Work:  [phone], Work:  [phone].  [facility] , EMERGENCY ROOM PHONE NUMBER: [phone]  IF A COMPLICATION OR EMERGENCY SITUATION ARISES AND YOU ARE UNABLE TO REACH   YOUR PHYSICIAN - GO DIRECTLY TO THE EMERGENCY ROOM.  Chapis Lane MD  6/11/2025 1:38:38 PM  This report has been verified and signed electronically.  Dear patient,  As a result of recent federal legislation (The Federal Cures Act), you may   receive lab or pathology results from your procedure in your [facility]   account before your physician is able to contact you. Your physician or   their representative will relay the results to you with their   recommendations  at their soonest availability.  Thank you,  PROVATION

## 2025-06-11 NOTE — TRANSFER OF CARE
"Anesthesia Transfer of Care Note    Patient: Vaibhav Vargas    Procedure(s) Performed: Procedure(s) (LRB):  EGD (ESOPHAGOGASTRODUODENOSCOPY) (Left)    Patient location: GI    Anesthesia Type: general    Post pain: adequate analgesia    Post assessment: no apparent anesthetic complications and tolerated procedure well    Post vital signs: stable    Level of consciousness: awake    Nausea/Vomiting: no nausea/vomiting    Complications: none    Transfer of care protocol was followed      Last vitals: Visit Vitals  BP 92/67   Pulse (!) 168   Temp 36.8 °C (98.2 °F) (Oral)   Resp 18   Ht 6' 2" (1.88 m)   Wt 107.5 kg (237 lb)   SpO2 100%   BMI 30.43 kg/m²     "

## 2025-06-11 NOTE — PLAN OF CARE
Pt to preprocedure for egd. Arousable and oriented. Afib in 150s, 96% o2 on 2L. . Notified Anesthesia.

## 2025-06-11 NOTE — PROGRESS NOTES
06/11/25 0915   Missed Time Reason   Missed Treatment Reason Nurse/ AWAIS hold     Pt's HR remains elevated in 150s at rest, pt now symptomatic with c/o SOB per nurse.  Pt did not undergo cardioversion yesterday.  Hold therapy pending medical improvement/ability to tolerate therapeutic interventions.  OT to monitor for f/u.

## 2025-06-12 ENCOUNTER — HOSPITAL ENCOUNTER (INPATIENT)
Facility: HOSPITAL | Age: 75
LOS: 19 days | Discharge: NURSING, REHAB OR LONG-TERM CARE | DRG: 356 | End: 2025-07-01
Attending: INTERNAL MEDICINE | Admitting: INTERNAL MEDICINE
Payer: MEDICARE

## 2025-06-12 VITALS
OXYGEN SATURATION: 97 % | HEART RATE: 121 BPM | TEMPERATURE: 98 F | WEIGHT: 237.19 LBS | RESPIRATION RATE: 13 BRPM | HEIGHT: 74 IN | BODY MASS INDEX: 30.44 KG/M2 | SYSTOLIC BLOOD PRESSURE: 99 MMHG | DIASTOLIC BLOOD PRESSURE: 59 MMHG

## 2025-06-12 DIAGNOSIS — R07.9 CHEST PAIN: ICD-10-CM

## 2025-06-12 DIAGNOSIS — D64.9 ANEMIA, UNSPECIFIED TYPE: ICD-10-CM

## 2025-06-12 DIAGNOSIS — R00.0 TACHYCARDIA: ICD-10-CM

## 2025-06-12 DIAGNOSIS — I50.9 HF (HEART FAILURE): ICD-10-CM

## 2025-06-12 DIAGNOSIS — I48.19 PERSISTENT ATRIAL FIBRILLATION: ICD-10-CM

## 2025-06-12 DIAGNOSIS — K68.3 RETROPERITONEAL HEMATOMA: Primary | ICD-10-CM

## 2025-06-12 DIAGNOSIS — I25.10 CAD (CORONARY ARTERY DISEASE): ICD-10-CM

## 2025-06-12 DIAGNOSIS — I48.91 ATRIAL FIBRILLATION, UNSPECIFIED TYPE: ICD-10-CM

## 2025-06-12 DIAGNOSIS — R65.20 SEVERE SEPSIS: ICD-10-CM

## 2025-06-12 DIAGNOSIS — A41.9 SEVERE SEPSIS: ICD-10-CM

## 2025-06-12 DIAGNOSIS — I49.9 ABNORMAL HEART RHYTHM: ICD-10-CM

## 2025-06-12 DIAGNOSIS — A41.9 SEPSIS: ICD-10-CM

## 2025-06-12 LAB
ABO + RH BLD: NORMAL
ALBUMIN SERPL-MCNC: 2 G/DL (ref 3.4–4.8)
ALBUMIN/GLOB SERPL: 0.6 RATIO (ref 1.1–2)
ALP SERPL-CCNC: 42 UNIT/L (ref 40–150)
ALT SERPL-CCNC: 17 UNIT/L (ref 0–55)
ANION GAP SERPL CALC-SCNC: 9 MEQ/L
AORTIC ARCH: 2.6 CM
AORTIC SIZE INDEX (SOV): 1.5 CM/M2
AORTIC SIZE INDEX: 1.2 CM/M2
ASCENDING AORTA: 2.9 CM
AST SERPL-CCNC: 25 UNIT/L (ref 11–45)
BASOPHILS # BLD AUTO: 0.02 X10(3)/MCL
BASOPHILS # BLD AUTO: 0.03 X10(3)/MCL
BASOPHILS NFR BLD AUTO: 0.2 %
BASOPHILS NFR BLD AUTO: 0.2 %
BILIRUB SERPL-MCNC: 0.6 MG/DL
BLD PROD TYP BPU: NORMAL
BLOOD UNIT EXPIRATION DATE: NORMAL
BLOOD UNIT TYPE CODE: 6200
BLOOD UNIT TYPE CODE: 8400
BSA FOR ECHO PROCEDURE: 2.46 M2
BUN SERPL-MCNC: 43.9 MG/DL (ref 8.4–25.7)
CALCIUM SERPL-MCNC: 8.2 MG/DL (ref 8.8–10)
CHLORIDE SERPL-SCNC: 108 MMOL/L (ref 98–107)
CO2 SERPL-SCNC: 26 MMOL/L (ref 23–31)
CREAT SERPL-MCNC: 2.24 MG/DL (ref 0.72–1.25)
CREAT/UREA NIT SERPL: 20
CROSSMATCH INTERPRETATION: NORMAL
CRP SERPL-MCNC: 66 MG/L
DESCENDING AORTA: 2.3 CM
DISPENSE STATUS: NORMAL
DOP CALC LVOT AREA: 3.8 CM2
DOP CALC LVOT DIAMETER: 2.2 CM
E/E' RATIO: 15 M/S
EOSINOPHIL # BLD AUTO: 0.02 X10(3)/MCL (ref 0–0.9)
EOSINOPHIL # BLD AUTO: 0.07 X10(3)/MCL (ref 0–0.9)
EOSINOPHIL NFR BLD AUTO: 0.2 %
EOSINOPHIL NFR BLD AUTO: 0.6 %
ERYTHROCYTE [DISTWIDTH] IN BLOOD BY AUTOMATED COUNT: 19.3 % (ref 11.5–17)
ERYTHROCYTE [DISTWIDTH] IN BLOOD BY AUTOMATED COUNT: 21.1 % (ref 11.5–17)
ERYTHROCYTE [SEDIMENTATION RATE] IN BLOOD: 24 MM/HR (ref 0–15)
GFR SERPLBLD CREATININE-BSD FMLA CKD-EPI: 30 ML/MIN/1.73/M2
GLOBULIN SER-MCNC: 3.5 GM/DL (ref 2.4–3.5)
GLUCOSE SERPL-MCNC: 176 MG/DL (ref 82–115)
GROUP & RH: NORMAL
HCT VFR BLD AUTO: 22.1 % (ref 42–52)
HCT VFR BLD AUTO: 26.9 % (ref 42–52)
HGB BLD-MCNC: 6.8 G/DL (ref 14–18)
HGB BLD-MCNC: 8.5 G/DL (ref 14–18)
HOLD SPECIMEN: NORMAL
HOLD SPECIMEN: NORMAL
IMM GRANULOCYTES # BLD AUTO: 0.09 X10(3)/MCL (ref 0–0.04)
IMM GRANULOCYTES # BLD AUTO: 0.12 X10(3)/MCL (ref 0–0.04)
IMM GRANULOCYTES NFR BLD AUTO: 0.8 %
IMM GRANULOCYTES NFR BLD AUTO: 1 %
INDIRECT COOMBS: NORMAL
LEFT VENTRICLE DIASTOLIC VOLUME INDEX: 42.74 ML/M2
LEFT VENTRICLE DIASTOLIC VOLUME: 103 ML
LEFT VENTRICLE SYSTOLIC VOLUME INDEX: 18.7 ML/M2
LEFT VENTRICLE SYSTOLIC VOLUME: 45 ML
LV LATERAL E/E' RATIO: 14.8 M/S
LV SEPTAL E/E' RATIO: 14.8 M/S
LYMPHOCYTES # BLD AUTO: 0.85 X10(3)/MCL (ref 0.6–4.6)
LYMPHOCYTES # BLD AUTO: 1.38 X10(3)/MCL (ref 0.6–4.6)
LYMPHOCYTES NFR BLD AUTO: 10.9 %
LYMPHOCYTES NFR BLD AUTO: 7.8 %
MAGNESIUM SERPL-MCNC: 2 MG/DL (ref 1.6–2.6)
MCH RBC QN AUTO: 27.8 PG (ref 27–31)
MCH RBC QN AUTO: 29.2 PG (ref 27–31)
MCHC RBC AUTO-ENTMCNC: 30.8 G/DL (ref 33–36)
MCHC RBC AUTO-ENTMCNC: 31.6 G/DL (ref 33–36)
MCV RBC AUTO: 90.2 FL (ref 80–94)
MCV RBC AUTO: 92.4 FL (ref 80–94)
MONOCYTES # BLD AUTO: 1.29 X10(3)/MCL (ref 0.1–1.3)
MONOCYTES # BLD AUTO: 1.77 X10(3)/MCL (ref 0.1–1.3)
MONOCYTES NFR BLD AUTO: 11.9 %
MONOCYTES NFR BLD AUTO: 14 %
MV PEAK E VEL: 0.74 M/S
NEUTROPHILS # BLD AUTO: 8.53 X10(3)/MCL (ref 2.1–9.2)
NEUTROPHILS # BLD AUTO: 9.31 X10(3)/MCL (ref 2.1–9.2)
NEUTROPHILS NFR BLD AUTO: 73.7 %
NEUTROPHILS NFR BLD AUTO: 78.7 %
NRBC BLD AUTO-RTO: 0.4 %
NRBC BLD AUTO-RTO: 0.7 %
OHS LV EJECTION FRACTION SIMPSONS BIPLANE MOD: 56 %
PHOSPHATE SERPL-MCNC: 4.5 MG/DL (ref 2.3–4.7)
PLATELET # BLD AUTO: 354 X10(3)/MCL (ref 130–400)
PLATELET # BLD AUTO: 452 X10(3)/MCL (ref 130–400)
PMV BLD AUTO: 8.7 FL (ref 7.4–10.4)
PMV BLD AUTO: 9.1 FL (ref 7.4–10.4)
POCT GLUCOSE: 147 MG/DL (ref 70–110)
POCT GLUCOSE: 153 MG/DL (ref 70–110)
POCT GLUCOSE: 156 MG/DL (ref 70–110)
POCT GLUCOSE: 178 MG/DL (ref 70–110)
POTASSIUM SERPL-SCNC: 4.1 MMOL/L (ref 3.5–5.1)
PROT SERPL-MCNC: 5.5 GM/DL (ref 5.8–7.6)
RBC # BLD AUTO: 2.45 X10(6)/MCL (ref 4.7–6.1)
RBC # BLD AUTO: 2.91 X10(6)/MCL (ref 4.7–6.1)
RBCS: NORMAL
SINUS: 3.5 CM
SODIUM SERPL-SCNC: 143 MMOL/L (ref 136–145)
SPECIMEN OUTDATE: NORMAL
TDI LATERAL: 0.05 M/S
TDI SEPTAL: 0.05 M/S
TDI: 0.05 M/S
UNIT NUMBER: NORMAL
VANCOMYCIN TROUGH SERPL-MCNC: 26.8 UG/ML (ref 15–20)
WBC # BLD AUTO: 10.85 X10(3)/MCL (ref 4.5–11.5)
WBC # BLD AUTO: 12.63 X10(3)/MCL (ref 4.5–11.5)

## 2025-06-12 PROCEDURE — 84100 ASSAY OF PHOSPHORUS: CPT | Performed by: STUDENT IN AN ORGANIZED HEALTH CARE EDUCATION/TRAINING PROGRAM

## 2025-06-12 PROCEDURE — 93010 ELECTROCARDIOGRAM REPORT: CPT | Mod: ,,, | Performed by: INTERNAL MEDICINE

## 2025-06-12 PROCEDURE — 86923 COMPATIBILITY TEST ELECTRIC: CPT | Performed by: INTERNAL MEDICINE

## 2025-06-12 PROCEDURE — A9560 TC99M LABELED RBC: HCPCS | Performed by: INTERNAL MEDICINE

## 2025-06-12 PROCEDURE — 25000003 PHARM REV CODE 250: Performed by: INTERNAL MEDICINE

## 2025-06-12 PROCEDURE — 80053 COMPREHEN METABOLIC PANEL: CPT

## 2025-06-12 PROCEDURE — 63600175 PHARM REV CODE 636 W HCPCS: Performed by: STUDENT IN AN ORGANIZED HEALTH CARE EDUCATION/TRAINING PROGRAM

## 2025-06-12 PROCEDURE — 25000003 PHARM REV CODE 250

## 2025-06-12 PROCEDURE — 94761 N-INVAS EAR/PLS OXIMETRY MLT: CPT

## 2025-06-12 PROCEDURE — 85025 COMPLETE CBC W/AUTO DIFF WBC: CPT

## 2025-06-12 PROCEDURE — 93005 ELECTROCARDIOGRAM TRACING: CPT

## 2025-06-12 PROCEDURE — 86140 C-REACTIVE PROTEIN: CPT | Performed by: NURSE PRACTITIONER

## 2025-06-12 PROCEDURE — 85025 COMPLETE CBC W/AUTO DIFF WBC: CPT | Performed by: STUDENT IN AN ORGANIZED HEALTH CARE EDUCATION/TRAINING PROGRAM

## 2025-06-12 PROCEDURE — 86923 COMPATIBILITY TEST ELECTRIC: CPT | Mod: 91

## 2025-06-12 PROCEDURE — 11000001 HC ACUTE MED/SURG PRIVATE ROOM

## 2025-06-12 PROCEDURE — 80053 COMPREHEN METABOLIC PANEL: CPT | Performed by: STUDENT IN AN ORGANIZED HEALTH CARE EDUCATION/TRAINING PROGRAM

## 2025-06-12 PROCEDURE — 86923 COMPATIBILITY TEST ELECTRIC: CPT | Mod: 91 | Performed by: NURSE PRACTITIONER

## 2025-06-12 PROCEDURE — 84100 ASSAY OF PHOSPHORUS: CPT

## 2025-06-12 PROCEDURE — 36415 COLL VENOUS BLD VENIPUNCTURE: CPT | Performed by: INTERNAL MEDICINE

## 2025-06-12 PROCEDURE — 36415 COLL VENOUS BLD VENIPUNCTURE: CPT | Performed by: NURSE PRACTITIONER

## 2025-06-12 PROCEDURE — 36415 COLL VENOUS BLD VENIPUNCTURE: CPT

## 2025-06-12 PROCEDURE — 83735 ASSAY OF MAGNESIUM: CPT

## 2025-06-12 PROCEDURE — 25500020 PHARM REV CODE 255: Performed by: INTERNAL MEDICINE

## 2025-06-12 PROCEDURE — 86901 BLOOD TYPING SEROLOGIC RH(D): CPT | Performed by: INTERNAL MEDICINE

## 2025-06-12 PROCEDURE — 80202 ASSAY OF VANCOMYCIN: CPT

## 2025-06-12 PROCEDURE — 27000221 HC OXYGEN, UP TO 24 HOURS

## 2025-06-12 PROCEDURE — P9016 RBC LEUKOCYTES REDUCED: HCPCS | Performed by: INTERNAL MEDICINE

## 2025-06-12 PROCEDURE — 85652 RBC SED RATE AUTOMATED: CPT | Performed by: NURSE PRACTITIONER

## 2025-06-12 PROCEDURE — 83735 ASSAY OF MAGNESIUM: CPT | Performed by: STUDENT IN AN ORGANIZED HEALTH CARE EDUCATION/TRAINING PROGRAM

## 2025-06-12 PROCEDURE — 86900 BLOOD TYPING SEROLOGIC ABO: CPT | Mod: 91 | Performed by: NURSE PRACTITIONER

## 2025-06-12 RX ORDER — SODIUM CHLORIDE 9 MG/ML
INJECTION, SOLUTION INTRAVENOUS ONCE
Status: COMPLETED | OUTPATIENT
Start: 2025-06-12 | End: 2025-06-12

## 2025-06-12 RX ORDER — HYDROCODONE BITARTRATE AND ACETAMINOPHEN 500; 5 MG/1; MG/1
TABLET ORAL
Status: DISCONTINUED | OUTPATIENT
Start: 2025-06-12 | End: 2025-06-12 | Stop reason: HOSPADM

## 2025-06-12 RX ORDER — HYDROCODONE BITARTRATE AND ACETAMINOPHEN 500; 5 MG/1; MG/1
TABLET ORAL
Status: DISCONTINUED | OUTPATIENT
Start: 2025-06-12 | End: 2025-07-01 | Stop reason: HOSPADM

## 2025-06-12 RX ORDER — HYDROCODONE BITARTRATE AND ACETAMINOPHEN 500; 5 MG/1; MG/1
TABLET ORAL
Status: DISCONTINUED | OUTPATIENT
Start: 2025-06-12 | End: 2025-06-13

## 2025-06-12 RX ORDER — METOPROLOL TARTRATE 25 MG/1
25 TABLET, FILM COATED ORAL 4 TIMES DAILY
Status: DISCONTINUED | OUTPATIENT
Start: 2025-06-12 | End: 2025-06-12 | Stop reason: HOSPADM

## 2025-06-12 RX ORDER — SODIUM CHLORIDE 9 MG/ML
INJECTION, SOLUTION INTRAVENOUS CONTINUOUS
Status: DISCONTINUED | OUTPATIENT
Start: 2025-06-12 | End: 2025-06-12 | Stop reason: HOSPADM

## 2025-06-12 RX ORDER — DIGOXIN 0.25 MG/ML
250 INJECTION INTRAMUSCULAR; INTRAVENOUS ONCE
Status: COMPLETED | OUTPATIENT
Start: 2025-06-12 | End: 2025-06-12

## 2025-06-12 RX ADMIN — IOHEXOL 100 ML: 350 INJECTION, SOLUTION INTRAVENOUS at 05:06

## 2025-06-12 RX ADMIN — SODIUM CHLORIDE: 9 INJECTION, SOLUTION INTRAVENOUS at 05:06

## 2025-06-12 RX ADMIN — ATORVASTATIN CALCIUM 20 MG: 20 TABLET, FILM COATED ORAL at 09:06

## 2025-06-12 RX ADMIN — DIGOXIN 250 MCG: 0.25 INJECTION INTRAMUSCULAR; INTRAVENOUS at 05:06

## 2025-06-12 RX ADMIN — PANTOPRAZOLE SODIUM 40 MG: 40 TABLET, DELAYED RELEASE ORAL at 06:06

## 2025-06-12 RX ADMIN — SODIUM CHLORIDE: 9 INJECTION, SOLUTION INTRAVENOUS at 07:06

## 2025-06-12 RX ADMIN — SENNOSIDES AND DOCUSATE SODIUM 1 TABLET: 50; 8.6 TABLET ORAL at 09:06

## 2025-06-12 RX ADMIN — TECHNETIUM TC 99M-LABELED RED BLOOD CELLS 23.6 MILLICURIE: KIT at 10:06

## 2025-06-12 RX ADMIN — PANTOPRAZOLE SODIUM 40 MG: 40 TABLET, DELAYED RELEASE ORAL at 03:06

## 2025-06-12 RX ADMIN — METOPROLOL TARTRATE 25 MG: 25 TABLET, FILM COATED ORAL at 05:06

## 2025-06-12 RX ADMIN — SODIUM CHLORIDE: 9 INJECTION, SOLUTION INTRAVENOUS at 12:06

## 2025-06-12 RX ADMIN — METOPROLOL SUCCINATE 25 MG: 25 TABLET, EXTENDED RELEASE ORAL at 09:06

## 2025-06-12 NOTE — PT/OT/SLP PROGRESS
Physical Therapy    Missed Treatment Session - cancel note - 0817 - 06/12/2025    Patient Name:  Vaibhav Vargas   MRN:  [id number]      -patient not seen at this time secondary to patient not appropriate at this time  -hemoglobin 6.8 -transfusion ordered (2 units RBC)  -will follow-up as patient is appropriate/available/agreeable to participate and as therapists' schedule allows.

## 2025-06-12 NOTE — Clinical Note
The catheter was repositioned into the LIMA. An angiography was performed of the LIMA. The angiography was performed via power injection.

## 2025-06-12 NOTE — Clinical Note
The catheter was inserted into the, was removed from the and was inserted over the wire into the ostium   right coronary artery. An angiography was performed of the right coronary arteries. Multiple views were taken. The angiography was performed via power injection.  [place]
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
## 2025-06-12 NOTE — PRE ADMISSION SCREENING
Outside Transfer Acceptance Note / Regional Referral Center    Referring facility: [facility]   Referring provider: MARYLU URIBE  Accepting facility: [facility]  Accepting provider: TAMANNA DOVE  Admitting provider:   Reason for transfer: Higher Level of Care   Transfer diagnosis:   Transfer specialty requested: Gastroenterology  Transfer specialty notified:   Transfer level:   Bed type requested:   Isolation status: Contact   Admission class or status: IP- Inpatient      Narrative     74 year old initially admitted for PRIYA, found to have thrombus on echo.  Started on Lovenox, started having GI bleed.   EGD on 6/2 showed non bleeding ulcer but active oozing from gastric mucosa.  Also had to go to ICU due to hemodynamic instability from Afib and GI bleed.    Lovenox was continued due to risk of embolization of thrombus.  Started having bleeding again.  EGD 6/11 showed no bleeding.    Again today big drop in hemoglobin thus needs GI continued following however GI at [facility] now on vacation so we will take for transfer.   NM GI bleed was ordered and CT abd pelvis gi bleed protocol has been ordered.     Patient accepted for transfer.     Objective     Vitals: Temp: 98 °F (36.7 °C) (06/12/25 1245)  Pulse: 61 (06/12/25 1245)  Resp: 18 (06/12/25 1245)  BP: (!) 91/55 (06/12/25 1245)  SpO2: (!) 91 % (06/12/25 1245)  Recent Labs: All pertinent labs within the past 24 hours have been reviewed.  BMP:   Recent Labs   Lab 06/12/25  0511   *      K 4.1   *   CO2 26   BUN 43.9*   CREATININE 2.24*   CALCIUM 8.2*   MG 2.00     CBC:   Recent Labs   Lab 06/11/25  0117 06/11/25  1006 06/12/25  0511   WBC 12.26*  --  12.63*   HGB 6.9* 8.2* 6.8*   HCT 22.2* 26.0* 22.1*   *  --  452*     Recent imaging:    Airway:     Vent settings: Oxygen Concentration (%):  [32] 32       IV access:    Infusions:   Allergies: Review of patient's allergies indicates:  No Known Allergies    NPO: Yes    Anticoagulation:   Anticoagulants       None             Instructions
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
## 2025-06-12 NOTE — PLAN OF CARE
Problem: Infection  Goal: Absence of Infection Signs and Symptoms  Outcome: Progressing     Problem: Adult Inpatient Plan of Care  Goal: Plan of Care Review  Outcome: Progressing  Goal: Patient-Specific Goal (Individualized)  Outcome: Progressing  Goal: Absence of Hospital-Acquired Illness or Injury  Outcome: Progressing  Goal: Optimal Comfort and Wellbeing  Outcome: Progressing  Goal: Readiness for Transition of Care  Outcome: Progressing     Problem: Heart Failure  Goal: Optimal Coping  Outcome: Progressing  Goal: Optimal Cardiac Output  Outcome: Progressing  Goal: Stable Heart Rate and Rhythm  Outcome: Progressing  Goal: Optimal Functional Ability  Outcome: Progressing  Goal: Fluid and Electrolyte Balance  Outcome: Progressing  Goal: Improved Oral Intake  Outcome: Progressing  Goal: Effective Oxygenation and Ventilation  Outcome: Progressing  Goal: Effective Breathing Pattern During Sleep  Outcome: Progressing     Problem: Fall Injury Risk  Goal: Absence of Fall and Fall-Related Injury  Outcome: Progressing     Problem: Pain Acute  Goal: Optimal Pain Control and Function  Outcome: Progressing

## 2025-06-12 NOTE — CARE UPDATE
CTA abdomen showed retroperitoneal hematoma with no evident active bleeding noted, consulted surgery.

## 2025-06-12 NOTE — CARE UPDATE
Beba Lazo RN informed me through secure chat that Dr. Lipscomb was notified of hospital transfer acceptance. They are awaiting bed. I texted her that pt 2nd UPRBC went up at 1703.

## 2025-06-12 NOTE — Clinical Note
A percutaneous stick to the right femoral artery was performed. Ultrasound guidance was used to obtain access. Oriented - self; Oriented - place; Oriented - time

## 2025-06-12 NOTE — PROGRESS NOTES
06/12/25 1020   Missed Time Reason   OT Attempted Eval Date 06/12/25   OT Attempted Eval Time 1020   Missed Treatment Reason Therapist assessment;Blood transfusion;MD hold (Comment)     Hold this am d/t Hgb level 6.8, blood transfusion ordered.  Per MD, pt will be transferred to [facility] for GI services.  Will DC OT services at this time.

## 2025-06-12 NOTE — Clinical Note
The catheter was inserted into the, was removed from the and was inserted over the wire into the left ventricle. Hemodynamics were performed.  and Pullback was recorded.  [employer]

## 2025-06-12 NOTE — Clinical Note
The catheter was inserted into the, was removed from the and was inserted over the wire into the. IFR PERFORMED

## 2025-06-12 NOTE — DISCHARGE SUMMARY
[facility] Internal Medicine Discharge Summary    Admitting Physician: Jayme Gilbert MD  Attending Physician: Ramez Tinsley MD  Date of Admit: 5/23/2025  Date of Discharge: 6/12/2025    Condition: Fair  Outcome: Transfer  DISPOSITION: Discharged to Other Facility          Discharge Diagnoses     Problem List[1]    Principal Problem:  Severe sepsis    Consultants and Procedures     Consultants:  IP CONSULT TO HOSPITAL MEDICINE  IP CONSULT TO REGISTERED DIETITIAN/NUTRITIONIST  IP CONSULT TO CARDIOLOGY  IP CONSULT TO INFECTIOUS DISEASES  IP CONSULT TO SOCIAL WORK/CASE MANAGEMENT  IP CONSULT TO NEPHROLOGY  IP CONSULT TO GI  IP CONSULT TO WOUND CARE PROVIDER  PHARMACY TO DOSE VANCOMYCIN CONSULT  IP CONSULT TO PICC TEAM (NIAS)  IP CONSULT TO CARDIOLOGY  IP CONSULT TO VASCULAR SURGERY  IP CONSULT TO GI    Procedures:   Procedure(s) (LRB):  EGD (ESOPHAGOGASTRODUODENOSCOPY) (Left)     Brief Admission History      74 y.o. male with PMH CVA 03/2025, CAD s/p LAD stent 2017, venous insuffiency, HTN, 1st Degree AVB, NIDDM2  presented to ED on 05/23 by daughter from NH for weakness.  He was found to have dislodged fowler catheter with severe PRIYA and UTI, fowler was replaced, he had good urinary output with significant improvement in renal function. He was also getting diureses for CHF exacerbation, workup showed EF of 35-40%, a thrombus was incidentally found in his right atrium, blood cultures were negative. He was started on full dose Lovenox the day after. He continued to be in a fib with RVR despite BB, CCB were being avoided due to low EF, cardioversion was avoided due to existing thrombus that was confirmed on YO. On 06/01 he had significant drop in his hemoglobin with worsening RVR, Lovenox was held, EGD on 06/02 showed non-bleeding ulcers with active oozing of blood from his gastric mucosa, GI did not recommend to hold anticoagulation due to risk for embolization.  On 06/04 the patient became hypotensive with A  flutter and RVR, hemoglobin dropped to 4.7, Lovenox was held again and massive transfusion was initiated, the patient was upgraded to the ICU due to hemodynamic instability.    Hospital Course with Pertinent Findings     The patient was admitted for severe PRIYA due to post renal obstruction, his hospital course included a wide range of complications:    PRIYA - initially post-renal, now likely ATN  UTI  His PRIYA improved gradually with diuresis, he completed IV antibiotics for UTI that is now resolved  He now developed renal PRIYA likely due to hypotensions and vancomycin, diuretics were held.    Osteomyelitis  His UTI resolved but continued to have elevated WBC, he was found to have osteomyelitis in his foot that was positive for MRSA, he was started on vancomycin on 06/02 to complete 6 weeks antibiotics (end date 7/14), had CTA runoff and arterial doppler that showed adequate flow to his foot.    Afib with RVR  Atrial thrombus  PE  He continued to be in RVR throughout admission, rate increases when in GI bleed, PE was found incidently on CTA runoff, no right heart strain was noted. He was planned to have YO with cardioversion when he started having active bleed.    GI bleed  He started bleeding on 06/02, EGD showed bleeding Dieulafoy lesion, lesion was clipped and anticoagulation was held for 48 hours after, he tolerated anticoagulation until 6/11 where his hemoglobin dropped significantly, EGD was unremarkable, he then had a tarry bowel movement on that evening, his hemoglobin continued to drop on 06/12 for which he was transferred to [facility] as GI services were not available until 06/20 at [facility]. NM tagged red blood cells did not show active bleeding, thorough physical exam did not reveal any bruising signs of bleeding, or abdominal tenderness.    Retroperitoneal hematoma  The patient was found to have retroperitoneal hematoma on CTA abdomen, no evident active bleeding, general surgery was consulted who recommended close  "monitoring as he was not actively bleeding anymore.    [facility]  We discussed with the daughter the ongoing complications and the risks of aggressive treatment, she was made aware of the poor prognosis, she wants full treatment and full code status.          Discharge physical exam:  Vitals  BP: (!) 97/55  Temp: 98.1 °F (36.7 °C)  Temp Source: Oral  Pulse: (!) 150  Resp: 20  SpO2: (!) 55 %  Height: 6' 2" (188 cm)  Weight: 107.6 kg (237 lb 3.4 oz)    Physical Exam  Constitutional:       Appearance: Normal appearance.   HENT:      Head: Normocephalic and atraumatic.   Cardiovascular:      Rate and Rhythm: Tachycardia present. Rhythm irregular.      Pulses: Normal pulses.      Heart sounds: Normal heart sounds.   Pulmonary:      Effort: Pulmonary effort is normal.      Breath sounds: Normal breath sounds.   Abdominal:      General: Bowel sounds are normal.      Palpations: Abdomen is soft.      Tenderness: There is no abdominal tenderness.   Musculoskeletal:         General: Normal range of motion.      Cervical back: Normal range of motion and neck supple.      Right lower leg: No edema.      Left lower leg: No edema.   Skin:     General: Skin is warm and dry.      Capillary Refill: Capillary refill takes less than 2 seconds.      Findings: No bruising or rash.      Comments: Sacral wounds and left foot wound  Onychomycosis on left great toe   Neurological:      Mental Status: He is alert and oriented to person, place, and time.         TIME SPENT ON DISCHARGE: 60 minutes    Discharge Medications        Medication List        ASK your doctor about these medications      aspirin 81 MG EC tablet  Commonly known as: ECOTRIN     atorvastatin 20 MG tablet  Commonly known as: LIPITOR  Take 1 tablet (20 mg total) by mouth once daily.     clopidogreL 75 mg tablet  Commonly known as: PLAVIX  Take 1 tablet (75 mg total) by mouth once daily.     cyanocobalamin 1000 MCG tablet  Commonly known as: VITAMIN B-12  Take 1 tablet (1,000 mcg " total) by mouth every other day.  Ask about: Should I take this medication?     GVOKE HYPOPEN 2-PACK 1 mg/0.2 mL Atin  Generic drug: glucagon  Inject 0.2 mLs into the skin daily as needed (low blood sugar). May repeat dose in 15 minutes     losartan 100 MG tablet  Commonly known as: COZAAR  Take 1 tablet (100 mg total) by mouth once daily.     metFORMIN 1000 MG tablet  Commonly known as: GLUCOPHAGE  Take 1 tablet (1,000 mg total) by mouth 2 (two) times daily.     verapamiL 240 MG CR tablet  Commonly known as: CALAN-SR  Take 1 tablet (240 mg total) by mouth once daily.              Discharge Information:     Discharge to [facility]    Bernice Lipscomb MD  Internal Medicine - PGY-2               [1]   Patient Active Problem List  Diagnosis    Chronic low back pain    Edema    Obesity    Osteoarthritis of knee    Primary hypertension    Type 2 diabetes mellitus    Coronary artery disease    Myocardial infarction    Acute stroke due to ischemia    Severe sepsis    Severe malnutrition

## 2025-06-12 NOTE — Clinical Note
The catheter was inserted into the, was removed from the and was inserted over the wire into the proximal   left anterior descending. IVUS PERFORMED

## 2025-06-12 NOTE — PROGRESS NOTES
[facility]  Infectious Diseases Progress Note        SUBJECTIVE:   HPI: 74-year-old male with PMH CAD, prior CVA in 03/2025, chronic indwelling Fowler catheter who was admitted to Medicine on 5/23 for workup/management of weakness.  Urine culture was obtained on admission, appears to be from old Fowler catheter, which showed growth of Enterococcus faecium.  He has been started on ampicillin as organism was not VRE and sensitive to beta lactams.  CT abdomen and pelvis showed malpositioned Fowler catheter with balloon of the prostate.  Per discussion with primary team there is noted purulence in his urine.  Infectious disease service has been consulted for management of UTI.     On admission he was noted to have leukocytosis of 26.8 however was afebrile.  He has remained afebrile throughout his entire hospitalization.  His leukocytosis has improved now to 16.7.  He was initially started on ceftriaxone empirically followed by vancomycin and Zosyn, however now has been transitioned to ampicillin.  TTE was completed which showed a mobile echogenic mass in the right atrium, cardiology has been consulted who is planning for YO to further evaluate this.     History was obtained from both the patient and his sister who was present at bedside. She reports that the patient's mentation decreased a few days prior to his admission along with generalized weakness to where he was unable to participate with PT at his nursing home where he resides. His fowler catheter apparently has not been exchanged since 3/2025. The patient reports noticing R midback pain that began about 3 days prior to his admission as well however this has since improved. He denies fever, chills, N/V/D.       Interval History:  Patient laying in bed.  Appears weak which he endorses, along with SOB.  Noted on oxygen at 3 liters at present.  Patient started back on oxygen with increasing requirements since yesterday.  + nausea.  Patient  reports he noticed some blood in his stool.  Denies fever, chills, night sweats, rash, HA, vision changes, dizziness, CP, cough, N/V/D, abdominal pain, or urinary complaints.  Patient is afebrile.  Blood pressures soft.  Tachycardia resolved.  Now with leukocytosis; WBC 12.6.  Drop noted in H&H; hemoglobin 6.8/hematocrit 22.2.  Now with PRIYA; BUN 43/creatinine 2.24.  Increase noted in CRP; 66.  Patient underwent additional transfusion of PRBCs.  Discussed with primary team and currently [facility] has no GI service for > 1 week, so patient will be transferred for additional GI services for GI bleed concerns.  CXR with possible small right pleural effusion.  Patient with Vancomycin currently on hold due to supratherapeutic level at 26, but will need to continue therapy for 6 weeks for LT toe osteomyelitis (Corynebacterium tuberculostearicum and MRSA).  End date 07/14/2025.      Antibiotic / Antiviral / Antifungal History:  Ceftriaxone 1 g IV x1 dose - 05/23/2025   Zosyn 4.5 g IV q.8 hours - 05/24/2025 to 05/25/2025   Vancomycin IV trough 15-20 - 05/24/2025 to 05/26/2025   Ampicillin 1 g IV q.4 hours - 05/26/2025 to 05/28/2025   Penicillin G 24,000,000 units IV daily per continuous infusion - 05/28/2025 to 06/01/2025   Vancomycin IV trough 15-20 - 06/02/2025 to present   Cefepime 2 g IV q.8 hours - 06/02/2025 to 06/07/2025  Flagyl 500 mg p.o. q.8 hours - 06/02/2025 to 06/07/2025      MEDICATIONS:   Reviewed in EMR    REVIEW OF SYSTEMS:   Except as documented, all other systems reviewed and negative     PHYSICAL EXAM:   T 97.4 °F (36.3 °C)   BP 92/61   P 71   RR 18   O2 96 %  GENERAL: Elderly BM who is A&Ox3, appears weak, appears chronically ill   HEENT: atraumatic, normocephalic, anicteric, moist oral mucosa without lesions, exudate, or erythema; no thrush  LUNGS: breathing unlabored, lungs clear to auscultation bilateral  HEART: RRR; no murmur, rub, or gallop  ABDOMEN: abdomen soft, obese, nondistended, nontender to  "palpation  : Palomo catheter in place  EXTREMITIES: 2+ bilateral lower extremity pitting edema  SKIN: Open wound to left medial great toe without purulence, erythema, or necrosis (see wound care note for pictures)  NEURO: speech fluent and intact, facial symmetry preserved, no tremor  PSYCH: cooperative, normal mood and affect  LINES: PICC to right upper arm without s/s infection       LABS AND IMAGING:     Recent Labs     06/11/25  0117 06/11/25  1006 06/12/25  0511   WBC 12.26*  --  12.63*   RBC 2.28*  --  2.45*   HGB 6.9* 8.2* 6.8*   HCT 22.2* 26.0* 22.1*   MCV 97.4*  --  90.2   MCH 30.3  --  27.8   MCHC 31.1*  --  30.8*   RDW 16.0  --  21.1*   *  --  452*     No results for input(s): "LACTIC" in the last 72 hours.  No results for input(s): "INR", "APTT", "D-DIMER" in the last 72 hours.    No results for input(s): "HGBA1C", "CHOL", "TRIG", "LDL", "VLDL", "HDL" in the last 72 hours.   Recent Labs     06/11/25  0117 06/12/25  0511    143   K 4.6 4.1   CO2 20* 26   BUN 30.7* 43.9*   CREATININE 1.56* 2.24*   CALCIUM 8.3* 8.2*   MG 1.90 2.00   PHOS 3.8 4.5   ALBUMIN 2.1* 2.0*   GLOBULIN 3.6* 3.5   ALKPHOS 52 42   ALT 22 17   AST 33 25   BILITOT 0.6 0.6   CRP  --  66.00*     Recent Labs     06/10/25  0408   .7*            Estimated Creatinine Clearance: 37.8 mL/min (A) (based on SCr of 2.24 mg/dL (H)).   Lab Results   Component Value Date    SEDRATE 24 (H) 06/12/2025      Lab Results   Component Value Date    CRP 66.00 (H) 06/12/2025        Micro:   Colonization:  No results found for this or any previous visit.     5/23 pBCx 2/2: NGTD  5/23 Urine cx: Enterococcus faecium      05/28/2025 sputum culture NGTD  05/29/2025 urine culture NGTD  06/02/2025 left foot wound culture with Corynebacterium tuberculostearicum and MRSA.  06/02/2025 left foot tissue culture NGTD; normal skin jacy      Echo Saline Bubble? No    Left Ventricle: The left ventricle is normal in size. Increased wall   thickness. There " is moderately reduced systolic function with a visually   estimated ejection fraction of 30 - 40%.    Right Ventricle: The right ventricle is mildly dilated Systolic   function is reduced.    Left Atrium: The left atrium is mildly dilated  X-Ray Chest 1 View  Narrative: EXAMINATION:  XR CHEST 1 VIEW    CLINICAL HISTORY:  Shortness of breath    TECHNIQUE:  Single view of the chest    COMPARISON:  06/04/2025    FINDINGS:  Suspect small right effusion.  Further evaluation may be obtained with two views of the chest.    The cardiomediastinal silhouette is within normal limits.    Right-sided PICC line projects over the cavoatrial junction.    No acute osseous abnormality.  Impression: Suspect small right effusion. Further evaluation may be obtained with two views of the chest.    Electronically signed by: Vaibhav Jamil  Date:    06/11/2025  Time:    06:10      TTE 05/26/2025:  Summary  Show Result Comparison     Left Ventricle: The left ventricle is normal in size. Normal wall thickness. There is moderately reduced systolic function with a visually estimated ejection fraction of 35 - 40%. Unable to assess diastolic function due to atrial fibrillation.    Right Ventricle: The right ventricle is moderately dilated Systolic function is moderately reduced. Prominent moderator band in the right ventricle.    Left Atrium: The left atrium is mildly dilated    Right Atrium: The right atrium is mildly dilated . 1.9 x 1.1 x 1.8 small mobile echogenic mass present.    Aortic Valve: There is mild aortic regurgitation.    Tricuspid Valve: There is mild regurgitation.    Pulmonary Artery: The estimated pulmonary artery systolic pressure is 49 mmHg.    IVC/SVC: Elevated venous pressure at 15 mmHg.      YO 05/30/2025:   Summary  Show Result Comparison     Left Ventricle: The left ventricle is normal in size. Normal wall motion. There is normal systolic function. Quantitated ejection fraction is 56%. Elevated left ventricular filling  pressure.    Right Ventricle: The right ventricle is mildly dilated Systolic function is normal.    Left Atrium: The left atrium is dilated Agitated saline study of the atrial septum is negative, suggesting absence of intracardiac shunt at the atrial level. No patent foramen ovale.    Right Atrium: The right atrium is dilated. There is a prominent Eustachian valve with a highly mobile echogenic structure attached to it most likely representing a thrombus and measuring 1.5 cm in its longest dimension. Dense spontaneous echo contrast visualized in the right atrial cavity.    Aortic Valve: The aortic valve is a trileaflet valve. There is no stenosis. There is no significant regurgitation.    Mitral Valve: The mitral valve is structurally normal. There is no stenosis. There is trace regurgitation.    Tricuspid Valve: There is trace regurgitation.    Aorta: The aortic root is normal in size measuring 3.5 cm. The ascending aorta is normal in size measuring 2.9 cm. The aortic arch is normal measuring 2.6 cm. The descending aorta is normal measuring 2.3 cm.    Pericardium: There is no pericardial effusion.     YO obtained for further evaluation of right atrial mass noted on transthoracic echocardiogram.    KAIT 06/02/2025:   Interpretation Summary  Show Result ComparisonThe right lower extremity ankle brachial index is 2.64 suggesting non compressible tibial vessels.    The left lower extremity ankle brachial index is 1.49 suggesting non compressible tibial vessels.        ASSESSMENT & PLAN:     74-year-old male with PMH CAD, prior CVA in 03/2025, chronic indwelling Palomo catheter who was admitted to Medicine on 5/23 for workup/management of weakness.  Urine culture was obtained on admission, appears to be from old Palomo catheter, which showed growth of Enterococcus faecium.  He has been started on ampicillin as organism was not VRE and sensitive to beta lactams.  CT abdomen and pelvis showed malpositioned Palomo catheter  with balloon of the prostate.  Per discussion with primary team there is noted purulence in his urine.  Infectious disease service has been consulted for management of UTI.     1) Enterococcus faecium cystitis  - Organism PCN sensitive, non-VRE  2) Possible acute prostatitis  3) Malpositioned fowler catheter, resolved  4) Sepsis  5) Right atrial echogenic thrombus   6) CVA  7) CAD  8) hepatomegaly   9) transaminitis (resolved)  10) PRIYA   11) dyspnea  12) anemia / GI bleed  13) leukocytosis  14) atrial fibrillation with RVR  15) LT foot osteomyelitis (probes to bone)  Tissue culture foot NGTD, but additional foot cultures with Corynebacterium tuberculostearicum and MRSA.  16) Significant anemia with hemodynamic instablity   17) Pulmonary embolus left lung  18) atelectasis vs pneumonia with bronchiolitis  17) diffuse arthrosclerotic calcifications  18) SOB / weakeness  19) Supratherapeutic Vancomycin level      Persistent leukocytosis in the setting of Enterococcus faecium cystitis.  CT pelvis with contrast was done and showed only concerns for cystitis; no prostate abscess.    Urine culture with Enterococcus faecium.  Repeat urine culture 05/29/2025 NGTD.    Sputum culture NGTD.  CTA of the chest without contrast 06/05/2025 showed collapse and consolidation of the right middle lobe and right lower lobe, along with tree-in-bud nodularity.    Patient noted with episodes of atrial fib with RVR.  TTE showed concerns for a mobile right atrial mass.  YO was followed up and she confirmed a highly mobile thrombus to the right atrium (1.5 cm).  LT toe wound that probes to bone which is indicative of osteomyelitis.  While concerning, did not feel this was source of leukocytosis.  Left foot wound culture with Corynebacterium tuberculostearicum and MRSA.  Left toe tissue culture NGTD; normal skin jacy.  ABIs were done and showed concerns for noncompressible tibial vessels bilateral.  CTA with runoff 06/05/2025 showed a PE at  the left lower lung lobe, along with diffuse arthrosclerotic calcifications.  Patient was seen by vascular surgery services and had arterial ultrasound which showed no occlusion or stenosis.  Vascular offered debridement to family, but this was declined.  Patient to complete 6 weeks of IV therapy.    Leukocytosis present         RECOMMENDATIONS:       Transfer planned for GI services   Continue monotherapy with Vancomycin IV (trough 15-20) for now (on hold due to supratherapeutic level).  Patient will require 6 weeks of IV antibiotics for treatment of osteomyelitis.  End date 07/14/2025.    Now with PRIYA.  Renal protective measures.  Drop in H&H and supratherapeutic Vancomycin level likely contributing.    If PRIYA worsens or persists, would likely recommend to switch to Daptomycin 6 mg / kg IV daily to complete remainder of the 6 week course.  Continue drop in H&H.  Patient with prior GI bleed.  Concerned about GI bleed again.  Discussed with primary team and there are no GI services here > 1 week, so patient will be transferred for additional GI services.   Renal protective measures  Patient needs continued wound care and strict glucose control   Again recommend Tdap as this is not up to date   Defer workup/management of anemia to primary team/GI service  Defer management PE to primary team  ID will continue to follow while patient remains here        KIRSTIN Hedrick  [facility] Infectious Diseases     *Portions of this note dictated using EMR integrated voice recognition software, and may be subject to voice recognition errors not corrected at proofreading. Please contact writer for clarification if needed. *

## 2025-06-13 PROBLEM — K68.3 RETROPERITONEAL HEMATOMA: Status: ACTIVE | Noted: 2025-06-13

## 2025-06-13 PROBLEM — D64.9 ANEMIA: Status: ACTIVE | Noted: 2025-06-13

## 2025-06-13 LAB
ABO + RH BLD: NORMAL
ALBUMIN SERPL-MCNC: 2 G/DL (ref 3.4–4.8)
ALBUMIN SERPL-MCNC: 2.1 G/DL (ref 3.4–4.8)
ALBUMIN/GLOB SERPL: 0.6 RATIO (ref 1.1–2)
ALBUMIN/GLOB SERPL: 0.6 RATIO (ref 1.1–2)
ALP SERPL-CCNC: 47 UNIT/L (ref 40–150)
ALP SERPL-CCNC: 49 UNIT/L (ref 40–150)
ALT SERPL-CCNC: 18 UNIT/L (ref 0–55)
ALT SERPL-CCNC: 18 UNIT/L (ref 0–55)
ANION GAP SERPL CALC-SCNC: 8 MEQ/L
ANION GAP SERPL CALC-SCNC: 9 MEQ/L
AST SERPL-CCNC: 19 UNIT/L (ref 11–45)
AST SERPL-CCNC: 19 UNIT/L (ref 11–45)
BASOPHILS # BLD AUTO: 0.03 X10(3)/MCL
BASOPHILS NFR BLD AUTO: 0.3 %
BILIRUB SERPL-MCNC: 0.9 MG/DL
BILIRUB SERPL-MCNC: 0.9 MG/DL
BLD PROD TYP BPU: NORMAL
BLOOD UNIT EXPIRATION DATE: NORMAL
BLOOD UNIT TYPE CODE: 6200
BUN SERPL-MCNC: 40.2 MG/DL (ref 8.4–25.7)
BUN SERPL-MCNC: 41.5 MG/DL (ref 8.4–25.7)
CALCIUM SERPL-MCNC: 8 MG/DL (ref 8.8–10)
CALCIUM SERPL-MCNC: 8.1 MG/DL (ref 8.8–10)
CHLORIDE SERPL-SCNC: 108 MMOL/L (ref 98–107)
CHLORIDE SERPL-SCNC: 108 MMOL/L (ref 98–107)
CK SERPL-CCNC: 464 U/L (ref 30–200)
CO2 SERPL-SCNC: 25 MMOL/L (ref 23–31)
CO2 SERPL-SCNC: 25 MMOL/L (ref 23–31)
CREAT SERPL-MCNC: 1.98 MG/DL (ref 0.72–1.25)
CREAT SERPL-MCNC: 2.08 MG/DL (ref 0.72–1.25)
CREAT/UREA NIT SERPL: 20
CREAT/UREA NIT SERPL: 20
CROSSMATCH INTERPRETATION: NORMAL
DISPENSE STATUS: NORMAL
EOSINOPHIL # BLD AUTO: 0.07 X10(3)/MCL (ref 0–0.9)
EOSINOPHIL NFR BLD AUTO: 0.7 %
ERYTHROCYTE [DISTWIDTH] IN BLOOD BY AUTOMATED COUNT: 19.1 % (ref 11.5–17)
GFR SERPLBLD CREATININE-BSD FMLA CKD-EPI: 33 ML/MIN/1.73/M2
GFR SERPLBLD CREATININE-BSD FMLA CKD-EPI: 35 ML/MIN/1.73/M2
GLOBULIN SER-MCNC: 3.5 GM/DL (ref 2.4–3.5)
GLOBULIN SER-MCNC: 3.6 GM/DL (ref 2.4–3.5)
GLUCOSE SERPL-MCNC: 137 MG/DL (ref 82–115)
GLUCOSE SERPL-MCNC: 154 MG/DL (ref 82–115)
GROUP & RH: NORMAL
HCT VFR BLD AUTO: 30 % (ref 42–52)
HGB BLD-MCNC: 9.5 G/DL (ref 14–18)
IMM GRANULOCYTES # BLD AUTO: 0.1 X10(3)/MCL (ref 0–0.04)
IMM GRANULOCYTES NFR BLD AUTO: 1 %
INDIRECT COOMBS: NORMAL
LYMPHOCYTES # BLD AUTO: 0.9 X10(3)/MCL (ref 0.6–4.6)
LYMPHOCYTES NFR BLD AUTO: 8.8 %
MAGNESIUM SERPL-MCNC: 1.8 MG/DL (ref 1.6–2.6)
MCH RBC QN AUTO: 28.6 PG (ref 27–31)
MCHC RBC AUTO-ENTMCNC: 31.7 G/DL (ref 33–36)
MCV RBC AUTO: 90.4 FL (ref 80–94)
MONOCYTES # BLD AUTO: 1.19 X10(3)/MCL (ref 0.1–1.3)
MONOCYTES NFR BLD AUTO: 11.7 %
NEUTROPHILS # BLD AUTO: 7.9 X10(3)/MCL (ref 2.1–9.2)
NEUTROPHILS NFR BLD AUTO: 77.5 %
NRBC BLD AUTO-RTO: 0.4 %
OHS QRS DURATION: 140 MS
OHS QTC CALCULATION: 534 MS
PHOSPHATE SERPL-MCNC: 4.1 MG/DL (ref 2.3–4.7)
PLATELET # BLD AUTO: 327 X10(3)/MCL (ref 130–400)
PMV BLD AUTO: 8.9 FL (ref 7.4–10.4)
POCT GLUCOSE: 123 MG/DL (ref 70–110)
POCT GLUCOSE: 135 MG/DL (ref 70–110)
POTASSIUM SERPL-SCNC: 4 MMOL/L (ref 3.5–5.1)
POTASSIUM SERPL-SCNC: 4.1 MMOL/L (ref 3.5–5.1)
PROT SERPL-MCNC: 5.5 GM/DL (ref 5.8–7.6)
PROT SERPL-MCNC: 5.7 GM/DL (ref 5.8–7.6)
RBC # BLD AUTO: 3.32 X10(6)/MCL (ref 4.7–6.1)
SODIUM SERPL-SCNC: 141 MMOL/L (ref 136–145)
SODIUM SERPL-SCNC: 142 MMOL/L (ref 136–145)
SPECIMEN OUTDATE: NORMAL
UNIT NUMBER: NORMAL
WBC # BLD AUTO: 10.19 X10(3)/MCL (ref 4.5–11.5)

## 2025-06-13 PROCEDURE — 25000003 PHARM REV CODE 250: Performed by: INTERNAL MEDICINE

## 2025-06-13 PROCEDURE — 36415 COLL VENOUS BLD VENIPUNCTURE: CPT | Performed by: STUDENT IN AN ORGANIZED HEALTH CARE EDUCATION/TRAINING PROGRAM

## 2025-06-13 PROCEDURE — 80053 COMPREHEN METABOLIC PANEL: CPT | Performed by: STUDENT IN AN ORGANIZED HEALTH CARE EDUCATION/TRAINING PROGRAM

## 2025-06-13 PROCEDURE — P9016 RBC LEUKOCYTES REDUCED: HCPCS | Performed by: NURSE PRACTITIONER

## 2025-06-13 PROCEDURE — 63600175 PHARM REV CODE 636 W HCPCS: Performed by: STUDENT IN AN ORGANIZED HEALTH CARE EDUCATION/TRAINING PROGRAM

## 2025-06-13 PROCEDURE — 11000001 HC ACUTE MED/SURG PRIVATE ROOM

## 2025-06-13 PROCEDURE — P9017 PLASMA 1 DONOR FRZ W/IN 8 HR: HCPCS | Performed by: NURSE PRACTITIONER

## 2025-06-13 PROCEDURE — 25000003 PHARM REV CODE 250: Performed by: STUDENT IN AN ORGANIZED HEALTH CARE EDUCATION/TRAINING PROGRAM

## 2025-06-13 PROCEDURE — 85025 COMPLETE CBC W/AUTO DIFF WBC: CPT | Performed by: STUDENT IN AN ORGANIZED HEALTH CARE EDUCATION/TRAINING PROGRAM

## 2025-06-13 PROCEDURE — 82550 ASSAY OF CK (CPK): CPT | Performed by: INTERNAL MEDICINE

## 2025-06-13 PROCEDURE — 87040 BLOOD CULTURE FOR BACTERIA: CPT | Performed by: STUDENT IN AN ORGANIZED HEALTH CARE EDUCATION/TRAINING PROGRAM

## 2025-06-13 PROCEDURE — C1751 CATH, INF, PER/CENT/MIDLINE: HCPCS

## 2025-06-13 PROCEDURE — P9035 PLATELET PHERES LEUKOREDUCED: HCPCS | Performed by: NURSE PRACTITIONER

## 2025-06-13 RX ORDER — SODIUM CHLORIDE 0.9 % (FLUSH) 0.9 %
10 SYRINGE (ML) INJECTION
Status: DISCONTINUED | OUTPATIENT
Start: 2025-06-13 | End: 2025-07-01 | Stop reason: HOSPADM

## 2025-06-13 RX ORDER — ONDANSETRON HYDROCHLORIDE 2 MG/ML
4 INJECTION, SOLUTION INTRAVENOUS EVERY 8 HOURS PRN
Status: DISCONTINUED | OUTPATIENT
Start: 2025-06-13 | End: 2025-07-01 | Stop reason: HOSPADM

## 2025-06-13 RX ORDER — MAGNESIUM SULFATE HEPTAHYDRATE 40 MG/ML
2 INJECTION, SOLUTION INTRAVENOUS ONCE
Status: COMPLETED | OUTPATIENT
Start: 2025-06-13 | End: 2025-06-13

## 2025-06-13 RX ORDER — HYDROCODONE BITARTRATE AND ACETAMINOPHEN 5; 325 MG/1; MG/1
1 TABLET ORAL EVERY 6 HOURS PRN
Refills: 0 | Status: DISCONTINUED | OUTPATIENT
Start: 2025-06-13 | End: 2025-07-01 | Stop reason: HOSPADM

## 2025-06-13 RX ORDER — ALUMINUM HYDROXIDE, MAGNESIUM HYDROXIDE, AND SIMETHICONE 1200; 120; 1200 MG/30ML; MG/30ML; MG/30ML
30 SUSPENSION ORAL 4 TIMES DAILY PRN
Status: DISCONTINUED | OUTPATIENT
Start: 2025-06-13 | End: 2025-07-01 | Stop reason: HOSPADM

## 2025-06-13 RX ORDER — TALC
9 POWDER (GRAM) TOPICAL NIGHTLY PRN
Status: DISCONTINUED | OUTPATIENT
Start: 2025-06-13 | End: 2025-07-01 | Stop reason: HOSPADM

## 2025-06-13 RX ORDER — HYDROCODONE BITARTRATE AND ACETAMINOPHEN 5; 325 MG/1; MG/1
1 TABLET ORAL EVERY 8 HOURS PRN
Status: ON HOLD | COMMUNITY
End: 2025-07-01 | Stop reason: HOSPADM

## 2025-06-13 RX ORDER — GLUCAGON 1 MG
1 KIT INJECTION
Status: DISCONTINUED | OUTPATIENT
Start: 2025-06-13 | End: 2025-07-01 | Stop reason: HOSPADM

## 2025-06-13 RX ORDER — LACTULOSE 10 G/10G
10 SOLUTION ORAL DAILY
Status: ON HOLD | COMMUNITY
End: 2025-07-01 | Stop reason: HOSPADM

## 2025-06-13 RX ORDER — MUPIROCIN 20 MG/G
OINTMENT TOPICAL 2 TIMES DAILY
Status: COMPLETED | OUTPATIENT
Start: 2025-06-13 | End: 2025-06-17

## 2025-06-13 RX ORDER — SODIUM CHLORIDE 9 MG/ML
INJECTION, SOLUTION INTRAVENOUS CONTINUOUS
Status: DISCONTINUED | OUTPATIENT
Start: 2025-06-13 | End: 2025-06-15

## 2025-06-13 RX ORDER — DICLOFENAC SODIUM 75 MG/1
75 TABLET, DELAYED RELEASE ORAL 2 TIMES DAILY
Status: ON HOLD | COMMUNITY
End: 2025-07-01 | Stop reason: HOSPADM

## 2025-06-13 RX ORDER — ACETAMINOPHEN 325 MG/1
650 TABLET ORAL EVERY 4 HOURS PRN
Status: DISCONTINUED | OUTPATIENT
Start: 2025-06-13 | End: 2025-06-28

## 2025-06-13 RX ORDER — IPRATROPIUM BROMIDE AND ALBUTEROL SULFATE 2.5; .5 MG/3ML; MG/3ML
3 SOLUTION RESPIRATORY (INHALATION) EVERY 6 HOURS PRN
Status: DISCONTINUED | OUTPATIENT
Start: 2025-06-13 | End: 2025-07-01 | Stop reason: HOSPADM

## 2025-06-13 RX ORDER — BISACODYL 10 MG/1
10 SUPPOSITORY RECTAL DAILY PRN
Status: DISCONTINUED | OUTPATIENT
Start: 2025-06-13 | End: 2025-07-01 | Stop reason: HOSPADM

## 2025-06-13 RX ORDER — NALOXONE HCL 0.4 MG/ML
0.02 VIAL (ML) INJECTION
Status: DISCONTINUED | OUTPATIENT
Start: 2025-06-13 | End: 2025-07-01 | Stop reason: HOSPADM

## 2025-06-13 RX ORDER — MORPHINE SULFATE 4 MG/ML
2 INJECTION, SOLUTION INTRAMUSCULAR; INTRAVENOUS EVERY 6 HOURS PRN
Refills: 0 | Status: DISCONTINUED | OUTPATIENT
Start: 2025-06-13 | End: 2025-06-16

## 2025-06-13 RX ORDER — IBUPROFEN 200 MG
16 TABLET ORAL
Status: DISCONTINUED | OUTPATIENT
Start: 2025-06-13 | End: 2025-07-01 | Stop reason: HOSPADM

## 2025-06-13 RX ORDER — SIMETHICONE 80 MG
1 TABLET,CHEWABLE ORAL 4 TIMES DAILY PRN
Status: DISCONTINUED | OUTPATIENT
Start: 2025-06-13 | End: 2025-07-01 | Stop reason: HOSPADM

## 2025-06-13 RX ORDER — IBUPROFEN 200 MG
24 TABLET ORAL
Status: DISCONTINUED | OUTPATIENT
Start: 2025-06-13 | End: 2025-07-01 | Stop reason: HOSPADM

## 2025-06-13 RX ORDER — ONDANSETRON 4 MG/1
8 TABLET, ORALLY DISINTEGRATING ORAL EVERY 8 HOURS PRN
Status: DISCONTINUED | OUTPATIENT
Start: 2025-06-13 | End: 2025-07-01 | Stop reason: HOSPADM

## 2025-06-13 RX ORDER — DOCUSATE SODIUM 100 MG/1
100 CAPSULE, LIQUID FILLED ORAL DAILY
Status: ON HOLD | COMMUNITY
End: 2025-07-01 | Stop reason: HOSPADM

## 2025-06-13 RX ORDER — POLYETHYLENE GLYCOL 3350 17 G/17G
17 POWDER, FOR SOLUTION ORAL 3 TIMES DAILY PRN
Status: DISCONTINUED | OUTPATIENT
Start: 2025-06-13 | End: 2025-07-01 | Stop reason: HOSPADM

## 2025-06-13 RX ORDER — CHOLECALCIFEROL (VITAMIN D3) 25 MCG
1000 TABLET ORAL DAILY
Status: ON HOLD | COMMUNITY
End: 2025-07-01 | Stop reason: HOSPADM

## 2025-06-13 RX ORDER — PANTOPRAZOLE SODIUM 40 MG/10ML
40 INJECTION, POWDER, LYOPHILIZED, FOR SOLUTION INTRAVENOUS 2 TIMES DAILY
Status: DISCONTINUED | OUTPATIENT
Start: 2025-06-13 | End: 2025-06-15

## 2025-06-13 RX ADMIN — SODIUM CHLORIDE: 9 INJECTION, SOLUTION INTRAVENOUS at 08:06

## 2025-06-13 RX ADMIN — HYDROCODONE BITARTRATE AND ACETAMINOPHEN 1 TABLET: 5; 325 TABLET ORAL at 06:06

## 2025-06-13 RX ADMIN — MUPIROCIN: 20 OINTMENT TOPICAL at 09:06

## 2025-06-13 RX ADMIN — PANTOPRAZOLE SODIUM 40 MG: 40 INJECTION, POWDER, LYOPHILIZED, FOR SOLUTION INTRAVENOUS at 09:06

## 2025-06-13 RX ADMIN — DAPTOMYCIN 645 MG: 500 INJECTION, POWDER, LYOPHILIZED, FOR SOLUTION INTRAVENOUS at 08:06

## 2025-06-13 RX ADMIN — MUPIROCIN: 20 OINTMENT TOPICAL at 08:06

## 2025-06-13 RX ADMIN — SODIUM CHLORIDE: 9 INJECTION, SOLUTION INTRAVENOUS at 09:06

## 2025-06-13 RX ADMIN — MAGNESIUM SULFATE 2 G: 2 INJECTION INTRAVENOUS at 03:06

## 2025-06-13 RX ADMIN — AMIODARONE HYDROCHLORIDE 150 MG: 1.5 INJECTION, SOLUTION INTRAVENOUS at 10:06

## 2025-06-13 RX ADMIN — AMIODARONE HYDROCHLORIDE 1 MG/MIN: 1.8 INJECTION, SOLUTION INTRAVENOUS at 11:06

## 2025-06-13 RX ADMIN — HYDROCODONE BITARTRATE AND ACETAMINOPHEN 1 TABLET: 5; 325 TABLET ORAL at 12:06

## 2025-06-13 RX ADMIN — PANTOPRAZOLE SODIUM 40 MG: 40 INJECTION, POWDER, LYOPHILIZED, FOR SOLUTION INTRAVENOUS at 08:06

## 2025-06-13 RX ADMIN — AMIODARONE HYDROCHLORIDE 0.5 MG/MIN: 1.8 INJECTION, SOLUTION INTRAVENOUS at 05:06
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
## 2025-06-13 NOTE — CONSULTS
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    aspirin (ECOTRIN) 81 MG EC tablet Take 81 mg by mouth.   Taking    atorvastatin (LIPITOR) 20 MG tablet Take 1 tablet (20 mg total) by mouth once daily. 90 tablet 3 Taking    clopidogreL (PLAVIX) 75 mg tablet Take 1 tablet (75 mg total) by mouth once daily. 90 tablet 3 Taking    diclofenac (VOLTAREN) 75 MG EC tablet Take 75 mg by mouth 2 (two) times daily.   Taking    docusate sodium (COLACE) 100 MG capsule Take 100 mg by mouth once daily.   Taking    HYDROcodone-acetaminophen (NORCO) 5-325 mg per tablet Take 1 tablet by mouth every 8 (eight) hours as needed for Pain.   Taking As Needed    lactulose (CEPHULAC) 10 gram packet Take 10 g by mouth once daily.   Taking    losartan  (COZAAR) 100 MG tablet Take 1 tablet (100 mg total) by mouth once daily. 90 tablet 1 Taking    metFORMIN (GLUCOPHAGE) 1000 MG tablet Take 1 tablet (1,000 mg total) by mouth 2 (two) times daily. 180 tablet 3 Taking    verapamiL (CALAN-SR) 240 MG CR tablet Take 1 tablet (240 mg total) by mouth once daily. 90 tablet 4 Taking    vitamin D (VITAMIN D3) 1000 units Tab Take 1,000 Units by mouth once daily.   Taking    glucagon (GVOKE HYPOPEN 2-PACK) 1 mg/0.2 mL AtIn Inject 0.2 mLs into the skin daily as needed (low blood sugar). May repeat dose in 15 minutes 0.4 mL 0

## 2025-06-13 NOTE — CARE UPDATE
CT reviewed  H/H improving  No active bleed  No surgical intervention at this time   Dr Viveros spoke with Dr Mike Salas for diet  We will sign off      6/13/2025     The above findings, diagnostics, and treatment plan were discussed with Dr. William Viveros  who will follow with further assessments and recommendations. Please call with any questions, concerns, or clinical status changes.  This note/OR report was created with the assistance of  voice recognition software or phone  dictation.  There may be transcription errors as a result of using this technology however minimal. Effort has been made to assure accuracy of transcription but any obvious errors or omissions should be clarified with the author of the document.
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
## 2025-06-13 NOTE — CONSULTS
[facility] 6th Floor Medical Telemetry  Wound Care    Patient Name:  Vaibhav Vargas   MRN:  [id number]  Date: 6/13/2025  Diagnosis: <principal problem not specified>    History:     Past Medical History:   Diagnosis Date    Chronic lumbar pain     Diabetes mellitus, type 2     Edema     Hypertension     Obesity, unspecified     Osteoarthritis of multiple joints        Social History[1]    Precautions:     Allergies as of 06/12/2025    (No Known Allergies)       WO Assessment Details/Treatment        06/13/25 1135   WOCN Assessment   Visit Date 06/13/25   Visit Time 1135   Consult Type New   [facility] Speciality Wound   Wound pressure   Intervention chart review;assessed;applied;orders;consult other service   Teaching on-going        Wound 05/23/25 2110 Pressure Injury Sacral spine   Date First Assessed/Time First Assessed: 05/23/25 2110   Present on Original Admission: Yes  Primary Wound Type: Pressure Injury  Location: Sacral spine   Wound Image     Pressure Injury Stage 3   Dressing Appearance Intact;Moist drainage   Drainage Amount Small   Drainage Characteristics/Odor Serosanguineous   Appearance Pink;Red;Yellow;Slough;Moist   Tissue loss description Full thickness   Black (%), Wound Tissue Color 0 %   Red (%), Wound Tissue Color 70 %   Yellow (%), Wound Tissue Color 30 %   Periwound Area Intact;Dry;Scar tissue   Wound Edges Irregular   Wound Length (cm) 8 cm   Wound Width (cm) 4 cm   Wound Depth (cm) 0.2 cm   Wound Volume (cm^3) 3.351 cm^3   Wound Surface Area (cm^2) 25.13 cm^2   Care Cleansed with:;Wound cleanser   Dressing Applied;Silver;Absorptive Pad        Wound 03/29/25 1900 Pressure Injury Left dorsal Foot   Date First Assessed/Time First Assessed: 03/29/25 1900   Present on Original Admission: Yes  Primary Wound Type: Pressure Injury  Side: Left  Orientation: dorsal  Location: Foot   Wound Image    Pressure Injury Stage 4   Dressing Appearance Intact;Dried drainage   Drainage Amount Scant    Drainage Characteristics/Odor Serosanguineous   Appearance Pink;Yellow;White;Bone   Tissue loss description Full thickness   Black (%), Wound Tissue Color 0 %   Red (%), Wound Tissue Color 10 %   Yellow (%), Wound Tissue Color 90 %   Periwound Area Intact;Dry   Wound Edges Irregular   Wound Length (cm) 1.7 cm   Wound Width (cm) 1 cm   Wound Depth (cm) 0.3 cm   Wound Volume (cm^3) 0.267 cm^3   Wound Surface Area (cm^2) 1.34 cm^2   Care Cleansed with:;Wound cleanser   Dressing Applied;Silver;Foam   Off Loading Heel lift boot     WOCN consulted for left foot and sacrum. Discussed POC w/ nurse charisma and recommending consulting podiatry to left medial foot due to bone being visible. Pt.'s wife at bedside. Stated he has been seeing wound care at [facility]. Treatment recommendations: sacrum: clean w/ vashe, dry well, apply aquacel ag advantage grey cloth to wound bed, cover with abd pad, secure with minimal tape. Daily/prn if soilage. Left foot: cleaned it with vashe, dried well, apply aq Ag cloth and new foam. Pt.'s foot in prafo boot. Follow orders until podiatry sees pt.'s foot and makes other recommendations. Nursing to cont. Tx recs and preventative measures. Will follow up.     06/13/2025         [1]   Social History  Socioeconomic History    Marital status:    Tobacco Use    Smoking status: Never    Smokeless tobacco: Never   Substance and Sexual Activity    Alcohol use: Not Currently    Drug use: Never    Sexual activity: Not Currently     Social Drivers of Health     Financial Resource Strain: Low Risk  (6/12/2025)    Overall Financial Resource Strain (CARDIA)     Difficulty of Paying Living Expenses: Not hard at all   Food Insecurity: No Food Insecurity (6/12/2025)    Hunger Vital Sign     Worried About Running Out of Food in the Last Year: Never true     Ran Out of Food in the Last Year: Never true   Transportation Needs: No Transportation Needs (6/12/2025)    PRAPARE - Transportation     Lack of  Transportation (Medical): No     Lack of Transportation (Non-Medical): No   Physical Activity: Inactive (5/26/2025)    Exercise Vital Sign     Days of Exercise per Week: 0 days     Minutes of Exercise per Session: 0 min   Stress: No Stress Concern Present (6/12/2025)    [employer] [place] [employer] - Occupational Stress Questionnaire     Feeling of Stress : Not at all   Housing Stability: Low Risk  (6/12/2025)    Housing Stability Vital Sign     Unable to Pay for Housing in the Last Year: No     Number of Times Moved in the Last Year: 0     Homeless in the Last Year: No

## 2025-06-13 NOTE — PT/OT/SLP DISCHARGE
Occupational Therapy Discharge Summary    Vaibhav Vargas  MRN: [id number]   Principal Problem: Severe sepsis      Patient Discharged from acute Occupational Therapy on 6/12/25.  Please refer to prior OT note dated 6/9/25 for functional status.    Assessment:      Patient was discharged unexpectedly.  Information required to complete an accurate discharge summary is unknown.  Refer to therapy initial evaluation and last progress note for initial and most recent functional status and goal achievement.  Recommendations made may be found in medical record.    Objective:     GOALS:   Multidisciplinary Problems       Occupational Therapy Goals          Problem: Occupational Therapy    Goal Priority Disciplines Outcome Interventions   Occupational Therapy Goal     OT, PT/OT Unable to Meet    Description: Goals to be met for DC, set at initial evaluation, updated on ReEvaluation 6/9 s/p ICU upgrade with return to floor yesterday.  Continue toward goals set at last ReEvaluation 6/3:    Pt will demonstrate feeding himself seated at recliner chair/WC with setup.  Pt will complete grooming seated at sink at WC level with SBA.  Pt will complete UB dressing with SBA.  Pt will complete sit>stand with min A with RW for prepare for BSC transfer.  Pt will toilet with moderate assistance for clothing management and hygiene at [facility].  Pt will engage/tolerate BUE general therex x 10 minutes to increase activity tolerance seated EOB.  Pt will tolerate static standing x 1 minute to assist with ADL tasks                       Reasons for Discontinuation of Therapy Services  Transfer to alternate level of care. and Patient is unable to continue work toward goals because of medical or psychosocial complications.      Plan:     Patient Discharged to: [facility] GI services    6/13/2025

## 2025-06-13 NOTE — AI DETERIORATION ALERT
Artificial Intelligence Notification  [facility]  [address]  [place] [address]  Phone: [phone]    This documentation was triggered by an Artificial Intelligence Notification:    Admit Date: 2025   LOS: 0  Code Status: Full Code  : 1950  Age: 74 y.o.  Weight:   Wt Readings from Last 1 Encounters:   25 107.6 kg (237 lb 3.4 oz)        Sex: male  Bed: 515/515 A  MRN: [id number]  Attending Physician: Rogelio Riley MD     Date of Alert: 2025  Time AI Alert Received:             Vitals:    25   BP: (!) 94/50   Pulse: (!) 150   Temp: 97.8 °F (36.6 °C)            Artificial Intelligence alert discussed with Provider:     Name: MEAGAN Arevalo NP   Date/Time of Provider Notification:  @2200 provider admitting and putting in admit orders for pt      Patient Condition: pt transferred from [facility] this evening for GI bleed in the face of clot on Echo and afib, EGD (-)  pt persists with low H&H, SBP 90's, map 68 on arrival, per cardiology note rate control has been difficult, attempting digoxin, HR in 150s, may need amiodorone gtt per cards. On admit orders for PLT, FFP, PRBC placed, will recheck labs and vs post transfusion. CTA results retroperitoneal hematoma, no active bleeding, admitting MD aware and ordered surgery c/s. Pts hemodynamics concerning for possible need for upgrade to ICU, map currently 68. Will continue to monitor closely.

## 2025-06-13 NOTE — PLAN OF CARE
Problem: Adult Inpatient Plan of Care  Goal: Plan of Care Review  Outcome: Progressing  Goal: Patient-Specific Goal (Individualized)  Outcome: Progressing  Goal: Absence of Hospital-Acquired Illness or Injury  Outcome: Progressing  Goal: Optimal Comfort and Wellbeing  Outcome: Progressing  Goal: Readiness for Transition of Care  Outcome: Progressing     Problem: Diabetes Comorbidity  Goal: Blood Glucose Level Within Targeted Range  Outcome: Progressing     Problem: Sepsis/Septic Shock  Goal: Optimal Coping  Outcome: Progressing  Goal: Absence of Bleeding  Outcome: Progressing  Goal: Blood Glucose Level Within Targeted Range  Outcome: Progressing  Goal: Absence of Infection Signs and Symptoms  Outcome: Progressing  Goal: Optimal Nutrition Intake  Outcome: Progressing     Problem: Infection  Goal: Absence of Infection Signs and Symptoms  Outcome: Progressing     Problem: Wound  Goal: Optimal Coping  Outcome: Progressing  Goal: Optimal Functional Ability  Outcome: Progressing  Goal: Absence of Infection Signs and Symptoms  Outcome: Progressing  Goal: Improved Oral Intake  Outcome: Progressing  Goal: Optimal Pain Control and Function  Outcome: Progressing  Goal: Skin Health and Integrity  Outcome: Progressing  Goal: Optimal Wound Healing  Outcome: Progressing     Problem: Skin Injury Risk Increased  Goal: Skin Health and Integrity  Outcome: Progressing

## 2025-06-13 NOTE — CARE UPDATE
Patient seen and examined  He has been afebrile  He denies any abdominal pain, nausea or vomiting   Labs and vitals reviewed   Hb 9.5, Plt 327, BUN 40, Crt 1.98    Continue IV abx per ID team, daptomycin  CK weekly     F/U by [facility] and renal team     Continue IV fluids and amiodarone drip    Labs in am

## 2025-06-13 NOTE — PLAN OF CARE
06/13/25 1319   Discharge Assessment   Assessment Type Discharge Planning Assessment   Source of Information health care advocate  (Mayela admissions coordinator at [facility])   Communicated DEANGELO with patient/caregiver Date not available/Unable to determine   People in Home facility resident   Name(s) of People in Home residents   Facility Arrived From: Pt is from [facility] as a skilled pt   Do you expect to return to your current living situation? Yes   Do you have help at home or someone to help you manage your care at home? Yes   Who are your caregiver(s) and their phone number(s)? [facility] staff--[phone]   Prior to hospitilization cognitive status: Unable to Assess   Current cognitive status: Alert/Oriented   Walking or Climbing Stairs Difficulty yes   Walking or Climbing Stairs ambulation difficulty, requires equipment   Mobility Management WC   Dressing/Bathing Difficulty yes   Dressing/Bathing bathing difficulty, assistance 1 person   Home Layout Able to live on 1st floor   Equipment Currently Used at Home wheelchair   Readmission within 30 days? No   Patient currently being followed by outpatient case management? No   Do you currently have service(s) that help you manage your care at home? Yes   How Many hours does patient receive services 24   Name and Contact number of agency [facility]--[phone]   Is the pt/caregiver preference to resume services with current agency Yes   Who is going to help you get home at discharge? NH van   Are you on dialysis? No   Discharge Plan A Return to nursing home  (skilled)   Discharge Plan B Return to Nursing Home  (skilled)   DME Needed Upon Discharge  none   Discharge Plan discussed with: Caregiver   Name(s) and Number(s) [facility] admissions coordinator--[phone]   Transition of Care Barriers None     Pt is from [facility] as skilled

## 2025-06-13 NOTE — NURSING
Nurse informed Dr. Ricks that cardiology recommends amiodarone drip. Transfer order requested. Dr. Ricks gave order to transfer patient. Patient assigned to bed 621. Nurse attempted to call report to [phone] at [phone]. Was told nurse was in a patient's room and she would call back for report.
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
## 2025-06-13 NOTE — NURSING
This nurse called back to unit at 1018 to give report to nurse. Silvana took report. Informed nurse the patient needed an amiodarone drip and still need a unit of platelets. Nurse asked for report sheet to be sent with patient. Patient left unit at approximately 1035 via transport. Patient awake and alert. On 2 units of oxygen. Dr. Kemp notified of transfer.

## 2025-06-13 NOTE — PT/OT/SLP DISCHARGE
POST DISCHARGE DOCUMENTATION - 06/13/2025 7:26 AM    Physical Therapy Discharge Summary    Name: Vaibhav Vargas  MRN: [id number]   Principal Problem: Severe sepsis   1. A-fib    2. SOB (shortness of breath)    3. Shortness of breath    4. Acute renal failure, unspecified acute renal failure type    5. Hyperkalemia    6. Lactic acidosis    7. Complicated UTI (urinary tract infection)    8. Severe sepsis    9. Bladder outlet obstruction    10. Routine check-up    11. Tachycardia    12. Sepsis    13. QT prolongation    14. Cardiac mass    15. AV block    16. Chest pain    17. PAD (peripheral artery disease)    18. Severe malnutrition    19. Pulmonary embolism    20. Pulmonary emboli    21. Intracardiac thrombus    22. Gastrointestinal hemorrhage, unspecified gastrointestinal hemorrhage type       Problem List[1]   Recommendations - per last treatment session     Therapy Intensity Recommendations at Discharge: Moderate Intensity Therapy  Discharge Equipment Recommendations: other (see comments) (tbd)     Assessment:     Patient last seen by PT SERVICES on 06/09/2025  Patient last seen by PT SERVICES on 06/03/2025    Refer to therapy's initial evaluation or last treatment (progress) note for patient's last known functional status, goal achievement and therapists' recommendations.    Patient was transferred to alternate level of care.    Objective     GOALS: - 0 of 5 STG's met by patient    Multidisciplinary Problems       Physical Therapy Goals       Problem: Physical Therapy    Goal Priority Disciplines Outcome Interventions   Physical Therapy Goal     PT, PT/OT Progressing    Description: REVIEWED 06/03/2025  Goals to be met by: DISCHARGE  Patient will increase functional independence with mobility by performing:  -. Supine to sit with contact guard assistance - MET & REVISED 06/03/2025 - NOT MET  -. Sit to supine with contact guard assistance - MET & REVISED 06/03/2025 - NOT MET  -. Rolling to Left and Right with  contact guard assistance - MET & REVISED 06/03/2025 - NOT MET  -. Sit to stand transfer with Minimal Assistance w/ rolling walker - REVIEWED & CONTINUED - NOT MET  -. Gait  x 25 feet with Minimal Assistance using Rolling Walker w/ wheelchair in tow - REVIEWED & CONTINUED - NOT MET           Plan     Patient Discharged to: another health care institution - not defined per chart.    6/13/2025       [1]   Patient Active Problem List  Diagnosis    Chronic low back pain    Edema    Obesity    Osteoarthritis of knee    Primary hypertension    Type 2 diabetes mellitus    Coronary artery disease    Myocardial infarction    Acute stroke due to ischemia    Severe sepsis    Severe malnutrition    Anemia    Retroperitoneal hematoma

## 2025-06-13 NOTE — AI DETERIORATION ALERT
Artificial Intelligence Notification  [facility]  [address] [place] [address]  Phone: [phone]    This documentation was triggered by an Artificial Intelligence Notification:    Admit Date: 2025   LOS: 1  Code Status: Full Code  : 1950  Age: 74 y.o.  Weight:   Wt Readings from Last 1 Encounters:   25 107.6 kg (237 lb 3.4 oz)        Sex: male  Bed: 1/[facility] A  MRN: [id number]  Attending Physician: Rambo Cain MD     Date of Alert: 2025  Time AI Alert Received: 1147            Vitals:    25 1158   BP: 120/60   Pulse: 61   Resp: 17   Temp: 97.7 °F (36.5 °C)     SpO2: 98 %      Artificial Intelligence alert discussed with Nurse:     Name: Silvana Joshi RN   Date/Time of Provider Notification: 2025 @ 1215      Patient Condition: AI likely triggered by SBP in the 60's charted in EMR. Spoke with nurse Silvana Joshi about pt's vital signs charted. She confirmed vital signs are erroneous, confirmed will change in EMR. Accurate vital signs listed above.

## 2025-06-13 NOTE — NURSING TRANSFER
Nursing Transfer Note      6/12/2025   8:14 PM    Nurse giving handoff: BELEN Davidson LPN  Nurse receiving handoff:EN Rooney RN    Reason patient is being transferred: Higher level of care    Transfer To: [facility]    Transfer via stretcher    Transfer with NC to      Transported by [facility]    Transfer Vital Signs:  Blood Pressure: 99/59, MAP 72  Heart Rate: 121 bpm  O2: 97%  Temperature: 97.5 F  Respirations: 16      Order for Tele Monitor? Yes    Additional Lines: Oxygen and Palomo Catheter    Medicines sent: NA    Any special needs or follow-up needed: NA    Patient belongings transferred with patient: Yes    Chart send with patient: No    Notified: daughter
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
## 2025-06-14 LAB
ALBUMIN SERPL-MCNC: 2 G/DL (ref 3.4–4.8)
ALBUMIN/GLOB SERPL: 0.6 RATIO (ref 1.1–2)
ALP SERPL-CCNC: 51 UNIT/L (ref 40–150)
ALT SERPL-CCNC: 15 UNIT/L (ref 0–55)
ANION GAP SERPL CALC-SCNC: 6 MEQ/L
AST SERPL-CCNC: 17 UNIT/L (ref 11–45)
BASOPHILS # BLD AUTO: 0.04 X10(3)/MCL
BASOPHILS NFR BLD AUTO: 0.4 %
BILIRUB SERPL-MCNC: 0.6 MG/DL
BUN SERPL-MCNC: 32 MG/DL (ref 8.4–25.7)
CALCIUM SERPL-MCNC: 8 MG/DL (ref 8.8–10)
CHLORIDE SERPL-SCNC: 113 MMOL/L (ref 98–107)
CO2 SERPL-SCNC: 25 MMOL/L (ref 23–31)
CREAT SERPL-MCNC: 1.44 MG/DL (ref 0.72–1.25)
CREAT/UREA NIT SERPL: 22
EOSINOPHIL # BLD AUTO: 0.16 X10(3)/MCL (ref 0–0.9)
EOSINOPHIL NFR BLD AUTO: 1.8 %
ERYTHROCYTE [DISTWIDTH] IN BLOOD BY AUTOMATED COUNT: 19.6 % (ref 11.5–17)
GFR SERPLBLD CREATININE-BSD FMLA CKD-EPI: 51 ML/MIN/1.73/M2
GLOBULIN SER-MCNC: 3.6 GM/DL (ref 2.4–3.5)
GLUCOSE SERPL-MCNC: 119 MG/DL (ref 82–115)
HCT VFR BLD AUTO: 27.8 % (ref 42–52)
HGB BLD-MCNC: 8.6 G/DL (ref 14–18)
IMM GRANULOCYTES # BLD AUTO: 0.07 X10(3)/MCL (ref 0–0.04)
IMM GRANULOCYTES NFR BLD AUTO: 0.8 %
LYMPHOCYTES # BLD AUTO: 0.9 X10(3)/MCL (ref 0.6–4.6)
LYMPHOCYTES NFR BLD AUTO: 9.9 %
MCH RBC QN AUTO: 28.3 PG (ref 27–31)
MCHC RBC AUTO-ENTMCNC: 30.9 G/DL (ref 33–36)
MCV RBC AUTO: 91.4 FL (ref 80–94)
MONOCYTES # BLD AUTO: 0.96 X10(3)/MCL (ref 0.1–1.3)
MONOCYTES NFR BLD AUTO: 10.6 %
NEUTROPHILS # BLD AUTO: 6.93 X10(3)/MCL (ref 2.1–9.2)
NEUTROPHILS NFR BLD AUTO: 76.5 %
NRBC BLD AUTO-RTO: 0 %
PLATELET # BLD AUTO: 327 X10(3)/MCL (ref 130–400)
PMV BLD AUTO: 8.6 FL (ref 7.4–10.4)
POTASSIUM SERPL-SCNC: 3.7 MMOL/L (ref 3.5–5.1)
PROT SERPL-MCNC: 5.6 GM/DL (ref 5.8–7.6)
RBC # BLD AUTO: 3.04 X10(6)/MCL (ref 4.7–6.1)
SODIUM SERPL-SCNC: 144 MMOL/L (ref 136–145)
WBC # BLD AUTO: 9.06 X10(3)/MCL (ref 4.5–11.5)

## 2025-06-14 PROCEDURE — 25000003 PHARM REV CODE 250: Performed by: NURSE PRACTITIONER

## 2025-06-14 PROCEDURE — 63600175 PHARM REV CODE 636 W HCPCS: Performed by: STUDENT IN AN ORGANIZED HEALTH CARE EDUCATION/TRAINING PROGRAM

## 2025-06-14 PROCEDURE — 80053 COMPREHEN METABOLIC PANEL: CPT | Performed by: INTERNAL MEDICINE

## 2025-06-14 PROCEDURE — 85025 COMPLETE CBC W/AUTO DIFF WBC: CPT | Performed by: INTERNAL MEDICINE

## 2025-06-14 PROCEDURE — 11000001 HC ACUTE MED/SURG PRIVATE ROOM

## 2025-06-14 PROCEDURE — 25000003 PHARM REV CODE 250: Performed by: STUDENT IN AN ORGANIZED HEALTH CARE EDUCATION/TRAINING PROGRAM

## 2025-06-14 PROCEDURE — 36415 COLL VENOUS BLD VENIPUNCTURE: CPT | Performed by: INTERNAL MEDICINE

## 2025-06-14 RX ORDER — DAPTOMYCIN 50 MG/ML
500 INJECTION, POWDER, LYOPHILIZED, FOR SOLUTION INTRAVENOUS
Status: DISCONTINUED | OUTPATIENT
Start: 2025-06-16 | End: 2025-07-01 | Stop reason: HOSPADM

## 2025-06-14 RX ORDER — METOPROLOL TARTRATE 1 MG/ML
5 INJECTION, SOLUTION INTRAVENOUS
Status: DISCONTINUED | OUTPATIENT
Start: 2025-06-14 | End: 2025-06-17

## 2025-06-14 RX ADMIN — MUPIROCIN: 20 OINTMENT TOPICAL at 09:06

## 2025-06-14 RX ADMIN — APIXABAN 5 MG: 5 TABLET, FILM COATED ORAL at 09:06

## 2025-06-14 RX ADMIN — PANTOPRAZOLE SODIUM 40 MG: 40 INJECTION, POWDER, LYOPHILIZED, FOR SOLUTION INTRAVENOUS at 09:06

## 2025-06-14 RX ADMIN — AMIODARONE HYDROCHLORIDE 0.5 MG/MIN: 1.8 INJECTION, SOLUTION INTRAVENOUS at 05:06

## 2025-06-14 RX ADMIN — AMIODARONE HYDROCHLORIDE 0.5 MG/MIN: 1.8 INJECTION, SOLUTION INTRAVENOUS at 06:06

## 2025-06-14 RX ADMIN — METOPROLOL TARTRATE 5 MG: 1 INJECTION, SOLUTION INTRAVENOUS at 11:06

## 2025-06-14 RX ADMIN — PANTOPRAZOLE SODIUM 40 MG: 40 INJECTION, POWDER, LYOPHILIZED, FOR SOLUTION INTRAVENOUS at 08:06

## 2025-06-14 RX ADMIN — SODIUM CHLORIDE: 9 INJECTION, SOLUTION INTRAVENOUS at 12:06

## 2025-06-14 RX ADMIN — SODIUM CHLORIDE: 9 INJECTION, SOLUTION INTRAVENOUS at 08:06

## 2025-06-14 RX ADMIN — MUPIROCIN: 20 OINTMENT TOPICAL at 08:06

## 2025-06-14 RX ADMIN — DAPTOMYCIN 645 MG: 500 INJECTION, POWDER, LYOPHILIZED, FOR SOLUTION INTRAVENOUS at 05:06

## 2025-06-14 NOTE — PLAN OF CARE
Problem: Adult Inpatient Plan of Care  Goal: Plan of Care Review  Outcome: Progressing  Goal: Patient-Specific Goal (Individualized)  Outcome: Progressing  Goal: Absence of Hospital-Acquired Illness or Injury  Outcome: Progressing  Goal: Optimal Comfort and Wellbeing  Outcome: Progressing  Goal: Readiness for Transition of Care  Outcome: Progressing     Problem: Diabetes Comorbidity  Goal: Blood Glucose Level Within Targeted Range  Outcome: Progressing     Problem: Sepsis/Septic Shock  Goal: Optimal Coping  Outcome: Progressing  Goal: Absence of Bleeding  Outcome: Progressing  Goal: Blood Glucose Level Within Targeted Range  Outcome: Progressing  Goal: Absence of Infection Signs and Symptoms  Outcome: Progressing  Goal: Optimal Nutrition Intake  Outcome: Progressing     Problem: Infection  Goal: Absence of Infection Signs and Symptoms  Outcome: Progressing     Problem: Wound  Goal: Optimal Coping  Outcome: Progressing  Goal: Optimal Functional Ability  Outcome: Progressing  Goal: Absence of Infection Signs and Symptoms  Outcome: Progressing  Goal: Improved Oral Intake  Outcome: Progressing  Goal: Optimal Pain Control and Function  Outcome: Progressing  Goal: Skin Health and Integrity  Outcome: Progressing  Goal: Optimal Wound Healing  Outcome: Progressing     Problem: Skin Injury Risk Increased  Goal: Skin Health and Integrity  Outcome: Progressing     Problem: Fall Injury Risk  Goal: Absence of Fall and Fall-Related Injury  Outcome: Progressing

## 2025-06-14 NOTE — PROGRESS NOTES
[facility] - 5th Floor Med Surg  Infectious Diseases Progress Note     Patient Name: Vaibhav Vargas  MRN: [id number]  Admission Date: 6/12/2025  Hospital Length of Stay: 1 days  Attending Physician: Rambo Cain MD  Primary Care Provider: Shameka Rooney DO   Service date: 6/14/2025  Reason for consultation:  Antibiotic management for left foot osteomyelitis.        IMPRESSION:     74-year-old male with complicated hospital stay started with obstructive uropathy and PRIYA along with CHF exacerbation he was found to have right atrial thrombus and dura anticoagulation patient had evidence of GI bleed retroperitoneal bleed evidence of pulmonary embolism with atrial fibrillation with rapid ventricular response.    History of cerebrovascular accident chronic indwelling Fowler catheter.    Enterococcus faecium colonization currently on treatment with daptomycin.    Left foot great toe ulcer with osteomyelitis wound culture grew MRSA.  Generalized weakness anemia of blood loss and deconditioning.        RECOMMENDATIONS:     Patient was started on daptomycin and the other facilities to cover both organisms the plan was to continue until July 14, 2025.    Because elevated CPK level will reduce his dose if level remains high worse we will switch to vancomycin  Continue heel suspension and local wound care per wound care team.    Continue supportive care and follow-up progress.    Follow-up repeated blood culture results.    Continue right arm PICC line care.    We will follow periodically please call with questions     Subjective:  Patient is the same continued to be weak left foot x-ray was reviewed his labs showing elevated CPK which went up to 464.     HPI:   74 y.o. male with PMH CVA 03/2025, CAD s/p LAD stent 2017, venous insuffiency, HTN, 1st Degree AVB, NIDDM2 who initially presented to [facility] ED on 05/23 by daughter from NH for weakness.  He was found to have dislodged fowler catheter with severe PRIYA  and UTI, fowler was replaced, he had good urinary output with significant improvement in renal function. He was also getting diureses for CHF exacerbation, workup showed EF of 35-40%, a thrombus was incidentally found in his right atrium. He was started on full dose Lovenox the day after. He continued to be in a fib with RVR despite BB, CCB were being avoided due to low EF, cardioversion was avoided due to existing thrombus that was confirmed on YO. On 06/01 he had significant drop in his hemoglobin with worsening RVR, Lovenox was held, EGD on 06/02 showed non-bleeding ulcers with active oozing of blood from his gastric mucosa, GI did not recommend to hold anticoagulation due to risk for embolization. On 06/04 the patient became hypotensive with A flutter and RVR, hemoglobin dropped to 4.7, Lovenox was held again and massive transfusion was initiated, the patient was upgraded to the ICU due to hemodynamic instability. Lovenox was started once again on 06/06 and he was downgraded from the ICU on 06/08.  Patient bled again with an acute drop in hemoglobin on 06/11.  He was transferred for higher level of care he has been receiving daptomycin for left foot osteomyelitis infectious disease consultation was requested.       Physical Exam:  VS Reviewed.  General:  Ill appearing  Head and neck:  Atraumatic, normocephalic, moist mucous membranes, supple neck  Chest:  Clear to auscultation bilaterally  Heart:  S1, S2, no appreciable murmur  Abdomen:  Soft, nontender, BS + Fowler in place  Neuro:  Awake, decreased strength bilaterally  Extremities:  Right lower extremity chronic ischemic changes with hyperpigmentation decreased pulses right heel eschar closed, left foot severe toenail fungal disease with open wound to the medial great toe area with minimal drainage pulses are absent.    Recent Labs   Lab 06/11/25  0117 06/12/25  0511 06/12/25  2339 06/13/25  0733 06/14/25  0718    143 141 142 144   K 4.6 4.1 4.0 4.1 3.7  "  * 108* 108* 108* 113*   CO2 20* 26 25 25 25   BUN 30.7* 43.9* 41.5* 40.2* 32.0*   CREATININE 1.56* 2.24* 2.08* 1.98* 1.44*   CALCIUM 8.3* 8.2* 8.0* 8.1* 8.0*   PROT 5.7* 5.5* 5.5* 5.7* 5.6*   BILITOT 0.6 0.6 0.9 0.9 0.6   ALKPHOS 52 42 47 49 51   ALT 22 17 18 18 15   AST 33 25 19 19 17   MG 1.90 2.00 1.80  --   --    PHOS 3.8 4.5 4.1  --   --          CBC/Anemia Labs: Coags:          Recent Labs   Lab 06/12/25  2338 06/13/25  0733 06/14/25  0718   WBC 10.85 10.19 9.06   HGB 8.5* 9.5* 8.6*   HCT 26.9* 30.0* 27.8*    327 327   MCV 92.4 90.4 91.4   RDW 19.3* 19.1* 19.6*    No results for input(s): "PT", "INR", "APTT" in the last 168 hours.          Microbiology Results (Last 365 Days)    Procedure Component Value Units Date/Time   Blood Culture [[id number]] (Normal) Collected: 06/13/25 0830   Order Status: Completed Specimen: Blood from Arm, Right Updated: 06/14/25 0901    Blood Culture No Growth At 24 Hours   Blood Culture [[id number]] (Normal) Collected: 06/13/25 0733   Order Status: Completed Specimen: Blood from Antecubital, Left Updated: 06/14/25 0901    Blood Culture No Growth At 24 Hours   Tissue Culture - Aerobic [[id number]] Collected: 06/02/25 1121   Order Status: Completed Specimen: Tissue from Foot, Left Updated: 06/05/25 1046    Tissue - Aerobic Culture Normal Skin Manisha, no further workup.   Anaerobic Culture [[id number]] Collected: 06/02/25 1121   Order Status: Completed Specimen: Tissue from Foot, Left Updated: 06/06/25 0753    Anaerobe Culture No Anaerobes Isolated   Wound Culture [[id number]] (Abnormal)  Collected: 06/02/25 0919   Order Status: Completed Specimen: Wound from Foot, Left Updated: 06/06/25 1032    Wound Culture Few Methicillin resistant Staphylococcus aureus Abnormal    Susceptibility     Methicillin resistant Staphylococcus aureus     JOSE G     Clindamycin <=0.25 Sensitive     Oxacillin >=4 Resistant     Tetracycline <=1 Sensitive     Trimeth/Sulfa <=10 Sensitive     " Vancomycin <=0.5 Sensitive               Linear View         Anaerobic Culture [[id number]] (Abnormal) Collected: 06/02/25 0919   Order Status: Completed Specimen: SWAB from Foot, Left Updated: 06/06/25 1018    Anaerobe Culture Corynebacterium tuberculostearicum Abnormal     Comment: Anaerobic sensitivity is not usually indicated except on single isolates from sterile body sites.      Urine culture [[id number]] Collected: 05/29/25 1623   Order Status: Completed Specimen: Urine Updated: 06/01/25 1121    Urine Culture No Growth   Respiratory Culture [[id number]] Collected: 05/28/25 1357   Order Status: Completed Specimen: Sputum, Expectorated Updated: 05/30/25 0814    Respiratory Culture Normal respiratory jacy    GRAM STAIN Quality 3+     No bacteria seen   Chlamydia/GC, PCR [[id number]] Collected: 05/28/25 1258   Order Status: Completed Specimen: Urine Updated: 05/28/25 1536    Chlamydia trachomatis PCR Not Detected    N. gonorrhea PCR Not Detected    Source Urine   Narrative:     The Xpert CT/NG test, performed on the GeneXpert system is a qualitative in vitro real-time polymerase chain reaction (PCR) test for the automated detected and differentiation for genomic DNA from Chlamydia trachomatis (CT) and/or Neisseria gonorrhoeae (NG).   Blood Culture #2 **CANNOT BE ORDERED STAT** [[id number]] (Normal) Collected: 05/23/25 1957   Order Status: Completed Specimen: Blood from Arm, Left Updated: 05/29/25 0011    Blood Culture No Growth at 5 days   Urine culture [[id number]] (Abnormal)  Collected: 05/23/25 1929   Order Status: Completed Specimen: Urine Updated: 05/26/25 1218    Urine Culture >/= 100,000 colonies/ml Enterococcus faecium Abnormal     Comment: Ampicillin results may be used to predict susceptibility to amoxicillin-clavulanate, ampicillin-sulbactam, and piperacillin-tazobactam.      Susceptibility     Enterococcus faecium     JOSE G     Ampicillin <=2 Sensitive     Ciprofloxacin 1 Sensitive     Gentamicin  Synergy Screen SYN-S Sensitive     Levofloxacin 2 Sensitive     Nitrofurantoin 64 Intermediate     Penicillin 2 Sensitive                         X-Ray Foot 2 View Left [[id number]] Resulted: 06/13/25 1036   Order Status: Completed Updated: 06/13/25 1038   Narrative:     EXAMINATION:  XR FOOT 2 VIEW LEFT    CLINICAL HISTORY:  great toe wound;Sepsis, unspecified organism    COMPARISON:  None.    FINDINGS:  There is some demineralization of the visualized osseous structures slightly more than expected for patient's age    No acute displaced fractures or dislocations.    There are degenerative changes with narrowing of the proximal and distal interphalangeal joints articular spaces are otherwise preserved with smooth articular surfaces    No blastic or lytic lesions.    On the provided images there is no evidence of primary and or secondary signs to suggest the presence of osteomyelitis, however, these might be lacking on plain films and therefore if clinically indicated other imaging modalities might prove helpful for further assessment   Impression:       Demineralization of the visualized osseous structures.    Degenerative changes.    No clear evidence of osteomyelitis other imaging modalities might prove helpful for further assessment.      Electronically signed by: Singh Woodruff  Date: 06/13/2025  Time: 10:36        Telemetry
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
## 2025-06-14 NOTE — PROGRESS NOTES
[facility] Progress Note        Chief Complaint: Inpatient Follow-up for anemia and ARF    HPI:   74 y.o. male with PMH CVA 03/2025, CAD s/p LAD stent 2017, venous insuffiency, HTN, 1st Degree AVB, NIDDM2 who initially presented to [facility] ED on 05/23 by daughter from NH for weakness.  He was found to have dislodged fowler catheter with severe PRIYA and UTI, fowler was replaced, he had good urinary output with significant improvement in renal function. He was also getting diureses for CHF exacerbation, workup showed EF of 35-40%, a thrombus was incidentally found in his right atrium. He was started on full dose Lovenox the day after. He continued to be in a fib with RVR despite BB, CCB were being avoided due to low EF, cardioversion was avoided due to existing thrombus that was confirmed on YO. On 06/01 he had significant drop in his hemoglobin with worsening RVR, Lovenox was held, EGD on 06/02 showed non-bleeding ulcers with active oozing of blood from his gastric mucosa, GI did not recommend to hold anticoagulation due to risk for embolization. On 06/04 the patient became hypotensive with A flutter and RVR, hemoglobin dropped to 4.7, Lovenox was held again and massive transfusion was initiated, the patient was upgraded to the ICU due to hemodynamic instability. Lovenox was started once again on 06/06 and he was downgraded from the ICU on 06/08.  Patient bled again with an acute drop in hemoglobin on 06/11 and was noted to have melena.  He was transferred to our facility for GI services. Prior to transferred found to have a retroperitoneal bleed.     Interval Hx:   Patient awake and comfortable. States he has emma LE weakness since he had a recent CVA. He is able to move emma UE. Eating well. Denies any fever, chills, SOB, cough or abdominal pain.     Family at bedside, explained in detail about the patients condition, diagnosis, vitals, labs and treatment plan. They  understand and agree with the plan. All their questions were answered.      Case was discussed with patient's nurse and  on the floor.    Objective/physical exam:  General: In no acute distress, + morbid obesity   Chest: Clear to auscultation bilaterally  Heart: Tachycardic, +S1, S2, no appreciable murmur  Abdomen: Soft, nontender, BS +  Neurologic: Cranial nerve II-XII intact, Strength 5/5 emma UE, 1/5 emma LE     VITAL SIGNS: 24 HRS MIN & MAX LAST   Temp  Min: 97.5 °F (36.4 °C)  Max: 98.4 °F (36.9 °C) 98.4 °F (36.9 °C)   BP  Min: 119/54  Max: 129/69 124/70   Pulse  Min: 96  Max: 120  (!) 120   Resp  Min: 18  Max: 18 18   SpO2  Min: 95 %  Max: 100 % 96 %     I have reviewed the following labs:  Recent Labs   Lab 06/12/25 2338 06/13/25  0733 06/14/25  0718   WBC 10.85 10.19 9.06   RBC 2.91* 3.32* 3.04*   HGB 8.5* 9.5* 8.6*   HCT 26.9* 30.0* 27.8*   MCV 92.4 90.4 91.4   MCH 29.2 28.6 28.3   MCHC 31.6* 31.7* 30.9*   RDW 19.3* 19.1* 19.6*    327 327   MPV 8.7 8.9 8.6     Recent Labs   Lab 06/11/25  0117 06/12/25  0511 06/12/25 2339 06/13/25 0733 06/14/25  0718    143 141 142 144   K 4.6 4.1 4.0 4.1 3.7   * 108* 108* 108* 113*   CO2 20* 26 25 25 25   BUN 30.7* 43.9* 41.5* 40.2* 32.0*   CREATININE 1.56* 2.24* 2.08* 1.98* 1.44*   * 176* 154* 137* 119*   CALCIUM 8.3* 8.2* 8.0* 8.1* 8.0*   MG 1.90 2.00 1.80  --   --    ALBUMIN 2.1* 2.0* 2.0* 2.1* 2.0*   PROT 5.7* 5.5* 5.5* 5.7* 5.6*   ALKPHOS 52 42 47 49 51   ALT 22 17 18 18 15   AST 33 25 19 19 17   BILITOT 0.6 0.6 0.9 0.9 0.6     Microbiology Results (last 7 days)       Procedure Component Value Units Date/Time    Blood Culture [[id number]]  (Normal) Collected: 06/13/25 0733    Order Status: Completed Specimen: Blood from Antecubital, Left Updated: 06/14/25 0901     Blood Culture No Growth At 24 Hours    Blood Culture [[id number]]  (Normal) Collected: 06/13/25 0830    Order Status: Completed Specimen: Blood from Arm, Right Updated:  06/14/25 0901     Blood Culture No Growth At 24 Hours             See below for Radiology    Assessment/Plan:  Anemia from PUD: stable   Retroperitoneal bleed: stable   Afib with RVR   ARF/ ATN  Right atrial thrombus   Acute PE   Left foot osteomyelitis   Chronic systolic HF  Morbid obesity   Mello LE weakness     PMH CVA 03/2025, CAD s/p LAD stent 2017, venous insuffiency, HTN, 1st Degree AVB, NIDDM2     Plan:  Patient looks comfortable  No signs of acute bleeding   Hb today 8.6, plt 327    His renal functions looks stable  BUN 32, Crt 1.44  Continue iv fluids   Will closely monitor patients daily weight, urine out put, renal parameters and volume status      HR still elevated, on iv amiodarone per CIS team   Keep on tele     He is on iv daptomycin for osteomyelitis till 7/14/25  Monitor weekly CK    Current meds reviewed     Continue supportive care     PT when a bit more stable     Labs in am    Critical care note:  Critical care diagnosis: Afib with RVR on iv amiodarone drip  Critical care interventions: Hands-on evaluation, review of labs/radiographs/records and discussion with patient and family if present  Critical care time spent: 35 minutes     VTE prophylaxis: SCD    Patient condition:  Guarded    Anticipated discharge and Disposition:   SNF when more stable       All diagnosis and differential diagnosis have been reviewed; assessment and plan has been documented; I have personally reviewed the labs and test results that are presently available; I have reviewed the patients medication list; I have reviewed the consulting providers response and recommendations. I have reviewed or attempted to review medical records based upon their availability    All of the patient's questions have been  addressed and answered. Patient's is agreeable to the above stated plan. I will continue to monitor closely and make adjustments to medical management as needed.    Portions of this note dictated using EMR integrated voice  recognition software, and may be subject to voice recognition errors not corrected at proofreading. Please contact writer for clarification if needed.   _____________________________________________________________________    Malnutrition Status:  Nutrition consulted. Most recent weight and BMI monitored-     Measurements:  Wt Readings from Last 1 Encounters:   06/13/25 107.6 kg (237 lb 3.4 oz)   Body mass index is 30.46 kg/m².    Patient has been screened and assessed by RD.    Malnutrition Type:  Context:    Level:      Malnutrition Characteristic Summary:       Interventions/Recommendations (treatment strategy):        Scheduled Med:   DAPTOmycin (CUBICIN) IV (PEDS and ADULTS)  6 mg/kg Intravenous Q24H    mupirocin   Nasal BID    pantoprazole  40 mg Intravenous BID      Continuous Infusions:   0.9% NaCl   Intravenous Continuous 125 mL/hr at 06/14/25 1245 New Bag at 06/14/25 1245    amiodarone in dextrose 5%  0.5 mg/min Intravenous Continuous 16.7 mL/hr at 06/14/25 0511 0.5 mg/min at 06/14/25 0511      PRN Meds:    Current Facility-Administered Medications:     0.9%  NaCl infusion (for blood administration), , Intravenous, Q24H PRN    0.9%  NaCl infusion (for blood administration), , Intravenous, Q24H PRN    acetaminophen, 650 mg, Oral, Q4H PRN    albuterol-ipratropium, 3 mL, Nebulization, Q6H PRN    aluminum-magnesium hydroxide-simethicone, 30 mL, Oral, QID PRN    bisacodyL, 10 mg, Rectal, Daily PRN    dextrose 50%, 12.5 g, Intravenous, PRN    dextrose 50%, 25 g, Intravenous, PRN    glucagon (human recombinant), 1 mg, Intramuscular, PRN    glucose, 16 g, Oral, PRN    glucose, 24 g, Oral, PRN    HYDROcodone-acetaminophen, 1 tablet, Oral, Q6H PRN    melatonin, 9 mg, Oral, Nightly PRN    morphine, 2 mg, Intravenous, Q6H PRN    naloxone, 0.02 mg, Intravenous, PRN    ondansetron, 8 mg, Oral, Q8H PRN    ondansetron, 4 mg, Intravenous, Q8H PRN    polyethylene glycol, 17 g, Oral, TID PRN    simethicone, 1 tablet, Oral,  QID PRN    sodium chloride 0.9%, 10 mL, Intravenous, PRN     Radiology:  I have personally reviewed the following imaging and agree with the radiologist.     X-Ray Foot 2 View Left  Narrative: EXAMINATION:  XR FOOT 2 VIEW LEFT    CLINICAL HISTORY:  great toe wound;Sepsis, unspecified organism    COMPARISON:  None.    FINDINGS:  There is some demineralization of the visualized osseous structures slightly more than expected for patient's age    No acute displaced fractures or dislocations.    There are degenerative changes with narrowing of the proximal and distal interphalangeal joints articular spaces are otherwise preserved with smooth articular surfaces    No blastic or lytic lesions.    On the provided images there is no evidence of primary and or secondary signs to suggest the presence of osteomyelitis, however, these might be lacking on plain films and therefore if clinically indicated other imaging modalities might prove helpful for further assessment  Impression: Demineralization of the visualized osseous structures.    Degenerative changes.    No clear evidence of osteomyelitis other imaging modalities might prove helpful for further assessment.    Electronically signed by: Singh Woodruff  Date:    06/13/2025  Time:    10:36      Rambo Cain MD  Department of Hospital Medicine   [facility]   06/14/2025

## 2025-06-15 LAB
ALBUMIN SERPL-MCNC: 2.1 G/DL (ref 3.4–4.8)
ALBUMIN/GLOB SERPL: 0.5 RATIO (ref 1.1–2)
ALP SERPL-CCNC: 55 UNIT/L (ref 40–150)
ALT SERPL-CCNC: 14 UNIT/L (ref 0–55)
ANION GAP SERPL CALC-SCNC: 6 MEQ/L
AST SERPL-CCNC: 20 UNIT/L (ref 11–45)
BASOPHILS # BLD AUTO: 0.04 X10(3)/MCL
BASOPHILS NFR BLD AUTO: 0.4 %
BILIRUB SERPL-MCNC: 0.7 MG/DL
BUN SERPL-MCNC: 23.6 MG/DL (ref 8.4–25.7)
CALCIUM SERPL-MCNC: 8.2 MG/DL (ref 8.8–10)
CHLORIDE SERPL-SCNC: 113 MMOL/L (ref 98–107)
CO2 SERPL-SCNC: 24 MMOL/L (ref 23–31)
CREAT SERPL-MCNC: 1.29 MG/DL (ref 0.72–1.25)
CREAT/UREA NIT SERPL: 18
EOSINOPHIL # BLD AUTO: 0.14 X10(3)/MCL (ref 0–0.9)
EOSINOPHIL NFR BLD AUTO: 1.6 %
ERYTHROCYTE [DISTWIDTH] IN BLOOD BY AUTOMATED COUNT: 19.2 % (ref 11.5–17)
GFR SERPLBLD CREATININE-BSD FMLA CKD-EPI: 58 ML/MIN/1.73/M2
GLOBULIN SER-MCNC: 4 GM/DL (ref 2.4–3.5)
GLUCOSE SERPL-MCNC: 114 MG/DL (ref 82–115)
HCT VFR BLD AUTO: 30.2 % (ref 42–52)
HGB BLD-MCNC: 9.3 G/DL (ref 14–18)
IMM GRANULOCYTES # BLD AUTO: 0.08 X10(3)/MCL (ref 0–0.04)
IMM GRANULOCYTES NFR BLD AUTO: 0.9 %
LYMPHOCYTES # BLD AUTO: 0.74 X10(3)/MCL (ref 0.6–4.6)
LYMPHOCYTES NFR BLD AUTO: 8.3 %
MCH RBC QN AUTO: 28.4 PG (ref 27–31)
MCHC RBC AUTO-ENTMCNC: 30.8 G/DL (ref 33–36)
MCV RBC AUTO: 92.1 FL (ref 80–94)
MONOCYTES # BLD AUTO: 0.95 X10(3)/MCL (ref 0.1–1.3)
MONOCYTES NFR BLD AUTO: 10.6 %
NEUTROPHILS # BLD AUTO: 7.01 X10(3)/MCL (ref 2.1–9.2)
NEUTROPHILS NFR BLD AUTO: 78.2 %
NRBC BLD AUTO-RTO: 0 %
PLATELET # BLD AUTO: 363 X10(3)/MCL (ref 130–400)
PMV BLD AUTO: 8.8 FL (ref 7.4–10.4)
POTASSIUM SERPL-SCNC: 4.1 MMOL/L (ref 3.5–5.1)
PROT SERPL-MCNC: 6.1 GM/DL (ref 5.8–7.6)
RBC # BLD AUTO: 3.28 X10(6)/MCL (ref 4.7–6.1)
SODIUM SERPL-SCNC: 143 MMOL/L (ref 136–145)
WBC # BLD AUTO: 8.96 X10(3)/MCL (ref 4.5–11.5)

## 2025-06-15 PROCEDURE — 85025 COMPLETE CBC W/AUTO DIFF WBC: CPT | Performed by: INTERNAL MEDICINE

## 2025-06-15 PROCEDURE — C1751 CATH, INF, PER/CENT/MIDLINE: HCPCS

## 2025-06-15 PROCEDURE — 36415 COLL VENOUS BLD VENIPUNCTURE: CPT | Performed by: INTERNAL MEDICINE

## 2025-06-15 PROCEDURE — 99900031 HC PATIENT EDUCATION (STAT)

## 2025-06-15 PROCEDURE — 25000003 PHARM REV CODE 250: Performed by: STUDENT IN AN ORGANIZED HEALTH CARE EDUCATION/TRAINING PROGRAM

## 2025-06-15 PROCEDURE — 99900035 HC TECH TIME PER 15 MIN (STAT)

## 2025-06-15 PROCEDURE — 80053 COMPREHEN METABOLIC PANEL: CPT | Performed by: INTERNAL MEDICINE

## 2025-06-15 PROCEDURE — 11000001 HC ACUTE MED/SURG PRIVATE ROOM

## 2025-06-15 PROCEDURE — 27000221 HC OXYGEN, UP TO 24 HOURS

## 2025-06-15 PROCEDURE — 25000003 PHARM REV CODE 250: Performed by: INTERNAL MEDICINE

## 2025-06-15 PROCEDURE — 63600175 PHARM REV CODE 636 W HCPCS: Performed by: STUDENT IN AN ORGANIZED HEALTH CARE EDUCATION/TRAINING PROGRAM

## 2025-06-15 PROCEDURE — 25000003 PHARM REV CODE 250: Performed by: NURSE PRACTITIONER

## 2025-06-15 PROCEDURE — 63600175 PHARM REV CODE 636 W HCPCS: Performed by: NURSE PRACTITIONER

## 2025-06-15 RX ORDER — PANTOPRAZOLE SODIUM 40 MG/1
40 TABLET, DELAYED RELEASE ORAL
Status: DISCONTINUED | OUTPATIENT
Start: 2025-06-15 | End: 2025-06-23

## 2025-06-15 RX ORDER — FUROSEMIDE 10 MG/ML
20 INJECTION INTRAMUSCULAR; INTRAVENOUS ONCE
Status: COMPLETED | OUTPATIENT
Start: 2025-06-15 | End: 2025-06-15

## 2025-06-15 RX ORDER — METOPROLOL TARTRATE 25 MG/1
25 TABLET, FILM COATED ORAL 2 TIMES DAILY
Status: DISCONTINUED | OUTPATIENT
Start: 2025-06-15 | End: 2025-06-15

## 2025-06-15 RX ORDER — METOPROLOL TARTRATE 50 MG/1
50 TABLET ORAL 2 TIMES DAILY
Status: DISCONTINUED | OUTPATIENT
Start: 2025-06-15 | End: 2025-06-22

## 2025-06-15 RX ORDER — SODIUM CHLORIDE 9 MG/ML
INJECTION, SOLUTION INTRAVENOUS CONTINUOUS
Status: DISCONTINUED | OUTPATIENT
Start: 2025-06-15 | End: 2025-06-15

## 2025-06-15 RX ADMIN — SODIUM CHLORIDE: 9 INJECTION, SOLUTION INTRAVENOUS at 06:06

## 2025-06-15 RX ADMIN — PANTOPRAZOLE SODIUM 40 MG: 40 INJECTION, POWDER, LYOPHILIZED, FOR SOLUTION INTRAVENOUS at 08:06

## 2025-06-15 RX ADMIN — HYDROCODONE BITARTRATE AND ACETAMINOPHEN 1 TABLET: 5; 325 TABLET ORAL at 08:06

## 2025-06-15 RX ADMIN — AMIODARONE HYDROCHLORIDE 0.5 MG/MIN: 1.8 INJECTION, SOLUTION INTRAVENOUS at 06:06

## 2025-06-15 RX ADMIN — METOPROLOL TARTRATE 5 MG: 1 INJECTION, SOLUTION INTRAVENOUS at 11:06

## 2025-06-15 RX ADMIN — MUPIROCIN: 20 OINTMENT TOPICAL at 09:06

## 2025-06-15 RX ADMIN — FUROSEMIDE 20 MG: 10 INJECTION, SOLUTION INTRAMUSCULAR; INTRAVENOUS at 11:06

## 2025-06-15 RX ADMIN — AMIODARONE HYDROCHLORIDE 0.5 MG/MIN: 1.8 INJECTION, SOLUTION INTRAVENOUS at 07:06

## 2025-06-15 RX ADMIN — METOPROLOL TARTRATE 25 MG: 25 TABLET, FILM COATED ORAL at 10:06

## 2025-06-15 RX ADMIN — SODIUM CHLORIDE: 9 INJECTION, SOLUTION INTRAVENOUS at 10:06

## 2025-06-15 RX ADMIN — METOPROLOL TARTRATE 5 MG: 1 INJECTION, SOLUTION INTRAVENOUS at 03:06

## 2025-06-15 RX ADMIN — METOPROLOL TARTRATE 5 MG: 1 INJECTION, SOLUTION INTRAVENOUS at 02:06

## 2025-06-15 RX ADMIN — METOPROLOL TARTRATE 50 MG: 50 TABLET, FILM COATED ORAL at 08:06

## 2025-06-15 RX ADMIN — METOPROLOL TARTRATE 5 MG: 1 INJECTION, SOLUTION INTRAVENOUS at 06:06
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
## 2025-06-15 NOTE — PROGRESS NOTES
[facility] Medicine Progress Note        Chief Complaint: Inpatient Follow-up for anemia and ARF    HPI:   74 y.o. male with PMH CVA 03/2025, CAD s/p LAD stent 2017, venous insuffiency, HTN, 1st Degree AVB, NIDDM2 who initially presented to [facility] ED on 05/23 by daughter from NH for weakness.  He was found to have dislodged fowler catheter with severe PRIYA and UTI, fowler was replaced, he had good urinary output with significant improvement in renal function. He was also getting diureses for CHF exacerbation, workup showed EF of 35-40%, a thrombus was incidentally found in his right atrium. He was started on full dose Lovenox the day after. He continued to be in a fib with RVR despite BB, CCB were being avoided due to low EF, cardioversion was avoided due to existing thrombus that was confirmed on YO. On 06/01 he had significant drop in his hemoglobin with worsening RVR, Lovenox was held, EGD on 06/02 showed non-bleeding ulcers with active oozing of blood from his gastric mucosa, GI did not recommend to hold anticoagulation due to risk for embolization. On 06/04 the patient became hypotensive with A flutter and RVR, hemoglobin dropped to 4.7, Lovenox was held again and massive transfusion was initiated, the patient was upgraded to the ICU due to hemodynamic instability. Lovenox was started once again on 06/06 and he was downgraded from the ICU on 06/08.  Patient bled again with an acute drop in hemoglobin on 06/11 and was noted to have melena.  He was transferred to our facility for GI services. Prior to transferred found to have a retroperitoneal bleed.     Interval Hx:   Patient awake and comfortable. Denies any chest pain, SOB or palpitation.     Family at bedside, explained in detail about the patients condition, diagnosis, vitals, labs and treatment plan. They understand and agree with the plan. All their questions were answered.      Case was discussed with patient's nurse  and  on the floor.    Objective/physical exam:  General: In no acute distress, + morbid obesity   Chest: Clear to auscultation bilaterally  Heart: Tachycardic, +S1, S2, no appreciable murmur  Abdomen: Soft, nontender, BS +  Neurologic: Cranial nerve II-XII intact, Strength 5/5 emma UE, 1/5 emma LE     VITAL SIGNS: 24 HRS MIN & MAX LAST   Temp  Min: 97.5 °F (36.4 °C)  Max: 99 °F (37.2 °C) 97.8 °F (36.6 °C)   BP  Min: 108/84  Max: 138/72 136/83   Pulse  Min: 67  Max: 139  (!) 134   Resp  Min: 16  Max: 24 (!) 24   SpO2  Min: 92 %  Max: 96 % (!) 92 %     I have reviewed the following labs:  Recent Labs   Lab 06/13/25  0733 06/14/25  0718 06/15/25  0728   WBC 10.19 9.06 8.96   RBC 3.32* 3.04* 3.28*   HGB 9.5* 8.6* 9.3*   HCT 30.0* 27.8* 30.2*   MCV 90.4 91.4 92.1   MCH 28.6 28.3 28.4   MCHC 31.7* 30.9* 30.8*   RDW 19.1* 19.6* 19.2*    327 363   MPV 8.9 8.6 8.8     Recent Labs   Lab 06/11/25  0117 06/12/25  0511 06/12/25  2339 06/13/25  0733 06/14/25  0718 06/15/25  0728    143 141 142 144 143   K 4.6 4.1 4.0 4.1 3.7 4.1   * 108* 108* 108* 113* 113*   CO2 20* 26 25 25 25 24   BUN 30.7* 43.9* 41.5* 40.2* 32.0* 23.6   CREATININE 1.56* 2.24* 2.08* 1.98* 1.44* 1.29*   * 176* 154* 137* 119* 114   CALCIUM 8.3* 8.2* 8.0* 8.1* 8.0* 8.2*   MG 1.90 2.00 1.80  --   --   --    ALBUMIN 2.1* 2.0* 2.0* 2.1* 2.0* 2.1*   PROT 5.7* 5.5* 5.5* 5.7* 5.6* 6.1   ALKPHOS 52 42 47 49 51 55   ALT 22 17 18 18 15 14   AST 33 25 19 19 17 20   BILITOT 0.6 0.6 0.9 0.9 0.6 0.7     Microbiology Results (last 7 days)       Procedure Component Value Units Date/Time    Blood Culture [[id number]]  (Normal) Collected: 06/13/25 0733    Order Status: Completed Specimen: Blood from Antecubital, Left Updated: 06/15/25 0901     Blood Culture No Growth At 48 Hours    Blood Culture [[id number]]  (Normal) Collected: 06/13/25 0830    Order Status: Completed Specimen: Blood from Arm, Right Updated: 06/15/25 0901     Blood Culture No  Growth At 48 Hours             See below for Radiology    Assessment/Plan:  Anemia from PUD: stable   Retroperitoneal bleed: stable   Afib with RVR   ARF/ ATN: improved   Right atrial thrombus   Acute PE   Left foot osteomyelitis   Chronic systolic HF  Morbid obesity   Mello LE weakness     PMH CVA 03/2025, CAD s/p LAD stent 2017, venous insuffiency, HTN, 1st Degree AVB, NIDDM2     Plan:  Patient continues to improve   Hs been afebrile  Eating well   No signs of acute bleeding   Hb today 9.3, plt 363    His renal functions looks stable  BUN 23, Crt 1.29  Continue iv fluids for today and stop  Will closely monitor patients daily weight, urine out put, renal parameters and volume status      HR still elevated, on iv amiodarone per [facility] team   Added metoprolol 25 mg BID   Keep on tele     He is on iv daptomycin for osteomyelitis till 7/14/25  Monitor weekly CK  Dose adjusted by ID team     Current meds reviewed     Continue supportive care     PT when a bit more stable     Labs in am    Critical care note:  Critical care diagnosis: Afib with RVR on iv amiodarone drip  Critical care interventions: Hands-on evaluation, review of labs/radiographs/records and discussion with patient and family if present  Critical care time spent: 35 minutes     VTE prophylaxis: SCD    Patient condition:  Guarded    Anticipated discharge and Disposition:   SNF when more stable       All diagnosis and differential diagnosis have been reviewed; assessment and plan has been documented; I have personally reviewed the labs and test results that are presently available; I have reviewed the patients medication list; I have reviewed the consulting providers response and recommendations. I have reviewed or attempted to review medical records based upon their availability    All of the patient's questions have been  addressed and answered. Patient's is agreeable to the above stated plan. I will continue to monitor closely and make adjustments to  medical management as needed.    Portions of this note dictated using EMR integrated voice recognition software, and may be subject to voice recognition errors not corrected at proofreading. Please contact writer for clarification if needed.   _____________________________________________________________________    Malnutrition Status:  Nutrition consulted. Most recent weight and BMI monitored-     Measurements:  Wt Readings from Last 1 Encounters:   06/13/25 107.6 kg (237 lb 3.4 oz)   Body mass index is 30.46 kg/m².    Patient has been screened and assessed by RD.    Malnutrition Type:  Context:    Level:      Malnutrition Characteristic Summary:       Interventions/Recommendations (treatment strategy):        Scheduled Med:   [START ON 6/16/2025] DAPTOmycin (CUBICIN) IV (PEDS and ADULTS)  500 mg Intravenous Q48H    metoprolol tartrate  25 mg Oral BID    mupirocin   Nasal BID    pantoprazole  40 mg Oral BID AC      Continuous Infusions:   0.9% NaCl   Intravenous Continuous 125 mL/hr at 06/15/25 1025 New Bag at 06/15/25 1025    amiodarone in dextrose 5%  0.5 mg/min Intravenous Continuous 16.7 mL/hr at 06/15/25 0652 0.5 mg/min at 06/15/25 0652      PRN Meds:    Current Facility-Administered Medications:     0.9%  NaCl infusion (for blood administration), , Intravenous, Q24H PRN    0.9%  NaCl infusion (for blood administration), , Intravenous, Q24H PRN    acetaminophen, 650 mg, Oral, Q4H PRN    albuterol-ipratropium, 3 mL, Nebulization, Q6H PRN    aluminum-magnesium hydroxide-simethicone, 30 mL, Oral, QID PRN    bisacodyL, 10 mg, Rectal, Daily PRN    dextrose 50%, 12.5 g, Intravenous, PRN    dextrose 50%, 25 g, Intravenous, PRN    glucagon (human recombinant), 1 mg, Intramuscular, PRN    glucose, 16 g, Oral, PRN    glucose, 24 g, Oral, PRN    HYDROcodone-acetaminophen, 1 tablet, Oral, Q6H PRN    melatonin, 9 mg, Oral, Nightly PRN    metoprolol, 5 mg, Intravenous, Q3H PRN    morphine, 2 mg, Intravenous, Q6H PRN     naloxone, 0.02 mg, Intravenous, PRN    ondansetron, 8 mg, Oral, Q8H PRN    ondansetron, 4 mg, Intravenous, Q8H PRN    polyethylene glycol, 17 g, Oral, TID PRN    simethicone, 1 tablet, Oral, QID PRN    sodium chloride 0.9%, 10 mL, Intravenous, PRN     Radiology:  I have personally reviewed the following imaging and agree with the radiologist.     X-Ray Foot 2 View Left  Narrative: EXAMINATION:  XR FOOT 2 VIEW LEFT    CLINICAL HISTORY:  great toe wound;Sepsis, unspecified organism    COMPARISON:  None.    FINDINGS:  There is some demineralization of the visualized osseous structures slightly more than expected for patient's age    No acute displaced fractures or dislocations.    There are degenerative changes with narrowing of the proximal and distal interphalangeal joints articular spaces are otherwise preserved with smooth articular surfaces    No blastic or lytic lesions.    On the provided images there is no evidence of primary and or secondary signs to suggest the presence of osteomyelitis, however, these might be lacking on plain films and therefore if clinically indicated other imaging modalities might prove helpful for further assessment  Impression: Demineralization of the visualized osseous structures.    Degenerative changes.    No clear evidence of osteomyelitis other imaging modalities might prove helpful for further assessment.    Electronically signed by: Singh Woodruff  Date:    06/13/2025  Time:    10:36      Rambo Cain MD  Department of Hospital Medicine   [facility]   06/15/2025

## 2025-06-16 LAB
ANION GAP SERPL CALC-SCNC: 12 MEQ/L
BASOPHILS # BLD AUTO: 0.04 X10(3)/MCL
BASOPHILS NFR BLD AUTO: 0.5 %
BUN SERPL-MCNC: 21 MG/DL (ref 8.4–25.7)
CALCIUM SERPL-MCNC: 8.5 MG/DL (ref 8.8–10)
CHLORIDE SERPL-SCNC: 111 MMOL/L (ref 98–107)
CO2 SERPL-SCNC: 22 MMOL/L (ref 23–31)
CREAT SERPL-MCNC: 1.36 MG/DL (ref 0.72–1.25)
CREAT/UREA NIT SERPL: 15
EOSINOPHIL # BLD AUTO: 0.02 X10(3)/MCL (ref 0–0.9)
EOSINOPHIL NFR BLD AUTO: 0.2 %
ERYTHROCYTE [DISTWIDTH] IN BLOOD BY AUTOMATED COUNT: 19.3 % (ref 11.5–17)
GFR SERPLBLD CREATININE-BSD FMLA CKD-EPI: 55 ML/MIN/1.73/M2
GLUCOSE SERPL-MCNC: 129 MG/DL (ref 82–115)
HCT VFR BLD AUTO: 31 % (ref 42–52)
HGB BLD-MCNC: 9.7 G/DL (ref 14–18)
IMM GRANULOCYTES # BLD AUTO: 0.06 X10(3)/MCL (ref 0–0.04)
IMM GRANULOCYTES NFR BLD AUTO: 0.7 %
LYMPHOCYTES # BLD AUTO: 0.97 X10(3)/MCL (ref 0.6–4.6)
LYMPHOCYTES NFR BLD AUTO: 11.6 %
MCH RBC QN AUTO: 28.7 PG (ref 27–31)
MCHC RBC AUTO-ENTMCNC: 31.3 G/DL (ref 33–36)
MCV RBC AUTO: 91.7 FL (ref 80–94)
MONOCYTES # BLD AUTO: 0.94 X10(3)/MCL (ref 0.1–1.3)
MONOCYTES NFR BLD AUTO: 11.2 %
NEUTROPHILS # BLD AUTO: 6.34 X10(3)/MCL (ref 2.1–9.2)
NEUTROPHILS NFR BLD AUTO: 75.8 %
NRBC BLD AUTO-RTO: 0 %
PLATELET # BLD AUTO: 354 X10(3)/MCL (ref 130–400)
PLT APH: NORMAL
PMV BLD AUTO: 8.8 FL (ref 7.4–10.4)
POTASSIUM SERPL-SCNC: 4.2 MMOL/L (ref 3.5–5.1)
RBC # BLD AUTO: 3.38 X10(6)/MCL (ref 4.7–6.1)
SODIUM SERPL-SCNC: 145 MMOL/L (ref 136–145)
WBC # BLD AUTO: 8.37 X10(3)/MCL (ref 4.5–11.5)

## 2025-06-16 PROCEDURE — 93010 ELECTROCARDIOGRAM REPORT: CPT | Mod: ,,, | Performed by: INTERNAL MEDICINE

## 2025-06-16 PROCEDURE — 92610 EVALUATE SWALLOWING FUNCTION: CPT

## 2025-06-16 PROCEDURE — 63600175 PHARM REV CODE 636 W HCPCS: Performed by: INTERNAL MEDICINE

## 2025-06-16 PROCEDURE — 92611 MOTION FLUOROSCOPY/SWALLOW: CPT

## 2025-06-16 PROCEDURE — 80048 BASIC METABOLIC PNL TOTAL CA: CPT | Performed by: INTERNAL MEDICINE

## 2025-06-16 PROCEDURE — 94760 N-INVAS EAR/PLS OXIMETRY 1: CPT

## 2025-06-16 PROCEDURE — 25000003 PHARM REV CODE 250: Performed by: STUDENT IN AN ORGANIZED HEALTH CARE EDUCATION/TRAINING PROGRAM

## 2025-06-16 PROCEDURE — 99900035 HC TECH TIME PER 15 MIN (STAT)

## 2025-06-16 PROCEDURE — 25000003 PHARM REV CODE 250: Performed by: INTERNAL MEDICINE

## 2025-06-16 PROCEDURE — 93005 ELECTROCARDIOGRAM TRACING: CPT

## 2025-06-16 PROCEDURE — 27000221 HC OXYGEN, UP TO 24 HOURS

## 2025-06-16 PROCEDURE — 25000003 PHARM REV CODE 250: Performed by: NURSE PRACTITIONER

## 2025-06-16 PROCEDURE — 63600175 PHARM REV CODE 636 W HCPCS: Performed by: STUDENT IN AN ORGANIZED HEALTH CARE EDUCATION/TRAINING PROGRAM

## 2025-06-16 PROCEDURE — 25500020 PHARM REV CODE 255: Performed by: INTERNAL MEDICINE

## 2025-06-16 PROCEDURE — A9698 NON-RAD CONTRAST MATERIALNOC: HCPCS | Performed by: INTERNAL MEDICINE

## 2025-06-16 PROCEDURE — 97162 PT EVAL MOD COMPLEX 30 MIN: CPT

## 2025-06-16 PROCEDURE — 94761 N-INVAS EAR/PLS OXIMETRY MLT: CPT

## 2025-06-16 PROCEDURE — 36415 COLL VENOUS BLD VENIPUNCTURE: CPT | Performed by: INTERNAL MEDICINE

## 2025-06-16 PROCEDURE — 11000001 HC ACUTE MED/SURG PRIVATE ROOM

## 2025-06-16 PROCEDURE — 85025 COMPLETE CBC W/AUTO DIFF WBC: CPT | Performed by: INTERNAL MEDICINE

## 2025-06-16 RX ORDER — NAPROXEN SODIUM 220 MG/1
81 TABLET, FILM COATED ORAL DAILY
Status: DISCONTINUED | OUTPATIENT
Start: 2025-06-17 | End: 2025-06-20

## 2025-06-16 RX ORDER — ASPIRIN 325 MG
325 TABLET, DELAYED RELEASE (ENTERIC COATED) ORAL ONCE
Status: COMPLETED | OUTPATIENT
Start: 2025-06-16 | End: 2025-06-16

## 2025-06-16 RX ORDER — SODIUM CHLORIDE 0.9 % (FLUSH) 0.9 %
10 SYRINGE (ML) INJECTION
Status: DISCONTINUED | OUTPATIENT
Start: 2025-06-16 | End: 2025-06-16

## 2025-06-16 RX ORDER — NAPROXEN SODIUM 220 MG/1
81 TABLET, FILM COATED ORAL DAILY
Status: DISCONTINUED | OUTPATIENT
Start: 2025-06-18 | End: 2025-06-16

## 2025-06-16 RX ADMIN — MUPIROCIN: 20 OINTMENT TOPICAL at 09:06

## 2025-06-16 RX ADMIN — METOPROLOL TARTRATE 5 MG: 1 INJECTION, SOLUTION INTRAVENOUS at 05:06

## 2025-06-16 RX ADMIN — MUPIROCIN: 20 OINTMENT TOPICAL at 08:06

## 2025-06-16 RX ADMIN — BARIUM SULFATE 5 ML: 0.81 POWDER, FOR SUSPENSION ORAL at 01:06

## 2025-06-16 RX ADMIN — METOPROLOL TARTRATE 50 MG: 50 TABLET, FILM COATED ORAL at 09:06

## 2025-06-16 RX ADMIN — DAPTOMYCIN 500 MG: 500 INJECTION, POWDER, LYOPHILIZED, FOR SOLUTION INTRAVENOUS at 05:06

## 2025-06-16 RX ADMIN — ASPIRIN 325 MG: 325 TABLET, COATED ORAL at 09:06

## 2025-06-16 RX ADMIN — AMIODARONE HYDROCHLORIDE 0.5 MG/MIN: 1.8 INJECTION, SOLUTION INTRAVENOUS at 09:06

## 2025-06-16 RX ADMIN — METOPROLOL TARTRATE 50 MG: 50 TABLET, FILM COATED ORAL at 10:06

## 2025-06-16 RX ADMIN — AMIODARONE HYDROCHLORIDE 0.5 MG/MIN: 1.8 INJECTION, SOLUTION INTRAVENOUS at 08:06

## 2025-06-16 RX ADMIN — PANTOPRAZOLE SODIUM 40 MG: 40 TABLET, DELAYED RELEASE ORAL at 04:06
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
## 2025-06-16 NOTE — PROGRESS NOTES
[facility] Progress Note        Chief Complaint: Inpatient Follow-up for anemia and ARF    HPI:   74 y.o. male with PMH CVA 03/2025, CAD s/p LAD stent 2017, venous insuffiency, HTN, 1st Degree AVB, NIDDM2 who initially presented to [facility] ED on 05/23 by daughter from NH for weakness.  He was found to have dislodged fowler catheter with severe PRIYA and UTI, fowler was replaced, he had good urinary output with significant improvement in renal function. He was also getting diureses for CHF exacerbation, workup showed EF of 35-40%, a thrombus was incidentally found in his right atrium. He was started on full dose Lovenox the day after. He continued to be in a fib with RVR despite BB, CCB were being avoided due to low EF, cardioversion was avoided due to existing thrombus that was confirmed on YO. On 06/01 he had significant drop in his hemoglobin with worsening RVR, Lovenox was held, EGD on 06/02 showed non-bleeding ulcers with active oozing of blood from his gastric mucosa, GI did not recommend to hold anticoagulation due to risk for embolization. On 06/04 the patient became hypotensive with A flutter and RVR, hemoglobin dropped to 4.7, Lovenox was held again and massive transfusion was initiated, the patient was upgraded to the ICU due to hemodynamic instability. Lovenox was started once again on 06/06 and he was downgraded from the ICU on 06/08.  Patient bled again with an acute drop in hemoglobin on 06/11 and was noted to have melena.  He was transferred to our facility for GI services. Prior to transferred found to have a retroperitoneal bleed.     Patient was also in Afib with RVR. He was seen by CIS team and started on IV amiodarone and later po metoprolol. His H&H was closely monitored. He was continued on PPI. Hb stabilized. He was eating well. He had emma LE weakness from a previous CVA. He was making a slow clinical recovery.     Interval Hx:   Patient awake and  comfortable. Had no acute issues overnight. Denies any chest or SOB. Nurse noticed mild aspiration like symptoms. ST has been consulted.     Family at bedside, explained in detail about the patients condition, diagnosis, vitals, labs and treatment plan. They understand and agree with the plan. All their questions were answered.      Case was discussed with patient's nurse and  on the floor.    Objective/physical exam:  General: In no acute distress, + morbid obesity   Chest: Clear to auscultation bilaterally  Heart: Tachycardic, +S1, S2, no appreciable murmur  Abdomen: Soft, nontender, BS +  Neurologic: Cranial nerve II-XII intact, Strength 5/5 emma UE, 1/5 emma LE     VITAL SIGNS: 24 HRS MIN & MAX LAST   Temp  Min: 97.1 °F (36.2 °C)  Max: 98.1 °F (36.7 °C) 97.1 °F (36.2 °C)   BP  Min: 114/82  Max: 145/90 135/82   Pulse  Min: 114  Max: 137  (!) 123   Resp  Min: 20  Max: 29 (!) 24   SpO2  Min: 92 %  Max: 98 % 98 %     I have reviewed the following labs:  Recent Labs   Lab 06/14/25  0718 06/15/25  0728 06/16/25  0515   WBC 9.06 8.96 8.37   RBC 3.04* 3.28* 3.38*   HGB 8.6* 9.3* 9.7*   HCT 27.8* 30.2* 31.0*   MCV 91.4 92.1 91.7   MCH 28.3 28.4 28.7   MCHC 30.9* 30.8* 31.3*   RDW 19.6* 19.2* 19.3*    363 354   MPV 8.6 8.8 8.8     Recent Labs   Lab 06/11/25  0117 06/12/25  0511 06/12/25  2339 06/13/25  0733 06/14/25  0718 06/15/25  0728 06/16/25  0515    143 141 142 144 143 145   K 4.6 4.1 4.0 4.1 3.7 4.1 4.2   * 108* 108* 108* 113* 113* 111*   CO2 20* 26 25 25 25 24 22*   BUN 30.7* 43.9* 41.5* 40.2* 32.0* 23.6 21.0   CREATININE 1.56* 2.24* 2.08* 1.98* 1.44* 1.29* 1.36*   * 176* 154* 137* 119* 114 129*   CALCIUM 8.3* 8.2* 8.0* 8.1* 8.0* 8.2* 8.5*   MG 1.90 2.00 1.80  --   --   --   --    ALBUMIN 2.1* 2.0* 2.0* 2.1* 2.0* 2.1*  --    PROT 5.7* 5.5* 5.5* 5.7* 5.6* 6.1  --    ALKPHOS 52 42 47 49 51 55  --    ALT 22 17 18 18 15 14  --    AST 33 25 19 19 17 20  --    BILITOT 0.6 0.6 0.9 0.9  0.6 0.7  --      Microbiology Results (last 7 days)       Procedure Component Value Units Date/Time    Blood Culture [[id number]]  (Normal) Collected: 06/13/25 0830    Order Status: Completed Specimen: Blood from Arm, Right Updated: 06/16/25 0901     Blood Culture No Growth At 72 Hours    Blood Culture [[id number]]  (Normal) Collected: 06/13/25 0733    Order Status: Completed Specimen: Blood from Antecubital, Left Updated: 06/16/25 0901     Blood Culture No Growth At 72 Hours             See below for Radiology    Assessment/Plan:  Anemia from PUD: stable   Retroperitoneal bleed: stable   Afib with RVR   ARF/ ATN: improved   Right atrial thrombus   Acute PE   Left foot osteomyelitis   Chronic systolic HF  Morbid obesity   Mello LE weakness     PMH CVA 03/2025, CAD s/p LAD stent 2017, venous insuffiency, HTN, 1st Degree AVB, NIDDM2     Plan:  Patient making a slow clinical recovery   Now NPO for ST to evaluate for possible aspiration   Hs been afebrile    No signs of acute bleeding   Hb today 9.7, plt 354    His renal functions looks stable  BUN 21, Crt 1.36  Will closely monitor patients daily weight, urine out put, renal parameters and volume status      HR still elevated, on iv amiodarone per CIS team   Now on metoprolol 50 mg BID   Keep on tele     He is on iv daptomycin for osteomyelitis till 7/14/25  Monitor weekly CK  Dose adjusted by ID team     Current meds reviewed     Continue supportive care     Start OT/PT    Labs in am    Critical care note:  Critical care diagnosis: Afib with RVR on iv amiodarone drip  Critical care interventions: Hands-on evaluation, review of labs/radiographs/records and discussion with patient and family if present  Critical care time spent: 35 minutes     VTE prophylaxis: SCD    Patient condition:  Guarded    Anticipated discharge and Disposition:   SNF when more stable       All diagnosis and differential diagnosis have been reviewed; assessment and plan has been documented; I have  personally reviewed the labs and test results that are presently available; I have reviewed the patients medication list; I have reviewed the consulting providers response and recommendations. I have reviewed or attempted to review medical records based upon their availability    All of the patient's questions have been  addressed and answered. Patient's is agreeable to the above stated plan. I will continue to monitor closely and make adjustments to medical management as needed.    Portions of this note dictated using EMR integrated voice recognition software, and may be subject to voice recognition errors not corrected at proofreading. Please contact writer for clarification if needed.   _____________________________________________________________________    Malnutrition Status:  Nutrition consulted. Most recent weight and BMI monitored-     Measurements:  Wt Readings from Last 1 Encounters:   06/13/25 107.6 kg (237 lb 3.4 oz)   Body mass index is 30.46 kg/m².    Patient has been screened and assessed by RD.    Malnutrition Type:  Context:    Level:      Malnutrition Characteristic Summary:       Interventions/Recommendations (treatment strategy):        Scheduled Med:   DAPTOmycin (CUBICIN) IV (PEDS and ADULTS)  500 mg Intravenous Q48H    metoprolol tartrate  50 mg Oral BID    mupirocin   Nasal BID    pantoprazole  40 mg Oral BID AC      Continuous Infusions:   amiodarone in dextrose 5%  0.5 mg/min Intravenous Continuous 16.7 mL/hr at 06/16/25 0801 0.5 mg/min at 06/16/25 0801      PRN Meds:    Current Facility-Administered Medications:     0.9%  NaCl infusion (for blood administration), , Intravenous, Q24H PRN    0.9%  NaCl infusion (for blood administration), , Intravenous, Q24H PRN    acetaminophen, 650 mg, Oral, Q4H PRN    albuterol-ipratropium, 3 mL, Nebulization, Q6H PRN    aluminum-magnesium hydroxide-simethicone, 30 mL, Oral, QID PRN    bisacodyL, 10 mg, Rectal, Daily PRN    dextrose 50%, 12.5 g,  Intravenous, PRN    dextrose 50%, 25 g, Intravenous, PRN    glucagon (human recombinant), 1 mg, Intramuscular, PRN    glucose, 16 g, Oral, PRN    glucose, 24 g, Oral, PRN    HYDROcodone-acetaminophen, 1 tablet, Oral, Q6H PRN    melatonin, 9 mg, Oral, Nightly PRN    metoprolol, 5 mg, Intravenous, Q3H PRN    naloxone, 0.02 mg, Intravenous, PRN    ondansetron, 8 mg, Oral, Q8H PRN    ondansetron, 4 mg, Intravenous, Q8H PRN    polyethylene glycol, 17 g, Oral, TID PRN    simethicone, 1 tablet, Oral, QID PRN    sodium chloride 0.9%, 10 mL, Intravenous, PRN     Radiology:  I have personally reviewed the following imaging and agree with the radiologist.     X-Ray Foot 2 View Left  Narrative: EXAMINATION:  XR FOOT 2 VIEW LEFT    CLINICAL HISTORY:  great toe wound;Sepsis, unspecified organism    COMPARISON:  None.    FINDINGS:  There is some demineralization of the visualized osseous structures slightly more than expected for patient's age    No acute displaced fractures or dislocations.    There are degenerative changes with narrowing of the proximal and distal interphalangeal joints articular spaces are otherwise preserved with smooth articular surfaces    No blastic or lytic lesions.    On the provided images there is no evidence of primary and or secondary signs to suggest the presence of osteomyelitis, however, these might be lacking on plain films and therefore if clinically indicated other imaging modalities might prove helpful for further assessment  Impression: Demineralization of the visualized osseous structures.    Degenerative changes.    No clear evidence of osteomyelitis other imaging modalities might prove helpful for further assessment.    Electronically signed by: Singh Woodruff  Date:    06/13/2025  Time:    10:36      Rambo Cain MD  Department of Hospital Medicine   [facility]   06/16/2025

## 2025-06-16 NOTE — PLAN OF CARE
Sent Clinical Updates to Santa Gutierrez    Insurance Auth Pending  Received the following message via [employer] from Santa Gutierrez:    Mayela Roman Connie  I spoke to [employer] and my request that was submitted last week was cancelled. I resubmitted with information from [facility]

## 2025-06-16 NOTE — AI DETERIORATION ALERT
Artificial Intelligence Notification  [facility]  [address]  [place] [address]  Phone: [phone]    This documentation was triggered by an Artificial Intelligence Notification:    Admit Date: 2025   LOS: 3  Code Status: Full Code  : 1950  Age: 74 y.o.  Weight:   Wt Readings from Last 1 Encounters:   25 107.6 kg (237 lb 3.4 oz)        Sex: male  Bed: 621/[facility]  MRN: [id number]  Attending Physician: Rambo Cain MD     Date of Alert: 06/15/2025  Time AI Alert Received:             Vitals:    06/15/25 1931   BP: (!) 145/90   Pulse: (!) 136   Resp: (!) 29   Temp: 97.9 °F (36.6 °C)     SpO2: (!) 92 %      Patient Condition:  74 y.o. male, known to Dr. Steel, with PMH of CAD, NSTEMI, COVID19, HHD, Claudication, Venous Insuff, Obesity, DM, HTN, prior CVA (3/2025) who presented to [facility] on 25 with acute renal failure secondary to obstructive uropathy and newly recognized HFrEF (decompensated) and atrial fibrillation/atrial flutter with RVR.  Workup showed EF of 35-40%, a thrombus was incidentally found in his right atrium 25, blood cultures were negative. He was started on full dose Lovenox the day after. He continued to be in a fib with RVR despite BB, CCB were being avoided due to low EF, cardioversion was avoided due to existing thrombus that was confirmed on YO. On  he had significant drop in his hemoglobin with worsening RVR, Lovenox was held, EGD on  showed non-bleeding ulcers with active oozing of blood from his gastric mucosa, GI did not recommend to hold anticoagulation due to risk for embolization. On 25 the patient became hypotensive with A flutter and RVR, hemoglobin dropped to 4.7, Lovenox was held again and massive transfusion was initiated, the patient was upgraded to the ICU due to hemodynamic instability. He was transferred to [facility] for higher level of care and GI services. Pt was started on amio gtt for  HR control; CIS was consulted due to AF/AFL RVR and R atrial thrombus.   Resume full AC when feasible from a bleeding standpoint.  Cont Amio gtt for HR control- suspect HR will improve with improvement in clinical condition   Increase metoprolol tartrate to 50 mg BID, titrate up as needed for rate control   AI alert for , bp stable, pt already on metoprolol and amio gtt for a-fib, nothing I can do from my standpoint, getting meds and care he needs  -call me if pt deteriorates and further assistance needed

## 2025-06-17 LAB
ANION GAP SERPL CALC-SCNC: 9 MEQ/L
BASOPHILS # BLD AUTO: 0.06 X10(3)/MCL
BASOPHILS NFR BLD AUTO: 0.7 %
BUN SERPL-MCNC: 28 MG/DL (ref 8.4–25.7)
CALCIUM SERPL-MCNC: 8.8 MG/DL (ref 8.8–10)
CHLORIDE SERPL-SCNC: 112 MMOL/L (ref 98–107)
CO2 SERPL-SCNC: 23 MMOL/L (ref 23–31)
CREAT SERPL-MCNC: 1.52 MG/DL (ref 0.72–1.25)
CREAT/UREA NIT SERPL: 18
EOSINOPHIL # BLD AUTO: 0.09 X10(3)/MCL (ref 0–0.9)
EOSINOPHIL NFR BLD AUTO: 1.1 %
ERYTHROCYTE [DISTWIDTH] IN BLOOD BY AUTOMATED COUNT: 18.6 % (ref 11.5–17)
GFR SERPLBLD CREATININE-BSD FMLA CKD-EPI: 48 ML/MIN/1.73/M2
GLUCOSE SERPL-MCNC: 158 MG/DL (ref 82–115)
HCT VFR BLD AUTO: 33.4 % (ref 42–52)
HGB BLD-MCNC: 10.1 G/DL (ref 14–18)
IMM GRANULOCYTES # BLD AUTO: 0.06 X10(3)/MCL (ref 0–0.04)
IMM GRANULOCYTES NFR BLD AUTO: 0.7 %
LYMPHOCYTES # BLD AUTO: 1.15 X10(3)/MCL (ref 0.6–4.6)
LYMPHOCYTES NFR BLD AUTO: 13.7 %
MAGNESIUM SERPL-MCNC: 2 MG/DL (ref 1.6–2.6)
MCH RBC QN AUTO: 28.5 PG (ref 27–31)
MCHC RBC AUTO-ENTMCNC: 30.2 G/DL (ref 33–36)
MCV RBC AUTO: 94.4 FL (ref 80–94)
MONOCYTES # BLD AUTO: 1.3 X10(3)/MCL (ref 0.1–1.3)
MONOCYTES NFR BLD AUTO: 15.5 %
NEUTROPHILS # BLD AUTO: 5.72 X10(3)/MCL (ref 2.1–9.2)
NEUTROPHILS NFR BLD AUTO: 68.3 %
NRBC BLD AUTO-RTO: 0 %
OHS QRS DURATION: 158 MS
OHS QTC CALCULATION: 585 MS
PLATELET # BLD AUTO: 336 X10(3)/MCL (ref 130–400)
PMV BLD AUTO: 9 FL (ref 7.4–10.4)
POTASSIUM SERPL-SCNC: 3.9 MMOL/L (ref 3.5–5.1)
RBC # BLD AUTO: 3.54 X10(6)/MCL (ref 4.7–6.1)
SODIUM SERPL-SCNC: 144 MMOL/L (ref 136–145)
WBC # BLD AUTO: 8.38 X10(3)/MCL (ref 4.5–11.5)

## 2025-06-17 PROCEDURE — C1894 INTRO/SHEATH, NON-LASER: HCPCS | Performed by: INTERNAL MEDICINE

## 2025-06-17 PROCEDURE — 80048 BASIC METABOLIC PNL TOTAL CA: CPT | Performed by: INTERNAL MEDICINE

## 2025-06-17 PROCEDURE — C1887 CATHETER, GUIDING: HCPCS | Performed by: INTERNAL MEDICINE

## 2025-06-17 PROCEDURE — 92978 ENDOLUMINL IVUS OCT C 1ST: CPT | Mod: LD | Performed by: INTERNAL MEDICINE

## 2025-06-17 PROCEDURE — B2111ZZ FLUOROSCOPY OF MULTIPLE CORONARY ARTERIES USING LOW OSMOLAR CONTRAST: ICD-10-PCS | Performed by: INTERNAL MEDICINE

## 2025-06-17 PROCEDURE — 63600175 PHARM REV CODE 636 W HCPCS: Performed by: INTERNAL MEDICINE

## 2025-06-17 PROCEDURE — 93799 UNLISTED CV SVC/PROCEDURE: CPT | Mod: LD | Performed by: INTERNAL MEDICINE

## 2025-06-17 PROCEDURE — C1769 GUIDE WIRE: HCPCS | Performed by: INTERNAL MEDICINE

## 2025-06-17 PROCEDURE — 63600175 PHARM REV CODE 636 W HCPCS: Performed by: STUDENT IN AN ORGANIZED HEALTH CARE EDUCATION/TRAINING PROGRAM

## 2025-06-17 PROCEDURE — 25000003 PHARM REV CODE 250: Performed by: STUDENT IN AN ORGANIZED HEALTH CARE EDUCATION/TRAINING PROGRAM

## 2025-06-17 PROCEDURE — 97166 OT EVAL MOD COMPLEX 45 MIN: CPT

## 2025-06-17 PROCEDURE — C1753 CATH, INTRAVAS ULTRASOUND: HCPCS | Performed by: INTERNAL MEDICINE

## 2025-06-17 PROCEDURE — 25500020 PHARM REV CODE 255: Performed by: INTERNAL MEDICINE

## 2025-06-17 PROCEDURE — 25000003 PHARM REV CODE 250: Performed by: NURSE PRACTITIONER

## 2025-06-17 PROCEDURE — 25000003 PHARM REV CODE 250: Performed by: INTERNAL MEDICINE

## 2025-06-17 PROCEDURE — C1760 CLOSURE DEV, VASC: HCPCS | Performed by: INTERNAL MEDICINE

## 2025-06-17 PROCEDURE — 4A033BC MEASUREMENT OF ARTERIAL PRESSURE, CORONARY, PERCUTANEOUS APPROACH: ICD-10-PCS | Performed by: INTERNAL MEDICINE

## 2025-06-17 PROCEDURE — 36415 COLL VENOUS BLD VENIPUNCTURE: CPT | Performed by: INTERNAL MEDICINE

## 2025-06-17 PROCEDURE — 27201423 OPTIME MED/SURG SUP & DEVICES STERILE SUPPLY: Performed by: INTERNAL MEDICINE

## 2025-06-17 PROCEDURE — 99153 MOD SED SAME PHYS/QHP EA: CPT | Performed by: INTERNAL MEDICINE

## 2025-06-17 PROCEDURE — 93459 L HRT ART/GRFT ANGIO: CPT | Performed by: INTERNAL MEDICINE

## 2025-06-17 PROCEDURE — 85025 COMPLETE CBC W/AUTO DIFF WBC: CPT | Performed by: INTERNAL MEDICINE

## 2025-06-17 PROCEDURE — 4A023N7 MEASUREMENT OF CARDIAC SAMPLING AND PRESSURE, LEFT HEART, PERCUTANEOUS APPROACH: ICD-10-PCS | Performed by: INTERNAL MEDICINE

## 2025-06-17 PROCEDURE — 21400001 HC TELEMETRY ROOM

## 2025-06-17 PROCEDURE — B2151ZZ FLUOROSCOPY OF LEFT HEART USING LOW OSMOLAR CONTRAST: ICD-10-PCS | Performed by: INTERNAL MEDICINE

## 2025-06-17 PROCEDURE — 99152 MOD SED SAME PHYS/QHP 5/>YRS: CPT | Performed by: INTERNAL MEDICINE

## 2025-06-17 PROCEDURE — 27000221 HC OXYGEN, UP TO 24 HOURS

## 2025-06-17 PROCEDURE — 97530 THERAPEUTIC ACTIVITIES: CPT | Mod: CQ

## 2025-06-17 PROCEDURE — 11000001 HC ACUTE MED/SURG PRIVATE ROOM

## 2025-06-17 PROCEDURE — 83735 ASSAY OF MAGNESIUM: CPT | Performed by: INTERNAL MEDICINE

## 2025-06-17 RX ORDER — HEPARIN SODIUM 1000 [USP'U]/ML
INJECTION, SOLUTION INTRAVENOUS; SUBCUTANEOUS
Status: DISCONTINUED | OUTPATIENT
Start: 2025-06-17 | End: 2025-06-17 | Stop reason: HOSPADM

## 2025-06-17 RX ORDER — SODIUM CHLORIDE 9 MG/ML
INJECTION, SOLUTION INTRAVENOUS CONTINUOUS
Status: DISCONTINUED | OUTPATIENT
Start: 2025-06-17 | End: 2025-06-17

## 2025-06-17 RX ORDER — MORPHINE SULFATE 4 MG/ML
2 INJECTION, SOLUTION INTRAMUSCULAR; INTRAVENOUS EVERY 4 HOURS PRN
Refills: 0 | Status: DISCONTINUED | OUTPATIENT
Start: 2025-06-17 | End: 2025-07-01 | Stop reason: HOSPADM

## 2025-06-17 RX ORDER — METOPROLOL TARTRATE 1 MG/ML
5 INJECTION, SOLUTION INTRAVENOUS
Status: DISCONTINUED | OUTPATIENT
Start: 2025-06-17 | End: 2025-07-01 | Stop reason: HOSPADM

## 2025-06-17 RX ORDER — LIDOCAINE HYDROCHLORIDE 10 MG/ML
INJECTION, SOLUTION INFILTRATION; PERINEURAL
Status: DISCONTINUED | OUTPATIENT
Start: 2025-06-17 | End: 2025-06-17 | Stop reason: HOSPADM

## 2025-06-17 RX ORDER — MIDAZOLAM HYDROCHLORIDE 1 MG/ML
INJECTION INTRAMUSCULAR; INTRAVENOUS
Status: DISCONTINUED | OUTPATIENT
Start: 2025-06-17 | End: 2025-06-17 | Stop reason: HOSPADM

## 2025-06-17 RX ORDER — NITROGLYCERIN 20 MG/100ML
INJECTION INTRAVENOUS
Status: DISCONTINUED | OUTPATIENT
Start: 2025-06-17 | End: 2025-06-17 | Stop reason: HOSPADM

## 2025-06-17 RX ORDER — FENTANYL CITRATE 50 UG/ML
INJECTION, SOLUTION INTRAMUSCULAR; INTRAVENOUS
Status: DISCONTINUED | OUTPATIENT
Start: 2025-06-17 | End: 2025-06-17 | Stop reason: HOSPADM

## 2025-06-17 RX ORDER — IOPAMIDOL 755 MG/ML
INJECTION, SOLUTION INTRAVASCULAR
Status: DISCONTINUED | OUTPATIENT
Start: 2025-06-17 | End: 2025-06-17 | Stop reason: HOSPADM

## 2025-06-17 RX ORDER — HYDRALAZINE HYDROCHLORIDE 20 MG/ML
10 INJECTION INTRAMUSCULAR; INTRAVENOUS EVERY 4 HOURS PRN
Status: DISCONTINUED | OUTPATIENT
Start: 2025-06-17 | End: 2025-07-01 | Stop reason: HOSPADM

## 2025-06-17 RX ORDER — CEFAZOLIN SODIUM 1 G/3ML
INJECTION, POWDER, FOR SOLUTION INTRAMUSCULAR; INTRAVENOUS
Status: DISCONTINUED | OUTPATIENT
Start: 2025-06-17 | End: 2025-06-17 | Stop reason: HOSPADM

## 2025-06-17 RX ORDER — SODIUM CHLORIDE 9 MG/ML
INJECTION, SOLUTION INTRAVENOUS CONTINUOUS
Status: ACTIVE | OUTPATIENT
Start: 2025-06-17 | End: 2025-06-18

## 2025-06-17 RX ADMIN — SODIUM CHLORIDE: 9 INJECTION, SOLUTION INTRAVENOUS at 02:06

## 2025-06-17 RX ADMIN — HYDROCODONE BITARTRATE AND ACETAMINOPHEN 1 TABLET: 5; 325 TABLET ORAL at 06:06

## 2025-06-17 RX ADMIN — ASPIRIN 81 MG CHEWABLE TABLET 81 MG: 81 TABLET CHEWABLE at 09:06

## 2025-06-17 RX ADMIN — METOPROLOL TARTRATE 50 MG: 50 TABLET, FILM COATED ORAL at 08:06

## 2025-06-17 RX ADMIN — PANTOPRAZOLE SODIUM 40 MG: 40 TABLET, DELAYED RELEASE ORAL at 03:06

## 2025-06-17 RX ADMIN — METOPROLOL TARTRATE 50 MG: 50 TABLET, FILM COATED ORAL at 09:06

## 2025-06-17 RX ADMIN — AMIODARONE HYDROCHLORIDE 0.5 MG/MIN: 1.8 INJECTION, SOLUTION INTRAVENOUS at 03:06

## 2025-06-17 RX ADMIN — MUPIROCIN: 20 OINTMENT TOPICAL at 09:06

## 2025-06-17 RX ADMIN — MUPIROCIN: 20 OINTMENT TOPICAL at 08:06

## 2025-06-17 RX ADMIN — AMIODARONE HYDROCHLORIDE 0.5 MG/MIN: 1.8 INJECTION, SOLUTION INTRAVENOUS at 06:06

## 2025-06-17 RX ADMIN — METOPROLOL TARTRATE 5 MG: 1 INJECTION, SOLUTION INTRAVENOUS at 12:06

## 2025-06-17 RX ADMIN — METOPROLOL TARTRATE 5 MG: 1 INJECTION, SOLUTION INTRAVENOUS at 03:06

## 2025-06-17 NOTE — PT/OT/SLP PROGRESS
Physical Therapy Treatment    Patient Name:  Vaibhav Vargas   MRN:  [id number]    Recommendations:     Discharge therapy intensity: Moderate Intensity Therapy   Discharge Equipment Recommendations: to be determined by next level of care  Barriers to discharge: Ongoing medical needs and placement    Assessment:     Vaibhav Vargas is a 74 y.o. male admitted with a medical diagnosis of  retroperitoneal bleed, GIB, R atrial thrombus, PE, enterococcus faecium, cystitis, L foot OM, PRIYA, AF RVR, HF.  He presents with the following impairments/functional limitations: weakness, impaired endurance, impaired self care skills, impaired functional mobility, gait instability, impaired balance, impaired cognition, decreased lower extremity function .   Pt with c/o lightheadedness while EOB, BP WNL but  at rest.    Rehab Prognosis: Good; patient would benefit from acute skilled PT services to address these deficits and reach maximum level of function.    Recent Surgery: Procedure(s) (LRB):  Left heart cath (Left) * Day of Surgery *    Plan:     During this hospitalization, patient would benefit from acute PT services 5 x/week to address the identified rehab impairments via gait training, therapeutic activities, therapeutic exercises, neuromuscular re-education, wheelchair management/training and progress toward the following goals:    Plan of Care Expires:  07/16/25    Subjective     Chief Complaint: lightheadedness  Patient/Family Comments/goals:   Pain/Comfort:         Objective:     Communicated with RN prior to session.  Patient found HOB elevated with fowler catheter, telemetry, peripheral IV, oxygen upon PT entry to room.     General Precautions: Standard, contact, aspiration  Orthopedic Precautions: N/A  Braces: N/A  Respiratory Status: Room air  Blood Pressure: 129/84  Skin Integrity: Visible skin intact      Functional Mobility:  Bed Mobility:     Supine to Sit: moderate assistance  Sit to Supine: maximal  assistance    Therapeutic Activities/Exercises:  Pt sat Eob ~15 CGA with cues to lean fwd,     Co-Treatment: No    Education:  Patient and spouse were provided with verbal education education regarding PT role/goals/POC.  Understanding was verbalized.     Patient left with bed in chair position with all lines intact, call button in reach, and wife present    GOALS:   Multidisciplinary Problems       Physical Therapy Goals          Problem: Physical Therapy    Goal Priority Disciplines Outcome Interventions   Physical Therapy Goal     PT, PT/OT Progressing    Description: Goals to be met by: 2025     Patient will increase functional independence with mobility by performin. Supine to sit with Contact Guard Assistance  2. Sit to stand transfer with Contact Guard Assistance  3. Gait  x 150 feet with Contact Guard Assistance using Rolling Walker.                          Time Tracking:     PT Received On: 25  PT Start Time: 0950     PT Stop Time: 1018  PT Total Time (min): 28 min     Billable Minutes: Therapeutic Activity 28    Treatment Type: Treatment  PT/PTA: PTA     Number of PTA visits since last PT visit: 1     2025

## 2025-06-17 NOTE — PLAN OF CARE
Clinical Updates sent to Santa Gutierrez, insurance auth pending, requested an update.     Waiting on Response
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
## 2025-06-17 NOTE — PLAN OF CARE
Problem: Occupational Therapy  Goal: Occupational Therapy Goal  Description: Goals to be met by 7/17/25    Pt will demonstrate feeding himself seated at recliner chair/WC with setup.  Pt will complete grooming seated at sink at WC level with SBA.  Pt will complete UB dressing with SBA.  Pt will complete sit>stand with min A with RW for prepare for BSC transfer.  Pt will toilet with moderate assistance for clothing management and hygiene at BSC.  Pt will engage/tolerate BUE general therex x 10 minutes to increase activity tolerance seated EOB.  Pt will tolerate static standing x 1 minute to assist with ADL tasks  Outcome: Progressing

## 2025-06-17 NOTE — PROGRESS NOTES
[facility] Medicine Progress Note        Chief Complaint: Inpatient Follow-up for anemia and ARF    HPI:   74 y.o. male with PMH CVA 03/2025, CAD s/p LAD stent 2017, venous insuffiency, HTN, 1st Degree AVB, NIDDM2 who initially presented to [facility] ED on 05/23 by daughter from NH for weakness.  He was found to have dislodged fowler catheter with severe PRIYA and UTI, fowler was replaced, he had good urinary output with significant improvement in renal function. He was also getting diureses for CHF exacerbation, workup showed EF of 35-40%, a thrombus was incidentally found in his right atrium. He was started on full dose Lovenox the day after. He continued to be in a fib with RVR despite BB, CCB were being avoided due to low EF, cardioversion was avoided due to existing thrombus that was confirmed on YO. On 06/01 he had significant drop in his hemoglobin with worsening RVR, Lovenox was held, EGD on 06/02 showed non-bleeding ulcers with active oozing of blood from his gastric mucosa, GI did not recommend to hold anticoagulation due to risk for embolization. On 06/04 the patient became hypotensive with A flutter and RVR, hemoglobin dropped to 4.7, Lovenox was held again and massive transfusion was initiated, the patient was upgraded to the ICU due to hemodynamic instability. Lovenox was started once again on 06/06 and he was downgraded from the ICU on 06/08.  Patient bled again with an acute drop in hemoglobin on 06/11 and was noted to have melena.  He was transferred to our facility for GI services. Prior to transferred found to have a retroperitoneal bleed.     Patient was also in Afib with RVR. He was seen by CIS team and started on IV amiodarone and later po metoprolol. His H&H was closely monitored. He was continued on PPI. Hb stabilized. He was eating well. He had emma LE weakness from a previous CVA. He was making a slow clinical recovery.     Interval Hx:   Patient awake and  comfortable. Awaiting [facility] today. No SOB or chest pain.     Family at bedside, explained in detail about the patients condition, diagnosis, vitals, labs and treatment plan. They understand and agree with the plan. All their questions were answered.      Case was discussed with patient's nurse and  on the floor.    Objective/physical exam:  General: In no acute distress, + morbid obesity   Chest: Clear to auscultation bilaterally  Heart: Tachycardic, +S1, S2, no appreciable murmur  Abdomen: Soft, nontender, BS +  Neurologic: Cranial nerve II-XII intact, Strength 5/5 emma UE, 1/5 emma LE     VITAL SIGNS: 24 HRS MIN & MAX LAST   Temp  Min: 97.5 °F (36.4 °C)  Max: 98.8 °F (37.1 °C) 97.5 °F (36.4 °C)   BP  Min: 111/37  Max: 128/89 127/82   Pulse  Min: 106  Max: 197  (!) 119   Resp  Min: 20  Max: 20 20   SpO2  Min: 92 %  Max: 100 % (!) 93 %     I have reviewed the following labs:  Recent Labs   Lab 06/15/25  0728 06/16/25  0515 06/17/25  0634   WBC 8.96 8.37 8.38   RBC 3.28* 3.38* 3.54*   HGB 9.3* 9.7* 10.1*   HCT 30.2* 31.0* 33.4*   MCV 92.1 91.7 94.4*   MCH 28.4 28.7 28.5   MCHC 30.8* 31.3* 30.2*   RDW 19.2* 19.3* 18.6*    354 336   MPV 8.8 8.8 9.0     Recent Labs   Lab 06/12/25  0511 06/12/25  2339 06/13/25  0733 06/14/25  0718 06/15/25  0728 06/16/25  0515 06/17/25  0634    141 142 144 143 145 144   K 4.1 4.0 4.1 3.7 4.1 4.2 3.9   * 108* 108* 113* 113* 111* 112*   CO2 26 25 25 25 24 22* 23   BUN 43.9* 41.5* 40.2* 32.0* 23.6 21.0 28.0*   CREATININE 2.24* 2.08* 1.98* 1.44* 1.29* 1.36* 1.52*   * 154* 137* 119* 114 129* 158*   CALCIUM 8.2* 8.0* 8.1* 8.0* 8.2* 8.5* 8.8   MG 2.00 1.80  --   --   --   --  2.00   ALBUMIN 2.0* 2.0* 2.1* 2.0* 2.1*  --   --    PROT 5.5* 5.5* 5.7* 5.6* 6.1  --   --    ALKPHOS 42 47 49 51 55  --   --    ALT 17 18 18 15 14  --   --    AST 25 19 19 17 20  --   --    BILITOT 0.6 0.9 0.9 0.6 0.7  --   --      Microbiology Results (last 7 days)       Procedure  Component Value Units Date/Time    Blood Culture [[id number]]  (Normal) Collected: 06/13/25 0733    Order Status: Completed Specimen: Blood from Antecubital, Left Updated: 06/17/25 0901     Blood Culture No Growth At 96 Hours    Blood Culture [[id number]]  (Normal) Collected: 06/13/25 0830    Order Status: Completed Specimen: Blood from Arm, Right Updated: 06/17/25 0901     Blood Culture No Growth At 96 Hours             See below for Radiology    Assessment/Plan:  Anemia from PUD: stable   Retroperitoneal bleed: stable   Afib with RVR   ARF/ ATN: improved   Right atrial thrombus   Acute PE   Left foot osteomyelitis   Chronic systolic HF  Morbid obesity   Mello LE weakness     PMH CVA 03/2025, CAD s/p LAD stent 2017, venous insuffiency, HTN, 1st Degree AVB, NIDDM2     Plan:  Patient seen this am, looks comfortable  Awaiting [facility] today per CIS   Added iv fluids x 24 hrs     No signs of acute bleeding   Hb today 10, plt 336    His renal functions looks stable  BUN 28, Crt 1.52  Will closely monitor patients daily weight, urine out put, renal parameters and volume status      HR still elevated, on iv amiodarone per CIS team   Now on metoprolol 50 mg BID   Keep on tele     He is on iv daptomycin for osteomyelitis till 7/14/25  Monitor weekly CK  Dose adjusted by ID team     Current meds reviewed     Continue supportive care     Start OT/PT    Labs in am    Critical care note:  Critical care diagnosis: Afib with RVR on iv amiodarone drip  Critical care interventions: Hands-on evaluation, review of labs/radiographs/records and discussion with patient and family if present  Critical care time spent: 35 minutes     VTE prophylaxis: SCD    Patient condition:  Guarded    Anticipated discharge and Disposition:   SNF when more stable       All diagnosis and differential diagnosis have been reviewed; assessment and plan has been documented; I have personally reviewed the labs and test results that are presently available; I have  reviewed the patients medication list; I have reviewed the consulting providers response and recommendations. I have reviewed or attempted to review medical records based upon their availability    All of the patient's questions have been  addressed and answered. Patient's is agreeable to the above stated plan. I will continue to monitor closely and make adjustments to medical management as needed.    Portions of this note dictated using EMR integrated voice recognition software, and may be subject to voice recognition errors not corrected at proofreading. Please contact writer for clarification if needed.   _____________________________________________________________________    Malnutrition Status:  Nutrition consulted. Most recent weight and BMI monitored-     Measurements:  Wt Readings from Last 1 Encounters:   06/13/25 107.6 kg (237 lb 3.4 oz)   Body mass index is 30.46 kg/m².    Patient has been screened and assessed by RD.    Malnutrition Type:  Context:    Level:      Malnutrition Characteristic Summary:       Interventions/Recommendations (treatment strategy):        Scheduled Med:   aspirin  81 mg Oral Daily    DAPTOmycin (CUBICIN) IV (PEDS and ADULTS)  500 mg Intravenous Q48H    metoprolol tartrate  50 mg Oral BID    mupirocin   Nasal BID    pantoprazole  40 mg Oral BID AC      Continuous Infusions:   0.9% NaCl   Intravenous Continuous 75 mL/hr at 06/17/25 1418 New Bag at 06/17/25 1418    amiodarone in dextrose 5%  0.5 mg/min Intravenous Continuous 16.7 mL/hr at 06/17/25 1540 0.5 mg/min at 06/17/25 1540      PRN Meds:    Current Facility-Administered Medications:     0.9%  NaCl infusion (for blood administration), , Intravenous, Q24H PRN    0.9%  NaCl infusion (for blood administration), , Intravenous, Q24H PRN    acetaminophen, 650 mg, Oral, Q4H PRN    albuterol-ipratropium, 3 mL, Nebulization, Q6H PRN    aluminum-magnesium hydroxide-simethicone, 30 mL, Oral, QID PRN    bisacodyL, 10 mg, Rectal, Daily  PRN    dextrose 50%, 12.5 g, Intravenous, PRN    dextrose 50%, 25 g, Intravenous, PRN    glucagon (human recombinant), 1 mg, Intramuscular, PRN    glucose, 16 g, Oral, PRN    glucose, 24 g, Oral, PRN    hydrALAZINE, 10 mg, Intravenous, Q4H PRN    HYDROcodone-acetaminophen, 1 tablet, Oral, Q6H PRN    melatonin, 9 mg, Oral, Nightly PRN    metoprolol, 5 mg, Intravenous, Q15 Min PRN    morphine, 2 mg, Intravenous, Q4H PRN    naloxone, 0.02 mg, Intravenous, PRN    ondansetron, 8 mg, Oral, Q8H PRN    ondansetron, 4 mg, Intravenous, Q8H PRN    polyethylene glycol, 17 g, Oral, TID PRN    simethicone, 1 tablet, Oral, QID PRN    sodium chloride 0.9%, 10 mL, Intravenous, PRN     Radiology:  I have personally reviewed the following imaging and agree with the radiologist.     Cardiac catheterization    The estimated blood loss was none.    The procedure log was documented by Documenter: Lorena Rodriguez RN and   verified by James Tony MD.    Date: 6/17/2025  Time: 3:24 PM    Preprocedure diagnosis-new cardiomyopathy  Postprocedure diagnosis-obstructive CAD  Estimated blood loss-5 cc  Tissue removal-none  Complications-none    Procedures performed:  1. Ultrasound-guided right groin access  2. Coronary angiography  3. Left ventricular hemodynamics  4. IFR interrogation of:  LAD-0.85  Ramus-0.93  Circ-1.0  5. IVUS of LAD  6. LIMA angio  7. Perclose [facility]    Findings:  1. Left main-Patent  2. Lad-70% calcified proximal stenosis, Mid stent is 2.5 mm stent in  a   3.5-4mm vessel.  MSA distal stent edge is 3.9mm2  3. LCX-50% mid lesion  4. Ramus-40% mid  5. RCA-30% proximal calcified stenosis  6. LVEDP-11  7. LIMA patent    Procedure detail:  Ultrasound-guided right groin access obtained.  Sheath inserted.  Femoral   angiography performed.  Left main angiography performed with JL4.  RCA   angiography performed with JR4 catheter.   Pigtail was then used for left   ventricular hemodynamics.   Given moderate LAD disease further    interrogation was needed.  Heparin was administered.  Six Lao EBU 3.5   catheter was advanced into the aorta.  Artery was pre treated with   nitroglycerin.  Wire was normalized in the aorta.  The catheter was   reengaged into the LM.  Wire was passed beyond the proximal lesion.    Guiding catheter was backed out of the LM, interrogation was performed.    Pullback to the guide was 1.  Process was repeated for the ramus and   circumflex.  Final angiography demonstrated YAKELIN 3 flow.  Guide was   redirected into the left subclavian and LIMA agio was performed.    Catheters were removed and a proglide was used for RCFA closure.    Plan:  1. Consult CTS for LIMA to LAD, ANGELIA ligation, MAZE, and possible surgical   extraction of prominent eustachian valve with thrombus.   2. If turned down he will likely need atherectomy and shockwave for   underexpanded stent, and repeat stenting of the prox to mid LAD.       Rambo Cain MD  Department of Hospital Medicine   [facility]   06/17/2025

## 2025-06-17 NOTE — PHYSICIAN QUERY
Please provide further clarification on the procedure performed on the site:  Excisional debridement of muscle
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
## 2025-06-18 PROBLEM — E44.0 MODERATE MALNUTRITION: Status: ACTIVE | Noted: 2025-06-18

## 2025-06-18 LAB
ALBUMIN SERPL-MCNC: 2.1 G/DL (ref 3.4–4.8)
ALBUMIN/GLOB SERPL: 0.5 RATIO (ref 1.1–2)
ALP SERPL-CCNC: 68 UNIT/L (ref 40–150)
ALT SERPL-CCNC: 12 UNIT/L (ref 0–55)
ANION GAP SERPL CALC-SCNC: 7 MEQ/L
AST SERPL-CCNC: 13 UNIT/L (ref 11–45)
BACTERIA BLD CULT: NORMAL
BACTERIA BLD CULT: NORMAL
BASOPHILS # BLD AUTO: 0.07 X10(3)/MCL
BASOPHILS NFR BLD AUTO: 1 %
BILIRUB SERPL-MCNC: 0.7 MG/DL
BUN SERPL-MCNC: 28.2 MG/DL (ref 8.4–25.7)
CALCIUM SERPL-MCNC: 8.4 MG/DL (ref 8.8–10)
CHLORIDE SERPL-SCNC: 113 MMOL/L (ref 98–107)
CO2 SERPL-SCNC: 22 MMOL/L (ref 23–31)
CREAT SERPL-MCNC: 1.41 MG/DL (ref 0.72–1.25)
CREAT/UREA NIT SERPL: 20
EOSINOPHIL # BLD AUTO: 0.09 X10(3)/MCL (ref 0–0.9)
EOSINOPHIL NFR BLD AUTO: 1.2 %
ERYTHROCYTE [DISTWIDTH] IN BLOOD BY AUTOMATED COUNT: 18.5 % (ref 11.5–17)
GFR SERPLBLD CREATININE-BSD FMLA CKD-EPI: 52 ML/MIN/1.73/M2
GLOBULIN SER-MCNC: 4 GM/DL (ref 2.4–3.5)
GLUCOSE SERPL-MCNC: 139 MG/DL (ref 82–115)
HCT VFR BLD AUTO: 31.2 % (ref 42–52)
HGB BLD-MCNC: 9.6 G/DL (ref 14–18)
IMM GRANULOCYTES # BLD AUTO: 0.1 X10(3)/MCL (ref 0–0.04)
IMM GRANULOCYTES NFR BLD AUTO: 1.4 %
LYMPHOCYTES # BLD AUTO: 1.07 X10(3)/MCL (ref 0.6–4.6)
LYMPHOCYTES NFR BLD AUTO: 14.8 %
MCH RBC QN AUTO: 28.1 PG (ref 27–31)
MCHC RBC AUTO-ENTMCNC: 30.8 G/DL (ref 33–36)
MCV RBC AUTO: 91.2 FL (ref 80–94)
MONOCYTES # BLD AUTO: 1.07 X10(3)/MCL (ref 0.1–1.3)
MONOCYTES NFR BLD AUTO: 14.8 %
NEUTROPHILS # BLD AUTO: 4.84 X10(3)/MCL (ref 2.1–9.2)
NEUTROPHILS NFR BLD AUTO: 66.8 %
NRBC BLD AUTO-RTO: 0 %
PLATELET # BLD AUTO: 344 X10(3)/MCL (ref 130–400)
PMV BLD AUTO: 9.4 FL (ref 7.4–10.4)
POTASSIUM SERPL-SCNC: 3.6 MMOL/L (ref 3.5–5.1)
PROT SERPL-MCNC: 6.1 GM/DL (ref 5.8–7.6)
RBC # BLD AUTO: 3.42 X10(6)/MCL (ref 4.7–6.1)
SODIUM SERPL-SCNC: 142 MMOL/L (ref 136–145)
WBC # BLD AUTO: 7.24 X10(3)/MCL (ref 4.5–11.5)

## 2025-06-18 PROCEDURE — 25000003 PHARM REV CODE 250: Performed by: STUDENT IN AN ORGANIZED HEALTH CARE EDUCATION/TRAINING PROGRAM

## 2025-06-18 PROCEDURE — 36415 COLL VENOUS BLD VENIPUNCTURE: CPT | Performed by: INTERNAL MEDICINE

## 2025-06-18 PROCEDURE — 25000003 PHARM REV CODE 250: Performed by: INTERNAL MEDICINE

## 2025-06-18 PROCEDURE — 63600175 PHARM REV CODE 636 W HCPCS: Performed by: INTERNAL MEDICINE

## 2025-06-18 PROCEDURE — 85025 COMPLETE CBC W/AUTO DIFF WBC: CPT | Performed by: INTERNAL MEDICINE

## 2025-06-18 PROCEDURE — 27000221 HC OXYGEN, UP TO 24 HOURS

## 2025-06-18 PROCEDURE — 63600175 PHARM REV CODE 636 W HCPCS: Performed by: STUDENT IN AN ORGANIZED HEALTH CARE EDUCATION/TRAINING PROGRAM

## 2025-06-18 PROCEDURE — 94760 N-INVAS EAR/PLS OXIMETRY 1: CPT

## 2025-06-18 PROCEDURE — 27000207 HC ISOLATION

## 2025-06-18 PROCEDURE — 92526 ORAL FUNCTION THERAPY: CPT

## 2025-06-18 PROCEDURE — 11000001 HC ACUTE MED/SURG PRIVATE ROOM

## 2025-06-18 PROCEDURE — 21400001 HC TELEMETRY ROOM

## 2025-06-18 PROCEDURE — 80053 COMPREHEN METABOLIC PANEL: CPT | Performed by: INTERNAL MEDICINE

## 2025-06-18 RX ADMIN — HYDROCODONE BITARTRATE AND ACETAMINOPHEN 1 TABLET: 5; 325 TABLET ORAL at 11:06

## 2025-06-18 RX ADMIN — ASPIRIN 81 MG CHEWABLE TABLET 81 MG: 81 TABLET CHEWABLE at 08:06

## 2025-06-18 RX ADMIN — DAPTOMYCIN 500 MG: 500 INJECTION, POWDER, LYOPHILIZED, FOR SOLUTION INTRAVENOUS at 05:06

## 2025-06-18 RX ADMIN — AMIODARONE HYDROCHLORIDE 0.5 MG/MIN: 1.8 INJECTION, SOLUTION INTRAVENOUS at 01:06

## 2025-06-18 RX ADMIN — METOPROLOL TARTRATE 50 MG: 50 TABLET, FILM COATED ORAL at 08:06

## 2025-06-18 RX ADMIN — METOPROLOL TARTRATE 50 MG: 50 TABLET, FILM COATED ORAL at 09:06

## 2025-06-18 RX ADMIN — AMIODARONE HYDROCHLORIDE 0.5 MG/MIN: 1.8 INJECTION, SOLUTION INTRAVENOUS at 12:06

## 2025-06-18 RX ADMIN — SODIUM CHLORIDE: 9 INJECTION, SOLUTION INTRAVENOUS at 04:06

## 2025-06-18 NOTE — PROGRESS NOTES
[facility] - 6th Floor Medical [facility]  Infectious Disease  Progress Note    Patient Name: Vaibhav Vargas  MRN: [id number]  Admission Date: 6/12/2025  Length of Stay: 6 days  Attending Physician: Rambo Cain MD  Primary Care Provider: Shameka Rooney DO    Isolation Status: Contact  Assessment/Plan:      Active Diagnoses:    Diagnosis Date Noted POA    Anemia [D64.9] 06/13/2025 Yes    Retroperitoneal hematoma [K68.3] 06/13/2025 Yes      Problems Resolved During this Admission:           Benjamin Ledesma DO  Infectious Disease  [facility] - 6th Floor Medical Telemetry    Subjective:     No reported fevers overnight    Review of Systems:   General: Patient denies any unexplained weight loss, night sweats, fatigue malaise or lethargy.   HEENT: (Head, Eyes, Ears, Nose and Throat): Patient denies any visual changes, headaches, eye pain, double vision, sore throat, recent, trauma, ear pain, epistaxis, tinnitus, or sinusitis.   Neck: No signs of recent trauma, or swelling.   Heart: No signs of chest pain, palpitations, orthopnea of loss of consciousness.   Lungs: No cough, sputum, wheeze, hemoptysis, shortness of breath, exercise intolerance.   GI: No abdominal pain, unintentional weight loss, nausea/vomiting, diarrhea/constipation, hematemesis, bright red blood per rectum, or melena.   : No incontinence, dysuria, hematuria, nocturia, polyuria, discharge, increased frequency, or urgency.   Vascular: No signs of claudication, leg edema, varicose veins, or thrombosis.   Neurologic: No loss of sensation, No numbness, No tingling, No Paralysis, No history of tremors or seizures.   Endocrine: No heat/cold intolerance, No excessive sweating, polyuria, polydipsia, or polyphagia.   Hematology: No anemia, easy bruising, petechiae or purpura Skin: No rashes, itching skin, open lesions, skin redness, hives or sensitivity to sun exposure, no changes in nail, hair or skin color.    Musculoskeletal: Patient denies joint swelling, decreased range of motion, crepitus, functional deficit, or arthritis.   Psychiatric: no signs of depression, anxiety or recent change in mood and sleep patterns.    Objective:     Vital Signs (Most Recent):  Temp: 97.5 °F (36.4 °C) (06/18/25 1120)  Pulse: (!) 111 (06/18/25 1200)  Resp: 18 (06/18/25 1127)  BP: 128/86 (06/18/25 1120)  SpO2: 100 % (06/18/25 1200) Vital Signs (24h Range):  Temp:  [97.4 °F (36.3 °C)-98 °F (36.7 °C)] 97.5 °F (36.4 °C)  Pulse:  [100-120] 111  Resp:  [16-20] 18  SpO2:  [90 %-100 %] 100 %  BP: (109-130)/(73-86) 128/86     Weight: 107.6 kg (237 lb 3.4 oz)  Body mass index is 30.46 kg/m².    Estimated Creatinine Clearance: 60.1 mL/min (A) (based on SCr of 1.41 mg/dL (H)).        Physical Exam:   General Appearance: Patient is well nourished and developed adult in no acute distress. Alert and Oriented Times 3   HEENT (Head, Eyes, Ears, Nose and Throat): Normocephalic, Nontraumatic. Pupils equal, round, reactive to light, Accommodation present, Extraocular movements and muscles intact. No cervical Lymphadenopathy, Moist Mucus Membranes, No thyromegaly.   Neck: Soft, Supple, No signs of trauma, No carotid bruits.   Cardiovascular: Regular Rate and Rhythm, No murmurs, gallops, clicks or rubs.   Respiratory: Clear to auscultation bilaterally, No wheezing, rales or rhonchi.   Abdominal: Soft, Non-tender, Non-distention, No peritoneal signs, No rebound tenderness, Present Bowel sounds in all four quadrants, No ascites present.   Extremities: No clubbing, No cyanosis, No edema.   Skin: No scars, No Rashes, Skin is Warm and Dry to the touch, Negative for Sacral Decubitus Ulcers.   Vascular Exam: Pulses 3 out of 4 in the brachial, radial and no JVD noticed Lymphatics: No Cervical, Supra/Infraclavicular Axillary, Trochlear, or Inguinal Adenopathy   Rectal Exam: Deferred at this time.   Neurological Exam: Limited Neurological Exam, patient response to  physical and verbal stimuli          Microbiology Results (last 7 days)       Procedure Component Value Units Date/Time    Blood Culture [[id number]]  (Normal) Collected: 06/13/25 0830    Order Status: Completed Specimen: Blood from Arm, Right Updated: 06/18/25 0902     Blood Culture No Growth at 5 days    Blood Culture [[id number]]  (Normal) Collected: 06/13/25 0733    Order Status: Completed Specimen: Blood from Antecubital, Left Updated: 06/18/25 0902     Blood Culture No Growth at 5 days             Antibiotics (From admission, onward)      Start     Stop Route Frequency Ordered    06/16/25 0630  DAPTOmycin injection 500 mg         -- IV Every 48 hours (non-standard times) 06/14/25 1359             Vitals:    06/18/25 1200   BP:    Pulse: (!) 111   Resp:    Temp:                IMPRESSION:     74-year-old male with complicated hospital stay started with obstructive uropathy and PRIYA along with CHF exacerbation he was found to have right atrial thrombus and dura anticoagulation patient had evidence of GI bleed retroperitoneal bleed evidence of pulmonary embolism with atrial fibrillation with rapid ventricular response.    History of cerebrovascular accident chronic indwelling Palomo catheter.    Enterococcus faecium colonization currently on treatment with daptomycin.    Left foot great toe ulcer with osteomyelitis wound culture grew MRSA.  Generalized weakness anemia of blood loss and deconditioning.        RECOMMENDATIONS:       We will order another CPK level tomorrow please continue with daptomycin until July 14th all outpatient antibiotic orders and labs have been sent over to case management, from an ID standpoint, the patient is cleared for discharge         Final outpatient IV antibiotic recommendation  Intravenous daptomycin 500 mg every 48 hours until July 14, 2025     Thank You;          Benjamin Ledesma D.O., F.I.D.S.A.  Infectious Disease Physician   [facility]  Cell  [phone]

## 2025-06-18 NOTE — PLAN OF CARE
Sent Clinical updates to Santa Gutierrez, Insurance Auth pending.   Requested an update on the status.   Waiting on Response    Call from Santa Gutierrez  - Insurance Auth received and can take the patient today if medically ready.  Advised CM     Patient is not medically ready/ advised [facility] Gutierrez.   Sent message to Santa Gutierrez advising the patient is on IV antibiotics until July 14, (  06/16/25 0630   DAPTOmycin injection 500 mg         -- IV Every 48 hours (non-standard times)   Asked them to advise if they can still take him on the antibiotics.     Will follow up again in the morning.

## 2025-06-18 NOTE — PT/OT/SLP PROGRESS
"[facility]  Speech Language Pathology Department  Dysphagia Therapy Progress Note    Patient Name:  Vaibhav Vargas   MRN:  [id number]    Recommendations     General recommendations:  SLP follow up regarding diet tolerance and dysphagia therapy  Solid texture recommendation:  Minced & Moist Diet - IDDSI Level 5  Liquid consistency recommendation: Moderately thick liquids - IDDSI Level 3   Medications: crushed in puree  Aspiration precautions: small bites/sips, slow rate, upright for PO intake, and assist with feeding as needed    Discharge therapy intensity: Moderate Intensity Therapy   Barriers to safe discharge:  acuity of illness    Subjective     Patient awake and alert.  Spiritual/Cultural/Christian Beliefs/Practices that affect care: no    Pain/Comfort: Pain Rating 1: 0/10    Respiratory Status:  2L via nasal cannula    Objective     Therapeutic PO Trials:  Consistency Amount Fed By Oral Symptoms Pharyngeal Symptoms   Soft & Bite-sized solid x1 SLP Prolonged bolus formation/mastication  Reported "too hard" None   Minced & Moist solid x1 SLP None None     Patient continuously asked for ice water, SLP explained that he was aspirating thin liquids and is requiring moderately thick liquids fro safety. Patient then refused multiple times to drink thickened liquids.     Assessment     Pt continues to present with oropharyngeal dysphagia requiring diet modification to improve swallow safety and efficiency. SLP rec: minced and moist solids, moderately thick liquids, meds crushed in puree.     Outcome Measures     Functional Oral Intake Scale: 5 - Total oral diet with multiple consistencies, by requiring special preparation or compensations    Goals     Multidisciplinary Problems       SLP Goals          Problem: SLP    Goal Priority Disciplines Outcome   SLP Goal     SLP    Description: LTG: Patient tolerate least restrictive diet with no clinical signs/sx of aspiration.     ST. " Patient will tolerate trials of minced and moist solids  2. Patient will tolerate trials of soft and bite sized solids  3. Patient will participate in base of tongue and laryngeal elevation exercises  4. Patient will participate in repeat MBS as appropriate                     Patient Education     Patient and family were provided with verbal education regarding results/recommendations and SLP POC.  Additional teaching is warranted.    Plan     Will continue to follow and tx as appropriate.    SLP Follow-Up:  Yes   Patient to be seen:  5 x/week   Plan of Care expires:  06/30/25  Plan of Care reviewed with:  patient, sibling       Time Tracking     SLP Treatment Date:   06/18/25  Speech Start Time:  1045  Speech Stop Time:  1100     Speech Total Time (min):  15 min    Billable minutes:  Treatment of Swallow Dysfunction, 15 minutes       06/18/2025
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
## 2025-06-18 NOTE — PROGRESS NOTES
[facility] Medicine Progress Note        Chief Complaint: Inpatient Follow-up for anemia and ARF    HPI:   74 y.o. male with PMH CVA 03/2025, CAD s/p LAD stent 2017, venous insuffiency, HTN, 1st Degree AVB, NIDDM2 who initially presented to [facility] ED on 05/23 by daughter from NH for weakness.  He was found to have dislodged fowler catheter with severe PRIYA and UTI, fowler was replaced, he had good urinary output with significant improvement in renal function. He was also getting diureses for CHF exacerbation, workup showed EF of 35-40%, a thrombus was incidentally found in his right atrium. He was started on full dose Lovenox the day after. He continued to be in a fib with RVR despite BB, CCB were being avoided due to low EF, cardioversion was avoided due to existing thrombus that was confirmed on YO. On 06/01 he had significant drop in his hemoglobin with worsening RVR, Lovenox was held, EGD on 06/02 showed non-bleeding ulcers with active oozing of blood from his gastric mucosa, GI did not recommend to hold anticoagulation due to risk for embolization. On 06/04 the patient became hypotensive with A flutter and RVR, hemoglobin dropped to 4.7, Lovenox was held again and massive transfusion was initiated, the patient was upgraded to the ICU due to hemodynamic instability. Lovenox was started once again on 06/06 and he was downgraded from the ICU on 06/08.  Patient bled again with an acute drop in hemoglobin on 06/11 and was noted to have melena.  He was transferred to our facility for GI services. Prior to transferred found to have a retroperitoneal bleed.     Patient was also in Afib with RVR. He was seen by CIS team and started on IV amiodarone and later po metoprolol. His H&H was closely monitored. He was continued on PPI. Hb stabilized. He was eating well. He had emma LE weakness from a previous CVA. He was making a slow clinical recovery.     He was followed by CIS team and [facility]  done and showed   Findings:  1. Left main-Patent  2. Lad-70% calcified proximal stenosis, Mid stent is 2.5 mm stent in  a   3.5-4mm vessel.  MSA distal stent edge is 3.9mm2  3. LCX-50% mid lesion  4. Ramus-40% mid  5. RCA-30% proximal calcified stenosis  6. LVEDP-11  7. LIMA patent    Recommended:  1. Consult CTS for LIMA to LAD, ANGELIA ligation, MAZE, and possible surgical   extraction of prominent eustachian valve with thrombus.   2. If turned down he will likely need atherectomy and shockwave for   underexpanded stent, and repeat stenting of the prox to mid LAD.     Interval Hx:   Patient awake and comfortable. Denies any chest pain, fever or chills. Eating well. Was informed by nurse that CT surgery do not recommended surgery, instead requested CIS to perform high risk PCI.    Family at bedside, explained in detail about the patients condition, diagnosis, vitals, labs and treatment plan. They understand and agree with the plan. All their questions were answered.      Case was discussed with patient's nurse and  on the floor.    Objective/physical exam:  General: In no acute distress, + morbid obesity   Chest: Clear to auscultation bilaterally  Heart: Tachycardic, +S1, S2, no appreciable murmur  Abdomen: Soft, nontender, BS +  Neurologic: Cranial nerve II-XII intact, Strength 5/5 emma UE, 1/5 emma LE     VITAL SIGNS: 24 HRS MIN & MAX LAST   Temp  Min: 97.4 °F (36.3 °C)  Max: 98 °F (36.7 °C) 97.5 °F (36.4 °C)   BP  Min: 109/73  Max: 130/83 128/86   Pulse  Min: 100  Max: 120  (!) 111   Resp  Min: 16  Max: 20 18   SpO2  Min: 90 %  Max: 100 % 100 %     I have reviewed the following labs:  Recent Labs   Lab 06/16/25  0515 06/17/25  0634 06/18/25  0440   WBC 8.37 8.38 7.24   RBC 3.38* 3.54* 3.42*   HGB 9.7* 10.1* 9.6*   HCT 31.0* 33.4* 31.2*   MCV 91.7 94.4* 91.2   MCH 28.7 28.5 28.1   MCHC 31.3* 30.2* 30.8*   RDW 19.3* 18.6* 18.5*    336 344   MPV 8.8 9.0 9.4     Recent Labs   Lab 06/12/25  0511  06/12/25  2339 06/13/25  0733 06/14/25  0718 06/15/25  0728 06/16/25  0515 06/17/25  0634 06/18/25  0440    141   < > 144 143 145 144 142   K 4.1 4.0   < > 3.7 4.1 4.2 3.9 3.6   * 108*   < > 113* 113* 111* 112* 113*   CO2 26 25   < > 25 24 22* 23 22*   BUN 43.9* 41.5*   < > 32.0* 23.6 21.0 28.0* 28.2*   CREATININE 2.24* 2.08*   < > 1.44* 1.29* 1.36* 1.52* 1.41*   * 154*   < > 119* 114 129* 158* 139*   CALCIUM 8.2* 8.0*   < > 8.0* 8.2* 8.5* 8.8 8.4*   MG 2.00 1.80  --   --   --   --  2.00  --    ALBUMIN 2.0* 2.0*   < > 2.0* 2.1*  --   --  2.1*   PROT 5.5* 5.5*   < > 5.6* 6.1  --   --  6.1   ALKPHOS 42 47   < > 51 55  --   --  68   ALT 17 18   < > 15 14  --   --  12   AST 25 19   < > 17 20  --   --  13   BILITOT 0.6 0.9   < > 0.6 0.7  --   --  0.7    < > = values in this interval not displayed.     Microbiology Results (last 7 days)       Procedure Component Value Units Date/Time    Blood Culture [[id number]]  (Normal) Collected: 06/13/25 0830    Order Status: Completed Specimen: Blood from Arm, Right Updated: 06/18/25 0902     Blood Culture No Growth at 5 days    Blood Culture [[id number]]  (Normal) Collected: 06/13/25 0733    Order Status: Completed Specimen: Blood from Antecubital, Left Updated: 06/18/25 0902     Blood Culture No Growth at 5 days             See below for Radiology    Assessment/Plan:  Anemia from PUD: stable   Retroperitoneal bleed: stable   Afib with RVR   CAD   ARF/ ATN: improved   Right atrial thrombus   Acute PE   Left foot osteomyelitis   Chronic systolic HF  Morbid obesity   Mello LE weakness     PMH CVA 03/2025, CAD s/p LAD stent 2017, venous insuffiency, HTN, 1st Degree AVB, NIDDM2     Plan:  Patient clinically improving   LHC showed CAD   Not a surgical candidate per CTS, recommended high risk PCI   F/U by CIS team    No signs of acute bleeding   Hb today 9.6, plt 344    His renal functions looks stable  BUN 28, Crt 1.41  Will closely monitor patients daily weight, urine  out put, renal parameters and volume status      HR still elevated, on iv amiodarone per CIS team   Now on metoprolol 50 mg BID   Keep on tele     He is on iv daptomycin for osteomyelitis till 7/14/25  Monitor weekly CK  Dose adjusted by ID team     Current meds reviewed     Continue supportive care     Continue OT/PT    Labs in am    Critical care note:  Critical care diagnosis: Afib with RVR on iv amiodarone drip  Critical care interventions: Hands-on evaluation, review of labs/radiographs/records and discussion with patient and family if present  Critical care time spent: 35 minutes     VTE prophylaxis: SCD    Patient condition:  Guarded    Anticipated discharge and Disposition:   SNF when more stable       All diagnosis and differential diagnosis have been reviewed; assessment and plan has been documented; I have personally reviewed the labs and test results that are presently available; I have reviewed the patients medication list; I have reviewed the consulting providers response and recommendations. I have reviewed or attempted to review medical records based upon their availability    All of the patient's questions have been  addressed and answered. Patient's is agreeable to the above stated plan. I will continue to monitor closely and make adjustments to medical management as needed.    Portions of this note dictated using EMR integrated voice recognition software, and may be subject to voice recognition errors not corrected at proofreading. Please contact writer for clarification if needed.   _____________________________________________________________________    Malnutrition Status:  Nutrition consulted. Most recent weight and BMI monitored-     Measurements:  Wt Readings from Last 1 Encounters:   06/13/25 107.6 kg (237 lb 3.4 oz)   Body mass index is 30.46 kg/m².    Patient has been screened and assessed by RD.    Malnutrition Type:  Context:    Level:      Malnutrition Characteristic Summary:        Interventions/Recommendations (treatment strategy):        Scheduled Med:   aspirin  81 mg Oral Daily    DAPTOmycin (CUBICIN) IV (PEDS and ADULTS)  500 mg Intravenous Q48H    metoprolol tartrate  50 mg Oral BID    pantoprazole  40 mg Oral BID AC      Continuous Infusions:   0.9% NaCl   Intravenous Continuous 75 mL/hr at 06/18/25 0440 New Bag at 06/18/25 0440    amiodarone in dextrose 5%  0.5 mg/min Intravenous Continuous 16.7 mL/hr at 06/18/25 0051 0.5 mg/min at 06/18/25 0051      PRN Meds:    Current Facility-Administered Medications:     0.9%  NaCl infusion (for blood administration), , Intravenous, Q24H PRN    0.9%  NaCl infusion (for blood administration), , Intravenous, Q24H PRN    acetaminophen, 650 mg, Oral, Q4H PRN    albuterol-ipratropium, 3 mL, Nebulization, Q6H PRN    aluminum-magnesium hydroxide-simethicone, 30 mL, Oral, QID PRN    bisacodyL, 10 mg, Rectal, Daily PRN    dextrose 50%, 12.5 g, Intravenous, PRN    dextrose 50%, 25 g, Intravenous, PRN    glucagon (human recombinant), 1 mg, Intramuscular, PRN    glucose, 16 g, Oral, PRN    glucose, 24 g, Oral, PRN    hydrALAZINE, 10 mg, Intravenous, Q4H PRN    HYDROcodone-acetaminophen, 1 tablet, Oral, Q6H PRN    melatonin, 9 mg, Oral, Nightly PRN    metoprolol, 5 mg, Intravenous, Q15 Min PRN    morphine, 2 mg, Intravenous, Q4H PRN    naloxone, 0.02 mg, Intravenous, PRN    ondansetron, 8 mg, Oral, Q8H PRN    ondansetron, 4 mg, Intravenous, Q8H PRN    polyethylene glycol, 17 g, Oral, TID PRN    simethicone, 1 tablet, Oral, QID PRN    sodium chloride 0.9%, 10 mL, Intravenous, PRN     Radiology:  I have personally reviewed the following imaging and agree with the radiologist.     Cardiac catheterization    The estimated blood loss was none.    The procedure log was documented by Documenter: Lorena Rodriguez RN and   verified by James Tony MD.    Date: 6/17/2025  Time: 3:24 PM    Preprocedure diagnosis-new cardiomyopathy  Postprocedure  diagnosis-obstructive CAD  Estimated blood loss-5 cc  Tissue removal-none  Complications-none    Procedures performed:  1. Ultrasound-guided right groin access  2. Coronary angiography  3. Left ventricular hemodynamics  4. IFR interrogation of:  LAD-0.85  Ramus-0.93  Circ-1.0  5. IVUS of LAD  6. LIMA angio  7. Perclose RCFA    Findings:  1. Left main-Patent  2. Lad-70% calcified proximal stenosis, Mid stent is 2.5 mm stent in  a   3.5-4mm vessel.  MSA distal stent edge is 3.9mm2  3. LCX-50% mid lesion  4. Ramus-40% mid  5. RCA-30% proximal calcified stenosis  6. LVEDP-11  7. LIMA patent    Procedure detail:  Ultrasound-guided right groin access obtained.  Sheath inserted.  Femoral   angiography performed.  Left main angiography performed with JL4.  RCA   angiography performed with JR4 catheter.   Pigtail was then used for left   ventricular hemodynamics.   Given moderate LAD disease further   interrogation was needed.  Heparin was administered.  Six [place] EBU 3.5   catheter was advanced into the aorta.  Artery was pre treated with   nitroglycerin.  Wire was normalized in the aorta.  The catheter was   reengaged into the LM.  Wire was passed beyond the proximal lesion.    Guiding catheter was backed out of the LM, interrogation was performed.    Pullback to the guide was 1.  Process was repeated for the ramus and   circumflex.  Final angiography demonstrated YAKELIN 3 flow.  Guide was   redirected into the left subclavian and LIMA agio was performed.    Catheters were removed and a proglide was used for RCFA closure.    Plan:  1. Consult CTS for LIMA to LAD, ANGELIA ligation, MAZE, and possible surgical   extraction of prominent eustachian valve with thrombus.   2. If turned down he will likely need atherectomy and shockwave for   underexpanded stent, and repeat stenting of the prox to mid LAD.       Rambo Cain MD  Department of Hospital Medicine   [facility]   06/18/2025

## 2025-06-18 NOTE — PT/OT/SLP PROGRESS
Physical Therapy      Patient Name:  Vaibhav Vargas   MRN:  [id number]    Patient not seen today secondary to pt's sister requesting to let pt rest and sleep  . Will follow-up tomorrow.

## 2025-06-18 NOTE — PROGRESS NOTES
Inpatient Nutrition Assessment    Admit Date: 6/12/2025   Total duration of encounter: 6 days   Patient Age: 74 y.o.    Nutrition Recommendation/Prescription     Diet Minced & Moist (IDDSI Level 5) Moderately Thick Liquids (IDDSI Level 3)  Add ALEXIS BID (provides 90 kcal and 2.5 g protein per serving)  Encouraged adequate PO intake  Monitor appetite/PO intake, weight, and labs    Communication of Recommendations: reviewed with nurse, reviewed with patient, and reviewed with family    Nutrition Assessment     Malnutrition Assessment/Nutrition-Focused Physical Exam    Malnutrition Context: acute illness or injury (06/18/25 1543)  Malnutrition Level: moderate (06/18/25 1543)  Energy Intake (Malnutrition): other (see comments) (Does not meet criteria) (06/18/25 1543)  Weight Loss (Malnutrition): other (see comments) (Does not meet criteria) (06/18/25 1543)  Subcutaneous Fat (Malnutrition): mild depletion (06/18/25 1543)        Thoracic and Lumbar Region: mild depletion  Muscle Mass (Malnutrition): mild depletion (06/18/25 1543)  [place] Region (Muscle Loss): mild depletion                                A minimum of two characteristics is recommended for diagnosis of either severe or non-severe malnutrition.    Chart Review    Reason Seen: length of stay    Malnutrition Screening Tool Results   Have you recently lost weight without trying?: No  Have you been eating poorly because of a decreased appetite?: No   MST Score: 0   Diagnosis:  Anemia from PUD: stable   Retroperitoneal bleed: stable   Afib with RVR   ARF/ ATN: improved   Right atrial thrombus   Acute PE   Left foot osteomyelitis   Chronic systolic HF  Morbid obesity   Mello LE weakness        Relevant Medical History:    Chronic lumbar pain      Diabetes mellitus, type 2      Edema      Hypertension      Obesity, unspecified      Osteoarthritis of multiple joints        Scheduled Medications:  aspirin, 81 mg, Daily  DAPTOmycin (CUBICIN) IV (PEDS and ADULTS), 500  mg, Q48H  metoprolol tartrate, 50 mg, BID  pantoprazole, 40 mg, BID AC    Continuous Infusions:  amiodarone in dextrose 5%, Last Rate: 0.5 mg/min (06/18/25 1315)    PRN Medications:  0.9%  NaCl infusion (for blood administration), , Q24H PRN  0.9%  NaCl infusion (for blood administration), , Q24H PRN  acetaminophen, 650 mg, Q4H PRN  albuterol-ipratropium, 3 mL, Q6H PRN  aluminum-magnesium hydroxide-simethicone, 30 mL, QID PRN  bisacodyL, 10 mg, Daily PRN  dextrose 50%, 12.5 g, PRN  dextrose 50%, 25 g, PRN  glucagon (human recombinant), 1 mg, PRN  glucose, 16 g, PRN  glucose, 24 g, PRN  hydrALAZINE, 10 mg, Q4H PRN  HYDROcodone-acetaminophen, 1 tablet, Q6H PRN  melatonin, 9 mg, Nightly PRN  metoprolol, 5 mg, Q15 Min PRN  morphine, 2 mg, Q4H PRN  naloxone, 0.02 mg, PRN  ondansetron, 8 mg, Q8H PRN  ondansetron, 4 mg, Q8H PRN  polyethylene glycol, 17 g, TID PRN  simethicone, 1 tablet, QID PRN  sodium chloride 0.9%, 10 mL, PRN    Calorie Containing IV Medications: no significant kcals from medications at this time    Recent Labs   Lab 06/12/25  0511 06/12/25  2338 06/12/25  2339 06/13/25  0733 06/14/25  0718 06/15/25  0728 06/16/25  0515 06/17/25  0634 06/18/25  0440     --  141 142 144 143 145 144 142   K 4.1  --  4.0 4.1 3.7 4.1 4.2 3.9 3.6   CALCIUM 8.2*  --  8.0* 8.1* 8.0* 8.2* 8.5* 8.8 8.4*   PHOS 4.5  --  4.1  --   --   --   --   --   --    MG 2.00  --  1.80  --   --   --   --  2.00  --    *  --  108* 108* 113* 113* 111* 112* 113*   CO2 26  --  25 25 25 24 22* 23 22*   BUN 43.9*  --  41.5* 40.2* 32.0* 23.6 21.0 28.0* 28.2*   CREATININE 2.24*  --  2.08* 1.98* 1.44* 1.29* 1.36* 1.52* 1.41*   EGFRNORACEVR 30  --  33 35 51 58 55 48 52   *  --  154* 137* 119* 114 129* 158* 139*   BILITOT 0.6  --  0.9 0.9 0.6 0.7  --   --  0.7   ALKPHOS 42  --  47 49 51 55  --   --  68   ALT 17  --  18 18 15 14  --   --  12   AST 25  --  19 19 17 20  --   --  13   ALBUMIN 2.0*  --  2.0* 2.1* 2.0* 2.1*  --   --  2.1*  "  CRP 66.00*  --   --   --   --   --   --   --   --    WBC 12.63* 10.85  --  10.19 9.06 8.96 8.37 8.38 7.24   HGB 6.8* 8.5*  --  9.5* 8.6* 9.3* 9.7* 10.1* 9.6*   HCT 22.1* 26.9*  --  30.0* 27.8* 30.2* 31.0* 33.4* 31.2*     Nutrition Orders:  Diet Minced & Moist (IDDSI Level 5) Moderately Thick Liquids (IDDSI Level 3)      Appetite/Oral Intake: fair/not applicable  Factors Affecting Nutritional Intake: texture modification  Social Needs Impacting Access to Food: none identified  Food/[place]ar[place]/Cultural Preferences: none reported  Food Allergies: none reported  Last Bowel Movement: 25  Wound(s):     Wound 25 2110 Pressure Injury Sacral spine-Tissue loss description: Full thickness  [REMOVED]      Wound 25 1900 Pressure Injury Left dorsal Foot-Tissue loss description: Full thickness noted    Comments    2025: Pt reports a good appetite/PO intake and reports decreased appetite/PO intake since admit due to texture modification, pt on pureed diet at time of visit, now on Minced and moist. Pt denies N/V/D/C and chewing/swallowing difficulties. Pt declined ONS. Encouraged adequate PO intake. Will add ALEXIS BID to promote wound healing. Per EMR, pt weighed 115.7 kg on 3/24/2025 (7.0% wt loss in 3 months, insignificant). Last BM noted. Will monitor.    Anthropometrics    Height: 6' 2" (188 cm), Height Method: Stated  Last Weight: 107.6 kg (237 lb 3.4 oz) (25 1044), Weight Method: Bed Scale  BMI (Calculated): 30.4  BMI Classification: obese grade I (BMI 30-34.9)        Ideal Body Weight (IBW), Male: 190 lb     % Ideal Body Weight, Male (lb): 124.85 %                 Usual Body Weight (UBW), k.7 kg  % Usual Body Weight: 93.19     Usual Weight Provided By: EMR weight history    Wt Readings from Last 5 Encounters:   25 107.6 kg (237 lb 3.4 oz)   25 107.6 kg (237 lb 3.4 oz)   25 115.7 kg (255 lb)   24 128.2 kg (282 lb 11.2 oz)   24 128 kg (282 lb 3 oz)     Weight " Change(s) Since Admission:   6/18/2025: no new wts  Wt Readings from Last 1 Encounters:   06/13/25 1044 107.6 kg (237 lb 3.4 oz)   06/13/25 0530 107.6 kg (237 lb 3.4 oz)   Admit Weight: 107.6 kg (237 lb 3.4 oz) (as per 06/12 vitals) (06/13/25 0530), Weight Method: Bed Scale    Estimated Needs    Weight Used For Calorie Calculations: 107.6 kg (237 lb 3.4 oz)  Energy Calorie Requirements (kcal): 1885--2262 (MSJ x 1.0-1.2)  Energy Need Method: [place]-St Jeor  Weight Used For Protein Calculations: 107.6 kg (237 lb 3.4 oz)  Protein Requirements:  (0.8-1.0 g/kg)  Fluid Requirements (mL): 1885 (1 mL/kcal)  CHO Requirement: 212-259 g/day (~45-55% est min kcal needs)     Enteral Nutrition     Patient not receiving enteral nutrition at this time.    Parenteral Nutrition     Patient not receiving parenteral nutrition support at this time.    Evaluation of Received Nutrient Intake    Calories: not meeting estimated needs  Protein: not meeting estimated needs    Patient Education     Not applicable.    Nutrition Diagnosis     PES: Inadequate oral intake related to acute illness as evidenced by poor PO intake for ~4 days. (new)     PES: Moderate acute disease or injury related malnutrition Related to acute illness As Evidenced by:  - muscle mass depletion: 1 area of mild muscle loss (Temporalis) - loss of subcutaneous fat: 1 area of mild fat loss (Ribs) new    Nutrition Interventions     Intervention(s): general/healthful diet  Intervention(s):      Goal: Meet greater than 80% of nutritional needs by follow-up. (new)    Nutrition Goals & Monitoring     Dietitian will monitor: food and beverage intake, weight, electrolyte/renal panel, glucose/endocrine profile, and gastrointestinal profile  Discharge planning: Minced and moist diet with texture modifications per SLP  Nutrition Risk/Follow-Up: patient at increased nutrition risk; dietitian will follow-up twice weekly   Please consult if re-assessment needed sooner.

## 2025-06-19 LAB
ANION GAP SERPL CALC-SCNC: 6 MEQ/L
BASOPHILS # BLD AUTO: 0.05 X10(3)/MCL
BASOPHILS NFR BLD AUTO: 0.7 %
BUN SERPL-MCNC: 23.9 MG/DL (ref 8.4–25.7)
CALCIUM SERPL-MCNC: 8.3 MG/DL (ref 8.8–10)
CHLORIDE SERPL-SCNC: 115 MMOL/L (ref 98–107)
CK SERPL-CCNC: 85 U/L (ref 30–200)
CO2 SERPL-SCNC: 23 MMOL/L (ref 23–31)
CREAT SERPL-MCNC: 1.21 MG/DL (ref 0.72–1.25)
CREAT/UREA NIT SERPL: 20
EOSINOPHIL # BLD AUTO: 0.09 X10(3)/MCL (ref 0–0.9)
EOSINOPHIL NFR BLD AUTO: 1.2 %
ERYTHROCYTE [DISTWIDTH] IN BLOOD BY AUTOMATED COUNT: 18.5 % (ref 11.5–17)
GFR SERPLBLD CREATININE-BSD FMLA CKD-EPI: >60 ML/MIN/1.73/M2
GLUCOSE SERPL-MCNC: 120 MG/DL (ref 82–115)
HCT VFR BLD AUTO: 31 % (ref 42–52)
HGB BLD-MCNC: 9.6 G/DL (ref 14–18)
IMM GRANULOCYTES # BLD AUTO: 0.08 X10(3)/MCL (ref 0–0.04)
IMM GRANULOCYTES NFR BLD AUTO: 1.1 %
LYMPHOCYTES # BLD AUTO: 1.11 X10(3)/MCL (ref 0.6–4.6)
LYMPHOCYTES NFR BLD AUTO: 15.1 %
MCH RBC QN AUTO: 28.2 PG (ref 27–31)
MCHC RBC AUTO-ENTMCNC: 31 G/DL (ref 33–36)
MCV RBC AUTO: 91.2 FL (ref 80–94)
MONOCYTES # BLD AUTO: 1.02 X10(3)/MCL (ref 0.1–1.3)
MONOCYTES NFR BLD AUTO: 13.8 %
NEUTROPHILS # BLD AUTO: 5.02 X10(3)/MCL (ref 2.1–9.2)
NEUTROPHILS NFR BLD AUTO: 68.1 %
NRBC BLD AUTO-RTO: 0 %
PLATELET # BLD AUTO: 345 X10(3)/MCL (ref 130–400)
PMV BLD AUTO: 9.3 FL (ref 7.4–10.4)
POTASSIUM SERPL-SCNC: 3.5 MMOL/L (ref 3.5–5.1)
RBC # BLD AUTO: 3.4 X10(6)/MCL (ref 4.7–6.1)
SODIUM SERPL-SCNC: 144 MMOL/L (ref 136–145)
WBC # BLD AUTO: 7.37 X10(3)/MCL (ref 4.5–11.5)

## 2025-06-19 PROCEDURE — 27000207 HC ISOLATION

## 2025-06-19 PROCEDURE — 97110 THERAPEUTIC EXERCISES: CPT | Mod: CQ

## 2025-06-19 PROCEDURE — 97530 THERAPEUTIC ACTIVITIES: CPT | Mod: CQ

## 2025-06-19 PROCEDURE — 21400001 HC TELEMETRY ROOM

## 2025-06-19 PROCEDURE — 11000001 HC ACUTE MED/SURG PRIVATE ROOM

## 2025-06-19 PROCEDURE — 97110 THERAPEUTIC EXERCISES: CPT

## 2025-06-19 PROCEDURE — 36415 COLL VENOUS BLD VENIPUNCTURE: CPT | Performed by: INTERNAL MEDICINE

## 2025-06-19 PROCEDURE — 80048 BASIC METABOLIC PNL TOTAL CA: CPT | Performed by: INTERNAL MEDICINE

## 2025-06-19 PROCEDURE — 27000221 HC OXYGEN, UP TO 24 HOURS

## 2025-06-19 PROCEDURE — 25000003 PHARM REV CODE 250: Performed by: STUDENT IN AN ORGANIZED HEALTH CARE EDUCATION/TRAINING PROGRAM

## 2025-06-19 PROCEDURE — 94760 N-INVAS EAR/PLS OXIMETRY 1: CPT

## 2025-06-19 PROCEDURE — 63600175 PHARM REV CODE 636 W HCPCS: Performed by: STUDENT IN AN ORGANIZED HEALTH CARE EDUCATION/TRAINING PROGRAM

## 2025-06-19 PROCEDURE — 82550 ASSAY OF CK (CPK): CPT | Performed by: INTERNAL MEDICINE

## 2025-06-19 PROCEDURE — 25000003 PHARM REV CODE 250: Performed by: INTERNAL MEDICINE

## 2025-06-19 PROCEDURE — 85025 COMPLETE CBC W/AUTO DIFF WBC: CPT | Performed by: INTERNAL MEDICINE

## 2025-06-19 RX ORDER — CLOPIDOGREL BISULFATE 75 MG/1
75 TABLET ORAL DAILY
Status: DISCONTINUED | OUTPATIENT
Start: 2025-06-19 | End: 2025-06-20

## 2025-06-19 RX ADMIN — AMIODARONE HYDROCHLORIDE 0.5 MG/MIN: 1.8 INJECTION, SOLUTION INTRAVENOUS at 11:06

## 2025-06-19 RX ADMIN — AMIODARONE HYDROCHLORIDE 0.5 MG/MIN: 1.8 INJECTION, SOLUTION INTRAVENOUS at 12:06

## 2025-06-19 RX ADMIN — ASPIRIN 81 MG CHEWABLE TABLET 81 MG: 81 TABLET CHEWABLE at 08:06

## 2025-06-19 RX ADMIN — CLOPIDOGREL 75 MG: 75 TABLET ORAL at 08:06

## 2025-06-19 RX ADMIN — METOPROLOL TARTRATE 50 MG: 50 TABLET, FILM COATED ORAL at 08:06
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
## 2025-06-19 NOTE — PT/OT/SLP PROGRESS
"Physical Therapy Treatment    Patient Name:  Vaibhav Vargas   MRN:  [id number]    Recommendations:     Discharge therapy intensity: Moderate Intensity Therapy   Discharge Equipment Recommendations: to be determined by next level of care  Barriers to discharge: Decreased caregiver support, Impaired mobility, Ongoing medical needs, and Placement    Assessment:     Vaibhav Vargas is a 74 y.o. male admitted with a medical diagnosis of medical diagnosis of  retroperitoneal bleed, GIB, R atrial thrombus, PE, enterococcus faecium, cystitis, L foot OM, PRIYA, AF RVR, HF. .  He presents with the following impairments/functional limitations: weakness, impaired endurance, impaired self care skills, impaired functional mobility, gait instability, impaired balance, impaired cognition, decreased lower extremity function .     Pt declined OOB activity 2/2 dizziness. Pt reported that whenever he sits EOB he becomes very dizzy.    Rehab Prognosis: Good; patient would benefit from acute skilled PT services to address these deficits and reach maximum level of function.    Recent Surgery: Procedure(s) (LRB):  Left heart cath (Left) 2 Days Post-Op    Plan:     During this hospitalization, patient would benefit from acute PT services 5 x/week to address the identified rehab impairments via gait training, therapeutic activities, therapeutic exercises, neuromuscular re-education, wheelchair management/training and progress toward the following goals:    Plan of Care Expires:  07/16/25    Subjective     Chief Complaint: "I'm dizzy"  Patient/Family Comments/goals:   Pain/Comfort:         Objective:     Communicated with rn prior to session.  Patient found HOB elevated with fowler catheter, telemetry, peripheral IV, oxygen upon PT entry to room.     General Precautions: Standard, contact, aspiration  Orthopedic Precautions: N/A  Braces: N/A  Respiratory Status: Nasal cannula, flow   L/min  Blood Pressure: 133/86  Skin Integrity: known " issues      Functional Mobility:  Did not occur    Therapeutic Activities/Exercises:  Intermittently elevated HOB 2/2 dizziness, assessed BP (wnl)   Performed heel slides, AP, abd/add 10x1 Rest breaks between sets. Requires more assist with RLE>LLE    Co-Treatment: No    Education:  Patient provided with verbal education education regarding PT role/goals/POC.  Understanding was verbalized.     Patient left HOB elevated with all lines intact, call button in reach, and rn notified    GOALS:   Multidisciplinary Problems       Physical Therapy Goals          Problem: Physical Therapy    Goal Priority Disciplines Outcome Interventions   Physical Therapy Goal     PT, PT/OT Progressing    Description: Goals to be met by: 2025     Patient will increase functional independence with mobility by performin. Supine to sit with Contact Guard Assistance  2. Sit to stand transfer with Contact Guard Assistance  3. Gait  x 150 feet with Contact Guard Assistance using Rolling Walker.                          Time Tracking:     PT Received On: 25  PT Start Time: 0850     PT Stop Time: 0920  PT Total Time (min): 30 min     Billable Minutes: Therapeutic Activity 15 and Therapeutic Exercise 15    Treatment Type: Treatment  PT/PTA: PTA     Number of PTA visits since last PT visit: 2     2025

## 2025-06-19 NOTE — PHYSICIAN QUERY
Please provide the integumentary diagnosis related to the documentation of the left foot.  Pressure Injury/Decubitus Ulcer, Stage 4

## 2025-06-19 NOTE — PT/OT/SLP PROGRESS
Occupational Therapy   Treatment    Name: Vaibhav Vargas  MRN: [id number]    Recommendations:     Recommended therapy intensity at discharge: Moderate Intensity Therapy   Discharge Equipment Recommendations:  to be determined by next level of care  Barriers to discharge:   (medical needs, placement, functional deficits)    Assessment:     Vaibhav Vargas is a 74 y.o. male with a medical diagnosis of retroperitoneal bleed, GIB, R atrial thrombus, PE, enterococcus faecium, cystitis, L foot OM, PRIYA, AF RVR, HF.  Performance deficits affecting function are weakness, impaired endurance, impaired coordination, decreased lower extremity function, impaired self care skills, impaired functional mobility, decreased safety awareness, impaired cardiopulmonary response to activity, impaired balance, edema, impaired skin.     Pt adamantly refusing to sit EOB/attempt mobility even with compression stockings for BP regulation. Pt c/o dizziness t/o session and not receptive to edu regarding role of therapy/negative side effects of prolonged time in bed. Therapeutic exercise completed from bed level.    Rehab Prognosis:  Fair; patient would benefit from acute skilled OT services to address these deficits and reach maximum level of function.       Plan:     Patient to be seen 3 x/week to address the above listed problems via self-care/home management, therapeutic activities, therapeutic exercises, neuromuscular re-education, sensory integration  Plan of Care Expires: 07/17/25  Plan of Care Reviewed with: patient, sibling    Subjective     Pain/Comfort:   0/10    Objective:     Communicated with: RN prior to session.  Patient found HOB elevated with oxygen, peripheral IV, pulse ox (continuous), telemetry upon OT entry to room.    General Precautions: Standard, contact, fall    Orthopedic Precautions:N/A  Braces: N/A  Respiratory Status: Nasal cannula, flow 1 L/min  Vital Signs: Blood Pressure: 121/90     Occupational Performance:      Therapeutic Exercise:  2X10 chest presses with BUE and 4# item  2x10 bicep curls 2# wt  2x10 straight arm raises 2# wt    AMPAC 6 Click ADL: 12    Co-Treatment: No    Patient Education:  Patient provided with verbal education education regarding OT role/goals/POC, fall prevention, safety awareness, Discharge/DME recommendations, and pressure ulcer prevention.  Additional teaching is warranted.      Patient left HOB elevated with all lines intact, call button in reach, RN notified, and B heels floated.    GOALS:   Multidisciplinary Problems       Occupational Therapy Goals          Problem: Occupational Therapy    Goal Priority Disciplines Outcome Interventions   Occupational Therapy Goal     OT, PT/OT Progressing    Description: Goals to be met by 7/17/25    Pt will demonstrate feeding himself seated at recliner chair/WC with setup.  Pt will complete grooming seated at sink at WC level with SBA.  Pt will complete UB dressing with SBA.  Pt will complete sit>stand with min A with RW for prepare for BSC transfer.  Pt will toilet with moderate assistance for clothing management and hygiene at BS.  Pt will engage/tolerate BUE general therex x 10 minutes to increase activity tolerance seated EOB.  Pt will tolerate static standing x 1 minute to assist with ADL tasks          Problem: Occupational Therapy    Goal Priority Disciplines Outcome Interventions   Occupational Therapy Goal     OT, PT/OT                         Time Tracking:     OT Date of Treatment: 06/19/25  OT Start Time: 1531  OT Stop Time: 1545  OT Total Time (min): 14 min    Billable Minutes:Therapeutic Exercise 1    OT/TETE: OT     Number of TETE visits since last OT visit: 1    6/19/2025

## 2025-06-19 NOTE — PROGRESS NOTES
Inpatient Nutrition Assessment    Admit Date: 6/12/2025   Total duration of encounter: 7 days   Patient Age: 74 y.o.    Nutrition Recommendation/Prescription     Diet Minced & Moist (IDDSI Level 5) Moderately Thick Liquids (IDDSI Level 3) per SLP recs.   Encourage ALEXIS BID (provides 90 kcal and 2.5 g protein per serving)- mix with thickener packets to appropriate consistency.   Encourage adequate PO intake  Monitor appetite/PO intake, weight, and labs    Communication of Recommendations: reviewed with nurse, reviewed with patient, and reviewed with family    Nutrition Assessment     Malnutrition Assessment/Nutrition-Focused Physical Exam    Malnutrition Context: acute illness or injury (06/18/25 1543)  Malnutrition Level: moderate (06/18/25 1543)  Energy Intake (Malnutrition): other (see comments) (Does not meet criteria) (06/18/25 1543)  Weight Loss (Malnutrition): other (see comments) (Does not meet criteria) (06/18/25 1543)  Subcutaneous Fat (Malnutrition): mild depletion (06/18/25 1543)        Thoracic and Lumbar Region: mild depletion  Muscle Mass (Malnutrition): mild depletion (06/18/25 1543)  [place] Region (Muscle Loss): mild depletion                                A minimum of two characteristics is recommended for diagnosis of either severe or non-severe malnutrition.    Chart Review    Reason Seen: length of stay    Malnutrition Screening Tool Results   Have you recently lost weight without trying?: No  Have you been eating poorly because of a decreased appetite?: No   MST Score: 0   Diagnosis:  Anemia from PUD: stable   Retroperitoneal bleed: stable   Afib with RVR   ARF/ ATN: improved   Right atrial thrombus   Acute PE   Left foot osteomyelitis   Chronic systolic HF  Morbid obesity   Mello LE weakness        Relevant Medical History:    Chronic lumbar pain      Diabetes mellitus, type 2      Edema      Hypertension      Obesity, unspecified      Osteoarthritis of multiple joints        Scheduled  Medications:  aspirin, 81 mg, Daily  clopidogreL, 75 mg, Daily  DAPTOmycin (CUBICIN) IV (PEDS and ADULTS), 500 mg, Q48H  metoprolol tartrate, 50 mg, BID  pantoprazole, 40 mg, BID AC    Continuous Infusions:  amiodarone in dextrose 5%, Last Rate: 0.5 mg/min (06/19/25 1217)    PRN Medications:  0.9%  NaCl infusion (for blood administration), , Q24H PRN  0.9%  NaCl infusion (for blood administration), , Q24H PRN  acetaminophen, 650 mg, Q4H PRN  albuterol-ipratropium, 3 mL, Q6H PRN  aluminum-magnesium hydroxide-simethicone, 30 mL, QID PRN  bisacodyL, 10 mg, Daily PRN  dextrose 50%, 12.5 g, PRN  dextrose 50%, 25 g, PRN  glucagon (human recombinant), 1 mg, PRN  glucose, 16 g, PRN  glucose, 24 g, PRN  hydrALAZINE, 10 mg, Q4H PRN  HYDROcodone-acetaminophen, 1 tablet, Q6H PRN  melatonin, 9 mg, Nightly PRN  metoprolol, 5 mg, Q15 Min PRN  morphine, 2 mg, Q4H PRN  naloxone, 0.02 mg, PRN  ondansetron, 8 mg, Q8H PRN  ondansetron, 4 mg, Q8H PRN  polyethylene glycol, 17 g, TID PRN  simethicone, 1 tablet, QID PRN  sodium chloride 0.9%, 10 mL, PRN    Calorie Containing IV Medications: no significant kcals from medications at this time    Recent Labs   Lab 06/12/25  2339 06/13/25  0733 06/14/25  0718 06/15/25  0728 06/16/25  0515 06/17/25  0634 06/18/25  0440 06/19/25  0457    142 144 143 145 144 142 144   K 4.0 4.1 3.7 4.1 4.2 3.9 3.6 3.5   CALCIUM 8.0* 8.1* 8.0* 8.2* 8.5* 8.8 8.4* 8.3*   PHOS 4.1  --   --   --   --   --   --   --    MG 1.80  --   --   --   --  2.00  --   --    * 108* 113* 113* 111* 112* 113* 115*   CO2 25 25 25 24 22* 23 22* 23   BUN 41.5* 40.2* 32.0* 23.6 21.0 28.0* 28.2* 23.9   CREATININE 2.08* 1.98* 1.44* 1.29* 1.36* 1.52* 1.41* 1.21   EGFRNORACEVR 33 35 51 58 55 48 52 >60   * 137* 119* 114 129* 158* 139* 120*   BILITOT 0.9 0.9 0.6 0.7  --   --  0.7  --    ALKPHOS 47 49 51 55  --   --  68  --    ALT 18 18 15 14  --   --  12  --    AST 19 19 17 20  --   --  13  --    ALBUMIN 2.0* 2.1* 2.0* 2.1*   "--   --  2.1*  --    WBC  --  10.19 9.06 8.96 8.37 8.38 7.24 7.37   HGB  --  9.5* 8.6* 9.3* 9.7* 10.1* 9.6* 9.6*   HCT  --  30.0* 27.8* 30.2* 31.0* 33.4* 31.2* 31.0*     Nutrition Orders:  Diet Minced & Moist (IDDSI Level 5) Moderately Thick Liquids (IDDSI Level 3)  Dietary nutrition supplements BID; Kavin - Any flavor    Appetite/Oral Intake: poor/0-25% of meals  Factors Affecting Nutritional Intake: texture modification  Social Needs Impacting Access to Food: none identified  Food/Orthodox/Cultural Preferences: none reported  Food Allergies: none reported  Last Bowel Movement: 25  Wound(s):     Wound 25 Pressure Injury Sacral spine-Tissue loss description: Full thickness  [REMOVED]      Wound 25 1900 Pressure Injury Left dorsal Foot-Tissue loss description: Full thickness noted    Comments    2025: Pt reports a good appetite/PO intake and reports decreased appetite/PO intake since admit due to texture modification, pt on pureed diet at time of visit, now on Minced and moist. Pt denies N/V/D/C and chewing/swallowing difficulties. Pt declined ONS. Encouraged adequate PO intake. Will add KAVIN BID to promote wound healing. Per EMR, pt weighed 115.7 kg on 3/24/2025 (7.0% wt loss in 3 months, insignificant). Last BM noted. Will monitor.    25: Per RN, pt is refusing to eat 2/2 the texture modifications of his food. Pt was evaluated by SLP yesterday and is on Minced and Moist diet with moderately thickened liquids.     Anthropometrics    Height: 6' 2" (188 cm), Height Method: Stated  Last Weight: 107.6 kg (237 lb 3.4 oz) (25 1044), Weight Method: Bed Scale  BMI (Calculated): 30.4  BMI Classification: obese grade I (BMI 30-34.9)        Ideal Body Weight (IBW), Male: 190 lb     % Ideal Body Weight, Male (lb): 124.85 %                 Usual Body Weight (UBW), k.7 kg  % Usual Body Weight: 93.19     Usual Weight Provided By: EMR weight history    Wt Readings from Last 5 " Encounters:   06/13/25 107.6 kg (237 lb 3.4 oz)   06/12/25 107.6 kg (237 lb 3.4 oz)   03/24/25 115.7 kg (255 lb)   03/26/24 128.2 kg (282 lb 11.2 oz)   03/23/24 128 kg (282 lb 3 oz)     Weight Change(s) Since Admission:   6/18/2025: no new wts  Wt Readings from Last 1 Encounters:   06/13/25 1044 107.6 kg (237 lb 3.4 oz)   06/13/25 0530 107.6 kg (237 lb 3.4 oz)   Admit Weight: 107.6 kg (237 lb 3.4 oz) (as per 06/12 vitals) (06/13/25 0530), Weight Method: Bed Scale    Estimated Needs    Weight Used For Calorie Calculations: 107.6 kg (237 lb 3.4 oz)  Energy Calorie Requirements (kcal): 1885--2262 (MSJ x 1.0-1.2)  Energy Need Method: Davis-St Jeor  Weight Used For Protein Calculations: 107.6 kg (237 lb 3.4 oz)  Protein Requirements:  (0.8-1.0 g/kg)  Fluid Requirements (mL): 1885 (1 mL/kcal)  CHO Requirement: 212-259 g/day (~45-55% est min kcal needs)     Enteral Nutrition     Patient not receiving enteral nutrition at this time.    Parenteral Nutrition     Patient not receiving parenteral nutrition support at this time.    Evaluation of Received Nutrient Intake    Calories: not meeting estimated needs  Protein: not meeting estimated needs    Patient Education     Not applicable.    Nutrition Diagnosis     PES: Inadequate oral intake related to acute illness as evidenced by poor PO intake for ~4 days. (active)     PES: Moderate acute disease or injury related malnutrition Related to acute illness As Evidenced by:  - muscle mass depletion: 1 area of mild muscle loss (Temporalis) - loss of subcutaneous fat: 1 area of mild fat loss (Ribs) active    Nutrition Interventions     Intervention(s): modified composition of meals/snacks and commercial food  Intervention(s):      Goal: Meet greater than 80% of nutritional needs by follow-up. (goal not met)    Nutrition Goals & Monitoring     Dietitian will monitor: food and beverage intake, weight, electrolyte/renal panel, glucose/endocrine profile, and gastrointestinal  profile  Discharge planning: Minced and moist diet with texture modifications per SLP  Nutrition Risk/Follow-Up: patient at increased nutrition risk; dietitian will follow-up twice weekly   Please consult if re-assessment needed sooner.

## 2025-06-19 NOTE — PROGRESS NOTES
"Hospital Medicine  Progress Note    Patient Name: Vaibhav Vargas  MRN: [id number]  Status: IP- Inpatient   Admission Date: 6/12/2025  Length of Stay: 7  Date of Service: 06/19/2025       CC: hospital follow-up for obstructive CAD       SUBJECTIVE   "74 y.o. male with PMH CVA 03/2025, CAD s/p LAD stent 2017, venous insuffiency, HTN, 1st Degree AVB, NIDDM2 who initially presented to [facility] ED on 05/23 by daughter from NH for weakness.  He was found to have dislodged fowler catheter with severe PRIYA and UTI, fowler was replaced, he had good urinary output with significant improvement in renal function. He was also getting diureses for CHF exacerbation, workup showed EF of 35-40%, a thrombus was incidentally found in his right atrium. He was started on full dose Lovenox the day after. He continued to be in a fib with RVR despite BB, CCB were being avoided due to low EF, cardioversion was avoided due to existing thrombus that was confirmed on YO. On 06/01 he had significant drop in his hemoglobin with worsening RVR, Lovenox was held, EGD on 06/02 showed non-bleeding ulcers with active oozing of blood from his gastric mucosa, GI did not recommend to hold anticoagulation due to risk for embolization. On 06/04 the patient became hypotensive with A flutter and RVR, hemoglobin dropped to 4.7, Lovenox was held again and massive transfusion was initiated, the patient was upgraded to the ICU due to hemodynamic instability. Lovenox was started once again on 06/06 and he was downgraded from the ICU on 06/08.  Patient bled again with an acute drop in hemoglobin on 06/11 and was noted to have melena.  He was transferred to our facility for GI services. Prior to transferred found to have a retroperitoneal bleed.      Patient was also in Afib with RVR. He was seen by [facility] team and started on IV amiodarone and later po metoprolol. His H&H was closely monitored. He was continued on PPI. Hb stabilized. He was eating well. He had emma LE weakness " "from a previous CVA. He was making a slow clinical recovery.      He was followed by [facility] team and C done and showed   Findings:  1. Left main-Patent  2. Lad-70% calcified proximal stenosis, Mid stent is 2.5 mm stent in  a   3.5-4mm vessel.  MSA distal stent edge is 3.9mm2  3. LCX-50% mid lesion  4. Ramus-40% mid  5. RCA-30% proximal calcified stenosis  6. LVEDP-11  7. LIMA patent     Recommended:  1. Consult CTS for LIMA to LAD, ANGELIA ligation, MAZE, and possible surgical   extraction of prominent eustachian valve with thrombus.   2. If turned down he will likely need atherectomy and shockwave for   underexpanded stent, and repeat stenting of the prox to mid LAD."     "CT surgery do not recommended surgery, instead requested CIS to perform high risk PCI."       Today: Patient seen and examined at bedside, and chart reviewed.  Denies any chest pain, is stable on 2 L nasal cannula.  Heart rates are controlled this morning.  Awaiting further input from Cardiology on high-risk PCI      MEDICATIONS   Scheduled   aspirin  81 mg Oral Daily    clopidogreL  75 mg Oral Daily    DAPTOmycin (CUBICIN) IV (PEDS and ADULTS)  500 mg Intravenous Q48H    metoprolol tartrate  50 mg Oral BID    pantoprazole  40 mg Oral BID AC     Continuous Infusions   amiodarone in dextrose 5%  0.5 mg/min Intravenous Continuous 16.7 mL/hr at 06/19/25 1217 0.5 mg/min at 06/19/25 1217       PHYSICAL EXAM   VITALS: T 98.7 °F (37.1 °C)   BP (!) 154/68   P 98   RR 20   O2 100 %    GENERAL: Awake and in NAD  LUNGS: CTA anteriorly  CVS: Normal rate  GI/: Soft, NT, bowel sounds positive.  EXTREMITIES: radial pulse 2+  NEURO: AAOx3  PSYCH: Cooperative      LABS   CBC  Recent Labs     06/18/25  0440 06/19/25  0457   WBC 7.24 7.37   RBC 3.42* 3.40*   HGB 9.6* 9.6*   HCT 31.2* 31.0*   MCV 91.2 91.2   MCH 28.1 28.2   MCHC 30.8* 31.0*   RDW 18.5* 18.5*    345     CHEM  Recent Labs     06/17/25  0634 06/18/25  0440 06/19/25  0457    142 144   K 3.9 " 3.6 3.5   CO2 23 22* 23   BUN 28.0* 28.2* 23.9   CREATININE 1.52* 1.41* 1.21   CALCIUM 8.8 8.4* 8.3*   MG 2.00  --   --    ALBUMIN  --  2.1*  --    GLOBULIN  --  4.0*  --    ALKPHOS  --  68  --    ALT  --  12  --    AST  --  13  --    BILITOT  --  0.7  --          MICROBIOLOGY     Microbiology Results (last 7 days)       Procedure Component Value Units Date/Time    Blood Culture [[id number]]  (Normal) Collected: 06/13/25 0830    Order Status: Completed Specimen: Blood from Arm, Right Updated: 06/18/25 0902     Blood Culture No Growth at 5 days    Blood Culture [[id number]]  (Normal) Collected: 06/13/25 0733    Order Status: Completed Specimen: Blood from Antecubital, Left Updated: 06/18/25 0902     Blood Culture No Growth at 5 days              ASSESSMENT   Acute blood loss anemia  Retroperitoneal bleeding, stable  Afib with RVR   Obstructive CAD, not a candidate for CABG  ARF/ATN, improved   Right atrial thrombus   Pulmonary Embolism   Left foot osteomyelitis   Chronic systolic heart failure  Morbid obesity   NIDDM II  Essential HTN     h/o CVA 03/2025, CAD s/p LAD stent 2017,HTN, 1° AVB,    PLAN   Cardiology re-evaluating for high-risk PCI  Continue ASA/Plavix/statin along with BB in the interim  Amiodarone infusion for AFib, continue telemetry monitoring  Patient will continue on IV daptomycin for left foot osteomyelitis as previously scheduled until July 14th  Awaiting Cardiology input, otherwise continue current management and monitoring in the interim    Prophylaxis: SCDs        Bereket Manrique MD  [facility]    Critical Care Time: 33 minutes spent in direct hands on care, review of labs, imaging and medical record, and discussion of diagnosis, treatment, prognosis with patient/family.  Patient remains at high-risk for clinical decompensation  Critical Care diagnosis:  AFib with RVR requiring continuous amiodarone infusion     Double O-Z Flap Text: The defect edges were debeveled with a #15 scalpel blade.  Given the location of the defect, shape of the defect and the proximity to free margins a Double O-Z flap was deemed most appropriate.  Using a sterile surgical marker, an appropriate transposition flap was drawn incorporating the defect and placing the expected incisions within the relaxed skin tension lines where possible. The area thus outlined was incised deep to adipose tissue with a #15 scalpel blade.  The skin margins were undermined to an appropriate distance in all directions utilizing iris scissors.

## 2025-06-19 NOTE — PROGRESS NOTES
[facility] 6th Floor Medical Telemetry  Wound Care    Patient Name:  Vaibhav Vargas   MRN:  [id number]  Date: 6/19/2025  Diagnosis: <principal problem not specified>    History:     Past Medical History:   Diagnosis Date    Chronic lumbar pain     Diabetes mellitus, type 2     Edema     Hypertension     Obesity, unspecified     Osteoarthritis of multiple joints        Social History[1]    Precautions:     Allergies as of 06/12/2025    (No Known Allergies)       WO Assessment Details/Treatment        06/19/25 0835   WOCN Assessment   Visit Date 06/19/25   Visit Time 0835   Consult Type Follow Up   [facility] Wound   Wound pressure   Intervention chart review;applied   Teaching on-going        Wound 05/23/25 2110 Pressure Injury Sacral spine   Date First Assessed/Time First Assessed: 05/23/25 2110   Present on Original Admission: Yes  Primary Wound Type: Pressure Injury  Location: Sacral spine   Wound Image    Pressure Injury Stage 3   Dressing Appearance Moist drainage   Drainage Amount Small   Drainage Characteristics/Odor Yellow   Appearance Pink;Yellow   Tissue loss description Full thickness   Black (%), Wound Tissue Color 0 %   Red (%), Wound Tissue Color 70 %   Yellow (%), Wound Tissue Color 30 %   Periwound Area Intact;Dry   Wound Edges Irregular   Wound Length (cm) 8 cm   Wound Width (cm) 4 cm   Wound Depth (cm) 0.1 cm   Wound Volume (cm^3) 1.676 cm^3   Wound Surface Area (cm^2) 25.13 cm^2   Care Cleansed with:;Antimicrobial agent;Wound cleanser   Dressing Applied;Silver  (abd, tape)     WO follow up for sacrum. No family present at bedside. Continue current Treatment recommendations: sacrum: clean w/ vashe, dry well, apply aquacel ag advantage grey cloth to wound bed, cover with abd pad, secure with minimal tape. Daily/prn if soilage. Left foot: cleaned it with vashe, dried well, apply aq Ag cloth and new foam. Pt.'s foot in prafo boot. Will follow orders until podiatry sees pt.'s  foot and makes other recommendations. Call bell within reach . Wound care will follow up.   06/19/2025         [1]   Social History  Socioeconomic History    Marital status:    Tobacco Use    Smoking status: Never    Smokeless tobacco: Never   Substance and Sexual Activity    Alcohol use: Not Currently    Drug use: Never    Sexual activity: Not Currently     Social Drivers of Health     Financial Resource Strain: Low Risk  (6/12/2025)    Overall Financial Resource Strain (CARDIA)     Difficulty of Paying Living Expenses: Not hard at all   Food Insecurity: No Food Insecurity (6/12/2025)    Hunger Vital Sign     Worried About Running Out of Food in the Last Year: Never true     Ran Out of Food in the Last Year: Never true   Transportation Needs: No Transportation Needs (6/12/2025)    PRAPARE - Transportation     Lack of Transportation (Medical): No     Lack of Transportation (Non-Medical): No   Physical Activity: Inactive (5/26/2025)    Exercise Vital Sign     Days of Exercise per Week: 0 days     Minutes of Exercise per Session: 0 min   Stress: No Stress Concern Present (6/12/2025)    [employer] [place] [employer] - Occupational Stress Questionnaire     Feeling of Stress : Not at all   Housing Stability: Low Risk  (6/12/2025)    Housing Stability Vital Sign     Unable to Pay for Housing in the Last Year: No     Number of Times Moved in the Last Year: 0     Homeless in the Last Year: No

## 2025-06-19 NOTE — PLAN OF CARE
Received a message via [facility] from [facility].  They can accept patient on IV antibiotics.  They asked if he can get a picc line. Sent Message via Secure Chat to CM.  Waiting on response.

## 2025-06-19 NOTE — PHYSICIAN QUERY
Please provide the integumentary diagnosis related to the documentation of the Sacral Spine:  Pressure Injury/Decubitus Ulcer, Stage 3

## 2025-06-20 PROCEDURE — 25000003 PHARM REV CODE 250: Performed by: STUDENT IN AN ORGANIZED HEALTH CARE EDUCATION/TRAINING PROGRAM

## 2025-06-20 PROCEDURE — 63600175 PHARM REV CODE 636 W HCPCS: Performed by: INTERNAL MEDICINE

## 2025-06-20 PROCEDURE — 97530 THERAPEUTIC ACTIVITIES: CPT | Mod: CQ

## 2025-06-20 PROCEDURE — 27000207 HC ISOLATION

## 2025-06-20 PROCEDURE — 25500020 PHARM REV CODE 255: Performed by: INTERNAL MEDICINE

## 2025-06-20 PROCEDURE — 25000003 PHARM REV CODE 250: Performed by: INTERNAL MEDICINE

## 2025-06-20 PROCEDURE — 11000001 HC ACUTE MED/SURG PRIVATE ROOM

## 2025-06-20 PROCEDURE — 21400001 HC TELEMETRY ROOM

## 2025-06-20 RX ORDER — AMIODARONE HYDROCHLORIDE 200 MG/1
200 TABLET ORAL DAILY
Status: DISCONTINUED | OUTPATIENT
Start: 2025-06-26 | End: 2025-06-26

## 2025-06-20 RX ORDER — AMIODARONE HYDROCHLORIDE 200 MG/1
200 TABLET ORAL 2 TIMES DAILY
Status: COMPLETED | OUTPATIENT
Start: 2025-06-23 | End: 2025-06-25

## 2025-06-20 RX ORDER — CLOPIDOGREL BISULFATE 75 MG/1
75 TABLET ORAL DAILY
Status: DISCONTINUED | OUTPATIENT
Start: 2025-06-20 | End: 2025-06-29

## 2025-06-20 RX ORDER — AMIODARONE HYDROCHLORIDE 200 MG/1
400 TABLET ORAL 2 TIMES DAILY
Status: COMPLETED | OUTPATIENT
Start: 2025-06-20 | End: 2025-06-22

## 2025-06-20 RX ORDER — ENOXAPARIN SODIUM 150 MG/ML
1 INJECTION SUBCUTANEOUS
Status: DISCONTINUED | OUTPATIENT
Start: 2025-06-20 | End: 2025-06-23

## 2025-06-20 RX ADMIN — METOPROLOL TARTRATE 50 MG: 50 TABLET, FILM COATED ORAL at 10:06

## 2025-06-20 RX ADMIN — AMIODARONE HYDROCHLORIDE 400 MG: 200 TABLET ORAL at 10:06

## 2025-06-20 RX ADMIN — METOPROLOL TARTRATE 50 MG: 50 TABLET, FILM COATED ORAL at 09:06

## 2025-06-20 RX ADMIN — AMIODARONE HYDROCHLORIDE 400 MG: 200 TABLET ORAL at 11:06

## 2025-06-20 RX ADMIN — PANTOPRAZOLE SODIUM 40 MG: 40 TABLET, DELAYED RELEASE ORAL at 03:06

## 2025-06-20 RX ADMIN — ENOXAPARIN SODIUM 105 MG: 150 INJECTION SUBCUTANEOUS at 11:06

## 2025-06-20 RX ADMIN — LOSARTAN POTASSIUM 12.5 MG: 25 TABLET, FILM COATED ORAL at 11:06

## 2025-06-20 RX ADMIN — IOHEXOL 100 ML: 350 INJECTION, SOLUTION INTRAVENOUS at 12:06

## 2025-06-20 RX ADMIN — CLOPIDOGREL 75 MG: 75 TABLET ORAL at 09:06

## 2025-06-20 RX ADMIN — ENOXAPARIN SODIUM 105 MG: 150 INJECTION SUBCUTANEOUS at 10:06

## 2025-06-20 RX ADMIN — ASPIRIN 81 MG CHEWABLE TABLET 81 MG: 81 TABLET CHEWABLE at 09:06

## 2025-06-20 RX ADMIN — DAPTOMYCIN 500 MG: 500 INJECTION, POWDER, LYOPHILIZED, FOR SOLUTION INTRAVENOUS at 06:06

## 2025-06-20 NOTE — PRE ADMISSION SCREENING
..[facility]    Pre-Admission Patient Screening                    Pre-Screen type:  LTAC:  Reason for Admission:    Acute blood loss anemia  Retroperitoneal bleedin  GI bleed secondary to PUD  Afib with RVR   Obstructive CAD, not a candidate for CABG  ARF/ATN, improved   Right atrial thrombus   Pulmonary Embolism   Left foot osteomyelitis   Chronic systolic heart failure  Morbid obesity   NIDDM II  Essential HTN    PATIENT WILL NEED LTAC FOR THE ABOVE MENTIONED REASONS INCLUDING IV DAPTOMYCIN Q48 TIL 7/24/25, DAILY LABS, DAILY PHYSICIAN ROUNDING, DAILY WOUND CARE, TELEMETRY MONITORING, PT/OT, AND AND A SAFE DISCHARGE HOME.    LTACH Admission Criteria:    Complex wound care for infected/necrotic skin conditions, Stage III or IV pressure injury, surgical wounds, or burns requiring at least one of the following:  Dressing changes requiring IM/IV analgesics  Other DAILY WOUND CARE    Must meet at least one of the following concomitant treatments/intervention daily unless notes: (excludes PO meds unless notes)  IV medication per therapeutic regimen, Cardiac Monitoring, Laboratory assessment and medication adjustment(s), Neurological assessment greater than or equal to 3 times a day, and Rehab Therapy (PT/OT/ST) 1-3 hours a day greater than or equal to 5 days a week      LTACH more appropriate than other levels of care (eg, skilled nursing facility, home health care), as indicated by:    Clinical management of patient deemed too frequent and needed beyond the capabilities of alternative levels of care as evidence by: Continued Titration of IV Medications, Continued use of IV analgesics, and Frequency of IV medications greater than or equal to 2 times daily  Frequent diagnostic services needed on an inpatient basis, including clinical assessments, laboratory, and imaging as evidence by: Frequent monitoring and clinical assessments performed by a licensed RN to identify current and future patient  needs by incorporating the recognition of normal vs abnormal body physiology, and to prompt recognition of pertinent changes to identify and prioritize appropriate interventions that can be performed within the acute inpatient setting.  and Frequent laboratory testing and/or imaging to aide in the improvement and effectiveness of patient's individualized treatment plan.   More intensive services, such as speciality nursing care, and/or onsite physician assessments needed that are not available at a lower level of care as evidence by: Daily physician intervention , Collaboration between consulting and attending providers still deemed a necessity to aide in the improvement and effectiveness of patient's individualized treatment plan, which can be provided at an LTACH level of care. , and Therapy Services to be included in patient's treatment plan in an effort to restore/improve patient's modality status to a safe level of functioning prior to acute illness.      [facility] as indicated by ALL of the following:      Hypotension Absent     Cardiovascular status stable     Stable chest findings     Renal function accepctable   Pain adequately managed    Intake acceptable       No acute significant hepatic dysfunction (eg, new encephalopathy)   No acute severe unstable neurologic abnormalities (eg, Altered mental status that is severe or persistent, or evidence of ongoing CNS embolization or ischemia, worsening hydrocephalus)   No active bleeding or unstable disorders of hemostasis (eg, no recent need for transfusion, severe thrombocytopenia with bleeding)   No need for respiratory or other isolation, OR manageable at LTACH level of care    Long-term enteral feeding (eg, PEG) and intravenous access established, not needed, OR to be placed at LTACH level of care      Anticipated Discharge Disposition:    Home with Home Health    Facility Status: Accept     Referring Physician:  YANE SCHAEFER  MD    Admitting Physician:  Rogelio Riley MD    Primary Care Physician:  Shameka Rooney, DO    History         Patient Active Problem List    Diagnosis Date Noted    Moderate malnutrition 06/18/2025    Anemia 06/13/2025    Retroperitoneal hematoma 06/13/2025    Severe malnutrition 05/26/2025    Severe sepsis 05/24/2025    Acute stroke due to ischemia 03/31/2025    Myocardial infarction 03/24/2024    Coronary artery disease 06/16/2023    Chronic low back pain 11/08/2022    Edema 11/08/2022    Obesity 11/08/2022    Osteoarthritis of knee 11/08/2022    Primary hypertension 11/08/2022    Type 2 diabetes mellitus 11/08/2022         Previous Specialties/Consulted physicians:      Cardiology , Gastrology, General Surgery , Neurology, and Wound Care       Past and Current Medical History    Past Medical History:   Diagnosis Date    Chronic lumbar pain     Diabetes mellitus, type 2     Edema     Hypertension     Obesity, unspecified     Osteoarthritis of multiple joints            History of Present Illness     74 y.o. male with PMH CVA 03/2025, CAD s/p LAD stent 2017, venous insuffiency, HTN, 1st Degree AVB, NIDDM2 who initially presented to [facility] ED on 05/23 by daughter from NH for weakness.  He was found to have dislodged fowler catheter with severe PRIYA and UTI, fowler was replaced, he had good urinary output with significant improvement in renal function. He was also getting diureses for CHF exacerbation, workup showed EF of 35-40%, a thrombus was incidentally found in his right atrium. He was started on full dose Lovenox the day after. He continued to be in a fib with RVR despite BB, CCB were being avoided due to low EF, cardioversion was avoided due to existing thrombus that was confirmed on YO. On 06/01 he had significant drop in his hemoglobin with worsening RVR, Lovenox was held, EGD on 06/02 showed non-bleeding ulcers with active oozing of blood from his gastric mucosa, GI did not recommend to hold anticoagulation due  to risk for embolization. On 06/04 the patient became hypotensive with A flutter and RVR, hemoglobin dropped to 4.7, Lovenox was held again and massive transfusion was initiated, the patient was upgraded to the ICU due to hemodynamic instability. Lovenox was started once again on 06/06 and he was downgraded from the ICU on 06/08.  Patient bled again with an acute drop in hemoglobin on 06/11 and was noted to have melena.  He was transferred to our facility for GI services. Prior to transferred found to have a retroperitoneal bleed.      Patient was also in Afib with RVR. He was seen by [facility] team and started on IV amiodarone and later po metoprolol. His H&H was closely monitored. He was continued on PPI. Hb stabilized. He was eating well. He had emma LE weakness from a previous CVA. He was making a slow clinical recovery.       Patient Traveled outside of the [place] in the last 3 months? no     Patient discharged from this LTAC to SNF within the last 45 days? no    Patient discharged from this LTAC to Rehab within the last 27 days? no    Prior residence: nursing home/SNF    Prior Post-Acute Services: home health    Allergies: Review of patient's allergies indicates:  No Known Allergies    Has patient received the current influenza vaccine (Oct 1 - March 31)? Unknown     Has patient received PPD skin test prior to admit? N/A     Code Status: Full Code    Orientation: Time, Place, and Person    Speech: normal     Vital Signs:     Date     Blood Pressure 142/88    Pulse 98    Respiratory Rate 20    O2 Saturation 97    Temperature 97.4        Bowel/Bladder: continent of bladder and continent of bowel  Bowel/Bladder Appliance: N/A    Dialysis: N/A    PICC Double Lumen 06/02/25 1625 right brachial (Active)   $ PICC Line Charges (Upon insertion) Catheter - Double Lumen (Supply) 06/15/25 2000   Line Necessity Review Longterm central access required 06/19/25 2045   Verification by X-ray Yes 06/19/25 2045   Site Assessment Not  assessed 25   Extremity Assessment Distal to IV No abnormal discoloration;No swelling;No warmth;No redness 25   Line Securement Device Secured with sutureless device 25   Dressing Type Central line dressing 25   Dressing Status Clean;Dry;Intact 25   Dressing Intervention Integrity maintained 25   Date on Dressing 25   Dressing Due to be Changed 25   Distal Patency/Care blood return present;infusing;flushed w/o difficulty 25   Proximal 1 Patency/Care blood return present;flushed w/o difficulty;normal saline locked 25   Number of days: 17       CBGs/Accuchecks: Yes     Precautions: Fall and Cardiac    Restraints: Yes     Isolation Precautions: Contact Isolation       Facility-Administered Medications as of 2025   Medication Dose Route Frequency Provider Last Rate Last Admin    0.9%  NaCl infusion (for blood administration)   Intravenous Q24H PRN Cordelia Arevalo FNP        0.9%  NaCl infusion (for blood administration)   Intravenous Q24H PRN Cordelia Arevalo FNP        [COMPLETED] 0.9% NaCl infusion   Intravenous Once Bernice Lipscomb MD   Stopped at 25 1402    [COMPLETED] 0.9% NaCl infusion   Intravenous Once Bernice Lipscomb MD   Stopped at 25 1904    [] 0.9% NaCl infusion   Intravenous Continuous Rambo Cain MD 75 mL/hr at 25 0440 New Bag at 25 0440    acetaminophen tablet 650 mg  650 mg Oral Q4H PRN Reyes, Thairy G, DO        albuterol-ipratropium 2.5 mg-0.5 mg/3 mL nebulizer solution 3 mL  3 mL Nebulization Q6H PRN Reyes, Thairy G, DO        aluminum-magnesium hydroxide-simethicone 200-200-20 mg/5 mL suspension 30 mL  30 mL Oral QID PRN Reyes, Thairy G, DO        [] amiodarone 360 mg/200 mL (1.8 mg/mL) infusion  1 mg/min Intravenous Continuous Reyes, Thairy G, DO 33.3 mL/hr at 25 1110 1 mg/min at 25 1110    [COMPLETED]  amiodarone in dextrose 150 mg/100 mL (1.5 mg/mL) loading dose 150 mg  150 mg Intravenous Once Reyes, Thairy G, DO   Stopped at 06/13/25 1058    amiodarone tablet 400 mg  400 mg Oral BID James Tony MD   400 mg at 06/20/25 1141    Followed by    [START ON 6/23/2025] amiodarone tablet 200 mg  200 mg Oral BID James Tony MD        Followed by    [START ON 6/26/2025] amiodarone tablet 200 mg  200 mg Oral Daily James Tony MD        [COMPLETED] aspirin EC tablet 325 mg  325 mg Oral Once James Tony MD   325 mg at 06/16/25 2108    [COMPLETED] barium sulfate (VARIBAR NECTAR) oral suspension 5 mL  5 mL Oral ONCE PRN Rambo Cain MD   5 mL at 06/16/25 1300    [COMPLETED] barium sulfate (VARIBAR PUDDING) oral paste 5 mL  5 mL Oral ONCE PRN Rambo Cain MD   5 mL at 06/16/25 1300    [COMPLETED] barium sulfate (VARIBAR THIN LIQUID) oral powder 5 mL  5 mL Oral ONCE PRN Rambo Cain MD   5 mL at 06/16/25 1300    bisacodyL suppository 10 mg  10 mg Rectal Daily PRN Reyes, Thairy G, DO        clopidogreL tablet 75 mg  75 mg Oral Daily James Tony MD        DAPTOmycin injection 500 mg  500 mg Intravenous Q48H Evi Ta MD   500 mg at 06/20/25 0601    dextrose 50% injection 12.5 g  12.5 g Intravenous PRN Reyes, Thairy G, DO        dextrose 50% injection 25 g  25 g Intravenous PRN Reyes, Thairy G, DO        [COMPLETED] digoxin injection 250 mcg  250 mcg Intravenous Once Kenney Hurley MD   250 mcg at 06/12/25 1716    enoxaparin injection 105 mg  1 mg/kg Subcutaneous Q12H Bereket Manrique MD   105 mg at 06/20/25 1141    [COMPLETED] furosemide injection 20 mg  20 mg Intravenous Once Julieth Bueno NP   20 mg at 06/15/25 2349    glucagon (human recombinant) injection 1 mg  1 mg Intramuscular PRN Reyes, Thairy G, DO        glucose chewable tablet 16 g  16 g Oral PRN Reyes, Thairy G, DO        glucose chewable tablet 24 g  24 g Oral PRN Reyes, Thairy G,  DO        hydrALAZINE injection 10 mg  10 mg Intravenous Q4H PRN James Tony MD        HYDROcodone-acetaminophen 5-325 mg per tablet 1 tablet  1 tablet Oral Q6H PRN Reyes, Thairy G, DO   1 tablet at 06/18/25 1127    [COMPLETED] iohexoL (OMNIPAQUE 350) injection 100 mL  100 mL Intravenous ONCE PRN Ramez Tinsley MD   100 mL at 06/12/25 1703    [COMPLETED] iohexoL (OMNIPAQUE 350) injection 100 mL  100 mL Intravenous ONCE PRN Bereket Manrique MD   100 mL at 06/20/25 1229    [COMPLETED] kit for Tc 99m-labeled RBCs SolR 25 millicurie  25 millicurie Intravenous ONCE PRN Ramez Tinsley MD   23.6 millicurie at 06/12/25 1022    losartan split tablet 12.5 mg  12.5 mg Oral Daily James Tony MD   12.5 mg at 06/20/25 1141    [COMPLETED] magnesium sulfate 2g in water 50mL IVPB (premix)  2 g Intravenous Once Reyes, Thairy G, DO   Stopped at 06/13/25 0503    melatonin tablet 9 mg  9 mg Oral Nightly PRN Reyes, Thairy G, DO        metoprolol injection 5 mg  5 mg Intravenous Q15 Min PRN Cassius Issa Q., NP   5 mg at 06/17/25 0333    metoprolol tartrate (LOPRESSOR) tablet 50 mg  50 mg Oral BID Nydia Lora MD   50 mg at 06/20/25 0948    morphine injection 2 mg  2 mg Intravenous Q4H PRN James Tony MD        [COMPLETED] mupirocin 2 % ointment   Nasal BID Rogelio Riley MD   Given at 06/17/25 2011    naloxone 0.4 mg/mL injection 0.02 mg  0.02 mg Intravenous PRN Reyes, Thairy G, DO        ondansetron disintegrating tablet 8 mg  8 mg Oral Q8H PRN Reyes, Thairy G, DO        ondansetron injection 4 mg  4 mg Intravenous Q8H PRN Reyes, Thairy G, DO        pantoprazole EC tablet 40 mg  40 mg Oral BID AC Rambo Cain MD   40 mg at 06/17/25 1540    polyethylene glycol packet 17 g  17 g Oral TID PRN Reyes, Thairy G, DO        simethicone chewable tablet 80 mg  1 tablet Oral QID PRN Reyes, Thairy G, DO        sodium chloride 0.9% flush 10 mL  10 mL Intravenous PRN Reyes, Thairy G, DO      "    Outpatient Medications as of 6/20/2025   Medication Sig Dispense Refill    aspirin (ECOTRIN) 81 MG EC tablet Take 81 mg by mouth.      atorvastatin (LIPITOR) 20 MG tablet Take 1 tablet (20 mg total) by mouth once daily. 90 tablet 3    clopidogreL (PLAVIX) 75 mg tablet Take 1 tablet (75 mg total) by mouth once daily. 90 tablet 3    diclofenac (VOLTAREN) 75 MG EC tablet Take 75 mg by mouth 2 (two) times daily.      docusate sodium (COLACE) 100 MG capsule Take 100 mg by mouth once daily.      HYDROcodone-acetaminophen (NORCO) 5-325 mg per tablet Take 1 tablet by mouth every 8 (eight) hours as needed for Pain.      lactulose (CEPHULAC) 10 gram packet Take 10 g by mouth once daily.      losartan (COZAAR) 100 MG tablet Take 1 tablet (100 mg total) by mouth once daily. 90 tablet 1    metFORMIN (GLUCOPHAGE) 1000 MG tablet Take 1 tablet (1,000 mg total) by mouth 2 (two) times daily. 180 tablet 3    verapamiL (CALAN-SR) 240 MG CR tablet Take 1 tablet (240 mg total) by mouth once daily. 90 tablet 4    vitamin D (VITAMIN D3) 1000 units Tab Take 1,000 Units by mouth once daily.      glucagon (GVOKE HYPOPEN 2-PACK) 1 mg/0.2 mL AtIn Inject 0.2 mLs into the skin daily as needed (low blood sugar). May repeat dose in 15 minutes 0.4 mL 0        Cardiovascular:    Cardiovascular Review: Requires Review    Telemetry: Yes    Rhythm: AFIB    AICD: No      Respiratory:    Oxygen: 2L NC    CPT/Frequency: N/A    Incentive Spirometer/Frequency: N/A    CPAP/Settings: N/A    BiPAP/Settings: N/A    Oxygen Saturation: 97    Suction Frequency: N/A        Nutrition:      Ht Readings from Last 3 Encounters:   06/13/25 6' 2" (1.88 m)   06/11/25 6' 2" (1.88 m)   03/24/25 6' 2" (1.88 m)     Wt Readings from Last 1 Encounters:   06/13/25 1044 107.6 kg (237 lb 3.4 oz)   06/13/25 0530 107.6 kg (237 lb 3.4 oz)       Feeding Status:   Current Diet: MICNCED AND MOIST   Supplements:BID ALEXIS   Tube Feeding: N/A   Flushes: " N/A      Integumentary:    Integumentary: Other: L SACRAL SPINE               Wound 05/23/25 2110 Pressure Injury Sacral spine (Active)   05/23/25 2110 Sacral spine   Present on Original Admission: Y   Primary Wound Type: Pressure inj   Side:    Orientation:    Wound Approximate Age at First Assessment (Weeks):    Wound Number:    Is this injury device related?:    Incision Type:    Closure Method:    Wound Description (Comments):    Type:    Additional Comments:    Ankle-Brachial Index:    Pulses:    Removal Indication and Assessment:    Wound Outcome:    Wound Image   06/19/25 0835   Pressure Injury Stage 3 06/19/25 2045   Dressing Appearance Clean;Intact 06/19/25 2045   Drainage Amount Scant 06/20/25 0348   Drainage Characteristics/Odor Yellow 06/19/25 0835   Appearance Pink;Yellow 06/19/25 0835   Tissue loss description Full thickness 06/19/25 0835   Black (%), Wound Tissue Color 0 % 06/19/25 0835   Red (%), Wound Tissue Color 70 % 06/19/25 0835   Yellow (%), Wound Tissue Color 30 % 06/19/25 0835   Periwound Area Intact;Dry 06/19/25 0835   Wound Edges Irregular 06/19/25 0835   Wound Length (cm) 8 cm 06/19/25 0835   Wound Width (cm) 4 cm 06/19/25 0835   Wound Depth (cm) 0.1 cm 06/19/25 0835   Wound Volume (cm^3) 1.676 cm^3 06/19/25 0835   Wound Surface Area (cm^2) 25.13 cm^2 06/19/25 0835   Care Cleansed with:;Antimicrobial agent;Wound cleanser 06/19/25 0835   Dressing Reinforced 06/19/25 2045   Dressing Change Due 06/20/25 06/19/25 2045   Number of days: 28         Musculoskeletal:    Transfer assist: Activity did not occur    Weight Bearing Status: As Tolerated    Comments: N/A    ADL Assist: Activity did not occur    Special Equipment: N/A    Radiology:    Radiology (Last 168 hours)               06/20 1304 CT Abdomen Pelvis W Wo Contrast       06/20 1037 Cardiac monitoring strips     06/20 1036 Cardiac monitoring strips     06/20 0641 Cardiac monitoring strips     06/20 0318 Cardiac monitoring strips      06/20 0318 Cardiac monitoring strips     06/20 0318 Cardiac monitoring strips     06/19 1444 Cardiac monitoring strips     06/19 1045 Cardiac monitoring strips     06/19 0645 Cardiac monitoring strips     06/19 0259 Cardiac monitoring strips     06/18 2312 Cardiac monitoring strips     06/18 1836 Cardiac monitoring strips     06/18 1429 Cardiac monitoring strips     06/18 1036 Cardiac monitoring strips     06/18 0640 Cardiac monitoring strips     06/18 0245 Cardiac monitoring strips     06/18 0245 Cardiac monitoring strips     06/18 0245 Cardiac monitoring strips     06/17 1526 Cardiac catheterization       06/17 1056 Cardiac monitoring strips     06/17 0655 Cardiac monitoring strips     06/17 0410 Cardiac monitoring strips     06/16 2320 Cardiac monitoring strips     06/16 1920 Cardiac monitoring strips     06/16 1528 Cardiac monitoring strips     06/16 1314 Fl Modified Barium Swallow Speech       06/16 1127 Cardiac monitoring strips     06/16 0742 Cardiac monitoring strips     06/16 0730 Cardiac monitoring strips     06/16 0343 Cardiac monitoring strips     06/15 2305 Cardiac monitoring strips     06/15 2012 Cardiac monitoring strips     06/15 1440 Cardiac monitoring strips     06/15 1045 Cardiac monitoring strips     06/15 0633 Cardiac monitoring strips     06/15 0404 Cardiac monitoring strips     06/14 2347 Cardiac monitoring strips     06/14 1831 Cardiac monitoring strips     06/14 1438 Cardiac monitoring strips     06/14 1040 Cardiac monitoring strips     06/14 0633 Cardiac monitoring strips     06/14 0335 Cardiac monitoring strips     06/14 0112 Cardiac monitoring strips     06/13 2016 Cardiac monitoring strips     06/13 2014 Cardiac monitoring strips     06/13 1453 Cardiac monitoring strips     06/12 0000 Cardiac monitoring strips     06/12 0000 Cardiac monitoring strips     06/12 0000 Cardiac monitoring strips     06/12 0000 Cardiac monitoring strips     06/12 0000 Cardiac monitoring strips     06/12 0000  Cardiac monitoring strips     06/12 0000 Cardiac monitoring strips     06/12 0000 Cardiac monitoring strips     06/12 0000 Cardiac monitoring strips     06/12 0000 Cardiac monitoring strips     06/12 0000 Cardiac monitoring strips     06/12 0000 Cardiac monitoring strips     06/12 0000 Cardiac monitoring strips     06/12 0000 Cardiac monitoring strips     06/12 0000 Cardiac monitoring strips     06/12 0000 Cardiac monitoring strips     06/12 0000 Cardiac monitoring strips     06/12 0000 Cardiac monitoring strips     06/12 0000 Cardiac monitoring strips     06/12 0000 Cardiac monitoring strips     05/23 0000 Cardiac monitoring strips     05/23 0000 Cardiac monitoring strips              CT Abdomen Pelvis W Wo Contrast  Narrative: EXAMINATION:  CT ABDOMEN PELVIS W WO CONTRAST    CLINICAL HISTORY:  GI bleed;Retroperitoneal hematoma suspected;    TECHNIQUE:  Low dose axial images, sagittal and coronal reformations were obtained from the lung bases to the pubic symphysis following the IV administration of  contrast.  Delayed imaging and pre contrasted imaging was performed as well. Automatic exposure control is utilized to reduce patient radiation exposure.    COMPARISON:  06/12/2025    FINDINGS:  There is bibasilar atelectasis and bilateral pleural effusions.  This is worse than the prior examination..    The liver appears normal.  No liver mass or lesion is seen.  Portal and hepatic veins appear normal.    The gallbladder appears grossly unremarkable.    The pancreas appears normal.  No pancreatic mass or lesion is seen.    The spleen appears normal.  No splenic mass or lesion is seen.    The right adrenal gland appears normal.  There is a nodule in the left adrenal gland that measures 1.6 x 1.2 cm.  Pre contrast Hounsfield units are 26.  The lesion is indeterminate.    The kidneys are normal in size.  No hydronephrosis is seen.  No hydroureter is seen.  No nephrolithiasis or ureteral stone is seen.  Couple of punctate  "cysts are seen in the kidneys.  These appear to be simple cysts.    There is a retroperitoneal hematoma seen on the right side which appears slightly smaller than the prior examination.  No evidence of active extravasation is seen.    Urinary bladder is decompressed due to Plaomo catheter.  There are some calcifications in the prostate..    No colitis is seen.  No diverticulitis is seen.  No colonic mass or lesion or inflammatory process is seen.  GI bleed cannot be excluded since there is oral contrast seen throughout the colon.    No free air is seen.    The bones appear grossly unremarkable.  Impression: Right retroperitoneal hematoma appears slightly smaller.  No active extravasation is seen    GI hemorrhage cannot be excluded since there is oral contrast throughout the colon    Bibasilar atelectasis and bilateral pleural effusions slightly worse than the prior examination    Indeterminate left adrenal nodule.  CT scan or MRI adrenal mass protocol is recommended.    Electronically signed by: Lonnie Cornell  Date:    06/20/2025  Time:    13:18      Lab/Cultures:    Blood Urea Nitrogen   Date Value Ref Range Status   06/19/2025 23.9 8.4 - 25.7 mg/dL Final   06/18/2025 28.2 (H) 8.4 - 25.7 mg/dL Final     Creatinine   Date Value Ref Range Status   06/19/2025 1.21 0.72 - 1.25 mg/dL Final   06/18/2025 1.41 (H) 0.72 - 1.25 mg/dL Final     WBC   Date Value Ref Range Status   06/19/2025 7.37 4.50 - 11.50 x10(3)/mcL Final   06/18/2025 7.24 4.50 - 11.50 x10(3)/mcL Final      Blood Culture   Date Value Ref Range Status   06/13/2025 No Growth at 5 days  Final     Urine Culture   Date Value Ref Range Status   05/29/2025 No Growth  Final     Wound Culture   Date Value Ref Range Status   06/02/2025 Few Methicillin resistant Staphylococcus aureus (A)  Final     No results for input(s): "PH", "PCO2", "PO2", "HCO3", "POCSATURATED", "BE" in the last 72 hours.        "

## 2025-06-20 NOTE — PT/OT/SLP PROGRESS
[facility]  Speech Language Pathology Department  Diet Tolerance Follow-up    Patient Name:  Vaibhav Vargas   MRN:  [id number]    Recommendations     General recommendations:  dysphagia therapy  Solid texture recommendation:  Minced & Moist Diet - IDDSI Level 5  Liquid consistency recommendation: Moderately thick liquids - IDDSI Level 3   Medications: crushed in puree  Swallow strategies/precautions: small bites/sips, slow rate, upright for PO intake, and assist with feeding as needed  Precautions: aspiration    Diet Tolerance     Nursing reports no difficulty regarding diet tolerance.    Outcome Measures     Functional Oral Intake Scale: 5 - Total oral diet with multiple consistencies, by requiring special preparation or compensations    Plan     SLP Follow-Up:  Yes    Patient to be seen:  5 x/week   Plan of Care expires:  06/30/25 06/20/2025

## 2025-06-20 NOTE — PT/OT/SLP PROGRESS
Physical Therapy Treatment    Patient Name:  Vaibhav Vargas   MRN:  [id number]    Recommendations:     Discharge therapy intensity: Moderate Intensity Therapy   Discharge Equipment Recommendations: to be determined by next level of care  Barriers to discharge: Impaired mobility, Ongoing medical needs, and placement    Assessment:     Vaibhav Vargas is a 74 y.o. male admitted with a medical diagnosis of medical diagnosis of retroperitoneal bleed, GIB, R atrial thrombus, PE, enterococcus faecium, cystitis, L foot OM, PRIYA, AF RVR, HF.  He presents with the following impairments/functional limitations: weakness, impaired endurance, impaired self care skills, impaired functional mobility, gait instability, impaired balance, impaired cognition, decreased lower extremity function .    Rehab Prognosis: Good; patient would benefit from acute skilled PT services to address these deficits and reach maximum level of function.    Recent Surgery: Procedure(s) (LRB):  Left heart cath (Left) 3 Days Post-Op    Plan:     During this hospitalization, patient would benefit from acute PT services 5 x/week to address the identified rehab impairments via gait training, therapeutic activities, therapeutic exercises, neuromuscular re-education, wheelchair management/training and progress toward the following goals:    Plan of Care Expires:  07/16/25    Subjective     Chief Complaint: dizziness  Patient/Family Comments/goals:   Pain/Comfort:         Objective:     Communicated with RN prior to session.  Patient found HOB elevated with fowler catheter, telemetry, peripheral IV, oxygen upon PT entry to room.     General Precautions: Standard, contact, aspiration  Orthopedic Precautions: N/A  Braces: N/A  Respiratory Status: Nasal cannula, flow   L/min  Skin Integrity: known issues      Functional Mobility:  Bed Mobility:     Supine to Sit: moderate assistance  Sit to Supine: maximal assistance  Transfers:     Sit to Stand:  moderate assistance  and of 2 persons with rolling walker    Therapeutic Activities/Exercises:  Pt sat EOB ~8min SBA while utilizing bed rail for hand sitting balance  Performed sit<>stand modAx2 with RW , pt able to clear hips, but returned self to sitting immediately 2/2 dizziness.    Co-Treatment: No    Education:  Patient provided with verbal education education regarding PT role/goals/POC.  Understanding was verbalized.     Patient left with bed in chair position with all lines intact, call button in reach, and Sister present    GOALS:   Multidisciplinary Problems       Physical Therapy Goals          Problem: Physical Therapy    Goal Priority Disciplines Outcome Interventions   Physical Therapy Goal     PT, PT/OT Progressing    Description: Goals to be met by: 2025     Patient will increase functional independence with mobility by performin. Supine to sit with Contact Guard Assistance  2. Sit to stand transfer with Contact Guard Assistance  3. Gait  x 150 feet with Contact Guard Assistance using Rolling Walker.                          Time Tracking:     PT Received On: 25  PT Start Time: 1035     PT Stop Time: 1105  PT Total Time (min): 30 min     Billable Minutes: Therapeutic Activity 30    Treatment Type: Treatment  PT/PTA: PTA     Number of PTA visits since last PT visit: 3     2025

## 2025-06-20 NOTE — PLAN OF CARE
06/20/25 1153   Discharge Reassessment   Assessment Type Discharge Planning Reassessment   Discharge Plan discussed with: Adult children   Discharge Plan A Long-term acute care facility (LTAC)   Discharge Plan B Long-term acute care facility (LTAC)   Post-Acute Status   Post-Acute Authorization Placement     List of LTAC provider given to daughter per phone. FOC obtained for [facility]. Referral sent via [employer], per [facility].

## 2025-06-20 NOTE — PLAN OF CARE
[facility] sent referral to [facility] for Long term acute care, waiting on response.     12:25  Received Message via Secure chat from [facility].  They will submit for Insurance Auth.
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
## 2025-06-20 NOTE — PROGRESS NOTES
"Hospital Medicine  Progress Note    Patient Name: Vaibhav Vargas  MRN: [id number]  Status: IP- Inpatient   Admission Date: 6/12/2025  Length of Stay: 8  Date of Service: 06/20/2025       CC: hospital follow-up for obstructive CAD       SUBJECTIVE   "74 y.o. male with PMH CVA 03/2025, CAD s/p LAD stent 2017, venous insuffiency, HTN, 1st Degree AVB, NIDDM2 who initially presented to [facility] ED on 05/23 by daughter from NH for weakness.  He was found to have dislodged fowler catheter with severe PRIYA and UTI, fowler was replaced, he had good urinary output with significant improvement in renal function. He was also getting diureses for CHF exacerbation, workup showed EF of 35-40%, a thrombus was incidentally found in his right atrium. He was started on full dose Lovenox the day after. He continued to be in a fib with RVR despite BB, CCB were being avoided due to low EF, cardioversion was avoided due to existing thrombus that was confirmed on YO. On 06/01 he had significant drop in his hemoglobin with worsening RVR, Lovenox was held, EGD on 06/02 showed non-bleeding ulcers with active oozing of blood from his gastric mucosa, GI did not recommend to hold anticoagulation due to risk for embolization. On 06/04 the patient became hypotensive with A flutter and RVR, hemoglobin dropped to 4.7, Lovenox was held again and massive transfusion was initiated, the patient was upgraded to the ICU due to hemodynamic instability. Lovenox was started once again on 06/06 and he was downgraded from the ICU on 06/08.  Patient bled again with an acute drop in hemoglobin on 06/11 and was noted to have melena.  He was transferred to our facility for GI services. Prior to transferred found to have a retroperitoneal bleed.      Patient was also in Afib with RVR. He was seen by CIS team and started on IV amiodarone and later po metoprolol. His H&H was closely monitored. He was continued on PPI. Hb stabilized. He was eating well. He had emma LE weakness " "from a previous CVA. He was making a slow clinical recovery.      He was followed by CIS team and C done and showed   Findings:  1. Left main-Patent  2. Lad-70% calcified proximal stenosis, Mid stent is 2.5 mm stent in  a   3.5-4mm vessel.  MSA distal stent edge is 3.9mm2  3. LCX-50% mid lesion  4. Ramus-40% mid  5. RCA-30% proximal calcified stenosis  6. LVEDP-11  7. LIMA patent     Recommended:  1. Consult CTS for LIMA to LAD, ANGELIA ligation, MAZE, and possible surgical   extraction of prominent eustachian valve with thrombus.   2. If turned down he will likely need atherectomy and shockwave for   underexpanded stent, and repeat stenting of the prox to mid LAD."     "CT surgery do not recommended surgery, instead requested CIS to perform high risk PCI."       Today: Patient seen and examined at bedside, and chart reviewed.  Continues was dizziness, denies any chest pain.  Cardiology hesitant to perform high-risk PCI at present.  They are considering DCCV if patient can tolerate anticoagulation.        MEDICATIONS   Scheduled   amiodarone  400 mg Oral BID    Followed by    [START ON 6/23/2025] amiodarone  200 mg Oral BID    Followed by    [START ON 6/26/2025] amiodarone  200 mg Oral Daily    aspirin  81 mg Oral Daily    clopidogreL  75 mg Oral Daily    DAPTOmycin (CUBICIN) IV (PEDS and ADULTS)  500 mg Intravenous Q48H    enoxaparin  1 mg/kg Subcutaneous Q12H    losartan  12.5 mg Oral Daily    metoprolol tartrate  50 mg Oral BID    pantoprazole  40 mg Oral BID AC     Continuous Infusions   amiodarone in dextrose 5%  0.5 mg/min Intravenous Continuous 16.7 mL/hr at 06/19/25 2349 0.5 mg/min at 06/19/25 2349       PHYSICAL EXAM   VITALS: T 97.4 °F (36.3 °C)   BP (!) 142/88   P 98   RR 20   O2 97 %    GENERAL: Awake and in NAD  LUNGS: CTA anteriorly  CVS: Normal rate  GI/: Soft, NT, bowel sounds positive.  EXTREMITIES: radial pulse 2+  NEURO: AAOx3  PSYCH: Cooperative      LABS   CBC  Recent Labs     06/18/25  0440 " 06/19/25  0457   WBC 7.24 7.37   RBC 3.42* 3.40*   HGB 9.6* 9.6*   HCT 31.2* 31.0*   MCV 91.2 91.2   MCH 28.1 28.2   MCHC 30.8* 31.0*   RDW 18.5* 18.5*    345     CHEM  Recent Labs     06/18/25  0440 06/19/25 0457    144   K 3.6 3.5   CO2 22* 23   BUN 28.2* 23.9   CREATININE 1.41* 1.21   CALCIUM 8.4* 8.3*   ALBUMIN 2.1*  --    GLOBULIN 4.0*  --    ALKPHOS 68  --    ALT 12  --    AST 13  --    BILITOT 0.7  --        MICROBIOLOGY     Microbiology Results (last 7 days)       Procedure Component Value Units Date/Time    Blood Culture [[id number]]  (Normal) Collected: 06/13/25 0830    Order Status: Completed Specimen: Blood from Arm, Right Updated: 06/18/25 0902     Blood Culture No Growth at 5 days    Blood Culture [[id number]]  (Normal) Collected: 06/13/25 0733    Order Status: Completed Specimen: Blood from Antecubital, Left Updated: 06/18/25 0902     Blood Culture No Growth at 5 days            ASSESSMENT   Acute blood loss anemia  Retroperitoneal bleeding, stable  GI bleed secondary to PUD  Afib with RVR   Obstructive CAD, not a candidate for CABG  ARF/ATN, improved   ? Right atrial thrombus   Pulmonary Embolism   Left foot osteomyelitis   Chronic systolic heart failure  Morbid obesity   NIDDM II  Essential HTN     h/o CVA 03/2025, CAD s/p LAD stent 2017, 1° AVB,    PLAN   Cardiology re-evaluating for high-risk PCI  Continue ASA/Plavix/statin along with BB in the interim  Patient previously cleared for anticoagulation from a GI standpoint, howeverAC remained on hold secondary to retroperitoneal bleeding   Will obtain CTA of the abdomen and pelvis to rehab retroperitoneal hematoma  Will resume AC with FD Lovenox and monitoring, if stable cardiology can consider DCCV  Amiodarone per Cards, continue telemetry monitoring  Patient will continue on IV daptomycin for left foot osteomyelitis as previously scheduled until July 14th, continue current wound care      Prophylaxis: Alda Manrique,  [facility]

## 2025-06-21 LAB
ANION GAP SERPL CALC-SCNC: 8 MEQ/L
BUN SERPL-MCNC: 22.3 MG/DL (ref 8.4–25.7)
CALCIUM SERPL-MCNC: 8.8 MG/DL (ref 8.8–10)
CHLORIDE SERPL-SCNC: 114 MMOL/L (ref 98–107)
CO2 SERPL-SCNC: 25 MMOL/L (ref 23–31)
CREAT SERPL-MCNC: 1.12 MG/DL (ref 0.72–1.25)
CREAT/UREA NIT SERPL: 20
ERYTHROCYTE [DISTWIDTH] IN BLOOD BY AUTOMATED COUNT: 18.7 % (ref 11.5–17)
GFR SERPLBLD CREATININE-BSD FMLA CKD-EPI: >60 ML/MIN/1.73/M2
GLUCOSE SERPL-MCNC: 112 MG/DL (ref 82–115)
HCT VFR BLD AUTO: 34.6 % (ref 42–52)
HGB BLD-MCNC: 10.6 G/DL (ref 14–18)
MCH RBC QN AUTO: 28.6 PG (ref 27–31)
MCHC RBC AUTO-ENTMCNC: 30.6 G/DL (ref 33–36)
MCV RBC AUTO: 93.3 FL (ref 80–94)
NRBC BLD AUTO-RTO: 0 %
PLATELET # BLD AUTO: 365 X10(3)/MCL (ref 130–400)
PMV BLD AUTO: 9.3 FL (ref 7.4–10.4)
POTASSIUM SERPL-SCNC: 3.3 MMOL/L (ref 3.5–5.1)
RBC # BLD AUTO: 3.71 X10(6)/MCL (ref 4.7–6.1)
SODIUM SERPL-SCNC: 147 MMOL/L (ref 136–145)
WBC # BLD AUTO: 11.01 X10(3)/MCL (ref 4.5–11.5)

## 2025-06-21 PROCEDURE — 25000003 PHARM REV CODE 250: Performed by: INTERNAL MEDICINE

## 2025-06-21 PROCEDURE — 80048 BASIC METABOLIC PNL TOTAL CA: CPT | Performed by: INTERNAL MEDICINE

## 2025-06-21 PROCEDURE — 36415 COLL VENOUS BLD VENIPUNCTURE: CPT | Performed by: INTERNAL MEDICINE

## 2025-06-21 PROCEDURE — 63600175 PHARM REV CODE 636 W HCPCS: Performed by: INTERNAL MEDICINE

## 2025-06-21 PROCEDURE — 11000001 HC ACUTE MED/SURG PRIVATE ROOM

## 2025-06-21 PROCEDURE — 27000207 HC ISOLATION

## 2025-06-21 PROCEDURE — 21400001 HC TELEMETRY ROOM

## 2025-06-21 PROCEDURE — 25000003 PHARM REV CODE 250: Performed by: STUDENT IN AN ORGANIZED HEALTH CARE EDUCATION/TRAINING PROGRAM

## 2025-06-21 PROCEDURE — 85027 COMPLETE CBC AUTOMATED: CPT | Performed by: INTERNAL MEDICINE

## 2025-06-21 RX ORDER — POTASSIUM CHLORIDE 20 MEQ/1
20 TABLET, EXTENDED RELEASE ORAL ONCE
Status: COMPLETED | OUTPATIENT
Start: 2025-06-21 | End: 2025-06-21

## 2025-06-21 RX ADMIN — METOPROLOL TARTRATE 50 MG: 50 TABLET, FILM COATED ORAL at 10:06

## 2025-06-21 RX ADMIN — LOSARTAN POTASSIUM 12.5 MG: 25 TABLET, FILM COATED ORAL at 09:06

## 2025-06-21 RX ADMIN — POTASSIUM CHLORIDE 20 MEQ: 20 TABLET, EXTENDED RELEASE ORAL at 01:06

## 2025-06-21 RX ADMIN — PANTOPRAZOLE SODIUM 40 MG: 40 TABLET, DELAYED RELEASE ORAL at 05:06

## 2025-06-21 RX ADMIN — AMIODARONE HYDROCHLORIDE 400 MG: 200 TABLET ORAL at 09:06

## 2025-06-21 RX ADMIN — CLOPIDOGREL 75 MG: 75 TABLET ORAL at 09:06

## 2025-06-21 RX ADMIN — METOPROLOL TARTRATE 50 MG: 50 TABLET, FILM COATED ORAL at 09:06

## 2025-06-21 RX ADMIN — AMIODARONE HYDROCHLORIDE 400 MG: 200 TABLET ORAL at 10:06

## 2025-06-21 RX ADMIN — ENOXAPARIN SODIUM 105 MG: 150 INJECTION SUBCUTANEOUS at 10:06

## 2025-06-21 NOTE — PROGRESS NOTES
[facility] - 6th [facility]  Infectious Disease  Progress Note    Patient Name: Vaibhav Vargas  MRN: [id number]  Admission Date: 6/12/2025  Length of Stay: 9 days  Attending Physician: Bereket Manrique MD  Primary Care Provider: Shameka Rooney DO    Isolation Status: Contact  Assessment/Plan:      Active Diagnoses:    Diagnosis Date Noted POA    Moderate malnutrition [E44.0] 06/18/2025 Yes    Anemia [D64.9] 06/13/2025 Yes    Retroperitoneal hematoma [K68.3] 06/13/2025 Yes      Problems Resolved During this Admission:         Benjamin Ledesma DO  Infectious Disease  [facility] - 6th Floor Medical Telemetry    Subjective:     No reported fevers by The patient or the medical nursing staff    Review of Systems:   General: Patient denies any unexplained weight loss, night sweats, fatigue malaise or lethargy.   HEENT: (Head, Eyes, Ears, Nose and Throat): Patient denies any visual changes, headaches, eye pain, double vision, sore throat, recent, trauma, ear pain, epistaxis, tinnitus, or sinusitis.   Neck: No signs of recent trauma, or swelling.   Heart: No signs of chest pain, palpitations, orthopnea of loss of consciousness.   Lungs: No cough, sputum, wheeze, hemoptysis, shortness of breath, exercise intolerance.   GI: No abdominal pain, unintentional weight loss, nausea/vomiting, diarrhea/constipation, hematemesis, bright red blood per rectum, or melena.   : No incontinence, dysuria, hematuria, nocturia, polyuria, discharge, increased frequency, or urgency.   Vascular: No signs of claudication, leg edema, varicose veins, or thrombosis.   Neurologic: No loss of sensation, No numbness, No tingling, No Paralysis, No history of tremors or seizures.   Endocrine: No heat/cold intolerance, No excessive sweating, polyuria, polydipsia, or polyphagia.   Hematology: No anemia, easy bruising, petechiae or purpura Skin: No rashes, itching skin, open lesions, skin redness, hives or  sensitivity to sun exposure, no changes in nail, hair or skin color.   Musculoskeletal: Patient denies joint swelling, decreased range of motion, crepitus, functional deficit, or arthritis.   Psychiatric: no signs of depression, anxiety or recent change in mood and sleep patterns.    Objective:     Vital Signs (Most Recent):  Temp: 98.1 °F (36.7 °C) (06/21/25 0740)  Pulse: (!) 111 (06/21/25 0740)  Resp: 17 (06/21/25 0740)  BP: (!) 173/87 (06/21/25 0740)  SpO2: 96 % (06/21/25 0740) Vital Signs (24h Range):  Temp:  [97.6 °F (36.4 °C)-98.4 °F (36.9 °C)] 98.1 °F (36.7 °C)  Pulse:  [] 111  Resp:  [17-20] 17  SpO2:  [94 %-97 %] 96 %  BP: (120-173)/(84-92) 173/87     Weight: 107.6 kg (237 lb 3.4 oz)  Body mass index is 30.46 kg/m².    Estimated Creatinine Clearance: 75.6 mL/min (based on SCr of 1.12 mg/dL).    Physical Exam:   General Appearance: Patient is well nourished and developed adult in no acute distress. Alert and Oriented Times 3   HEENT (Head, Eyes, Ears, Nose and Throat): Normocephalic, Nontraumatic. Pupils equal, round, reactive to light, Accommodation present, Extraocular movements and muscles intact. No cervical Lymphadenopathy, Moist Mucus Membranes, No thyromegaly.   Neck: Soft, Supple, No signs of trauma, No carotid bruits.   Cardiovascular: Regular Rate and Rhythm, No murmurs, gallops, clicks or rubs.   Respiratory: Clear to auscultation bilaterally, No wheezing, rales or rhonchi.   Abdominal: Soft, Non-tender, Non-distention, No peritoneal signs, No rebound tenderness, Present Bowel sounds in all four quadrants, No ascites present.   Extremities: No clubbing, No cyanosis, No edema.   Skin: No scars, No Rashes, Skin is Warm and Dry to the touch, Negative for Sacral Decubitus Ulcers.   Vascular Exam: Pulses 3 out of 4 in the brachial, radial and no JVD noticed Lymphatics: No Cervical, Supra/Infraclavicular Axillary, Trochlear, or Inguinal Adenopathy   Rectal Exam: Deferred at this time.    Neurological Exam: Limited Neurological Exam, patient response to physical and verbal stimuli      Microbiology Results (last 7 days)       Procedure Component Value Units Date/Time    Blood Culture [[id number]]  (Normal) Collected: 06/13/25 0830    Order Status: Completed Specimen: Blood from Arm, Right Updated: 06/18/25 0902     Blood Culture No Growth at 5 days    Blood Culture [[id number]]  (Normal) Collected: 06/13/25 0733    Order Status: Completed Specimen: Blood from Antecubital, Left Updated: 06/18/25 0902     Blood Culture No Growth at 5 days               Antibiotics (From admission, onward)      Start     Stop Route Frequency Ordered    06/16/25 0630  DAPTOmycin injection 500 mg         -- IV Every 48 hours (non-standard times) 06/14/25 1359                    IMPRESSION:     74-year-old male with complicated hospital stay started with obstructive uropathy and PRIYA along with CHF exacerbation he was found to have right atrial thrombus and dura anticoagulation patient had evidence of GI bleed retroperitoneal bleed evidence of pulmonary embolism with atrial fibrillation with rapid ventricular response.    History of cerebrovascular accident chronic indwelling Palomo catheter.    Enterococcus faecium colonization currently on treatment with daptomycin.    Left foot great toe ulcer with osteomyelitis wound culture grew MRSA.  Generalized weakness anemia of blood loss and deconditioning.        RECOMMENDATIONS:        The latest CPK level on June 19th was at 85, please continue with current medical management with daptomycin until July 14, all outpatient antibiotic orders and labs have been sent over to case management, no additional changes from our standpoint today......  From an ID standpoint, the patient is cleared for discharge            Final outpatient IV antibiotic recommendation  Intravenous daptomycin 500 mg every 48 hours until July 14, 2025     Thank You;              Benjamin Ledesma D.O.,  ELSIE  Infectious Disease Physician   [facility]  Cell [phone]          None

## 2025-06-21 NOTE — PROGRESS NOTES
"Hospital Medicine  Progress Note    Patient Name: Vaibhav Vargas  MRN: [id number]  Status: IP- Inpatient   Admission Date: 6/12/2025  Length of Stay: 9  Date of Service: 06/21/2025       CC: hospital follow-up for obstructive CAD       SUBJECTIVE   "74 y.o. male with PMH CVA 03/2025, CAD s/p LAD stent 2017, venous insuffiency, HTN, 1st Degree AVB, NIDDM2 who initially presented to [facility] ED on 05/23 by daughter from NH for weakness.  He was found to have dislodged fowler catheter with severe PRIYA and UTI, fowler was replaced, he had good urinary output with significant improvement in renal function. He was also getting diureses for CHF exacerbation, workup showed EF of 35-40%, a thrombus was incidentally found in his right atrium. He was started on full dose Lovenox the day after. He continued to be in a fib with RVR despite BB, CCB were being avoided due to low EF, cardioversion was avoided due to existing thrombus that was confirmed on YO. On 06/01 he had significant drop in his hemoglobin with worsening RVR, Lovenox was held, EGD on 06/02 showed non-bleeding ulcers with active oozing of blood from his gastric mucosa, GI did not recommend to hold anticoagulation due to risk for embolization. On 06/04 the patient became hypotensive with A flutter and RVR, hemoglobin dropped to 4.7, Lovenox was held again and massive transfusion was initiated, the patient was upgraded to the ICU due to hemodynamic instability. Lovenox was started once again on 06/06 and he was downgraded from the ICU on 06/08.  Patient bled again with an acute drop in hemoglobin on 06/11 and was noted to have melena.  He was transferred to our facility for GI services. Prior to transferred found to have a retroperitoneal bleed.      Patient was also in Afib with RVR. He was seen by CIS team and started on IV amiodarone and later po metoprolol. His H&H was closely monitored. He was continued on PPI. Hb stabilized. He was eating well. He had emma LE weakness " "from a previous CVA. He was making a slow clinical recovery.      He was followed by CIS team and C done and showed   Findings:  1. Left main-Patent  2. Lad-70% calcified proximal stenosis, Mid stent is 2.5 mm stent in  a   3.5-4mm vessel.  MSA distal stent edge is 3.9mm2  3. LCX-50% mid lesion  4. Ramus-40% mid  5. RCA-30% proximal calcified stenosis  6. LVEDP-11  7. LIMA patent     Recommended:  1. Consult CTS for LIMA to LAD, ANGELIA ligation, MAZE, and possible surgical   extraction of prominent eustachian valve with thrombus.   2. If turned down he will likely need atherectomy and shockwave for   underexpanded stent, and repeat stenting of the prox to mid LAD."     "CT surgery do not recommended surgery, instead requested CIS to perform high risk PCI."       Today: Patient seen and examined at bedside, and chart reviewed.  Continues was dizziness; remains in AFib.  Denies  any chest pain.  He is back on anticoagulation with full-dose Lovenox.  Repeat CT of the abdomen notes retroperitoneal bleed, slightly smaller than previous.      MEDICATIONS   Scheduled   amiodarone  400 mg Oral BID    Followed by    [START ON 6/23/2025] amiodarone  200 mg Oral BID    Followed by    [START ON 6/26/2025] amiodarone  200 mg Oral Daily    clopidogreL  75 mg Oral Daily    DAPTOmycin (CUBICIN) IV (PEDS and ADULTS)  500 mg Intravenous Q48H    enoxaparin  1 mg/kg Subcutaneous Q12H    losartan  12.5 mg Oral Daily    metoprolol tartrate  50 mg Oral BID    pantoprazole  40 mg Oral BID AC    potassium chloride  20 mEq Oral Once     Continuous Infusions  None      PHYSICAL EXAM   VITALS: T 98 °F (36.7 °C)   BP (!) 156/92   P 99   RR 18   O2 96 %    GENERAL: Awake and in NAD  LUNGS: CTA anteriorly  CVS: Normal rate, irregularly irregular rhythm  GI/: Soft, NT, bowel sounds positive.  EXTREMITIES: radial pulse 2+  NEURO: AAOx3  PSYCH: Cooperative      LABS   CBC  Recent Labs     06/19/25  0457 06/21/25  0356   WBC 7.37 11.01   RBC 3.40* " 3.71*   HGB 9.6* 10.6*   HCT 31.0* 34.6*   MCV 91.2 93.3   MCH 28.2 28.6   MCHC 31.0* 30.6*   RDW 18.5* 18.7*    365     CHEM  Recent Labs     06/19/25  0457 06/21/25  0356    147*   K 3.5 3.3*   CO2 23 25   BUN 23.9 22.3   CREATININE 1.21 1.12   CALCIUM 8.3* 8.8       MICROBIOLOGY     Microbiology Results (last 7 days)       Procedure Component Value Units Date/Time    Blood Culture [[id number]]  (Normal) Collected: 06/13/25 0830    Order Status: Completed Specimen: Blood from Arm, Right Updated: 06/18/25 0902     Blood Culture No Growth at 5 days    Blood Culture [[id number]]  (Normal) Collected: 06/13/25 0733    Order Status: Completed Specimen: Blood from Antecubital, Left Updated: 06/18/25 0902     Blood Culture No Growth at 5 days            ASSESSMENT   Acute blood loss anemia  Retroperitoneal bleeding, stable  GI bleed secondary to PUD  Afib with RVR   Obstructive CAD, not a candidate for CABG  ARF/ATN, improved   ? Right atrial thrombus   Pulmonary Embolism   Left foot osteomyelitis   Chronic systolic heart failure  Morbid obesity   NIDDM II  Essential HTN     h/o CVA 03/2025, CAD s/p LAD stent 2017, 1° AVB,    PLAN   Cardiology holding off on high-risk PCI for now  Continue ASA/Plavix/statin along with BB in the interim  Patient back on full-dose anticoagulation, will monitor H&H and signs of bleeding  If patient remains in AFib, and assuming he is stable on anticoagulation, cardiology to reconsider DCCV on Monday  Otherwise continue current cardiac regimen and telemetry monitoring  Wwill continue on IV daptomycin for left foot osteomyelitis as previously scheduled until July 14th; continue with current wound care to foot      Prophylaxis:  FD Lovenox as above        Bereket Manrique MD  [facility]

## 2025-06-21 NOTE — PT/OT/SLP PROGRESS
Physical Therapy      Patient Name:  Vaibhav Vargas   MRN:  [id number]    Changing POC to 3x/wk per PT/PTA POC conference 2/2 poor activity tolerance, can increase to 5x/wk if symptoms improve allowing better tolerance and participation in mobility.
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
## 2025-06-22 LAB
ANION GAP SERPL CALC-SCNC: 8 MEQ/L
BUN SERPL-MCNC: 25 MG/DL (ref 8.4–25.7)
CALCIUM SERPL-MCNC: 8.3 MG/DL (ref 8.8–10)
CHLORIDE SERPL-SCNC: 116 MMOL/L (ref 98–107)
CO2 SERPL-SCNC: 22 MMOL/L (ref 23–31)
CREAT SERPL-MCNC: 1.17 MG/DL (ref 0.72–1.25)
CREAT/UREA NIT SERPL: 21
ERYTHROCYTE [DISTWIDTH] IN BLOOD BY AUTOMATED COUNT: 18.9 % (ref 11.5–17)
GFR SERPLBLD CREATININE-BSD FMLA CKD-EPI: >60 ML/MIN/1.73/M2
GLUCOSE SERPL-MCNC: 133 MG/DL (ref 82–115)
HCT VFR BLD AUTO: 33.7 % (ref 42–52)
HGB BLD-MCNC: 10.2 G/DL (ref 14–18)
MCH RBC QN AUTO: 27.8 PG (ref 27–31)
MCHC RBC AUTO-ENTMCNC: 30.3 G/DL (ref 33–36)
MCV RBC AUTO: 91.8 FL (ref 80–94)
NRBC BLD AUTO-RTO: 0 %
PLATELET # BLD AUTO: 374 X10(3)/MCL (ref 130–400)
PMV BLD AUTO: 9.5 FL (ref 7.4–10.4)
POTASSIUM SERPL-SCNC: 4.5 MMOL/L (ref 3.5–5.1)
RBC # BLD AUTO: 3.67 X10(6)/MCL (ref 4.7–6.1)
SODIUM SERPL-SCNC: 146 MMOL/L (ref 136–145)
WBC # BLD AUTO: 12.98 X10(3)/MCL (ref 4.5–11.5)

## 2025-06-22 PROCEDURE — 63600175 PHARM REV CODE 636 W HCPCS: Performed by: INTERNAL MEDICINE

## 2025-06-22 PROCEDURE — 25000003 PHARM REV CODE 250: Performed by: INTERNAL MEDICINE

## 2025-06-22 PROCEDURE — 27000207 HC ISOLATION

## 2025-06-22 PROCEDURE — 85027 COMPLETE CBC AUTOMATED: CPT | Performed by: INTERNAL MEDICINE

## 2025-06-22 PROCEDURE — 11000001 HC ACUTE MED/SURG PRIVATE ROOM

## 2025-06-22 PROCEDURE — 36415 COLL VENOUS BLD VENIPUNCTURE: CPT | Performed by: INTERNAL MEDICINE

## 2025-06-22 PROCEDURE — 25000003 PHARM REV CODE 250: Performed by: STUDENT IN AN ORGANIZED HEALTH CARE EDUCATION/TRAINING PROGRAM

## 2025-06-22 PROCEDURE — 25000003 PHARM REV CODE 250: Performed by: NURSE PRACTITIONER

## 2025-06-22 PROCEDURE — 21400001 HC TELEMETRY ROOM

## 2025-06-22 PROCEDURE — 80048 BASIC METABOLIC PNL TOTAL CA: CPT | Performed by: INTERNAL MEDICINE

## 2025-06-22 RX ORDER — METOPROLOL TARTRATE 50 MG/1
50 TABLET ORAL 3 TIMES DAILY
Status: DISCONTINUED | OUTPATIENT
Start: 2025-06-22 | End: 2025-06-23

## 2025-06-22 RX ADMIN — PANTOPRAZOLE SODIUM 40 MG: 40 TABLET, DELAYED RELEASE ORAL at 05:06

## 2025-06-22 RX ADMIN — LOSARTAN POTASSIUM 12.5 MG: 25 TABLET, FILM COATED ORAL at 08:06

## 2025-06-22 RX ADMIN — DAPTOMYCIN 500 MG: 500 INJECTION, POWDER, LYOPHILIZED, FOR SOLUTION INTRAVENOUS at 06:06

## 2025-06-22 RX ADMIN — METOPROLOL TARTRATE 5 MG: 1 INJECTION, SOLUTION INTRAVENOUS at 10:06

## 2025-06-22 RX ADMIN — METOPROLOL TARTRATE 50 MG: 50 TABLET, FILM COATED ORAL at 09:06

## 2025-06-22 RX ADMIN — METOPROLOL TARTRATE 50 MG: 50 TABLET, FILM COATED ORAL at 02:06

## 2025-06-22 RX ADMIN — AMIODARONE HYDROCHLORIDE 400 MG: 200 TABLET ORAL at 08:06

## 2025-06-22 RX ADMIN — CLOPIDOGREL 75 MG: 75 TABLET ORAL at 08:06

## 2025-06-22 RX ADMIN — AMIODARONE HYDROCHLORIDE 400 MG: 200 TABLET ORAL at 09:06

## 2025-06-22 RX ADMIN — ENOXAPARIN SODIUM 105 MG: 150 INJECTION SUBCUTANEOUS at 10:06

## 2025-06-22 RX ADMIN — HYDROCODONE BITARTRATE AND ACETAMINOPHEN 1 TABLET: 5; 325 TABLET ORAL at 09:06

## 2025-06-22 RX ADMIN — METOPROLOL TARTRATE 5 MG: 1 INJECTION, SOLUTION INTRAVENOUS at 05:06

## 2025-06-22 RX ADMIN — ENOXAPARIN SODIUM 105 MG: 150 INJECTION SUBCUTANEOUS at 09:06

## 2025-06-22 RX ADMIN — METOPROLOL TARTRATE 50 MG: 50 TABLET, FILM COATED ORAL at 08:06
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
## 2025-06-22 NOTE — PROGRESS NOTES
--------------   CARDIOLOGY ATTENDING ADDENDUM   Please also see NP/PA portion of note  ---------------  06/22/2025 11:14 AM    I evaluated Vaibhav Vargsa.  The patient is a 74 y.o. male with:   No chief complaint on file.        Vaibhav Vargas has the following scheduled Medications   amiodarone  400 mg Oral BID    Followed by    [START ON 6/23/2025] amiodarone  200 mg Oral BID    Followed by    [START ON 6/26/2025] amiodarone  200 mg Oral Daily    clopidogreL  75 mg Oral Daily    DAPTOmycin (CUBICIN) IV (PEDS and ADULTS)  500 mg Intravenous Q48H    enoxaparin  1 mg/kg Subcutaneous Q12H    losartan  12.5 mg Oral Daily    metoprolol tartrate  50 mg Oral BID    pantoprazole  40 mg Oral BID AC       Vaibhav Vargas has the following Lab Results   Last BMP BMP  Lab Results   Component Value Date     (H) 06/22/2025    K 4.5 06/22/2025     (H) 06/22/2025    CO2 22 (L) 06/22/2025    BUN 25.0 06/22/2025    CREATININE 1.17 06/22/2025    CALCIUM 8.3 (L) 06/22/2025    EGFRNORACEVR >60 06/22/2025      Last CBC     Lab Results   Component Value Date    WBC 12.98 (H) 06/22/2025    HGB 10.2 (L) 06/22/2025    HCT 33.7 (L) 06/22/2025    MCV 91.8 06/22/2025     06/22/2025           BNP    Lab Results   Component Value Date    .7 (H) 06/10/2025    BNP 1,451.0 (H) 05/27/2025    BNP 1,332.9 (H) 05/23/2025     Troponin   Lab Results   Component Value Date    TROPONINI 0.082 (H) 06/04/2025    TROPONINI 0.105 (H) 06/01/2025    TROPONINI 0.119 (H) 05/31/2025     Last lipids    Lab Results   Component Value Date    CHOL 86 05/02/2025    HDL 37 05/02/2025    LDL 37.00 (L) 05/02/2025    TRIG 58 05/02/2025    TOTALCHOLEST 2 05/02/2025        Vaibhav EFREN Vargas has the following Echocardiogram Results  Results for orders placed during the hospital encounter of 05/23/25    Echo Saline Bubble? No    Interpretation Summary    Left Ventricle: The left ventricle is normal in size. Increased wall thickness.  There is moderately reduced systolic function with a visually estimated ejection fraction of 30 - 40%.    Right Ventricle: The right ventricle is mildly dilated Systolic function is reduced.    Left Atrium: The left atrium is mildly dilated      Vaibhav Vargas has the following Stress Test Results  No results found for this or any previous visit.      Vaibhav Vargas has the following Coronary Angiogram Results   Results for orders placed during the hospital encounter of 06/12/25    Cardiac catheterization    Conclusion    The estimated blood loss was none.    The procedure log was documented by Documenter: Lorena Rodriguez RN and verified by James Tony MD.    Date: 6/17/2025  Time: 3:24 PM    Preprocedure diagnosis-new cardiomyopathy  Postprocedure diagnosis-obstructive CAD  Estimated blood loss-5 cc  Tissue removal-none  Complications-none    Procedures performed:  1. Ultrasound-guided right groin access  2. Coronary angiography  3. Left ventricular hemodynamics  4. IFR interrogation of:  LAD-0.85  Ramus-0.93  Circ-1.0  5. IVUS of LAD  6. LIMA angio  7. Perclose RC      Findings:  1. Left main-Patent  2. Lad-70% calcified proximal stenosis, Mid stent is 2.5 mm stent in  a 3.5-4mm vessel.  MSA distal stent edge is 3.9mm2  3. LCX-50% mid lesion  4. Ramus-40% mid  5. RCA-30% proximal calcified stenosis  6. LVEDP-11  7. LIMA patent        Procedure detail:  Ultrasound-guided right groin access obtained.  Sheath inserted.  Femoral angiography performed.  Left main angiography performed with JL4.  RCA angiography performed with JR4 catheter.   Pigtail was then used for left ventricular hemodynamics.   Given moderate LAD disease further interrogation was needed.  Heparin was administered.  Six Croatian EBU 3.5 catheter was advanced into the aorta.  Artery was pre treated with nitroglycerin.  Wire was normalized in the aorta.  The catheter was reengaged into the LM.  Wire was passed beyond the proximal lesion.   "Guiding catheter was backed out of the LM, interrogation was performed.  Pullback to the guide was 1.  Process was repeated for the ramus and circumflex.  Final angiography demonstrated YAKELIN 3 flow.  Guide was redirected into the left subclavian and LIMA agio was performed.  Catheters were removed and a proglide was used for RCFA closure.    Plan:  1. Consult CTS for LIMA to LAD, ANGELIA ligation, MAZE, and possible surgical extraction of prominent eustachian valve with thrombus.  2. If turned down he will likely need atherectomy and shockwave for underexpanded stent, and repeat stenting of the prox to mid LAD.      Vaibhav Vargas has the following Holter Monitor Results   No cardiac monitor results found for the past 12 months    THE ABOVE ARE DIAGNOSTIC RESULTS PULLED INTO THIS NOTE ON Vaibhav Vargas    ROS    GEN   No fever  + weakness in legs  RESP  + SOB  + wheezing  SKIN  No pruritus No rash  CARD  No CP  No palpitations  VASC  No cyanosis. Has not been out of bed  HEM   No adenopathy No bleeding  GI  No heart burn  No melena  NEURO  + dizziness No syncope    Blood pressure 119/73, pulse (!) 130, temperature 97.5 °F (36.4 °C), temperature source Oral, resp. rate 17, height 6' 2" (1.88 m), weight 107.6 kg (237 lb 3.4 oz), SpO2 97%.  PE    GEN  No acute distress  Not ill appearing Laying in bed   NECK  Supple Normal carotid upstroke and volume      CV   Tachycardic  Irregular rhythm    PUL  No respiratory distress  No wheezing.     Clear anteriorly  ABD  No distention Mildly obese    LOW EXT   No deformity   Trace edema    SKIN  No bruising  Venous stasis changes  Dry skin  NEURO  Awake and alert    Weakness in lower ext      Assessment/Plan  CAD:  Patient has not good candidate for CABG at the moment  Continue medical management for CAD  AFib:  Can consider YO in ECV (patient had right atrial thrombus previously noted)  Patient is on amiodarone  HFrEF:  GDM T as tolerated      Jose Alberto Hughes MD  "   Cardiologist

## 2025-06-23 ENCOUNTER — ANESTHESIA (OUTPATIENT)
Dept: ENDOSCOPY | Facility: HOSPITAL | Age: 75
End: 2025-06-23
Payer: MEDICARE

## 2025-06-23 ENCOUNTER — ANESTHESIA EVENT (OUTPATIENT)
Dept: ENDOSCOPY | Facility: HOSPITAL | Age: 75
End: 2025-06-23
Payer: MEDICARE

## 2025-06-23 LAB
ABO + RH BLD: NORMAL
ALBUMIN SERPL-MCNC: 2 G/DL (ref 3.4–4.8)
ALBUMIN/GLOB SERPL: 0.6 RATIO (ref 1.1–2)
ALP SERPL-CCNC: 49 UNIT/L (ref 40–150)
ALT SERPL-CCNC: 11 UNIT/L (ref 0–55)
ANION GAP SERPL CALC-SCNC: 6 MEQ/L
APTT PPP: 42.6 SECONDS (ref 23.2–33.7)
AST SERPL-CCNC: 13 UNIT/L (ref 11–45)
BACTERIA #/AREA URNS AUTO: ABNORMAL /HPF
BASOPHILS # BLD AUTO: 0.04 X10(3)/MCL
BASOPHILS # BLD AUTO: 0.04 X10(3)/MCL
BASOPHILS NFR BLD AUTO: 0.3 %
BASOPHILS NFR BLD AUTO: 0.3 %
BILIRUB SERPL-MCNC: 0.7 MG/DL
BILIRUB UR QL STRIP.AUTO: NEGATIVE
BLD PROD TYP BPU: NORMAL
BLOOD UNIT EXPIRATION DATE: NORMAL
BLOOD UNIT TYPE CODE: 6200
BUN SERPL-MCNC: 43.4 MG/DL (ref 8.4–25.7)
CALCIUM SERPL-MCNC: 8.4 MG/DL (ref 8.8–10)
CHLORIDE SERPL-SCNC: 118 MMOL/L (ref 98–107)
CLARITY UR: ABNORMAL
CO2 SERPL-SCNC: 24 MMOL/L (ref 23–31)
COLOR UR AUTO: YELLOW
CREAT SERPL-MCNC: 1.45 MG/DL (ref 0.72–1.25)
CREAT/UREA NIT SERPL: 30
CROSSMATCH INTERPRETATION: NORMAL
DISPENSE STATUS: NORMAL
EOSINOPHIL # BLD AUTO: 0 X10(3)/MCL (ref 0–0.9)
EOSINOPHIL # BLD AUTO: 0.01 X10(3)/MCL (ref 0–0.9)
EOSINOPHIL NFR BLD AUTO: 0 %
EOSINOPHIL NFR BLD AUTO: 0.1 %
ERYTHROCYTE [DISTWIDTH] IN BLOOD BY AUTOMATED COUNT: 18.8 % (ref 11.5–17)
ERYTHROCYTE [DISTWIDTH] IN BLOOD BY AUTOMATED COUNT: 19 % (ref 11.5–17)
GFR SERPLBLD CREATININE-BSD FMLA CKD-EPI: 51 ML/MIN/1.73/M2
GLOBULIN SER-MCNC: 3.5 GM/DL (ref 2.4–3.5)
GLUCOSE SERPL-MCNC: 169 MG/DL (ref 82–115)
GLUCOSE UR QL STRIP: NORMAL
GROUP & RH: NORMAL
HCT VFR BLD AUTO: 24.3 % (ref 42–52)
HCT VFR BLD AUTO: 24.6 % (ref 42–52)
HGB BLD-MCNC: 7.3 G/DL (ref 14–18)
HGB BLD-MCNC: 7.5 G/DL (ref 14–18)
HGB UR QL STRIP: ABNORMAL
IMM GRANULOCYTES # BLD AUTO: 0.12 X10(3)/MCL (ref 0–0.04)
IMM GRANULOCYTES # BLD AUTO: 0.16 X10(3)/MCL (ref 0–0.04)
IMM GRANULOCYTES NFR BLD AUTO: 0.9 %
IMM GRANULOCYTES NFR BLD AUTO: 1.2 %
INDIRECT COOMBS: NORMAL
INR PPP: 2
KETONES UR QL STRIP: NEGATIVE
LEUKOCYTE ESTERASE UR QL STRIP: NEGATIVE
LYMPHOCYTES # BLD AUTO: 1.18 X10(3)/MCL (ref 0.6–4.6)
LYMPHOCYTES # BLD AUTO: 1.27 X10(3)/MCL (ref 0.6–4.6)
LYMPHOCYTES NFR BLD AUTO: 9 %
LYMPHOCYTES NFR BLD AUTO: 9.8 %
MCH RBC QN AUTO: 28.4 PG (ref 27–31)
MCH RBC QN AUTO: 28.8 PG (ref 27–31)
MCHC RBC AUTO-ENTMCNC: 30 G/DL (ref 33–36)
MCHC RBC AUTO-ENTMCNC: 30.5 G/DL (ref 33–36)
MCV RBC AUTO: 94.6 FL (ref 80–94)
MCV RBC AUTO: 94.6 FL (ref 80–94)
MONOCYTES # BLD AUTO: 1.13 X10(3)/MCL (ref 0.1–1.3)
MONOCYTES # BLD AUTO: 1.17 X10(3)/MCL (ref 0.1–1.3)
MONOCYTES NFR BLD AUTO: 8.7 %
MONOCYTES NFR BLD AUTO: 8.9 %
MUCOUS THREADS URNS QL MICRO: ABNORMAL /LPF
NEUTROPHILS # BLD AUTO: 10.4 X10(3)/MCL (ref 2.1–9.2)
NEUTROPHILS # BLD AUTO: 10.6 X10(3)/MCL (ref 2.1–9.2)
NEUTROPHILS NFR BLD AUTO: 80 %
NEUTROPHILS NFR BLD AUTO: 80.8 %
NITRITE UR QL STRIP: NEGATIVE
NRBC BLD AUTO-RTO: 0.2 %
NRBC BLD AUTO-RTO: 0.2 %
PH UR STRIP: 6 [PH]
PLATELET # BLD AUTO: 339 X10(3)/MCL (ref 130–400)
PLATELET # BLD AUTO: 341 X10(3)/MCL (ref 130–400)
PMV BLD AUTO: 9.4 FL (ref 7.4–10.4)
PMV BLD AUTO: 9.9 FL (ref 7.4–10.4)
POCT GLUCOSE: 232 MG/DL (ref 70–110)
POTASSIUM SERPL-SCNC: 4.2 MMOL/L (ref 3.5–5.1)
PROT SERPL-MCNC: 5.5 GM/DL (ref 5.8–7.6)
PROT UR QL STRIP: ABNORMAL
PROTHROMBIN TIME: 22.3 SECONDS (ref 12.5–14.5)
RBC # BLD AUTO: 2.57 X10(6)/MCL (ref 4.7–6.1)
RBC # BLD AUTO: 2.6 X10(6)/MCL (ref 4.7–6.1)
RBC #/AREA URNS AUTO: ABNORMAL /HPF
SODIUM SERPL-SCNC: 148 MMOL/L (ref 136–145)
SP GR UR STRIP.AUTO: 1.04 (ref 1–1.03)
SPECIMEN OUTDATE: NORMAL
SQUAMOUS #/AREA URNS LPF: ABNORMAL /HPF
UNIT NUMBER: NORMAL
UROBILINOGEN UR STRIP-ACNC: NORMAL
WBC # BLD AUTO: 13 X10(3)/MCL (ref 4.5–11.5)
WBC # BLD AUTO: 13.12 X10(3)/MCL (ref 4.5–11.5)
WBC #/AREA URNS AUTO: ABNORMAL /HPF

## 2025-06-23 PROCEDURE — 85610 PROTHROMBIN TIME: CPT

## 2025-06-23 PROCEDURE — 11000001 HC ACUTE MED/SURG PRIVATE ROOM

## 2025-06-23 PROCEDURE — 63600175 PHARM REV CODE 636 W HCPCS: Performed by: INTERNAL MEDICINE

## 2025-06-23 PROCEDURE — 85730 THROMBOPLASTIN TIME PARTIAL: CPT

## 2025-06-23 PROCEDURE — 27000221 HC OXYGEN, UP TO 24 HOURS

## 2025-06-23 PROCEDURE — 86923 COMPATIBILITY TEST ELECTRIC: CPT | Mod: 91 | Performed by: INTERNAL MEDICINE

## 2025-06-23 PROCEDURE — 86900 BLOOD TYPING SEROLOGIC ABO: CPT

## 2025-06-23 PROCEDURE — 85025 COMPLETE CBC W/AUTO DIFF WBC: CPT | Performed by: INTERNAL MEDICINE

## 2025-06-23 PROCEDURE — 25000003 PHARM REV CODE 250: Performed by: INTERNAL MEDICINE

## 2025-06-23 PROCEDURE — 30233K1 TRANSFUSION OF NONAUTOLOGOUS FROZEN PLASMA INTO PERIPHERAL VEIN, PERCUTANEOUS APPROACH: ICD-10-PCS | Performed by: INTERNAL MEDICINE

## 2025-06-23 PROCEDURE — 25000003 PHARM REV CODE 250: Performed by: NURSE PRACTITIONER

## 2025-06-23 PROCEDURE — 25500020 PHARM REV CODE 255: Performed by: INTERNAL MEDICINE

## 2025-06-23 PROCEDURE — 30233N1 TRANSFUSION OF NONAUTOLOGOUS RED BLOOD CELLS INTO PERIPHERAL VEIN, PERCUTANEOUS APPROACH: ICD-10-PCS | Performed by: INTERNAL MEDICINE

## 2025-06-23 PROCEDURE — 30233L1 TRANSFUSION OF NONAUTOLOGOUS FRESH PLASMA INTO PERIPHERAL VEIN, PERCUTANEOUS APPROACH: ICD-10-PCS | Performed by: INTERNAL MEDICINE

## 2025-06-23 PROCEDURE — P9016 RBC LEUKOCYTES REDUCED: HCPCS | Performed by: INTERNAL MEDICINE

## 2025-06-23 PROCEDURE — 63600175 PHARM REV CODE 636 W HCPCS

## 2025-06-23 PROCEDURE — 27201423 OPTIME MED/SURG SUP & DEVICES STERILE SUPPLY: Performed by: INTERNAL MEDICINE

## 2025-06-23 PROCEDURE — 21400001 HC TELEMETRY ROOM

## 2025-06-23 PROCEDURE — 0W3P8ZZ CONTROL BLEEDING IN GASTROINTESTINAL TRACT, VIA NATURAL OR ARTIFICIAL OPENING ENDOSCOPIC: ICD-10-PCS | Performed by: INTERNAL MEDICINE

## 2025-06-23 PROCEDURE — 36415 COLL VENOUS BLD VENIPUNCTURE: CPT

## 2025-06-23 PROCEDURE — 25000003 PHARM REV CODE 250: Performed by: STUDENT IN AN ORGANIZED HEALTH CARE EDUCATION/TRAINING PROGRAM

## 2025-06-23 PROCEDURE — P9016 RBC LEUKOCYTES REDUCED: HCPCS

## 2025-06-23 PROCEDURE — 25000003 PHARM REV CODE 250: Performed by: NURSE ANESTHETIST, CERTIFIED REGISTERED

## 2025-06-23 PROCEDURE — 37000008 HC ANESTHESIA 1ST 15 MINUTES: Performed by: INTERNAL MEDICINE

## 2025-06-23 PROCEDURE — 86923 COMPATIBILITY TEST ELECTRIC: CPT

## 2025-06-23 PROCEDURE — 36415 COLL VENOUS BLD VENIPUNCTURE: CPT | Performed by: INTERNAL MEDICINE

## 2025-06-23 PROCEDURE — 63600175 PHARM REV CODE 636 W HCPCS: Performed by: NURSE ANESTHETIST, CERTIFIED REGISTERED

## 2025-06-23 PROCEDURE — 80053 COMPREHEN METABOLIC PANEL: CPT | Performed by: INTERNAL MEDICINE

## 2025-06-23 PROCEDURE — 81001 URINALYSIS AUTO W/SCOPE: CPT | Performed by: INTERNAL MEDICINE

## 2025-06-23 PROCEDURE — 43255 EGD CONTROL BLEEDING ANY: CPT | Performed by: INTERNAL MEDICINE

## 2025-06-23 PROCEDURE — XW0G886 INTRODUCTION OF MINERAL-BASED TOPICAL HEMOSTATIC AGENT INTO UPPER GI, VIA NATURAL OR ARTIFICIAL OPENING ENDOSCOPIC, NEW TECHNOLOGY GROUP 6: ICD-10-PCS | Performed by: INTERNAL MEDICINE

## 2025-06-23 PROCEDURE — 37000009 HC ANESTHESIA EA ADD 15 MINS: Performed by: INTERNAL MEDICINE

## 2025-06-23 PROCEDURE — 27000207 HC ISOLATION

## 2025-06-23 PROCEDURE — P9017 PLASMA 1 DONOR FRZ W/IN 8 HR: HCPCS

## 2025-06-23 RX ORDER — LIDOCAINE HYDROCHLORIDE 10 MG/ML
INJECTION, SOLUTION EPIDURAL; INFILTRATION; INTRACAUDAL; PERINEURAL
Status: DISCONTINUED | OUTPATIENT
Start: 2025-06-23 | End: 2025-06-23

## 2025-06-23 RX ORDER — HALOPERIDOL LACTATE 5 MG/ML
0.5 INJECTION, SOLUTION INTRAMUSCULAR EVERY 10 MIN PRN
OUTPATIENT
Start: 2025-06-23

## 2025-06-23 RX ORDER — PHENYLEPHRINE HCL IN 0.9% NACL 1 MG/10 ML
SYRINGE (ML) INTRAVENOUS
Status: COMPLETED
Start: 2025-06-23 | End: 2025-06-23

## 2025-06-23 RX ORDER — HYDROCODONE BITARTRATE AND ACETAMINOPHEN 500; 5 MG/1; MG/1
TABLET ORAL
Status: DISCONTINUED | OUTPATIENT
Start: 2025-06-23 | End: 2025-07-01 | Stop reason: HOSPADM

## 2025-06-23 RX ORDER — EPINEPHRINE 0.1 MG/ML
INJECTION INTRAVENOUS
Status: DISCONTINUED | OUTPATIENT
Start: 2025-06-23 | End: 2025-06-23 | Stop reason: HOSPADM

## 2025-06-23 RX ORDER — HYDROMORPHONE HYDROCHLORIDE 2 MG/ML
0.2 INJECTION, SOLUTION INTRAMUSCULAR; INTRAVENOUS; SUBCUTANEOUS EVERY 5 MIN PRN
Refills: 0 | OUTPATIENT
Start: 2025-06-23

## 2025-06-23 RX ORDER — GLYCOPYRROLATE 0.2 MG/ML
INJECTION INTRAMUSCULAR; INTRAVENOUS
Status: DISPENSED
Start: 2025-06-23 | End: 2025-06-24

## 2025-06-23 RX ORDER — HYDRALAZINE HYDROCHLORIDE 20 MG/ML
10 INJECTION INTRAMUSCULAR; INTRAVENOUS ONCE
OUTPATIENT
Start: 2025-06-23 | End: 2025-06-23

## 2025-06-23 RX ORDER — PHENYLEPHRINE HCL IN 0.9% NACL 1 MG/10 ML
SYRINGE (ML) INTRAVENOUS
Status: DISCONTINUED | OUTPATIENT
Start: 2025-06-23 | End: 2025-06-23

## 2025-06-23 RX ORDER — PROPOFOL 10 MG/ML
INJECTION, EMULSION INTRAVENOUS
Status: DISCONTINUED | OUTPATIENT
Start: 2025-06-23 | End: 2025-06-23

## 2025-06-23 RX ORDER — OXYCODONE AND ACETAMINOPHEN 5; 325 MG/1; MG/1
1 TABLET ORAL
Refills: 0 | OUTPATIENT
Start: 2025-06-23

## 2025-06-23 RX ORDER — METOPROLOL TARTRATE 50 MG/1
100 TABLET ORAL 2 TIMES DAILY
Status: DISCONTINUED | OUTPATIENT
Start: 2025-06-23 | End: 2025-06-27

## 2025-06-23 RX ORDER — GLUCAGON 1 MG
1 KIT INJECTION
OUTPATIENT
Start: 2025-06-23

## 2025-06-23 RX ORDER — PROPOFOL 10 MG/ML
VIAL (ML) INTRAVENOUS
Status: DISPENSED
Start: 2025-06-23 | End: 2025-06-24

## 2025-06-23 RX ORDER — LIDOCAINE HYDROCHLORIDE 10 MG/ML
INJECTION, SOLUTION EPIDURAL; INFILTRATION; INTRACAUDAL; PERINEURAL
Status: DISPENSED
Start: 2025-06-23 | End: 2025-06-24

## 2025-06-23 RX ORDER — GLYCOPYRROLATE 0.2 MG/ML
INJECTION INTRAMUSCULAR; INTRAVENOUS
Status: DISCONTINUED | OUTPATIENT
Start: 2025-06-23 | End: 2025-06-23

## 2025-06-23 RX ORDER — PANTOPRAZOLE SODIUM 40 MG/10ML
40 INJECTION, POWDER, LYOPHILIZED, FOR SOLUTION INTRAVENOUS 2 TIMES DAILY
Status: DISCONTINUED | OUTPATIENT
Start: 2025-06-23 | End: 2025-07-01 | Stop reason: HOSPADM

## 2025-06-23 RX ORDER — CALCIUM CHLORIDE INJECTION 100 MG/ML
INJECTION, SOLUTION INTRAVENOUS
Status: DISCONTINUED | OUTPATIENT
Start: 2025-06-23 | End: 2025-06-23

## 2025-06-23 RX ADMIN — CALCIUM CHLORIDE INJECTION 1 G: 100 INJECTION, SOLUTION INTRAVENOUS at 02:06

## 2025-06-23 RX ADMIN — Medication 200 MCG: at 02:06

## 2025-06-23 RX ADMIN — PANTOPRAZOLE SODIUM 40 MG: 40 INJECTION, POWDER, FOR SOLUTION INTRAVENOUS at 08:06

## 2025-06-23 RX ADMIN — METOPROLOL TARTRATE 100 MG: 50 TABLET, FILM COATED ORAL at 08:06

## 2025-06-23 RX ADMIN — LIDOCAINE HYDROCHLORIDE 50 MG: 10 INJECTION, SOLUTION EPIDURAL; INFILTRATION; INTRACAUDAL; PERINEURAL at 02:06

## 2025-06-23 RX ADMIN — GLYCOPYRROLATE 0.2 MG: 0.2 INJECTION INTRAMUSCULAR; INTRAVENOUS at 02:06

## 2025-06-23 RX ADMIN — CLOPIDOGREL 75 MG: 75 TABLET ORAL at 08:06

## 2025-06-23 RX ADMIN — PROPOFOL 40 MG: 10 INJECTION, EMULSION INTRAVENOUS at 02:06

## 2025-06-23 RX ADMIN — PROPOFOL 50 MCG/KG/MIN: 10 INJECTION, EMULSION INTRAVENOUS at 02:06

## 2025-06-23 RX ADMIN — AMIODARONE HYDROCHLORIDE 200 MG: 200 TABLET ORAL at 08:06

## 2025-06-23 RX ADMIN — ACETAMINOPHEN 650 MG: 325 TABLET ORAL at 05:06

## 2025-06-23 RX ADMIN — CALCIUM CHLORIDE INJECTION 0.5 G: 100 INJECTION, SOLUTION INTRAVENOUS at 02:06

## 2025-06-23 RX ADMIN — SODIUM CHLORIDE, POTASSIUM CHLORIDE, SODIUM LACTATE AND CALCIUM CHLORIDE 500 ML: 600; 310; 30; 20 INJECTION, SOLUTION INTRAVENOUS at 02:06

## 2025-06-23 RX ADMIN — METOPROLOL TARTRATE 50 MG: 50 TABLET, FILM COATED ORAL at 08:06

## 2025-06-23 RX ADMIN — Medication 9 MG: at 08:06

## 2025-06-23 RX ADMIN — HYDROCODONE BITARTRATE AND ACETAMINOPHEN 1 TABLET: 5; 325 TABLET ORAL at 10:06

## 2025-06-23 RX ADMIN — IOHEXOL 100 ML: 350 INJECTION, SOLUTION INTRAVENOUS at 02:06

## 2025-06-23 RX ADMIN — METOPROLOL TARTRATE 5 MG: 1 INJECTION, SOLUTION INTRAVENOUS at 06:06

## 2025-06-23 RX ADMIN — SODIUM CHLORIDE: 9 INJECTION, SOLUTION INTRAVENOUS at 02:06

## 2025-06-23 NOTE — TRANSFER OF CARE
"Anesthesia Transfer of Care Note    Patient: Vaibhav Vargas    Procedure(s) Performed: Procedure(s) (LRB):  EGD (N/A)  EGD,WITH HEMORRHAGE CONTROL    Patient location: PACU    Anesthesia Type: MAC    Transport from OR: Transported from OR on room air with adequate spontaneous ventilation    Post pain: adequate analgesia    Post assessment: no apparent anesthetic complications and tolerated procedure well    Post vital signs: stable    Level of consciousness: awake, alert and oriented    Nausea/Vomiting: no nausea/vomiting    Complications: none    Transfer of care protocol was followed    Last vitals: Visit Vitals  /63 (BP Location: Right forearm, Patient Position: Lying)   Pulse (!) 129   Temp 36.5 °C (97.7 °F) (Temporal)   Resp (!) 21   Ht 6' 2" (1.88 m)   Wt 107.6 kg (237 lb 3.4 oz)   SpO2 98%   BMI 30.46 kg/m²     "
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
## 2025-06-23 NOTE — NURSING
7.5 mg  epinephrine injected gastrically during EGD per Dr Henao. Next powder also used for hemostasis.

## 2025-06-23 NOTE — AI DETERIORATION ALERT
Artificial Intelligence Notification  [facility]  [address]  [place] [address]  Phone: [phone]    This documentation was triggered by an Artificial Intelligence Notification:    Admit Date: 2025   LOS: 11  Code Status: Full Code  : 1950  Age: 74 y.o.  Weight:   Wt Readings from Last 1 Encounters:   25 107.6 kg (237 lb 3.4 oz)        Sex: male  Bed: 1/[facility] A  MRN: [id number]  Attending Physician: Bereket Manrique MD     Date of Alert: 2025  Time AI Alert Received:             Vitals:    25 1659   BP: (!) 95/55   Pulse: (!) 124   Resp: 16   Temp: 97.9 °F (36.6 °C)     SpO2: 100 %      Artificial Intelligence alert discussed with Provider:     Name: Krystal España RN   Date/Time of Provider Notification: 1735      Patient Condition: Patient stable, s/p EGD w/active GI bleed - clipping performed & c/s to IR d/t high potential for re-bleed.  Received 2 units pRBCs earlier, currently receiving blood now.  Nurse aware to call ICU if pt declines.

## 2025-06-23 NOTE — PROVATION PATIENT INSTRUCTIONS
Discharge Summary/Instructions after an Endoscopic Procedure  Patient Name: Vaibhav Vargas  Patient MRN: [id number]  Patient YOB: 1950  Monday, June 23, 2025  Benjamin Reyna MD  Dear patient,  As a result of recent federal legislation (The Federal Cures Act), you may   receive lab or pathology results from your procedure in your [facility]   account before your physician is able to contact you. Your physician or   their representative will relay the results to you with their   recommendations at their soonest availability.  Thank you,  RESTRICTIONS:  During your procedure today, you received medications for sedation.  These   medications may affect your judgment, balance and coordination.  Therefore,   for 24 hours, you have the following restrictions:   - DO NOT drive a car, operate machinery, make legal/financial decisions,   sign important papers or drink alcohol.    ACTIVITY:  Today: no heavy lifting, straining or running due to procedural   sedation/anesthesia.  The following day: return to full activity including work.  DIET:  Eat and drink normally unless instructed otherwise.     TREATMENT FOR COMMON SIDE EFFECTS:  - Mild abdominal pain, nausea, belching, bloating or excessive gas:  rest,   eat lightly and use a heating pad.  - Sore Throat: treat with throat lozenges and/or gargle with warm salt   water.  - Because air was used during the procedure, expelling large amounts of air   from your rectum or belching is normal.  - If a bowel prep was taken, you may not have a bowel movement for 1-3 days.    This is normal.  SYMPTOMS TO WATCH FOR AND REPORT TO YOUR PHYSICIAN:  1. Abdominal pain or bloating, other than gas cramps.  2. Chest pain.  3. Back pain.  4. Signs of infection such as: chills or fever occurring within 24 hours   after the procedure.  5. Rectal bleeding, which would show as bright red, maroon, or black stools.   (A tablespoon of blood from the rectum is not serious, especially  if   hemorrhoids are present.)  6. Vomiting.  7. Weakness or dizziness.  GO DIRECTLY TO THE NEAREST EMERGENCY ROOM IF YOU HAVE ANY OF THE FOLLOWING:      Difficulty breathing              Chills and/or fever over 101 F   Persistent vomiting and/or vomiting blood   Severe abdominal pain   Severe chest pain   Black, tarry stools   Bleeding- more than one tablespoon   Any other symptom or condition that you feel may need urgent attention  Your doctor recommends these additional instructions:  If any biopsies were taken, your doctors clinic will contact you in 1 to 2   weeks with any results.  Recommendations:  Will keep npo for now   Likely will need another blood transfusion]  CONSULT IR FOR EMBOLIZATION OF LIKELY GDA, given ulcer in the duodenal bulb   that has proven refractory to endoscopic therapy  - [facility] to [facility].  Impressions:  - Large duodenal ulcer with visible vessel with clot and oozing.  s/p   clip/epi/hemospray with temporaty hemostatsis.  - Given the large size and this is his 4th EGD will consult IR for   embolization due to high risk of rebleeding. And failling endoscopic   management  - Normal esophagus.   - Red blood in the gastric fundus.   -  duodenal ulcer with a visible vessel.   - One hemostatic clip was successfully placed (MR conditional) in the   duodenal bulb.  Clip : Venga.   - An area in the duodenal bulb successfully injected.   - No specimens collected.  For questions, problems or results please call your physician - Benjamin Reyna MD at Work:  [phone].  [facility] ED at [phone]  IF A COMPLICATION OR EMERGENCY SITUATION ARISES AND YOU ARE UNABLE TO REACH   YOUR PHYSICIAN - GO DIRECTLY TO THE EMERGENCY ROOM.  MD Benjamin Blake MD  6/23/2025 3:16:22 PM  This report has been verified and signed electronically.  Dear patient,  As a result of recent federal legislation (The Federal Cures  Act), you may   receive lab or pathology results from your procedure in your [facility]   account before your physician is able to contact you. Your physician or   their representative will relay the results to you with their   recommendations at their soonest availability.  Thank you,  PROVATION

## 2025-06-23 NOTE — BRIEF OP NOTE
EGD with injection of epinephrine/hemoclip/hemo spray    Known duodenal ulcer acute drop in H&H melena hematochezia      Large amount of blood in the fundus fresh red with clots.    Went to the duodenum there was a large visible vessel in the setting of a large gastric ulcer on the p medial wall going through the sweep.  It appeared to be about 1 cm or more.  And had fibrous tissue on the tip.  With some clot around it.  We elected to place a clip present was in good positioning across the finger-like projection.  There was some mild oozing at the base after we injected with a total of 10 cc of 0.1% epinephrine.  And we elected to spray the area with hemo spray.  There was 2 previous placed clips in his area to      Plan is to keep him NPO.  Consult Interventional Radiology if patient is likely going to rebleed given the size of the vessel.  Of note he did receive Plavix today.  1 unit of FFP and 2 units of PRBC's.  Planning to give him 2 more units of blood now.    Pressures were volume responsive but we will likely need intervention in the near future.  We will reach out to IR

## 2025-06-23 NOTE — PLAN OF CARE
Sent Clinical Updates to [facility], requested status of Insurance Authorization, waiting on a response.

## 2025-06-23 NOTE — NURSING
Report called to 6th floor. Given to RAO Akins. Informed that Dr is putting in an order to transfuse blood when pt returns to his room. Consult MD for diet. Stated she understood. Pt will probably be NPO due to IR consult and possible procedure.    WDL

## 2025-06-23 NOTE — PROGRESS NOTES
Gastroenterology Progress Note    Subjective/Interval History:    GI called back 6/23 for melena and low H&H.    GI originally consulted 6/13 for anticoagulation clearance given recent GI bleed.    Patient was cleared from GI standpoint to begin blood thinners and signed off.     Hgb previously stable between 9-10; however, noted to have significant drop to 7.5 this morning..  Repeat was drawn 3 hrs later and noted to be 7.3.  Also now having melena / maroon colored stools - 3 total since last night.    He is afebrile and HDS.  Plts wnl.  INR 2.0  Family at bedside.     6/17 [facility] for decrease in EF:  70% proximal LAD stenosis.  Evaluated by CTS and deemed poor candidate for open heart surgery given active infection with multiple acute comorbidities.  Cardiology evaluated for possible LAD PCI.  No plans for intervention at this time given chronic nature of stenosis and afib with recurrent rvr. Would eventually like to perform YO with DCCV; however, patient needs to be able to tolerate anticoagulation.     Repeat CT A/P w.w/out contrast 6/20:  Right retroperitoneal hematoma appears slightly smaller, no extravasation.  GI hemorrhage can not be excluded since there is oral contrast throughout the colon.  Bibasilar atelectasis and bilateral pleural effusion slightly worse than prior examination.    6/20: started on lovenox, asa discontinued     6/21: started on plavix     Repeat CT A/P with and without contrast 6/23:  No contrast extravasation, contrast filling, or contrast blush seen within the bowel to suggest active hemorrhage.  Decrease fat stranding along the right lateral abdominal wall compared to previous.  Mild interval decrease in the size of previously noted right retroperitoneal hematoma measuring approximately 11 x 6 cm. Decreased thickening of the adjacent right iliacus and psoas major muscles is likewise observed. No contrast extravasation, contrast pooling, or contrast blush is seen to suggest active  "hemorrhage.       ROS:  Review of Systems   Constitutional:  Positive for malaise/fatigue. Negative for chills and fever.        Decreased appetite / po intake   Gastrointestinal:  Positive for blood in stool and melena. Negative for abdominal pain, nausea and vomiting.   Musculoskeletal:         Sacral pain 2/2 wound   Neurological:  Positive for weakness (generalized).       Vital Signs:  /62 (Patient Position: Lying)   Pulse 107   Temp 97.3 °F (36.3 °C) (Oral)   Resp 16   Ht 6' 2" (1.88 m)   Wt 107.6 kg (237 lb 3.4 oz)   SpO2 95%   BMI 30.46 kg/m²   Body mass index is 30.46 kg/m².    Physical Exam:  Physical Exam  Constitutional:       General: He is not in acute distress.     Appearance: He is ill-appearing.   HENT:      Head: Normocephalic and atraumatic.      Mouth/Throat:      Mouth: Mucous membranes are moist.      Pharynx: Oropharynx is clear.   Eyes:      Extraocular Movements: Extraocular movements intact.      Pupils: Pupils are equal, round, and reactive to light.   Cardiovascular:      Rate and Rhythm: Normal rate. Rhythm irregular.      Pulses: Normal pulses.      Heart sounds: Normal heart sounds.   Pulmonary:      Effort: Pulmonary effort is normal.      Breath sounds: Normal breath sounds.   Abdominal:      General: Bowel sounds are normal. There is no distension.      Palpations: Abdomen is soft.      Tenderness: There is no abdominal tenderness. There is no guarding.   Musculoskeletal:      Right lower leg: Edema present.      Left lower leg: Edema present.   Skin:     General: Skin is warm and dry.   Neurological:      Mental Status: He is alert and oriented to person, place, and time.   Psychiatric:         Mood and Affect: Mood normal.         Behavior: Behavior normal.         Labs:  Recent Results (from the past 48 hours)   CBC Without Differential    Collection Time: 06/22/25  4:19 AM   Result Value Ref Range    WBC 12.98 (H) 4.50 - 11.50 x10(3)/mcL    RBC 3.67 (L) 4.70 - 6.10 " x10(6)/mcL    Hgb 10.2 (L) 14.0 - 18.0 g/dL    Hct 33.7 (L) 42.0 - 52.0 %    MCV 91.8 80.0 - 94.0 fL    MCH 27.8 27.0 - 31.0 pg    MCHC 30.3 (L) 33.0 - 36.0 g/dL    RDW 18.9 (H) 11.5 - 17.0 %    Platelet 374 130 - 400 x10(3)/mcL    MPV 9.5 7.4 - 10.4 fL    NRBC% 0.0 %   Basic Metabolic Panel    Collection Time: 06/22/25  4:19 AM   Result Value Ref Range    Sodium 146 (H) 136 - 145 mmol/L    Potassium 4.5 3.5 - 5.1 mmol/L    Chloride 116 (H) 98 - 107 mmol/L    CO2 22 (L) 23 - 31 mmol/L    Glucose 133 (H) 82 - 115 mg/dL    Blood Urea Nitrogen 25.0 8.4 - 25.7 mg/dL    Creatinine 1.17 0.72 - 1.25 mg/dL    BUN/Creatinine Ratio 21     Calcium 8.3 (L) 8.8 - 10.0 mg/dL    Anion Gap 8.0 mEq/L    eGFR >60 mL/min/1.73/m2   POCT glucose    Collection Time: 06/22/25  9:30 PM   Result Value Ref Range    POCT Glucose 232 (H) 70 - 110 mg/dL   CBC with Differential    Collection Time: 06/23/25 12:52 AM   Result Value Ref Range    WBC 13.00 (H) 4.50 - 11.50 x10(3)/mcL    RBC 2.60 (L) 4.70 - 6.10 x10(6)/mcL    Hgb 7.5 (L) 14.0 - 18.0 g/dL    Hct 24.6 (L) 42.0 - 52.0 %    MCV 94.6 (H) 80.0 - 94.0 fL    MCH 28.8 27.0 - 31.0 pg    MCHC 30.5 (L) 33.0 - 36.0 g/dL    RDW 18.8 (H) 11.5 - 17.0 %    Platelet 341 130 - 400 x10(3)/mcL    MPV 9.9 7.4 - 10.4 fL    Neut % 80.0 %    Lymph % 9.8 %    Mono % 8.7 %    Eos % 0.0 %    Basophil % 0.3 %    Imm Grans % 1.2 %    Neut # 10.40 (H) 2.1 - 9.2 x10(3)/mcL    Lymph # 1.27 0.6 - 4.6 x10(3)/mcL    Mono # 1.13 0.1 - 1.3 x10(3)/mcL    Eos # 0.00 0 - 0.9 x10(3)/mcL    Baso # 0.04 <=0.2 x10(3)/mcL    Imm Gran # 0.16 (H) 0.00 - 0.04 x10(3)/mcL    NRBC% 0.2 %   Comprehensive Metabolic Panel    Collection Time: 06/23/25  3:21 AM   Result Value Ref Range    Sodium 148 (H) 136 - 145 mmol/L    Potassium 4.2 3.5 - 5.1 mmol/L    Chloride 118 (H) 98 - 107 mmol/L    CO2 24 23 - 31 mmol/L    Glucose 169 (H) 82 - 115 mg/dL    Blood Urea Nitrogen 43.4 (H) 8.4 - 25.7 mg/dL    Creatinine 1.45 (H) 0.72 - 1.25 mg/dL     Calcium 8.4 (L) 8.8 - 10.0 mg/dL    Protein Total 5.5 (L) 5.8 - 7.6 gm/dL    Albumin 2.0 (L) 3.4 - 4.8 g/dL    Globulin 3.5 2.4 - 3.5 gm/dL    Albumin/Globulin Ratio 0.6 (L) 1.1 - 2.0 ratio    Bilirubin Total 0.7 <=1.5 mg/dL    ALP 49 40 - 150 unit/L    ALT 11 0 - 55 unit/L    AST 13 11 - 45 unit/L    eGFR 51 mL/min/1.73/m2    Anion Gap 6.0 mEq/L    BUN/Creatinine Ratio 30    CBC with Differential    Collection Time: 06/23/25  3:21 AM   Result Value Ref Range    WBC 13.12 (H) 4.50 - 11.50 x10(3)/mcL    RBC 2.57 (L) 4.70 - 6.10 x10(6)/mcL    Hgb 7.3 (L) 14.0 - 18.0 g/dL    Hct 24.3 (L) 42.0 - 52.0 %    MCV 94.6 (H) 80.0 - 94.0 fL    MCH 28.4 27.0 - 31.0 pg    MCHC 30.0 (L) 33.0 - 36.0 g/dL    RDW 19.0 (H) 11.5 - 17.0 %    Platelet 339 130 - 400 x10(3)/mcL    MPV 9.4 7.4 - 10.4 fL    Neut % 80.8 %    Lymph % 9.0 %    Mono % 8.9 %    Eos % 0.1 %    Basophil % 0.3 %    Imm Grans % 0.9 %    Neut # 10.60 (H) 2.1 - 9.2 x10(3)/mcL    Lymph # 1.18 0.6 - 4.6 x10(3)/mcL    Mono # 1.17 0.1 - 1.3 x10(3)/mcL    Eos # 0.01 0 - 0.9 x10(3)/mcL    Baso # 0.04 <=0.2 x10(3)/mcL    Imm Gran # 0.12 (H) 0.00 - 0.04 x10(3)/mcL    NRBC% 0.2 %   Prepare RBC 1 Unit    Collection Time: 06/23/25  3:21 AM   Result Value Ref Range    UNIT NUMBER [id number]     UNIT ABO/RH A POS     DISPENSE STATUS Issued     Unit Expiration [id number]     Product Code P4927I79     Unit Blood Type Code 6200     CROSSMATCH INTERPRETATION Compatible    Type & Screen    Collection Time: 06/23/25  3:21 AM   Result Value Ref Range    Group & Rh A POS     Indirect Kasey GEL NEG     Specimen Outdate 06/26/2025 23:59    APTT    Collection Time: 06/23/25  3:21 AM   Result Value Ref Range    PTT 42.6 (H) 23.2 - 33.7 seconds   Protime-INR    Collection Time: 06/23/25  3:21 AM   Result Value Ref Range    PT 22.3 (H) 12.5 - 14.5 seconds    INR 2.0 (H) <=1.3   Urinalysis, Reflex to Urine Culture Urine, Clean Catch    Collection Time: 06/23/25  3:30 AM    Specimen: Urine    Result Value Ref Range    Color, UA Yellow Yellow, Light-Yellow, Colorless, Straw, Dark-Yellow    Appearance, UA Turbid (A) Clear    Specific Gravity, UA 1.044 (H) 1.005 - 1.030    pH, UA 6.0 5.0 - 8.5    Protein, UA 1+ (A) Negative    Glucose, UA Normal Negative, Normal    Ketones, UA Negative Negative    Blood, UA 1+ (A) Negative    Bilirubin, UA Negative Negative    Urobilinogen, UA Normal 0.2, 1.0, Normal    Nitrites, UA Negative Negative    Leukocyte Esterase, UA Negative Negative    RBC, UA 6-10 (A) None Seen, 0-2, 3-5, 0-5 /HPF    WBC, UA 0-5 None Seen, 0-2, 3-5, 0-5 /HPF    Bacteria, UA None Seen None Seen, Trace /HPF    Squamous Epithelial Cells, UA None Seen None Seen, Trace /HPF    Mucous, UA Trace (A) None Seen /LPF       Imaging:  CT Abdomen Pelvis W Wo Contrast  Result Date: 6/23/2025  Technique: CT of the abdomen and pelvis was performed with axial images as well as sagittal and coronal reconstruction images with intravenous contrast. Comparison: Comparison is with study dated June 20, 2025 Clinical History: GI Bleed. Dosage Information: Automated Exposure Control was utilized 3042 mGy.cm. Findings: Lines and Tubes: None. Thorax: Lungs: Stable bilateral pleural effusions are noted, more prominent on the right, with associated right lower lobe atelectasis or consolidation demonstrating air bronchograms. Pleura: No pneumothorax is seen in the visualized lung bases. Abdomen: Abdominal Wall: There is decreased fat stranding along the right lateral abdominal wall compared to the previous study. Liver: The liver appears unremarkable. Biliary System: No intrahepatic or extrahepatic biliary duct dilatation is seen. Gallbladder: Gallbladder is without wall thickening or pericholecystic inflammatory changes or fluid. Pancreas: No pancreatic mass, or, ductal dilatation is seen. There is moderate fatty of the pancreas. Spleen: The spleen appears unremarkable. Adrenals: There is stable hypodensity in the right  adrenal gland measuring 1 cm (Series 2, Image 35). This may reflect a right renal adenoma. There is a stable heterogeneous attenuation 1.5 cm diameter nodule in the medial limb of the left adrenal gland which is indeterminate in etiology. Follow-up as clinically indicated. Kidneys: Multiple hyperdense cysts are identified in the left kidney the largest of which measures 7.1 mm is on in the upper pole of the left kidney. The left kidney otherwise appears unremarkable with no stones masses or hydronephrosis identified. A single cyst measuring 8.6 mm is seen on Image 59, Series 2 in the mid pole of the right kidney for which no specific follow-up imaging is recommended.  The right kidney otherwise appears unremarkable with no stones masses or hydronephrosis identified. Aorta: There is extensive calcification of the abdominal aorta and its branches. Bowel: No contrast extravasation, contrast pooling, or contrast blush is seen within the bowel to suggest active hemorrhage. Esophagus: The visualized distal esophagus appears unremarkable. Stomach: The stomach appears unremarkable. Duodenum: Unremarkable appearing duodenum. Small Bowel: The small bowel appears unremarkable. Colon: Multiple diverticula are seen in the colon. No associated inflammatory stranding or pericolonic fluid is seen to suggest diverticulitis. Appendix: The appendix appears unremarkable and is seen on Image 118, Series 2 through Image 109, Series 2. Peritoneum: No free intraperitoneal air is seen. There is mild interval decrease in the size of the previously noted right retroperitoneal hematoma, measuring approximately 11 x 6 cm. Decreased thickening of the adjacent right iliacus and psoas major muscles is likewise observed. No contrast extravasation, contrast pooling, or contrast blush is seen to suggest active hemorrhage. Pelvis: Bladder: The bladder appears unremarkable. Male: Prostate gland: There are a few calcifications in the prostate gland.  Bony structures: Dorsal Spine: There is moderate scattered spondylosis of the visualized dorsal spine. Bony Pelvis: There is mild degenerative change of the hip.     Impression: 1. No contrast extravasation, contrast pooling, or contrast blush is seen within the bowel to suggest active hemorrhage. 2. There is decreased fat stranding along the right lateral abdominal wall compared to the previous study. 3. There is mild interval decrease in the size of the previously noted right retroperitoneal hematoma, measuring approximately 11 x 6 cm. Decreased thickening of the adjacent right iliacus and psoas major muscles is likewise observed. No contrast extravasation, contrast pooling, or contrast blush is seen to suggest active hemorrhage. Correlate with clinical and laboratory findings as regards additional evaluation and follow-up. 4. Details and other findings as discussed above. No significant discrepancy with overnight report. Electronically signed by: Sumit Núñez Date:    06/23/2025 Time:    08:35    X-Ray Chest 1 View  Result Date: 6/20/2025  EXAMINATION: XR CHEST 1 VIEW CLINICAL HISTORY: SepsisPICC LINE; COMPARISON: 11 June 2025 FINDINGS: Frontal view of the chest was obtained. Right PICC tip overlies the upper SVC.  The heart is not enlarged.  Low lung volumes with right basilar opacities and pleural effusion.  No pneumothorax.     Right PICC tip overlies the upper SVC.  Right basilar opacities with pleural effusion. Electronically signed by: Jesús Moura Date:    06/20/2025 Time:    16:26    CT Abdomen Pelvis W Wo Contrast  Result Date: 6/20/2025  EXAMINATION: CT ABDOMEN PELVIS W WO CONTRAST CLINICAL HISTORY: GI bleed;Retroperitoneal hematoma suspected; TECHNIQUE: Low dose axial images, sagittal and coronal reformations were obtained from the lung bases to the pubic symphysis following the IV administration of  contrast.  Delayed imaging and pre contrasted imaging was performed as well. Automatic exposure control  is utilized to reduce patient radiation exposure. COMPARISON: 06/12/2025 FINDINGS: There is bibasilar atelectasis and bilateral pleural effusions.  This is worse than the prior examination.. The liver appears normal.  No liver mass or lesion is seen.  Portal and hepatic veins appear normal. The gallbladder appears grossly unremarkable. The pancreas appears normal.  No pancreatic mass or lesion is seen. The spleen appears normal.  No splenic mass or lesion is seen. The right adrenal gland appears normal.  There is a nodule in the left adrenal gland that measures 1.6 x 1.2 cm.  Pre contrast Hounsfield units are 26.  The lesion is indeterminate. The kidneys are normal in size.  No hydronephrosis is seen.  No hydroureter is seen.  No nephrolithiasis or ureteral stone is seen.  Couple of punctate cysts are seen in the kidneys.  These appear to be simple cysts. There is a retroperitoneal hematoma seen on the right side which appears slightly smaller than the prior examination.  No evidence of active extravasation is seen. Urinary bladder is decompressed due to Palomo catheter.  There are some calcifications in the prostate.. No colitis is seen.  No diverticulitis is seen.  No colonic mass or lesion or inflammatory process is seen.  GI bleed cannot be excluded since there is oral contrast seen throughout the colon. No free air is seen. The bones appear grossly unremarkable.     Right retroperitoneal hematoma appears slightly smaller.  No active extravasation is seen GI hemorrhage cannot be excluded since there is oral contrast throughout the colon Bibasilar atelectasis and bilateral pleural effusions slightly worse than the prior examination Indeterminate left adrenal nodule.  CT scan or MRI adrenal mass protocol is recommended. Electronically signed by: Lonnie Cornell Date:    06/20/2025 Time:    13:18    Cardiac catheterization  Result Date: 6/17/2025    The estimated blood loss was none. The procedure log was documented  by Documenter: Lorena Rodriguez RN and verified by Jamse Tony MD. Date: 6/17/2025  Time: 3:24 PM Preprocedure diagnosis-new cardiomyopathy Postprocedure diagnosis-obstructive CAD Estimated blood loss-5 cc Tissue removal-none Complications-none Procedures performed: 1. Ultrasound-guided right groin access 2. Coronary angiography 3. Left ventricular hemodynamics 4. IFR interrogation of: LAD-0.85 Ramus-0.93 Circ-1.0 5. IVUS of LAD 6. LIMA angio 7. Perclose RCFA Findings: 1. Left main-Patent 2. Lad-70% calcified proximal stenosis, Mid stent is 2.5 mm stent in  a 3.5-4mm vessel.  MSA distal stent edge is 3.9mm2 3. LCX-50% mid lesion 4. Ramus-40% mid 5. RCA-30% proximal calcified stenosis 6. LVEDP-11 7. LIMA patent Procedure detail: Ultrasound-guided right groin access obtained.  Sheath inserted.  Femoral angiography performed.  Left main angiography performed with JL4.  RCA angiography performed with JR4 catheter.   Pigtail was then used for left ventricular hemodynamics.   Given moderate LAD disease further interrogation was needed.  Heparin was administered.  Six Lao EBU 3.5 catheter was advanced into the aorta.  Artery was pre treated with nitroglycerin.  Wire was normalized in the aorta.  The catheter was reengaged into the LM.  Wire was passed beyond the proximal lesion.  Guiding catheter was backed out of the LM, interrogation was performed.  Pullback to the guide was 1.  Process was repeated for the ramus and circumflex.  Final angiography demonstrated YAKELIN 3 flow.  Guide was redirected into the left subclavian and LIMA agio was performed.  Catheters were removed and a proglide was used for RCFA closure. Plan: 1. Consult CTS for LIMA to LAD, ANGELIA ligation, MAZE, and possible surgical extraction of prominent eustachian valve with thrombus. 2. If turned down he will likely need atherectomy and shockwave for underexpanded stent, and repeat stenting of the prox to mid LAD.     Fl Modified Barium Swallow  Speech  Result Date: 6/16/2025  See procedure notes from Speech Pathologist. This procedure was auto-finalized.    X-Ray Foot 2 View Left  Result Date: 6/13/2025  EXAMINATION: XR FOOT 2 VIEW LEFT CLINICAL HISTORY: great toe wound;Sepsis, unspecified organism COMPARISON: None. FINDINGS: There is some demineralization of the visualized osseous structures slightly more than expected for patient's age No acute displaced fractures or dislocations. There are degenerative changes with narrowing of the proximal and distal interphalangeal joints articular spaces are otherwise preserved with smooth articular surfaces No blastic or lytic lesions. On the provided images there is no evidence of primary and or secondary signs to suggest the presence of osteomyelitis, however, these might be lacking on plain films and therefore if clinically indicated other imaging modalities might prove helpful for further assessment     Demineralization of the visualized osseous structures. Degenerative changes. No clear evidence of osteomyelitis other imaging modalities might prove helpful for further assessment. Electronically signed by: Singh Woodruff Date:    06/13/2025 Time:    10:36    CTA Abdomen and Pelvis  Result Date: 6/12/2025  EXAMINATION: CTA ABDOMEN AND PELVIS CLINICAL HISTORY: GI bleed;. TECHNIQUE: Helical acquisition through the abdomen and pelvis without and with IV contrast targeting the arterial phase.  3D MIP reconstructions were provided for review.  Automatic exposure control, adjustment of mA/kV or iterative reconstruction technique was used to reduce radiation. COMPARISON: 5 June 2025 FINDINGS: There is a moderate right pleural effusion with right basilar consolidation.  Left lower lobe pulmonary embolus is again noted. There are no acute findings solid abdominal organs. No bowel obstruction or free air.  No significant inflammatory changes of the bowel. There is bladder wall thickening similar to prior.  There is a  Palomo in the bladder.  No pelvic free fluid.  Abdominal aorta normal in caliber.  Fairly mild atherosclerotic disease.  Widely patent mesenteric and renal arteries. There is a right retroperitoneal hematoma which is new compared to prior.  No convincing active extravasation.  Hematoma measures approximately 16 x 14 x 7 cm. There are no acute osseous findings.     1. Right retroperitoneal hematoma without convincing active extravasation. 2. Moderate right pleural effusion with right basilar consolidation. 3. Pulmonary embolus is again seen left lower lobe. Electronically signed by: Jesús Moura Date:    06/12/2025 Time:    17:37    NM GI Bleed Scan (Tagged RBC)  Result Date: 6/12/2025  EXAMINATION: NM GI BLEED STUDY CLINICAL HISTORY: GI bleed;UGI bleed, nonvariceal, endoscopy negative; TECHNIQUE: After injection of autologous red blood cells labeled in vitro with 23.6 mCi of Tc-99m pertechnetate, sequential dynamic images of the abdomen were obtained for  minutes. Delayed images obtained for a total of 4 hours. COMPARISON: 5 June 2025 FINDINGS: There is physiologic distribution of radiotracer.  A site of active GI bleeding is not seen after 4 hours of imaging.     No evidence of active GI bleeding over 4 hours of imaging. Electronically signed by: Jesús Moura Date:    06/12/2025 Time:    15:03    US Retroperitoneal Limited  Result Date: 6/12/2025  EXAMINATION: US RETROPERITONEAL LIMITED CLINICAL HISTORY: Alfonso; TECHNIQUE: Ultrasound evaluation of the kidneys. COMPARISON: CT abdomen pelvis dated 06/05/2025 FINDINGS: The right kidney measures 10.5 cm. The left kidney measures 8.5 cm. There is no hydronephrosis.  Urinary bladder is decompressed with a Palomo catheter in place.     No hydronephrosis. Electronically signed by: Cha Bueno Date:    06/12/2025 Time:    14:03    Transesophageal echo (YO)  Addendum Date: 6/12/2025    Left Ventricle: The left ventricle is normal in size. Normal wall motion. There is normal  systolic function. Quantitated ejection fraction is 56%. Elevated left ventricular filling pressure.   Right Ventricle: The right ventricle is mildly dilated Systolic function is normal.   Left Atrium: The left atrium is dilated Agitated saline study of the atrial septum is negative, suggesting absence of intracardiac shunt at the atrial level. No patent foramen ovale. Appendage velocity is normal at greater than 40 cm/sec. There is no thrombus in the left atrial appendage.   Right Atrium: The right atrium is dilated. There is a prominent Eustachian valve with a highly mobile echogenic structure attached to it most likely representing a thrombus and measuring 1.5 cm in its longest dimension. Dense spontaneous echo contrast visualized in the right atrial cavity.   Aortic Valve: The aortic valve is a trileaflet valve. There is no stenosis. There is no significant regurgitation.   Mitral Valve: The mitral valve is structurally normal. There is no stenosis. There is trace regurgitation.   Tricuspid Valve: There is trace regurgitation.   Aorta: The aortic root is normal in size measuring 3.5 cm. The ascending aorta is normal in size measuring 2.9 cm. The aortic arch is normal measuring 2.6 cm. The descending aorta is normal measuring 2.3 cm.   Pericardium: There is no pericardial effusion. YO obtained for further evaluation of right atrial mass noted on transthoracic echocardiogram.    Result Date: 6/12/2025    Left Ventricle: The left ventricle is normal in size. Normal wall motion. There is normal systolic function. Quantitated ejection fraction is 56%. Elevated left ventricular filling pressure.   Right Ventricle: The right ventricle is mildly dilated Systolic function is normal.   Left Atrium: The left atrium is dilated Agitated saline study of the atrial septum is negative, suggesting absence of intracardiac shunt at the atrial level. No patent foramen ovale.   Right Atrium: The right atrium is dilated. There is a  prominent Eustachian valve with a highly mobile echogenic structure attached to it most likely representing a thrombus and measuring 1.5 cm in its longest dimension. Dense spontaneous echo contrast visualized in the right atrial cavity.   Aortic Valve: The aortic valve is a trileaflet valve. There is no stenosis. There is no significant regurgitation.   Mitral Valve: The mitral valve is structurally normal. There is no stenosis. There is trace regurgitation.   Tricuspid Valve: There is trace regurgitation.   Aorta: The aortic root is normal in size measuring 3.5 cm. The ascending aorta is normal in size measuring 2.9 cm. The aortic arch is normal measuring 2.6 cm. The descending aorta is normal measuring 2.3 cm.   Pericardium: There is no pericardial effusion. YO obtained for further evaluation of right atrial mass noted on transthoracic echocardiogram.     Echo Saline Bubble? No  Result Date: 6/11/2025    Left Ventricle: The left ventricle is normal in size. Increased wall thickness. There is moderately reduced systolic function with a visually estimated ejection fraction of 30 - 40%.   Right Ventricle: The right ventricle is mildly dilated Systolic function is reduced.   Left Atrium: The left atrium is mildly dilated     X-Ray Chest 1 View  Result Date: 6/11/2025  EXAMINATION: XR CHEST 1 VIEW CLINICAL HISTORY: Shortness of breath TECHNIQUE: Single view of the chest COMPARISON: 06/04/2025 FINDINGS: Suspect small right effusion.  Further evaluation may be obtained with two views of the chest. The cardiomediastinal silhouette is within normal limits. Right-sided PICC line projects over the cavoatrial junction. No acute osseous abnormality.     Suspect small right effusion. Further evaluation may be obtained with two views of the chest. Electronically signed by: Vaibhav Jamil Date:    06/11/2025 Time:    06:10    CV Ultrasound doppler arterial legs bilat  Result Date: 6/10/2025  The right lower extremity arterial  system is patent with no evidence of focal stenosis or occlusion. The left lower extremity arterial system is patent with no evidence of focal stenosis or occlusion. The left distal posterior tibial artery demonstrated retrograde flow. KAIT study performed on 6/2/25, which demonstrated non compressible vessels at the bilateral ankles.     CV Ultrasound doppler venous legs bilat  Result Date: 6/7/2025    There is no evidence of a right lower extremity DVT.   There is no evidence of a left lower extremity DVT. Negative for deep and superficial vein thrombosis in bilateral lower extremities.     Echo  Result Date: 6/5/2025    Limited echo to r/o right heart strain.   Complete echo 5/26/25. YO 5/30/25)   Right Ventricle: The right ventricle is moderately dilated Systolic function is moderately reduced.   Right Atrium: The right atrium is normal in size.   Tricuspid Valve: There is mild regurgitation.   Pulmonary Artery: PASP is at least 50mmhg.   IVC/SVC: IVC was not well visualized due to poor acoustic window.     CTA Runoff ABD PEL Bilat Lower Ext  Result Date: 6/5/2025  EXAMINATION: CTA RUNOFF ABD PEL BILAT LOWER EXT CLINICAL HISTORY: Peripheral arterial disease (PAD), asymptomatic; TECHNIQUE: Helically acquired images were obtained from the lung bases through the lower extremities prior to and after IV administration of contrast. Axial, sagittal, coronal and MIP reformations were interpreted. Automated tube current modulation, weight-based exposure dosing, and/or iterative reconstruction technique utilized to reach lowest reasonably achievable exposure rate. DLP: 1709 mGy*cm COMPARISON: CT abdomen pelvis 05/28/2025 FINDINGS: VASCULATURE: Pulmonary arteries: Segmental pulmonary embolus at the left lower lobe. Aorta: Mild atherosclerosis without aneurysm or occlusion. Mesenteric arteries: No significant stenosis. Renal arteries:No significant stenosis. Right: Iliac arteries: No significant stenosis. Femoral: Diffuse  atherosclerotic change at the SFA and deep femoral branches.  Mild stenosis at the distal SFA. Popliteal: Atherosclerosis with mild stenosis. Tibioperoneal trunk: Patent tibioperoneal trunk.  Diminutive caliber vessels with calcific atherosclerosis below the trifurcation.  This makes it challenging to evaluate luminal patency of diminutive vessels below the knee.  Diminutive peroneal artery with enhancement fading at the ankle.  There does appear to be flow at the anterior tibial and posterior tibial artery at the ankle.  Dorsalis pedis artery enhances. Left: Iliac arteries: No significant stenosis. Femoral: Diffuse atherosclerotic change at the SFA and deep femoral branches.  Mild stenosis. Popliteal: Atherosclerosis with mild stenosis. Tibioperoneal trunk: Patent tibioperoneal trunk.  Diminutive caliber vessels with calcific atherosclerosis below the trifurcation.  This makes it challenging to evaluate luminal patency of diminutive vessels below the knee.  Peroneal artery does appear to be patent to the ankle.  Unable to confidently assess tibial arteries.  Defer to sonogram. HEART: There are coronary artery calcifications. LUNG BASES: See separate report for CT chest. LIVER: No arterially enhancing mass. BILIARY: No calcified gallstones. PANCREAS: No inflammatory change. SPLEEN: Normal in size ADRENALS: No mass. KIDNEYS/URETERS: No hydronephrosis.  Possible cyst at the upper pole right kidney, obscured by beam hardening artifact. GI TRACT/MESENTERY: Clip seen at the level of the duodenum.  No active extravasation appreciable.  Colonic diverticulosis without acute inflammatory change. PERITONEUM: No free fluid.No free air. LYMPH NODES: No enlarged lymph nodes by size criteria. BLADDER: Collapsed about a Palomo catheter. REPRODUCTIVE ORGANS: There are prostate calcifications. SOFT TISSUES: Ventral hernia mesh.  Body wall edema.  Bilateral lower extremity edema. BONES: Note large field of view runoff exam not  designed to evaluate fine osseous details.  No acute osseous abnormality seen.  Degenerative change at the spine.  Degenerative change at the feet     1. Segmental pulmonary embolus at the left lower lobe.  Findings discussed with  Bernice Lipscomb, the physician caring for patient 6/5/2025 09:11. 2. Diffuse atherosclerotic calcifications.  No appreciable significant stenosis above the knee.  Below the knee evaluation is limited due to diminutive caliber vessels and calcific atherosclerosis.  Defer to sonogram. Electronically signed by: Ann Armstrong Date:    06/05/2025 Time:    09:12    CT Chest Without Contrast  Result Date: 6/5/2025  EXAMINATION: CT CHEST WITHOUT CONTRAST CLINICAL HISTORY: Sepsis; TECHNIQUE: Helically acquired axial images, sagittal and coronal reformations were obtained from the thoracic inlet to the lung bases without the IV administration of contrast. Automated tube current modulation, weight-based exposure dosing, and/or iterative reconstruction technique utilized to reach lowest reasonably achievable exposure rate. DLP: 1708 mGy*cm COMPARISON: Chest radiograph 06/04/2025 FINDINGS: BASE OF NECK: Multinodular goiter.  This can be evaluated with outpatient thyroid sonogram after resolution of patient's acute illness. AORTA: Atherosclerosis. HEART: Heart at the upper limits of normal in size.  There are coronary artery calcifications. CHACE/MEDIASTINUM: Small nonspecific mediastinal lymph nodes.  Evaluation of hilar lymphadenopathy is limited without intravenous contrast. AIRWAYS: Deviation of the trachea to the right at the thoracic inlet related to nodular enlargement of the left lobe of the thyroid. LUNGS: Respiratory motion artifact.  Complete collapse and consolidation of the right middle lobe.  Moderate, near complete collapse and consolidation at the right lower lobe.  Tree-in-bud nodularity at the posterior left upper lobe and at the superior segment left lower lobe compatible with  bronchiolitis. PLEURA: Trace right pleural effusion. UPPER ABDOMEN: See separate report for CT abdomen THORACIC SOFT TISSUES: Right upper extremity PICC terminates at the SVC. BONES: Flowing osteophytes of the thoracic spine as can be seen with DISH.     1. Collapse and consolidation at the right middle lobe and right lower lobe.  Atelectasis versus pneumonia 2. Tree-in-bud nodularity at the left lung.  Bronchiolitis Electronically signed by: Ann Armstrong Date:    06/05/2025 Time:    08:52    X-Ray Chest 1 View  Result Date: 6/4/2025  EXAMINATION: XR CHEST 1 VIEW CLINICAL HISTORY: concern for pulmonary edema; TECHNIQUE: AP chest COMPARISON: Chest x-ray dated 06/02/2025 FINDINGS: Right-sided PICC line has its tip over the superior vena cava.  The heart is normal in size.  There is similar appearance of a right basilar airspace opacity.  There is no new airspace consolidation there is no pleural effusion or visible pneumothorax.     Stable exam without significant interval change Electronically signed by: Cha Bueno Date:    06/04/2025 Time:    15:50    CT Pelvis With IV Contrast NO Oral Contrast  Result Date: 6/3/2025  EXAMINATION: CT PELVIS WITH IV CONTRAST CLINICAL HISTORY: concern for prostatitis/prostatic abscess; Chief complaint: Shortness of Breath (From [facility] with complaints of SOB and lower back pain for about 3 days. Abdominal distension and bilateral leg edema noted. Palomo in place upon arrival with cloudy urine noted.)Hospital courseVaibhav Vargas is a 74 y.o. Black or  male with past medical history of cerebrovascular accident (March 2025), coronary artery disease status post LAD stent (2017), venous insufficiency, hypertension, first-degree AV block, and type 2 diabetes mellitus, who presented to the ED from a nursing home due to progressive weakness over the past week. His daughter provided history due to his communication difficulties. Associated symptoms  included altered mental status, back pain, and abdominal discomfort. In the ED, vitals were stable, but lab results were remarkable for severe hyperkalemia and azotemia.  He was treated with Lokelma, insulin, and albuterol, started on Rocephin for a urinary tract infection.  Imaging of the abdomen revealed hydronephrosis and malpositioned Fowler catheter.  The Fowler catheter has been subsequently replaced and renal function has improved.  Nephrology service continues to follow for assistance with management of PRIYA. TECHNIQUE: Helical axial images are acquired through the pelvis after the IV administration 95 mL of Omnipaque 350.  Images were reconstructed into the coronal sagittal plane.  Dose automated exposure control, dose radiation lowering technique was utilized. COMPARISON: CT abdomen and pelvis without contrast from 05/28/2025 CT abdomen and pelvis without contrast from 05/23/2025 FINDINGS: Pelvis: The bony structures of the pelvis are intact. A metallic density is seen in the left ilium. Hip: There is mild degenerative change in the bilateral hip. Right: No fracture dislocation or subluxation is seen involving the visualized right hip bony structures. Left: No fracture dislocation or subluxation is seen involving the visualized left hip bony structures. Femur: Proximal Femur: The visualized proximal right and left femurs appear intact. Pelvic structures: Bladder: A fowler catheter in place. The bladder wall appears thickened even though the bladder is collapsed. Visualized distal ureters: Normal. Visualized small bowel loops: Normal. Rectosigmoid colon: Multiple diverticulum are seen in the sigmoid colon without surrounding inflammation to suggest diverticulitis. Prostate and seminal vesicles: There are multiple calcifications in the prostate gland. No peripherally enhancing fluid collection identified to suggest an abscess. Pelvic cavity: Normal. Pelvic wall: Normal. Systemic arteries and veins: There is mild  atheromatous calcification in the bilateral common iliac arteries and imaged bilateral superficial femoral arteries. Osseous structures: Mild to moderate degenerative change is seen in the imaged osseous structures.     1. A fowler catheter in place. The bladder wall appears thickened even though the bladder is collapsed. This could reflect an element of cystitis. Correlate with clinical and laboratory findings. 2. No acute pelvic solid organ or bowel pathology identified. Details and other findings as discussed above [employer] concurrence Electronically signed by: Jayme Hagan MD Date:    06/03/2025 Time:    07:54    Ankle Brachial Indices (KAIT)  Result Date: 6/3/2025  The right lower extremity ankle brachial index is 2.64 suggesting non compressible tibial vessels.  The left lower extremity ankle brachial index is 1.49 suggesting non compressible tibial vessels.      X-Ray Chest 1 View for Line/Tube Placement  Result Date: 6/2/2025  EXAMINATION XR CHEST 1 VIEW FOR LINE/TUBE PLACEMENT CLINICAL HISTORY picc placement; TECHNIQUE A total of 1 frontal image(s) of the chest. COMPARISON 1 June 2025 FINDINGS Lines/tubes/devices: Interval placement of right upper extremity PICC, catheter tip projects over the mid SVC region.  Multiple ECG leads again overlie the chest. The cardiac silhouette and central vascular structures are unchanged.  The trachea is midline. Subtle ill-defined right basilar infiltrate is better visualized.  Remaining lung fields are clear.  There is no enlarging pleural effusion or convincing pneumothorax. Regional osseous structures and extrathoracic soft tissues are similar. IMPRESSION 1. Interval placement of right upper extremity PICC, acceptable positioning. 2. Better visualized right basilar infiltrate. Electronically signed by: Luis Enrique Posada Date:    06/02/2025 Time:    17:25    X-Ray Chest 1 View  Result Date: 6/1/2025  EXAMINATION: XR CHEST 1 VIEW CLINICAL HISTORY: Sob; TECHNIQUE: Single  frontal view of the chest was performed. COMPARISON: 05/31/2025 FINDINGS: LINES AND TUBES: EKG/telemetry leads overlie the chest. MEDIASTINUM AND CHACE: The cardiac silhouette is normal. LUNGS: Lung volumes are low with associated atelectatic change.  Mild improved aeration at the right lung base. PLEURA:No pleural effusion. No pneumothorax. OTHER: No acute osseous abnormality.     Mild improved aeration at the right lung base. Electronically signed by: Ann Armstrong Date:    06/01/2025 Time:    12:46    X-Ray Chest 1 View  Result Date: 5/31/2025  EXAMINATION: XR CHEST 1 VIEW CLINICAL HISTORY: new SOB post blood transfusion; TECHNIQUE: Single frontal view of the chest was performed. COMPARISON: 05/29/2025 FINDINGS: The lungs are hypoaerated which accentuates the bronchovascular markings but no infiltrate is seen. The heart is normal in appearance. The pulmonary vascularity is unremarkable. No pleural effusion is seen. Bones and joints show no acute abnormality.     Hypoaerated lungs Electronically signed by: Lonnie Cornell Date:    05/31/2025 Time:    16:57    X-Ray Chest 1 View  Result Date: 5/29/2025  EXAMINATION: XR CHEST 1 VIEW CLINICAL HISTORY: hypoxic respiratory failure; TECHNIQUE: One view COMPARISON: May 23, 2025. FINDINGS: Cardiopericardial silhouette is within normal limits.  Right basilar atelectasis.  No convincing acute dense focal or segmental consolidation, congestive process, significant pleural effusions or pneumothorax.     No acute cardiopulmonary process identified. Electronically signed by: Sumit Núñez Date:    05/29/2025 Time:    09:06    CT Abdomen Pelvis  Without Contrast  Result Date: 5/28/2025  EXAMINATION: CT ABDOMEN PELVIS WITHOUT CONTRAST CLINICAL HISTORY: concern for prostate abscess; TECHNIQUE: Low dose axial images, sagittal and coronal reformations were obtained from the lung bases to the pubic symphysis.  No contrast was administered.  Automatic exposure control is utilized to  reduce patient radiation exposure. COMPARISON: 05/23/2025 FINDINGS: There is right lower lobe atelectasis.  There is a right-sided pleural effusion. The liver appears normal.  No obvious liver mass or lesion is seen. Gallbladder appears normal.  No gallstones are seen. The pancreas appears normal.  No inflammatory changes are seen in the pancreatic region. The spleen appears normal and adrenal glands appear normal.  No adrenal nodule is seen. No nephrolithiasis is seen.  No hydronephrosis is seen.  No hydroureter is seen.  No ureteral stone is seen. No colitis is seen.  No diverticulitis is seen.  No appendicitis is seen. No free air seen.  No free fluid is seen. The urinary bladder is decompressed due to Palomo catheter.  There is some air in the urinary bladder likely due to the catheter. The prostate is heavily calcified.  The area of hypoattenuation that was seen at the base of the prostate on the prior examination is less well visualized on this exam.  Contrast enhanced examination is recommended. Bones show no acute abnormality.     The hypoattenuated area in the prostate that was seen on prior examination is not visualized on today's exam.  Additional imaging with contrast enhancement may be beneficial for better delineation. Coarse heterotrophic calcifications seen throughout the prostate Interval placement of a Palomo catheter in the urinary bladder with decompressed appearing urinary bladder Interval development of right lower lobe atelectasis and moderate right-sided pleural effusion Electronically signed by: Lonnie Cornell Date:    05/28/2025 Time:    11:38    US Abdomen Limited_Liver  Result Date: 5/27/2025  EXAMINATION: US ABDOMEN LIMITED_LIVER CLINICAL HISTORY: transaminitis; COMPARISON: CT 23 May 2025. FINDINGS: Grayscale, color and spectral doppler evaluation of the right upper quadrant. Pancreas obscured by bowel gas.  Imaged portion of the IVC normal in caliber. The liver is mildly enlarged. No focal  suspicious liver lesion is seen. Patent portal vein with hepatopetal flow. No gallstones. No significant gallbladder wall thickening or pericholecystic fluid.  The common bile duct is normal in caliber  and measures 2 mm. Right kidney measures 10 cm in length.  Right hydronephrosis improved compared to the prior CT     1. Mild hepatomegaly. 2. Right kidney hydronephrosis improved compared to the prior CT. Electronically signed by: Jesús Moura Date:    05/27/2025 Time:    15:58    Echo Saline Bubble? No  Result Date: 5/26/2025    Left Ventricle: The left ventricle is normal in size. Normal wall thickness. There is moderately reduced systolic function with a visually estimated ejection fraction of 35 - 40%. Unable to assess diastolic function due to atrial fibrillation.   Right Ventricle: The right ventricle is moderately dilated Systolic function is moderately reduced. Prominent moderator band in the right ventricle.   Left Atrium: The left atrium is mildly dilated   Right Atrium: The right atrium is mildly dilated . 1.9 x 1.1 x 1.8 small mobile echogenic mass present.   Aortic Valve: There is mild aortic regurgitation.   Tricuspid Valve: There is mild regurgitation.   Pulmonary Artery: The estimated pulmonary artery systolic pressure is 49 mmHg.   IVC/SVC: Elevated venous pressure at 15 mmHg.          Assessment/Plan:    74-year-old male previously known to Dr. Torres but followed now by Dr. Chapis Lane past medical history of type 2 diabetes, HTN, obesity, osteoarthritis, CAD s/p LAD stent 2017, venous insufficiency who initially presented to [facility] 5/23 from a local nursing home for generalized weakness.  Admitted with severe PRIYA, UTI, CHF exacerbation (EF 35-40 % on TTE 5/26), and atrial fibrillation/atrial flutter with RVR.   TTE also concerning for right atrial mass.  Underwent YO 5/30 which confirmed right atrial thrombus.       GI consulted 6/13 for anticoagulation clearance given recent GI bleed.     Patient was cleared from GI standpoint to begin blood thinners and signed off.  Called back 6/23 for melena and anemia.     1.   Acute on chronic macrocytic anemia   - Hgb 9.5--8.6--9.3--9.7--10.1--9.6--9.6--10.6--10.2--7.5--7.3     2.  Recent duodenal ulcer with bleeding requiring multiple EGDs (see below)     06/02: EGD: 3 duodenal ulcers - 15 mm posterior wall with oozing s/p APC and 10 mm on lateral wall with adherent clot s/p heater probe  06/04: EGD: 13 mm posterior wall ulcer with VV s/p bipolar and clips x 2.  Dieulafoy on edge of ulcer s/p clip x 2  06/11: EGD: Nonobstructing, nonbleeding duodenal ulcer with a clean ulcer base.  Prior clips seen with no active bleeding or high-risk stigmata.  Nonobstructing, nonbleeding duodenal ulcer with a clean ulcer base.     3.   Right retroperitoneal hematoma on CTA 6/12, improving   - General surgery consulted - no surgical intervention, signed off.      4.  Right atrial thrombus, on YO 5/30    5.  Left lower lobe pulmonary embolism on CTA runnoff ab/pel/bilat LE 6/5     -Keep NPO for EGD today  -Continue ppi bid  -Monitor H/H and transfuse as needed to Hgb 7  -Monitor stools for bleeding  -INR of 2 noted.  1 unit FFP ordered.   -Additional recommendations pending endoscopic findings     Case and plan discussed with Dr. Maribell Chambers, FNP-C

## 2025-06-23 NOTE — PROGRESS NOTES
"Hospital Medicine  Progress Note    Patient Name: Vaibhav Vargas  MRN: [id number]  Status: IP- Inpatient   Admission Date: 6/12/2025  Length of Stay: 11  Date of Service: 06/23/2025       CC: hospital follow-up for obstructive CAD       SUBJECTIVE   "74 y.o. male with PMH CVA 03/2025, CAD s/p LAD stent 2017, venous insuffiency, HTN, 1st Degree AVB, NIDDM2 who initially presented to [facility] ED on 05/23 by daughter from NH for weakness.  He was found to have dislodged fowler catheter with severe PRIYA and UTI, fowler was replaced, he had good urinary output with significant improvement in renal function. He was also getting diureses for CHF exacerbation, workup showed EF of 35-40%, a thrombus was incidentally found in his right atrium. He was started on full dose Lovenox the day after. He continued to be in a fib with RVR despite BB, CCB were being avoided due to low EF, cardioversion was avoided due to existing thrombus that was confirmed on YO. On 06/01 he had significant drop in his hemoglobin with worsening RVR, Lovenox was held, EGD on 06/02 showed non-bleeding ulcers with active oozing of blood from his gastric mucosa, GI did not recommend to hold anticoagulation due to risk for embolization. On 06/04 the patient became hypotensive with A flutter and RVR, hemoglobin dropped to 4.7, Lovenox was held again and massive transfusion was initiated, the patient was upgraded to the ICU due to hemodynamic instability. Lovenox was started once again on 06/06 and he was downgraded from the ICU on 06/08.  Patient bled again with an acute drop in hemoglobin on 06/11 and was noted to have melena.  He was transferred to our facility for GI services. Prior to transferred found to have a retroperitoneal bleed.      Patient was also in Afib with RVR. He was seen by [facility] team and started on IV amiodarone and later po metoprolol. His H&H was closely monitored. He was continued on PPI. Hb stabilized. He was eating well. He had emma LE " "weakness from a previous CVA. He was making a slow clinical recovery.      He was followed by [facility] team and LHC done and showed   Findings:  1. Left main-Patent  2. Lad-70% calcified proximal stenosis, Mid stent is 2.5 mm stent in  a   3.5-4mm vessel.  MSA distal stent edge is 3.9mm2  3. LCX-50% mid lesion  4. Ramus-40% mid  5. RCA-30% proximal calcified stenosis  6. LVEDP-11  7. LIMA patent     Recommended:  1. Consult CTS for LIMA to LAD, ANGELIA ligation, MAZE, and possible surgical   extraction of prominent eustachian valve with thrombus.   2. If turned down he will likely need atherectomy and shockwave for   underexpanded stent, and repeat stenting of the prox to mid LAD."     "CT surgery do not recommended surgery, instead requested CIS to perform high risk PCI."     Cardiology holding off on high-risk PCI.   Patient continued with dizziness and remained in Afib; Cardiology felt symptomatology related to this, but would need to be anticoagulated to perform DCCV.  We restarted AC with FD LOVENOX.  Repeat CT of the abdomen noted retroperitoneal bleed was stable, slightly smaller than previous.  H&H remained stable.       Today: Patient seen and examined at bedside, and chart reviewed.  Hemoglobin has dropped significantly overnight from 10.2 to 7.3.  Stat CT of the retroperitoneum noted increasing size of his prior retroperitoneal bleed, though no active extravasation could be identified.      MEDICATIONS   Scheduled   amiodarone  200 mg Oral BID    Followed by    [START ON 6/26/2025] amiodarone  200 mg Oral Daily    clopidogreL  75 mg Oral Daily    DAPTOmycin (CUBICIN) IV (PEDS and ADULTS)  500 mg Intravenous Q48H    losartan  12.5 mg Oral Daily    metoprolol tartrate  50 mg Oral TID    pantoprazole  40 mg Intravenous BID     Continuous Infusions  None      PHYSICAL EXAM   VITALS: T 97.5 °F (36.4 °C)   /68   P 80   RR 18   O2 100 %    GENERAL: Awake and in NAD  LUNGS: CTA anteriorly  CVS: Normal rate, " irregularly irregular rhythm  GI/: Soft, NT, bowel sounds positive.  EXTREMITIES: radial pulse 2+  NEURO: AAOx3  PSYCH: Cooperative      LABS   CBC  Recent Labs     06/23/25  0052 06/23/25  0321   WBC 13.00* 13.12*   RBC 2.60* 2.57*   HGB 7.5* 7.3*   HCT 24.6* 24.3*   MCV 94.6* 94.6*   MCH 28.8 28.4   MCHC 30.5* 30.0*   RDW 18.8* 19.0*    339     CHEM  Recent Labs     06/22/25  0419 06/23/25  0321   * 148*   K 4.5 4.2   CO2 22* 24   BUN 25.0 43.4*   CREATININE 1.17 1.45*   CALCIUM 8.3* 8.4*   ALBUMIN  --  2.0*   GLOBULIN  --  3.5   ALKPHOS  --  49   ALT  --  11   AST  --  13   BILITOT  --  0.7       MICROBIOLOGY     Microbiology Results (last 7 days)       Procedure Component Value Units Date/Time    Blood Culture [[id number]]  (Normal) Collected: 06/13/25 0830    Order Status: Completed Specimen: Blood from Arm, Right Updated: 06/18/25 0902     Blood Culture No Growth at 5 days    Blood Culture [[id number]]  (Normal) Collected: 06/13/25 0733    Order Status: Completed Specimen: Blood from Antecubital, Left Updated: 06/18/25 0902     Blood Culture No Growth at 5 days            ASSESSMENT   Acute blood loss anemia  Retroperitoneal bleeding, stable  GI bleed secondary to PUD  Afib with RVR   Obstructive CAD, not a candidate for CABG  ARF/ATN, improved   ? Right atrial thrombus   Pulmonary Embolism   Left foot osteomyelitis   Chronic systolic heart failure  Morbid obesity   NIDDM II  Essential HTN     h/o CVA 03/2025, CAD s/p LAD stent 2017, 1° AVB,    PLAN   Discontinue Lovenox  Will transfuse 2 units PRBCs today given his heart disease, in attempt to keep Hgb near 10.  Continue to monitor H&H closely, will repeat this evening s/p PRBCs  Remains in Afib, though not a candidate for DCCV with active bleeding  Will discuss with IR for possible embolization if feasible  Cardiology also holding off on high-risk PCI for now  Continue Plavix/statin,   Contionue BB/amiodarone for Afib  Otherwise continue  current management and close monitoring, including telemetry  IV daptomycin for left foot osteomyelitis as previously scheduled until July 14th; continue concomitant wound care      Prophylaxis:  FD Lovenox as above        Bereket Manrique MD  [facility]    Critical Care Time: 35 minutes spent in direct hands on care, review of labs, imaging and medical record, and discussion of diagnosis, treatment, prognosis with patient/family.  Patient remains at high-risk for clinical decompensation  Critical Care diagnosis: Acute blood loss anemia

## 2025-06-23 NOTE — NURSING
Pt to RR. HR fluctuating from 13s to 150s. FELISA Matos at bedside and is awate. Dr Burks, anesthesia MD called to bedside. Said that he will order more blood to be infused when pt gets back to his inpt room.

## 2025-06-23 NOTE — OR NURSING
Pt AA&O. Dr Reyna at bedside to discuss procedure and findings and plan with pt. Informed that he will consult with IR. Pt shook his head yes.

## 2025-06-23 NOTE — PROGRESS NOTES
Inpatient Nutrition Assessment    Admit Date: 6/12/2025   Total duration of encounter: 11 days   Patient Age: 74 y.o.    Nutrition Recommendation/Prescription     Diet Minced & Moist (IDDSI Level 5) Moderately Thick Liquids (IDDSI Level 3) per SLP recs.   Encourage ALEXIS BID (provides 90 kcal and 2.5 g protein per serving)- mix with thickener packets to appropriate consistency.   Encourage adequate PO intake  Monitor appetite/PO intake, weight, and labs    Communication of Recommendations: reviewed with nurse, reviewed with patient, and reviewed with family    Nutrition Assessment     Malnutrition Assessment/Nutrition-Focused Physical Exam    Malnutrition Context: acute illness or injury (06/18/25 1543)  Malnutrition Level: moderate (06/18/25 1543)  Energy Intake (Malnutrition): other (see comments) (Does not meet criteria) (06/18/25 1543)  Weight Loss (Malnutrition): other (see comments) (Does not meet criteria) (06/18/25 1543)  Subcutaneous Fat (Malnutrition): mild depletion (06/18/25 1543)        Thoracic and Lumbar Region: mild depletion  Muscle Mass (Malnutrition): mild depletion (06/18/25 1543)  Jew Region (Muscle Loss): mild depletion                                A minimum of two characteristics is recommended for diagnosis of either severe or non-severe malnutrition.    Chart Review    Reason Seen: length of stay and follow-up    Malnutrition Screening Tool Results   Have you recently lost weight without trying?: No  Have you been eating poorly because of a decreased appetite?: No   MST Score: 0   Diagnosis:  Anemia from PUD: stable   Retroperitoneal bleed: stable   Afib with RVR   ARF/ ATN: improved   Right atrial thrombus   Acute PE   Left foot osteomyelitis   Chronic systolic HF  Morbid obesity   Mello LE weakness        Relevant Medical History:    Chronic lumbar pain      Diabetes mellitus, type 2      Edema      Hypertension      Obesity, unspecified      Osteoarthritis of multiple joints         Scheduled Medications:  amiodarone, 200 mg, BID   Followed by  [START ON 6/26/2025] amiodarone, 200 mg, Daily  clopidogreL, 75 mg, Daily  DAPTOmycin (CUBICIN) IV (PEDS and ADULTS), 500 mg, Q48H  glycopyrrolate, ,   LIDOcaine (PF) 10 mg/ml (1%), ,   losartan, 12.5 mg, Daily  metoprolol tartrate, 100 mg, BID  pantoprazole, 40 mg, BID  propofol, ,     Continuous Infusions:     PRN Medications:  0.9%  NaCl infusion (for blood administration), , Q24H PRN  0.9%  NaCl infusion (for blood administration), , Q24H PRN  0.9%  NaCl infusion (for blood administration), , Q24H PRN  0.9%  NaCl infusion (for blood administration), , Q24H PRN  0.9%  NaCl infusion (for blood administration), , Q24H PRN  0.9%  NaCl infusion (for blood administration), , Q24H PRN  acetaminophen, 650 mg, Q4H PRN  albuterol-ipratropium, 3 mL, Q6H PRN  aluminum-magnesium hydroxide-simethicone, 30 mL, QID PRN  bisacodyL, 10 mg, Daily PRN  dextrose 50%, 12.5 g, PRN  dextrose 50%, 25 g, PRN  glucagon (human recombinant), 1 mg, PRN  glucose, 16 g, PRN  glucose, 24 g, PRN  glycopyrrolate, ,   hydrALAZINE, 10 mg, Q4H PRN  HYDROcodone-acetaminophen, 1 tablet, Q6H PRN  LIDOcaine (PF) 10 mg/ml (1%), ,   melatonin, 9 mg, Nightly PRN  metoprolol, 5 mg, Q15 Min PRN  morphine, 2 mg, Q4H PRN  naloxone, 0.02 mg, PRN  ondansetron, 8 mg, Q8H PRN  ondansetron, 4 mg, Q8H PRN  polyethylene glycol, 17 g, TID PRN  propofol, ,   simethicone, 1 tablet, QID PRN  sodium chloride 0.9%, 10 mL, PRN    Calorie Containing IV Medications: no significant kcals from medications at this time    Recent Labs   Lab 06/17/25  0634 06/18/25  8440 06/19/25  7130 06/21/25  0356 06/22/25  0419 06/23/25  0052 06/23/25  0321    142 144 147* 146*  --  148*   K 3.9 3.6 3.5 3.3* 4.5  --  4.2   CALCIUM 8.8 8.4* 8.3* 8.8 8.3*  --  8.4*   MG 2.00  --   --   --   --   --   --    * 113* 115* 114* 116*  --  118*   CO2 23 22* 23 25 22*  --  24   BUN 28.0* 28.2* 23.9 22.3 25.0  --  43.4*    CREATININE 1.52* 1.41* 1.21 1.12 1.17  --  1.45*   EGFRNORACEVR 48 52 >60 >60 >60  --  51   * 139* 120* 112 133*  --  169*   BILITOT  --  0.7  --   --   --   --  0.7   ALKPHOS  --  68  --   --   --   --  49   ALT  --  12  --   --   --   --  11   AST  --  13  --   --   --   --  13   ALBUMIN  --  2.1*  --   --   --   --  2.0*   WBC 8.38 7.24 7.37 11.01 12.98* 13.00* 13.12*   HGB 10.1* 9.6* 9.6* 10.6* 10.2* 7.5* 7.3*   HCT 33.4* 31.2* 31.0* 34.6* 33.7* 24.6* 24.3*     Nutrition Orders:  Diet Minced & Moist (IDDSI Level 5) Moderately Thick Liquids (IDDSI Level 3)  Diet NPO Except for: Medication, Ice Chips  Diet NPO  Dietary nutrition supplements BID; Kavin - Any flavor    Appetite/Oral Intake: poor/0-25% of meals  Factors Affecting Nutritional Intake: texture modification  Social Needs Impacting Access to Food: none identified  Food/Yarsanism/Cultural Preferences: none reported  Food Allergies: none reported  Last Bowel Movement: 06/23/25  Wound(s):     Wound 05/23/25 2110 Pressure Injury Sacral spine-Tissue loss description: Full thickness  [REMOVED]      Wound 03/29/25 1900 Pressure Injury Left dorsal Foot-Tissue loss description: Full thickness noted    Comments    6/18/2025: Pt reports a good appetite/PO intake and reports decreased appetite/PO intake since admit due to texture modification, pt on pureed diet at time of visit, now on Minced and moist. Pt denies N/V/D/C and chewing/swallowing difficulties. Pt declined ONS. Encouraged adequate PO intake. Will add KAVIN BID to promote wound healing. Per EMR, pt weighed 115.7 kg on 3/24/2025 (7.0% wt loss in 3 months, insignificant). Last BM noted. Will monitor.    6/19/25: Per RN, pt is refusing to eat 2/2 the texture modifications of his food. Pt was evaluated by SLP yesterday and is on Minced and Moist diet with moderately thickened liquids.     6/23/2025: Pt outside of room, family member reports continued poor appetite/PO intake but denies N/V/D/C. Last  "BM noted. Will monitor.    Anthropometrics    Height: 6' 2" (188 cm), Height Method: Stated  Last Weight: 107.6 kg (237 lb 3.4 oz) (25 1044), Weight Method: Bed Scale  BMI (Calculated): 30.4  BMI Classification: obese grade I (BMI 30-34.9)        Ideal Body Weight (IBW), Male: 190 lb     % Ideal Body Weight, Male (lb): 124.85 %                 Usual Body Weight (UBW), k.7 kg  % Usual Body Weight: 93.19     Usual Weight Provided By: EMR weight history    Wt Readings from Last 5 Encounters:   25 107.6 kg (237 lb 3.4 oz)   25 107.6 kg (237 lb 3.4 oz)   25 115.7 kg (255 lb)   24 128.2 kg (282 lb 11.2 oz)   24 128 kg (282 lb 3 oz)     Weight Change(s) Since Admission:   2025: no new wts  2025: no new wts  Wt Readings from Last 1 Encounters:   25 1044 107.6 kg (237 lb 3.4 oz)   25 0530 107.6 kg (237 lb 3.4 oz)   Admit Weight: 107.6 kg (237 lb 3.4 oz) (as per  vitals) (25 0530), Weight Method: Bed Scale    Estimated Needs    Weight Used For Calorie Calculations: 107.6 kg (237 lb 3.4 oz)  Energy Calorie Requirements (kcal): 1885--2262 (MSJ x 1.0-1.2)  Energy Need Method: [place]- Jeor  Weight Used For Protein Calculations: 107.6 kg (237 lb 3.4 oz)  Protein Requirements:  (0.8-1.0 g/kg)  Fluid Requirements (mL): 1885 (1 mL/kcal)  CHO Requirement: 212-259 g/day (~45-55% est min kcal needs)     Enteral Nutrition     Patient not receiving enteral nutrition at this time.    Parenteral Nutrition     Patient not receiving parenteral nutrition support at this time.    Evaluation of Received Nutrient Intake    Calories: not meeting estimated needs  Protein: not meeting estimated needs    Patient Education     Not applicable.    Nutrition Diagnosis     PES: Inadequate oral intake related to acute illness as evidenced by poor PO intake for ~4 days. (active)     PES: Moderate acute disease or injury related malnutrition Related to acute illness As " Evidenced by:  - muscle mass depletion: 1 area of mild muscle loss (Temporalis) - loss of subcutaneous fat: 1 area of mild fat loss (Ribs) active    Nutrition Interventions     Intervention(s): modified composition of meals/snacks and commercial food  Intervention(s):      Goal: Meet greater than 80% of nutritional needs by follow-up. (goal not met)    Nutrition Goals & Monitoring     Dietitian will monitor: food and beverage intake, weight, electrolyte/renal panel, glucose/endocrine profile, and gastrointestinal profile  Discharge planning: Minced and moist diet with texture modifications per SLP  Nutrition Risk/Follow-Up: patient at increased nutrition risk; dietitian will follow-up twice weekly   Please consult if re-assessment needed sooner.
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
## 2025-06-24 LAB
ALBUMIN SERPL-MCNC: 2.1 G/DL (ref 3.4–4.8)
ALBUMIN/GLOB SERPL: 0.8 RATIO (ref 1.1–2)
ALP SERPL-CCNC: 40 UNIT/L (ref 40–150)
ALT SERPL-CCNC: 9 UNIT/L (ref 0–55)
ANION GAP SERPL CALC-SCNC: 8 MEQ/L
AST SERPL-CCNC: 9 UNIT/L (ref 11–45)
BASOPHILS # BLD AUTO: 0.01 X10(3)/MCL
BASOPHILS NFR BLD AUTO: 0.1 %
BILIRUB SERPL-MCNC: 0.7 MG/DL
BUN SERPL-MCNC: 60.9 MG/DL (ref 8.4–25.7)
CALCIUM SERPL-MCNC: 8.9 MG/DL (ref 8.8–10)
CHLORIDE SERPL-SCNC: 122 MMOL/L (ref 98–107)
CO2 SERPL-SCNC: 23 MMOL/L (ref 23–31)
CREAT SERPL-MCNC: 1.95 MG/DL (ref 0.72–1.25)
CREAT/UREA NIT SERPL: 31
EOSINOPHIL # BLD AUTO: 0 X10(3)/MCL (ref 0–0.9)
EOSINOPHIL NFR BLD AUTO: 0 %
ERYTHROCYTE [DISTWIDTH] IN BLOOD BY AUTOMATED COUNT: 18.6 % (ref 11.5–17)
GFR SERPLBLD CREATININE-BSD FMLA CKD-EPI: 35 ML/MIN/1.73/M2
GLOBULIN SER-MCNC: 2.7 GM/DL (ref 2.4–3.5)
GLUCOSE SERPL-MCNC: 171 MG/DL (ref 82–115)
GLUCOSE SERPL-MCNC: 177 MG/DL (ref 82–115)
HCT VFR BLD AUTO: 24.9 % (ref 42–52)
HCT VFR BLD AUTO: 26 % (ref 42–52)
HCT VFR BLD AUTO: 26.7 % (ref 42–52)
HCT VFR BLD AUTO: 28.2 % (ref 42–52)
HGB BLD-MCNC: 7.9 G/DL (ref 14–18)
HGB BLD-MCNC: 8.2 G/DL (ref 14–18)
HGB BLD-MCNC: 8.6 G/DL (ref 14–18)
HGB BLD-MCNC: 8.7 G/DL (ref 14–18)
IMM GRANULOCYTES # BLD AUTO: 0.18 X10(3)/MCL (ref 0–0.04)
IMM GRANULOCYTES NFR BLD AUTO: 1.3 %
INR PPP: 1.8
LYMPHOCYTES # BLD AUTO: 1.29 X10(3)/MCL (ref 0.6–4.6)
LYMPHOCYTES NFR BLD AUTO: 9 %
MCH RBC QN AUTO: 29.9 PG (ref 27–31)
MCHC RBC AUTO-ENTMCNC: 30.5 G/DL (ref 33–36)
MCV RBC AUTO: 97.9 FL (ref 80–94)
MONOCYTES # BLD AUTO: 1.34 X10(3)/MCL (ref 0.1–1.3)
MONOCYTES NFR BLD AUTO: 9.3 %
NEUTROPHILS # BLD AUTO: 11.53 X10(3)/MCL (ref 2.1–9.2)
NEUTROPHILS NFR BLD AUTO: 80.3 %
NRBC BLD AUTO-RTO: 1.8 %
PLATELET # BLD AUTO: 230 X10(3)/MCL (ref 130–400)
PMV BLD AUTO: 9.8 FL (ref 7.4–10.4)
POCT GLUCOSE: 177 MG/DL (ref 70–110)
POTASSIUM SERPL-SCNC: 4.7 MMOL/L (ref 3.5–5.1)
PROT SERPL-MCNC: 4.8 GM/DL (ref 5.8–7.6)
PROTHROMBIN TIME: 21.1 SECONDS (ref 12.5–14.5)
RBC # BLD AUTO: 2.88 X10(6)/MCL (ref 4.7–6.1)
SODIUM SERPL-SCNC: 153 MMOL/L (ref 136–145)
WBC # BLD AUTO: 14.35 X10(3)/MCL (ref 4.5–11.5)

## 2025-06-24 PROCEDURE — 25500020 PHARM REV CODE 255: Performed by: RADIOLOGY

## 2025-06-24 PROCEDURE — 85018 HEMOGLOBIN: CPT

## 2025-06-24 PROCEDURE — 82947 ASSAY GLUCOSE BLOOD QUANT: CPT | Performed by: INTERNAL MEDICINE

## 2025-06-24 PROCEDURE — 21400001 HC TELEMETRY ROOM

## 2025-06-24 PROCEDURE — 37244 VASC EMBOLIZE/OCCLUDE BLEED: CPT | Mod: ,,, | Performed by: RADIOLOGY

## 2025-06-24 PROCEDURE — 04L33DZ OCCLUSION OF HEPATIC ARTERY WITH INTRALUMINAL DEVICE, PERCUTANEOUS APPROACH: ICD-10-PCS | Performed by: RADIOLOGY

## 2025-06-24 PROCEDURE — 97530 THERAPEUTIC ACTIVITIES: CPT | Mod: CQ

## 2025-06-24 PROCEDURE — 27000221 HC OXYGEN, UP TO 24 HOURS

## 2025-06-24 PROCEDURE — 27000207 HC ISOLATION

## 2025-06-24 PROCEDURE — 99900031 HC PATIENT EDUCATION (STAT)

## 2025-06-24 PROCEDURE — 36415 COLL VENOUS BLD VENIPUNCTURE: CPT | Performed by: INTERNAL MEDICINE

## 2025-06-24 PROCEDURE — 97535 SELF CARE MNGMENT TRAINING: CPT

## 2025-06-24 PROCEDURE — B4121ZZ FLUOROSCOPY OF HEPATIC ARTERY USING LOW OSMOLAR CONTRAST: ICD-10-PCS | Performed by: RADIOLOGY

## 2025-06-24 PROCEDURE — 51798 US URINE CAPACITY MEASURE: CPT

## 2025-06-24 PROCEDURE — 36415 COLL VENOUS BLD VENIPUNCTURE: CPT

## 2025-06-24 PROCEDURE — 85025 COMPLETE CBC W/AUTO DIFF WBC: CPT | Performed by: RADIOLOGY

## 2025-06-24 PROCEDURE — 36245 INS CATH ABD/L-EXT ART 1ST: CPT | Mod: XU,,, | Performed by: RADIOLOGY

## 2025-06-24 PROCEDURE — B41F1ZZ FLUOROSCOPY OF RIGHT LOWER EXTREMITY ARTERIES USING LOW OSMOLAR CONTRAST: ICD-10-PCS | Performed by: RADIOLOGY

## 2025-06-24 PROCEDURE — 25000003 PHARM REV CODE 250: Performed by: INTERNAL MEDICINE

## 2025-06-24 PROCEDURE — 75726 ARTERY X-RAYS ABDOMEN: CPT | Mod: 26,XU,, | Performed by: RADIOLOGY

## 2025-06-24 PROCEDURE — 85610 PROTHROMBIN TIME: CPT

## 2025-06-24 PROCEDURE — 11000001 HC ACUTE MED/SURG PRIVATE ROOM

## 2025-06-24 PROCEDURE — 63600175 PHARM REV CODE 636 W HCPCS

## 2025-06-24 PROCEDURE — 63600175 PHARM REV CODE 636 W HCPCS: Performed by: RADIOLOGY

## 2025-06-24 PROCEDURE — 94760 N-INVAS EAR/PLS OXIMETRY 1: CPT

## 2025-06-24 PROCEDURE — 85014 HEMATOCRIT: CPT | Performed by: INTERNAL MEDICINE

## 2025-06-24 PROCEDURE — 63600175 PHARM REV CODE 636 W HCPCS: Performed by: INTERNAL MEDICINE

## 2025-06-24 PROCEDURE — 99900035 HC TECH TIME PER 15 MIN (STAT)

## 2025-06-24 PROCEDURE — 25000003 PHARM REV CODE 250: Performed by: NURSE PRACTITIONER

## 2025-06-24 PROCEDURE — 80053 COMPREHEN METABOLIC PANEL: CPT

## 2025-06-24 RX ORDER — LIDOCAINE HYDROCHLORIDE 20 MG/ML
INJECTION, SOLUTION INFILTRATION; PERINEURAL
Status: COMPLETED | OUTPATIENT
Start: 2025-06-24 | End: 2025-06-24

## 2025-06-24 RX ORDER — SUCRALFATE 1 G/1
1 TABLET ORAL
Status: DISCONTINUED | OUTPATIENT
Start: 2025-06-24 | End: 2025-06-29

## 2025-06-24 RX ORDER — IOPAMIDOL 755 MG/ML
80 INJECTION, SOLUTION INTRAVASCULAR
Status: COMPLETED | OUTPATIENT
Start: 2025-06-24 | End: 2025-06-24

## 2025-06-24 RX ORDER — CEFAZOLIN 2 G/1
INJECTION, POWDER, FOR SOLUTION INTRAMUSCULAR; INTRAVENOUS
Status: COMPLETED | OUTPATIENT
Start: 2025-06-24 | End: 2025-06-24

## 2025-06-24 RX ADMIN — CEFAZOLIN 2 G: 2 INJECTION, POWDER, FOR SOLUTION INTRAMUSCULAR; INTRAVENOUS at 07:06

## 2025-06-24 RX ADMIN — AMIODARONE HYDROCHLORIDE 200 MG: 200 TABLET ORAL at 09:06

## 2025-06-24 RX ADMIN — DAPTOMYCIN 500 MG: 500 INJECTION, POWDER, LYOPHILIZED, FOR SOLUTION INTRAVENOUS at 05:06

## 2025-06-24 RX ADMIN — CLOPIDOGREL 75 MG: 75 TABLET ORAL at 09:06

## 2025-06-24 RX ADMIN — METOPROLOL TARTRATE 100 MG: 50 TABLET, FILM COATED ORAL at 09:06

## 2025-06-24 RX ADMIN — IOPAMIDOL 80 ML: 755 INJECTION, SOLUTION INTRAVENOUS at 08:06

## 2025-06-24 RX ADMIN — PANTOPRAZOLE SODIUM 40 MG: 40 INJECTION, POWDER, FOR SOLUTION INTRAVENOUS at 09:06

## 2025-06-24 RX ADMIN — LIDOCAINE HYDROCHLORIDE 5 ML: 20 INJECTION, SOLUTION INFILTRATION; PERINEURAL at 07:06

## 2025-06-24 RX ADMIN — SUCRALFATE 1 G: 1 TABLET ORAL at 09:06

## 2025-06-24 NOTE — INTERVAL H&P NOTE
The patient has been examined and the H&P has been reviewed:    I concur with the findings and no changes have occurred since H&P was written. Vaibhav Vargas is a 74 y.o. male with Duodenal Hemorrhage who presents for Embolization.           Active Hospital Problems    Diagnosis  POA    Moderate malnutrition [E44.0]  Yes     Patient has been screened and assessed by RD.    Malnutrition Type:  Context:  acute illness or injury  Level: moderate    Malnutrition Characteristic Summary:  Weight Loss (Malnutrition): other (see comments) (Does not meet criteria)  Energy Intake (Malnutrition): other (see comments) (Does not meet criteria)  Subcutaneous Fat (Malnutrition): mild depletion  Muscle Mass (Malnutrition): mild depletion    Interventions/Recommendations (treatment strategy):             Anemia [D64.9]  Yes    Retroperitoneal hematoma [K68.3]  Yes      Resolved Hospital Problems   No resolved problems to display.

## 2025-06-24 NOTE — PLAN OF CARE
Peer to peer arranged with Dr. Bereket Manrique. Information given to Shu at [facility].  Reference# [id number].

## 2025-06-24 NOTE — PLAN OF CARE
Sent clinical updates to [facility] for LTAC placement.  Requested update on Insurance Authorization.     Received Message via Secure Chat from Alondra Bryan is requiring a P2P, Harini will contact Dr. Manrique to set up, advised CM.

## 2025-06-24 NOTE — PROGRESS NOTES
"Gastroenterology Progress Note    Subjective/Interval History:    S/p EGD 6/23 and GDA embolization earlier this morning with IR.   Nursing staff reports 2 dark bowel movement so far this morning.  No further maroon colored stools or hematochezia.   Hgb 8.2  He is afebrile and hemodynamically stable  Patient is without any abd pain, nausea, or vomiting.  He is asking for ice water.     ROS:  Review of Systems   Constitutional:  Positive for malaise/fatigue. Negative for chills and fever.        Decreased appetite / po intake   Gastrointestinal:  Positive for melena. Negative for abdominal pain, nausea and vomiting.   Musculoskeletal:         Sacral pain 2/2 wound   Neurological:  Positive for weakness (generalized).     Vital Signs:  /83   Pulse (!) 127   Temp 97.1 °F (36.2 °C) (Axillary)   Resp 18   Ht 6' 2" (1.88 m)   Wt 107.6 kg (237 lb 3.4 oz)   SpO2 (!) 53%   BMI 30.46 kg/m²   Body mass index is 30.46 kg/m².    Physical Exam  Constitutional:       General: He is not in acute distress.     Appearance: He is ill-appearing.   HENT:      Head: Normocephalic and atraumatic.      Mouth/Throat:      Mouth: Mucous membranes are moist.      Pharynx: Oropharynx is clear.   Eyes:      Extraocular Movements: Extraocular movements intact.      Pupils: Pupils are equal, round, and reactive to light.   Cardiovascular:      Rate and Rhythm: Normal rate. Rhythm irregular.      Pulses: Normal pulses.      Heart sounds: Normal heart sounds.   Pulmonary:      Effort: Pulmonary effort is normal.      Breath sounds: Normal breath sounds.   Abdominal:      General: Bowel sounds are normal. There is no distension.      Palpations: Abdomen is soft.      Tenderness: There is no abdominal tenderness. There is no guarding.   Musculoskeletal:      Right lower leg: Edema present.      Left lower leg: Edema present.   Skin:     General: Skin is warm and dry.   Neurological:      Mental Status: He is alert and oriented to person, " place, and time.   Psychiatric:         Mood and Affect: Mood normal.         Behavior: Behavior normal.     Labs:  Recent Results (from the past 48 hours)   POCT glucose    Collection Time: 06/22/25  9:30 PM   Result Value Ref Range    POCT Glucose 232 (H) 70 - 110 mg/dL   CBC with Differential    Collection Time: 06/23/25 12:52 AM   Result Value Ref Range    WBC 13.00 (H) 4.50 - 11.50 x10(3)/mcL    RBC 2.60 (L) 4.70 - 6.10 x10(6)/mcL    Hgb 7.5 (L) 14.0 - 18.0 g/dL    Hct 24.6 (L) 42.0 - 52.0 %    MCV 94.6 (H) 80.0 - 94.0 fL    MCH 28.8 27.0 - 31.0 pg    MCHC 30.5 (L) 33.0 - 36.0 g/dL    RDW 18.8 (H) 11.5 - 17.0 %    Platelet 341 130 - 400 x10(3)/mcL    MPV 9.9 7.4 - 10.4 fL    Neut % 80.0 %    Lymph % 9.8 %    Mono % 8.7 %    Eos % 0.0 %    Basophil % 0.3 %    Imm Grans % 1.2 %    Neut # 10.40 (H) 2.1 - 9.2 x10(3)/mcL    Lymph # 1.27 0.6 - 4.6 x10(3)/mcL    Mono # 1.13 0.1 - 1.3 x10(3)/mcL    Eos # 0.00 0 - 0.9 x10(3)/mcL    Baso # 0.04 <=0.2 x10(3)/mcL    Imm Gran # 0.16 (H) 0.00 - 0.04 x10(3)/mcL    NRBC% 0.2 %   Comprehensive Metabolic Panel    Collection Time: 06/23/25  3:21 AM   Result Value Ref Range    Sodium 148 (H) 136 - 145 mmol/L    Potassium 4.2 3.5 - 5.1 mmol/L    Chloride 118 (H) 98 - 107 mmol/L    CO2 24 23 - 31 mmol/L    Glucose 169 (H) 82 - 115 mg/dL    Blood Urea Nitrogen 43.4 (H) 8.4 - 25.7 mg/dL    Creatinine 1.45 (H) 0.72 - 1.25 mg/dL    Calcium 8.4 (L) 8.8 - 10.0 mg/dL    Protein Total 5.5 (L) 5.8 - 7.6 gm/dL    Albumin 2.0 (L) 3.4 - 4.8 g/dL    Globulin 3.5 2.4 - 3.5 gm/dL    Albumin/Globulin Ratio 0.6 (L) 1.1 - 2.0 ratio    Bilirubin Total 0.7 <=1.5 mg/dL    ALP 49 40 - 150 unit/L    ALT 11 0 - 55 unit/L    AST 13 11 - 45 unit/L    eGFR 51 mL/min/1.73/m2    Anion Gap 6.0 mEq/L    BUN/Creatinine Ratio 30    CBC with Differential    Collection Time: 06/23/25  3:21 AM   Result Value Ref Range    WBC 13.12 (H) 4.50 - 11.50 x10(3)/mcL    RBC 2.57 (L) 4.70 - 6.10 x10(6)/mcL    Hgb 7.3 (L) 14.0 -  18.0 g/dL    Hct 24.3 (L) 42.0 - 52.0 %    MCV 94.6 (H) 80.0 - 94.0 fL    MCH 28.4 27.0 - 31.0 pg    MCHC 30.0 (L) 33.0 - 36.0 g/dL    RDW 19.0 (H) 11.5 - 17.0 %    Platelet 339 130 - 400 x10(3)/mcL    MPV 9.4 7.4 - 10.4 fL    Neut % 80.8 %    Lymph % 9.0 %    Mono % 8.9 %    Eos % 0.1 %    Basophil % 0.3 %    Imm Grans % 0.9 %    Neut # 10.60 (H) 2.1 - 9.2 x10(3)/mcL    Lymph # 1.18 0.6 - 4.6 x10(3)/mcL    Mono # 1.17 0.1 - 1.3 x10(3)/mcL    Eos # 0.01 0 - 0.9 x10(3)/mcL    Baso # 0.04 <=0.2 x10(3)/mcL    Imm Gran # 0.12 (H) 0.00 - 0.04 x10(3)/mcL    NRBC% 0.2 %   Prepare RBC 1 Unit    Collection Time: 06/23/25  3:21 AM   Result Value Ref Range    UNIT NUMBER [id number]     UNIT ABO/RH A POS     DISPENSE STATUS Transfused     Unit Expiration [id number]     Product Code Y9152H38     Unit Blood Type Code 6200     CROSSMATCH INTERPRETATION Compatible    Type & Screen    Collection Time: 06/23/25  3:21 AM   Result Value Ref Range    Group & Rh A POS     Indirect Kasey GEL NEG     Specimen Outdate 06/26/2025 23:59    APTT    Collection Time: 06/23/25  3:21 AM   Result Value Ref Range    PTT 42.6 (H) 23.2 - 33.7 seconds   Protime-INR    Collection Time: 06/23/25  3:21 AM   Result Value Ref Range    PT 22.3 (H) 12.5 - 14.5 seconds    INR 2.0 (H) <=1.3   Prepare RBC 2 Units; Symptomatic anemia    Collection Time: 06/23/25  3:21 AM   Result Value Ref Range    UNIT NUMBER [id number]     UNIT ABO/RH A POS     DISPENSE STATUS Transfused     Unit Expiration [id number]     Product Code O1395H09     Unit Blood Type Code 6200     CROSSMATCH INTERPRETATION Compatible     UNIT NUMBER [id number]     UNIT ABO/RH A POS     DISPENSE STATUS Transfused     Unit Expiration [id number]     Product Code W1749O09     Unit Blood Type Code 6200     CROSSMATCH INTERPRETATION Compatible    Prepare Fresh Frozen Plasma 1 Unit    Collection Time: 06/23/25  3:21 AM   Result Value Ref Range    UNIT NUMBER [id number]     UNIT ABO/RH A  POS     DISPENSE STATUS Transfused     Unit Expiration [id number]     Product Code L2763N87     Unit Blood Type Code 6200     CROSSMATCH INTERPRETATION Not required    Urinalysis, Reflex to Urine Culture Urine, Clean Catch    Collection Time: 06/23/25  3:30 AM    Specimen: Urine   Result Value Ref Range    Color, UA Yellow Yellow, Light-Yellow, Colorless, Straw, Dark-Yellow    Appearance, UA Turbid (A) Clear    Specific Gravity, UA 1.044 (H) 1.005 - 1.030    pH, UA 6.0 5.0 - 8.5    Protein, UA 1+ (A) Negative    Glucose, UA Normal Negative, Normal    Ketones, UA Negative Negative    Blood, UA 1+ (A) Negative    Bilirubin, UA Negative Negative    Urobilinogen, UA Normal 0.2, 1.0, Normal    Nitrites, UA Negative Negative    Leukocyte Esterase, UA Negative Negative    RBC, UA 6-10 (A) None Seen, 0-2, 3-5, 0-5 /HPF    WBC, UA 0-5 None Seen, 0-2, 3-5, 0-5 /HPF    Bacteria, UA None Seen None Seen, Trace /HPF    Squamous Epithelial Cells, UA None Seen None Seen, Trace /HPF    Mucous, UA Trace (A) None Seen /LPF   CBC with Differential    Collection Time: 06/24/25  2:19 AM   Result Value Ref Range    WBC 14.35 (H) 4.50 - 11.50 x10(3)/mcL    RBC 2.88 (L) 4.70 - 6.10 x10(6)/mcL    Hgb 8.6 (L) 14.0 - 18.0 g/dL    Hct 28.2 (L) 42.0 - 52.0 %    MCV 97.9 (H) 80.0 - 94.0 fL    MCH 29.9 27.0 - 31.0 pg    MCHC 30.5 (L) 33.0 - 36.0 g/dL    RDW 18.6 (H) 11.5 - 17.0 %    Platelet 230 130 - 400 x10(3)/mcL    MPV 9.8 7.4 - 10.4 fL    Neut % 80.3 %    Lymph % 9.0 %    Mono % 9.3 %    Eos % 0.0 %    Basophil % 0.1 %    Imm Grans % 1.3 %    Neut # 11.53 (H) 2.1 - 9.2 x10(3)/mcL    Lymph # 1.29 0.6 - 4.6 x10(3)/mcL    Mono # 1.34 (H) 0.1 - 1.3 x10(3)/mcL    Eos # 0.00 0 - 0.9 x10(3)/mcL    Baso # 0.01 <=0.2 x10(3)/mcL    Imm Gran # 0.18 (H) 0.00 - 0.04 x10(3)/mcL    NRBC% 1.8 %   Glucose, Random    Collection Time: 06/24/25  2:19 AM   Result Value Ref Range    Glucose 171 (H) 82 - 115 mg/dL   Hemoglobin and Hematocrit    Collection Time:  06/24/25  5:13 AM   Result Value Ref Range    Hgb 8.7 (L) 14.0 - 18.0 g/dL    Hct 26.7 (L) 42.0 - 52.0 %   Comprehensive Metabolic Panel    Collection Time: 06/24/25  5:13 AM   Result Value Ref Range    Sodium 153 (H) 136 - 145 mmol/L    Potassium 4.7 3.5 - 5.1 mmol/L    Chloride 122 (H) 98 - 107 mmol/L    CO2 23 23 - 31 mmol/L    Glucose 177 (H) 82 - 115 mg/dL    Blood Urea Nitrogen 60.9 (H) 8.4 - 25.7 mg/dL    Creatinine 1.95 (H) 0.72 - 1.25 mg/dL    Calcium 8.9 8.8 - 10.0 mg/dL    Protein Total 4.8 (L) 5.8 - 7.6 gm/dL    Albumin 2.1 (L) 3.4 - 4.8 g/dL    Globulin 2.7 2.4 - 3.5 gm/dL    Albumin/Globulin Ratio 0.8 (L) 1.1 - 2.0 ratio    Bilirubin Total 0.7 <=1.5 mg/dL    ALP 40 40 - 150 unit/L    ALT 9 0 - 55 unit/L    AST 9 (L) 11 - 45 unit/L    eGFR 35 mL/min/1.73/m2    Anion Gap 8.0 mEq/L    BUN/Creatinine Ratio 31    Protime-INR    Collection Time: 06/24/25  5:13 AM   Result Value Ref Range    PT 21.1 (H) 12.5 - 14.5 seconds    INR 1.8 (H) <=1.3       Imaging:  IR Embolization  Result Date: 6/24/2025  PROCEDURE: Gastroduodenal Artery Embolization CLINICAL HISTORY: 74-year-old male with bleeding duodenal ulcer and hemorrhage ANESTHESIA: Level of sedation: Local anesthesia Medications: 0 mg Versed IV; 0 mcg Fentanyl IV; 0 mg Ativan IV; other: None Administration: Moderate sedation was administered during this procedure. Continuous monitoring of the patient's level of consciousness and physiological status was provided throughout the procedure by an independent trained observer under the direct supervision of the operating physician. Duration of sedation: 0 minutes Antibiotic prophylaxis: Ancef 2 g IV PHYSICIANS: Amador Sanders MD RADIATION DOSE: Fluoroscopy time: 6.4 minutes Radiation exposure dose: 497 mGy Exposure images: 125 CONTRAST: 80 cc of Isovue 370 PROCEDURAL SUMMARY: After informed consent was obtained, the patient was placed supine on the angiography table. The skin overlying the right groin was then  prepped and draped in the usual sterile fashion. The skin and soft tissues were anesthetized using 1% lidocaine.  The bony landmarks were confirmed under fluoroscopy.  The common femoral pulse was palpated for guidance and a 21g micropuncture needle was advanced into the common femoral artery. Return of blood flow was confirmed and a mandril wire was advanced into the needle under fluoroscopic guidance. The needle was removed and a microdilator was advanced over the wire into the artery.  The inner dilator and wire were removed and a RealScout guidewire was advanced through the sheath into the artery under fluoroscopic guidance.  The sheath was removed. A 5F vascular sheath was advanced into the common femoral artery and the inner dilator was removed.  The sheath was flushed. A 5F [place] Cobra catheter was advance through the sheath over the wire into the arterial system.  The celiac  artery was selected.  Angiography demonstrated normal vascular anatomy with apparent contrast extravasation along a duodenal branch off of the GDA.. A decision was made to proceed with embolization. A coaxial system of a TruSelect microcatheter with a Fathom microwire was inserted through the select catheter. The microcatheter and microwire were advanced into the GDA, distal to the area concerning for hemorrhage.  Angiography confirmed positioning within the targeted vessel. Embold microcoils and Gelfoam were used for embolization. 3 mm x 12 cm and 4 mm x 15 cm Embold microcoils as well as 0.3 cc of Gelfoam were deployed within the target artery over the origin of the vessel of concern.  Injected contrast confirmed progression towards stasis within the target vessel.  Once embolization was complete, angiography was performed through the microcatheter confirming technically successful occlusion of the targeted branch.  The microcatheter was removed and angiography was repeated through parent catheter.  This confirmed successful  embolization of the target vessel with no other vascular abnormality identified. The glide Cobra catheter was then used to select the superior mesenteric artery.  Angiography was performed to ensure no further evidence of hemorrhage was identified.  After this was confirmed, the catheter was then removed over a Bentson guidewire. The groin sheath was flushed.  Angiography of the common femoral artery was performed via the vascular sheath.  Angiography of the common femoral artery demonstrated no evidence of injury. A Mynx closure device was deployed for hemostasis and the sheath was removed.  A sterile dressing was applied. The patient tolerated the procedure well and experienced no immediate complications. The patient was then brought to the recovery room in good condition.     Technically successful GDA embolization. Electronically signed by: Amador Sanders Date:    06/24/2025 Time:    11:05    CT Abdomen Pelvis W Wo Contrast  Result Date: 6/23/2025  Technique: CT of the abdomen and pelvis was performed with axial images as well as sagittal and coronal reconstruction images with intravenous contrast. Comparison: Comparison is with study dated June 20, 2025 Clinical History: GI Bleed. Dosage Information: Automated Exposure Control was utilized 3042 mGy.cm. Findings: Lines and Tubes: None. Thorax: Lungs: Stable bilateral pleural effusions are noted, more prominent on the right, with associated right lower lobe atelectasis or consolidation demonstrating air bronchograms. Pleura: No pneumothorax is seen in the visualized lung bases. Abdomen: Abdominal Wall: There is decreased fat stranding along the right lateral abdominal wall compared to the previous study. Liver: The liver appears unremarkable. Biliary System: No intrahepatic or extrahepatic biliary duct dilatation is seen. Gallbladder: Gallbladder is without wall thickening or pericholecystic inflammatory changes or fluid. Pancreas: No pancreatic mass, or, ductal  dilatation is seen. There is moderate fatty of the pancreas. Spleen: The spleen appears unremarkable. Adrenals: There is stable hypodensity in the right adrenal gland measuring 1 cm (Series 2, Image 35). This may reflect a right renal adenoma. There is a stable heterogeneous attenuation 1.5 cm diameter nodule in the medial limb of the left adrenal gland which is indeterminate in etiology. Follow-up as clinically indicated. Kidneys: Multiple hyperdense cysts are identified in the left kidney the largest of which measures 7.1 mm is on in the upper pole of the left kidney. The left kidney otherwise appears unremarkable with no stones masses or hydronephrosis identified. A single cyst measuring 8.6 mm is seen on Image 59, Series 2 in the mid pole of the right kidney for which no specific follow-up imaging is recommended.  The right kidney otherwise appears unremarkable with no stones masses or hydronephrosis identified. Aorta: There is extensive calcification of the abdominal aorta and its branches. Bowel: No contrast extravasation, contrast pooling, or contrast blush is seen within the bowel to suggest active hemorrhage. Esophagus: The visualized distal esophagus appears unremarkable. Stomach: The stomach appears unremarkable. Duodenum: Unremarkable appearing duodenum. Small Bowel: The small bowel appears unremarkable. Colon: Multiple diverticula are seen in the colon. No associated inflammatory stranding or pericolonic fluid is seen to suggest diverticulitis. Appendix: The appendix appears unremarkable and is seen on Image 118, Series 2 through Image 109, Series 2. Peritoneum: No free intraperitoneal air is seen. There is mild interval decrease in the size of the previously noted right retroperitoneal hematoma, measuring approximately 11 x 6 cm. Decreased thickening of the adjacent right iliacus and psoas major muscles is likewise observed. No contrast extravasation, contrast pooling, or contrast blush is seen to  suggest active hemorrhage. Pelvis: Bladder: The bladder appears unremarkable. Male: Prostate gland: There are a few calcifications in the prostate gland. Bony structures: Dorsal Spine: There is moderate scattered spondylosis of the visualized dorsal spine. Bony Pelvis: There is mild degenerative change of the hip.     Impression: 1. No contrast extravasation, contrast pooling, or contrast blush is seen within the bowel to suggest active hemorrhage. 2. There is decreased fat stranding along the right lateral abdominal wall compared to the previous study. 3. There is mild interval decrease in the size of the previously noted right retroperitoneal hematoma, measuring approximately 11 x 6 cm. Decreased thickening of the adjacent right iliacus and psoas major muscles is likewise observed. No contrast extravasation, contrast pooling, or contrast blush is seen to suggest active hemorrhage. Correlate with clinical and laboratory findings as regards additional evaluation and follow-up. 4. Details and other findings as discussed above. No significant discrepancy with overnight report. Electronically signed by: Sumit Núñez Date:    06/23/2025 Time:    08:35    X-Ray Chest 1 View  Result Date: 6/20/2025  EXAMINATION: XR CHEST 1 VIEW CLINICAL HISTORY: SepsisPICC LINE; COMPARISON: 11 June 2025 FINDINGS: Frontal view of the chest was obtained. Right PICC tip overlies the upper SVC.  The heart is not enlarged.  Low lung volumes with right basilar opacities and pleural effusion.  No pneumothorax.     Right PICC tip overlies the upper SVC.  Right basilar opacities with pleural effusion. Electronically signed by: Jesús Moura Date:    06/20/2025 Time:    16:26    CT Abdomen Pelvis W Wo Contrast  Result Date: 6/20/2025  EXAMINATION: CT ABDOMEN PELVIS W WO CONTRAST CLINICAL HISTORY: GI bleed;Retroperitoneal hematoma suspected; TECHNIQUE: Low dose axial images, sagittal and coronal reformations were obtained from the lung bases to the  pubic symphysis following the IV administration of  contrast.  Delayed imaging and pre contrasted imaging was performed as well. Automatic exposure control is utilized to reduce patient radiation exposure. COMPARISON: 06/12/2025 FINDINGS: There is bibasilar atelectasis and bilateral pleural effusions.  This is worse than the prior examination.. The liver appears normal.  No liver mass or lesion is seen.  Portal and hepatic veins appear normal. The gallbladder appears grossly unremarkable. The pancreas appears normal.  No pancreatic mass or lesion is seen. The spleen appears normal.  No splenic mass or lesion is seen. The right adrenal gland appears normal.  There is a nodule in the left adrenal gland that measures 1.6 x 1.2 cm.  Pre contrast Hounsfield units are 26.  The lesion is indeterminate. The kidneys are normal in size.  No hydronephrosis is seen.  No hydroureter is seen.  No nephrolithiasis or ureteral stone is seen.  Couple of punctate cysts are seen in the kidneys.  These appear to be simple cysts. There is a retroperitoneal hematoma seen on the right side which appears slightly smaller than the prior examination.  No evidence of active extravasation is seen. Urinary bladder is decompressed due to Palomo catheter.  There are some calcifications in the prostate.. No colitis is seen.  No diverticulitis is seen.  No colonic mass or lesion or inflammatory process is seen.  GI bleed cannot be excluded since there is oral contrast seen throughout the colon. No free air is seen. The bones appear grossly unremarkable.     Right retroperitoneal hematoma appears slightly smaller.  No active extravasation is seen GI hemorrhage cannot be excluded since there is oral contrast throughout the colon Bibasilar atelectasis and bilateral pleural effusions slightly worse than the prior examination Indeterminate left adrenal nodule.  CT scan or MRI adrenal mass protocol is recommended. Electronically signed by: Lonnie Cornell  Date:    06/20/2025 Time:    13:18    Cardiac catheterization  Result Date: 6/17/2025    The estimated blood loss was none. The procedure log was documented by Documenter: Lorena Rodriguez RN and verified by James Tony MD. Date: 6/17/2025  Time: 3:24 PM Preprocedure diagnosis-new cardiomyopathy Postprocedure diagnosis-obstructive CAD Estimated blood loss-5 cc Tissue removal-none Complications-none Procedures performed: 1. Ultrasound-guided right groin access 2. Coronary angiography 3. Left ventricular hemodynamics 4. IFR interrogation of: LAD-0.85 Ramus-0.93 Circ-1.0 5. IVUS of LAD 6. LIMA angio 7. Perclose RCFA Findings: 1. Left main-Patent 2. Lad-70% calcified proximal stenosis, Mid stent is 2.5 mm stent in  a 3.5-4mm vessel.  MSA distal stent edge is 3.9mm2 3. LCX-50% mid lesion 4. Ramus-40% mid 5. RCA-30% proximal calcified stenosis 6. LVEDP-11 7. LIMA patent Procedure detail: Ultrasound-guided right groin access obtained.  Sheath inserted.  Femoral angiography performed.  Left main angiography performed with JL4.  RCA angiography performed with JR4 catheter.   Pigtail was then used for left ventricular hemodynamics.   Given moderate LAD disease further interrogation was needed.  Heparin was administered.  Six Sinhala EBU 3.5 catheter was advanced into the aorta.  Artery was pre treated with nitroglycerin.  Wire was normalized in the aorta.  The catheter was reengaged into the LM.  Wire was passed beyond the proximal lesion.  Guiding catheter was backed out of the LM, interrogation was performed.  Pullback to the guide was 1.  Process was repeated for the ramus and circumflex.  Final angiography demonstrated YAKELIN 3 flow.  Guide was redirected into the left subclavian and LIMA agio was performed.  Catheters were removed and a proglide was used for RCFA closure. Plan: 1. Consult CTS for LIMA to LAD, ANGELIA ligation, MAZE, and possible surgical extraction of prominent eustachian valve with thrombus. 2. If turned  down he will likely need atherectomy and shockwave for underexpanded stent, and repeat stenting of the prox to mid LAD.     Fl Modified Barium Swallow Speech  Result Date: 6/16/2025  See procedure notes from Speech Pathologist. This procedure was auto-finalized.    X-Ray Foot 2 View Left  Result Date: 6/13/2025  EXAMINATION: XR FOOT 2 VIEW LEFT CLINICAL HISTORY: great toe wound;Sepsis, unspecified organism COMPARISON: None. FINDINGS: There is some demineralization of the visualized osseous structures slightly more than expected for patient's age No acute displaced fractures or dislocations. There are degenerative changes with narrowing of the proximal and distal interphalangeal joints articular spaces are otherwise preserved with smooth articular surfaces No blastic or lytic lesions. On the provided images there is no evidence of primary and or secondary signs to suggest the presence of osteomyelitis, however, these might be lacking on plain films and therefore if clinically indicated other imaging modalities might prove helpful for further assessment     Demineralization of the visualized osseous structures. Degenerative changes. No clear evidence of osteomyelitis other imaging modalities might prove helpful for further assessment. Electronically signed by: Singh Woodruff Date:    06/13/2025 Time:    10:36    CTA Abdomen and Pelvis  Result Date: 6/12/2025  EXAMINATION: CTA ABDOMEN AND PELVIS CLINICAL HISTORY: GI bleed;. TECHNIQUE: Helical acquisition through the abdomen and pelvis without and with IV contrast targeting the arterial phase.  3D MIP reconstructions were provided for review.  Automatic exposure control, adjustment of mA/kV or iterative reconstruction technique was used to reduce radiation. COMPARISON: 5 June 2025 FINDINGS: There is a moderate right pleural effusion with right basilar consolidation.  Left lower lobe pulmonary embolus is again noted. There are no acute findings solid abdominal organs.  No bowel obstruction or free air.  No significant inflammatory changes of the bowel. There is bladder wall thickening similar to prior.  There is a Palomo in the bladder.  No pelvic free fluid.  Abdominal aorta normal in caliber.  Fairly mild atherosclerotic disease.  Widely patent mesenteric and renal arteries. There is a right retroperitoneal hematoma which is new compared to prior.  No convincing active extravasation.  Hematoma measures approximately 16 x 14 x 7 cm. There are no acute osseous findings.     1. Right retroperitoneal hematoma without convincing active extravasation. 2. Moderate right pleural effusion with right basilar consolidation. 3. Pulmonary embolus is again seen left lower lobe. Electronically signed by: Jesús Moura Date:    06/12/2025 Time:    17:37    NM GI Bleed Scan (Tagged RBC)  Result Date: 6/12/2025  EXAMINATION: NM GI BLEED STUDY CLINICAL HISTORY: GI bleed;UGI bleed, nonvariceal, endoscopy negative; TECHNIQUE: After injection of autologous red blood cells labeled in vitro with 23.6 mCi of Tc-99m pertechnetate, sequential dynamic images of the abdomen were obtained for  minutes. Delayed images obtained for a total of 4 hours. COMPARISON: 5 June 2025 FINDINGS: There is physiologic distribution of radiotracer.  A site of active GI bleeding is not seen after 4 hours of imaging.     No evidence of active GI bleeding over 4 hours of imaging. Electronically signed by: Jesús Moura Date:    06/12/2025 Time:    15:03    US Retroperitoneal Limited  Result Date: 6/12/2025  EXAMINATION: US RETROPERITONEAL LIMITED CLINICAL HISTORY: Alfonso; TECHNIQUE: Ultrasound evaluation of the kidneys. COMPARISON: CT abdomen pelvis dated 06/05/2025 FINDINGS: The right kidney measures 10.5 cm. The left kidney measures 8.5 cm. There is no hydronephrosis.  Urinary bladder is decompressed with a Palomo catheter in place.     No hydronephrosis. Electronically signed by: Cha Bueno Date:    06/12/2025  Time:    14:03    Transesophageal echo (YO)  Addendum Date: 6/12/2025    Left Ventricle: The left ventricle is normal in size. Normal wall motion. There is normal systolic function. Quantitated ejection fraction is 56%. Elevated left ventricular filling pressure.   Right Ventricle: The right ventricle is mildly dilated Systolic function is normal.   Left Atrium: The left atrium is dilated Agitated saline study of the atrial septum is negative, suggesting absence of intracardiac shunt at the atrial level. No patent foramen ovale. Appendage velocity is normal at greater than 40 cm/sec. There is no thrombus in the left atrial appendage.   Right Atrium: The right atrium is dilated. There is a prominent Eustachian valve with a highly mobile echogenic structure attached to it most likely representing a thrombus and measuring 1.5 cm in its longest dimension. Dense spontaneous echo contrast visualized in the right atrial cavity.   Aortic Valve: The aortic valve is a trileaflet valve. There is no stenosis. There is no significant regurgitation.   Mitral Valve: The mitral valve is structurally normal. There is no stenosis. There is trace regurgitation.   Tricuspid Valve: There is trace regurgitation.   Aorta: The aortic root is normal in size measuring 3.5 cm. The ascending aorta is normal in size measuring 2.9 cm. The aortic arch is normal measuring 2.6 cm. The descending aorta is normal measuring 2.3 cm.   Pericardium: There is no pericardial effusion. YO obtained for further evaluation of right atrial mass noted on transthoracic echocardiogram.    Result Date: 6/12/2025    Left Ventricle: The left ventricle is normal in size. Normal wall motion. There is normal systolic function. Quantitated ejection fraction is 56%. Elevated left ventricular filling pressure.   Right Ventricle: The right ventricle is mildly dilated Systolic function is normal.   Left Atrium: The left atrium is dilated Agitated saline study of the atrial  septum is negative, suggesting absence of intracardiac shunt at the atrial level. No patent foramen ovale.   Right Atrium: The right atrium is dilated. There is a prominent Eustachian valve with a highly mobile echogenic structure attached to it most likely representing a thrombus and measuring 1.5 cm in its longest dimension. Dense spontaneous echo contrast visualized in the right atrial cavity.   Aortic Valve: The aortic valve is a trileaflet valve. There is no stenosis. There is no significant regurgitation.   Mitral Valve: The mitral valve is structurally normal. There is no stenosis. There is trace regurgitation.   Tricuspid Valve: There is trace regurgitation.   Aorta: The aortic root is normal in size measuring 3.5 cm. The ascending aorta is normal in size measuring 2.9 cm. The aortic arch is normal measuring 2.6 cm. The descending aorta is normal measuring 2.3 cm.   Pericardium: There is no pericardial effusion. YO obtained for further evaluation of right atrial mass noted on transthoracic echocardiogram.     Echo Saline Bubble? No  Result Date: 6/11/2025    Left Ventricle: The left ventricle is normal in size. Increased wall thickness. There is moderately reduced systolic function with a visually estimated ejection fraction of 30 - 40%.   Right Ventricle: The right ventricle is mildly dilated Systolic function is reduced.   Left Atrium: The left atrium is mildly dilated     X-Ray Chest 1 View  Result Date: 6/11/2025  EXAMINATION: XR CHEST 1 VIEW CLINICAL HISTORY: Shortness of breath TECHNIQUE: Single view of the chest COMPARISON: 06/04/2025 FINDINGS: Suspect small right effusion.  Further evaluation may be obtained with two views of the chest. The cardiomediastinal silhouette is within normal limits. Right-sided PICC line projects over the cavoatrial junction. No acute osseous abnormality.     Suspect small right effusion. Further evaluation may be obtained with two views of the chest. Electronically  signed by: Vaibhav Jamil Date:    06/11/2025 Time:    06:10    CV Ultrasound doppler arterial legs bilat  Result Date: 6/10/2025  The right lower extremity arterial system is patent with no evidence of focal stenosis or occlusion. The left lower extremity arterial system is patent with no evidence of focal stenosis or occlusion. The left distal posterior tibial artery demonstrated retrograde flow. KAIT study performed on 6/2/25, which demonstrated non compressible vessels at the bilateral ankles.     CV Ultrasound doppler venous legs bilat  Result Date: 6/7/2025    There is no evidence of a right lower extremity DVT.   There is no evidence of a left lower extremity DVT. Negative for deep and superficial vein thrombosis in bilateral lower extremities.     Echo  Result Date: 6/5/2025    Limited echo to r/o right heart strain.   Complete echo 5/26/25. YO 5/30/25)   Right Ventricle: The right ventricle is moderately dilated Systolic function is moderately reduced.   Right Atrium: The right atrium is normal in size.   Tricuspid Valve: There is mild regurgitation.   Pulmonary Artery: PASP is at least 50mmhg.   IVC/SVC: IVC was not well visualized due to poor acoustic window.     CTA Runoff ABD PEL Bilat Lower Ext  Result Date: 6/5/2025  EXAMINATION: CTA RUNOFF ABD PEL BILAT LOWER EXT CLINICAL HISTORY: Peripheral arterial disease (PAD), asymptomatic; TECHNIQUE: Helically acquired images were obtained from the lung bases through the lower extremities prior to and after IV administration of contrast. Axial, sagittal, coronal and MIP reformations were interpreted. Automated tube current modulation, weight-based exposure dosing, and/or iterative reconstruction technique utilized to reach lowest reasonably achievable exposure rate. DLP: 1709 mGy*cm COMPARISON: CT abdomen pelvis 05/28/2025 FINDINGS: VASCULATURE: Pulmonary arteries: Segmental pulmonary embolus at the left lower lobe. Aorta: Mild atherosclerosis without aneurysm  or occlusion. Mesenteric arteries: No significant stenosis. Renal arteries:No significant stenosis. Right: Iliac arteries: No significant stenosis. Femoral: Diffuse atherosclerotic change at the SFA and deep femoral branches.  Mild stenosis at the distal SFA. Popliteal: Atherosclerosis with mild stenosis. Tibioperoneal trunk: Patent tibioperoneal trunk.  Diminutive caliber vessels with calcific atherosclerosis below the trifurcation.  This makes it challenging to evaluate luminal patency of diminutive vessels below the knee.  Diminutive peroneal artery with enhancement fading at the ankle.  There does appear to be flow at the anterior tibial and posterior tibial artery at the ankle.  Dorsalis pedis artery enhances. Left: Iliac arteries: No significant stenosis. Femoral: Diffuse atherosclerotic change at the SFA and deep femoral branches.  Mild stenosis. Popliteal: Atherosclerosis with mild stenosis. Tibioperoneal trunk: Patent tibioperoneal trunk.  Diminutive caliber vessels with calcific atherosclerosis below the trifurcation.  This makes it challenging to evaluate luminal patency of diminutive vessels below the knee.  Peroneal artery does appear to be patent to the ankle.  Unable to confidently assess tibial arteries.  Defer to sonogram. HEART: There are coronary artery calcifications. LUNG BASES: See separate report for CT chest. LIVER: No arterially enhancing mass. BILIARY: No calcified gallstones. PANCREAS: No inflammatory change. SPLEEN: Normal in size ADRENALS: No mass. KIDNEYS/URETERS: No hydronephrosis.  Possible cyst at the upper pole right kidney, obscured by beam hardening artifact. GI TRACT/MESENTERY: Clip seen at the level of the duodenum.  No active extravasation appreciable.  Colonic diverticulosis without acute inflammatory change. PERITONEUM: No free fluid.No free air. LYMPH NODES: No enlarged lymph nodes by size criteria. BLADDER: Collapsed about a Palomo catheter. REPRODUCTIVE ORGANS: There are  prostate calcifications. SOFT TISSUES: Ventral hernia mesh.  Body wall edema.  Bilateral lower extremity edema. BONES: Note large field of view runoff exam not designed to evaluate fine osseous details.  No acute osseous abnormality seen.  Degenerative change at the spine.  Degenerative change at the feet     1. Segmental pulmonary embolus at the left lower lobe.  Findings discussed with  Bernice Lipscomb, the physician caring for patient 6/5/2025 09:11. 2. Diffuse atherosclerotic calcifications.  No appreciable significant stenosis above the knee.  Below the knee evaluation is limited due to diminutive caliber vessels and calcific atherosclerosis.  Defer to sonogram. Electronically signed by: Ann Armstrong Date:    06/05/2025 Time:    09:12    CT Chest Without Contrast  Result Date: 6/5/2025  EXAMINATION: CT CHEST WITHOUT CONTRAST CLINICAL HISTORY: Sepsis; TECHNIQUE: Helically acquired axial images, sagittal and coronal reformations were obtained from the thoracic inlet to the lung bases without the IV administration of contrast. Automated tube current modulation, weight-based exposure dosing, and/or iterative reconstruction technique utilized to reach lowest reasonably achievable exposure rate. DLP: 1708 mGy*cm COMPARISON: Chest radiograph 06/04/2025 FINDINGS: BASE OF NECK: Multinodular goiter.  This can be evaluated with outpatient thyroid sonogram after resolution of patient's acute illness. AORTA: Atherosclerosis. HEART: Heart at the upper limits of normal in size.  There are coronary artery calcifications. CHACE/MEDIASTINUM: Small nonspecific mediastinal lymph nodes.  Evaluation of hilar lymphadenopathy is limited without intravenous contrast. AIRWAYS: Deviation of the trachea to the right at the thoracic inlet related to nodular enlargement of the left lobe of the thyroid. LUNGS: Respiratory motion artifact.  Complete collapse and consolidation of the right middle lobe.  Moderate, near complete collapse and  consolidation at the right lower lobe.  Tree-in-bud nodularity at the posterior left upper lobe and at the superior segment left lower lobe compatible with bronchiolitis. PLEURA: Trace right pleural effusion. UPPER ABDOMEN: See separate report for CT abdomen THORACIC SOFT TISSUES: Right upper extremity PICC terminates at the SVC. BONES: Flowing osteophytes of the thoracic spine as can be seen with DISH.     1. Collapse and consolidation at the right middle lobe and right lower lobe.  Atelectasis versus pneumonia 2. Tree-in-bud nodularity at the left lung.  Bronchiolitis Electronically signed by: Ann Armstrong Date:    06/05/2025 Time:    08:52    X-Ray Chest 1 View  Result Date: 6/4/2025  EXAMINATION: XR CHEST 1 VIEW CLINICAL HISTORY: concern for pulmonary edema; TECHNIQUE: AP chest COMPARISON: Chest x-ray dated 06/02/2025 FINDINGS: Right-sided PICC line has its tip over the superior vena cava.  The heart is normal in size.  There is similar appearance of a right basilar airspace opacity.  There is no new airspace consolidation there is no pleural effusion or visible pneumothorax.     Stable exam without significant interval change Electronically signed by: Cha Bueno Date:    06/04/2025 Time:    15:50    CT Pelvis With IV Contrast NO Oral Contrast  Result Date: 6/3/2025  EXAMINATION: CT PELVIS WITH IV CONTRAST CLINICAL HISTORY: concern for prostatitis/prostatic abscess; Chief complaint: Shortness of Breath (From [facility] with complaints of SOB and lower back pain for about 3 days. Abdominal distension and bilateral leg edema noted. Palomo in place upon arrival with cloudy urine noted.)Hospital courseVaibhav Vargas is a 74 y.o. Black or  male with past medical history of cerebrovascular accident (March 2025), coronary artery disease status post LAD stent (2017), venous insufficiency, hypertension, first-degree AV block, and type 2 diabetes mellitus, who presented to the ED  from a nursing home due to progressive weakness over the past week. His daughter provided history due to his communication difficulties. Associated symptoms included altered mental status, back pain, and abdominal discomfort. In the ED, vitals were stable, but lab results were remarkable for severe hyperkalemia and azotemia.  He was treated with Lokelma, insulin, and albuterol, started on Rocephin for a urinary tract infection.  Imaging of the abdomen revealed hydronephrosis and malpositioned Fowler catheter.  The Fowler catheter has been subsequently replaced and renal function has improved.  Nephrology service continues to follow for assistance with management of PRIYA. TECHNIQUE: Helical axial images are acquired through the pelvis after the IV administration 95 mL of Omnipaque 350.  Images were reconstructed into the coronal sagittal plane.  Dose automated exposure control, dose radiation lowering technique was utilized. COMPARISON: CT abdomen and pelvis without contrast from 05/28/2025 CT abdomen and pelvis without contrast from 05/23/2025 FINDINGS: Pelvis: The bony structures of the pelvis are intact. A metallic density is seen in the left ilium. Hip: There is mild degenerative change in the bilateral hip. Right: No fracture dislocation or subluxation is seen involving the visualized right hip bony structures. Left: No fracture dislocation or subluxation is seen involving the visualized left hip bony structures. Femur: Proximal Femur: The visualized proximal right and left femurs appear intact. Pelvic structures: Bladder: A fowler catheter in place. The bladder wall appears thickened even though the bladder is collapsed. Visualized distal ureters: Normal. Visualized small bowel loops: Normal. Rectosigmoid colon: Multiple diverticulum are seen in the sigmoid colon without surrounding inflammation to suggest diverticulitis. Prostate and seminal vesicles: There are multiple calcifications in the prostate gland. No  peripherally enhancing fluid collection identified to suggest an abscess. Pelvic cavity: Normal. Pelvic wall: Normal. Systemic arteries and veins: There is mild atheromatous calcification in the bilateral common iliac arteries and imaged bilateral superficial femoral arteries. Osseous structures: Mild to moderate degenerative change is seen in the imaged osseous structures.     1. A fowler catheter in place. The bladder wall appears thickened even though the bladder is collapsed. This could reflect an element of cystitis. Correlate with clinical and laboratory findings. 2. No acute pelvic solid organ or bowel pathology identified. Details and other findings as discussed above [employer] concurrence Electronically signed by: Jayme Hagan MD Date:    06/03/2025 Time:    07:54    Ankle Brachial Indices (KAIT)  Result Date: 6/3/2025  The right lower extremity ankle brachial index is 2.64 suggesting non compressible tibial vessels.  The left lower extremity ankle brachial index is 1.49 suggesting non compressible tibial vessels.      X-Ray Chest 1 View for Line/Tube Placement  Result Date: 6/2/2025  EXAMINATION XR CHEST 1 VIEW FOR LINE/TUBE PLACEMENT CLINICAL HISTORY picc placement; TECHNIQUE A total of 1 frontal image(s) of the chest. COMPARISON 1 June 2025 FINDINGS Lines/tubes/devices: Interval placement of right upper extremity PICC, catheter tip projects over the mid SVC region.  Multiple ECG leads again overlie the chest. The cardiac silhouette and central vascular structures are unchanged.  The trachea is midline. Subtle ill-defined right basilar infiltrate is better visualized.  Remaining lung fields are clear.  There is no enlarging pleural effusion or convincing pneumothorax. Regional osseous structures and extrathoracic soft tissues are similar. IMPRESSION 1. Interval placement of right upper extremity PICC, acceptable positioning. 2. Better visualized right basilar infiltrate. Electronically signed  by: Luis Enrique Posada Date:    06/02/2025 Time:    17:25    X-Ray Chest 1 View  Result Date: 6/1/2025  EXAMINATION: XR CHEST 1 VIEW CLINICAL HISTORY: Sob; TECHNIQUE: Single frontal view of the chest was performed. COMPARISON: 05/31/2025 FINDINGS: LINES AND TUBES: EKG/telemetry leads overlie the chest. MEDIASTINUM AND CHACE: The cardiac silhouette is normal. LUNGS: Lung volumes are low with associated atelectatic change.  Mild improved aeration at the right lung base. PLEURA:No pleural effusion. No pneumothorax. OTHER: No acute osseous abnormality.     Mild improved aeration at the right lung base. Electronically signed by: Ann Armstrong Date:    06/01/2025 Time:    12:46    X-Ray Chest 1 View  Result Date: 5/31/2025  EXAMINATION: XR CHEST 1 VIEW CLINICAL HISTORY: new SOB post blood transfusion; TECHNIQUE: Single frontal view of the chest was performed. COMPARISON: 05/29/2025 FINDINGS: The lungs are hypoaerated which accentuates the bronchovascular markings but no infiltrate is seen. The heart is normal in appearance. The pulmonary vascularity is unremarkable. No pleural effusion is seen. Bones and joints show no acute abnormality.     Hypoaerated lungs Electronically signed by: Lonnie Cornell Date:    05/31/2025 Time:    16:57    X-Ray Chest 1 View  Result Date: 5/29/2025  EXAMINATION: XR CHEST 1 VIEW CLINICAL HISTORY: hypoxic respiratory failure; TECHNIQUE: One view COMPARISON: May 23, 2025. FINDINGS: Cardiopericardial silhouette is within normal limits.  Right basilar atelectasis.  No convincing acute dense focal or segmental consolidation, congestive process, significant pleural effusions or pneumothorax.     No acute cardiopulmonary process identified. Electronically signed by: Sumit Núñez Date:    05/29/2025 Time:    09:06    CT Abdomen Pelvis  Without Contrast  Result Date: 5/28/2025  EXAMINATION: CT ABDOMEN PELVIS WITHOUT CONTRAST CLINICAL HISTORY: concern for prostate abscess; TECHNIQUE: Low dose axial images,  sagittal and coronal reformations were obtained from the lung bases to the pubic symphysis.  No contrast was administered.  Automatic exposure control is utilized to reduce patient radiation exposure. COMPARISON: 05/23/2025 FINDINGS: There is right lower lobe atelectasis.  There is a right-sided pleural effusion. The liver appears normal.  No obvious liver mass or lesion is seen. Gallbladder appears normal.  No gallstones are seen. The pancreas appears normal.  No inflammatory changes are seen in the pancreatic region. The spleen appears normal and adrenal glands appear normal.  No adrenal nodule is seen. No nephrolithiasis is seen.  No hydronephrosis is seen.  No hydroureter is seen.  No ureteral stone is seen. No colitis is seen.  No diverticulitis is seen.  No appendicitis is seen. No free air seen.  No free fluid is seen. The urinary bladder is decompressed due to Palomo catheter.  There is some air in the urinary bladder likely due to the catheter. The prostate is heavily calcified.  The area of hypoattenuation that was seen at the base of the prostate on the prior examination is less well visualized on this exam.  Contrast enhanced examination is recommended. Bones show no acute abnormality.     The hypoattenuated area in the prostate that was seen on prior examination is not visualized on today's exam.  Additional imaging with contrast enhancement may be beneficial for better delineation. Coarse heterotrophic calcifications seen throughout the prostate Interval placement of a Palomo catheter in the urinary bladder with decompressed appearing urinary bladder Interval development of right lower lobe atelectasis and moderate right-sided pleural effusion Electronically signed by: Lonnie Cornell Date:    05/28/2025 Time:    11:38    US Abdomen Limited_Liver  Result Date: 5/27/2025  EXAMINATION: US ABDOMEN LIMITED_LIVER CLINICAL HISTORY: transaminitis; COMPARISON: CT 23 May 2025. FINDINGS: Grayscale, color and  spectral doppler evaluation of the right upper quadrant. Pancreas obscured by bowel gas.  Imaged portion of the IVC normal in caliber. The liver is mildly enlarged. No focal suspicious liver lesion is seen. Patent portal vein with hepatopetal flow. No gallstones. No significant gallbladder wall thickening or pericholecystic fluid.  The common bile duct is normal in caliber  and measures 2 mm. Right kidney measures 10 cm in length.  Right hydronephrosis improved compared to the prior CT     1. Mild hepatomegaly. 2. Right kidney hydronephrosis improved compared to the prior CT. Electronically signed by: Jesús Moura Date:    05/27/2025 Time:    15:58    Echo Saline Bubble? No  Result Date: 5/26/2025    Left Ventricle: The left ventricle is normal in size. Normal wall thickness. There is moderately reduced systolic function with a visually estimated ejection fraction of 35 - 40%. Unable to assess diastolic function due to atrial fibrillation.   Right Ventricle: The right ventricle is moderately dilated Systolic function is moderately reduced. Prominent moderator band in the right ventricle.   Left Atrium: The left atrium is mildly dilated   Right Atrium: The right atrium is mildly dilated . 1.9 x 1.1 x 1.8 small mobile echogenic mass present.   Aortic Valve: There is mild aortic regurgitation.   Tricuspid Valve: There is mild regurgitation.   Pulmonary Artery: The estimated pulmonary artery systolic pressure is 49 mmHg.   IVC/SVC: Elevated venous pressure at 15 mmHg.          Assessment/Plan:    74-year-old male previously known to Dr. Torres but followed now by Dr. Chapis Lane past medical history of type 2 diabetes, HTN, obesity, osteoarthritis, CAD s/p LAD stent 2017, venous insufficiency who initially presented to [facility] 5/23 from a local nursing home for generalized weakness.  Admitted with severe PRIYA, UTI, CHF exacerbation (EF 35-40 % on TTE 5/26), and atrial fibrillation/atrial flutter with RVR.   TTE  also concerning for right atrial mass.  Underwent YO 5/30 which confirmed right atrial thrombus.       GI consulted 6/13 for anticoagulation clearance given recent GI bleed.    Patient was cleared from GI standpoint to begin blood thinners and signed off.  Called back 6/23 for melena and anemia.     6/23 EGD: Large duodenal ulcer with visible vessel with clot and oozing. s/p clip/epi/hemospray with   temporary hemostatsis. Given the large size and this is his 4th EGD will consult IR for embolization due to high risk of rebleeding. And failling endoscopic management Normal esophagus.Red blood in the gastric fundus. Duodenal ulcer with a visible vessel.  One hemostatic clip was successfully placed (MR   conditional) in the duodenal bulb.  An area in the duodenal bulb successfully injected.     6/24 GDA embolization with IR     1.   Acute on chronic macrocytic anemia   - Hgb 9.5--8.6--9.3--9.7--10.1--9.6--9.6--10.6--10.2--7.5--7.3 ( 3units PRBC / 1 unit FFP)--8.6--8.7     2.  Duodenal ulcer with bleeding requiring multiple EGDs     06/02: EGD: 3 duodenal ulcers - 15 mm posterior wall with oozing s/p APC and 10 mm on lateral wall with adherent clot s/p heater probe  06/04: EGD: 13 mm posterior wall ulcer with VV s/p bipolar and clips x 2.  Dieulafoy on edge of ulcer s/p clip x 2  06/11: EGD: Nonobstructing, nonbleeding duodenal ulcer with a clean ulcer base.  Prior clips seen with no active bleeding or high-risk stigmata.  Nonobstructing, nonbleeding duodenal ulcer with a clean ulcer base.     3.   Right retroperitoneal hematoma on CTA 6/12, improving   - General surgery consulted - no surgical intervention, signed off.      4.  Right atrial thrombus, on YO 5/30     5.  Left lower lobe pulmonary embolism on CTA runnoff ab/pel/bilat LE 6/5      - Serial H&H (q 6 hrs) over the next 24 hours  - Continue IV PPI BID and carafate QID (can dissolve in 10-20 cc of water to create a slushy for administration)  -Ok for clear  liquid diet   -Monitor H/H and transfuse as needed to Hgb 7  -Monitor clinically and stools for bleeding and notify GI team   -Supportive care per primary team     Case and plan discussed with Dr. Maribell Chambers, FNP-C

## 2025-06-24 NOTE — OP NOTE
Radiology Post-Procedure Note    Pre Op Diagnosis: Duodenal Hemorrhage    Post Op Diagnosis: Same    Secondary Diagnoses:   Problem List Items Addressed This Visit       Severe sepsis    Relevant Medications    metoprolol tartrate (LOPRESSOR) tablet 100 mg    Other Relevant Orders    CK (Completed)    X-Ray Foot 2 View Left (Completed)    Magnesium (Completed)    Anemia    Relevant Orders    Case Request Endoscopy: EGD (ESOPHAGOGASTRODUODENOSCOPY) (Completed)     Other Visit Diagnoses         Atrial fibrillation, unspecified type    -  Primary    Relevant Medications    losartan split tablet 12.5 mg    amiodarone tablet 400 mg (Completed)    amiodarone tablet 200 mg    amiodarone tablet 200 mg (Start on 6/26/2025  9:00 AM)    clopidogreL tablet 75 mg    Other Relevant Orders    Nursing communication      Sepsis          Tachycardia        Relevant Orders    EKG 12-lead (Completed)      Chest pain        Relevant Orders    EKG 12-lead    EKG 12-LEAD      Persistent atrial fibrillation        Relevant Orders    Case Request-Cath Lab: Left heart cath (Completed)    Cardiac catheterization (Completed)      Abnormal heart rhythm        Relevant Orders    Case Request-Cath Lab: Left heart cath (Completed)    Cardiac catheterization (Completed)    EKG 12-lead (Completed)    Nursing communication             Procedure: GDA Embolization    Procedure performed by: Amador Sanders MD    Assistant: Maria Elena    Written Informed Consent Obtained: Yes    Specimen Removed: None    Estimated Blood Loss: 30 cc    Condition: Stable    Outcome: The patient tolerated the procedure well and was without complications.    For further details please see the imaging report associated.    Disposition: Transfer back to inpatient unit.

## 2025-06-24 NOTE — PT/OT/SLP PROGRESS
"Physical Therapy Treatment    Patient Name:  Vaibhav Vargas   MRN:  [id number]    Recommendations:     Discharge therapy intensity: Moderate Intensity Therapy   Discharge Equipment Recommendations: to be determined by next level of care  Barriers to discharge: Ongoing medical needs and placement    Assessment:     Vaibhav Vargas is a 74 y.o. male admitted with a medical diagnosis of medical diagnosis of retroperitoneal bleed, GIB, R atrial thrombus, PE, enterococcus faecium, cystitis, L foot OM, PRIYA, AF RVR, HF. .  He presents with the following impairments/functional limitations: weakness, impaired endurance, impaired self care skills, impaired functional mobility, gait instability, impaired balance, impaired cognition, decreased lower extremity function .   Pt requires max encouragement to participate in therapy. Pt refuses to perform sit<>stand 2/2 weakness and dizziness.    Rehab Prognosis: Good; patient would benefit from acute skilled PT services to address these deficits and reach maximum level of function.    Recent Surgery: Procedure(s) (LRB):  EGD,WITH HEMORRHAGE CONTROL  EGD, WITH SUBMUCOSAL INJECTION (N/A) 1 Day Post-Op    Plan:     During this hospitalization, patient would benefit from acute PT services 3 x/week to address the identified rehab impairments via gait training, therapeutic activities, therapeutic exercises, neuromuscular re-education, wheelchair management/training and progress toward the following goals:    Plan of Care Expires:  07/16/25    Subjective     Chief Complaint: "I can't I'm too weak"  Patient/Family Comments/goals:   Pain/Comfort:         Objective:     Communicated with RN prior to session.  Patient found HOB elevated with fowler catheter, telemetry, peripheral IV, oxygen upon PT entry to room.     General Precautions: Standard, contact, aspiration  Orthopedic Precautions: N/A  Braces: N/A  Respiratory Status: Nasal cannula, flow 1.5 L/min  Blood Pressure: 105/86  Skin " Integrity: Visible skin intact and Known issues      Functional Mobility:  Bed Mobility:     Rolling Left:  maximal assistance and of 2 persons  Rolling Right: maximal assistance and of 2 persons  Scooting: moderate assistance  Supine to Sit: maximal assistance and of 2 persons  Sit to Supine: maximal assistance and of 2 persons    Therapeutic Activities/Exercises:  Performed rolling L<>R 2/2 being soiled (bloody stool). Requiring increase time  Pt sat EOB ~15MIN sba, Vcs needed to correct kyphotic posture.  Attempted sit<>stand with rw but No initiation from pt.    Co-Treatment: No    Education:  Patient provided with verbal education education regarding PT role/goals/POC.  Understanding was verbalized.     Patient left with bed in chair position with all lines intact, call button in reach, RN notified, and SISTER present    GOALS:   Multidisciplinary Problems       Physical Therapy Goals          Problem: Physical Therapy    Goal Priority Disciplines Outcome Interventions   Physical Therapy Goal     PT, PT/OT Progressing    Description: Goals to be met by: 2025     Patient will increase functional independence with mobility by performin. Supine to sit with Contact Guard Assistance  2. Sit to stand transfer with Contact Guard Assistance  3. Gait  x 150 feet with Contact Guard Assistance using Rolling Walker.                          Time Tracking:     PT Received On: 25  PT Start Time: 1105     PT Stop Time: 1200  PT Total Time (min): 55 min     Billable Minutes: Therapeutic Activity 55    Treatment Type: Treatment  PT/PTA: PTA     Number of PTA visits since last PT visit: 4     2025

## 2025-06-24 NOTE — PROGRESS NOTES
[facility] 6th Floor Medical Telemetry  Wound Care    Patient Name:  Vaibhav Vargas   MRN:  [id number]  Date: 6/24/2025  Diagnosis: <principal problem not specified>    History:     Past Medical History:   Diagnosis Date    Chronic lumbar pain     Diabetes mellitus, type 2     Edema     Hypertension     Obesity, unspecified     Osteoarthritis of multiple joints        Social History[1]    Precautions:     Allergies as of 06/12/2025    (No Known Allergies)       WO Assessment Details/Treatment        06/24/25 1020   WOCN Assessment   Visit Date 06/24/25   Visit Time 1020   Consult Type Follow Up   WO Speciality Wound   Wound pressure   Intervention chart review;assessed;changed;orders   Teaching on-going        Wound 05/23/25 2110 Pressure Injury Sacral spine   Date First Assessed/Time First Assessed: 05/23/25 2110   Present on Original Admission: Yes  Primary Wound Type: Pressure Injury  Location: Sacral spine   Wound Image    Pressure Injury Stage 3   Dressing Appearance Intact;Moist drainage   Drainage Amount Small   Drainage Characteristics/Odor Serosanguineous;No odor   Appearance Tan;Pink;Red   Tissue loss description Full thickness   Black (%), Wound Tissue Color 50 %   Red (%), Wound Tissue Color 50 %   Yellow (%), Wound Tissue Color 0 %   Periwound Area Dry;Blistered   Wound Edges Irregular   Wound Length (cm) 7 cm   Wound Width (cm) 4 cm   Wound Depth (cm) 0.2 cm   Wound Volume (cm^3) 2.932 cm^3   Wound Surface Area (cm^2) 21.99 cm^2   Care Cleansed with:;Wound cleanser   Dressing Changed;Silver;Absorptive Pad   Off Loading Wedge;Other (see comments)  (gladys VAUGHAN)        Wound 06/22/25 2000 Venous Ulcer Left anterior Foot   Date First Assessed/Time First Assessed: 06/22/25 2000   Present on Original Admission: Yes  Primary Wound Type: Venous Ulcer  Side: Left  Orientation: anterior  Location: Foot  Is this injury device related?: No   Wound Image    Dressing Appearance Dry;Intact;Clean    Drainage Amount None   Drainage Characteristics/Odor No odor   Appearance Yellow;Bone   Tissue loss description Full thickness   Wound Edges Callused   Care Cleansed with:;Wound cleanser   Dressing Applied;Silver;Absorptive Pad;Rolled gauze   Off Loading Pillow     WOCN follow up for left foot and sacrum. Discussed POC w/ nurse gordo. I still recommend consulting podiatry to left medial foot due to bone being visible. Consult has not been put in yet. Pt.'s family at bedside. Explained reason for visit. Pt. Has a current GI bleed and had a bloody bowel movement. Nurse aware and helped me turn patient to clean him up. GI on case. Treatment recommendations: sacrum: clean w/ vashe, dry well, apply aquacel ag advantage grey cloth to wound bed, cover with abd pad, secure with minimal tape. Daily/prn if soilage. Left foot: cleaned it with vashe, dried well, apply aq Ag cloth and new foam. Pt.'s foot in prafo boot. Follow orders until podiatry sees pt.'s foot and makes other recommendations. Nursing to cont. Tx recs and preventative measures. Will follow up.     06/24/2025         [1]   Social History  Socioeconomic History    Marital status:    Tobacco Use    Smoking status: Never    Smokeless tobacco: Never   Substance and Sexual Activity    Alcohol use: Not Currently    Drug use: Never    Sexual activity: Not Currently     Social Drivers of Health     Financial Resource Strain: Low Risk  (6/12/2025)    Overall Financial Resource Strain (CARDIA)     Difficulty of Paying Living Expenses: Not hard at all   Food Insecurity: No Food Insecurity (6/12/2025)    Hunger Vital Sign     Worried About Running Out of Food in the Last Year: Never true     Ran Out of Food in the Last Year: Never true   Transportation Needs: No Transportation Needs (6/12/2025)    PRAPARE - Transportation     Lack of Transportation (Medical): No     Lack of Transportation (Non-Medical): No   Physical Activity: Inactive (5/26/2025)    Exercise  Vital Sign     Days of Exercise per Week: 0 days     Minutes of Exercise per Session: 0 min   Stress: No Stress Concern Present (6/12/2025)    [place] [employer] - Occupational Stress Questionnaire     Feeling of Stress : Not at all   Housing Stability: Low Risk  (6/12/2025)    Housing Stability Vital Sign     Unable to Pay for Housing in the Last Year: No     Number of Times Moved in the Last Year: 0     Homeless in the Last Year: No
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
## 2025-06-24 NOTE — PT/OT/SLP PROGRESS
Occupational Therapy   Treatment    Name: Vaibhav Vargas  MRN: [id number]    Recommendations:     Recommended therapy intensity at discharge: Moderate Intensity Therapy   Discharge Equipment Recommendations:  to be determined by next level of care  Barriers to discharge:   (medical needs, placement, functional deficits)    Assessment:     Vaibhav Vargas is a 74 y.o. male with a medical diagnosis of retroperitoneal bleed, GIB, R atrial thrombus, PE, enterococcus faecium, cystitis, L foot OM, PRIYA, AF RVR, HF.  Performance deficits affecting function are weakness, impaired endurance, impaired coordination, decreased lower extremity function, impaired self care skills, impaired functional mobility, decreased safety awareness, impaired cardiopulmonary response to activity, impaired balance, edema, impaired skin. Pt with self limiting behaviors in addition to his deconditioning; requires MAX encouragement to attempt sitting EOB with therapy today.     Rehab Prognosis:  Fair; patient would benefit from acute skilled OT services to address these deficits and reach maximum level of function.       Plan:     Patient to be seen 3 x/week to address the above listed problems via self-care/home management, therapeutic activities, therapeutic exercises, neuromuscular re-education, sensory integration  Plan of Care Expires: 07/17/25  Plan of Care Reviewed with: patient, sibling    Subjective     Pain/Comfort:  Pain Rating 1: 0/100/10    Objective:     Communicated with: RN prior to session.  Patient found HOB elevated with pulse ox (continuous), telemetry, peripheral IV, fowler catheter upon OT entry to room.    General Precautions: Standard, contact, fall    Orthopedic Precautions:N/A  Braces: N/A  Respiratory Status: Nasal cannula, flow 1 L/min  Vital Signs: Blood Pressure: 121/90     Occupational Performance:   Pt with poor tolerance; remains sitting EOB ~20 min to promote ax tolerance and trunk control for ADL  participation    Functional Mobility:  Bed Mobility:     Rolling Left:  maximal assistance and of 2 persons  Rolling Right: maximal assistance and of 2 persons  Scooting: moderate assistance  Supine to Sit: maximal assistance and of 2 persons  Sit to Supine: maximal assistance and of 2 persons  Activities of Daily Living:  Feeding: set up assist with thickened liquids, pt required cues to manage cup himself 2/2 pt does not initiate self-feeding and waits for therapist to provide max assist  Lower Body Dressing: maximal assistance To doff/don socks  Toileting: maximal assistance fowler cath in place; pt with bloody BM this date and requiring max A for pericare from bed level. RN informed. Sacral dressing exchanged and RN notified.     [facility] 6 Click ADL: 12    Co-Treatment: Yes, due to Limited activity tolerance    Patient Education:  Patient provided with verbal education education regarding OT role/goals/POC, fall prevention, safety awareness, Discharge/DME recommendations, and pressure ulcer prevention.  Additional teaching is warranted.      Patient left with bed in chair position, all lines intact, call button in reach, RN notified, and B heels floated.    GOALS:   Multidisciplinary Problems       Occupational Therapy Goals          Problem: Occupational Therapy    Goal Priority Disciplines Outcome Interventions   Occupational Therapy Goal     OT, PT/OT Progressing    Description: Goals to be met by 7/17/25    Pt will demonstrate feeding himself seated at recliner chair/WC with setup.  Pt will complete grooming seated at sink at WC level with SBA.  Pt will complete UB dressing with SBA.  Pt will complete sit>stand with min A with RW for prepare for C transfer.  Pt will toilet with moderate assistance for clothing management and hygiene at [facility].  Pt will engage/tolerate BUE general therex x 10 minutes to increase activity tolerance seated EOB.  Pt will tolerate static standing x 1 minute to assist with ADL tasks           Problem: Occupational Therapy    Goal Priority Disciplines Outcome Interventions   Occupational Therapy Goal     OT, PT/OT                         Time Tracking:     OT Date of Treatment: 06/24/25  OT Start Time: 1105  OT Stop Time: 1200  OT Total Time (min): 55 min    Billable Minutes:Self Care/Home Management 4    OT/TETE: OT     Number of TETE visits since last OT visit: 2    6/24/2025

## 2025-06-24 NOTE — ANESTHESIA POSTPROCEDURE EVALUATION
Anesthesia Post Evaluation    Patient: Vaibhav Vargas    Procedure(s) Performed: Procedure(s) (LRB):  EGD (N/A)  EGD,WITH HEMORRHAGE CONTROL    Final Anesthesia Type: MAC      Patient location during evaluation: GI PACU  Patient participation: Yes- Able to Participate  Level of consciousness: awake and alert and oriented  Post-procedure vital signs: reviewed and stable  Pain management: adequate  Airway patency: patent    PONV status at discharge: No PONV  Anesthetic complications: no      Cardiovascular status: hemodynamically stable and tachycardic  Respiratory status: unassisted, spontaneous ventilation and room air  Hydration status: euvolemic  Follow-up not needed.              Vitals Value Taken Time   /68 06/23/25 15:30   Temp 36.5 °C (97.7 °F) 06/23/25 15:00   Pulse 128 06/23/25 15:30   Resp 20 06/23/25 15:30   SpO2 96 % 06/23/25 15:30         No case tracking events are documented in the log.      Pain/Josefina Score: Pain Rating Prior to Med Admin: 7 (6/23/2025 10:19 AM)  Pain Rating Post Med Admin: 2 (6/23/2025 11:05 AM)  Josefina Score: 9 (6/23/2025  3:14 PM)

## 2025-06-24 NOTE — PROGRESS NOTES
"Hospital Medicine  Progress Note    Patient Name: Vaibhav Vargas  MRN: [id number]  Status: IP- Inpatient   Admission Date: 6/12/2025  Length of Stay: 12  Date of Service: 06/24/2025       CC: hospital follow-up for obstructive CAD       SUBJECTIVE   "74 y.o. male with PMH CVA 03/2025, CAD s/p LAD stent 2017, venous insuffiency, HTN, 1st Degree AVB, NIDDM2 who initially presented to [facility] ED on 05/23 by daughter from NH for weakness.  He was found to have dislodged fowler catheter with severe PRIYA and UTI, fowler was replaced, he had good urinary output with significant improvement in renal function. He was also getting diureses for CHF exacerbation, workup showed EF of 35-40%, a thrombus was incidentally found in his right atrium. He was started on full dose Lovenox the day after. He continued to be in a fib with RVR despite BB, CCB were being avoided due to low EF, cardioversion was avoided due to existing thrombus that was confirmed on YO. On 06/01 he had significant drop in his hemoglobin with worsening RVR, Lovenox was held, EGD on 06/02 showed non-bleeding ulcers with active oozing of blood from his gastric mucosa, GI did not recommend to hold anticoagulation due to risk for embolization. On 06/04 the patient became hypotensive with A flutter and RVR, hemoglobin dropped to 4.7, Lovenox was held again and massive transfusion was initiated, the patient was upgraded to the ICU due to hemodynamic instability. Lovenox was started once again on 06/06 and he was downgraded from the ICU on 06/08.  Patient bled again with an acute drop in hemoglobin on 06/11 and was noted to have melena.  He was transferred to our facility for GI services. Prior to transferred found to have a retroperitoneal bleed.      Patient was also in Afib with RVR. He was seen by CIS team and started on IV amiodarone and later po metoprolol. His H&H was closely monitored. He was continued on PPI. Hb stabilized. He was eating well. He had emma LE " "weakness from a previous CVA. He was making a slow clinical recovery.      He was followed by CIS team and LHC done and showed   Findings:  1. Left main-Patent  2. Lad-70% calcified proximal stenosis, Mid stent is 2.5 mm stent in  a   3.5-4mm vessel.  MSA distal stent edge is 3.9mm2  3. LCX-50% mid lesion  4. Ramus-40% mid  5. RCA-30% proximal calcified stenosis  6. LVEDP-11  7. LIMA patent     Recommended:  1. Consult CTS for LIMA to LAD, ANGELIA ligation, MAZE, and possible surgical   extraction of prominent eustachian valve with thrombus.   2. If turned down he will likely need atherectomy and shockwave for   underexpanded stent, and repeat stenting of the prox to mid LAD."     "CT surgery do not recommended surgery, instead requested CIS to perform high risk PCI."     Cardiology holding off on high-risk PCI.   Patient continued with dizziness and remained in Afib; Cardiology felt symptomatology related to this, but would need to be anticoagulated to perform DCCV.  We restarted AC with FD LOVENOX.  Repeat CT of the abdomen noted retroperitoneal bleed was stable, slightly smaller than previous.  H&H remained stable.  However Hemoglobin subsequently dropped significantly overnight from 10.2 to 7.3.  Stat CT of the retroperitoneum noted mild increasing size of his prior retroperitoneal bleed, though no active extravasation could be identified.       Today: Patient seen and examined at bedside, and chart reviewed.  Underwent upper endoscopy with GI yesterday noting large duodenal ulcer with a visible bleeding vessel treated with clip/epi/hemo spray.  Additionally IR was consulted, patient now underwent GDA embolization this morning.  Status post 3 units PRBCs yesterday with Hgb rising from 7.3 to 8.6; last draw now stable at 8.7.      MEDICATIONS   Scheduled   amiodarone  200 mg Oral BID    Followed by    [START ON 6/26/2025] amiodarone  200 mg Oral Daily    clopidogreL  75 mg Oral Daily    DAPTOmycin (CUBICIN) IV (PEDS " and ADULTS)  500 mg Intravenous Q48H    metoprolol tartrate  100 mg Oral BID    pantoprazole  40 mg Intravenous BID     Continuous Infusions  None      PHYSICAL EXAM   VITALS: T 97.1 °F (36.2 °C)   /72   P (!) 125   RR 18   O2 (!) 50 %    GENERAL: Awake and in NAD  LUNGS: CTA anteriorly  CVS: Normal rate, irregularly irregular rhythm  GI/: Soft, NT, bowel sounds positive.  EXTREMITIES: radial pulse 2+  NEURO: AAOx3  PSYCH: Cooperative      LABS   CBC  Recent Labs     06/23/25 0321 06/24/25 0219 06/24/25  0513   WBC 13.12* 14.35*  --    RBC 2.57* 2.88*  --    HGB 7.3* 8.6* 8.7*   HCT 24.3* 28.2* 26.7*   MCV 94.6* 97.9*  --    MCH 28.4 29.9  --    MCHC 30.0* 30.5*  --    RDW 19.0* 18.6*  --     230  --      CHEM  Recent Labs     06/23/25 0321 06/24/25 0513   * 153*   K 4.2 4.7   CO2 24 23   BUN 43.4* 60.9*   CREATININE 1.45* 1.95*   CALCIUM 8.4* 8.9   ALBUMIN 2.0* 2.1*   GLOBULIN 3.5 2.7   ALKPHOS 49 40   ALT 11 9   AST 13 9*   BILITOT 0.7 0.7       MICROBIOLOGY     Microbiology Results (last 7 days)       Procedure Component Value Units Date/Time    Blood Culture [[id number]]  (Normal) Collected: 06/13/25 0830    Order Status: Completed Specimen: Blood from Arm, Right Updated: 06/18/25 0902     Blood Culture No Growth at 5 days    Blood Culture [[id number]]  (Normal) Collected: 06/13/25 0733    Order Status: Completed Specimen: Blood from Antecubital, Left Updated: 06/18/25 0902     Blood Culture No Growth at 5 days            ASSESSMENT   Acute blood loss anemia  Retroperitoneal bleeding, stable  GI bleed secondary to duodenal ulcer  Afib with RVR   Obstructive CAD, not a candidate for CABG  ARF/ATN, improved   ? Right atrial thrombus   Pulmonary Embolism   Left foot osteomyelitis   Chronic systolic heart failure  Morbid obesity   NIDDM II  Essential HTN     h/o CVA 03/2025, CAD s/p LAD stent 2017, 1° AVB,    PLAN   AC discontinued, continue to monitor H&H  Remains in Afib, though not a  candidate for DCCV with bleeding issues  Contionue BB/amiodarone for Afib  Cardiology also holding off on high-risk PCI for now  Continue Plavix/statin   Otherwise continue current management and close monitoring, including telemetry  IV daptomycin for left foot osteomyelitis as previously scheduled until July 14th; continue concomitant wound care      Prophylaxis:  FD Lovenox as above        Bereket Manrique MD  [facility]

## 2025-06-24 NOTE — AI DETERIORATION ALERT
Artificial Intelligence Notification  [facility]  [address]  [place] [address]  Phone: [phone]    This documentation was triggered by an Artificial Intelligence Notification:    Admit Date: 2025   LOS: 12  Code Status: Full Code  : 1950  Age: 74 y.o.  Weight:   Wt Readings from Last 1 Encounters:   25 107.6 kg (237 lb 3.4 oz)        Sex: male  Bed: 621/1 A  MRN: [id number]  Attending Physician: Bereket Manrique MD     Date of Alert: 2025  Time AI Alert Received: 9:32            Vitals:    25 0932   BP: 105/72   Pulse: (!) 125   Resp:    Temp:      SpO2: (!) 50 %      Artificial Intelligence alert discussed with Provider:     Name: RAO Kelly   Date/Time of Provider Notification: 9:34      Patient Condition: pt lying in bed flat, appears in no distress. O2 Is 100% and HR is 110 in Afib RVR which is not new. Nurse states oxygen charted was inaccurate and pt was being settled after procedure. No ICU intervention at this time.

## 2025-06-25 LAB
ALBUMIN SERPL-MCNC: 2.3 G/DL (ref 3.4–4.8)
ALBUMIN/GLOB SERPL: 0.7 RATIO (ref 1.1–2)
ALP SERPL-CCNC: 39 UNIT/L (ref 40–150)
ALT SERPL-CCNC: 9 UNIT/L (ref 0–55)
ANION GAP SERPL CALC-SCNC: 8 MEQ/L
AST SERPL-CCNC: 13 UNIT/L (ref 11–45)
BASOPHILS # BLD AUTO: 0.03 X10(3)/MCL
BASOPHILS NFR BLD AUTO: 0.2 %
BILIRUB SERPL-MCNC: 0.6 MG/DL
BUN SERPL-MCNC: 58.8 MG/DL (ref 8.4–25.7)
CALCIUM SERPL-MCNC: 8.9 MG/DL (ref 8.8–10)
CHLORIDE SERPL-SCNC: 125 MMOL/L (ref 98–107)
CO2 SERPL-SCNC: 22 MMOL/L (ref 23–31)
CREAT SERPL-MCNC: 1.99 MG/DL (ref 0.72–1.25)
CREAT/UREA NIT SERPL: 30
EOSINOPHIL # BLD AUTO: 0.01 X10(3)/MCL (ref 0–0.9)
EOSINOPHIL NFR BLD AUTO: 0.1 %
ERYTHROCYTE [DISTWIDTH] IN BLOOD BY AUTOMATED COUNT: 18.7 % (ref 11.5–17)
GFR SERPLBLD CREATININE-BSD FMLA CKD-EPI: 35 ML/MIN/1.73/M2
GLOBULIN SER-MCNC: 3.1 GM/DL (ref 2.4–3.5)
GLUCOSE SERPL-MCNC: 146 MG/DL (ref 82–115)
HCT VFR BLD AUTO: 24.7 % (ref 42–52)
HCT VFR BLD AUTO: 25.1 % (ref 42–52)
HCT VFR BLD AUTO: 25.3 % (ref 42–52)
HGB BLD-MCNC: 7.6 G/DL (ref 14–18)
HGB BLD-MCNC: 7.7 G/DL (ref 14–18)
HGB BLD-MCNC: 7.9 G/DL (ref 14–18)
IMM GRANULOCYTES # BLD AUTO: 0.25 X10(3)/MCL (ref 0–0.04)
IMM GRANULOCYTES NFR BLD AUTO: 1.7 %
LYMPHOCYTES # BLD AUTO: 1.19 X10(3)/MCL (ref 0.6–4.6)
LYMPHOCYTES NFR BLD AUTO: 8.1 %
MCH RBC QN AUTO: 29.9 PG (ref 27–31)
MCHC RBC AUTO-ENTMCNC: 31.5 G/DL (ref 33–36)
MCV RBC AUTO: 95.1 FL (ref 80–94)
MONOCYTES # BLD AUTO: 1.31 X10(3)/MCL (ref 0.1–1.3)
MONOCYTES NFR BLD AUTO: 9 %
NEUTROPHILS # BLD AUTO: 11.84 X10(3)/MCL (ref 2.1–9.2)
NEUTROPHILS NFR BLD AUTO: 80.9 %
NRBC BLD AUTO-RTO: 3.6 %
OHS QRS DURATION: 134 MS
OHS QTC CALCULATION: 531 MS
PLATELET # BLD AUTO: 241 X10(3)/MCL (ref 130–400)
PMV BLD AUTO: 9.6 FL (ref 7.4–10.4)
POTASSIUM SERPL-SCNC: 4.2 MMOL/L (ref 3.5–5.1)
PROT SERPL-MCNC: 5.4 GM/DL (ref 5.8–7.6)
RBC # BLD AUTO: 2.64 X10(6)/MCL (ref 4.7–6.1)
SODIUM SERPL-SCNC: 155 MMOL/L (ref 136–145)
WBC # BLD AUTO: 14.63 X10(3)/MCL (ref 4.5–11.5)

## 2025-06-25 PROCEDURE — 21400001 HC TELEMETRY ROOM

## 2025-06-25 PROCEDURE — 25000003 PHARM REV CODE 250: Performed by: STUDENT IN AN ORGANIZED HEALTH CARE EDUCATION/TRAINING PROGRAM

## 2025-06-25 PROCEDURE — 36415 COLL VENOUS BLD VENIPUNCTURE: CPT

## 2025-06-25 PROCEDURE — 25000003 PHARM REV CODE 250: Performed by: INTERNAL MEDICINE

## 2025-06-25 PROCEDURE — 85018 HEMOGLOBIN: CPT

## 2025-06-25 PROCEDURE — 80053 COMPREHEN METABOLIC PANEL: CPT

## 2025-06-25 PROCEDURE — 27000207 HC ISOLATION

## 2025-06-25 PROCEDURE — 11000001 HC ACUTE MED/SURG PRIVATE ROOM

## 2025-06-25 PROCEDURE — 25000003 PHARM REV CODE 250: Performed by: NURSE PRACTITIONER

## 2025-06-25 PROCEDURE — 85025 COMPLETE CBC W/AUTO DIFF WBC: CPT

## 2025-06-25 PROCEDURE — 27000221 HC OXYGEN, UP TO 24 HOURS

## 2025-06-25 PROCEDURE — 93005 ELECTROCARDIOGRAM TRACING: CPT

## 2025-06-25 PROCEDURE — 63600175 PHARM REV CODE 636 W HCPCS

## 2025-06-25 PROCEDURE — 93010 ELECTROCARDIOGRAM REPORT: CPT | Mod: ,,, | Performed by: INTERNAL MEDICINE

## 2025-06-25 RX ADMIN — CLOPIDOGREL 75 MG: 75 TABLET ORAL at 09:06

## 2025-06-25 RX ADMIN — AMIODARONE HYDROCHLORIDE 200 MG: 200 TABLET ORAL at 08:06

## 2025-06-25 RX ADMIN — AMIODARONE HYDROCHLORIDE 200 MG: 200 TABLET ORAL at 09:06

## 2025-06-25 RX ADMIN — METOPROLOL TARTRATE 100 MG: 50 TABLET, FILM COATED ORAL at 09:06

## 2025-06-25 RX ADMIN — SUCRALFATE 1 G: 1 TABLET ORAL at 04:06

## 2025-06-25 RX ADMIN — METOPROLOL TARTRATE 100 MG: 50 TABLET, FILM COATED ORAL at 08:06

## 2025-06-25 RX ADMIN — PANTOPRAZOLE SODIUM 40 MG: 40 INJECTION, POWDER, FOR SOLUTION INTRAVENOUS at 08:06

## 2025-06-25 RX ADMIN — HYDROCODONE BITARTRATE AND ACETAMINOPHEN 1 TABLET: 5; 325 TABLET ORAL at 05:06

## 2025-06-25 RX ADMIN — SUCRALFATE 1 G: 1 TABLET ORAL at 05:06

## 2025-06-25 RX ADMIN — SUCRALFATE 1 G: 1 TABLET ORAL at 08:06

## 2025-06-25 RX ADMIN — PANTOPRAZOLE SODIUM 40 MG: 40 INJECTION, POWDER, FOR SOLUTION INTRAVENOUS at 09:06
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
## 2025-06-25 NOTE — PLAN OF CARE
Clinical Updates sent to [facility] for LTAC placement, insurance authorization pending P2P.

## 2025-06-25 NOTE — PROGRESS NOTES
"Gastroenterology Progress Note    Subjective/Interval History:    Patient with 1 dark, melanotic stool overnight.  None so far today.  He is afebrile and hemodynamically stable.    Not eating much.  Denies abd pain, nausea, or vomiting.  Asking to sit in the chair.     ROS:  Constitutional:  Positive for malaise/fatigue. Negative for chills and fever.        Decreased appetite / po intake   Gastrointestinal:  Positive for melena. Negative for abdominal pain, nausea and vomiting.   Musculoskeletal:         Sacral pain 2/2 wound   Neurological:  Positive for weakness (generalized).     Vital Signs:  /76 (BP Location: Left arm, Patient Position: Lying)   Pulse 106   Temp 97.4 °F (36.3 °C) (Oral)   Resp 19   Ht 6' 2" (1.88 m)   Wt 107.6 kg (237 lb 3.4 oz)   SpO2 (!) 82%   BMI 30.46 kg/m²   Body mass index is 30.46 kg/m².    Physical Exam:  Constitutional:       General: He is not in acute distress.     Appearance: He is ill-appearing.   HENT:      Head: Normocephalic and atraumatic.      Mouth/Throat:      Mouth: Mucous membranes are moist.      Pharynx: Oropharynx is clear.   Eyes:      Extraocular Movements: Extraocular movements intact.      Pupils: Pupils are equal, round, and reactive to light.   Cardiovascular:      Rate and Rhythm: Normal rate. Rhythm irregular.      Pulses: Normal pulses.      Heart sounds: Normal heart sounds.   Pulmonary:      Effort: Pulmonary effort is normal.      Breath sounds: Normal breath sounds.   Abdominal:      General: Bowel sounds are normal. There is no distension.      Palpations: Abdomen is soft.      Tenderness: There is no abdominal tenderness. There is no guarding.   Musculoskeletal:      Right lower leg: Edema present.      Left lower leg: Edema present.   Skin:     General: Skin is warm and dry.   Neurological:      Mental Status: He is alert and oriented to person, place, and time.   Psychiatric:         Mood and Affect: Mood normal.         Behavior: Behavior " normal.     Labs:  Recent Results (from the past 48 hours)   POCT glucose    Collection Time: 06/23/25  8:28 PM   Result Value Ref Range    POCT Glucose 177 (H) 70 - 110 mg/dL   CBC with Differential    Collection Time: 06/24/25  2:19 AM   Result Value Ref Range    WBC 14.35 (H) 4.50 - 11.50 x10(3)/mcL    RBC 2.88 (L) 4.70 - 6.10 x10(6)/mcL    Hgb 8.6 (L) 14.0 - 18.0 g/dL    Hct 28.2 (L) 42.0 - 52.0 %    MCV 97.9 (H) 80.0 - 94.0 fL    MCH 29.9 27.0 - 31.0 pg    MCHC 30.5 (L) 33.0 - 36.0 g/dL    RDW 18.6 (H) 11.5 - 17.0 %    Platelet 230 130 - 400 x10(3)/mcL    MPV 9.8 7.4 - 10.4 fL    Neut % 80.3 %    Lymph % 9.0 %    Mono % 9.3 %    Eos % 0.0 %    Basophil % 0.1 %    Imm Grans % 1.3 %    Neut # 11.53 (H) 2.1 - 9.2 x10(3)/mcL    Lymph # 1.29 0.6 - 4.6 x10(3)/mcL    Mono # 1.34 (H) 0.1 - 1.3 x10(3)/mcL    Eos # 0.00 0 - 0.9 x10(3)/mcL    Baso # 0.01 <=0.2 x10(3)/mcL    Imm Gran # 0.18 (H) 0.00 - 0.04 x10(3)/mcL    NRBC% 1.8 %   Glucose, Random    Collection Time: 06/24/25  2:19 AM   Result Value Ref Range    Glucose 171 (H) 82 - 115 mg/dL   Hemoglobin and Hematocrit    Collection Time: 06/24/25  5:13 AM   Result Value Ref Range    Hgb 8.7 (L) 14.0 - 18.0 g/dL    Hct 26.7 (L) 42.0 - 52.0 %   Comprehensive Metabolic Panel    Collection Time: 06/24/25  5:13 AM   Result Value Ref Range    Sodium 153 (H) 136 - 145 mmol/L    Potassium 4.7 3.5 - 5.1 mmol/L    Chloride 122 (H) 98 - 107 mmol/L    CO2 23 23 - 31 mmol/L    Glucose 177 (H) 82 - 115 mg/dL    Blood Urea Nitrogen 60.9 (H) 8.4 - 25.7 mg/dL    Creatinine 1.95 (H) 0.72 - 1.25 mg/dL    Calcium 8.9 8.8 - 10.0 mg/dL    Protein Total 4.8 (L) 5.8 - 7.6 gm/dL    Albumin 2.1 (L) 3.4 - 4.8 g/dL    Globulin 2.7 2.4 - 3.5 gm/dL    Albumin/Globulin Ratio 0.8 (L) 1.1 - 2.0 ratio    Bilirubin Total 0.7 <=1.5 mg/dL    ALP 40 40 - 150 unit/L    ALT 9 0 - 55 unit/L    AST 9 (L) 11 - 45 unit/L    eGFR 35 mL/min/1.73/m2    Anion Gap 8.0 mEq/L    BUN/Creatinine Ratio 31    Protime-INR     Collection Time: 06/24/25  5:13 AM   Result Value Ref Range    PT 21.1 (H) 12.5 - 14.5 seconds    INR 1.8 (H) <=1.3   Hemoglobin and Hematocrit    Collection Time: 06/24/25 12:17 PM   Result Value Ref Range    Hgb 8.2 (L) 14.0 - 18.0 g/dL    Hct 26.0 (L) 42.0 - 52.0 %   Hemoglobin and Hematocrit    Collection Time: 06/24/25  6:59 PM   Result Value Ref Range    Hgb 7.9 (L) 14.0 - 18.0 g/dL    Hct 24.9 (L) 42.0 - 52.0 %   Hemoglobin and Hematocrit    Collection Time: 06/25/25 12:00 AM   Result Value Ref Range    Hgb 7.7 (L) 14.0 - 18.0 g/dL    Hct 24.7 (L) 42.0 - 52.0 %   Comprehensive Metabolic Panel    Collection Time: 06/25/25  4:03 AM   Result Value Ref Range    Sodium 155 (H) 136 - 145 mmol/L    Potassium 4.2 3.5 - 5.1 mmol/L    Chloride 125 (H) 98 - 107 mmol/L    CO2 22 (L) 23 - 31 mmol/L    Glucose 146 (H) 82 - 115 mg/dL    Blood Urea Nitrogen 58.8 (H) 8.4 - 25.7 mg/dL    Creatinine 1.99 (H) 0.72 - 1.25 mg/dL    Calcium 8.9 8.8 - 10.0 mg/dL    Protein Total 5.4 (L) 5.8 - 7.6 gm/dL    Albumin 2.3 (L) 3.4 - 4.8 g/dL    Globulin 3.1 2.4 - 3.5 gm/dL    Albumin/Globulin Ratio 0.7 (L) 1.1 - 2.0 ratio    Bilirubin Total 0.6 <=1.5 mg/dL    ALP 39 (L) 40 - 150 unit/L    ALT 9 0 - 55 unit/L    AST 13 11 - 45 unit/L    eGFR 35 mL/min/1.73/m2    Anion Gap 8.0 mEq/L    BUN/Creatinine Ratio 30    CBC with Differential    Collection Time: 06/25/25  4:03 AM   Result Value Ref Range    WBC 14.63 (H) 4.50 - 11.50 x10(3)/mcL    RBC 2.64 (L) 4.70 - 6.10 x10(6)/mcL    Hgb 7.9 (L) 14.0 - 18.0 g/dL    Hct 25.1 (L) 42.0 - 52.0 %    MCV 95.1 (H) 80.0 - 94.0 fL    MCH 29.9 27.0 - 31.0 pg    MCHC 31.5 (L) 33.0 - 36.0 g/dL    RDW 18.7 (H) 11.5 - 17.0 %    Platelet 241 130 - 400 x10(3)/mcL    MPV 9.6 7.4 - 10.4 fL    Neut % 80.9 %    Lymph % 8.1 %    Mono % 9.0 %    Eos % 0.1 %    Basophil % 0.2 %    Imm Grans % 1.7 %    Neut # 11.84 (H) 2.1 - 9.2 x10(3)/mcL    Lymph # 1.19 0.6 - 4.6 x10(3)/mcL    Mono # 1.31 (H) 0.1 - 1.3 x10(3)/mcL     Eos # 0.01 0 - 0.9 x10(3)/mcL    Baso # 0.03 <=0.2 x10(3)/mcL    Imm Gran # 0.25 (H) 0.00 - 0.04 x10(3)/mcL    NRBC% 3.6 %   Hemoglobin and Hematocrit    Collection Time: 06/25/25 12:00 PM   Result Value Ref Range    Hgb 7.6 (L) 14.0 - 18.0 g/dL    Hct 25.3 (L) 42.0 - 52.0 %       Imaging:  IR Embolization  Result Date: 6/24/2025  PROCEDURE: Gastroduodenal Artery Embolization CLINICAL HISTORY: 74-year-old male with bleeding duodenal ulcer and hemorrhage ANESTHESIA: Level of sedation: Local anesthesia Medications: 0 mg Versed IV; 0 mcg Fentanyl IV; 0 mg Ativan IV; other: None Administration: Moderate sedation was administered during this procedure. Continuous monitoring of the patient's level of consciousness and physiological status was provided throughout the procedure by an independent trained observer under the direct supervision of the operating physician. Duration of sedation: 0 minutes Antibiotic prophylaxis: Ancef 2 g IV PHYSICIANS: Amador Sanders MD RADIATION DOSE: Fluoroscopy time: 6.4 minutes Radiation exposure dose: 497 mGy Exposure images: 125 CONTRAST: 80 cc of Isovue 370 PROCEDURAL SUMMARY: After informed consent was obtained, the patient was placed supine on the angiography table. The skin overlying the right groin was then prepped and draped in the usual sterile fashion. The skin and soft tissues were anesthetized using 1% lidocaine.  The bony landmarks were confirmed under fluoroscopy.  The common femoral pulse was palpated for guidance and a 21g micropuncture needle was advanced into the common femoral artery. Return of blood flow was confirmed and a mandril wire was advanced into the needle under fluoroscopic guidance. The needle was removed and a microdilator was advanced over the wire into the artery.  The inner dilator and wire were removed and a OtherInbox guidewire was advanced through the sheath into the artery under fluoroscopic guidance.  The sheath was removed. A 5F vascular sheath was  advanced into the common femoral artery and the inner dilator was removed.  The sheath was flushed. A 5F [place] Cobra catheter was advance through the sheath over the wire into the arterial system.  The celiac  artery was selected.  Angiography demonstrated normal vascular anatomy with apparent contrast extravasation along a duodenal branch off of the GDA.. A decision was made to proceed with embolization. A coaxial system of a TruSelect microcatheter with a Fathom microwire was inserted through the select catheter. The microcatheter and microwire were advanced into the GDA, distal to the area concerning for hemorrhage.  Angiography confirmed positioning within the targeted vessel. Embold microcoils and Gelfoam were used for embolization. 3 mm x 12 cm and 4 mm x 15 cm Embold microcoils as well as 0.3 cc of Gelfoam were deployed within the target artery over the origin of the vessel of concern.  Injected contrast confirmed progression towards stasis within the target vessel.  Once embolization was complete, angiography was performed through the microcatheter confirming technically successful occlusion of the targeted branch.  The microcatheter was removed and angiography was repeated through parent catheter.  This confirmed successful embolization of the target vessel with no other vascular abnormality identified. The glide Cobra catheter was then used to select the superior mesenteric artery.  Angiography was performed to ensure no further evidence of hemorrhage was identified.  After this was confirmed, the catheter was then removed over a [employer] guidewire. The groin sheath was flushed.  Angiography of the common femoral artery was performed via the vascular sheath.  Angiography of the common femoral artery demonstrated no evidence of injury. A Mynx closure device was deployed for hemostasis and the sheath was removed.  A sterile dressing was applied. The patient tolerated the procedure well and experienced no  immediate complications. The patient was then brought to the recovery room in good condition.     Technically successful GDA embolization. Electronically signed by: Amador Sanders Date:    06/24/2025 Time:    11:05    CT Abdomen Pelvis W Wo Contrast  Result Date: 6/23/2025  Technique: CT of the abdomen and pelvis was performed with axial images as well as sagittal and coronal reconstruction images with intravenous contrast. Comparison: Comparison is with study dated June 20, 2025 Clinical History: GI Bleed. Dosage Information: Automated Exposure Control was utilized 3042 mGy.cm. Findings: Lines and Tubes: None. Thorax: Lungs: Stable bilateral pleural effusions are noted, more prominent on the right, with associated right lower lobe atelectasis or consolidation demonstrating air bronchograms. Pleura: No pneumothorax is seen in the visualized lung bases. Abdomen: Abdominal Wall: There is decreased fat stranding along the right lateral abdominal wall compared to the previous study. Liver: The liver appears unremarkable. Biliary System: No intrahepatic or extrahepatic biliary duct dilatation is seen. Gallbladder: Gallbladder is without wall thickening or pericholecystic inflammatory changes or fluid. Pancreas: No pancreatic mass, or, ductal dilatation is seen. There is moderate fatty of the pancreas. Spleen: The spleen appears unremarkable. Adrenals: There is stable hypodensity in the right adrenal gland measuring 1 cm (Series 2, Image 35). This may reflect a right renal adenoma. There is a stable heterogeneous attenuation 1.5 cm diameter nodule in the medial limb of the left adrenal gland which is indeterminate in etiology. Follow-up as clinically indicated. Kidneys: Multiple hyperdense cysts are identified in the left kidney the largest of which measures 7.1 mm is on in the upper pole of the left kidney. The left kidney otherwise appears unremarkable with no stones masses or hydronephrosis identified. A single cyst  measuring 8.6 mm is seen on Image 59, Series 2 in the mid pole of the right kidney for which no specific follow-up imaging is recommended.  The right kidney otherwise appears unremarkable with no stones masses or hydronephrosis identified. Aorta: There is extensive calcification of the abdominal aorta and its branches. Bowel: No contrast extravasation, contrast pooling, or contrast blush is seen within the bowel to suggest active hemorrhage. Esophagus: The visualized distal esophagus appears unremarkable. Stomach: The stomach appears unremarkable. Duodenum: Unremarkable appearing duodenum. Small Bowel: The small bowel appears unremarkable. Colon: Multiple diverticula are seen in the colon. No associated inflammatory stranding or pericolonic fluid is seen to suggest diverticulitis. Appendix: The appendix appears unremarkable and is seen on Image 118, Series 2 through Image 109, Series 2. Peritoneum: No free intraperitoneal air is seen. There is mild interval decrease in the size of the previously noted right retroperitoneal hematoma, measuring approximately 11 x 6 cm. Decreased thickening of the adjacent right iliacus and psoas major muscles is likewise observed. No contrast extravasation, contrast pooling, or contrast blush is seen to suggest active hemorrhage. Pelvis: Bladder: The bladder appears unremarkable. Male: Prostate gland: There are a few calcifications in the prostate gland. Bony structures: Dorsal Spine: There is moderate scattered spondylosis of the visualized dorsal spine. Bony Pelvis: There is mild degenerative change of the hip.     Impression: 1. No contrast extravasation, contrast pooling, or contrast blush is seen within the bowel to suggest active hemorrhage. 2. There is decreased fat stranding along the right lateral abdominal wall compared to the previous study. 3. There is mild interval decrease in the size of the previously noted right retroperitoneal hematoma, measuring approximately 11 x 6  cm. Decreased thickening of the adjacent right iliacus and psoas major muscles is likewise observed. No contrast extravasation, contrast pooling, or contrast blush is seen to suggest active hemorrhage. Correlate with clinical and laboratory findings as regards additional evaluation and follow-up. 4. Details and other findings as discussed above. No significant discrepancy with overnight report. Electronically signed by: Sumit Núñez Date:    06/23/2025 Time:    08:35    X-Ray Chest 1 View  Result Date: 6/20/2025  EXAMINATION: XR CHEST 1 VIEW CLINICAL HISTORY: SepsisPICC LINE; COMPARISON: 11 June 2025 FINDINGS: Frontal view of the chest was obtained. Right PICC tip overlies the upper SVC.  The heart is not enlarged.  Low lung volumes with right basilar opacities and pleural effusion.  No pneumothorax.     Right PICC tip overlies the upper SVC.  Right basilar opacities with pleural effusion. Electronically signed by: Jesús Moura Date:    06/20/2025 Time:    16:26    CT Abdomen Pelvis W Wo Contrast  Result Date: 6/20/2025  EXAMINATION: CT ABDOMEN PELVIS W WO CONTRAST CLINICAL HISTORY: GI bleed;Retroperitoneal hematoma suspected; TECHNIQUE: Low dose axial images, sagittal and coronal reformations were obtained from the lung bases to the pubic symphysis following the IV administration of  contrast.  Delayed imaging and pre contrasted imaging was performed as well. Automatic exposure control is utilized to reduce patient radiation exposure. COMPARISON: 06/12/2025 FINDINGS: There is bibasilar atelectasis and bilateral pleural effusions.  This is worse than the prior examination.. The liver appears normal.  No liver mass or lesion is seen.  Portal and hepatic veins appear normal. The gallbladder appears grossly unremarkable. The pancreas appears normal.  No pancreatic mass or lesion is seen. The spleen appears normal.  No splenic mass or lesion is seen. The right adrenal gland appears normal.  There is a nodule in the left  adrenal gland that measures 1.6 x 1.2 cm.  Pre contrast Hounsfield units are 26.  The lesion is indeterminate. The kidneys are normal in size.  No hydronephrosis is seen.  No hydroureter is seen.  No nephrolithiasis or ureteral stone is seen.  Couple of punctate cysts are seen in the kidneys.  These appear to be simple cysts. There is a retroperitoneal hematoma seen on the right side which appears slightly smaller than the prior examination.  No evidence of active extravasation is seen. Urinary bladder is decompressed due to Palomo catheter.  There are some calcifications in the prostate.. No colitis is seen.  No diverticulitis is seen.  No colonic mass or lesion or inflammatory process is seen.  GI bleed cannot be excluded since there is oral contrast seen throughout the colon. No free air is seen. The bones appear grossly unremarkable.     Right retroperitoneal hematoma appears slightly smaller.  No active extravasation is seen GI hemorrhage cannot be excluded since there is oral contrast throughout the colon Bibasilar atelectasis and bilateral pleural effusions slightly worse than the prior examination Indeterminate left adrenal nodule.  CT scan or MRI adrenal mass protocol is recommended. Electronically signed by: Lonnie Cornell Date:    06/20/2025 Time:    13:18    Cardiac catheterization  Result Date: 6/17/2025    The estimated blood loss was none. The procedure log was documented by Documenter: Lorena Rodriguez RN and verified by James Tony MD. Date: 6/17/2025  Time: 3:24 PM Preprocedure diagnosis-new cardiomyopathy Postprocedure diagnosis-obstructive CAD Estimated blood loss-5 cc Tissue removal-none Complications-none Procedures performed: 1. Ultrasound-guided right groin access 2. Coronary angiography 3. Left ventricular hemodynamics 4. IFR interrogation of: LAD-0.85 Ramus-0.93 Circ-1.0 5. IVUS of LAD 6. LIMA angio 7. Perclose RCFA Findings: 1. Left main-Patent 2. Lad-70% calcified proximal stenosis,  Mid stent is 2.5 mm stent in  a 3.5-4mm vessel.  MSA distal stent edge is 3.9mm2 3. LCX-50% mid lesion 4. Ramus-40% mid 5. RCA-30% proximal calcified stenosis 6. LVEDP-11 7. LIMA patent Procedure detail: Ultrasound-guided right groin access obtained.  Sheath inserted.  Femoral angiography performed.  Left main angiography performed with JL4.  RCA angiography performed with JR4 catheter.   Pigtail was then used for left ventricular hemodynamics.   Given moderate LAD disease further interrogation was needed.  Heparin was administered.  Six [place] EBU 3.5 catheter was advanced into the aorta.  Artery was pre treated with nitroglycerin.  Wire was normalized in the aorta.  The catheter was reengaged into the LM.  Wire was passed beyond the proximal lesion.  Guiding catheter was backed out of the LM, interrogation was performed.  Pullback to the guide was 1.  Process was repeated for the ramus and circumflex.  Final angiography demonstrated YAKELIN 3 flow.  Guide was redirected into the left subclavian and LIMA agio was performed.  Catheters were removed and a proglide was used for RCFA closure. Plan: 1. Consult CTS for LIMA to LAD, ANGELIA ligation, MAZE, and possible surgical extraction of prominent eustachian valve with thrombus. 2. If turned down he will likely need atherectomy and shockwave for underexpanded stent, and repeat stenting of the prox to mid LAD.     Fl Modified Barium Swallow Speech  Result Date: 6/16/2025  See procedure notes from Speech Pathologist. This procedure was auto-finalized.    X-Ray Foot 2 View Left  Result Date: 6/13/2025  EXAMINATION: XR FOOT 2 VIEW LEFT CLINICAL HISTORY: great toe wound;Sepsis, unspecified organism COMPARISON: None. FINDINGS: There is some demineralization of the visualized osseous structures slightly more than expected for patient's age No acute displaced fractures or dislocations. There are degenerative changes with narrowing of the proximal and distal interphalangeal joints  articular spaces are otherwise preserved with smooth articular surfaces No blastic or lytic lesions. On the provided images there is no evidence of primary and or secondary signs to suggest the presence of osteomyelitis, however, these might be lacking on plain films and therefore if clinically indicated other imaging modalities might prove helpful for further assessment     Demineralization of the visualized osseous structures. Degenerative changes. No clear evidence of osteomyelitis other imaging modalities might prove helpful for further assessment. Electronically signed by: Singh Woodruff Date:    06/13/2025 Time:    10:36    CTA Abdomen and Pelvis  Result Date: 6/12/2025  EXAMINATION: CTA ABDOMEN AND PELVIS CLINICAL HISTORY: GI bleed;. TECHNIQUE: Helical acquisition through the abdomen and pelvis without and with IV contrast targeting the arterial phase.  3D MIP reconstructions were provided for review.  Automatic exposure control, adjustment of mA/kV or iterative reconstruction technique was used to reduce radiation. COMPARISON: 5 June 2025 FINDINGS: There is a moderate right pleural effusion with right basilar consolidation.  Left lower lobe pulmonary embolus is again noted. There are no acute findings solid abdominal organs. No bowel obstruction or free air.  No significant inflammatory changes of the bowel. There is bladder wall thickening similar to prior.  There is a Palomo in the bladder.  No pelvic free fluid.  Abdominal aorta normal in caliber.  Fairly mild atherosclerotic disease.  Widely patent mesenteric and renal arteries. There is a right retroperitoneal hematoma which is new compared to prior.  No convincing active extravasation.  Hematoma measures approximately 16 x 14 x 7 cm. There are no acute osseous findings.     1. Right retroperitoneal hematoma without convincing active extravasation. 2. Moderate right pleural effusion with right basilar consolidation. 3. Pulmonary embolus is again seen  left lower lobe. Electronically signed by: Jesús Moura Date:    06/12/2025 Time:    17:37    NM GI Bleed Scan (Tagged RBC)  Result Date: 6/12/2025  EXAMINATION: NM GI BLEED STUDY CLINICAL HISTORY: GI bleed;UGI bleed, nonvariceal, endoscopy negative; TECHNIQUE: After injection of autologous red blood cells labeled in vitro with 23.6 mCi of Tc-99m pertechnetate, sequential dynamic images of the abdomen were obtained for  minutes. Delayed images obtained for a total of 4 hours. COMPARISON: 5 June 2025 FINDINGS: There is physiologic distribution of radiotracer.  A site of active GI bleeding is not seen after 4 hours of imaging.     No evidence of active GI bleeding over 4 hours of imaging. Electronically signed by: Jesús Moura Date:    06/12/2025 Time:    15:03    US Retroperitoneal Limited  Result Date: 6/12/2025  EXAMINATION: US RETROPERITONEAL LIMITED CLINICAL HISTORY: Alfonso; TECHNIQUE: Ultrasound evaluation of the kidneys. COMPARISON: CT abdomen pelvis dated 06/05/2025 FINDINGS: The right kidney measures 10.5 cm. The left kidney measures 8.5 cm. There is no hydronephrosis.  Urinary bladder is decompressed with a Palomo catheter in place.     No hydronephrosis. Electronically signed by: Cha Bueno Date:    06/12/2025 Time:    14:03    Transesophageal echo ([facility])  Addendum Date: 6/12/2025    Left Ventricle: The left ventricle is normal in size. Normal wall motion. There is normal systolic function. Quantitated ejection fraction is 56%. Elevated left ventricular filling pressure.   Right Ventricle: The right ventricle is mildly dilated Systolic function is normal.   Left Atrium: The left atrium is dilated Agitated saline study of the atrial septum is negative, suggesting absence of intracardiac shunt at the atrial level. No patent foramen ovale. Appendage velocity is normal at greater than 40 cm/sec. There is no thrombus in the left atrial appendage.   Right Atrium: The right atrium is dilated. There is a prominent  Eustachian valve with a highly mobile echogenic structure attached to it most likely representing a thrombus and measuring 1.5 cm in its longest dimension. Dense spontaneous echo contrast visualized in the right atrial cavity.   Aortic Valve: The aortic valve is a trileaflet valve. There is no stenosis. There is no significant regurgitation.   Mitral Valve: The mitral valve is structurally normal. There is no stenosis. There is trace regurgitation.   Tricuspid Valve: There is trace regurgitation.   Aorta: The aortic root is normal in size measuring 3.5 cm. The ascending aorta is normal in size measuring 2.9 cm. The aortic arch is normal measuring 2.6 cm. The descending aorta is normal measuring 2.3 cm.   Pericardium: There is no pericardial effusion. YO obtained for further evaluation of right atrial mass noted on transthoracic echocardiogram.    Result Date: 6/12/2025    Left Ventricle: The left ventricle is normal in size. Normal wall motion. There is normal systolic function. Quantitated ejection fraction is 56%. Elevated left ventricular filling pressure.   Right Ventricle: The right ventricle is mildly dilated Systolic function is normal.   Left Atrium: The left atrium is dilated Agitated saline study of the atrial septum is negative, suggesting absence of intracardiac shunt at the atrial level. No patent foramen ovale.   Right Atrium: The right atrium is dilated. There is a prominent Eustachian valve with a highly mobile echogenic structure attached to it most likely representing a thrombus and measuring 1.5 cm in its longest dimension. Dense spontaneous echo contrast visualized in the right atrial cavity.   Aortic Valve: The aortic valve is a trileaflet valve. There is no stenosis. There is no significant regurgitation.   Mitral Valve: The mitral valve is structurally normal. There is no stenosis. There is trace regurgitation.   Tricuspid Valve: There is trace regurgitation.   Aorta: The aortic root is  normal in size measuring 3.5 cm. The ascending aorta is normal in size measuring 2.9 cm. The aortic arch is normal measuring 2.6 cm. The descending aorta is normal measuring 2.3 cm.   Pericardium: There is no pericardial effusion. YO obtained for further evaluation of right atrial mass noted on transthoracic echocardiogram.     Echo Saline Bubble? No  Result Date: 6/11/2025    Left Ventricle: The left ventricle is normal in size. Increased wall thickness. There is moderately reduced systolic function with a visually estimated ejection fraction of 30 - 40%.   Right Ventricle: The right ventricle is mildly dilated Systolic function is reduced.   Left Atrium: The left atrium is mildly dilated     X-Ray Chest 1 View  Result Date: 6/11/2025  EXAMINATION: XR CHEST 1 VIEW CLINICAL HISTORY: Shortness of breath TECHNIQUE: Single view of the chest COMPARISON: 06/04/2025 FINDINGS: Suspect small right effusion.  Further evaluation may be obtained with two views of the chest. The cardiomediastinal silhouette is within normal limits. Right-sided PICC line projects over the cavoatrial junction. No acute osseous abnormality.     Suspect small right effusion. Further evaluation may be obtained with two views of the chest. Electronically signed by: Vaibhav Jamil Date:    06/11/2025 Time:    06:10    CV Ultrasound doppler arterial legs bilat  Result Date: 6/10/2025  The right lower extremity arterial system is patent with no evidence of focal stenosis or occlusion. The left lower extremity arterial system is patent with no evidence of focal stenosis or occlusion. The left distal posterior tibial artery demonstrated retrograde flow. KAIT study performed on 6/2/25, which demonstrated non compressible vessels at the bilateral ankles.     CV Ultrasound doppler venous legs bilat  Result Date: 6/7/2025    There is no evidence of a right lower extremity DVT.   There is no evidence of a left lower extremity DVT. Negative for deep and  superficial vein thrombosis in bilateral lower extremities.     Echo  Result Date: 6/5/2025    Limited echo to r/o right heart strain.   Complete echo 5/26/25. YO 5/30/25)   Right Ventricle: The right ventricle is moderately dilated Systolic function is moderately reduced.   Right Atrium: The right atrium is normal in size.   Tricuspid Valve: There is mild regurgitation.   Pulmonary Artery: PASP is at least 50mmhg.   IVC/SVC: IVC was not well visualized due to poor acoustic window.     CTA Runoff ABD PEL Bilat Lower Ext  Result Date: 6/5/2025  EXAMINATION: CTA RUNOFF ABD PEL BILAT LOWER EXT CLINICAL HISTORY: Peripheral arterial disease (PAD), asymptomatic; TECHNIQUE: Helically acquired images were obtained from the lung bases through the lower extremities prior to and after IV administration of contrast. Axial, sagittal, coronal and MIP reformations were interpreted. Automated tube current modulation, weight-based exposure dosing, and/or iterative reconstruction technique utilized to reach lowest reasonably achievable exposure rate. DLP: 1709 mGy*cm COMPARISON: CT abdomen pelvis 05/28/2025 FINDINGS: VASCULATURE: Pulmonary arteries: Segmental pulmonary embolus at the left lower lobe. Aorta: Mild atherosclerosis without aneurysm or occlusion. Mesenteric arteries: No significant stenosis. Renal arteries:No significant stenosis. Right: Iliac arteries: No significant stenosis. Femoral: Diffuse atherosclerotic change at the SFA and deep femoral branches.  Mild stenosis at the distal SFA. Popliteal: Atherosclerosis with mild stenosis. Tibioperoneal trunk: Patent tibioperoneal trunk.  Diminutive caliber vessels with calcific atherosclerosis below the trifurcation.  This makes it challenging to evaluate luminal patency of diminutive vessels below the knee.  Diminutive peroneal artery with enhancement fading at the ankle.  There does appear to be flow at the anterior tibial and posterior tibial artery at the ankle.  Dorsalis  pedis artery enhances. Left: Iliac arteries: No significant stenosis. Femoral: Diffuse atherosclerotic change at the SFA and deep femoral branches.  Mild stenosis. Popliteal: Atherosclerosis with mild stenosis. Tibioperoneal trunk: Patent tibioperoneal trunk.  Diminutive caliber vessels with calcific atherosclerosis below the trifurcation.  This makes it challenging to evaluate luminal patency of diminutive vessels below the knee.  Peroneal artery does appear to be patent to the ankle.  Unable to confidently assess tibial arteries.  Defer to sonogram. HEART: There are coronary artery calcifications. LUNG BASES: See separate report for CT chest. LIVER: No arterially enhancing mass. BILIARY: No calcified gallstones. PANCREAS: No inflammatory change. SPLEEN: Normal in size ADRENALS: No mass. KIDNEYS/URETERS: No hydronephrosis.  Possible cyst at the upper pole right kidney, obscured by beam hardening artifact. GI TRACT/MESENTERY: Clip seen at the level of the duodenum.  No active extravasation appreciable.  Colonic diverticulosis without acute inflammatory change. PERITONEUM: No free fluid.No free air. LYMPH NODES: No enlarged lymph nodes by size criteria. BLADDER: Collapsed about a Palomo catheter. REPRODUCTIVE ORGANS: There are prostate calcifications. SOFT TISSUES: Ventral hernia mesh.  Body wall edema.  Bilateral lower extremity edema. BONES: Note large field of view runoff exam not designed to evaluate fine osseous details.  No acute osseous abnormality seen.  Degenerative change at the spine.  Degenerative change at the feet     1. Segmental pulmonary embolus at the left lower lobe.  Findings discussed with  Bernice Lipscomb, the physician caring for patient 6/5/2025 09:11. 2. Diffuse atherosclerotic calcifications.  No appreciable significant stenosis above the knee.  Below the knee evaluation is limited due to diminutive caliber vessels and calcific atherosclerosis.  Defer to sonogram. Electronically signed  by: Ann Armstrong Date:    06/05/2025 Time:    09:12    CT Chest Without Contrast  Result Date: 6/5/2025  EXAMINATION: CT CHEST WITHOUT CONTRAST CLINICAL HISTORY: Sepsis; TECHNIQUE: Helically acquired axial images, sagittal and coronal reformations were obtained from the thoracic inlet to the lung bases without the IV administration of contrast. Automated tube current modulation, weight-based exposure dosing, and/or iterative reconstruction technique utilized to reach lowest reasonably achievable exposure rate. DLP: 1708 mGy*cm COMPARISON: Chest radiograph 06/04/2025 FINDINGS: BASE OF NECK: Multinodular goiter.  This can be evaluated with outpatient thyroid sonogram after resolution of patient's acute illness. AORTA: Atherosclerosis. HEART: Heart at the upper limits of normal in size.  There are coronary artery calcifications. CHACE/MEDIASTINUM: Small nonspecific mediastinal lymph nodes.  Evaluation of hilar lymphadenopathy is limited without intravenous contrast. AIRWAYS: Deviation of the trachea to the right at the thoracic inlet related to nodular enlargement of the left lobe of the thyroid. LUNGS: Respiratory motion artifact.  Complete collapse and consolidation of the right middle lobe.  Moderate, near complete collapse and consolidation at the right lower lobe.  Tree-in-bud nodularity at the posterior left upper lobe and at the superior segment left lower lobe compatible with bronchiolitis. PLEURA: Trace right pleural effusion. UPPER ABDOMEN: See separate report for CT abdomen THORACIC SOFT TISSUES: Right upper extremity PICC terminates at the SVC. BONES: Flowing osteophytes of the thoracic spine as can be seen with DISH.     1. Collapse and consolidation at the right middle lobe and right lower lobe.  Atelectasis versus pneumonia 2. Tree-in-bud nodularity at the left lung.  Bronchiolitis Electronically signed by: Ann Armstrong Date:    06/05/2025 Time:    08:52    X-Ray Chest 1 View  Result Date:  6/4/2025  EXAMINATION: XR CHEST 1 VIEW CLINICAL HISTORY: concern for pulmonary edema; TECHNIQUE: AP chest COMPARISON: Chest x-ray dated 06/02/2025 FINDINGS: Right-sided PICC line has its tip over the superior vena cava.  The heart is normal in size.  There is similar appearance of a right basilar airspace opacity.  There is no new airspace consolidation there is no pleural effusion or visible pneumothorax.     Stable exam without significant interval change Electronically signed by: Cha Bueno Date:    06/04/2025 Time:    15:50    CT Pelvis With IV Contrast NO Oral Contrast  Result Date: 6/3/2025  EXAMINATION: CT PELVIS WITH IV CONTRAST CLINICAL HISTORY: concern for prostatitis/prostatic abscess; Chief complaint: Shortness of Breath (From [facility] with complaints of SOB and lower back pain for about 3 days. Abdominal distension and bilateral leg edema noted. Palomo in place upon arrival with cloudy urine noted.)[facility] is a 74 y.o. Black or  male with past medical history of cerebrovascular accident (March 2025), coronary artery disease status post LAD stent (2017), venous insufficiency, hypertension, first-degree AV block, and type 2 diabetes mellitus, who presented to the ED from a nursing home due to progressive weakness over the past week. His daughter provided history due to his communication difficulties. Associated symptoms included altered mental status, back pain, and abdominal discomfort. In the ED, vitals were stable, but lab results were remarkable for severe hyperkalemia and azotemia.  He was treated with Lokelma, insulin, and albuterol, started on Rocephin for a urinary tract infection.  Imaging of the abdomen revealed hydronephrosis and malpositioned Palomo catheter.  The Palomo catheter has been subsequently replaced and renal function has improved.  Nephrology service continues to follow for assistance with management of PRIYA. TECHNIQUE:  Helical axial images are acquired through the pelvis after the IV administration 95 mL of Omnipaque 350.  Images were reconstructed into the coronal sagittal plane.  Dose automated exposure control, dose radiation lowering technique was utilized. COMPARISON: CT abdomen and pelvis without contrast from 05/28/2025 CT abdomen and pelvis without contrast from 05/23/2025 FINDINGS: Pelvis: The bony structures of the pelvis are intact. A metallic density is seen in the left ilium. Hip: There is mild degenerative change in the bilateral hip. Right: No fracture dislocation or subluxation is seen involving the visualized right hip bony structures. Left: No fracture dislocation or subluxation is seen involving the visualized left hip bony structures. Femur: Proximal Femur: The visualized proximal right and left femurs appear intact. Pelvic structures: Bladder: A fowler catheter in place. The bladder wall appears thickened even though the bladder is collapsed. Visualized distal ureters: Normal. Visualized small bowel loops: Normal. Rectosigmoid colon: Multiple diverticulum are seen in the sigmoid colon without surrounding inflammation to suggest diverticulitis. Prostate and seminal vesicles: There are multiple calcifications in the prostate gland. No peripherally enhancing fluid collection identified to suggest an abscess. Pelvic cavity: Normal. Pelvic wall: Normal. Systemic arteries and veins: There is mild atheromatous calcification in the bilateral common iliac arteries and imaged bilateral superficial femoral arteries. Osseous structures: Mild to moderate degenerative change is seen in the imaged osseous structures.     1. A fowler catheter in place. The bladder wall appears thickened even though the bladder is collapsed. This could reflect an element of cystitis. Correlate with clinical and laboratory findings. 2. No acute pelvic solid organ or bowel pathology identified. Details and other findings as discussed above  Nikkie concurrence Electronically signed by: Jayme Hagan MD Date:    06/03/2025 Time:    07:54    Ankle Brachial Indices (KAIT)  Result Date: 6/3/2025  The right lower extremity ankle brachial index is 2.64 suggesting non compressible tibial vessels.  The left lower extremity ankle brachial index is 1.49 suggesting non compressible tibial vessels.      X-Ray Chest 1 View for Line/Tube Placement  Result Date: 6/2/2025  EXAMINATION XR CHEST 1 VIEW FOR LINE/TUBE PLACEMENT CLINICAL HISTORY picc placement; TECHNIQUE A total of 1 frontal image(s) of the chest. COMPARISON 1 June 2025 FINDINGS Lines/tubes/devices: Interval placement of right upper extremity PICC, catheter tip projects over the mid SVC region.  Multiple ECG leads again overlie the chest. The cardiac silhouette and central vascular structures are unchanged.  The trachea is midline. Subtle ill-defined right basilar infiltrate is better visualized.  Remaining lung fields are clear.  There is no enlarging pleural effusion or convincing pneumothorax. Regional osseous structures and extrathoracic soft tissues are similar. IMPRESSION 1. Interval placement of right upper extremity PICC, acceptable positioning. 2. Better visualized right basilar infiltrate. Electronically signed by: Luis Enrique Posada Date:    06/02/2025 Time:    17:25    X-Ray Chest 1 View  Result Date: 6/1/2025  EXAMINATION: XR CHEST 1 VIEW CLINICAL HISTORY: Sob; TECHNIQUE: Single frontal view of the chest was performed. COMPARISON: 05/31/2025 FINDINGS: LINES AND TUBES: EKG/telemetry leads overlie the chest. MEDIASTINUM AND CHACE: The cardiac silhouette is normal. LUNGS: Lung volumes are low with associated atelectatic change.  Mild improved aeration at the right lung base. PLEURA:No pleural effusion. No pneumothorax. OTHER: No acute osseous abnormality.     Mild improved aeration at the right lung base. Electronically signed by: Ann Armstrong Date:    06/01/2025 Time:    12:46    X-Ray Chest 1  View  Result Date: 5/31/2025  EXAMINATION: XR CHEST 1 VIEW CLINICAL HISTORY: new SOB post blood transfusion; TECHNIQUE: Single frontal view of the chest was performed. COMPARISON: 05/29/2025 FINDINGS: The lungs are hypoaerated which accentuates the bronchovascular markings but no infiltrate is seen. The heart is normal in appearance. The pulmonary vascularity is unremarkable. No pleural effusion is seen. Bones and joints show no acute abnormality.     Hypoaerated lungs Electronically signed by: Lonnie Cornell Date:    05/31/2025 Time:    16:57    X-Ray Chest 1 View  Result Date: 5/29/2025  EXAMINATION: XR CHEST 1 VIEW CLINICAL HISTORY: hypoxic respiratory failure; TECHNIQUE: One view COMPARISON: May 23, 2025. FINDINGS: Cardiopericardial silhouette is within normal limits.  Right basilar atelectasis.  No convincing acute dense focal or segmental consolidation, congestive process, significant pleural effusions or pneumothorax.     No acute cardiopulmonary process identified. Electronically signed by: Sumit Núñez Date:    05/29/2025 Time:    09:06    CT Abdomen Pelvis  Without Contrast  Result Date: 5/28/2025  EXAMINATION: CT ABDOMEN PELVIS WITHOUT CONTRAST CLINICAL HISTORY: concern for prostate abscess; TECHNIQUE: Low dose axial images, sagittal and coronal reformations were obtained from the lung bases to the pubic symphysis.  No contrast was administered.  Automatic exposure control is utilized to reduce patient radiation exposure. COMPARISON: 05/23/2025 FINDINGS: There is right lower lobe atelectasis.  There is a right-sided pleural effusion. The liver appears normal.  No obvious liver mass or lesion is seen. Gallbladder appears normal.  No gallstones are seen. The pancreas appears normal.  No inflammatory changes are seen in the pancreatic region. The spleen appears normal and adrenal glands appear normal.  No adrenal nodule is seen. No nephrolithiasis is seen.  No hydronephrosis is seen.  No hydroureter is seen.   No ureteral stone is seen. No colitis is seen.  No diverticulitis is seen.  No appendicitis is seen. No free air seen.  No free fluid is seen. The urinary bladder is decompressed due to Palomo catheter.  There is some air in the urinary bladder likely due to the catheter. The prostate is heavily calcified.  The area of hypoattenuation that was seen at the base of the prostate on the prior examination is less well visualized on this exam.  Contrast enhanced examination is recommended. Bones show no acute abnormality.     The hypoattenuated area in the prostate that was seen on prior examination is not visualized on today's exam.  Additional imaging with contrast enhancement may be beneficial for better delineation. Coarse heterotrophic calcifications seen throughout the prostate Interval placement of a Palomo catheter in the urinary bladder with decompressed appearing urinary bladder Interval development of right lower lobe atelectasis and moderate right-sided pleural effusion Electronically signed by: Lonnie Cornell Date:    05/28/2025 Time:    11:38    US Abdomen Limited_Liver  Result Date: 5/27/2025  EXAMINATION: US ABDOMEN LIMITED_LIVER CLINICAL HISTORY: transaminitis; COMPARISON: CT 23 May 2025. FINDINGS: Grayscale, color and spectral doppler evaluation of the right upper quadrant. Pancreas obscured by bowel gas.  Imaged portion of the IVC normal in caliber. The liver is mildly enlarged. No focal suspicious liver lesion is seen. Patent portal vein with hepatopetal flow. No gallstones. No significant gallbladder wall thickening or pericholecystic fluid.  The common bile duct is normal in caliber  and measures 2 mm. Right kidney measures 10 cm in length.  Right hydronephrosis improved compared to the prior CT     1. Mild hepatomegaly. 2. Right kidney hydronephrosis improved compared to the prior CT. Electronically signed by: Jesús Moura Date:    05/27/2025 Time:    15:58         Assessment/Plan:    74-year-old male  previously known to Dr. Torres but followed now by Dr. Chapis Lane past medical history of type 2 diabetes, HTN, obesity, osteoarthritis, CAD s/p LAD stent 2017, venous insufficiency who initially presented to [facility] ER 5/23 from a local nursing home for generalized weakness.  Admitted with severe PRIYA, UTI, CHF exacerbation (EF 35-40 % on TTE 5/26), and atrial fibrillation/atrial flutter with RVR.   TTE also concerning for right atrial mass.  Underwent YO 5/30 which confirmed right atrial thrombus.       GI consulted 6/13 for anticoagulation clearance given recent GI bleed.    Patient was cleared from GI standpoint to begin blood thinners and signed off.  Called back 6/23 for melena and anemia.      6/23 EGD: Large duodenal ulcer with visible vessel with clot and oozing. s/p clip/epi/hemospray with   temporary hemostatsis. Given the large size and this is his 4th EGD will consult IR for embolization due to high risk of rebleeding. And failling endoscopic management Normal esophagus.Red blood in the gastric fundus. Duodenal ulcer with a visible vessel.  One hemostatic clip was successfully placed (MR   conditional) in the duodenal bulb.  An area in the duodenal bulb successfully injected.      6/24 GDA embolization with IR     1.   Acute on chronic macrocytic anemia   - Hgb 9.5--8.6--9.3--9.7--10.1--9.6--9.6--10.6--10.2--7.5--7.3 ( 3units PRBC / 1 unit FFP)--8.6--8.7--8.2--7.9--7.7--7.9--7.6     2.  Duodenal ulcer with bleeding requiring multiple EGDs     06/02: EGD: 3 duodenal ulcers - 15 mm posterior wall with oozing s/p APC and 10 mm on lateral wall with adherent clot s/p heater probe  06/04: EGD: 13 mm posterior wall ulcer with VV s/p bipolar and clips x 2.  Dieulafoy on edge of ulcer s/p clip x 2  06/11: EGD: Nonobstructing, nonbleeding duodenal ulcer with a clean ulcer base.  Prior clips seen with no active bleeding or high-risk stigmata.  Nonobstructing, nonbleeding duodenal ulcer with a clean ulcer  base.     3.   Right retroperitoneal hematoma on CTA 6/12, improving   - General surgery consulted - no surgical intervention, signed off.      4.  Right atrial thrombus, on YO 5/30     5.  Left lower lobe pulmonary embolism on CTA runnoff ab/pel/bilat LE 6/5      - Continue IV PPI BID and carafate QID (can dissolve in 10-20 cc of water to create a slushy for administration)  -Continue clear liquid diet for now.  Will discuss advancing with MD on rounds.   -Monitor H/H and transfuse as needed to Hgb 7  -Monitor clinically and stools for bleeding and notify GI team   -Supportive care per primary team  -Additional recs, if any, pending discussion with MD.      Liz Chambers, MOHITP-C

## 2025-06-25 NOTE — AI DETERIORATION ALERT
Artificial Intelligence Notification  [facility]  [address]  [place] [address]  Phone: [phone]    This documentation was triggered by an Artificial Intelligence Notification:    Admit Date: 2025   LOS: 13  Code Status: Full Code  : 1950  Age: 74 y.o.  Weight:   Wt Readings from Last 1 Encounters:   25 107.6 kg (237 lb 3.4 oz)        Sex: male  Bed: [address] [place] [address] A  MRN: [id number]  Attending Physician: Bereket Manrique MD     Date of Alert: 2025  Time AI Alert Received: 0817            Vitals:    25 0902   BP: 122/86   Pulse: 106   Resp:    Temp:      SpO2: (!) 82 %      Artificial Intelligence alert discussed with Provider:     Name: RAO Kelly   Date/Time of Provider Notification: 2025 @ 0943      Patient Condition: assessed pt at bedside. Sats up to 99%. Spoke to nurse about pt condition. Pt is stable for now. VS stable. Please let ICU if need anything

## 2025-06-25 NOTE — PLAN OF CARE
Received call from Shu with [facility]. [facility] Vandana notified. Meds adjusted today. Plan for discharge to [facility] tomorrow.

## 2025-06-26 LAB
ALBUMIN SERPL-MCNC: 2.4 G/DL (ref 3.4–4.8)
ALBUMIN/GLOB SERPL: 0.7 RATIO (ref 1.1–2)
ALP SERPL-CCNC: 45 UNIT/L (ref 40–150)
ALT SERPL-CCNC: 10 UNIT/L (ref 0–55)
ANION GAP SERPL CALC-SCNC: 7 MEQ/L
AST SERPL-CCNC: 19 UNIT/L (ref 11–45)
BASOPHILS # BLD AUTO: 0.01 X10(3)/MCL
BASOPHILS NFR BLD AUTO: 0.1 %
BILIRUB SERPL-MCNC: 0.7 MG/DL
BUN SERPL-MCNC: 48 MG/DL (ref 8.4–25.7)
CALCIUM SERPL-MCNC: 9.1 MG/DL (ref 8.8–10)
CHLORIDE SERPL-SCNC: 127 MMOL/L (ref 98–107)
CK SERPL-CCNC: 190 U/L (ref 30–200)
CO2 SERPL-SCNC: 23 MMOL/L (ref 23–31)
CREAT SERPL-MCNC: 1.69 MG/DL (ref 0.72–1.25)
CREAT/UREA NIT SERPL: 28
EOSINOPHIL # BLD AUTO: 0.02 X10(3)/MCL (ref 0–0.9)
EOSINOPHIL NFR BLD AUTO: 0.2 %
ERYTHROCYTE [DISTWIDTH] IN BLOOD BY AUTOMATED COUNT: 18.9 % (ref 11.5–17)
GFR SERPLBLD CREATININE-BSD FMLA CKD-EPI: 42 ML/MIN/1.73/M2
GLOBULIN SER-MCNC: 3.4 GM/DL (ref 2.4–3.5)
GLUCOSE SERPL-MCNC: 128 MG/DL (ref 82–115)
HCT VFR BLD AUTO: 26.3 % (ref 42–52)
HGB BLD-MCNC: 7.8 G/DL (ref 14–18)
IMM GRANULOCYTES # BLD AUTO: 0.19 X10(3)/MCL (ref 0–0.04)
IMM GRANULOCYTES NFR BLD AUTO: 1.5 %
LYMPHOCYTES # BLD AUTO: 0.71 X10(3)/MCL (ref 0.6–4.6)
LYMPHOCYTES NFR BLD AUTO: 5.7 %
MCH RBC QN AUTO: 29.5 PG (ref 27–31)
MCHC RBC AUTO-ENTMCNC: 29.7 G/DL (ref 33–36)
MCV RBC AUTO: 99.6 FL (ref 80–94)
MONOCYTES # BLD AUTO: 1 X10(3)/MCL (ref 0.1–1.3)
MONOCYTES NFR BLD AUTO: 8 %
NEUTROPHILS # BLD AUTO: 10.63 X10(3)/MCL (ref 2.1–9.2)
NEUTROPHILS NFR BLD AUTO: 84.5 %
NRBC BLD AUTO-RTO: 4.1 %
OSMOLALITY SERPL: 340 MOSM/KG (ref 280–300)
PLATELET # BLD AUTO: 252 X10(3)/MCL (ref 130–400)
PMV BLD AUTO: 10.2 FL (ref 7.4–10.4)
POTASSIUM SERPL-SCNC: 3.9 MMOL/L (ref 3.5–5.1)
PROT SERPL-MCNC: 5.8 GM/DL (ref 5.8–7.6)
RBC # BLD AUTO: 2.64 X10(6)/MCL (ref 4.7–6.1)
SODIUM SERPL-SCNC: 157 MMOL/L (ref 136–145)
SODIUM UR-SCNC: 33 MMOL/L
WBC # BLD AUTO: 12.56 X10(3)/MCL (ref 4.5–11.5)

## 2025-06-26 PROCEDURE — 25000003 PHARM REV CODE 250: Performed by: INTERNAL MEDICINE

## 2025-06-26 PROCEDURE — 85025 COMPLETE CBC W/AUTO DIFF WBC: CPT

## 2025-06-26 PROCEDURE — 80053 COMPREHEN METABOLIC PANEL: CPT

## 2025-06-26 PROCEDURE — 51702 INSERT TEMP BLADDER CATH: CPT

## 2025-06-26 PROCEDURE — 25000003 PHARM REV CODE 250: Performed by: NURSE PRACTITIONER

## 2025-06-26 PROCEDURE — 82550 ASSAY OF CK (CPK): CPT | Performed by: INTERNAL MEDICINE

## 2025-06-26 PROCEDURE — 83930 ASSAY OF BLOOD OSMOLALITY: CPT | Performed by: INTERNAL MEDICINE

## 2025-06-26 PROCEDURE — 63600175 PHARM REV CODE 636 W HCPCS: Performed by: INTERNAL MEDICINE

## 2025-06-26 PROCEDURE — 97530 THERAPEUTIC ACTIVITIES: CPT | Mod: CQ

## 2025-06-26 PROCEDURE — 36415 COLL VENOUS BLD VENIPUNCTURE: CPT

## 2025-06-26 PROCEDURE — 92526 ORAL FUNCTION THERAPY: CPT

## 2025-06-26 PROCEDURE — 84300 ASSAY OF URINE SODIUM: CPT | Performed by: INTERNAL MEDICINE

## 2025-06-26 PROCEDURE — 63600175 PHARM REV CODE 636 W HCPCS

## 2025-06-26 PROCEDURE — 27000207 HC ISOLATION

## 2025-06-26 PROCEDURE — 11000001 HC ACUTE MED/SURG PRIVATE ROOM

## 2025-06-26 RX ORDER — DIGOXIN 0.25 MG/ML
250 INJECTION INTRAMUSCULAR; INTRAVENOUS ONCE
Status: COMPLETED | OUTPATIENT
Start: 2025-06-26 | End: 2025-06-26

## 2025-06-26 RX ORDER — DIGOXIN 0.25 MG/ML
125 INJECTION INTRAMUSCULAR; INTRAVENOUS EVERY 6 HOURS
Status: COMPLETED | OUTPATIENT
Start: 2025-06-27 | End: 2025-06-27

## 2025-06-26 RX ORDER — DEXTROSE MONOHYDRATE 50 MG/ML
INJECTION, SOLUTION INTRAVENOUS CONTINUOUS
Status: ACTIVE | OUTPATIENT
Start: 2025-06-26 | End: 2025-06-27

## 2025-06-26 RX ADMIN — SUCRALFATE 1 G: 1 TABLET ORAL at 08:06

## 2025-06-26 RX ADMIN — METOPROLOL TARTRATE 100 MG: 50 TABLET, FILM COATED ORAL at 08:06

## 2025-06-26 RX ADMIN — DIGOXIN 250 MCG: 0.25 INJECTION INTRAMUSCULAR; INTRAVENOUS at 03:06

## 2025-06-26 RX ADMIN — SUCRALFATE 1 G: 1 TABLET ORAL at 10:06

## 2025-06-26 RX ADMIN — DAPTOMYCIN 500 MG: 500 INJECTION, POWDER, LYOPHILIZED, FOR SOLUTION INTRAVENOUS at 06:06

## 2025-06-26 RX ADMIN — PANTOPRAZOLE SODIUM 40 MG: 40 INJECTION, POWDER, FOR SOLUTION INTRAVENOUS at 08:06

## 2025-06-26 RX ADMIN — SUCRALFATE 1 G: 1 TABLET ORAL at 06:06

## 2025-06-26 RX ADMIN — CLOPIDOGREL 75 MG: 75 TABLET ORAL at 10:06

## 2025-06-26 RX ADMIN — PANTOPRAZOLE SODIUM 40 MG: 40 INJECTION, POWDER, FOR SOLUTION INTRAVENOUS at 10:06

## 2025-06-26 RX ADMIN — METOPROLOL TARTRATE 100 MG: 50 TABLET, FILM COATED ORAL at 10:06

## 2025-06-26 RX ADMIN — DEXTROSE MONOHYDRATE: 50 INJECTION, SOLUTION INTRAVENOUS at 09:06

## 2025-06-26 NOTE — PT/OT/SLP PROGRESS
"Physical Therapy Treatment    Patient Name:  Vaibhav Vargas   MRN:  [id number]    Recommendations:     Discharge therapy intensity: Moderate Intensity Therapy   Discharge Equipment Recommendations: to be determined by next level of care  Barriers to discharge: Ongoing medical needs and placement    Assessment:     Vaibhav Vargas is a 74 y.o. male admitted with a medical diagnosis of medical diagnosis of retroperitoneal bleed, GIB, R atrial thrombus, PE, enterococcus faecium, cystitis, L foot OM, PRIYA, AF RVR, HF. .  He presents with the following impairments/functional limitations: weakness, impaired endurance, impaired self care skills, impaired functional mobility, gait instability, impaired balance, impaired cognition, decreased lower extremity function .   Pt requires max encouragement to participate in therapy and Get OOB. Pt refuses to perform sit<>stand 2/2 weakness and dizziness.    Rehab Prognosis: Good; patient would benefit from acute skilled PT services to address these deficits and reach maximum level of function.    Recent Surgery: Procedure(s) (LRB):  EGD,WITH HEMORRHAGE CONTROL  EGD, WITH SUBMUCOSAL INJECTION (N/A) 3 Days Post-Op    Plan:     During this hospitalization, patient would benefit from acute PT services 3 x/week to address the identified rehab impairments via gait training, therapeutic activities, therapeutic exercises, neuromuscular re-education, wheelchair management/training and progress toward the following goals:    Plan of Care Expires:  07/16/25    Subjective     Chief Complaint: "I can't I'm too weak"  Patient/Family Comments/goals:   Pain/Comfort:         Objective:     Communicated with RN prior to session.  Patient found HOB elevated with fowler catheter, telemetry, peripheral IV, oxygen upon PT entry to room.     General Precautions: Standard, contact, aspiration  Orthopedic Precautions: N/A  Braces: N/A  Respiratory Status: Nasal cannula, flow 1.5 L/min  Skin Integrity: Visible " skin intact and Known issues      Functional Mobility:  Bed Mobility:     Scooting: moderate assistance  Supine to Sit: maximal assistance and of 2 persons  Transfers:     Bed to Chair: dependence with  annette lift  using  annette pad    Therapeutic Activities/Exercises:  Pt sat EOB ~18MIN sba, Vcs needed to correct kyphotic posture.  Performed LAQ 10x1 requires assist for full ROM      Co-Treatment: No    Education:  Patient provided with verbal education education regarding PT role/goals/POC.  Understanding was verbalized.     Patient left up in chair with all lines intact, call button in reach, geomat cushion, annette pad in place, RN notified, and Son in law present    GOALS:   Multidisciplinary Problems       Physical Therapy Goals          Problem: Physical Therapy    Goal Priority Disciplines Outcome Interventions   Physical Therapy Goal     PT, PT/OT Progressing    Description: Goals to be met by: 2025     Patient will increase functional independence with mobility by performin. Supine to sit with Contact Guard Assistance  2. Sit to stand transfer with Contact Guard Assistance  3. Gait  x 150 feet with Contact Guard Assistance using Rolling Walker.                          Time Tracking:     PT Received On: 25  PT Start Time: 1339     PT Stop Time: 1427  PT Total Time (min): 48 min     Billable Minutes: Therapeutic Activity 48    Treatment Type: Treatment  PT/PTA: PTA     Number of PTA visits since last PT visit: 5     2025
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
## 2025-06-26 NOTE — PROGRESS NOTES
"Gastroenterology Progress Note    Subjective/Interval History:    Hgb stable this morning at 7.8.  No further overt GI bleeding.  He denies any abdominal pain, nausea, or vomiting.  Continues to ask for ice water.  Patient and sister report acute onset cough and shortness of breath this morning.  Denies any chest pain.  He is currently afebrile and hemodynamically stable.    ROS:  Review of Systems   Constitutional:  Positive for malaise/fatigue.        Decreased po intake   Respiratory:  Positive for cough and shortness of breath.    Cardiovascular:  Negative for chest pain.   Gastrointestinal:  Negative for abdominal pain, blood in stool, melena, nausea and vomiting.       Vital Signs:  /76 (BP Location: Left arm)   Pulse 106   Temp 97.6 °F (36.4 °C) (Axillary)   Resp 18   Ht 6' 2" (1.88 m)   Wt 107.6 kg (237 lb 3.4 oz)   SpO2 (!) 93%   BMI 30.46 kg/m²   Body mass index is 30.46 kg/m².    Physical Exam:  Constitutional:       General: He is not in acute distress.     Appearance: He is ill-appearing.   HENT:      Head: Normocephalic and atraumatic.      Mouth/Throat:      Mouth: Mucous membranes are moist.      Pharynx: Oropharynx is clear.   Eyes:      Extraocular Movements: Extraocular movements intact.      Pupils: Pupils are equal, round, and reactive to light.   Cardiovascular:      Rate and Rhythm: Normal rate. Rhythm irregular.      Pulses: Normal pulses.      Heart sounds: Normal heart sounds.   Pulmonary:      Effort: Pulmonary effort is normal.      Breath sounds: Rhonchi   Abdominal:      General: Bowel sounds are normal. There is no distension.      Palpations: Abdomen is soft.      Tenderness: There is no abdominal tenderness. There is no guarding.   Musculoskeletal:      Right lower leg: Edema present.      Left lower leg: Edema present.   Skin:     General: Skin is warm and dry.   Neurological:      Mental Status: He is alert and oriented to person, place, and time.   Psychiatric:        "  Mood and Affect: Mood normal.         Behavior: Behavior normal.     Labs:  Recent Results (from the past 48 hours)   Hemoglobin and Hematocrit    Collection Time: 06/24/25  6:59 PM   Result Value Ref Range    Hgb 7.9 (L) 14.0 - 18.0 g/dL    Hct 24.9 (L) 42.0 - 52.0 %   Hemoglobin and Hematocrit    Collection Time: 06/25/25 12:00 AM   Result Value Ref Range    Hgb 7.7 (L) 14.0 - 18.0 g/dL    Hct 24.7 (L) 42.0 - 52.0 %   Comprehensive Metabolic Panel    Collection Time: 06/25/25  4:03 AM   Result Value Ref Range    Sodium 155 (H) 136 - 145 mmol/L    Potassium 4.2 3.5 - 5.1 mmol/L    Chloride 125 (H) 98 - 107 mmol/L    CO2 22 (L) 23 - 31 mmol/L    Glucose 146 (H) 82 - 115 mg/dL    Blood Urea Nitrogen 58.8 (H) 8.4 - 25.7 mg/dL    Creatinine 1.99 (H) 0.72 - 1.25 mg/dL    Calcium 8.9 8.8 - 10.0 mg/dL    Protein Total 5.4 (L) 5.8 - 7.6 gm/dL    Albumin 2.3 (L) 3.4 - 4.8 g/dL    Globulin 3.1 2.4 - 3.5 gm/dL    Albumin/Globulin Ratio 0.7 (L) 1.1 - 2.0 ratio    Bilirubin Total 0.6 <=1.5 mg/dL    ALP 39 (L) 40 - 150 unit/L    ALT 9 0 - 55 unit/L    AST 13 11 - 45 unit/L    eGFR 35 mL/min/1.73/m2    Anion Gap 8.0 mEq/L    BUN/Creatinine Ratio 30    CBC with Differential    Collection Time: 06/25/25  4:03 AM   Result Value Ref Range    WBC 14.63 (H) 4.50 - 11.50 x10(3)/mcL    RBC 2.64 (L) 4.70 - 6.10 x10(6)/mcL    Hgb 7.9 (L) 14.0 - 18.0 g/dL    Hct 25.1 (L) 42.0 - 52.0 %    MCV 95.1 (H) 80.0 - 94.0 fL    MCH 29.9 27.0 - 31.0 pg    MCHC 31.5 (L) 33.0 - 36.0 g/dL    RDW 18.7 (H) 11.5 - 17.0 %    Platelet 241 130 - 400 x10(3)/mcL    MPV 9.6 7.4 - 10.4 fL    Neut % 80.9 %    Lymph % 8.1 %    Mono % 9.0 %    Eos % 0.1 %    Basophil % 0.2 %    Imm Grans % 1.7 %    Neut # 11.84 (H) 2.1 - 9.2 x10(3)/mcL    Lymph # 1.19 0.6 - 4.6 x10(3)/mcL    Mono # 1.31 (H) 0.1 - 1.3 x10(3)/mcL    Eos # 0.01 0 - 0.9 x10(3)/mcL    Baso # 0.03 <=0.2 x10(3)/mcL    Imm Gran # 0.25 (H) 0.00 - 0.04 x10(3)/mcL    NRBC% 3.6 %   EKG 12-lead    Collection  Time: 06/25/25  5:24 AM   Result Value Ref Range    QRS Duration 134 ms    [facility] QTC Calculation 531 ms   Hemoglobin and Hematocrit    Collection Time: 06/25/25 12:00 PM   Result Value Ref Range    Hgb 7.6 (L) 14.0 - 18.0 g/dL    Hct 25.3 (L) 42.0 - 52.0 %   Comprehensive Metabolic Panel    Collection Time: 06/26/25  5:02 AM   Result Value Ref Range    Sodium 157 (H) 136 - 145 mmol/L    Potassium 3.9 3.5 - 5.1 mmol/L    Chloride 127 (HH) 98 - 107 mmol/L    CO2 23 23 - 31 mmol/L    Glucose 128 (H) 82 - 115 mg/dL    Blood Urea Nitrogen 48.0 (H) 8.4 - 25.7 mg/dL    Creatinine 1.69 (H) 0.72 - 1.25 mg/dL    Calcium 9.1 8.8 - 10.0 mg/dL    Protein Total 5.8 5.8 - 7.6 gm/dL    Albumin 2.4 (L) 3.4 - 4.8 g/dL    Globulin 3.4 2.4 - 3.5 gm/dL    Albumin/Globulin Ratio 0.7 (L) 1.1 - 2.0 ratio    Bilirubin Total 0.7 <=1.5 mg/dL    ALP 45 40 - 150 unit/L    ALT 10 0 - 55 unit/L    AST 19 11 - 45 unit/L    eGFR 42 mL/min/1.73/m2    Anion Gap 7.0 mEq/L    BUN/Creatinine Ratio 28    CBC with Differential    Collection Time: 06/26/25  5:02 AM   Result Value Ref Range    WBC 12.56 (H) 4.50 - 11.50 x10(3)/mcL    RBC 2.64 (L) 4.70 - 6.10 x10(6)/mcL    Hgb 7.8 (L) 14.0 - 18.0 g/dL    Hct 26.3 (L) 42.0 - 52.0 %    MCV 99.6 (H) 80.0 - 94.0 fL    MCH 29.5 27.0 - 31.0 pg    MCHC 29.7 (L) 33.0 - 36.0 g/dL    RDW 18.9 (H) 11.5 - 17.0 %    Platelet 252 130 - 400 x10(3)/mcL    MPV 10.2 7.4 - 10.4 fL    Neut % 84.5 %    Lymph % 5.7 %    Mono % 8.0 %    Eos % 0.2 %    Basophil % 0.1 %    Imm Grans % 1.5 %    Neut # 10.63 (H) 2.1 - 9.2 x10(3)/mcL    Lymph # 0.71 0.6 - 4.6 x10(3)/mcL    Mono # 1.00 0.1 - 1.3 x10(3)/mcL    Eos # 0.02 0 - 0.9 x10(3)/mcL    Baso # 0.01 <=0.2 x10(3)/mcL    Imm Gran # 0.19 (H) 0.00 - 0.04 x10(3)/mcL    NRBC% 4.1 %   Osmolality, Serum    Collection Time: 06/26/25  5:02 AM   Result Value Ref Range    Osmolality 340 (HH) 280 - 300 mOsm/kg   CK    Collection Time: 06/26/25  5:02 AM   Result Value Ref Range    Creatine Kinase  190 30 - 200 U/L   Sodium, Random Urine    Collection Time: 06/26/25  9:30 AM   Result Value Ref Range    Urine Sodium 33.0 mmol/L       Imaging:  IR Embolization  Result Date: 6/24/2025  PROCEDURE: Gastroduodenal Artery Embolization CLINICAL HISTORY: 74-year-old male with bleeding duodenal ulcer and hemorrhage ANESTHESIA: Level of sedation: Local anesthesia Medications: 0 mg Versed IV; 0 mcg Fentanyl IV; 0 mg Ativan IV; other: None Administration: Moderate sedation was administered during this procedure. Continuous monitoring of the patient's level of consciousness and physiological status was provided throughout the procedure by an independent trained observer under the direct supervision of the operating physician. Duration of sedation: 0 minutes Antibiotic prophylaxis: Ancef 2 g IV PHYSICIANS: Amador Sanders MD RADIATION DOSE: Fluoroscopy time: 6.4 minutes Radiation exposure dose: 497 mGy Exposure images: 125 CONTRAST: 80 cc of Isovue 370 PROCEDURAL SUMMARY: After informed consent was obtained, the patient was placed supine on the angiography table. The skin overlying the right groin was then prepped and draped in the usual sterile fashion. The skin and soft tissues were anesthetized using 1% lidocaine.  The bony landmarks were confirmed under fluoroscopy.  The common femoral pulse was palpated for guidance and a 21g micropuncture needle was advanced into the common femoral artery. Return of blood flow was confirmed and a mandril wire was advanced into the needle under fluoroscopic guidance. The needle was removed and a microdilator was advanced over the wire into the artery.  The inner dilator and wire were removed and a Beviison guidewire was advanced through the sheath into the artery under fluoroscopic guidance.  The sheath was removed. A 5F vascular sheath was advanced into the common femoral artery and the inner dilator was removed.  The sheath was flushed. A 5F [place] Cobra catheter was advance through the  sheath over the wire into the arterial system.  The celiac  artery was selected.  Angiography demonstrated normal vascular anatomy with apparent contrast extravasation along a duodenal branch off of the GDA.. A decision was made to proceed with embolization. A coaxial system of a TruSelect microcatheter with a Fathom microwire was inserted through the select catheter. The microcatheter and microwire were advanced into the GDA, distal to the area concerning for hemorrhage.  Angiography confirmed positioning within the targeted vessel. Embold microcoils and Gelfoam were used for embolization. 3 mm x 12 cm and 4 mm x 15 cm Embold microcoils as well as 0.3 cc of Gelfoam were deployed within the target artery over the origin of the vessel of concern.  Injected contrast confirmed progression towards stasis within the target vessel.  Once embolization was complete, angiography was performed through the microcatheter confirming technically successful occlusion of the targeted branch.  The microcatheter was removed and angiography was repeated through parent catheter.  This confirmed successful embolization of the target vessel with no other vascular abnormality identified. The glide Cobra catheter was then used to select the superior mesenteric artery.  Angiography was performed to ensure no further evidence of hemorrhage was identified.  After this was confirmed, the catheter was then removed over a [employer] guidewire. The groin sheath was flushed.  Angiography of the common femoral artery was performed via the vascular sheath.  Angiography of the common femoral artery demonstrated no evidence of injury. A Mynx closure device was deployed for hemostasis and the sheath was removed.  A sterile dressing was applied. The patient tolerated the procedure well and experienced no immediate complications. The patient was then brought to the recovery room in good condition.     Technically successful GDA embolization. Electronically  signed by: Amador Sanders Date:    06/24/2025 Time:    11:05    CT Abdomen Pelvis W Wo Contrast  Result Date: 6/23/2025  Technique: CT of the abdomen and pelvis was performed with axial images as well as sagittal and coronal reconstruction images with intravenous contrast. Comparison: Comparison is with study dated June 20, 2025 Clinical History: GI Bleed. Dosage Information: Automated Exposure Control was utilized 3042 mGy.cm. Findings: Lines and Tubes: None. Thorax: Lungs: Stable bilateral pleural effusions are noted, more prominent on the right, with associated right lower lobe atelectasis or consolidation demonstrating air bronchograms. Pleura: No pneumothorax is seen in the visualized lung bases. Abdomen: Abdominal Wall: There is decreased fat stranding along the right lateral abdominal wall compared to the previous study. Liver: The liver appears unremarkable. Biliary System: No intrahepatic or extrahepatic biliary duct dilatation is seen. Gallbladder: Gallbladder is without wall thickening or pericholecystic inflammatory changes or fluid. Pancreas: No pancreatic mass, or, ductal dilatation is seen. There is moderate fatty of the pancreas. Spleen: The spleen appears unremarkable. Adrenals: There is stable hypodensity in the right adrenal gland measuring 1 cm (Series 2, Image 35). This may reflect a right renal adenoma. There is a stable heterogeneous attenuation 1.5 cm diameter nodule in the medial limb of the left adrenal gland which is indeterminate in etiology. Follow-up as clinically indicated. Kidneys: Multiple hyperdense cysts are identified in the left kidney the largest of which measures 7.1 mm is on in the upper pole of the left kidney. The left kidney otherwise appears unremarkable with no stones masses or hydronephrosis identified. A single cyst measuring 8.6 mm is seen on Image 59, Series 2 in the mid pole of the right kidney for which no specific follow-up imaging is recommended.  The right kidney  otherwise appears unremarkable with no stones masses or hydronephrosis identified. Aorta: There is extensive calcification of the abdominal aorta and its branches. Bowel: No contrast extravasation, contrast pooling, or contrast blush is seen within the bowel to suggest active hemorrhage. Esophagus: The visualized distal esophagus appears unremarkable. Stomach: The stomach appears unremarkable. Duodenum: Unremarkable appearing duodenum. Small Bowel: The small bowel appears unremarkable. Colon: Multiple diverticula are seen in the colon. No associated inflammatory stranding or pericolonic fluid is seen to suggest diverticulitis. Appendix: The appendix appears unremarkable and is seen on Image 118, Series 2 through Image 109, Series 2. Peritoneum: No free intraperitoneal air is seen. There is mild interval decrease in the size of the previously noted right retroperitoneal hematoma, measuring approximately 11 x 6 cm. Decreased thickening of the adjacent right iliacus and psoas major muscles is likewise observed. No contrast extravasation, contrast pooling, or contrast blush is seen to suggest active hemorrhage. Pelvis: Bladder: The bladder appears unremarkable. Male: Prostate gland: There are a few calcifications in the prostate gland. Bony structures: Dorsal Spine: There is moderate scattered spondylosis of the visualized dorsal spine. Bony Pelvis: There is mild degenerative change of the hip.     Impression: 1. No contrast extravasation, contrast pooling, or contrast blush is seen within the bowel to suggest active hemorrhage. 2. There is decreased fat stranding along the right lateral abdominal wall compared to the previous study. 3. There is mild interval decrease in the size of the previously noted right retroperitoneal hematoma, measuring approximately 11 x 6 cm. Decreased thickening of the adjacent right iliacus and psoas major muscles is likewise observed. No contrast extravasation, contrast pooling, or contrast  blush is seen to suggest active hemorrhage. Correlate with clinical and laboratory findings as regards additional evaluation and follow-up. 4. Details and other findings as discussed above. No significant discrepancy with overnight report. Electronically signed by: Sumit Núñez Date:    06/23/2025 Time:    08:35    X-Ray Chest 1 View  Result Date: 6/20/2025  EXAMINATION: XR CHEST 1 VIEW CLINICAL HISTORY: SepsisPICC LINE; COMPARISON: 11 June 2025 FINDINGS: Frontal view of the chest was obtained. Right PICC tip overlies the upper SVC.  The heart is not enlarged.  Low lung volumes with right basilar opacities and pleural effusion.  No pneumothorax.     Right PICC tip overlies the upper SVC.  Right basilar opacities with pleural effusion. Electronically signed by: Jesús Moura Date:    06/20/2025 Time:    16:26    CT Abdomen Pelvis W Wo Contrast  Result Date: 6/20/2025  EXAMINATION: CT ABDOMEN PELVIS W WO CONTRAST CLINICAL HISTORY: GI bleed;Retroperitoneal hematoma suspected; TECHNIQUE: Low dose axial images, sagittal and coronal reformations were obtained from the lung bases to the pubic symphysis following the IV administration of  contrast.  Delayed imaging and pre contrasted imaging was performed as well. Automatic exposure control is utilized to reduce patient radiation exposure. COMPARISON: 06/12/2025 FINDINGS: There is bibasilar atelectasis and bilateral pleural effusions.  This is worse than the prior examination.. The liver appears normal.  No liver mass or lesion is seen.  Portal and hepatic veins appear normal. The gallbladder appears grossly unremarkable. The pancreas appears normal.  No pancreatic mass or lesion is seen. The spleen appears normal.  No splenic mass or lesion is seen. The right adrenal gland appears normal.  There is a nodule in the left adrenal gland that measures 1.6 x 1.2 cm.  Pre contrast Hounsfield units are 26.  The lesion is indeterminate. The kidneys are normal in size.  No  hydronephrosis is seen.  No hydroureter is seen.  No nephrolithiasis or ureteral stone is seen.  Couple of punctate cysts are seen in the kidneys.  These appear to be simple cysts. There is a retroperitoneal hematoma seen on the right side which appears slightly smaller than the prior examination.  No evidence of active extravasation is seen. Urinary bladder is decompressed due to Palomo catheter.  There are some calcifications in the prostate.. No colitis is seen.  No diverticulitis is seen.  No colonic mass or lesion or inflammatory process is seen.  GI bleed cannot be excluded since there is oral contrast seen throughout the colon. No free air is seen. The bones appear grossly unremarkable.     Right retroperitoneal hematoma appears slightly smaller.  No active extravasation is seen GI hemorrhage cannot be excluded since there is oral contrast throughout the colon Bibasilar atelectasis and bilateral pleural effusions slightly worse than the prior examination Indeterminate left adrenal nodule.  CT scan or MRI adrenal mass protocol is recommended. Electronically signed by: Lonnie Cornell Date:    06/20/2025 Time:    13:18    Cardiac catheterization  Result Date: 6/17/2025    The estimated blood loss was none. The procedure log was documented by Documenter: Lorena Rodriguez RN and verified by James Tony MD. Date: 6/17/2025  Time: 3:24 PM Preprocedure diagnosis-new cardiomyopathy Postprocedure diagnosis-obstructive CAD Estimated blood loss-5 cc Tissue removal-none Complications-none Procedures performed: 1. Ultrasound-guided right groin access 2. Coronary angiography 3. Left ventricular hemodynamics 4. IFR interrogation of: LAD-0.85 Ramus-0.93 Circ-1.0 5. IVUS of LAD 6. LIMA angio 7. Perclose RCFA Findings: 1. Left main-Patent 2. Lad-70% calcified proximal stenosis, Mid stent is 2.5 mm stent in  a 3.5-4mm vessel.  MSA distal stent edge is 3.9mm2 3. LCX-50% mid lesion 4. Ramus-40% mid 5. RCA-30% proximal  calcified stenosis 6. LVEDP-11 7. LIMA patent Procedure detail: Ultrasound-guided right groin access obtained.  Sheath inserted.  Femoral angiography performed.  Left main angiography performed with JL4.  RCA angiography performed with JR4 catheter.   Pigtail was then used for left ventricular hemodynamics.   Given moderate LAD disease further interrogation was needed.  Heparin was administered.  Six [place] EBU 3.5 catheter was advanced into the aorta.  Artery was pre treated with nitroglycerin.  Wire was normalized in the aorta.  The catheter was reengaged into the LM.  Wire was passed beyond the proximal lesion.  Guiding catheter was backed out of the LM, interrogation was performed.  Pullback to the guide was 1.  Process was repeated for the ramus and circumflex.  Final angiography demonstrated YAKELIN 3 flow.  Guide was redirected into the left subclavian and LIMA agio was performed.  Catheters were removed and a proglide was used for RCFA closure. Plan: 1. Consult CTS for LIMA to LAD, ANGELIA ligation, MAZE, and possible surgical extraction of prominent eustachian valve with thrombus. 2. If turned down he will likely need atherectomy and shockwave for underexpanded stent, and repeat stenting of the prox to mid LAD.     Fl Modified Barium Swallow Speech  Result Date: 6/16/2025  See procedure notes from Speech Pathologist. This procedure was auto-finalized.    X-Ray Foot 2 View Left  Result Date: 6/13/2025  EXAMINATION: XR FOOT 2 VIEW LEFT CLINICAL HISTORY: great toe wound;Sepsis, unspecified organism COMPARISON: None. FINDINGS: There is some demineralization of the visualized osseous structures slightly more than expected for patient's age No acute displaced fractures or dislocations. There are degenerative changes with narrowing of the proximal and distal interphalangeal joints articular spaces are otherwise preserved with smooth articular surfaces No blastic or lytic lesions. On the provided images there is no  evidence of primary and or secondary signs to suggest the presence of osteomyelitis, however, these might be lacking on plain films and therefore if clinically indicated other imaging modalities might prove helpful for further assessment     Demineralization of the visualized osseous structures. Degenerative changes. No clear evidence of osteomyelitis other imaging modalities might prove helpful for further assessment. Electronically signed by: Singh Woodruff Date:    06/13/2025 Time:    10:36    CTA Abdomen and Pelvis  Result Date: 6/12/2025  EXAMINATION: CTA ABDOMEN AND PELVIS CLINICAL HISTORY: GI bleed;. TECHNIQUE: Helical acquisition through the abdomen and pelvis without and with IV contrast targeting the arterial phase.  3D MIP reconstructions were provided for review.  Automatic exposure control, adjustment of mA/kV or iterative reconstruction technique was used to reduce radiation. COMPARISON: 5 June 2025 FINDINGS: There is a moderate right pleural effusion with right basilar consolidation.  Left lower lobe pulmonary embolus is again noted. There are no acute findings solid abdominal organs. No bowel obstruction or free air.  No significant inflammatory changes of the bowel. There is bladder wall thickening similar to prior.  There is a Palomo in the bladder.  No pelvic free fluid.  Abdominal aorta normal in caliber.  Fairly mild atherosclerotic disease.  Widely patent mesenteric and renal arteries. There is a right retroperitoneal hematoma which is new compared to prior.  No convincing active extravasation.  Hematoma measures approximately 16 x 14 x 7 cm. There are no acute osseous findings.     1. Right retroperitoneal hematoma without convincing active extravasation. 2. Moderate right pleural effusion with right basilar consolidation. 3. Pulmonary embolus is again seen left lower lobe. Electronically signed by: Jesús Moura Date:    06/12/2025 Time:    17:37    NM GI Bleed Scan (Tagged RBC)  Result  Date: 6/12/2025  EXAMINATION: NM GI BLEED STUDY CLINICAL HISTORY: GI bleed;UGI bleed, nonvariceal, endoscopy negative; TECHNIQUE: After injection of autologous red blood cells labeled in vitro with 23.6 mCi of Tc-99m pertechnetate, sequential dynamic images of the abdomen were obtained for  minutes. Delayed images obtained for a total of 4 hours. COMPARISON: 5 June 2025 FINDINGS: There is physiologic distribution of radiotracer.  A site of active GI bleeding is not seen after 4 hours of imaging.     No evidence of active GI bleeding over 4 hours of imaging. Electronically signed by: Jesús Moura Date:    06/12/2025 Time:    15:03    US Retroperitoneal Limited  Result Date: 6/12/2025  EXAMINATION: US RETROPERITONEAL LIMITED CLINICAL HISTORY: Alfonso; TECHNIQUE: Ultrasound evaluation of the kidneys. COMPARISON: CT abdomen pelvis dated 06/05/2025 FINDINGS: The right kidney measures 10.5 cm. The left kidney measures 8.5 cm. There is no hydronephrosis.  Urinary bladder is decompressed with a Palomo catheter in place.     No hydronephrosis. Electronically signed by: Cha Bueno Date:    06/12/2025 Time:    14:03    Transesophageal echo ([facility])  Addendum Date: 6/12/2025    Left Ventricle: The left ventricle is normal in size. Normal wall motion. There is normal systolic function. Quantitated ejection fraction is 56%. Elevated left ventricular filling pressure.   Right Ventricle: The right ventricle is mildly dilated Systolic function is normal.   Left Atrium: The left atrium is dilated Agitated saline study of the atrial septum is negative, suggesting absence of intracardiac shunt at the atrial level. No patent foramen ovale. Appendage velocity is normal at greater than 40 cm/sec. There is no thrombus in the left atrial appendage.   Right Atrium: The right atrium is dilated. There is a prominent Eustachian valve with a highly mobile echogenic structure attached to it most likely representing a thrombus and measuring 1.5 cm  in its longest dimension. Dense spontaneous echo contrast visualized in the right atrial cavity.   Aortic Valve: The aortic valve is a trileaflet valve. There is no stenosis. There is no significant regurgitation.   Mitral Valve: The mitral valve is structurally normal. There is no stenosis. There is trace regurgitation.   Tricuspid Valve: There is trace regurgitation.   Aorta: The aortic root is normal in size measuring 3.5 cm. The ascending aorta is normal in size measuring 2.9 cm. The aortic arch is normal measuring 2.6 cm. The descending aorta is normal measuring 2.3 cm.   Pericardium: There is no pericardial effusion. YO obtained for further evaluation of right atrial mass noted on transthoracic echocardiogram.    Result Date: 6/12/2025    Left Ventricle: The left ventricle is normal in size. Normal wall motion. There is normal systolic function. Quantitated ejection fraction is 56%. Elevated left ventricular filling pressure.   Right Ventricle: The right ventricle is mildly dilated Systolic function is normal.   Left Atrium: The left atrium is dilated Agitated saline study of the atrial septum is negative, suggesting absence of intracardiac shunt at the atrial level. No patent foramen ovale.   Right Atrium: The right atrium is dilated. There is a prominent Eustachian valve with a highly mobile echogenic structure attached to it most likely representing a thrombus and measuring 1.5 cm in its longest dimension. Dense spontaneous echo contrast visualized in the right atrial cavity.   Aortic Valve: The aortic valve is a trileaflet valve. There is no stenosis. There is no significant regurgitation.   Mitral Valve: The mitral valve is structurally normal. There is no stenosis. There is trace regurgitation.   Tricuspid Valve: There is trace regurgitation.   Aorta: The aortic root is normal in size measuring 3.5 cm. The ascending aorta is normal in size measuring 2.9 cm. The aortic arch is normal measuring 2.6 cm.  The descending aorta is normal measuring 2.3 cm.   Pericardium: There is no pericardial effusion. YO obtained for further evaluation of right atrial mass noted on transthoracic echocardiogram.     Echo Saline Bubble? No  Result Date: 6/11/2025    Left Ventricle: The left ventricle is normal in size. Increased wall thickness. There is moderately reduced systolic function with a visually estimated ejection fraction of 30 - 40%.   Right Ventricle: The right ventricle is mildly dilated Systolic function is reduced.   Left Atrium: The left atrium is mildly dilated     X-Ray Chest 1 View  Result Date: 6/11/2025  EXAMINATION: XR CHEST 1 VIEW CLINICAL HISTORY: Shortness of breath TECHNIQUE: Single view of the chest COMPARISON: 06/04/2025 FINDINGS: Suspect small right effusion.  Further evaluation may be obtained with two views of the chest. The cardiomediastinal silhouette is within normal limits. Right-sided PICC line projects over the cavoatrial junction. No acute osseous abnormality.     Suspect small right effusion. Further evaluation may be obtained with two views of the chest. Electronically signed by: Vaibhav Jamil Date:    06/11/2025 Time:    06:10    CV Ultrasound doppler arterial legs bilat  Result Date: 6/10/2025  The right lower extremity arterial system is patent with no evidence of focal stenosis or occlusion. The left lower extremity arterial system is patent with no evidence of focal stenosis or occlusion. The left distal posterior tibial artery demonstrated retrograde flow. KAIT study performed on 6/2/25, which demonstrated non compressible vessels at the bilateral ankles.     CV Ultrasound doppler venous legs bilat  Result Date: 6/7/2025    There is no evidence of a right lower extremity DVT.   There is no evidence of a left lower extremity DVT. Negative for deep and superficial vein thrombosis in bilateral lower extremities.     Echo  Result Date: 6/5/2025    Limited echo to r/o right heart strain.    Complete echo 5/26/25. YO 5/30/25)   Right Ventricle: The right ventricle is moderately dilated Systolic function is moderately reduced.   Right Atrium: The right atrium is normal in size.   Tricuspid Valve: There is mild regurgitation.   Pulmonary Artery: PASP is at least 50mmhg.   IVC/SVC: IVC was not well visualized due to poor acoustic window.     CTA Runoff ABD PEL Bilat Lower Ext  Result Date: 6/5/2025  EXAMINATION: CTA RUNOFF ABD PEL BILAT LOWER EXT CLINICAL HISTORY: Peripheral arterial disease (PAD), asymptomatic; TECHNIQUE: Helically acquired images were obtained from the lung bases through the lower extremities prior to and after IV administration of contrast. Axial, sagittal, coronal and MIP reformations were interpreted. Automated tube current modulation, weight-based exposure dosing, and/or iterative reconstruction technique utilized to reach lowest reasonably achievable exposure rate. DLP: 1709 mGy*cm COMPARISON: CT abdomen pelvis 05/28/2025 FINDINGS: VASCULATURE: Pulmonary arteries: Segmental pulmonary embolus at the left lower lobe. Aorta: Mild atherosclerosis without aneurysm or occlusion. Mesenteric arteries: No significant stenosis. Renal arteries:No significant stenosis. Right: Iliac arteries: No significant stenosis. Femoral: Diffuse atherosclerotic change at the SFA and deep femoral branches.  Mild stenosis at the distal SFA. Popliteal: Atherosclerosis with mild stenosis. Tibioperoneal trunk: Patent tibioperoneal trunk.  Diminutive caliber vessels with calcific atherosclerosis below the trifurcation.  This makes it challenging to evaluate luminal patency of diminutive vessels below the knee.  Diminutive peroneal artery with enhancement fading at the ankle.  There does appear to be flow at the anterior tibial and posterior tibial artery at the ankle.  Dorsalis pedis artery enhances. Left: Iliac arteries: No significant stenosis. Femoral: Diffuse atherosclerotic change at the SFA and deep  femoral branches.  Mild stenosis. Popliteal: Atherosclerosis with mild stenosis. Tibioperoneal trunk: Patent tibioperoneal trunk.  Diminutive caliber vessels with calcific atherosclerosis below the trifurcation.  This makes it challenging to evaluate luminal patency of diminutive vessels below the knee.  Peroneal artery does appear to be patent to the ankle.  Unable to confidently assess tibial arteries.  Defer to sonogram. HEART: There are coronary artery calcifications. LUNG BASES: See separate report for CT chest. LIVER: No arterially enhancing mass. BILIARY: No calcified gallstones. PANCREAS: No inflammatory change. SPLEEN: Normal in size ADRENALS: No mass. KIDNEYS/URETERS: No hydronephrosis.  Possible cyst at the upper pole right kidney, obscured by beam hardening artifact. GI TRACT/MESENTERY: Clip seen at the level of the duodenum.  No active extravasation appreciable.  Colonic diverticulosis without acute inflammatory change. PERITONEUM: No free fluid.No free air. LYMPH NODES: No enlarged lymph nodes by size criteria. BLADDER: Collapsed about a Palomo catheter. REPRODUCTIVE ORGANS: There are prostate calcifications. SOFT TISSUES: Ventral hernia mesh.  Body wall edema.  Bilateral lower extremity edema. BONES: Note large field of view runoff exam not designed to evaluate fine osseous details.  No acute osseous abnormality seen.  Degenerative change at the spine.  Degenerative change at the feet     1. Segmental pulmonary embolus at the left lower lobe.  Findings discussed with  Bernice Lipscomb, the physician caring for patient 6/5/2025 09:11. 2. Diffuse atherosclerotic calcifications.  No appreciable significant stenosis above the knee.  Below the knee evaluation is limited due to diminutive caliber vessels and calcific atherosclerosis.  Defer to sonogram. Electronically signed by: Ann Armstrong Date:    06/05/2025 Time:    09:12    CT Chest Without Contrast  Result Date: 6/5/2025  EXAMINATION: CT CHEST WITHOUT  CONTRAST CLINICAL HISTORY: Sepsis; TECHNIQUE: Helically acquired axial images, sagittal and coronal reformations were obtained from the thoracic inlet to the lung bases without the IV administration of contrast. Automated tube current modulation, weight-based exposure dosing, and/or iterative reconstruction technique utilized to reach lowest reasonably achievable exposure rate. DLP: 1708 mGy*cm COMPARISON: Chest radiograph 06/04/2025 FINDINGS: BASE OF NECK: Multinodular goiter.  This can be evaluated with outpatient thyroid sonogram after resolution of patient's acute illness. AORTA: Atherosclerosis. HEART: Heart at the upper limits of normal in size.  There are coronary artery calcifications. CHACE/MEDIASTINUM: Small nonspecific mediastinal lymph nodes.  Evaluation of hilar lymphadenopathy is limited without intravenous contrast. AIRWAYS: Deviation of the trachea to the right at the thoracic inlet related to nodular enlargement of the left lobe of the thyroid. LUNGS: Respiratory motion artifact.  Complete collapse and consolidation of the right middle lobe.  Moderate, near complete collapse and consolidation at the right lower lobe.  Tree-in-bud nodularity at the posterior left upper lobe and at the superior segment left lower lobe compatible with bronchiolitis. PLEURA: Trace right pleural effusion. UPPER ABDOMEN: See separate report for CT abdomen THORACIC SOFT TISSUES: Right upper extremity PICC terminates at the SVC. BONES: Flowing osteophytes of the thoracic spine as can be seen with DISH.     1. Collapse and consolidation at the right middle lobe and right lower lobe.  Atelectasis versus pneumonia 2. Tree-in-bud nodularity at the left lung.  Bronchiolitis Electronically signed by: Ann Armstrong Date:    06/05/2025 Time:    08:52    X-Ray Chest 1 View  Result Date: 6/4/2025  EXAMINATION: XR CHEST 1 VIEW CLINICAL HISTORY: concern for pulmonary edema; TECHNIQUE: AP chest COMPARISON: Chest x-ray dated 06/02/2025  FINDINGS: Right-sided PICC line has its tip over the superior vena cava.  The heart is normal in size.  There is similar appearance of a right basilar airspace opacity.  There is no new airspace consolidation there is no pleural effusion or visible pneumothorax.     Stable exam without significant interval change Electronically signed by: Cha Bueno Date:    06/04/2025 Time:    15:50    CT Pelvis With IV Contrast NO Oral Contrast  Result Date: 6/3/2025  EXAMINATION: CT PELVIS WITH IV CONTRAST CLINICAL HISTORY: concern for prostatitis/prostatic abscess; Chief complaint: Shortness of Breath (From [facility] with complaints of SOB and lower back pain for about 3 days. Abdominal distension and bilateral leg edema noted. Palomo in place upon arrival with cloudy urine noted.)[facility] Inge Vargas is a 74 y.o. Black or  male with past medical history of cerebrovascular accident (March 2025), coronary artery disease status post LAD stent (2017), venous insufficiency, hypertension, first-degree AV block, and type 2 diabetes mellitus, who presented to the ED from a nursing home due to progressive weakness over the past week. His daughter provided history due to his communication difficulties. Associated symptoms included altered mental status, back pain, and abdominal discomfort. In the ED, vitals were stable, but lab results were remarkable for severe hyperkalemia and azotemia.  He was treated with Lokelma, insulin, and albuterol, started on Rocephin for a urinary tract infection.  Imaging of the abdomen revealed hydronephrosis and malpositioned Palomo catheter.  The Palomo catheter has been subsequently replaced and renal function has improved.  Nephrology service continues to follow for assistance with management of PRIYA. TECHNIQUE: Helical axial images are acquired through the pelvis after the IV administration 95 mL of Omnipaque 350.  Images were reconstructed into the coronal  sagittal plane.  Dose automated exposure control, dose radiation lowering technique was utilized. COMPARISON: CT abdomen and pelvis without contrast from 05/28/2025 CT abdomen and pelvis without contrast from 05/23/2025 FINDINGS: Pelvis: The bony structures of the pelvis are intact. A metallic density is seen in the left ilium. Hip: There is mild degenerative change in the bilateral hip. Right: No fracture dislocation or subluxation is seen involving the visualized right hip bony structures. Left: No fracture dislocation or subluxation is seen involving the visualized left hip bony structures. Femur: Proximal Femur: The visualized proximal right and left femurs appear intact. Pelvic structures: Bladder: A fowler catheter in place. The bladder wall appears thickened even though the bladder is collapsed. Visualized distal ureters: Normal. Visualized small bowel loops: Normal. Rectosigmoid colon: Multiple diverticulum are seen in the sigmoid colon without surrounding inflammation to suggest diverticulitis. Prostate and seminal vesicles: There are multiple calcifications in the prostate gland. No peripherally enhancing fluid collection identified to suggest an abscess. Pelvic cavity: Normal. Pelvic wall: Normal. Systemic arteries and veins: There is mild atheromatous calcification in the bilateral common iliac arteries and imaged bilateral superficial femoral arteries. Osseous structures: Mild to moderate degenerative change is seen in the imaged osseous structures.     1. A fowler catheter in place. The bladder wall appears thickened even though the bladder is collapsed. This could reflect an element of cystitis. Correlate with clinical and laboratory findings. 2. No acute pelvic solid organ or bowel pathology identified. Details and other findings as discussed above [employer] concurrence Electronically signed by: Jayme Hagan MD Date:    06/03/2025 Time:    07:54    Ankle Brachial Indices (KAIT)  Result Date:  6/3/2025  The right lower extremity ankle brachial index is 2.64 suggesting non compressible tibial vessels.  The left lower extremity ankle brachial index is 1.49 suggesting non compressible tibial vessels.      X-Ray Chest 1 View for Line/Tube Placement  Result Date: 6/2/2025  EXAMINATION XR CHEST 1 VIEW FOR LINE/TUBE PLACEMENT CLINICAL HISTORY picc placement; TECHNIQUE A total of 1 frontal image(s) of the chest. COMPARISON 1 June 2025 FINDINGS Lines/tubes/devices: Interval placement of right upper extremity PICC, catheter tip projects over the mid SVC region.  Multiple ECG leads again overlie the chest. The cardiac silhouette and central vascular structures are unchanged.  The trachea is midline. Subtle ill-defined right basilar infiltrate is better visualized.  Remaining lung fields are clear.  There is no enlarging pleural effusion or convincing pneumothorax. Regional osseous structures and extrathoracic soft tissues are similar. IMPRESSION 1. Interval placement of right upper extremity PICC, acceptable positioning. 2. Better visualized right basilar infiltrate. Electronically signed by: Luis Enrique Posada Date:    06/02/2025 Time:    17:25    X-Ray Chest 1 View  Result Date: 6/1/2025  EXAMINATION: XR CHEST 1 VIEW CLINICAL HISTORY: Sob; TECHNIQUE: Single frontal view of the chest was performed. COMPARISON: 05/31/2025 FINDINGS: LINES AND TUBES: EKG/telemetry leads overlie the chest. MEDIASTINUM AND CAHCE: The cardiac silhouette is normal. LUNGS: Lung volumes are low with associated atelectatic change.  Mild improved aeration at the right lung base. PLEURA:No pleural effusion. No pneumothorax. OTHER: No acute osseous abnormality.     Mild improved aeration at the right lung base. Electronically signed by: Ann Armstrong Date:    06/01/2025 Time:    12:46    X-Ray Chest 1 View  Result Date: 5/31/2025  EXAMINATION: XR CHEST 1 VIEW CLINICAL HISTORY: new SOB post blood transfusion; TECHNIQUE: Single frontal view of the  chest was performed. COMPARISON: 05/29/2025 FINDINGS: The lungs are hypoaerated which accentuates the bronchovascular markings but no infiltrate is seen. The heart is normal in appearance. The pulmonary vascularity is unremarkable. No pleural effusion is seen. Bones and joints show no acute abnormality.     Hypoaerated lungs Electronically signed by: Lonnie Cornell Date:    05/31/2025 Time:    16:57    X-Ray Chest 1 View  Result Date: 5/29/2025  EXAMINATION: XR CHEST 1 VIEW CLINICAL HISTORY: hypoxic respiratory failure; TECHNIQUE: One view COMPARISON: May 23, 2025. FINDINGS: Cardiopericardial silhouette is within normal limits.  Right basilar atelectasis.  No convincing acute dense focal or segmental consolidation, congestive process, significant pleural effusions or pneumothorax.     No acute cardiopulmonary process identified. Electronically signed by: Sumit Núñez Date:    05/29/2025 Time:    09:06    CT Abdomen Pelvis  Without Contrast  Result Date: 5/28/2025  EXAMINATION: CT ABDOMEN PELVIS WITHOUT CONTRAST CLINICAL HISTORY: concern for prostate abscess; TECHNIQUE: Low dose axial images, sagittal and coronal reformations were obtained from the lung bases to the pubic symphysis.  No contrast was administered.  Automatic exposure control is utilized to reduce patient radiation exposure. COMPARISON: 05/23/2025 FINDINGS: There is right lower lobe atelectasis.  There is a right-sided pleural effusion. The liver appears normal.  No obvious liver mass or lesion is seen. Gallbladder appears normal.  No gallstones are seen. The pancreas appears normal.  No inflammatory changes are seen in the pancreatic region. The spleen appears normal and adrenal glands appear normal.  No adrenal nodule is seen. No nephrolithiasis is seen.  No hydronephrosis is seen.  No hydroureter is seen.  No ureteral stone is seen. No colitis is seen.  No diverticulitis is seen.  No appendicitis is seen. No free air seen.  No free fluid is seen. The  urinary bladder is decompressed due to Palomo catheter.  There is some air in the urinary bladder likely due to the catheter. The prostate is heavily calcified.  The area of hypoattenuation that was seen at the base of the prostate on the prior examination is less well visualized on this exam.  Contrast enhanced examination is recommended. Bones show no acute abnormality.     The hypoattenuated area in the prostate that was seen on prior examination is not visualized on today's exam.  Additional imaging with contrast enhancement may be beneficial for better delineation. Coarse heterotrophic calcifications seen throughout the prostate Interval placement of a Palomo catheter in the urinary bladder with decompressed appearing urinary bladder Interval development of right lower lobe atelectasis and moderate right-sided pleural effusion Electronically signed by: Lonnie Cornell Date:    05/28/2025 Time:    11:38    US Abdomen Limited_Liver  Result Date: 5/27/2025  EXAMINATION: US ABDOMEN LIMITED_LIVER CLINICAL HISTORY: transaminitis; COMPARISON: CT 23 May 2025. FINDINGS: Grayscale, color and spectral doppler evaluation of the right upper quadrant. Pancreas obscured by bowel gas.  Imaged portion of the IVC normal in caliber. The liver is mildly enlarged. No focal suspicious liver lesion is seen. Patent portal vein with hepatopetal flow. No gallstones. No significant gallbladder wall thickening or pericholecystic fluid.  The common bile duct is normal in caliber  and measures 2 mm. Right kidney measures 10 cm in length.  Right hydronephrosis improved compared to the prior CT     1. Mild hepatomegaly. 2. Right kidney hydronephrosis improved compared to the prior CT. Electronically signed by: Jesús Moura Date:    05/27/2025 Time:    15:58         Assessment/Plan:    74-year-old male previously known to Dr. Torres but followed now by Dr. Chapis Lane past medical history of type 2 diabetes, HTN, obesity, osteoarthritis, CAD  s/p LAD stent 2017, venous insufficiency who initially presented to [facility] ER 5/23 from a local nursing home for generalized weakness.  Admitted with severe PRIYA, UTI, CHF exacerbation (EF 35-40 % on TTE 5/26), and atrial fibrillation/atrial flutter with RVR.   TTE also concerning for right atrial mass.  Underwent YO 5/30 which confirmed right atrial thrombus.       GI consulted 6/13 for anticoagulation clearance given recent GI bleed.    Patient was cleared from GI standpoint to begin blood thinners and signed off.  Called back 6/23 for melena and anemia.      6/23 EGD: Large duodenal ulcer with visible vessel with clot and oozing. s/p clip/epi/hemospray with   temporary hemostatsis. Given the large size and this is his 4th EGD will consult IR for embolization due to high risk of rebleeding. And failling endoscopic management Normal esophagus.Red blood in the gastric fundus. Duodenal ulcer with a visible vessel.  One hemostatic clip was successfully placed (MR   conditional) in the duodenal bulb.  An area in the duodenal bulb successfully injected.      6/24 GDA embolization with IR     1.   Acute on chronic macrocytic anemia   - Hgb 9.5--8.6--9.3--9.7--10.1--9.6--9.6--10.6--10.2--7.5--7.3 ( 3units PRBC / 1 unit FFP)--8.6--8.7--8.2--7.9--7.7--7.9--7.6--7.8     2.  Duodenal ulcer with bleeding requiring multiple EGDs     06/02: EGD: 3 duodenal ulcers - 15 mm posterior wall with oozing s/p APC and 10 mm on lateral wall with adherent clot s/p heater probe  06/04: EGD: 13 mm posterior wall ulcer with VV s/p bipolar and clips x 2.  Dieulafoy on edge of ulcer s/p clip x 2  06/11: EGD: Nonobstructing, nonbleeding duodenal ulcer with a clean ulcer base.  Prior clips seen with no active bleeding or high-risk stigmata.  Nonobstructing, nonbleeding duodenal ulcer with a clean ulcer base.     3.   Right retroperitoneal hematoma on CTA 6/12, improving   - General surgery consulted - no surgical intervention, signed off.      4.   Right atrial thrombus, on YO 5/30     5.  Left lower lobe pulmonary embolism on CTA runnoff ab/pel/bilat LE 6/5       - Continue IV PPI BID and carafate QID (can dissolve in 10-20 cc of water to create a slushy for administration)  -Okay to advance diet as tolerated if cleared by speech  -Monitor H/H and transfuse as needed to Hgb 7  -Monitor clinically and stools for bleeding and notify GI team   -We will sign off.  Please re-consult GI if needed.        Liz Chambers, FNP-C

## 2025-06-26 NOTE — PT/OT/SLP PROGRESS
[facility]  Speech Language Pathology Department  Dysphagia Therapy Progress Note    Patient Name:  Vaibhav Vargas   MRN:  [id number]    Recommendations     General recommendations:  repeat Modified Barium Swallow Study once able to take barium  Solid texture recommendation:  NPO  Liquid consistency recommendation: NPO   Medications: crushed in puree (monitor closely)    Discharge therapy intensity: Moderate Intensity Therapy   Barriers to safe discharge:  severity of impairment    Subjective     Patient awake and alert.   Spiritual/Cultural/Orthodoxy Beliefs/Practices that affect care: no    Pain/Comfort: Pain Rating 1: 0/10    Respiratory Status:  Nasal cannula    Objective     Patient was place on clear liquid (moderately thick) per GI.    Therapeutic PO Trials:  Consistency Amount Fed By Oral Symptoms Pharyngeal Symptoms   Moderately thick liquid by straw 6 sips SLP Bolus holding  Slowed oral transit time Wet vocal quality after swallow  Cough after swallow     Assessment     Pt is presenting with new signs/sx of aspiration. Patient is not currently safe for PO diet. SLP rec: NPO, meds ok crushed in puree (monitor close). SLP will repeat MBS once patient is appropriate for barium intake.     Outcome Measures     Functional Oral Intake Scale: 1 - Nothing by mouth    Goals     Multidisciplinary Problems       SLP Goals          Problem: SLP    Goal Priority Disciplines Outcome   SLP Goal     SLP    Description: LTG: Patient tolerate least restrictive diet with no clinical signs/sx of aspiration.     ST. Patient will tolerate trials of minced and moist solids  2. Patient will tolerate trials of soft and bite sized solids  3. Patient will participate in base of tongue and laryngeal elevation exercises  4. Patient will participate in repeat MBS as appropriate                     Patient Education     Patient and family were provided with verbal education regarding swallow function,  risks of aspiration, and SLP POC.  Understanding was verbalized.    Plan     Will continue to follow and tx as appropriate.    SLP Follow-Up:  Yes   Patient to be seen:  5 x/week   Plan of Care expires:  06/30/25  Plan of Care reviewed with:  patient       Time Tracking     SLP Treatment Date:   06/26/25  Speech Start Time:  1015  Speech Stop Time:  1030     Speech Total Time (min):  15 min    Billable minutes:  Treatment of Swallow Dysfunction, 15 minutes       06/26/2025

## 2025-06-26 NOTE — PROGRESS NOTES
"Hospital Medicine  Progress Note    Patient Name: Vaibhav Vargas  MRN: [id number]  Status: IP- Inpatient   Admission Date: 6/12/2025  Length of Stay: 13  Date of Service: 06/25/2025       CC: hospital follow-up for obstructive CAD       SUBJECTIVE   "74 y.o. male with PMH CVA 03/2025, CAD s/p LAD stent 2017, venous insuffiency, HTN, 1st Degree AVB, NIDDM2 who initially presented to [facility] ED on 05/23 by daughter from NH for weakness.  He was found to have dislodged fowler catheter with severe PRIYA and UTI, fowler was replaced, he had good urinary output with significant improvement in renal function. He was also getting diureses for CHF exacerbation, workup showed EF of 35-40%, a thrombus was incidentally found in his right atrium. He was started on full dose Lovenox the day after. He continued to be in a fib with RVR despite BB, CCB were being avoided due to low EF, cardioversion was avoided due to existing thrombus that was confirmed on YO. On 06/01 he had significant drop in his hemoglobin with worsening RVR, Lovenox was held, EGD on 06/02 showed non-bleeding ulcers with active oozing of blood from his gastric mucosa, GI did not recommend to hold anticoagulation due to risk for embolization. On 06/04 the patient became hypotensive with A flutter and RVR, hemoglobin dropped to 4.7, Lovenox was held again and massive transfusion was initiated, the patient was upgraded to the ICU due to hemodynamic instability. Lovenox was started once again on 06/06 and he was downgraded from the ICU on 06/08.  Patient bled again with an acute drop in hemoglobin on 06/11 and was noted to have melena.  He was transferred to our facility for GI services. Prior to transferred found to have a retroperitoneal bleed.      Patient was also in Afib with RVR. He was seen by [facility] team and started on IV amiodarone and later po metoprolol. His H&H was closely monitored. He was continued on PPI. Hb stabilized. He was eating well. He had emma LE " "weakness from a previous CVA. He was making a slow clinical recovery.      He was followed by [facility] team and LHC done and showed   Findings:  1. Left main-Patent  2. Lad-70% calcified proximal stenosis, Mid stent is 2.5 mm stent in  a   3.5-4mm vessel.  MSA distal stent edge is 3.9mm2  3. LCX-50% mid lesion  4. Ramus-40% mid  5. RCA-30% proximal calcified stenosis  6. LVEDP-11  7. LIMA patent     Recommended:  1. Consult CTS for LIMA to LAD, ANGELIA ligation, MAZE, and possible surgical   extraction of prominent eustachian valve with thrombus.   2. If turned down he will likely need atherectomy and shockwave for   underexpanded stent, and repeat stenting of the prox to mid LAD."     "CT surgery do not recommended surgery, instead requested CIS to perform high risk PCI."     Cardiology holding off on high-risk PCI.   Patient continued with dizziness and remained in Afib; Cardiology felt symptomatology related to this, but would need to be anticoagulated to perform DCCV.  We restarted AC with FD LOVENOX.  Repeat CT of the abdomen noted retroperitoneal bleed was stable, slightly smaller than previous.  H&H remained stable.  However Hemoglobin subsequently dropped significantly overnight from 10.2 to 7.3.  Stat CT of the retroperitoneum noted mild increasing size of his prior retroperitoneal bleed, though no active extravasation could be identified.  Underwent upper endoscopy with GI 6/23 noting large duodenal ulcer with a visible bleeding vessel treated with clip/epi/hemo spray.  Additionally IR was consulted, and patient now underwent GDA embolization 6/24.  Patient s/t 3 units PRBCs total with Hgb rising from 7.3 to 8.6.       Today: Patient seen and examined at bedside, and chart reviewed.  H&H stable at this point.  though patient remains off AC.      MEDICATIONS   Scheduled   [START ON 6/26/2025] amiodarone  200 mg Oral Daily    clopidogreL  75 mg Oral Daily    DAPTOmycin (CUBICIN) IV (PEDS and ADULTS)  500 mg " Intravenous Q48H    metoprolol tartrate  100 mg Oral BID    pantoprazole  40 mg Intravenous BID    sucralfate  1 g Oral QID (AC & HS)     Continuous Infusions  None      PHYSICAL EXAM   VITALS: T 97.4 °F (36.3 °C)   /66   P 100   RR 20   O2 95 %    GENERAL: Awake and in NAD  LUNGS: CTA anteriorly  CVS: Normal rate, irregularly irregular rhythm  GI/: Soft, NT, bowel sounds positive.  EXTREMITIES: radial pulse 2+  NEURO: AAOx3  PSYCH: Cooperative      LABS   CBC  Recent Labs     06/24/25  0219 06/24/25  0513 06/25/25  0403 06/25/25  1200   WBC 14.35*  --  14.63*  --    RBC 2.88*  --  2.64*  --    HGB 8.6*   < > 7.9* 7.6*   HCT 28.2*   < > 25.1* 25.3*   MCV 97.9*  --  95.1*  --    MCH 29.9  --  29.9  --    MCHC 30.5*  --  31.5*  --    RDW 18.6*  --  18.7*  --      --  241  --     < > = values in this interval not displayed.     CHEM  Recent Labs     06/24/25  0513 06/25/25  0403   * 155*   K 4.7 4.2   CO2 23 22*   BUN 60.9* 58.8*   CREATININE 1.95* 1.99*   CALCIUM 8.9 8.9   ALBUMIN 2.1* 2.3*   GLOBULIN 2.7 3.1   ALKPHOS 40 39*   ALT 9 9   AST 9* 13   BILITOT 0.7 0.6       MICROBIOLOGY     Microbiology Results (last 7 days)       ** No results found for the last 168 hours. **            ASSESSMENT   Acute blood loss anemia  Retroperitoneal bleeding, stable  GI bleed secondary to duodenal ulcer  Afib with RVR   Obstructive CAD, not a candidate for CABG  ARF/ATN, improved   ? Right atrial thrombus   Pulmonary Embolism   Left foot osteomyelitis   Chronic systolic heart failure  Morbid obesity   NIDDM II  Essential HTN     h/o CVA 03/2025, CAD s/p LAD stent 2017, 1° AVB,    PLAN   AC will remain on hold  monitoring H&H  CM working on LTAC placement should patient be stable  Contionue BB/amiodarone for Afib, adjsutments per Cards  Cardiology also holding off on high-risk PCI for now  Continue Plavix/statin   Otherwise continue current management and close monitoring, including telemetry  IV daptomycin  for left foot osteomyelitis as previously scheduled until July 14th; continue concomitant wound care      Prophylaxis:  FD Lovenox as above        Bereket Manrique MD  [facility] Medicine

## 2025-06-27 LAB
ALLENS TEST BLOOD GAS (OHS): ABNORMAL
ANION GAP SERPL CALC-SCNC: 7 MEQ/L
BASE EXCESS BLD CALC-SCNC: 1.7 MMOL/L (ref -2–2)
BASOPHILS # BLD AUTO: 0.01 X10(3)/MCL
BASOPHILS NFR BLD AUTO: 0.1 %
BLOOD GAS SAMPLE TYPE (OHS): ABNORMAL
BUN SERPL-MCNC: 36.8 MG/DL (ref 8.4–25.7)
CA-I BLD-SCNC: 1.18 MMOL/L (ref 1.12–1.23)
CALCIUM SERPL-MCNC: 8.6 MG/DL (ref 8.8–10)
CHLORIDE SERPL-SCNC: 123 MMOL/L (ref 98–107)
CO2 BLDA-SCNC: 26.7 MMOL/L
CO2 SERPL-SCNC: 25 MMOL/L (ref 23–31)
COHGB MFR BLDA: 2.5 % (ref 0.5–1.5)
CREAT SERPL-MCNC: 1.48 MG/DL (ref 0.72–1.25)
CREAT/UREA NIT SERPL: 25
DRAWN BY BLOOD GAS (OHS): ABNORMAL
EOSINOPHIL # BLD AUTO: 0.03 X10(3)/MCL (ref 0–0.9)
EOSINOPHIL NFR BLD AUTO: 0.3 %
ERYTHROCYTE [DISTWIDTH] IN BLOOD BY AUTOMATED COUNT: 19.7 % (ref 11.5–17)
GFR SERPLBLD CREATININE-BSD FMLA CKD-EPI: 49 ML/MIN/1.73/M2
GLUCOSE SERPL-MCNC: 149 MG/DL (ref 82–115)
HCO3 BLDA-SCNC: 25.6 MMOL/L (ref 22–26)
HCT VFR BLD AUTO: 26.5 % (ref 42–52)
HGB BLD-MCNC: 7.8 G/DL (ref 14–18)
IMM GRANULOCYTES # BLD AUTO: 0.2 X10(3)/MCL (ref 0–0.04)
IMM GRANULOCYTES NFR BLD AUTO: 1.8 %
LPM (OHS): 3
LYMPHOCYTES # BLD AUTO: 0.67 X10(3)/MCL (ref 0.6–4.6)
LYMPHOCYTES NFR BLD AUTO: 6 %
MCH RBC QN AUTO: 30 PG (ref 27–31)
MCHC RBC AUTO-ENTMCNC: 29.4 G/DL (ref 33–36)
MCV RBC AUTO: 101.9 FL (ref 80–94)
METHGB MFR BLDA: 0.9 % (ref 0.4–1.5)
MONOCYTES # BLD AUTO: 1.24 X10(3)/MCL (ref 0.1–1.3)
MONOCYTES NFR BLD AUTO: 11.2 %
NEUTROPHILS # BLD AUTO: 8.94 X10(3)/MCL (ref 2.1–9.2)
NEUTROPHILS NFR BLD AUTO: 80.6 %
NRBC BLD AUTO-RTO: 3.3 %
O2 HB BLOOD GAS (OHS): 94.3 % (ref 94–97)
OXYGEN DEVICE BLOOD GAS (OHS): ABNORMAL
OXYHGB MFR BLDA: 8 G/DL (ref 12–16)
PCO2 BLDA: 36 MMHG (ref 35–45)
PH BLDA: 7.46 [PH] (ref 7.35–7.45)
PLATELET # BLD AUTO: 230 X10(3)/MCL (ref 130–400)
PMV BLD AUTO: 10.6 FL (ref 7.4–10.4)
PO2 BLDA: 70 MMHG (ref 80–100)
POTASSIUM BLOOD GAS (OHS): 3.2 MMOL/L (ref 3.5–5)
POTASSIUM SERPL-SCNC: 3.6 MMOL/L (ref 3.5–5.1)
RBC # BLD AUTO: 2.6 X10(6)/MCL (ref 4.7–6.1)
RBCS: NORMAL
SAMPLE SITE BLOOD GAS (OHS): ABNORMAL
SAO2 % BLDA: 94.7 %
SODIUM BLOOD GAS (OHS): 154 MMOL/L (ref 137–145)
SODIUM SERPL-SCNC: 155 MMOL/L (ref 136–145)
WBC # BLD AUTO: 11.09 X10(3)/MCL (ref 4.5–11.5)

## 2025-06-27 PROCEDURE — 63600175 PHARM REV CODE 636 W HCPCS

## 2025-06-27 PROCEDURE — 97164 PT RE-EVAL EST PLAN CARE: CPT

## 2025-06-27 PROCEDURE — 27000221 HC OXYGEN, UP TO 24 HOURS

## 2025-06-27 PROCEDURE — 97530 THERAPEUTIC ACTIVITIES: CPT | Mod: CO

## 2025-06-27 PROCEDURE — 85025 COMPLETE CBC W/AUTO DIFF WBC: CPT | Performed by: INTERNAL MEDICINE

## 2025-06-27 PROCEDURE — 99900031 HC PATIENT EDUCATION (STAT)

## 2025-06-27 PROCEDURE — 27000207 HC ISOLATION

## 2025-06-27 PROCEDURE — 36569 INSJ PICC 5 YR+ W/O IMAGING: CPT

## 2025-06-27 PROCEDURE — C1751 CATH, INF, PER/CENT/MIDLINE: HCPCS

## 2025-06-27 PROCEDURE — 25000003 PHARM REV CODE 250: Performed by: INTERNAL MEDICINE

## 2025-06-27 PROCEDURE — 36415 COLL VENOUS BLD VENIPUNCTURE: CPT | Performed by: INTERNAL MEDICINE

## 2025-06-27 PROCEDURE — 97550 CAREGIVER TRAING 1ST 30 MIN: CPT

## 2025-06-27 PROCEDURE — 80048 BASIC METABOLIC PNL TOTAL CA: CPT | Performed by: INTERNAL MEDICINE

## 2025-06-27 PROCEDURE — 99900035 HC TECH TIME PER 15 MIN (STAT)

## 2025-06-27 PROCEDURE — 11000001 HC ACUTE MED/SURG PRIVATE ROOM

## 2025-06-27 PROCEDURE — 63600175 PHARM REV CODE 636 W HCPCS: Performed by: INTERNAL MEDICINE

## 2025-06-27 PROCEDURE — 92611 MOTION FLUOROSCOPY/SWALLOW: CPT

## 2025-06-27 RX ORDER — DEXTROSE MONOHYDRATE 50 MG/ML
INJECTION, SOLUTION INTRAVENOUS CONTINUOUS
Status: DISCONTINUED | OUTPATIENT
Start: 2025-06-27 | End: 2025-06-27

## 2025-06-27 RX ORDER — FUROSEMIDE 10 MG/ML
20 INJECTION INTRAMUSCULAR; INTRAVENOUS ONCE
Status: COMPLETED | OUTPATIENT
Start: 2025-06-27 | End: 2025-06-27

## 2025-06-27 RX ORDER — METOPROLOL TARTRATE 1 MG/ML
5 INJECTION, SOLUTION INTRAVENOUS
Status: DISCONTINUED | OUTPATIENT
Start: 2025-06-27 | End: 2025-06-28

## 2025-06-27 RX ORDER — METOPROLOL TARTRATE 1 MG/ML
5 INJECTION, SOLUTION INTRAVENOUS EVERY 6 HOURS
Status: DISCONTINUED | OUTPATIENT
Start: 2025-06-28 | End: 2025-06-27

## 2025-06-27 RX ORDER — FUROSEMIDE 10 MG/ML
20 INJECTION INTRAMUSCULAR; INTRAVENOUS ONCE
Status: DISCONTINUED | OUTPATIENT
Start: 2025-06-27 | End: 2025-06-27

## 2025-06-27 RX ADMIN — DIGOXIN 125 MCG: 0.25 INJECTION INTRAMUSCULAR; INTRAVENOUS at 12:06

## 2025-06-27 RX ADMIN — DEXTROSE MONOHYDRATE: 50 INJECTION, SOLUTION INTRAVENOUS at 11:06

## 2025-06-27 RX ADMIN — PANTOPRAZOLE SODIUM 40 MG: 40 INJECTION, POWDER, FOR SOLUTION INTRAVENOUS at 09:06

## 2025-06-27 RX ADMIN — LEUCINE, PHENYLALANINE, LYSINE, METHIONINE, ISOLEUCINE, VALINE, HISTIDINE, THREONINE, TRYPTOPHAN, ALANINE, GLYCINE, ARGININE, PROLINE, SERINE, TYROSINE, SODIUM ACETATE, DIBASIC POTASSIUM PHOSPHATE, MAGNESIUM CHLORIDE, SODIUM CHLORIDE, CALCIUM CHLORIDE, DEXTROSE
880; 489; 33; 5; 438; 204; 255; 311; 247; 51; 170; 238; 261; 289; 213; 297; 77; 179; 77; 17; 247 INJECTION INTRAVENOUS at 04:06

## 2025-06-27 RX ADMIN — PANTOPRAZOLE SODIUM 40 MG: 40 INJECTION, POWDER, FOR SOLUTION INTRAVENOUS at 10:06

## 2025-06-27 RX ADMIN — DIGOXIN 125 MCG: 0.25 INJECTION INTRAMUSCULAR; INTRAVENOUS at 05:06

## 2025-06-27 RX ADMIN — METOPROLOL TARTRATE 5 MG: 1 INJECTION, SOLUTION INTRAVENOUS at 09:06

## 2025-06-27 RX ADMIN — FUROSEMIDE 20 MG: 10 INJECTION, SOLUTION INTRAMUSCULAR; INTRAVENOUS at 11:06
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
## 2025-06-27 NOTE — PLAN OF CARE
Problem: Adult Inpatient Plan of Care  Goal: Plan of Care Review  Outcome: Progressing  Goal: Patient-Specific Goal (Individualized)  Outcome: Progressing  Goal: Absence of Hospital-Acquired Illness or Injury  Outcome: Progressing  Goal: Optimal Comfort and Wellbeing  Outcome: Progressing  Goal: Readiness for Transition of Care  Outcome: Progressing     Problem: Sepsis/Septic Shock  Goal: Optimal Coping  Outcome: Progressing  Goal: Absence of Bleeding  Outcome: Progressing

## 2025-06-27 NOTE — PLAN OF CARE
Problem: Adult Inpatient Plan of Care  Goal: Plan of Care Review  Outcome: Progressing  Goal: Patient-Specific Goal (Individualized)  Outcome: Progressing  Goal: Absence of Hospital-Acquired Illness or Injury  Outcome: Progressing  Goal: Optimal Comfort and Wellbeing  Outcome: Progressing  Goal: Readiness for Transition of Care  Outcome: Progressing     Problem: Diabetes Comorbidity  Goal: Blood Glucose Level Within Targeted Range  Outcome: Progressing     Problem: Sepsis/Septic Shock  Goal: Optimal Coping  Outcome: Progressing  Goal: Absence of Bleeding  Outcome: Progressing  Goal: Blood Glucose Level Within Targeted Range  Outcome: Progressing  Goal: Absence of Infection Signs and Symptoms  Outcome: Progressing  Goal: Optimal Nutrition Intake  Outcome: Progressing     Problem: Wound  Goal: Optimal Coping  Outcome: Progressing  Goal: Optimal Functional Ability  Outcome: Progressing  Goal: Absence of Infection Signs and Symptoms  Outcome: Progressing  Goal: Improved Oral Intake  Outcome: Progressing  Goal: Optimal Pain Control and Function  Outcome: Progressing  Goal: Skin Health and Integrity  Outcome: Progressing  Goal: Optimal Wound Healing  Outcome: Progressing     Problem: Skin Injury Risk Increased  Goal: Skin Health and Integrity  Outcome: Progressing     Problem: Fall Injury Risk  Goal: Absence of Fall and Fall-Related Injury  Outcome: Progressing
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
## 2025-06-27 NOTE — PT/OT/SLP PROGRESS
Occupational Therapy   Treatment    Name: Vaibhav Vargas  MRN: [id number]    Recommendations:     Recommended therapy intensity at discharge: Moderate Intensity Therapy   Discharge Equipment Recommendations:  to be determined by next level of care  Barriers to discharge:       Assessment:     Vaibhav Vargas is a 74 y.o. male with a medical diagnosis of Retroperitoneal hematoma.f retroperitoneal bleed, GIB, R atrial thrombus, PE, enterococcus faecium, cystitis, L foot OM, PRIYA, AF RVR, HF.   He presents with limited activity tolerance and endurance, pt. Dizziness throughout session however vitals WNL. Recommending Mod intensity therapy pending progress. Performance deficits affecting function are weakness, impaired self care skills, impaired functional mobility, impaired balance, impaired endurance.       Rehab Prognosis:  Fair; patient would benefit from acute skilled OT services to address these deficits and reach maximum level of function.       Plan:     Patient to be seen 3 x/week to address the above listed problems via self-care/home management, therapeutic activities, therapeutic exercises  Plan of Care Expires: 07/17/25  Plan of Care Reviewed with: patient    Subjective   No pain per pt. Alert, cooperative    Objective:     Communicated with: RN prior to session.  Patient found HOB elevated with   upon OT entry to room.    General Precautions: Standard, aspiration    Orthopedic Precautions:N/A  Braces: N/A  Respiratory Status: Nasal cannula, flow 2 L/min  Vital Signs: Blood Pressure: 129/75  Sp02: 89-93%     Occupational Performance:   (Bed Mobility-Max A)   (Sitting balance- Max A progressing to Mod A) Posterior lean noted) Pt. Performing grooming task (washing face) using R UE for excursion to face on command.   Pt. dizzy with upright position. Requesting to lay down several times, pt. Laid back down, BM noted (bloody stool) RN notified. Extensive pericare required, pt. Left with RN, repositioned with all  needs within reach.  Therapeutic Positioning    OT interventions performed during the course of today's session in an effort to prevent and/or reduce acquired pressure injuries:   Therapeutic positioning was provided at the conclusion of session to offload all bony prominences for the prevention and/or reduction of pressure injuries      [facility] 6 Click ADL:      Co-Treatment: No    Patient Education:  Patient provided with verbal education education regarding fall prevention, safety awareness, and pressure ulcer prevention.  Additional teaching is warranted.      Patient left HOB elevated with all lines intact and call button in reach.    GOALS:   Multidisciplinary Problems       Occupational Therapy Goals          Problem: Occupational Therapy    Goal Priority Disciplines Outcome Interventions   Occupational Therapy Goal     OT, PT/OT Progressing    Description: Goals to be met by 7/17/25    Pt will demonstrate feeding himself seated at recliner chair/WC with setup.  Pt will complete grooming seated at sink at WC level with SBA.  Pt will complete UB dressing with SBA.  Pt will complete sit>stand with min A with RW for prepare for BSC transfer.  Pt will toilet with moderate assistance for clothing management and hygiene at BS.  Pt will engage/tolerate BUE general therex x 10 minutes to increase activity tolerance seated EOB.  Pt will tolerate static standing x 1 minute to assist with ADL tasks          Problem: Occupational Therapy    Goal Priority Disciplines Outcome Interventions   Occupational Therapy Goal     OT, PT/OT                         Time Tracking:     OT Date of Treatment: 06/27/25  OT Start Time: 0907  OT Stop Time: 0930  OT Total Time (min): 23 min    Billable Minutes:Therapeutic Activity 2    OTR/L readily available for conference at the time of the provision of services: DANIEL Hurley  OT/TETE: TETE     Number of TETE visits since last OT visit: 3    6/27/2025

## 2025-06-28 LAB
ANION GAP SERPL CALC-SCNC: 6 MEQ/L
BUN SERPL-MCNC: 34.9 MG/DL (ref 8.4–25.7)
CALCIUM SERPL-MCNC: 8.4 MG/DL (ref 8.8–10)
CHLORIDE SERPL-SCNC: 124 MMOL/L (ref 98–107)
CO2 SERPL-SCNC: 25 MMOL/L (ref 23–31)
CREAT SERPL-MCNC: 1.29 MG/DL (ref 0.72–1.25)
CREAT/UREA NIT SERPL: 27
GFR SERPLBLD CREATININE-BSD FMLA CKD-EPI: 58 ML/MIN/1.73/M2
GLUCOSE SERPL-MCNC: 169 MG/DL (ref 82–115)
MAGNESIUM SERPL-MCNC: 2.2 MG/DL (ref 1.6–2.6)
POTASSIUM SERPL-SCNC: 3.6 MMOL/L (ref 3.5–5.1)
SODIUM SERPL-SCNC: 155 MMOL/L (ref 136–145)

## 2025-06-28 PROCEDURE — 25000003 PHARM REV CODE 250: Performed by: INTERNAL MEDICINE

## 2025-06-28 PROCEDURE — 99900031 HC PATIENT EDUCATION (STAT)

## 2025-06-28 PROCEDURE — 27000221 HC OXYGEN, UP TO 24 HOURS

## 2025-06-28 PROCEDURE — 25000003 PHARM REV CODE 250: Performed by: NURSE PRACTITIONER

## 2025-06-28 PROCEDURE — 99900035 HC TECH TIME PER 15 MIN (STAT)

## 2025-06-28 PROCEDURE — 63600175 PHARM REV CODE 636 W HCPCS: Performed by: INTERNAL MEDICINE

## 2025-06-28 PROCEDURE — 83735 ASSAY OF MAGNESIUM: CPT | Performed by: NURSE PRACTITIONER

## 2025-06-28 PROCEDURE — 94760 N-INVAS EAR/PLS OXIMETRY 1: CPT

## 2025-06-28 PROCEDURE — 63600175 PHARM REV CODE 636 W HCPCS

## 2025-06-28 PROCEDURE — 80048 BASIC METABOLIC PNL TOTAL CA: CPT | Performed by: NURSE PRACTITIONER

## 2025-06-28 PROCEDURE — 36415 COLL VENOUS BLD VENIPUNCTURE: CPT | Performed by: NURSE PRACTITIONER

## 2025-06-28 PROCEDURE — 27000207 HC ISOLATION

## 2025-06-28 PROCEDURE — 11000001 HC ACUTE MED/SURG PRIVATE ROOM

## 2025-06-28 RX ORDER — ATORVASTATIN CALCIUM 40 MG/1
40 TABLET, FILM COATED ORAL DAILY
Status: DISCONTINUED | OUTPATIENT
Start: 2025-06-29 | End: 2025-07-01 | Stop reason: HOSPADM

## 2025-06-28 RX ORDER — ACETAMINOPHEN 650 MG/20.3ML
650 LIQUID ORAL EVERY 4 HOURS PRN
Status: DISCONTINUED | OUTPATIENT
Start: 2025-06-28 | End: 2025-07-01 | Stop reason: HOSPADM

## 2025-06-28 RX ADMIN — DAPTOMYCIN 500 MG: 500 INJECTION, POWDER, LYOPHILIZED, FOR SOLUTION INTRAVENOUS at 05:06

## 2025-06-28 RX ADMIN — METOPROLOL TARTRATE 125 MG: 50 TABLET, FILM COATED ORAL at 08:06

## 2025-06-28 RX ADMIN — LEUCINE, PHENYLALANINE, LYSINE, METHIONINE, ISOLEUCINE, VALINE, HISTIDINE, THREONINE, TRYPTOPHAN, ALANINE, GLYCINE, ARGININE, PROLINE, SERINE, TYROSINE, SODIUM ACETATE, DIBASIC POTASSIUM PHOSPHATE, MAGNESIUM CHLORIDE, SODIUM CHLORIDE, CALCIUM CHLORIDE, DEXTROSE
880; 489; 33; 5; 438; 204; 255; 311; 247; 51; 170; 238; 261; 289; 213; 297; 77; 179; 77; 17; 247 INJECTION INTRAVENOUS at 11:06

## 2025-06-28 RX ADMIN — PANTOPRAZOLE SODIUM 40 MG: 40 INJECTION, POWDER, FOR SOLUTION INTRAVENOUS at 09:06

## 2025-06-28 RX ADMIN — PANTOPRAZOLE SODIUM 40 MG: 40 INJECTION, POWDER, FOR SOLUTION INTRAVENOUS at 08:06

## 2025-06-28 RX ADMIN — SUCRALFATE 1 G: 1 TABLET ORAL at 08:06

## 2025-06-28 RX ADMIN — CLOPIDOGREL 75 MG: 75 TABLET ORAL at 12:06

## 2025-06-28 RX ADMIN — ACETAMINOPHEN 650 MG: 650 SOLUTION ORAL at 06:06

## 2025-06-28 RX ADMIN — METOPROLOL TARTRATE 5 MG: 1 INJECTION, SOLUTION INTRAVENOUS at 03:06

## 2025-06-28 RX ADMIN — METOPROLOL TARTRATE 5 MG: 1 INJECTION, SOLUTION INTRAVENOUS at 09:06

## 2025-06-28 NOTE — PROGRESS NOTES
[facility] Hospital Medicine Progress Note        Chief Complaint: Inpatient Follow-up     HPI:   74 y.o. male with PMHx of recent  CVA on 03/2025 on ASA/Plavix, CAD s/p LAD stent 2017, venous insuffiency, HTN, 1st Degree AVB, NIDDM2 who initially presented to [facility] ED on 05/23 by daughter from SNF  for generalized weakness. He was found to have dislodged fowler catheter with severe PRIYA and UTI, fowler was replaced, he had good urinary output with significant improvement in renal function. He was also getting diureses for CHF exacerbation, workup showed EF of 35-40%, a thrombus was incidentally found in his right atrium. He was started on full dose Lovenox. He continued to be in a fib with RVR despite BB. CCB were being avoided due to low EF, cardioversion was avoided due to existing thrombus that was confirmed on YO. On 06/01 he had significant drop in his hemoglobin with worsening RVR, Lovenox was held, EGD on 06/02 showed non-bleeding ulcers with active oozing of blood from his gastric mucosa, GI did not recommend to hold anticoagulation due to risk for embolization. On 06/04 the patient became hypotensive with A flutter and RVR, hemoglobin dropped to 4.7, Lovenox was held again and massive transfusion was initiated, the patient was upgraded to the ICU due to hemodynamic instability. Lovenox was started once again on 06/06 and he was downgraded from the ICU on 06/08.  GI consulted on 6/13 for AC clearance given recent GI bleed. Pt was cleared from GI stand point to restart AC. On 6/23 Pt with onset of melena and anemia. EGD on 6/23 showed large duodenal ulcer with visible vessel with clot and oozing treated with clip, epi and hemospray with temporary hemostasis and subsequently IR was consulted for embolization due to high risk of re bleed. On 6/24 underwent IR GDA embolization. Of note on CTA abd pelvis on 6/12 noted to have Rt retroperitoneal hematoma. General Surgery was consulted  "and no surgical intervention recommended.       Patient was followed by [facility] for  Afib with RVR. He was initially started on IV amiodarone and later po metoprolol. His H&H was closely monitored. He was continued on PPI. Hb stabilized. Cleared by SLP for minced and moist diet and medication crushed in puree as of 6/20/25.     LHC done on 6/18/25 showed -   1. Left main-Patent  2. Lad-70% calcified proximal stenosis, Mid stent is 2.5 mm stent in  a   3.5-4mm vessel.  MSA distal stent edge is 3.9mm2  3. LCX-50% mid lesion  4. Ramus-40% mid  5. RCA-30% proximal calcified stenosis  6. LVEDP-11  7. LIMA patent    Recommended:  1. Consult CTS for LIMA to LAD, ANGELIA ligation, MAZE, and possible surgical   extraction of prominent eustachian valve with thrombus.   2. If turned down he will likely need atherectomy and shockwave for   underexpanded stent, and repeat stenting of the prox to mid LAD."      CT surgery do not recommended surgery, instead requested [facility] to perform high risk PCI. Cardiology holding off on high-risk PCI.  Patient continued with dizziness and remained in Afib; Cardiology felt symptomatology related to uncontrolled A.fib but would need to be anticoagulated to perform DCCV.  We restarted AC with FD LOVENOX.  Repeat CT of the abdomen noted retroperitoneal bleed was stable, slightly smaller than previous.  H&H initially stable then  subsequently dropped significantly overnight from 10.2 to 7.3.  Stat CT of the retroperitoneum noted mild increasing size of his prior retroperitoneal bleed, though no active extravasation could be identified. Patient s/t 3 units PRBCs with Hgb rising from 7.3 to 8.6.    Additional issue that has been addressed during  this admission include Left foot great toe ulcer with osteomyelitis with wound culture grew MRSA. ID was consulted and recommended daptomycin 500 mg q48h until July 14.     As of 6/26/25, no evidence of overt bleed in the last few days with Hgb staying stable at 7.3 " to 7.8. Plavix re started on 6/20/25. On 6/26/25 Pt with new onset difficulty swallowing with evidence of chocking on modified diet. SLP evaluated Pt and place NPO except medication. MBS performed on 6/27/25 showed severe bolus holding unable to initiate swallow. Ct head done showed  evidence of multiple old infarcts both cerebellar hemisphere and extensive microvascular ischemic changes reducing the sensitivity to exclude acute infarct. SLP recommended strict NPO for any enteral intake. Noted to develop hypernatremia and PRIYA. D5 infusion utilized with some improvement of PRIYA however noted to develop some resp  distress as of 6/27/25. Pt's overall clinical condition and poor prognosis discussed with Pt's daughter at bedside. Family elected DNR for code status. Hospice transition also discussed. Daughter will discuss this option with other family members. Interim Pt was started on Clinimix and medication changed to parenteral as able        Interval Hx:   Pt with new onset severe oropharyngeal dysphagia since yesterday . CT head today with acute infarction can not be excluded entirely.  S/P MBS study today.  SLP recommended strict NPO   All oral meds held and transitioned to parenteral as able .  Spoke to daughter regarding Pt's overall extreme  poor prognosis with high risk vascular event in the setting of A.fib and high risk bleeding with AC treatment.  Daughter elected DNR for code status       Case was discussed with patient's nurse and  on the floor.    Objective/physical exam:  General: In no acute distress, afebrile, ill looking, on oxymask   Chest: Clear to auscultation bilaterally  Heart: irregular rhythm +S1, S2, no appreciable murmur  Abdomen: Soft, nontender, BS +  MSK: (+) venous stasis dermatitis , 1+ edema , wound dressings to left foot  Neurologic: Alert and oriented  to self and person.      VITAL SIGNS: 24 HRS MIN & MAX LAST   Temp  Min: 97.6 °F (36.4 °C)  Max: 98.2 °F (36.8 °C) 98.1 °F  (36.7 °C)   BP  Min: 113/65  Max: 138/77 122/72   Pulse  Min: 61  Max: 97  83   Resp  Min: 15  Max: 20 20   SpO2  Min: 94 %  Max: 100 % 100 %     I have reviewed the following labs:  Recent Labs   Lab 06/25/25  0403 06/25/25  1200 06/26/25  0502 06/27/25  0638   WBC 14.63*  --  12.56* 11.09   RBC 2.64*  --  2.64* 2.60*   HGB 7.9* 7.6* 7.8* 7.8*   HCT 25.1* 25.3* 26.3* 26.5*   MCV 95.1*  --  99.6* 101.9*   MCH 29.9  --  29.5 30.0   MCHC 31.5*  --  29.7* 29.4*   RDW 18.7*  --  18.9* 19.7*     --  252 230   MPV 9.6  --  10.2 10.6*     Recent Labs   Lab 06/24/25  0513 06/25/25  0403 06/26/25  0502 06/27/25  0635 06/27/25  1622   * 155* 157* 155*  --    K 4.7 4.2 3.9 3.6  --    * 125* 127* 123*  --    CO2 23 22* 23 25  --    BUN 60.9* 58.8* 48.0* 36.8*  --    CREATININE 1.95* 1.99* 1.69* 1.48*  --    * 146* 128* 149*  --    CALCIUM 8.9 8.9 9.1 8.6*  --    PH  --   --   --   --  7.460*   ALBUMIN 2.1* 2.3* 2.4*  --   --    PROT 4.8* 5.4* 5.8  --   --    ALKPHOS 40 39* 45  --   --    ALT 9 9 10  --   --    AST 9* 13 19  --   --    BILITOT 0.7 0.6 0.7  --   --      Microbiology Results (last 7 days)       ** No results found for the last 168 hours. **             See below for Radiology    Assessment/Plan:  Recurrent Acute blood loss anemia due to upper GI bleed with Duodenal ulcer required multiple EGD with intervention and multiple blood transfusions, POA  Retroperitoneal bleeding, POA  Persistent Afib with RVR, POA  NSTEMI, type 2 in the setting of A.fib  Obstructive native CAD, not a candidate for CABG, POA  Acute on chronic systolic HF, LVEF 30-40%, POA  Acute kidney injury , POA- improved   Right atrial thrombus, POA  LLL Pulmonary Embolism, POA   Left foot osteomyelitis on IV Daptomycin   Severe Oropharyngeal dysphagia -6/25/25  NIDDM II  Essential HTN  DNR status      Hx- CVA 03/2025, CAD s/p LAD stent 2017, 1° AVB.     Plan-  Pt with extremely poor prognosis at this time  given presence of  high risk of thromboembolic event with persistent A.fib and concurrent high risk of bleeding with AC.   Additionally now with new onset severe oropharyngeal dysphagia with high suspicion for acute cerebrovascular event.   AC remains on hold   Strict NPO status presently as of 6/27/25  Cardiology holding of high risk PCI  Daughter elected DNR status   Hospice transition discussed   Family yet to discuss   Start Clinimix in interim   Change medication to parenteral as able   Monitor course         VTE prophylaxis: SCDs    Patient condition:  poor prognosis    Anticipated discharge and Disposition:     TBD    All diagnosis and differential diagnosis have been reviewed; assessment and plan has been documented; I have personally reviewed the labs and test results that are presently available; I have reviewed the patients medication list; I have reviewed the consulting providers response and recommendations. I have reviewed or attempted to review medical records based upon their availability    All of the patient's questions have been  addressed and answered. Patient's is agreeable to the above stated plan. I will continue to monitor closely and make adjustments to medical management as needed.    Portions of this note dictated using EMR integrated voice recognition software, and may be subject to voice recognition errors not corrected at proofreading. Please contact writer for clarification if needed.   _____________________________________________________________________    Malnutrition Status:  Nutrition consulted. Most recent weight and BMI monitored-     Measurements:  Wt Readings from Last 1 Encounters:   06/27/25 114.8 kg (253 lb)   Body mass index is 32.48 kg/m².    Patient has been screened and assessed by RD.    Malnutrition Type:  Context: acute illness or injury  Level: moderate    Malnutrition Characteristic Summary:  Weight Loss (Malnutrition): other (see comments) (Does not meet criteria)  Energy Intake  (Malnutrition): other (see comments) (Does not meet criteria)  Subcutaneous Fat (Malnutrition): mild depletion  Muscle Mass (Malnutrition): mild depletion    Interventions/Recommendations (treatment strategy):        Scheduled Med:   clopidogreL  75 mg Oral Daily    DAPTOmycin (CUBICIN) IV (PEDS and ADULTS)  500 mg Intravenous Q48H    furosemide (LASIX) injection  20 mg Intravenous Once    [START ON 6/28/2025] metoprolol  5 mg Intravenous Q6H    metoprolol tartrate  125 mg Oral BID    pantoprazole  40 mg Intravenous BID    sucralfate  1 g Oral QID (AC & HS)      Continuous Infusions:   Amino acid 4.25% - dextrose 5% (CLINIMIX-E) solution (1L provides 42.5 gm AA, 50 gm CHO (170 kcal/L dextrose), Na 35, K 30, Mg 5, Ca 4.5, Acetate 70, Cl 39, Phos 15)   Intravenous Continuous 50 mL/hr at 06/27/25 1602 New Bag at 06/27/25 1602      PRN Meds:    Current Facility-Administered Medications:     0.9%  NaCl infusion (for blood administration), , Intravenous, Q24H PRN    0.9%  NaCl infusion (for blood administration), , Intravenous, Q24H PRN    0.9%  NaCl infusion (for blood administration), , Intravenous, Q24H PRN    0.9%  NaCl infusion (for blood administration), , Intravenous, Q24H PRN    0.9%  NaCl infusion (for blood administration), , Intravenous, Q24H PRN    0.9%  NaCl infusion (for blood administration), , Intravenous, Q24H PRN    acetaminophen, 650 mg, Oral, Q4H PRN    albuterol-ipratropium, 3 mL, Nebulization, Q6H PRN    aluminum-magnesium hydroxide-simethicone, 30 mL, Oral, QID PRN    bisacodyL, 10 mg, Rectal, Daily PRN    dextrose 50%, 12.5 g, Intravenous, PRN    dextrose 50%, 25 g, Intravenous, PRN    glucagon (human recombinant), 1 mg, Intramuscular, PRN    glucose, 16 g, Oral, PRN    glucose, 24 g, Oral, PRN    hydrALAZINE, 10 mg, Intravenous, Q4H PRN    HYDROcodone-acetaminophen, 1 tablet, Oral, Q6H PRN    melatonin, 9 mg, Oral, Nightly PRN    metoprolol, 5 mg, Intravenous, Q15 Min PRN    morphine, 2 mg,  Intravenous, Q4H PRN    naloxone, 0.02 mg, Intravenous, PRN    ondansetron, 8 mg, Oral, Q8H PRN    ondansetron, 4 mg, Intravenous, Q8H PRN    polyethylene glycol, 17 g, Oral, TID PRN    simethicone, 1 tablet, Oral, QID PRN    sodium chloride 0.9%, 10 mL, Intravenous, PRN           Beka Servin MD  Department of Hospital Medicine   [facility]   06/27/2025

## 2025-06-28 NOTE — PROGRESS NOTES
[facility] Hospital Medicine Progress Note        Chief Complaint: Inpatient Follow-up GI bleed    HPI:   74 y.o. male with PMHx of recent  CVA on 03/2025 on ASA/Plavix, CAD s/p LAD stent 2017, venous insuffiency, HTN, 1st Degree AVB, NIDDM2 who initially presented to [facility] ED on 05/23 by daughter from SNF  for generalized weakness. He was found to have dislodged fowler catheter with severe PRIYA and UTI, fowler was replaced, he had good urinary output with significant improvement in renal function. He was also getting diureses for CHF exacerbation, workup showed EF of 35-40%, a thrombus was incidentally found in his right atrium. He was started on full dose Lovenox. He continued to be in a fib with RVR despite BB. CCB were being avoided due to low EF, cardioversion was avoided due to existing thrombus that was confirmed on YO. On 06/01 he had significant drop in his hemoglobin with worsening RVR, Lovenox was held, EGD on 06/02 showed non-bleeding ulcers with active oozing of blood from his gastric mucosa, GI did not recommend to hold anticoagulation due to risk for embolization. On 06/04 the patient became hypotensive with A flutter and RVR, hemoglobin dropped to 4.7, Lovenox was held again and massive transfusion was initiated, the patient was upgraded to the ICU due to hemodynamic instability. Lovenox was started once again on 06/06 and he was downgraded from the ICU on 06/08.  GI consulted on 6/13 for AC clearance given recent GI bleed. Pt was cleared from GI stand point to restart AC. On 6/23 Pt with onset of melena and anemia. EGD on 6/23 showed large duodenal ulcer with visible vessel with clot and oozing treated with clip, epi and hemospray with temporary hemostasis and subsequently IR was consulted for embolization due to high risk of re bleed. On 6/24 underwent IR GDA embolization. Of note on CTA abd pelvis on 6/12 noted to have Rt retroperitoneal hematoma. General Surgery was  "consulted and no surgical intervention recommended.         Patient was followed by [facility] for  Afib with RVR. He was initially started on IV amiodarone and later po metoprolol. His H&H was closely monitored. He was continued on PPI. Hb stabilized. Cleared by SLP for minced and moist diet and medication crushed in puree as of 6/20/25.      LHC done on 6/18/25 showed -   1. Left main-Patent  2. Lad-70% calcified proximal stenosis, Mid stent is 2.5 mm stent in  a   3.5-4mm vessel.  MSA distal stent edge is 3.9mm2  3. LCX-50% mid lesion  4. Ramus-40% mid  5. RCA-30% proximal calcified stenosis  6. LVEDP-11  7. LIMA patent     Recommended:  1. Consult CTS for LIMA to LAD, ANGELIA ligation, MAZE, and possible surgical   extraction of prominent eustachian valve with thrombus.   2. If turned down he will likely need atherectomy and shockwave for   underexpanded stent, and repeat stenting of the prox to mid LAD."      CT surgery do not recommended surgery, instead requested [facility] to perform high risk PCI. Cardiology holding off on high-risk PCI.  Patient continued with dizziness and remained in Afib; Cardiology felt symptomatology related to uncontrolled A.fib but would need to be anticoagulated to perform DCCV.  We restarted AC with FD LOVENOX.  Repeat CT of the abdomen noted retroperitoneal bleed was stable, slightly smaller than previous.  H&H initially stable then  subsequently dropped significantly overnight from 10.2 to 7.3.  Stat CT of the retroperitoneum noted mild increasing size of his prior retroperitoneal bleed, though no active extravasation could be identified. Patient s/t 3 units PRBCs with Hgb rising from 7.3 to 8.6.      Additional issue that has been addressed during  this admission include Left foot great toe ulcer with osteomyelitis with wound culture grew MRSA. ID was consulted and recommended daptomycin 500 mg q48h until July 14.      As of 6/26/25, no evidence of overt bleed in the last few days with Hgb " staying stable at 7.3 to 7.8. Plavix re started on 6/20/25. On 6/26/25 Pt with new onset difficulty swallowing with evidence of chocking on modified diet. SLP evaluated Pt and place NPO except medication with plan for  MBS  on 6/27/25.    Noted to develop hypernatremia and PRIYA. D5 infusion utilized with some improvement of PRIYA       Interval Hx:   Pt was seen at bedside. No family member is around at this time. He was awake , oriented ti self, poor understanding of disease and current clinical condition .    Case was discussed with patient's nurse and  on the floor.    Objective/physical exam:  General: In no acute distress, afebrile, ill looking, on oxymask   Chest: Clear to auscultation bilaterally  Heart: irregular rhythm +S1, S2, no appreciable murmur  Abdomen: Soft, nontender, BS +  MSK: (+) venous stasis dermatitis , 1+ edema , wound dressings to left foot  Neurologic: Alert and oriented  to self and person.      VITAL SIGNS: 24 HRS MIN & MAX LAST   Temp  Min: 97.6 °F (36.4 °C)  Max: 98.3 °F (36.8 °C) 98.3 °F (36.8 °C)   BP  Min: 105/50  Max: 138/77 (!) 105/50   Pulse  Min: 61  Max: 130  (!) 130   Resp  Min: 15  Max: 20 20   SpO2  Min: 94 %  Max: 100 % 100 %     I have reviewed the following labs:  Recent Labs   Lab 06/25/25  0403 06/25/25  1200 06/26/25  0502 06/27/25  0638   WBC 14.63*  --  12.56* 11.09   RBC 2.64*  --  2.64* 2.60*   HGB 7.9* 7.6* 7.8* 7.8*   HCT 25.1* 25.3* 26.3* 26.5*   MCV 95.1*  --  99.6* 101.9*   MCH 29.9  --  29.5 30.0   MCHC 31.5*  --  29.7* 29.4*   RDW 18.7*  --  18.9* 19.7*     --  252 230   MPV 9.6  --  10.2 10.6*     Recent Labs   Lab 06/24/25  0513 06/25/25  0403 06/26/25  0502 06/27/25  0635 06/27/25  1622   * 155* 157* 155*  --    K 4.7 4.2 3.9 3.6  --    * 125* 127* 123*  --    CO2 23 22* 23 25  --    BUN 60.9* 58.8* 48.0* 36.8*  --    CREATININE 1.95* 1.99* 1.69* 1.48*  --    * 146* 128* 149*  --    CALCIUM 8.9 8.9 9.1 8.6*  --    PH  --    --   --   --  7.460*   ALBUMIN 2.1* 2.3* 2.4*  --   --    PROT 4.8* 5.4* 5.8  --   --    ALKPHOS 40 39* 45  --   --    ALT 9 9 10  --   --    AST 9* 13 19  --   --    BILITOT 0.7 0.6 0.7  --   --      Microbiology Results (last 7 days)       ** No results found for the last 168 hours. **             See below for Radiology    Assessment/Plan:  Recurrent Acute blood loss anemia due to upper GI bleed with Duodenal ulcer required multiple EGD with intervention and multiple blood transfusions, POA  Retroperitoneal bleeding, POA  Persistent Afib with RVR, POA  NSTEMI, type 2 in the setting of A.fib  Obstructive native CAD, not a candidate for CABG, POA  Acute on chronic systolic HF, LVEF 30-40%, POA  Acute kidney injury , POA- improved   Right atrial thrombus, POA  LLL Pulmonary Embolism, POA   Left foot osteomyelitis on IV Daptomycin   Severe Oropharyngeal dysphagia -6/25/25  NIDDM II  Essential HTN  DNR status      Hx- CVA 03/2025, CAD s/p LAD stent 2017, 1° AVB.     Plan-   NPO except meds crushed in puree  MBS study tomorrow   AC will remain on hold   Monitor H&H and signs of active bleeding   Continue PPI.  Cardiology today stopped Amiodarone and increased Metoprolol to 125 mg bid   Cardiac intervention once bleeding issues resolves     VTE prophylaxis: SCDs    Patient condition:  poor prognosis     Anticipated discharge and Disposition:     TBD    All diagnosis and differential diagnosis have been reviewed; assessment and plan has been documented; I have personally reviewed the labs and test results that are presently available; I have reviewed the patients medication list; I have reviewed the consulting providers response and recommendations. I have reviewed or attempted to review medical records based upon their availability    All of the patient's questions have been  addressed and answered. Patient's is agreeable to the above stated plan. I will continue to monitor closely and make adjustments to medical  management as needed.    Portions of this note dictated using EMR integrated voice recognition software, and may be subject to voice recognition errors not corrected at proofreading. Please contact writer for clarification if needed.   _____________________________________________________________________    Malnutrition Status:  Nutrition consulted. Most recent weight and BMI monitored-     Measurements:  Wt Readings from Last 1 Encounters:   06/27/25 114.8 kg (253 lb)   Body mass index is 32.48 kg/m².    Patient has been screened and assessed by RD.    Malnutrition Type:  Context: acute illness or injury  Level: moderate    Malnutrition Characteristic Summary:  Weight Loss (Malnutrition): other (see comments) (Does not meet criteria)  Energy Intake (Malnutrition): other (see comments) (Does not meet criteria)  Subcutaneous Fat (Malnutrition): mild depletion  Muscle Mass (Malnutrition): mild depletion    Interventions/Recommendations (treatment strategy):        Scheduled Med:   clopidogreL  75 mg Oral Daily    DAPTOmycin (CUBICIN) IV (PEDS and ADULTS)  500 mg Intravenous Q48H    metoprolol  5 mg Intravenous Q6H    metoprolol tartrate  125 mg Oral BID    pantoprazole  40 mg Intravenous BID    sucralfate  1 g Oral QID (AC & HS)      Continuous Infusions:   Amino acid 4.25% - dextrose 5% (CLINIMIX-E) solution (1L provides 42.5 gm AA, 50 gm CHO (170 kcal/L dextrose), Na 35, K 30, Mg 5, Ca 4.5, Acetate 70, Cl 39, Phos 15)   Intravenous Continuous 50 mL/hr at 06/27/25 1602 New Bag at 06/27/25 1602      PRN Meds:    Current Facility-Administered Medications:     0.9%  NaCl infusion (for blood administration), , Intravenous, Q24H PRN    0.9%  NaCl infusion (for blood administration), , Intravenous, Q24H PRN    0.9%  NaCl infusion (for blood administration), , Intravenous, Q24H PRN    0.9%  NaCl infusion (for blood administration), , Intravenous, Q24H PRN    0.9%  NaCl infusion (for blood administration), , Intravenous,  Q24H PRN    0.9%  NaCl infusion (for blood administration), , Intravenous, Q24H PRN    acetaminophen, 650 mg, Oral, Q4H PRN    albuterol-ipratropium, 3 mL, Nebulization, Q6H PRN    aluminum-magnesium hydroxide-simethicone, 30 mL, Oral, QID PRN    bisacodyL, 10 mg, Rectal, Daily PRN    dextrose 50%, 12.5 g, Intravenous, PRN    dextrose 50%, 25 g, Intravenous, PRN    glucagon (human recombinant), 1 mg, Intramuscular, PRN    glucose, 16 g, Oral, PRN    glucose, 24 g, Oral, PRN    hydrALAZINE, 10 mg, Intravenous, Q4H PRN    HYDROcodone-acetaminophen, 1 tablet, Oral, Q6H PRN    melatonin, 9 mg, Oral, Nightly PRN    metoprolol, 5 mg, Intravenous, Q15 Min PRN    morphine, 2 mg, Intravenous, Q4H PRN    naloxone, 0.02 mg, Intravenous, PRN    ondansetron, 8 mg, Oral, Q8H PRN    ondansetron, 4 mg, Intravenous, Q8H PRN    polyethylene glycol, 17 g, Oral, TID PRN    simethicone, 1 tablet, Oral, QID PRN    sodium chloride 0.9%, 10 mL, Intravenous, PRN         Beka Servin MD  Department of Hospital Medicine   [facility]   06/26/2025

## 2025-06-28 NOTE — PROGRESS NOTES
[facility] Hospital Medicine Progress Note        Chief Complaint: Inpatient Follow-up GI bleed     HPI:     74 y.o. male with PMHx of recent  CVA on 03/2025 on ASA/Plavix, CAD s/p LAD stent 2017, venous insuffiency, HTN, 1st Degree AVB, NIDDM2 who initially presented to [facility] ED on 05/23 by daughter from SNF  for generalized weakness. He was found to have dislodged fowler catheter with severe PRIYA and UTI, fowler was replaced, he had good urinary output with significant improvement in renal function. He was also treated with diuretic for CHF exacerbation, workup showed EF of 35-40%, a thrombus was incidentally found in his right atrium. He was started on full dose Lovenox. He continued to be in a fib with RVR despite BB. CCB were being avoided due to low EF, cardioversion was avoided due to existing thrombus that was confirmed on YO. On 06/01 he had significant drop in his hemoglobin with worsening RVR, Lovenox was held, EGD on 06/02 showed non-bleeding ulcers with active oozing of blood from his gastric mucosa, GI did not recommend to hold anticoagulation due to risk for embolization. On 06/04 the patient became hypotensive with A flutter and RVR, hemoglobin dropped to 4.7, Lovenox was held again and massive transfusion was initiated, the patient was upgraded to the ICU due to hemodynamic instability. Lovenox was started once again on 06/06 and he was downgraded from the ICU on 06/08.  GI consulted on 6/13 for AC clearance given recent GI bleed. Pt was cleared from GI stand point to restart AC. On 6/23 Pt with onset of melena and anemia. EGD on 6/23 showed large duodenal ulcer with visible vessel with clot and oozing treated with clip, epi and hemospray with temporary hemostasis and subsequently IR was consulted for embolization due to high risk of re bleed. On 6/24 underwent IR GDA embolization. Of note on CTA abd pelvis on 6/12 noted to have Rt retroperitoneal hematoma. General Surgery  "was consulted and no surgical intervention recommended.         Patient was followed by [facility] for  Afib with RVR. He was initially started on IV amiodarone and later po metoprolol. His H&H was closely monitored. He was continued on PPI. Hb stabilized. Cleared by SLP for minced and moist diet and medication crushed in puree as of 6/20/25.      LHC done on 6/18/25 showed -   1. Left main-Patent  2. Lad-70% calcified proximal stenosis, Mid stent is 2.5 mm stent in  a   3.5-4mm vessel.  MSA distal stent edge is 3.9mm2  3. LCX-50% mid lesion  4. Ramus-40% mid  5. RCA-30% proximal calcified stenosis  6. LVEDP-11  7. LIMA patent     Recommended:  1. Consult CTS for LIMA to LAD, ANGELIA ligation, MAZE, and possible surgical   extraction of prominent eustachian valve with thrombus.   2. If turned down he will likely need atherectomy and shockwave for   underexpanded stent, and repeat stenting of the prox to mid LAD."      CT surgery do not recommended surgery, instead requested [facility] to perform high risk PCI. Cardiology holding off on high-risk PCI.  Patient continued with dizziness and remained in Afib; Cardiology felt symptomatology related to uncontrolled A.fib but would need to be anticoagulated to perform DCCV.  We restarted AC with FD LOVENOX.  Repeat CT of the abdomen noted retroperitoneal bleed was stable, slightly smaller than previous.  H&H initially stable then  subsequently dropped significantly overnight from 10.2 to 7.3.  Stat CT of the retroperitoneum noted mild increasing size of his prior retroperitoneal bleed, though no active extravasation could be identified. Patient s/t 3 units PRBCs with Hgb rising from 7.3 to 8.6. AC discontinued. .     Additional issue that has been addressed during  this admission include Left foot great toe ulcer with osteomyelitis with wound culture grew MRSA. ID was consulted and recommended daptomycin 500 mg q48h until July 14.      As of 6/26/25, no evidence of overt bleed in the last " few days with Hgb staying stable at 7.3 to 7.8. Plavix re started on 6/20/25. On 6/26/25 Pt with new onset difficulty swallowing with evidence of chocking on modified diet. SLP evaluated Pt and place NPO except medication. MBS performed on 6/27/25 showed severe bolus holding unable to initiate swallow. Ct head done showed  evidence of multiple old infarcts both cerebellar hemisphere and extensive microvascular ischemic changes reducing the sensitivity to exclude acute infarct. SLP recommended strict NPO for any enteral intake. Noted to develop hypernatremia and PRIYA. D5 infusion utilized with some improvement of PRIYA however noted to develop some resp  distress as of 6/27/25. Pt's overall clinical condition and poor prognosis discussed with Pt's daughter at bedside. Family elected DNR for code status. Hospice transition also discussed. Daughter will discuss this option with other family members. Interim Pt was started on Clinimix and medication changed to parenteral as able.    MRI brain on 6/28 showed minimal acute infarct involving left corona radiata in the periventricular location, and left frontal lobe subacute lacunar infarcts.          Interval Hx:   MRI brain today confirmed left corona radiata small infarct and left frontal lobe subacute lacunar infarcts.   Pt is awake, interact, follows commands.   Remains with dysphagia and RLE weakness.     Rate currently not controlled with IV Metoprolol.  Will reach out to [facility] for additional recs.     Case was discussed with patient's nurse and  on the floor.    Objective/physical exam:  General: In no acute distress, afebrile, ill looking, on oxymask   Chest: Clear to auscultation bilaterally  Heart: irregular rhythm +S1, S2, no appreciable murmur  Abdomen: Soft, nontender, BS +  MSK: (+) venous stasis dermatitis , 1+ edema , wound dressings to left foot  Neurologic: Alert and oriented  to self and person.RLE weakness         VITAL SIGNS: 24 HRS MIN & MAX  LAST   Temp  Min: 98.1 °F (36.7 °C)  Max: 100.3 °F (37.9 °C) 98.5 °F (36.9 °C)   BP  Min: 98/57  Max: 126/60 116/77   Pulse  Min: 61  Max: 130  (!) 113   Resp  Min: 18  Max: 20 18   SpO2  Min: 96 %  Max: 100 % 99 %     I have reviewed the following labs:  Recent Labs   Lab 06/25/25  0403 06/25/25  1200 06/26/25  0502 06/27/25  0638   WBC 14.63*  --  12.56* 11.09   RBC 2.64*  --  2.64* 2.60*   HGB 7.9* 7.6* 7.8* 7.8*   HCT 25.1* 25.3* 26.3* 26.5*   MCV 95.1*  --  99.6* 101.9*   MCH 29.9  --  29.5 30.0   MCHC 31.5*  --  29.7* 29.4*   RDW 18.7*  --  18.9* 19.7*     --  252 230   MPV 9.6  --  10.2 10.6*     Recent Labs   Lab 06/24/25  0513 06/25/25  0403 06/26/25  0502 06/27/25  0635 06/27/25  1622 06/28/25  0545   * 155* 157* 155*  --  155*   K 4.7 4.2 3.9 3.6  --  3.6   * 125* 127* 123*  --  124*   CO2 23 22* 23 25  --  25   BUN 60.9* 58.8* 48.0* 36.8*  --  34.9*   CREATININE 1.95* 1.99* 1.69* 1.48*  --  1.29*   * 146* 128* 149*  --  169*   CALCIUM 8.9 8.9 9.1 8.6*  --  8.4*   PH  --   --   --   --  7.460*  --    MG  --   --   --   --   --  2.20   ALBUMIN 2.1* 2.3* 2.4*  --   --   --    PROT 4.8* 5.4* 5.8  --   --   --    ALKPHOS 40 39* 45  --   --   --    ALT 9 9 10  --   --   --    AST 9* 13 19  --   --   --    BILITOT 0.7 0.6 0.7  --   --   --      Microbiology Results (last 7 days)       ** No results found for the last 168 hours. **             See below for Radiology    Assessment/Plan:  New onset acute stroke involving left corona radiata and left frontal lobe cluster of subacute infarcts- 6/26/25  New onset severe Oropharyngeal dysphagia due to above  Recurrent Acute blood loss anemia due to upper GI bleed with Duodenal ulcer required multiple EGD with intervention and multiple blood transfusions, POA  Retroperitoneal bleeding, POA  Persistent Afib with RVR, POA  NSTEMI, type 2 in the setting of A.fib  Obstructive native CAD, not a candidate for CABG, POA  Acute on chronic systolic  HF, LVEF 30-40%, POA  Acute kidney injury , POA- improved   Right atrial thrombus, POA  LLL Pulmonary Embolism, POA   Left foot osteomyelitis on IV Daptomycin   Hypernatremia due to free water deficit-6/21/24  NIDDM II  Essential HTN  DNR status      Hx- CVA 03/2025, CAD s/p LAD stent 2017, 1° AVB.      Plan-  Pt with extremely poor prognosis at this time  given presence of high risk of thromboembolic event with persistent A.fib and concurrent high risk of bleeding with AC.   AC remains on hold   Strict NPO status presently as of 6/27/25  Hospice transition discussed with family.   Family yet to determine   Option of PEG discussed. Family yet to determine.  Currently rate is not controlled with IV Metoprolol   Will discuss with daughter for NG tube insertion and re start oral medication.   Cardiology holding of high risk PCI  Daughter elected DNR status   Start Clinimix in interim   Change medication to parenteral as able   Monitor course         VTE prophylaxis: SCDs     Patient condition:  poor prognosis     Anticipated discharge and Disposition:     TBD      All diagnosis and differential diagnosis have been reviewed; assessment and plan has been documented; I have personally reviewed the labs and test results that are presently available; I have reviewed the patients medication list; I have reviewed the consulting providers response and recommendations. I have reviewed or attempted to review medical records based upon their availability    All of the patient's questions have been  addressed and answered. Patient's is agreeable to the above stated plan. I will continue to monitor closely and make adjustments to medical management as needed.    Portions of this note dictated using EMR integrated voice recognition software, and may be subject to voice recognition errors not corrected at proofreading. Please contact writer for clarification if needed.    _____________________________________________________________________    Malnutrition Status:  Nutrition consulted. Most recent weight and BMI monitored-     Measurements:  Wt Readings from Last 1 Encounters:   06/27/25 114.8 kg (253 lb)   Body mass index is 32.48 kg/m².    Patient has been screened and assessed by RD.    Malnutrition Type:  Context: acute illness or injury  Level: moderate    Malnutrition Characteristic Summary:  Weight Loss (Malnutrition): other (see comments) (Does not meet criteria)  Energy Intake (Malnutrition): other (see comments) (Does not meet criteria)  Subcutaneous Fat (Malnutrition): mild depletion  Muscle Mass (Malnutrition): mild depletion    Interventions/Recommendations (treatment strategy):        Scheduled Med:   clopidogreL  75 mg Oral Daily    DAPTOmycin (CUBICIN) IV (PEDS and ADULTS)  500 mg Intravenous Q48H    metoprolol  5 mg Intravenous Q6H    metoprolol tartrate  125 mg Oral BID    pantoprazole  40 mg Intravenous BID    sucralfate  1 g Oral QID (AC & HS)      Continuous Infusions:   Amino acid 4.25% - dextrose 5% (CLINIMIX-E) solution (1L provides 42.5 gm AA, 50 gm CHO (170 kcal/L dextrose), Na 35, K 30, Mg 5, Ca 4.5, Acetate 70, Cl 39, Phos 15)   Intravenous Continuous 50 mL/hr at 06/28/25 1121 New Bag at 06/28/25 1121      PRN Meds:    Current Facility-Administered Medications:     0.9%  NaCl infusion (for blood administration), , Intravenous, Q24H PRN    0.9%  NaCl infusion (for blood administration), , Intravenous, Q24H PRN    0.9%  NaCl infusion (for blood administration), , Intravenous, Q24H PRN    0.9%  NaCl infusion (for blood administration), , Intravenous, Q24H PRN    0.9%  NaCl infusion (for blood administration), , Intravenous, Q24H PRN    0.9%  NaCl infusion (for blood administration), , Intravenous, Q24H PRN    acetaminophen, 650 mg, Oral, Q4H PRN    albuterol-ipratropium, 3 mL, Nebulization, Q6H PRN    aluminum-magnesium hydroxide-simethicone, 30 mL, Oral,  QID PRN    bisacodyL, 10 mg, Rectal, Daily PRN    dextrose 50%, 12.5 g, Intravenous, PRN    dextrose 50%, 25 g, Intravenous, PRN    glucagon (human recombinant), 1 mg, Intramuscular, PRN    glucose, 16 g, Oral, PRN    glucose, 24 g, Oral, PRN    hydrALAZINE, 10 mg, Intravenous, Q4H PRN    HYDROcodone-acetaminophen, 1 tablet, Oral, Q6H PRN    melatonin, 9 mg, Oral, Nightly PRN    metoprolol, 5 mg, Intravenous, Q15 Min PRN    morphine, 2 mg, Intravenous, Q4H PRN    naloxone, 0.02 mg, Intravenous, PRN    ondansetron, 8 mg, Oral, Q8H PRN    ondansetron, 4 mg, Intravenous, Q8H PRN    polyethylene glycol, 17 g, Oral, TID PRN    simethicone, 1 tablet, Oral, QID PRN    sodium chloride 0.9%, 10 mL, Intravenous, PRN         Beka Servin MD  Department of Hospital Medicine   [facility]   06/28/2025
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
## 2025-06-29 LAB
ALBUMIN SERPL-MCNC: 2.1 G/DL (ref 3.4–4.8)
ALBUMIN/GLOB SERPL: 0.6 RATIO (ref 1.1–2)
ALP SERPL-CCNC: 41 UNIT/L (ref 40–150)
ALT SERPL-CCNC: 14 UNIT/L (ref 0–55)
ANION GAP SERPL CALC-SCNC: 6 MEQ/L
AST SERPL-CCNC: 25 UNIT/L (ref 11–45)
BASOPHILS # BLD AUTO: 0.01 X10(3)/MCL
BASOPHILS NFR BLD AUTO: 0.1 %
BILIRUB SERPL-MCNC: 0.7 MG/DL
BUN SERPL-MCNC: 36.8 MG/DL (ref 8.4–25.7)
CALCIUM SERPL-MCNC: 8.6 MG/DL (ref 8.8–10)
CHLORIDE SERPL-SCNC: 124 MMOL/L (ref 98–107)
CO2 SERPL-SCNC: 26 MMOL/L (ref 23–31)
CREAT SERPL-MCNC: 1.32 MG/DL (ref 0.72–1.25)
CREAT/UREA NIT SERPL: 28
EOSINOPHIL # BLD AUTO: 0.03 X10(3)/MCL (ref 0–0.9)
EOSINOPHIL NFR BLD AUTO: 0.3 %
ERYTHROCYTE [DISTWIDTH] IN BLOOD BY AUTOMATED COUNT: 20 % (ref 11.5–17)
GFR SERPLBLD CREATININE-BSD FMLA CKD-EPI: 57 ML/MIN/1.73/M2
GLOBULIN SER-MCNC: 3.5 GM/DL (ref 2.4–3.5)
GLUCOSE SERPL-MCNC: 158 MG/DL (ref 82–115)
HCT VFR BLD AUTO: 26.1 % (ref 42–52)
HGB BLD-MCNC: 7.6 G/DL (ref 14–18)
IMM GRANULOCYTES # BLD AUTO: 0.25 X10(3)/MCL (ref 0–0.04)
IMM GRANULOCYTES NFR BLD AUTO: 2.1 %
LYMPHOCYTES # BLD AUTO: 0.63 X10(3)/MCL (ref 0.6–4.6)
LYMPHOCYTES NFR BLD AUTO: 5.4 %
MAGNESIUM SERPL-MCNC: 2.4 MG/DL (ref 1.6–2.6)
MCH RBC QN AUTO: 30 PG (ref 27–31)
MCHC RBC AUTO-ENTMCNC: 29.1 G/DL (ref 33–36)
MCV RBC AUTO: 103.2 FL (ref 80–94)
MONOCYTES # BLD AUTO: 0.66 X10(3)/MCL (ref 0.1–1.3)
MONOCYTES NFR BLD AUTO: 5.6 %
NEUTROPHILS # BLD AUTO: 10.13 X10(3)/MCL (ref 2.1–9.2)
NEUTROPHILS NFR BLD AUTO: 86.5 %
NRBC BLD AUTO-RTO: 1.3 %
OHS QRS DURATION: 144 MS
OHS QTC CALCULATION: 457 MS
PLATELET # BLD AUTO: 202 X10(3)/MCL (ref 130–400)
PMV BLD AUTO: 10.6 FL (ref 7.4–10.4)
POTASSIUM SERPL-SCNC: 3.6 MMOL/L (ref 3.5–5.1)
PROT SERPL-MCNC: 5.6 GM/DL (ref 5.8–7.6)
RBC # BLD AUTO: 2.53 X10(6)/MCL (ref 4.7–6.1)
SODIUM SERPL-SCNC: 156 MMOL/L (ref 136–145)
WBC # BLD AUTO: 11.71 X10(3)/MCL (ref 4.5–11.5)

## 2025-06-29 PROCEDURE — 83735 ASSAY OF MAGNESIUM: CPT | Performed by: NURSE PRACTITIONER

## 2025-06-29 PROCEDURE — 25000003 PHARM REV CODE 250: Performed by: STUDENT IN AN ORGANIZED HEALTH CARE EDUCATION/TRAINING PROGRAM

## 2025-06-29 PROCEDURE — 85025 COMPLETE CBC W/AUTO DIFF WBC: CPT | Performed by: NURSE PRACTITIONER

## 2025-06-29 PROCEDURE — 25000003 PHARM REV CODE 250: Performed by: INTERNAL MEDICINE

## 2025-06-29 PROCEDURE — 63600175 PHARM REV CODE 636 W HCPCS

## 2025-06-29 PROCEDURE — 36415 COLL VENOUS BLD VENIPUNCTURE: CPT | Performed by: NURSE PRACTITIONER

## 2025-06-29 PROCEDURE — 27000207 HC ISOLATION

## 2025-06-29 PROCEDURE — 80053 COMPREHEN METABOLIC PANEL: CPT | Performed by: NURSE PRACTITIONER

## 2025-06-29 PROCEDURE — 63600175 PHARM REV CODE 636 W HCPCS: Performed by: STUDENT IN AN ORGANIZED HEALTH CARE EDUCATION/TRAINING PROGRAM

## 2025-06-29 PROCEDURE — 93005 ELECTROCARDIOGRAM TRACING: CPT

## 2025-06-29 PROCEDURE — 25000003 PHARM REV CODE 250: Performed by: NURSE PRACTITIONER

## 2025-06-29 PROCEDURE — 11000001 HC ACUTE MED/SURG PRIVATE ROOM

## 2025-06-29 RX ORDER — CLOPIDOGREL BISULFATE 75 MG/1
75 TABLET ORAL DAILY
Status: DISCONTINUED | OUTPATIENT
Start: 2025-06-30 | End: 2025-07-01 | Stop reason: HOSPADM

## 2025-06-29 RX ORDER — SUCRALFATE 1 G/1
1 TABLET ORAL 4 TIMES DAILY
Status: DISCONTINUED | OUTPATIENT
Start: 2025-06-29 | End: 2025-07-01 | Stop reason: HOSPADM

## 2025-06-29 RX ADMIN — CLOPIDOGREL 75 MG: 75 TABLET ORAL at 10:06

## 2025-06-29 RX ADMIN — LEUCINE, PHENYLALANINE, LYSINE, METHIONINE, ISOLEUCINE, VALINE, HISTIDINE, THREONINE, TRYPTOPHAN, ALANINE, GLYCINE, ARGININE, PROLINE, SERINE, TYROSINE, SODIUM ACETATE, DIBASIC POTASSIUM PHOSPHATE, MAGNESIUM CHLORIDE, SODIUM CHLORIDE, CALCIUM CHLORIDE, DEXTROSE
880; 489; 33; 5; 438; 204; 255; 311; 247; 51; 170; 238; 261; 289; 213; 297; 77; 179; 77; 17; 247 INJECTION INTRAVENOUS at 12:06

## 2025-06-29 RX ADMIN — ATORVASTATIN CALCIUM 40 MG: 40 TABLET, FILM COATED ORAL at 10:06

## 2025-06-29 RX ADMIN — SUCRALFATE 1 G: 1 TABLET ORAL at 05:06

## 2025-06-29 RX ADMIN — ONDANSETRON 4 MG: 2 INJECTION INTRAMUSCULAR; INTRAVENOUS at 08:06

## 2025-06-29 RX ADMIN — METOPROLOL TARTRATE 125 MG: 50 TABLET, FILM COATED ORAL at 10:06

## 2025-06-29 RX ADMIN — PANTOPRAZOLE SODIUM 40 MG: 40 INJECTION, POWDER, FOR SOLUTION INTRAVENOUS at 08:06

## 2025-06-29 RX ADMIN — SUCRALFATE 1 G: 1 TABLET ORAL at 08:06

## 2025-06-29 RX ADMIN — PANTOPRAZOLE SODIUM 40 MG: 40 INJECTION, POWDER, FOR SOLUTION INTRAVENOUS at 10:06

## 2025-06-29 RX ADMIN — Medication 9 MG: at 08:06

## 2025-06-29 RX ADMIN — METOPROLOL TARTRATE 125 MG: 50 TABLET, FILM COATED ORAL at 08:06

## 2025-06-29 NOTE — PROGRESS NOTES
[facility] Hospital Medicine Progress Note        Chief Complaint: Inpatient Follow-up for GI bleed    HPI:   74 y.o. male with PMHx of recent  CVA on 03/2025 on ASA/Plavix, CAD s/p LAD stent 2017, venous insuffiency, HTN, 1st Degree AVB, NIDDM2 who initially presented to [facility] ED on 05/23 by daughter from SNF  for generalized weakness. He was found to have dislodged fowler catheter with severe PRIYA and UTI, fowler was replaced, he had good urinary output with significant improvement in renal function. He was also treated with diuretic for CHF exacerbation, workup showed EF of 35-40%, a thrombus was incidentally found in his right atrium. He was started on full dose Lovenox. He continued to be in a fib with RVR despite BB. CCB were being avoided due to low EF, cardioversion was avoided due to existing thrombus that was confirmed on YO. On 06/01 he had significant drop in his hemoglobin with worsening RVR, Lovenox was held, EGD on 06/02 showed non-bleeding ulcers with active oozing of blood from his gastric mucosa, GI did not recommend to hold anticoagulation due to risk for embolization. On 06/04 the patient became hypotensive with A flutter and RVR, hemoglobin dropped to 4.7, Lovenox was held again and massive transfusion was initiated, the patient was upgraded to the ICU due to hemodynamic instability. Lovenox was started once again on 06/06 and he was downgraded from the ICU on 06/08.  GI consulted on 6/13 for AC clearance given recent GI bleed. Pt was cleared from GI stand point to restart AC. On 6/23 Pt with onset of melena and anemia. EGD on 6/23 showed large duodenal ulcer with visible vessel with clot and oozing treated with clip, epi and hemospray with temporary hemostasis and subsequently IR was consulted for embolization due to high risk of re bleed. On 6/24 underwent IR GDA embolization. Of note on CTA abd pelvis on 6/12 noted to have Rt retroperitoneal hematoma. General  "Surgery was consulted and no surgical intervention recommended.         Patient was followed by [facility] for  Afib with RVR. He was initially started on IV amiodarone and later po metoprolol. His H&H was closely monitored. He was continued on PPI. Hb stabilized. Cleared by SLP for minced and moist diet and medication crushed in puree as of 6/20/25.      LHC done on 6/18/25 showed -   1. Left main-Patent  2. Lad-70% calcified proximal stenosis, Mid stent is 2.5 mm stent in  a   3.5-4mm vessel.  MSA distal stent edge is 3.9mm2  3. LCX-50% mid lesion  4. Ramus-40% mid  5. RCA-30% proximal calcified stenosis  6. LVEDP-11  7. LIMA patent     Recommended:  1. Consult CTS for LIMA to LAD, ANGELIA ligation, MAZE, and possible surgical   extraction of prominent eustachian valve with thrombus.   2. If turned down he will likely need atherectomy and shockwave for   underexpanded stent, and repeat stenting of the prox to mid LAD."      CT surgery do not recommended surgery, instead requested [facility] to perform high risk PCI. Cardiology holding off on high-risk PCI.  Patient continued with dizziness and remained in Afib; Cardiology felt symptomatology related to uncontrolled A.fib but would need to be anticoagulated to perform DCCV.  We restarted AC with FD LOVENOX.  Repeat CT of the abdomen noted retroperitoneal bleed was stable, slightly smaller than previous.  H&H initially stable then  subsequently dropped significantly overnight from 10.2 to 7.3.  Stat CT of the retroperitoneum noted mild increasing size of his prior retroperitoneal bleed, though no active extravasation could be identified. Patient s/t 3 units PRBCs with Hgb rising from 7.3 to 8.6. AC discontinued. .     Additional issue that has been addressed during  this admission include Left foot great toe ulcer with osteomyelitis with wound culture grew MRSA. ID was consulted and recommended daptomycin 500 mg q48h until July 14.      As of 6/26/25, no evidence of overt bleed in " the last few days with Hgb staying stable at 7.3 to 7.8. Plavix re started on 6/20/25. On 6/26/25 Pt with new onset difficulty swallowing with evidence of chocking on modified diet. SLP evaluated Pt and place NPO except medication. MBS performed on 6/27/25 showed severe bolus holding unable to initiate swallow. Ct head done showed  evidence of multiple old infarcts both cerebellar hemisphere and extensive microvascular ischemic changes reducing the sensitivity to exclude acute infarct. SLP recommended strict NPO for any enteral intake. Noted to develop hypernatremia and PRIYA. D5 infusion utilized with some improvement of PRIYA however noted to develop some resp  distress as of 6/27/25. Pt's overall clinical condition and poor prognosis discussed with Pt's daughter at bedside. Family elected DNR for code status. Hospice transition also discussed. Daughter will discuss this option with other family members. Interim Pt was started on Clinimix and medication changed to parenteral as able.     MRI brain on 6/28 showed minimal acute infarct involving left corona radiata in the periventricular location, and left frontal lobe subacute lacunar infarcts.       Interval Hx:   NG tube inserted to start medication via enteral route.   RD consult requested for Tube feeds   No acute events reported overnight   Rate is better controlled currently with current enteral dose of Metoprolol  Hgb 7.6. Na still 156.   Will start free water via NG tube         Case was discussed with patient's nurse and  on the floor.    Objective/physical exam:    General: In no acute distress, afebrile, ill looking, on oxymask   Chest: Clear to auscultation bilaterally  Heart: irregular rhythm +S1, S2, no appreciable murmur  Abdomen: Soft, nontender, BS +  MSK: (+) venous stasis dermatitis , 1+ edema , wound dressings to left foot  Neurologic: Alert and oriented  to self and person.RLE weakness         VITAL SIGNS: 24 HRS MIN & MAX LAST   Temp   Min: 97.8 °F (36.6 °C)  Max: 98.6 °F (37 °C) 97.8 °F (36.6 °C)   BP  Min: 102/58  Max: 140/74 106/61   Pulse  Min: 54  Max: 113  (!) 113   Resp  Min: 20  Max: 20 20   SpO2  Min: 86 %  Max: 100 % 100 %     I have reviewed the following labs:  Recent Labs   Lab 06/26/25  0502 06/27/25  0638 06/29/25  1006   WBC 12.56* 11.09 11.71*   RBC 2.64* 2.60* 2.53*   HGB 7.8* 7.8* 7.6*   HCT 26.3* 26.5* 26.1*   MCV 99.6* 101.9* 103.2*   MCH 29.5 30.0 30.0   MCHC 29.7* 29.4* 29.1*   RDW 18.9* 19.7* 20.0*    230 202   MPV 10.2 10.6* 10.6*     Recent Labs   Lab 06/25/25  0403 06/26/25  0502 06/27/25  0635 06/27/25  1622 06/28/25  0545 06/29/25  1006   * 157* 155*  --  155* 156*   K 4.2 3.9 3.6  --  3.6 3.6   * 127* 123*  --  124* 124*   CO2 22* 23 25  --  25 26   BUN 58.8* 48.0* 36.8*  --  34.9* 36.8*   CREATININE 1.99* 1.69* 1.48*  --  1.29* 1.32*   * 128* 149*  --  169* 158*   CALCIUM 8.9 9.1 8.6*  --  8.4* 8.6*   PH  --   --   --  7.460*  --   --    MG  --   --   --   --  2.20 2.40   ALBUMIN 2.3* 2.4*  --   --   --  2.1*   PROT 5.4* 5.8  --   --   --  5.6*   ALKPHOS 39* 45  --   --   --  41   ALT 9 10  --   --   --  14   AST 13 19  --   --   --  25   BILITOT 0.6 0.7  --   --   --  0.7     Microbiology Results (last 7 days)       ** No results found for the last 168 hours. **             See below for Radiology    Assessment/Plan:  New onset acute stroke involving left corona radiata and left frontal lobe cluster of subacute infarcts- 6/26/25  New onset severe Oropharyngeal dysphagia due to above  Recurrent Acute blood loss anemia due to upper GI bleed with Duodenal ulcer required multiple EGD with intervention and multiple blood transfusions, POA  Retroperitoneal bleeding, POA  Persistent Afib with RVR, POA  NSTEMI, type 2 in the setting of A.fib  Obstructive native CAD, not a candidate for CABG, POA  Acute on chronic systolic HF, LVEF 30-40%, POA  Acute kidney injury , POA- improved   Right atrial  thrombus, POA  LLL Pulmonary Embolism, POA   Left foot osteomyelitis on IV Daptomycin   Hypernatremia due to free water deficit-6/21/24  NIDDM II  Essential HTN  DNR status      Hx- CVA 03/2025, CAD s/p LAD stent 2017, 1° AVB.      Plan-  Pt with extremely poor prognosis at this time  given presence of high risk of thromboembolic event with persistent A.fib and concurrent high risk of bleeding with AC.   AC remains on hold as well as ASA per Cards  Continue Plavix/Statin /BB via NG tube    Strict NPO status presently as of 6/27/25  Hospice transition discussed with family.   Family yet to determine   Option of PEG discussed. Family yet to determine.    Cardiology holding of high risk PCI  Daughter elected DNR status   Start NG tube feeds today   Stop Clinimix   Free water flush via NG tube to correct free water deficit and hypernatremia.   Monitor course         VTE prophylaxis: SCDs     Patient condition:  poor prognosis     Anticipated discharge and Disposition:     TBD        All diagnosis and differential diagnosis have been reviewed; assessment and plan has been documented; I have personally reviewed the labs and test results that are presently available; I have reviewed the patients medication list; I have reviewed the consulting providers response and recommendations. I have reviewed or attempted to review medical records based upon their availability    All of the patient's questions have been  addressed and answered. Patient's is agreeable to the above stated plan. I will continue to monitor closely and make adjustments to medical management as needed.    Portions of this note dictated using EMR integrated voice recognition software, and may be subject to voice recognition errors not corrected at proofreading. Please contact writer for clarification if needed.   _____________________________________________________________________    Malnutrition Status:  Nutrition consulted. Most recent weight and BMI  monitored-     Measurements:  Wt Readings from Last 1 Encounters:   06/27/25 114.8 kg (253 lb)   Body mass index is 32.48 kg/m².    Patient has been screened and assessed by RD.    Malnutrition Type:  Context: acute illness or injury  Level: moderate    Malnutrition Characteristic Summary:  Weight Loss (Malnutrition): other (see comments) (Does not meet criteria)  Energy Intake (Malnutrition): other (see comments) (Does not meet criteria)  Subcutaneous Fat (Malnutrition): mild depletion  Muscle Mass (Malnutrition): mild depletion    Interventions/Recommendations (treatment strategy):        Scheduled Med:   atorvastatin  40 mg Per NG tube Daily    clopidogreL  75 mg Oral Daily    DAPTOmycin (CUBICIN) IV (PEDS and ADULTS)  500 mg Intravenous Q48H    metoprolol tartrate  125 mg Oral BID    pantoprazole  40 mg Intravenous BID    sucralfate  1 g Oral QID (AC & HS)      Continuous Infusions:     PRN Meds:    Current Facility-Administered Medications:     0.9%  NaCl infusion (for blood administration), , Intravenous, Q24H PRN    0.9%  NaCl infusion (for blood administration), , Intravenous, Q24H PRN    0.9%  NaCl infusion (for blood administration), , Intravenous, Q24H PRN    0.9%  NaCl infusion (for blood administration), , Intravenous, Q24H PRN    0.9%  NaCl infusion (for blood administration), , Intravenous, Q24H PRN    0.9%  NaCl infusion (for blood administration), , Intravenous, Q24H PRN    acetaminophen, 650 mg, Oral, Q4H PRN    albuterol-ipratropium, 3 mL, Nebulization, Q6H PRN    aluminum-magnesium hydroxide-simethicone, 30 mL, Oral, QID PRN    bisacodyL, 10 mg, Rectal, Daily PRN    dextrose 50%, 12.5 g, Intravenous, PRN    dextrose 50%, 25 g, Intravenous, PRN    glucagon (human recombinant), 1 mg, Intramuscular, PRN    glucose, 16 g, Oral, PRN    glucose, 24 g, Oral, PRN    hydrALAZINE, 10 mg, Intravenous, Q4H PRN    HYDROcodone-acetaminophen, 1 tablet, Oral, Q6H PRN    melatonin, 9 mg, Oral, Nightly PRN     metoprolol, 5 mg, Intravenous, Q15 Min PRN    morphine, 2 mg, Intravenous, Q4H PRN    naloxone, 0.02 mg, Intravenous, PRN    ondansetron, 8 mg, Oral, Q8H PRN    ondansetron, 4 mg, Intravenous, Q8H PRN    polyethylene glycol, 17 g, Oral, TID PRN    simethicone, 1 tablet, Oral, QID PRN    sodium chloride 0.9%, 10 mL, Intravenous, PRN         Beka Servin MD  Department of Hospital Medicine   [facility]   06/29/2025

## 2025-06-29 NOTE — PROGRESS NOTES
Inpatient Nutrition Assessment    Admit Date: 6/12/2025   Total duration of encounter: 17 days   Patient Age: 74 y.o.    Nutrition Recommendation/Prescription     Diet NPO ordered, advance as medically feasible per SLP recommendations  Kavin 1 pkt bid for wound healing    Tube Feeding recs (if unable to begin oral diet)  Diabetisource AC goal rate 80ml/hr + free water flushes 60ml every 2 hrs  Provides:  1920 kcal (100% est needs)  96 gm protein (100% est needs)  1905 ml free water (100% est needs)  *based on est run time 20h/day  4.   Monitor tolerance, labs  5.   May decrease Clinimix as tube feeding rate increases    Communication of Recommendations: reviewed with nurse, reviewed with patient, and reviewed with family    Nutrition Assessment     Malnutrition Assessment/Nutrition-Focused Physical Exam    Malnutrition Context: acute illness or injury (06/18/25 1543)  Malnutrition Level: moderate (06/18/25 1543)  Energy Intake (Malnutrition): other (see comments) (Does not meet criteria) (06/18/25 1543)  Weight Loss (Malnutrition): other (see comments) (Does not meet criteria) (06/18/25 1543)  Subcutaneous Fat (Malnutrition): mild depletion (06/18/25 1543)        Thoracic and Lumbar Region: mild depletion  Muscle Mass (Malnutrition): mild depletion (06/18/25 1543)  Tenriism Region (Muscle Loss): mild depletion                                A minimum of two characteristics is recommended for diagnosis of either severe or non-severe malnutrition.    Chart Review    Reason Seen: length of stay and follow-up    Malnutrition Screening Tool Results   Have you recently lost weight without trying?: No  Have you been eating poorly because of a decreased appetite?: No   MST Score: 0   Diagnosis:  Anemia from PUD: stable   Retroperitoneal bleed: stable   Afib with RVR   ARF/ ATN: improved   Right atrial thrombus   Acute PE   Left foot osteomyelitis   Chronic systolic HF  Morbid obesity   Mello LE weakness        Relevant Medical  History:    Chronic lumbar pain      Diabetes mellitus, type 2      Edema      Hypertension      Obesity, unspecified      Osteoarthritis of multiple joints        Scheduled Medications:  atorvastatin, 40 mg, Daily  clopidogreL, 75 mg, Daily  DAPTOmycin (CUBICIN) IV (PEDS and ADULTS), 500 mg, Q48H  metoprolol tartrate, 125 mg, BID  pantoprazole, 40 mg, BID  sucralfate, 1 g, QID (AC & HS)    Continuous Infusions:  Amino acid 4.25% - dextrose 5% (CLINIMIX-E) solution (1L provides 42.5 gm AA, 50 gm CHO (170 kcal/L dextrose), Na 35, K 30, Mg 5, Ca 4.5, Acetate 70, Cl 39, Phos 15)      PRN Medications:  0.9%  NaCl infusion (for blood administration), , Q24H PRN  0.9%  NaCl infusion (for blood administration), , Q24H PRN  0.9%  NaCl infusion (for blood administration), , Q24H PRN  0.9%  NaCl infusion (for blood administration), , Q24H PRN  0.9%  NaCl infusion (for blood administration), , Q24H PRN  0.9%  NaCl infusion (for blood administration), , Q24H PRN  acetaminophen, 650 mg, Q4H PRN  albuterol-ipratropium, 3 mL, Q6H PRN  aluminum-magnesium hydroxide-simethicone, 30 mL, QID PRN  bisacodyL, 10 mg, Daily PRN  dextrose 50%, 12.5 g, PRN  dextrose 50%, 25 g, PRN  glucagon (human recombinant), 1 mg, PRN  glucose, 16 g, PRN  glucose, 24 g, PRN  hydrALAZINE, 10 mg, Q4H PRN  HYDROcodone-acetaminophen, 1 tablet, Q6H PRN  melatonin, 9 mg, Nightly PRN  metoprolol, 5 mg, Q15 Min PRN  morphine, 2 mg, Q4H PRN  naloxone, 0.02 mg, PRN  ondansetron, 8 mg, Q8H PRN  ondansetron, 4 mg, Q8H PRN  polyethylene glycol, 17 g, TID PRN  simethicone, 1 tablet, QID PRN  sodium chloride 0.9%, 10 mL, PRN    Calorie Containing IV Medications: no significant kcals from medications at this time    Recent Labs   Lab 06/23/25  0052 06/23/25  0052 06/23/25  0321 06/24/25  0219 06/24/25  0513 06/24/25  1217 06/24/25  1859 06/25/25  0000 06/25/25  0403 06/25/25  1200 06/26/25  0502 06/27/25  0635 06/27/25  0638 06/28/25  0545 06/29/25  1006   NA  --   --   148*  --  153*  --   --   --  155*  --  157* 155*  --  155* 156*   K  --   --  4.2  --  4.7  --   --   --  4.2  --  3.9 3.6  --  3.6 3.6   CALCIUM  --   --  8.4*  --  8.9  --   --   --  8.9  --  9.1 8.6*  --  8.4* 8.6*   MG  --   --   --   --   --   --   --   --   --   --   --   --   --  2.20 2.40   CL  --   --  118*  --  122*  --   --   --  125*  --  127* 123*  --  124* 124*   CO2  --   --  24  --  23  --   --   --  22*  --  23 25  --  25 26   BUN  --   --  43.4*  --  60.9*  --   --   --  58.8*  --  48.0* 36.8*  --  34.9* 36.8*   CREATININE  --   --  1.45*  --  1.95*  --   --   --  1.99*  --  1.69* 1.48*  --  1.29* 1.32*   EGFRNORACEVR  --   --  51  --  35  --   --   --  35  --  42 49  --  58 57   GLU  --    < > 169* 171* 177*  --   --   --  146*  --  128* 149*  --  169* 158*   BILITOT  --   --  0.7  --  0.7  --   --   --  0.6  --  0.7  --   --   --  0.7   ALKPHOS  --   --  49  --  40  --   --   --  39*  --  45  --   --   --  41   ALT  --   --  11  --  9  --   --   --  9  --  10  --   --   --  14   AST  --   --  13  --  9*  --   --   --  13  --  19  --   --   --  25   ALBUMIN  --   --  2.0*  --  2.1*  --   --   --  2.3*  --  2.4*  --   --   --  2.1*   WBC 13.00*  --  13.12* 14.35*  --   --   --   --  14.63*  --  12.56*  --  11.09  --  11.71*   HGB 7.5*  --  7.3* 8.6* 8.7*   < > 7.9* 7.7* 7.9* 7.6* 7.8*  --  7.8*  --  7.6*   HCT 24.6*  --  24.3* 28.2* 26.7*   < > 24.9* 24.7* 25.1* 25.3* 26.3*  --  26.5*  --  26.1*    < > = values in this interval not displayed.     Nutrition Orders:  Diet NPO  Amino acid 4.25% - dextrose 5% (CLINIMIX-E) solution (1L provides 42.5 gm AA, 50 gm CHO (170 kcal/L dextrose), Na 35, K 30, Mg 5, Ca 4.5, Acetate 70, Cl 39, Phos 15)  Dietary nutrition supplements BID; Kavin - Any flavor    Appetite/Oral Intake: NPO/NPO  Factors Affecting Nutritional Intake: texture modification  Social Needs Impacting Access to Food: none identified  Food/Yazdanism/Cultural Preferences: none reported  Food  "Allergies: none reported  Last Bowel Movement: 25  Wound(s):     Wound 25 Venous Ulcer Left anterior Foot-Tissue loss description: Full thickness       Wound 25 Pressure Injury Sacral spine-Tissue loss description: Full thickness  [REMOVED]      Wound 25 190 Pressure Injury Left dorsal Foot-Tissue loss description: Full thickness noted    Comments    2025: Pt reports a good appetite/PO intake and reports decreased appetite/PO intake since admit due to texture modification, pt on pureed diet at time of visit, now on Minced and moist. Pt denies N/V/D/C and chewing/swallowing difficulties. Pt declined ONS. Encouraged adequate PO intake. Will add ALEXIS BID to promote wound healing. Per EMR, pt weighed 115.7 kg on 3/24/2025 (7.0% wt loss in 3 months, insignificant). Last BM noted. Will monitor.    25: Per RN, pt is refusing to eat 2/2 the texture modifications of his food. Pt was evaluated by SLP yesterday and is on Minced and Moist diet with moderately thickened liquids.     2025: Pt outside of room, family member reports continued poor appetite/PO intake but denies N/V/D/C. Last BM noted. Will monitor.    2025: Pt NPO at time of visit. Pt reports coughing and choking, relayed information to nurse. Pt denies N/V/D/C. Last BM noted. Will monitor.     25 Pt NPO, failed MBS; consulted for tube feeding, recs provided. Has had poor po intake since admit x 10 days so will increase tube feeding slowly to goal rate and monitor tolerance. PPN running @ 50ml/hr.    Anthropometrics    Height: 6' 2" (188 cm), Height Method: Stated  Last Weight: 114.8 kg (253 lb) (25 0533), Weight Method: Bed Scale  BMI (Calculated): 32.5  BMI Classification: obese grade I (BMI 30-34.9)        Ideal Body Weight (IBW), Male: 190 lb     % Ideal Body Weight, Male (lb): 124.85 %                 Usual Body Weight (UBW), k.7 kg  % Usual Body Weight: 93.19     Usual Weight Provided By: " EMR weight history    Wt Readings from Last 5 Encounters:   06/27/25 114.8 kg (253 lb)   06/12/25 107.6 kg (237 lb 3.4 oz)   03/24/25 115.7 kg (255 lb)   03/26/24 128.2 kg (282 lb 11.2 oz)   03/23/24 128 kg (282 lb 3 oz)     Weight Change(s) Since Admission:   6/18/2025: no new wts  6/23/2025: no new wts  6/26/2025: no new wts  Wt Readings from Last 1 Encounters:   06/27/25 0533 114.8 kg (253 lb)   06/13/25 1044 107.6 kg (237 lb 3.4 oz)   06/13/25 0530 107.6 kg (237 lb 3.4 oz)   Admit Weight: 107.6 kg (237 lb 3.4 oz) (as per 06/12 vitals) (06/13/25 0530), Weight Method: Bed Scale    Estimated Needs    Weight Used For Calorie Calculations: 107.6 kg (237 lb 3.4 oz)  Energy Calorie Requirements (kcal): 1885--2262 (MSJ x 1.0-1.2)  Energy Need Method: [place]-St Jeor  Weight Used For Protein Calculations: 107.6 kg (237 lb 3.4 oz)  Protein Requirements:  (0.8-1.0 g/kg)  Fluid Requirements (mL): 1885 (1 mL/kcal)  CHO Requirement: 212-259 g/day (~45-55% est min kcal needs)     Enteral Nutrition     Patient not receiving enteral nutrition at this time.    Parenteral Nutrition     Standard Formula: Clinimix E 4.25/5  Custom Formula: not applicable  Additives: none  Rate/Volume: 50 ml/hr  Lipids: none  Total Nutrition Provided by Parenteral Nutrition:  Calories Provided  408 kcal/d, 22% needs   Protein Provided  51 g/d, 59% needs   Dextrose Provided  60 g/d, GIR 0.36 mg CHO/kg/min   Fluid Provided  1200 ml/d, 64% needs       Evaluation of Received Nutrient Intake    Calories: not meeting estimated needs  Protein: not meeting estimated needs    Patient Education     Not applicable.    Nutrition Diagnosis     PES: Inadequate oral intake related to acute illness as evidenced by poor PO intake for ~10 days, NPO. (active)     PES: Moderate acute disease or injury related malnutrition Related to acute illness As Evidenced by:  - muscle mass depletion: 1 area of mild muscle loss (Temporalis) - loss of subcutaneous fat: 1 area  of mild fat loss (Ribs) active    Nutrition Interventions     Intervention(s): modified composition of meals/snacks, modified rate of enteral nutrition, commercial food, and collaboration with other providers  Intervention(s):      Goal: Meet greater than 80% of nutritional needs by follow-up. (goal not met)    Nutrition Goals & Monitoring     Dietitian will monitor: energy intake, enteral nutrition intake, weight, electrolyte/renal panel, glucose/endocrine profile, and gastrointestinal profile  Discharge planning: too early to determine; pending clinical course  Nutrition Risk/Follow-Up: patient at increased nutrition risk; dietitian will follow-up twice weekly   Please consult if re-assessment needed sooner.
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
## 2025-06-30 LAB
ANION GAP SERPL CALC-SCNC: 7 MEQ/L
BASOPHILS # BLD AUTO: 0.01 X10(3)/MCL
BASOPHILS NFR BLD AUTO: 0.1 %
BUN SERPL-MCNC: 39.2 MG/DL (ref 8.4–25.7)
CALCIUM SERPL-MCNC: 8.7 MG/DL (ref 8.8–10)
CHLORIDE SERPL-SCNC: 124 MMOL/L (ref 98–107)
CO2 SERPL-SCNC: 26 MMOL/L (ref 23–31)
CREAT SERPL-MCNC: 1.28 MG/DL (ref 0.72–1.25)
CREAT/UREA NIT SERPL: 31
EOSINOPHIL # BLD AUTO: 0.05 X10(3)/MCL (ref 0–0.9)
EOSINOPHIL NFR BLD AUTO: 0.5 %
ERYTHROCYTE [DISTWIDTH] IN BLOOD BY AUTOMATED COUNT: 19.9 % (ref 11.5–17)
GFR SERPLBLD CREATININE-BSD FMLA CKD-EPI: 59 ML/MIN/1.73/M2
GLUCOSE SERPL-MCNC: 158 MG/DL (ref 82–115)
HCT VFR BLD AUTO: 29.6 % (ref 42–52)
HGB BLD-MCNC: 8.3 G/DL (ref 14–18)
IMM GRANULOCYTES # BLD AUTO: 0.15 X10(3)/MCL (ref 0–0.04)
IMM GRANULOCYTES NFR BLD AUTO: 1.4 %
LYMPHOCYTES # BLD AUTO: 0.52 X10(3)/MCL (ref 0.6–4.6)
LYMPHOCYTES NFR BLD AUTO: 4.8 %
MCH RBC QN AUTO: 29.3 PG (ref 27–31)
MCHC RBC AUTO-ENTMCNC: 28 G/DL (ref 33–36)
MCV RBC AUTO: 104.6 FL (ref 80–94)
MONOCYTES # BLD AUTO: 0.51 X10(3)/MCL (ref 0.1–1.3)
MONOCYTES NFR BLD AUTO: 4.7 %
NEUTROPHILS # BLD AUTO: 9.5 X10(3)/MCL (ref 2.1–9.2)
NEUTROPHILS NFR BLD AUTO: 88.5 %
NRBC BLD AUTO-RTO: 1 %
OHS QRS DURATION: 154 MS
OHS QTC CALCULATION: 494 MS
PLATELET # BLD AUTO: 255 X10(3)/MCL (ref 130–400)
PMV BLD AUTO: 11.3 FL (ref 7.4–10.4)
POTASSIUM SERPL-SCNC: 3.9 MMOL/L (ref 3.5–5.1)
RBC # BLD AUTO: 2.83 X10(6)/MCL (ref 4.7–6.1)
SODIUM SERPL-SCNC: 157 MMOL/L (ref 136–145)
WBC # BLD AUTO: 10.74 X10(3)/MCL (ref 4.5–11.5)

## 2025-06-30 PROCEDURE — 63600175 PHARM REV CODE 636 W HCPCS: Performed by: NURSE PRACTITIONER

## 2025-06-30 PROCEDURE — 99900031 HC PATIENT EDUCATION (STAT)

## 2025-06-30 PROCEDURE — 25000242 PHARM REV CODE 250 ALT 637 W/ HCPCS: Performed by: STUDENT IN AN ORGANIZED HEALTH CARE EDUCATION/TRAINING PROGRAM

## 2025-06-30 PROCEDURE — C1751 CATH, INF, PER/CENT/MIDLINE: HCPCS

## 2025-06-30 PROCEDURE — 63600175 PHARM REV CODE 636 W HCPCS: Performed by: INTERNAL MEDICINE

## 2025-06-30 PROCEDURE — 94761 N-INVAS EAR/PLS OXIMETRY MLT: CPT

## 2025-06-30 PROCEDURE — 25000003 PHARM REV CODE 250: Performed by: INTERNAL MEDICINE

## 2025-06-30 PROCEDURE — 87040 BLOOD CULTURE FOR BACTERIA: CPT | Performed by: NURSE PRACTITIONER

## 2025-06-30 PROCEDURE — 25000003 PHARM REV CODE 250: Performed by: NURSE PRACTITIONER

## 2025-06-30 PROCEDURE — 93005 ELECTROCARDIOGRAM TRACING: CPT

## 2025-06-30 PROCEDURE — 99900035 HC TECH TIME PER 15 MIN (STAT)

## 2025-06-30 PROCEDURE — 99223 1ST HOSP IP/OBS HIGH 75: CPT | Mod: ,,,

## 2025-06-30 PROCEDURE — 63600175 PHARM REV CODE 636 W HCPCS: Performed by: STUDENT IN AN ORGANIZED HEALTH CARE EDUCATION/TRAINING PROGRAM

## 2025-06-30 PROCEDURE — 94640 AIRWAY INHALATION TREATMENT: CPT

## 2025-06-30 PROCEDURE — 27000207 HC ISOLATION

## 2025-06-30 PROCEDURE — 85025 COMPLETE CBC W/AUTO DIFF WBC: CPT | Performed by: INTERNAL MEDICINE

## 2025-06-30 PROCEDURE — 27000221 HC OXYGEN, UP TO 24 HOURS

## 2025-06-30 PROCEDURE — 36415 COLL VENOUS BLD VENIPUNCTURE: CPT | Performed by: INTERNAL MEDICINE

## 2025-06-30 PROCEDURE — 93010 ELECTROCARDIOGRAM REPORT: CPT | Mod: ,,, | Performed by: INTERNAL MEDICINE

## 2025-06-30 PROCEDURE — 92523 SPEECH SOUND LANG COMPREHEN: CPT

## 2025-06-30 PROCEDURE — 80048 BASIC METABOLIC PNL TOTAL CA: CPT | Performed by: INTERNAL MEDICINE

## 2025-06-30 PROCEDURE — 11000001 HC ACUTE MED/SURG PRIVATE ROOM

## 2025-06-30 PROCEDURE — 63600175 PHARM REV CODE 636 W HCPCS

## 2025-06-30 RX ORDER — SCOPOLAMINE 1 MG/3D
1 PATCH, EXTENDED RELEASE TRANSDERMAL
Status: DISCONTINUED | OUTPATIENT
Start: 2025-06-30 | End: 2025-07-01 | Stop reason: HOSPADM

## 2025-06-30 RX ADMIN — METOPROLOL TARTRATE 125 MG: 50 TABLET, FILM COATED ORAL at 11:06

## 2025-06-30 RX ADMIN — PIPERACILLIN SODIUM AND TAZOBACTAM SODIUM 4.5 G: 4; .5 INJECTION, POWDER, LYOPHILIZED, FOR SOLUTION INTRAVENOUS at 06:06

## 2025-06-30 RX ADMIN — ONDANSETRON 4 MG: 2 INJECTION INTRAMUSCULAR; INTRAVENOUS at 02:06

## 2025-06-30 RX ADMIN — PIPERACILLIN SODIUM AND TAZOBACTAM SODIUM 4.5 G: 4; .5 INJECTION, POWDER, LYOPHILIZED, FOR SOLUTION INTRAVENOUS at 11:06

## 2025-06-30 RX ADMIN — ATORVASTATIN CALCIUM 40 MG: 40 TABLET, FILM COATED ORAL at 09:06

## 2025-06-30 RX ADMIN — SCOPOLAMINE 1 PATCH: 1 PATCH TRANSDERMAL at 11:06

## 2025-06-30 RX ADMIN — METOPROLOL TARTRATE 125 MG: 50 TABLET, FILM COATED ORAL at 09:06

## 2025-06-30 RX ADMIN — SUCRALFATE 1 G: 1 TABLET ORAL at 11:06

## 2025-06-30 RX ADMIN — PANTOPRAZOLE SODIUM 40 MG: 40 INJECTION, POWDER, FOR SOLUTION INTRAVENOUS at 09:06

## 2025-06-30 RX ADMIN — SUCRALFATE 1 G: 1 TABLET ORAL at 05:06

## 2025-06-30 RX ADMIN — DAPTOMYCIN 500 MG: 500 INJECTION, POWDER, LYOPHILIZED, FOR SOLUTION INTRAVENOUS at 06:06

## 2025-06-30 RX ADMIN — CLOPIDOGREL 75 MG: 75 TABLET ORAL at 09:06

## 2025-06-30 RX ADMIN — PANTOPRAZOLE SODIUM 40 MG: 40 INJECTION, POWDER, FOR SOLUTION INTRAVENOUS at 11:06

## 2025-06-30 RX ADMIN — LEUCINE, PHENYLALANINE, LYSINE, METHIONINE, ISOLEUCINE, VALINE, HISTIDINE, THREONINE, TRYPTOPHAN, ALANINE, GLYCINE, ARGININE, PROLINE, SERINE, TYROSINE, SODIUM ACETATE, DIBASIC POTASSIUM PHOSPHATE, MAGNESIUM CHLORIDE, SODIUM CHLORIDE, CALCIUM CHLORIDE, DEXTROSE
880; 489; 33; 5; 438; 204; 255; 311; 247; 51; 170; 238; 261; 289; 213; 297; 77; 179; 77; 17; 247 INJECTION INTRAVENOUS at 02:06

## 2025-06-30 RX ADMIN — SUCRALFATE 1 G: 1 TABLET ORAL at 01:06

## 2025-06-30 RX ADMIN — PIPERACILLIN SODIUM AND TAZOBACTAM SODIUM 4.5 G: 4; .5 INJECTION, POWDER, LYOPHILIZED, FOR SOLUTION INTRAVENOUS at 01:06

## 2025-06-30 RX ADMIN — SUCRALFATE 1 G: 1 TABLET ORAL at 09:06

## 2025-06-30 RX ADMIN — IPRATROPIUM BROMIDE AND ALBUTEROL SULFATE 3 ML: .5; 3 SOLUTION RESPIRATORY (INHALATION) at 03:06
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
## 2025-06-30 NOTE — PROGRESS NOTES
"[facility]   Progress Note          SUBJECTIVE: Had issues with vomiting yesterday, TF on hold. Denies abdominal pain or other complaints at present.        ROS: Negative unless stated in HPI    MEDS: Reviewed in EMR    OBJECTIVE:  T 98.1 °F (36.7 °C)   /67   P 80   RR 18   O2 (!) 93 %  GENERAL: Ill and fatigued appearing; NAD; does not appear toxic  SKIN: no rash, no jaundice  HEENT: sclera non-icteric; PERRL; NGT in place  NECK: supple; no LAD  CHEST: Rhonchi; nonlabored, equal expansion     CARDIOVASCULAR: RRR, S1S2; no murmur; noted peripheral edema  ABDOMEN:  active bowel sounds; abdomen soft, rounded, nontender to palpation; midline scar noted  NEURO: weak although AAO, baseline    Labs:  Recent Labs     06/29/25  1006 06/30/25  0434   WBC 11.71* 10.74   RBC 2.53* 2.83*   HGB 7.6* 8.3*   HCT 26.1* 29.6*   .2* 104.6*   MCH 30.0 29.3   MCHC 29.1* 28.0*   RDW 20.0* 19.9*    255     No results for input(s): "LACTIC" in the last 72 hours.  No results for input(s): "INR", "APTT", "D-DIMER" in the last 72 hours.  No results for input(s): "HGBA1C", "CHOL", "TRIG", "LDL", "VLDL", "HDL" in the last 72 hours.   Recent Labs     06/28/25  0545 06/29/25  1006 06/30/25  0434   * 156* 157*   K 3.6 3.6 3.9   CO2 25 26 26   BUN 34.9* 36.8* 39.2*   CREATININE 1.29* 1.32* 1.28*   CALCIUM 8.4* 8.6* 8.7*   MG 2.20 2.40  --    ALBUMIN  --  2.1*  --    GLOBULIN  --  3.5  --    ALKPHOS  --  41  --    ALT  --  14  --    AST  --  25  --    BILITOT  --  0.7  --      No results for input(s): "BNP", "CPK", "TROPONINI" in the last 72 hours.       Imaging: Reviewed pertinent imaging    ASSESSMENT / PLAN:  74-year-old male previously known to Dr. Torres but followed now by Dr. Chapis Lane past medical history of type 2 diabetes, HTN, obesity, osteoarthritis, CAD s/p LAD stent 2017, venous insufficiency who initially presented to [facility] ER 5/23 from a local nursing home for generalized " weakness.  Admitted with severe PRIYA, UTI, CHF exacerbation (EF 35-40 % on TTE 5/26), and atrial fibrillation/atrial flutter with RVR.   TTE also concerning for right atrial mass.  Underwent YO 5/30 which confirmed right atrial thrombus.       GI consulted 6/13 for anticoagulation clearance given recent GI bleed.    Patient was cleared from GI standpoint to begin blood thinners and signed off.  Called back 6/23 for melena and anemia.      06/02: EGD: 3 duodenal ulcers - 15 mm posterior wall with oozing s/p APC and 10 mm on lateral wall with adherent clot s/p heater probe    06/04: EGD: 13 mm posterior wall ulcer with VV s/p bipolar and clips x 2.  Dieulafoy on edge of ulcer s/p clip x 2    06/11: EGD: Nonobstructing, nonbleeding duodenal ulcer with a clean ulcer base.  Prior clips seen with no active bleeding or high-risk stigmata.  Nonobstructing, nonbleeding duodenal ulcer with a clean ulcer base.    6/23 EGD: Large duodenal ulcer with visible vessel with clot and oozing. s/p clip/epi/hemospray with temporary hemostatsis. Given the large size and this is his 4th EGD will consult IR for embolization due to high risk of rebleeding. And failling endoscopic management Normal esophagus.Red blood in the gastric fundus. Duodenal ulcer with a visible vessel.  One hemostatic clip was successfully placed (MR conditional) in the duodenal bulb.  An area in the duodenal bulb successfully injected.     6/24 GDA embolization with IR      6/30 --- GI called back for PEG        Duodenal ulcer requiring multiple EGDs  Retroperitoneal hematoma   LLL PE  Right atrial thrombus  Acute CVA  Dysphagia            Patient currently on Plavix -- will need to be held x5 days prior to PEG placement.  OK for ASA while Plavix on hold.  Nursing to reach out to primary if the above OK  Continue abx per ID  No family present at time of exam  Further to follow with MD on rounds.  Discussed with patient and nursing.            Thank you for allowing  us to participate in the care of Vaibhav Vargas.    Mayela Barrow, [facility]

## 2025-06-30 NOTE — NURSING
0211 called to patients room for vomiting. Patient has NG tube in place.Green/yellow fluid on gown. Mouth suctioned. Patient complaining of dizziness and nausea.m Patient tube feeds runining at 10cc/hr. Residual checked 200cc now. 3cc at 2020. 100cc given with meds. Patient sounding more coarse now. 84% O2 on 3L oxymask. Patient bumped to 15L oxymask with O2 of 90%. Patient BP 91/54. Notifed NP Irina. Said to get stat cxr, give neb treatment and hold tube feeds. If no improvement in respiratory status place on vapotherm

## 2025-06-30 NOTE — PROGRESS NOTES
[facility] Hospital Medicine Progress Note        Chief Complaint: Inpatient Follow-up for GI bleed     HPI:   74 y.o. male with PMHx of recent  CVA on 03/2025 on ASA/Plavix, CAD s/p LAD stent 2017, venous insuffiency, HTN, 1st Degree AVB, NIDDM2 who initially presented to [facility] ED on 05/23 by daughter from SNF  for generalized weakness. He was found to have dislodged fowler catheter with severe PRIYA and UTI, fowler was replaced, he had good urinary output with significant improvement in renal function. He was also treated with diuretic for CHF exacerbation, workup showed EF of 35-40%, a thrombus was incidentally found in his right atrium. He was started on full dose Lovenox. He continued to be in a fib with RVR despite BB. CCB were being avoided due to low EF, cardioversion was avoided due to existing thrombus that was confirmed on YO. On 06/01 he had significant drop in his hemoglobin with worsening RVR, Lovenox was held, EGD on 06/02 showed non-bleeding ulcers with active oozing of blood from his gastric mucosa, GI did not recommend to hold anticoagulation due to risk for embolization. On 06/04 the patient became hypotensive with A flutter and RVR, hemoglobin dropped to 4.7, Lovenox was held again and massive transfusion was initiated, the patient was upgraded to the ICU due to hemodynamic instability. Lovenox was started once again on 06/06 and he was downgraded from the ICU on 06/08.  GI consulted on 6/13 for AC clearance given recent GI bleed. Pt was cleared from GI stand point to restart AC. On 6/23 Pt with onset of melena and anemia. EGD on 6/23 showed large duodenal ulcer with visible vessel with clot and oozing treated with clip, epi and hemospray with temporary hemostasis and subsequently IR was consulted for embolization due to high risk of re bleed. On 6/24 underwent IR GDA embolization. Of note on CTA abd pelvis on 6/12 noted to have Rt retroperitoneal hematoma. General  "Surgery was consulted and no surgical intervention recommended.         Patient was followed by [facility] for  Afib with RVR. He was initially started on IV amiodarone and later po metoprolol. His H&H was closely monitored. He was continued on PPI. Hb stabilized. Cleared by SLP for minced and moist diet and medication crushed in puree as of 6/20/25.      LHC done on 6/18/25 showed -   1. Left main-Patent  2. Lad-70% calcified proximal stenosis, Mid stent is 2.5 mm stent in  a   3.5-4mm vessel.  MSA distal stent edge is 3.9mm2  3. LCX-50% mid lesion  4. Ramus-40% mid  5. RCA-30% proximal calcified stenosis  6. LVEDP-11  7. LIMA patent     Recommended:  1. Consult CTS for LIMA to LAD, ANGELIA ligation, MAZE, and possible surgical   extraction of prominent eustachian valve with thrombus.   2. If turned down he will likely need atherectomy and shockwave for   underexpanded stent, and repeat stenting of the prox to mid LAD."      CT surgery do not recommended surgery, instead requested [facility] to perform high risk PCI. Cardiology holding off on high-risk PCI.  Patient continued with dizziness and remained in Afib; Cardiology felt symptomatology related to uncontrolled A.fib but would need to be anticoagulated to perform DCCV.  We restarted AC with FD LOVENOX.  Repeat CT of the abdomen noted retroperitoneal bleed was stable, slightly smaller than previous.  H&H initially stable then  subsequently dropped significantly overnight from 10.2 to 7.3.  Stat CT of the retroperitoneum noted mild increasing size of his prior retroperitoneal bleed, though no active extravasation could be identified. Patient s/t 3 units PRBCs with Hgb rising from 7.3 to 8.6. AC discontinued. .     Additional issue that has been addressed during  this admission include Left foot great toe ulcer with osteomyelitis with wound culture grew MRSA. ID was consulted and recommended daptomycin 500 mg q48h until July 14.      As of 6/26/25, no evidence of overt bleed in " the last few days with Hgb staying stable at 7.3 to 7.8. Plavix re started on 6/20/25. On 6/26/25 Pt with new onset difficulty swallowing with evidence of chocking on modified diet. SLP evaluated Pt and place NPO except medication. MBS performed on 6/27/25 showed severe bolus holding unable to initiate swallow. Ct head done showed  evidence of multiple old infarcts both cerebellar hemisphere and extensive microvascular ischemic changes reducing the sensitivity to exclude acute infarct. SLP recommended strict NPO for any enteral intake. Noted to develop hypernatremia and PRIYA. D5 infusion utilized with some improvement of PRIYA however noted to develop some resp  distress as of 6/27/25. Pt's overall clinical condition and poor prognosis discussed with Pt's daughter at bedside. Family elected DNR for code status. Hospice transition also discussed. Daughter will discuss this option with other family members. Interim Pt was started on Clinimix and medication changed to parenteral as able.     MRI brain on 6/28 showed minimal acute infarct involving left corona radiata in the periventricular location, and left frontal lobe subacute lacunar infarcts. Pt remains with severe oropharyngeal dysphagia. NG tube inserted for continuation of medication and to start enteral nutrition on 6/29/25.      Interval Hx:   Overnight event noted. Pt with episode of vomiting overnight with onset of worsening hypoxia. NG tube residual 200 cc. CXR with evidence of new Rt base consolidation.   Tube feeds held.  Palliative Medicine consulted   Daughter at bedside wants PEG tube   Currently hemodynamically stable. O2 demand stable at 4L oxymask    Hgb 8.3, Na 157, Cr 1.2    Case was discussed with patient's nurse and  on the floor.    Objective/physical exam:  General: In no acute distress, afebrile, ill looking, on oxymask   Chest: Clear to auscultation bilaterally  Heart: irregular rhythm +S1, S2, no appreciable murmur  Abdomen: Soft,  nontender, BS +  MSK: (+) venous stasis dermatitis , 1+ edema , wound dressings to left foot  Neurologic: Alert and oriented  to self and person.RLE weakness        VITAL SIGNS: 24 HRS MIN & MAX LAST   Temp  Min: 97.2 °F (36.2 °C)  Max: 98.1 °F (36.7 °C) 98.1 °F (36.7 °C)   BP  Min: 91/54  Max: 121/66 106/67   Pulse  Min: 52  Max: 113  80   Resp  Min: 18  Max: 23 18   SpO2  Min: 93 %  Max: 100 % (!) 93 %     I have reviewed the following labs:  Recent Labs   Lab 06/27/25  0638 06/29/25  1006 06/30/25  0434   WBC 11.09 11.71* 10.74   RBC 2.60* 2.53* 2.83*   HGB 7.8* 7.6* 8.3*   HCT 26.5* 26.1* 29.6*   .9* 103.2* 104.6*   MCH 30.0 30.0 29.3   MCHC 29.4* 29.1* 28.0*   RDW 19.7* 20.0* 19.9*    202 255   MPV 10.6* 10.6* 11.3*     Recent Labs   Lab 06/25/25  0403 06/26/25  0502 06/27/25  0635 06/27/25  1622 06/28/25  0545 06/29/25  1006 06/30/25  0434   * 157*   < >  --  155* 156* 157*   K 4.2 3.9   < >  --  3.6 3.6 3.9   * 127*   < >  --  124* 124* 124*   CO2 22* 23   < >  --  25 26 26   BUN 58.8* 48.0*   < >  --  34.9* 36.8* 39.2*   CREATININE 1.99* 1.69*   < >  --  1.29* 1.32* 1.28*   * 128*   < >  --  169* 158* 158*   CALCIUM 8.9 9.1   < >  --  8.4* 8.6* 8.7*   PH  --   --   --  7.460*  --   --   --    MG  --   --   --   --  2.20 2.40  --    ALBUMIN 2.3* 2.4*  --   --   --  2.1*  --    PROT 5.4* 5.8  --   --   --  5.6*  --    ALKPHOS 39* 45  --   --   --  41  --    ALT 9 10  --   --   --  14  --    AST 13 19  --   --   --  25  --    BILITOT 0.6 0.7  --   --   --  0.7  --     < > = values in this interval not displayed.     Microbiology Results (last 7 days)       Procedure Component Value Units Date/Time    Blood Culture [[id number]] Collected: 06/30/25 0434    Order Status: Resulted Specimen: Blood, Venous Updated: 06/30/25 0522    Blood Culture [[id number]] Collected: 06/30/25 0434    Order Status: Resulted Specimen: Blood, Venous Updated: 06/30/25 0522             See below for  Radiology    Assessment/Plan:  New onset acute stroke involving left corona radiata and left frontal lobe cluster of subacute infarcts- 6/26/25  New onset severe Oropharyngeal dysphagia due to above  Aspiration pneumonia - 6/30/25  Recurrent Acute blood loss anemia due to upper GI bleed with Duodenal ulcer required multiple EGD with intervention and multiple blood transfusions, POA  Retroperitoneal bleeding, POA  Persistent Afib with RVR, POA  NSTEMI, type 2 in the setting of A.fib  Obstructive native CAD, not a candidate for CABG, POA  Acute on chronic systolic HF, LVEF 30-40%, POA  Acute kidney injury , POA- improved   Right atrial thrombus, POA  LLL Pulmonary Embolism, POA   Left foot osteomyelitis on IV Daptomycin   Hypernatremia due to free water deficit-6/21/24  NIDDM II  Essential HTN  DNR status      Hx- CVA 03/2025, CAD s/p LAD stent 2017, 1° AVB.      Plan-  Pt with extremely poor prognosis at this time  given presence of high risk of thromboembolic event with persistent A.fib and concurrent high risk of bleeding with AC.   AC remains on hold as well as ASA per Cards  Continue Plavix/Statin /BB via NG tube     Strict NPO status presently as of 6/27/25  Hospice transition discussed with family.   Family yet to determine   Daughter wants PEG tube. Explained to family risk of aspiration still present with PEG tube. However they want to proceed.  Will consult GI to evaluate if Pt be a candidate for PEG.  Zosyn started to cover aspiration pneumonia      Cardiology holding of high risk PCI  Daughter elected DNR status   Free water flush via NG tube to correct free water deficit and hypernatremia.   Monitor course         VTE prophylaxis: SCDs     Patient condition:  poor prognosis     Anticipated discharge and Disposition:     TBD      All diagnosis and differential diagnosis have been reviewed; assessment and plan has been documented; I have personally reviewed the labs and test results that are presently  available; I have reviewed the patients medication list; I have reviewed the consulting providers response and recommendations. I have reviewed or attempted to review medical records based upon their availability    All of the patient's questions have been  addressed and answered. Patient's is agreeable to the above stated plan. I will continue to monitor closely and make adjustments to medical management as needed.    Portions of this note dictated using EMR integrated voice recognition software, and may be subject to voice recognition errors not corrected at proofreading. Please contact writer for clarification if needed.   _____________________________________________________________________    Malnutrition Status:  Nutrition consulted. Most recent weight and BMI monitored-     Measurements:  Wt Readings from Last 1 Encounters:   06/27/25 114.8 kg (253 lb)   Body mass index is 32.48 kg/m².    Patient has been screened and assessed by RD.    Malnutrition Type:  Context: acute illness or injury  Level: moderate    Malnutrition Characteristic Summary:  Weight Loss (Malnutrition): other (see comments) (Does not meet criteria)  Energy Intake (Malnutrition): other (see comments) (Does not meet criteria)  Subcutaneous Fat (Malnutrition): mild depletion  Muscle Mass (Malnutrition): mild depletion    Interventions/Recommendations (treatment strategy):        Scheduled Med:   atorvastatin  40 mg Per NG tube Daily    clopidogreL  75 mg Per NG tube Daily    DAPTOmycin (CUBICIN) IV (PEDS and ADULTS)  500 mg Intravenous Q48H    metoprolol tartrate  125 mg Per NG tube BID    pantoprazole  40 mg Intravenous BID    piperacillin-tazobactam (Zosyn) IV (PEDS and ADULTS) (extended infusion is not appropriate)  4.5 g Intravenous Q8H    sucralfate  1 g Per NG tube QID      Continuous Infusions:   Amino acid 4.25% - dextrose 5% (CLINIMIX-E) solution (1L provides 42.5 gm AA, 50 gm CHO (170 kcal/L dextrose), Na 35, K 30, Mg 5, Ca 4.5,  Acetate 70, Cl 39, Phos 15)   Intravenous Continuous          PRN Meds:    Current Facility-Administered Medications:     0.9%  NaCl infusion (for blood administration), , Intravenous, Q24H PRN    0.9%  NaCl infusion (for blood administration), , Intravenous, Q24H PRN    0.9%  NaCl infusion (for blood administration), , Intravenous, Q24H PRN    0.9%  NaCl infusion (for blood administration), , Intravenous, Q24H PRN    0.9%  NaCl infusion (for blood administration), , Intravenous, Q24H PRN    0.9%  NaCl infusion (for blood administration), , Intravenous, Q24H PRN    acetaminophen, 650 mg, Oral, Q4H PRN    albuterol-ipratropium, 3 mL, Nebulization, Q6H PRN    aluminum-magnesium hydroxide-simethicone, 30 mL, Oral, QID PRN    bisacodyL, 10 mg, Rectal, Daily PRN    dextrose 50%, 12.5 g, Intravenous, PRN    dextrose 50%, 25 g, Intravenous, PRN    glucagon (human recombinant), 1 mg, Intramuscular, PRN    glucose, 16 g, Oral, PRN    glucose, 24 g, Oral, PRN    hydrALAZINE, 10 mg, Intravenous, Q4H PRN    HYDROcodone-acetaminophen, 1 tablet, Oral, Q6H PRN    melatonin, 9 mg, Oral, Nightly PRN    metoprolol, 5 mg, Intravenous, Q15 Min PRN    morphine, 2 mg, Intravenous, Q4H PRN    naloxone, 0.02 mg, Intravenous, PRN    ondansetron, 8 mg, Oral, Q8H PRN    ondansetron, 4 mg, Intravenous, Q8H PRN    polyethylene glycol, 17 g, Oral, TID PRN    simethicone, 1 tablet, Oral, QID PRN    sodium chloride 0.9%, 10 mL, Intravenous, PRN     Radiology:  I have personally reviewed the following imaging and agree with the radiologist.     XR Gastric tube check, non-radiologist performed  Narrative: EXAMINATION:  One-view upper abdomen    CLINICAL HISTORY:  NG tube placement    COMPARISON:  06/28/2020    FINDINGS:  Enteric tube projects past the GE junction ith in the body of the stomach.  Impression: Satisfactory enteric tube placement    Findings are compatible with the preliminary report.    Electronically signed by: Johnathan Bridges  MD  Date:    06/30/2025  Time:    07:01  X-Ray Chest 1 View  Narrative: EXAMINATION:  XR CHEST 1 VIEW    CLINICAL HISTORY:  worsening hypoxia, suspected aspiration with NG tube feeding;    COMPARISON:  06/27/2020    FINDINGS:  Single view of the chest shows new patchy right lower lung consolidation.  Trace right effusion persist.  Enteric tube has been placed with its tip past the GE junction off the inferior margin of the film.  Right PICC is stable.  No pneumothorax.  Heart size is unchanged.  Impression: 1. New patchy right lung base consolidation suggestive of pneumonia/pneumonitis.  2. Enteric tube projects past the GE junction off the inferior margin of the film  3. Findings are compatible with the preliminary report.    Electronically signed by: Johnathan Bridges MD  Date:    06/30/2025  Time:    06:56      Beka Servin MD  Department of Hospital Medicine   [facility]   06/30/2025
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
## 2025-06-30 NOTE — PROGRESS NOTES
Inpatient Nutrition Assessment    Admit Date: 6/12/2025   Total duration of encounter: 18 days   Patient Age: 74 y.o.    Nutrition Recommendation/Prescription     Diet NPO ordered, advance as medically feasible per SLP recommendations  Kavin 1 pkt bid for wound healing    Tube Feeding recs (if unable to begin oral diet)  Diabetisource AC goal rate 80ml/hr + free water flushes 60ml every 2 hrs  Provides:  1920 kcal (100% est needs)  96 gm protein (100% est needs)  1905 ml free water (100% est needs)  *based on est run time 20h/day  4.   Monitor tolerance, labs  5.   May decrease Clinimix as tube feeding rate increases    Communication of Recommendations: reviewed with nurse, reviewed with patient, and reviewed with family    Nutrition Assessment     Malnutrition Assessment/Nutrition-Focused Physical Exam    Malnutrition Context: acute illness or injury (06/18/25 1543)  Malnutrition Level: moderate (06/18/25 1543)  Energy Intake (Malnutrition): other (see comments) (Does not meet criteria) (06/18/25 1543)  Weight Loss (Malnutrition): other (see comments) (Does not meet criteria) (06/18/25 1543)  Subcutaneous Fat (Malnutrition): mild depletion (06/18/25 1543)        Thoracic and Lumbar Region: mild depletion  Muscle Mass (Malnutrition): mild depletion (06/18/25 1543)  Faith Region (Muscle Loss): mild depletion                                A minimum of two characteristics is recommended for diagnosis of either severe or non-severe malnutrition.    Chart Review    Reason Seen: length of stay and follow-up    Malnutrition Screening Tool Results   Have you recently lost weight without trying?: No  Have you been eating poorly because of a decreased appetite?: No   MST Score: 0   Diagnosis:  Anemia from PUD: stable   Retroperitoneal bleed: stable   Afib with RVR   ARF/ ATN: improved   Right atrial thrombus   Acute PE   Left foot osteomyelitis   Chronic systolic HF  Morbid obesity   Mello LE weakness        Relevant Medical  History:    Chronic lumbar pain      Diabetes mellitus, type 2      Edema      Hypertension      Obesity, unspecified      Osteoarthritis of multiple joints        Scheduled Medications:  atorvastatin, 40 mg, Daily  clopidogreL, 75 mg, Daily  DAPTOmycin (CUBICIN) IV (PEDS and ADULTS), 500 mg, Q48H  metoprolol tartrate, 125 mg, BID  pantoprazole, 40 mg, BID  piperacillin-tazobactam (Zosyn) IV (PEDS and ADULTS) (extended infusion is not appropriate), 4.5 g, Q8H  sucralfate, 1 g, QID    Continuous Infusions:  Amino acid 4.25% - dextrose 5% (CLINIMIX-E) solution (1L provides 42.5 gm AA, 50 gm CHO (170 kcal/L dextrose), Na 35, K 30, Mg 5, Ca 4.5, Acetate 70, Cl 39, Phos 15), Last Rate: 50 mL/hr at 06/30/25 1446      PRN Medications:  0.9%  NaCl infusion (for blood administration), , Q24H PRN  0.9%  NaCl infusion (for blood administration), , Q24H PRN  0.9%  NaCl infusion (for blood administration), , Q24H PRN  0.9%  NaCl infusion (for blood administration), , Q24H PRN  0.9%  NaCl infusion (for blood administration), , Q24H PRN  0.9%  NaCl infusion (for blood administration), , Q24H PRN  acetaminophen, 650 mg, Q4H PRN  albuterol-ipratropium, 3 mL, Q6H PRN  aluminum-magnesium hydroxide-simethicone, 30 mL, QID PRN  bisacodyL, 10 mg, Daily PRN  dextrose 50%, 12.5 g, PRN  dextrose 50%, 25 g, PRN  glucagon (human recombinant), 1 mg, PRN  glucose, 16 g, PRN  glucose, 24 g, PRN  hydrALAZINE, 10 mg, Q4H PRN  HYDROcodone-acetaminophen, 1 tablet, Q6H PRN  melatonin, 9 mg, Nightly PRN  metoprolol, 5 mg, Q15 Min PRN  morphine, 2 mg, Q4H PRN  naloxone, 0.02 mg, PRN  ondansetron, 8 mg, Q8H PRN  ondansetron, 4 mg, Q8H PRN  polyethylene glycol, 17 g, TID PRN  simethicone, 1 tablet, QID PRN  sodium chloride 0.9%, 10 mL, PRN    Calorie Containing IV Medications: no significant kcals from medications at this time    Recent Labs   Lab 06/24/25  0219 06/24/25  0513 06/24/25  1217 06/25/25  0000 06/25/25  0403 06/25/25  1200 06/26/25  0502  06/27/25  0635 06/27/25  0638 06/28/25  0545 06/29/25  1006 06/30/25  0434   NA  --  153*  --   --  155*  --  157* 155*  --  155* 156* 157*   K  --  4.7  --   --  4.2  --  3.9 3.6  --  3.6 3.6 3.9   CALCIUM  --  8.9  --   --  8.9  --  9.1 8.6*  --  8.4* 8.6* 8.7*   MG  --   --   --   --   --   --   --   --   --  2.20 2.40  --    CL  --  122*  --   --  125*  --  127* 123*  --  124* 124* 124*   CO2  --  23  --   --  22*  --  23 25  --  25 26 26   BUN  --  60.9*  --   --  58.8*  --  48.0* 36.8*  --  34.9* 36.8* 39.2*   CREATININE  --  1.95*  --   --  1.99*  --  1.69* 1.48*  --  1.29* 1.32* 1.28*   EGFRNORACEVR  --  35  --   --  35  --  42 49  --  58 57 59   * 177*  --   --  146*  --  128* 149*  --  169* 158* 158*   BILITOT  --  0.7  --   --  0.6  --  0.7  --   --   --  0.7  --    ALKPHOS  --  40  --   --  39*  --  45  --   --   --  41  --    ALT  --  9  --   --  9  --  10  --   --   --  14  --    AST  --  9*  --   --  13  --  19  --   --   --  25  --    ALBUMIN  --  2.1*  --   --  2.3*  --  2.4*  --   --   --  2.1*  --    WBC 14.35*  --   --   --  14.63*  --  12.56*  --  11.09  --  11.71* 10.74   HGB 8.6* 8.7*   < > 7.7* 7.9* 7.6* 7.8*  --  7.8*  --  7.6* 8.3*   HCT 28.2* 26.7*   < > 24.7* 25.1* 25.3* 26.3*  --  26.5*  --  26.1* 29.6*    < > = values in this interval not displayed.     Nutrition Orders:  Diet NPO  Amino acid 4.25% - dextrose 5% (CLINIMIX-E) solution (1L provides 42.5 gm AA, 50 gm CHO (170 kcal/L dextrose), Na 35, K 30, Mg 5, Ca 4.5, Acetate 70, Cl 39, Phos 15)      Appetite/Oral Intake: NPO/NPO  Factors Affecting Nutritional Intake: texture modification  Social Needs Impacting Access to Food: none identified  Food/Yazidi/Cultural Preferences: none reported  Food Allergies: none reported  Last Bowel Movement: 06/27/25  Wound(s):     Wound 06/22/25 2000 Venous Ulcer Left anterior Foot-Tissue loss description: Full thickness       Wound 05/23/25 2110 Pressure Injury Sacral spine-Tissue loss  "description: Full thickness  [REMOVED]      Wound 25 1900 Pressure Injury Left dorsal Foot-Tissue loss description: Full thickness noted    Comments    2025: Pt reports a good appetite/PO intake and reports decreased appetite/PO intake since admit due to texture modification, pt on pureed diet at time of visit, now on Minced and moist. Pt denies N/V/D/C and chewing/swallowing difficulties. Pt declined ONS. Encouraged adequate PO intake. Will add ALEXIS BID to promote wound healing. Per EMR, pt weighed 115.7 kg on 3/24/2025 (7.0% wt loss in 3 months, insignificant). Last BM noted. Will monitor.    25: Per RN, pt is refusing to eat 2/2 the texture modifications of his food. Pt was evaluated by SLP yesterday and is on Minced and Moist diet with moderately thickened liquids.     2025: Pt outside of room, family member reports continued poor appetite/PO intake but denies N/V/D/C. Last BM noted. Will monitor.    2025: Pt NPO at time of visit. Pt reports coughing and choking, relayed information to nurse. Pt denies N/V/D/C. Last BM noted. Will monitor.     25 Pt NPO, failed MBS; consulted for tube feeding, recs provided. Has had poor po intake since admit x 10 days so will increase tube feeding slowly to goal rate and monitor tolerance. PPN running @ 50ml/hr.    2025: Pt's TF on hold per MD note. PPN @ 50 mL/hr ordered. No reported N/V. Last BM noted. Will monitor.    Anthropometrics    Height: 6' 2" (188 cm), Height Method: Stated  Last Weight: 114.8 kg (253 lb) (25 0533), Weight Method: Bed Scale  BMI (Calculated): 32.5  BMI Classification: obese grade I (BMI 30-34.9)        Ideal Body Weight (IBW), Male: 190 lb     % Ideal Body Weight, Male (lb): 124.85 %                 Usual Body Weight (UBW), k.7 kg  % Usual Body Weight: 93.19     Usual Weight Provided By: EMR weight history    Wt Readings from Last 5 Encounters:   25 114.8 kg (253 lb)   25 107.6 kg (237 lb " 3.4 oz)   03/24/25 115.7 kg (255 lb)   03/26/24 128.2 kg (282 lb 11.2 oz)   03/23/24 128 kg (282 lb 3 oz)     Weight Change(s) Since Admission:   6/18/2025: no new wts  6/23/2025: no new wts  6/26/2025: no new wts  6/27/2025: 114.8 kg  Wt Readings from Last 1 Encounters:   06/27/25 0533 114.8 kg (253 lb)   06/13/25 1044 107.6 kg (237 lb 3.4 oz)   06/13/25 0530 107.6 kg (237 lb 3.4 oz)   Admit Weight: 107.6 kg (237 lb 3.4 oz) (as per 06/12 vitals) (06/13/25 0530), Weight Method: Bed Scale    Estimated Needs    Weight Used For Calorie Calculations: 107.6 kg (237 lb 3.4 oz)  Energy Calorie Requirements (kcal): 1885--2262 (MSJ x 1.0-1.2)  Energy Need Method: [place]-St Jeor  Weight Used For Protein Calculations: 107.6 kg (237 lb 3.4 oz)  Protein Requirements:  (0.8-1.0 g/kg)  Fluid Requirements (mL): 1885 (1 mL/kcal)  CHO Requirement: 212-259 g/day (~45-55% est min kcal needs)     Enteral Nutrition     Patient not receiving enteral nutrition at this time.    Parenteral Nutrition     Standard Formula: Clinimix E 4.25/5  Custom Formula: not applicable  Additives: none  Rate/Volume: 50 ml/hr  Lipids: none  Total Nutrition Provided by Parenteral Nutrition:  Calories Provided  408 kcal/d, 22% needs   Protein Provided  51 g/d, 59% needs   Dextrose Provided  60 g/d, GIR 0.36 mg CHO/kg/min   Fluid Provided  1200 ml/d, 64% needs       Evaluation of Received Nutrient Intake    Calories: not meeting estimated needs  Protein: not meeting estimated needs    Patient Education     Not applicable.    Nutrition Diagnosis     PES: Inadequate oral intake related to acute illness as evidenced by poor PO intake for ~10 days, NPO. (active)     PES: Moderate acute disease or injury related malnutrition Related to acute illness As Evidenced by:  - muscle mass depletion: 1 area of mild muscle loss (Temporalis) - loss of subcutaneous fat: 1 area of mild fat loss (Ribs) active    Nutrition Interventions     Intervention(s): modified  composition of meals/snacks, modified rate of enteral nutrition, commercial food, and collaboration with other providers  Intervention(s):      Goal: Meet greater than 80% of nutritional needs by follow-up. (goal not met)    Nutrition Goals & Monitoring     Dietitian will monitor: energy intake, enteral nutrition intake, weight, electrolyte/renal panel, glucose/endocrine profile, and gastrointestinal profile  Discharge planning: too early to determine; pending clinical course  Nutrition Risk/Follow-Up: patient at increased nutrition risk; dietitian will follow-up twice weekly   Please consult if re-assessment needed sooner.

## 2025-06-30 NOTE — PLAN OF CARE
Problem: Adult Inpatient Plan of Care  Goal: Plan of Care Review  Outcome: Progressing  Goal: Patient-Specific Goal (Individualized)  Outcome: Progressing  Goal: Absence of Hospital-Acquired Illness or Injury  Outcome: Progressing  Goal: Optimal Comfort and Wellbeing  Outcome: Progressing  Goal: Readiness for Transition of Care  Outcome: Progressing     Problem: Diabetes Comorbidity  Goal: Blood Glucose Level Within Targeted Range  Outcome: Progressing     Problem: Sepsis/Septic Shock  Goal: Optimal Coping  Outcome: Progressing  Goal: Absence of Bleeding  Outcome: Progressing  Goal: Blood Glucose Level Within Targeted Range  Outcome: Progressing  Goal: Absence of Infection Signs and Symptoms  Outcome: Progressing  Goal: Optimal Nutrition Intake  Outcome: Progressing     Problem: Infection  Goal: Absence of Infection Signs and Symptoms  Outcome: Progressing     Problem: Wound  Goal: Optimal Coping  Outcome: Progressing  Goal: Optimal Functional Ability  Outcome: Progressing  Goal: Absence of Infection Signs and Symptoms  Outcome: Progressing  Goal: Improved Oral Intake  Outcome: Progressing  Goal: Optimal Pain Control and Function  Outcome: Progressing  Goal: Skin Health and Integrity  Outcome: Progressing  Goal: Optimal Wound Healing  Outcome: Progressing     Problem: Skin Injury Risk Increased  Goal: Skin Health and Integrity  Outcome: Progressing     Problem: Fall Injury Risk  Goal: Absence of Fall and Fall-Related Injury  Outcome: Progressing      No

## 2025-07-01 VITALS
WEIGHT: 253 LBS | SYSTOLIC BLOOD PRESSURE: 104 MMHG | TEMPERATURE: 99 F | HEIGHT: 74 IN | DIASTOLIC BLOOD PRESSURE: 68 MMHG | BODY MASS INDEX: 32.47 KG/M2 | HEART RATE: 120 BPM | RESPIRATION RATE: 24 BRPM | OXYGEN SATURATION: 90 %

## 2025-07-01 PROCEDURE — 27000221 HC OXYGEN, UP TO 24 HOURS

## 2025-07-01 PROCEDURE — 25000242 PHARM REV CODE 250 ALT 637 W/ HCPCS: Performed by: STUDENT IN AN ORGANIZED HEALTH CARE EDUCATION/TRAINING PROGRAM

## 2025-07-01 PROCEDURE — 25000003 PHARM REV CODE 250: Performed by: INTERNAL MEDICINE

## 2025-07-01 PROCEDURE — 94640 AIRWAY INHALATION TREATMENT: CPT

## 2025-07-01 PROCEDURE — 63600175 PHARM REV CODE 636 W HCPCS: Performed by: INTERNAL MEDICINE

## 2025-07-01 PROCEDURE — 25000003 PHARM REV CODE 250: Performed by: STUDENT IN AN ORGANIZED HEALTH CARE EDUCATION/TRAINING PROGRAM

## 2025-07-01 PROCEDURE — 94760 N-INVAS EAR/PLS OXIMETRY 1: CPT

## 2025-07-01 PROCEDURE — 63600175 PHARM REV CODE 636 W HCPCS

## 2025-07-01 RX ORDER — ACETAMINOPHEN 650 MG/20.3ML
650 LIQUID ORAL EVERY 4 HOURS PRN
Status: ON HOLD
Start: 2025-07-01

## 2025-07-01 RX ORDER — PANTOPRAZOLE SODIUM 40 MG/10ML
40 INJECTION, POWDER, LYOPHILIZED, FOR SOLUTION INTRAVENOUS 2 TIMES DAILY
Qty: 60 EACH | Refills: 11 | Status: ON HOLD | OUTPATIENT
Start: 2025-07-01 | End: 2026-07-01

## 2025-07-01 RX ORDER — IPRATROPIUM BROMIDE AND ALBUTEROL SULFATE 2.5; .5 MG/3ML; MG/3ML
3 SOLUTION RESPIRATORY (INHALATION) EVERY 6 HOURS PRN
Status: ON HOLD
Start: 2025-07-01 | End: 2026-07-01

## 2025-07-01 RX ORDER — SCOPOLAMINE 1 MG/3D
1 PATCH, EXTENDED RELEASE TRANSDERMAL
Status: ON HOLD
Start: 2025-07-03

## 2025-07-01 RX ORDER — ATORVASTATIN CALCIUM 40 MG/1
40 TABLET, FILM COATED ORAL DAILY
Status: ON HOLD
Start: 2025-07-02 | End: 2026-07-02

## 2025-07-01 RX ORDER — CLOPIDOGREL BISULFATE 75 MG/1
75 TABLET ORAL DAILY
Status: ON HOLD
Start: 2025-07-02 | End: 2026-07-02

## 2025-07-01 RX ORDER — DAPTOMYCIN 50 MG/ML
500 INJECTION, POWDER, LYOPHILIZED, FOR SOLUTION INTRAVENOUS
Status: ON HOLD
Start: 2025-07-02

## 2025-07-01 RX ORDER — SUCRALFATE 1 G/1
1 TABLET ORAL 4 TIMES DAILY
Status: ON HOLD
Start: 2025-07-01

## 2025-07-01 RX ORDER — METOPROLOL TARTRATE 25 MG/1
125 TABLET, FILM COATED ORAL 2 TIMES DAILY
Status: ON HOLD
Start: 2025-07-01 | End: 2026-07-01

## 2025-07-01 RX ADMIN — PIPERACILLIN SODIUM AND TAZOBACTAM SODIUM 4.5 G: 4; .5 INJECTION, POWDER, LYOPHILIZED, FOR SOLUTION INTRAVENOUS at 05:07

## 2025-07-01 RX ADMIN — MORPHINE SULFATE 2 MG: 4 INJECTION, SOLUTION INTRAMUSCULAR; INTRAVENOUS at 08:07

## 2025-07-01 RX ADMIN — IPRATROPIUM BROMIDE AND ALBUTEROL SULFATE 3 ML: .5; 3 SOLUTION RESPIRATORY (INHALATION) at 08:07

## 2025-07-01 RX ADMIN — ATORVASTATIN CALCIUM 40 MG: 40 TABLET, FILM COATED ORAL at 09:07

## 2025-07-01 RX ADMIN — SUCRALFATE 1 G: 1 TABLET ORAL at 09:07

## 2025-07-01 RX ADMIN — SUCRALFATE 1 G: 1 TABLET ORAL at 02:07

## 2025-07-01 RX ADMIN — HYDROCODONE BITARTRATE AND ACETAMINOPHEN 1 TABLET: 5; 325 TABLET ORAL at 04:07

## 2025-07-01 RX ADMIN — PANTOPRAZOLE SODIUM 40 MG: 40 INJECTION, POWDER, FOR SOLUTION INTRAVENOUS at 11:07

## 2025-07-01 RX ADMIN — SUCRALFATE 1 G: 1 TABLET ORAL at 08:07

## 2025-07-01 RX ADMIN — METOPROLOL TARTRATE 125 MG: 50 TABLET, FILM COATED ORAL at 08:07

## 2025-07-01 RX ADMIN — SUCRALFATE 1 G: 1 TABLET ORAL at 04:07

## 2025-07-01 RX ADMIN — METOPROLOL TARTRATE 125 MG: 50 TABLET, FILM COATED ORAL at 09:07

## 2025-07-01 RX ADMIN — CLOPIDOGREL 75 MG: 75 TABLET ORAL at 09:07

## 2025-07-01 RX ADMIN — PIPERACILLIN SODIUM AND TAZOBACTAM SODIUM 4.5 G: 4; .5 INJECTION, POWDER, LYOPHILIZED, FOR SOLUTION INTRAVENOUS at 02:07

## 2025-07-01 RX ADMIN — PANTOPRAZOLE SODIUM 40 MG: 40 INJECTION, POWDER, FOR SOLUTION INTRAVENOUS at 08:07

## 2025-07-01 NOTE — PROGRESS NOTES
[facility] 6th Floor Medical Telemetry  Wound Care    Patient Name:  Vaibhav Vargas   MRN:  [id number]  Date: 7/1/2025  Diagnosis: Retroperitoneal hematoma    History:     Past Medical History:   Diagnosis Date    Chronic lumbar pain     Diabetes mellitus, type 2     Edema     Hypertension     Obesity, unspecified     Osteoarthritis of multiple joints        Social History[1]    Precautions:     Allergies as of 06/12/2025    (No Known Allergies)       WOC Assessment Details/Treatment        07/01/25 1145   WOCN Assessment   Visit Date 07/01/25   Visit Time 1145   Consult Type Follow Up   WO Speciality Wound   Wound pressure   Intervention chart review;assessed;applied;orders   Teaching on-going        Wound 05/23/25 2110 Pressure Injury Sacral spine   Date First Assessed/Time First Assessed: 05/23/25 2110   Present on Original Admission: Yes  Primary Wound Type: Pressure Injury  Location: Sacral spine   Wound Image    Pressure Injury Stage 3   Dressing Appearance Dry;Clean;Intact   Drainage Amount Scant   Drainage Characteristics/Odor Serosanguineous   Appearance Red;Pink;Tan   Tissue loss description Full thickness   Black (%), Wound Tissue Color 0 %   Red (%), Wound Tissue Color 100 %   Yellow (%), Wound Tissue Color 0 %   Periwound Area Intact;Dry   Wound Edges Irregular   Wound Length (cm) 7 cm   Wound Width (cm) 4 cm   Wound Depth (cm) 0.2 cm   Wound Volume (cm^3) 2.932 cm^3   Wound Surface Area (cm^2) 21.99 cm^2   Care Cleansed with:;Wound cleanser   Dressing Changed;Silver;Absorptive Pad        Wound 06/22/25 2000 Venous Ulcer Left anterior Foot   Date First Assessed/Time First Assessed: 06/22/25 2000   Present on Original Admission: Yes  Primary Wound Type: Venous Ulcer  Side: Left  Orientation: anterior  Location: Foot  Is this injury device related?: No   Wound Image    Dressing Appearance Open to air   Drainage Amount None   Drainage Characteristics/Odor No odor   Appearance  Tan;Yellow;Dry;Bone   Tissue loss description Full thickness   Periwound Area Dry;Intact   Wound Edges Callused   Wound Length (cm) 1.4 cm   Wound Width (cm) 0.5 cm   Wound Depth (cm) 0.3 cm   Wound Volume (cm^3) 0.11 cm^3   Wound Surface Area (cm^2) 0.55 cm^2   Care Cleansed with:;Wound cleanser   Dressing Applied;Silver;Absorptive Pad     WOCN follow up for left foot and sacrum. Discussed POC w/ nurse. I still recommend consulting podiatry to left medial foot due to bone being visible. Consult has still not been put in yet. Pt.'s family at bedside. Explained reason for visit. continue treatment recommendations: sacrum: clean w/ vashe, dry well, apply aquacel ag advantage grey cloth to wound bed, cover with abd pad, secure with minimal tape. Daily/prn if soilage. Left foot: cleaned it with vashe, dried well, apply aq Ag cloth and new foam. Pt.'s foot in prafo boot. Follow orders until podiatry sees pt.'s foot and makes other recommendations. Nursing to cont. Tx recs and preventative measures. Will follow up.     07/01/2025         [1]   Social History  Socioeconomic History    Marital status:    Tobacco Use    Smoking status: Never    Smokeless tobacco: Never   Substance and Sexual Activity    Alcohol use: Not Currently    Drug use: Never    Sexual activity: Not Currently     Social Drivers of Health     Financial Resource Strain: Low Risk  (6/12/2025)    Overall Financial Resource Strain (CARDIA)     Difficulty of Paying Living Expenses: Not hard at all   Food Insecurity: No Food Insecurity (6/12/2025)    Hunger Vital Sign     Worried About Running Out of Food in the Last Year: Never true     Ran Out of Food in the Last Year: Never true   Transportation Needs: No Transportation Needs (6/12/2025)    PRAPARE - Transportation     Lack of Transportation (Medical): No     Lack of Transportation (Non-Medical): No   Physical Activity: Inactive (5/26/2025)    Exercise Vital Sign     Days of Exercise per Week: 0  days     Minutes of Exercise per Session: 0 min   Stress: No Stress Concern Present (6/12/2025)    [employer] - Occupational Stress Questionnaire     Feeling of Stress : Not at all   Housing Stability: Low Risk  (6/12/2025)    Housing Stability Vital Sign     Unable to Pay for Housing in the Last Year: No     Number of Times Moved in the Last Year: 0     Homeless in the Last Year: No

## 2025-07-01 NOTE — PLAN OF CARE
Clinical updates sent to [facility] where patient came from .     Also, sent clinical updates to [facility], referral .

## 2025-07-01 NOTE — CONSULTS
Inpatient Nutrition Assessment    Admit Date: 6/12/2025   Total duration of encounter: 19 days   Patient Age: 74 y.o.    Nutrition Recommendation/Prescription     Diet NPO ordered, advance as medically feasible per SLP recommendations  Kavin 1 pkt bid for wound healing    Tube Feeding recs (if unable to begin oral diet)  Diabetisource AC goal rate 80ml/hr + free water flushes 60ml every 2 hrs  Provides:  1920 kcal (100% est needs)  96 gm protein (100% est needs)  1905 ml free water (100% est needs)  *based on est run time 20h/day  4.   Monitor tolerance, labs  5.   May decrease Clinimix as tube feeding rate increases    Communication of Recommendations: reviewed with nurse, reviewed with patient, and reviewed with family    Nutrition Assessment     Malnutrition Assessment/Nutrition-Focused Physical Exam    Malnutrition Context: acute illness or injury (06/18/25 1543)  Malnutrition Level: moderate (06/18/25 1543)  Energy Intake (Malnutrition): other (see comments) (Does not meet criteria) (06/18/25 1543)  Weight Loss (Malnutrition): other (see comments) (Does not meet criteria) (06/18/25 1543)  Subcutaneous Fat (Malnutrition): mild depletion (06/18/25 1543)        Thoracic and Lumbar Region: mild depletion  Muscle Mass (Malnutrition): mild depletion (06/18/25 1543)  Confucianist Region (Muscle Loss): mild depletion                                A minimum of two characteristics is recommended for diagnosis of either severe or non-severe malnutrition.    Chart Review    Reason Seen: length of stay, physician consult for TF, and follow-up    Malnutrition Screening Tool Results   Have you recently lost weight without trying?: No  Have you been eating poorly because of a decreased appetite?: No   MST Score: 0   Diagnosis:  Anemia from PUD: stable   Retroperitoneal bleed: stable   Afib with RVR   ARF/ ATN: improved   Right atrial thrombus   Acute PE   Left foot osteomyelitis   Chronic systolic HF  Morbid obesity   Mello LE  weakness        Relevant Medical History:    Chronic lumbar pain      Diabetes mellitus, type 2      Edema      Hypertension      Obesity, unspecified      Osteoarthritis of multiple joints        Scheduled Medications:  atorvastatin, 40 mg, Daily  clopidogreL, 75 mg, Daily  DAPTOmycin (CUBICIN) IV (PEDS and ADULTS), 500 mg, Q48H  metoprolol tartrate, 125 mg, BID  pantoprazole, 40 mg, BID  piperacillin-tazobactam (Zosyn) IV (PEDS and ADULTS) (extended infusion is not appropriate), 4.5 g, Q8H  scopolamine, 1 patch, Q3 Days  sucralfate, 1 g, QID    Continuous Infusions:  Amino acid 4.25% - dextrose 5% (CLINIMIX-E) solution (1L provides 42.5 gm AA, 50 gm CHO (170 kcal/L dextrose), Na 35, K 30, Mg 5, Ca 4.5, Acetate 70, Cl 39, Phos 15), Last Rate: 50 mL/hr at 06/30/25 1446      PRN Medications:  0.9%  NaCl infusion (for blood administration), , Q24H PRN  0.9%  NaCl infusion (for blood administration), , Q24H PRN  0.9%  NaCl infusion (for blood administration), , Q24H PRN  0.9%  NaCl infusion (for blood administration), , Q24H PRN  0.9%  NaCl infusion (for blood administration), , Q24H PRN  0.9%  NaCl infusion (for blood administration), , Q24H PRN  acetaminophen, 650 mg, Q4H PRN  albuterol-ipratropium, 3 mL, Q6H PRN  aluminum-magnesium hydroxide-simethicone, 30 mL, QID PRN  bisacodyL, 10 mg, Daily PRN  dextrose 50%, 12.5 g, PRN  dextrose 50%, 25 g, PRN  glucagon (human recombinant), 1 mg, PRN  glucose, 16 g, PRN  glucose, 24 g, PRN  hydrALAZINE, 10 mg, Q4H PRN  HYDROcodone-acetaminophen, 1 tablet, Q6H PRN  melatonin, 9 mg, Nightly PRN  metoprolol, 5 mg, Q15 Min PRN  morphine, 2 mg, Q4H PRN  naloxone, 0.02 mg, PRN  ondansetron, 8 mg, Q8H PRN  ondansetron, 4 mg, Q8H PRN  polyethylene glycol, 17 g, TID PRN  simethicone, 1 tablet, QID PRN  sodium chloride 0.9%, 10 mL, PRN    Calorie Containing IV Medications: no significant kcals from medications at this time    Recent Labs   Lab 06/25/25  0000 06/25/25  0403 06/25/25  1200  06/26/25  0502 06/27/25  0635 06/27/25  0638 06/28/25  0545 06/29/25  1006 06/30/25  0434   NA  --  155*  --  157* 155*  --  155* 156* 157*   K  --  4.2  --  3.9 3.6  --  3.6 3.6 3.9   CALCIUM  --  8.9  --  9.1 8.6*  --  8.4* 8.6* 8.7*   MG  --   --   --   --   --   --  2.20 2.40  --    CL  --  125*  --  127* 123*  --  124* 124* 124*   CO2  --  22*  --  23 25  --  25 26 26   BUN  --  58.8*  --  48.0* 36.8*  --  34.9* 36.8* 39.2*   CREATININE  --  1.99*  --  1.69* 1.48*  --  1.29* 1.32* 1.28*   EGFRNORACEVR  --  35  --  42 49  --  58 57 59   GLU  --  146*  --  128* 149*  --  169* 158* 158*   BILITOT  --  0.6  --  0.7  --   --   --  0.7  --    ALKPHOS  --  39*  --  45  --   --   --  41  --    ALT  --  9  --  10  --   --   --  14  --    AST  --  13  --  19  --   --   --  25  --    ALBUMIN  --  2.3*  --  2.4*  --   --   --  2.1*  --    WBC  --  14.63*  --  12.56*  --  11.09  --  11.71* 10.74   HGB 7.7* 7.9* 7.6* 7.8*  --  7.8*  --  7.6* 8.3*   HCT 24.7* 25.1* 25.3* 26.3*  --  26.5*  --  26.1* 29.6*     Nutrition Orders:  Diet NPO  Amino acid 4.25% - dextrose 5% (CLINIMIX-E) solution (1L provides 42.5 gm AA, 50 gm CHO (170 kcal/L dextrose), Na 35, K 30, Mg 5, Ca 4.5, Acetate 70, Cl 39, Phos 15)  Diet NPO  Diet NPO  Tube Feedings/Formulas 80; 1,600; Diabetisource AC; Peg; 60; Every 2 hours    Appetite/Oral Intake: NPO/NPO  Factors Affecting Nutritional Intake: texture modification  Social Needs Impacting Access to Food: none identified  Food/Jain/Cultural Preferences: none reported  Food Allergies: none reported  Last Bowel Movement: 06/27/25  Wound(s):     Wound 06/22/25 2000 Venous Ulcer Left anterior Foot-Tissue loss description: Full thickness       Wound 05/23/25 2110 Pressure Injury Sacral spine-Tissue loss description: Full thickness  [REMOVED]      Wound 03/29/25 1900 Pressure Injury Left dorsal Foot-Tissue loss description: Full thickness noted    Comments    6/18/2025: Pt reports a good appetite/PO intake  "and reports decreased appetite/PO intake since admit due to texture modification, pt on pureed diet at time of visit, now on Minced and moist. Pt denies N/V/D/C and chewing/swallowing difficulties. Pt declined ONS. Encouraged adequate PO intake. Will add ALEXIS BID to promote wound healing. Per EMR, pt weighed 115.7 kg on 3/24/2025 (7.0% wt loss in 3 months, insignificant). Last BM noted. Will monitor.    25: Per RN, pt is refusing to eat 2/2 the texture modifications of his food. Pt was evaluated by SLP yesterday and is on Minced and Moist diet with moderately thickened liquids.     2025: Pt outside of room, family member reports continued poor appetite/PO intake but denies N/V/D/C. Last BM noted. Will monitor.    2025: Pt NPO at time of visit. Pt reports coughing and choking, relayed information to nurse. Pt denies N/V/D/C. Last BM noted. Will monitor.     25 Pt NPO, failed MBS; consulted for tube feeding, recs provided. Has had poor po intake since admit x 10 days so will increase tube feeding slowly to goal rate and monitor tolerance. PPN running @ 50ml/hr.    2025: Pt's TF on hold per MD note. PPN @ 50 mL/hr ordered. No reported N/V. Last BM noted. Will monitor.    2025: Consulted for TF recommendations, provided recommendations and orders. PPN ordered. The nurse reports 35 mL residuals today for NGT. No reported N/V. Last BM noted. Will monitor.    Anthropometrics    Height: 6' 2" (188 cm), Height Method: Stated  Last Weight: 114.8 kg (253 lb) (25 0533), Weight Method: Bed Scale  BMI (Calculated): 32.5  BMI Classification: obese grade I (BMI 30-34.9)        Ideal Body Weight (IBW), Male: 190 lb     % Ideal Body Weight, Male (lb): 124.85 %                 Usual Body Weight (UBW), k.7 kg  % Usual Body Weight: 93.19     Usual Weight Provided By: EMR weight history    Wt Readings from Last 5 Encounters:   25 114.8 kg (253 lb)   25 107.6 kg (237 lb 3.4 oz) "   03/24/25 115.7 kg (255 lb)   03/26/24 128.2 kg (282 lb 11.2 oz)   03/23/24 128 kg (282 lb 3 oz)     Weight Change(s) Since Admission:   6/18/2025: no new wts  6/23/2025: no new wts  6/26/2025: no new wts  6/27/2025: 114.8 kg  Wt Readings from Last 1 Encounters:   06/27/25 0533 114.8 kg (253 lb)   06/13/25 1044 107.6 kg (237 lb 3.4 oz)   06/13/25 0530 107.6 kg (237 lb 3.4 oz)   Admit Weight: 107.6 kg (237 lb 3.4 oz) (as per 06/12 vitals) (06/13/25 0530), Weight Method: Bed Scale    Estimated Needs    Weight Used For Calorie Calculations: 107.6 kg (237 lb 3.4 oz)  Energy Calorie Requirements (kcal): 1885--2262 (MSJ x 1.0-1.2)  Energy Need Method: [place]-St Jeor  Weight Used For Protein Calculations: 107.6 kg (237 lb 3.4 oz)  Protein Requirements:  (0.8-1.0 g/kg)  Fluid Requirements (mL): 1885 (1 mL/kcal)  CHO Requirement: 212-259 g/day (~45-55% est min kcal needs)     Enteral Nutrition     Patient not receiving enteral nutrition at this time.    Parenteral Nutrition     Standard Formula: Clinimix E 4.25/5  Custom Formula: not applicable  Additives: none  Rate/Volume: 50 ml/hr  Lipids: none  Total Nutrition Provided by Parenteral Nutrition:  Calories Provided  408 kcal/d, 22% needs   Protein Provided  51 g/d, 59% needs   Dextrose Provided  60 g/d, GIR 0.36 mg CHO/kg/min   Fluid Provided  1200 ml/d, 64% needs       Evaluation of Received Nutrient Intake    Calories: not meeting estimated needs  Protein: not meeting estimated needs    Patient Education     Not applicable.    Nutrition Diagnosis     PES: Inadequate oral intake related to acute illness as evidenced by poor PO intake for ~10 days, NPO. (active)     PES: Moderate acute disease or injury related malnutrition Related to acute illness As Evidenced by:  - muscle mass depletion: 1 area of mild muscle loss (Temporalis) - loss of subcutaneous fat: 1 area of mild fat loss (Ribs) active    Nutrition Interventions     Intervention(s): modified composition of  meals/snacks, modified rate of enteral nutrition, commercial food, and collaboration with other providers  Intervention(s):      Goal: Meet greater than 80% of nutritional needs by follow-up. (goal not met)    Nutrition Goals & Monitoring     Dietitian will monitor: energy intake, enteral nutrition intake, weight, electrolyte/renal panel, glucose/endocrine profile, and gastrointestinal profile  Discharge planning: too early to determine; pending clinical course  Nutrition Risk/Follow-Up: patient at increased nutrition risk; dietitian will follow-up twice weekly   Please consult if re-assessment needed sooner.

## 2025-07-01 NOTE — PLAN OF CARE
Problem: Adult Inpatient Plan of Care  Goal: Plan of Care Review  Outcome: Progressing  Goal: Patient-Specific Goal (Individualized)  Outcome: Progressing  Goal: Absence of Hospital-Acquired Illness or Injury  Outcome: Progressing  Goal: Optimal Comfort and Wellbeing  Outcome: Progressing  Goal: Readiness for Transition of Care  Outcome: Progressing     Problem: Diabetes Comorbidity  Goal: Blood Glucose Level Within Targeted Range  Outcome: Progressing     Problem: Sepsis/Septic Shock  Goal: Optimal Coping  Outcome: Progressing  Goal: Absence of Bleeding  Outcome: Progressing  Goal: Blood Glucose Level Within Targeted Range  Outcome: Progressing  Goal: Absence of Infection Signs and Symptoms  Outcome: Progressing  Goal: Optimal Nutrition Intake  Outcome: Progressing     Problem: Infection  Goal: Absence of Infection Signs and Symptoms  Outcome: Progressing     Problem: Wound  Goal: Optimal Coping  Outcome: Progressing  Goal: Optimal Functional Ability  Outcome: Progressing  Goal: Absence of Infection Signs and Symptoms  Outcome: Progressing  Goal: Improved Oral Intake  Outcome: Progressing  Goal: Optimal Pain Control and Function  Outcome: Progressing  Goal: Skin Health and Integrity  Outcome: Progressing  Goal: Optimal Wound Healing  Outcome: Progressing     Problem: Skin Injury Risk Increased  Goal: Skin Health and Integrity  Outcome: Progressing     Problem: Fall Injury Risk  Goal: Absence of Fall and Fall-Related Injury  Outcome: Progressing     Problem: Coping Ineffective  Goal: Effective Coping  Outcome: Progressing
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
## 2025-07-01 NOTE — PLAN OF CARE
Called [facility] for Transport to [facility] per CM, patient has poor trunk control, telemetry, and is on 3 L Oxygen.  Printed Facesheet and DNR  for [facility] and gave to Nurse on 6 C.     Called in at 3:30, there will be a 2 hour delay.

## 2025-07-01 NOTE — DISCHARGE SUMMARY
[facility] Medicine Discharge Summary    Admit Date: 6/12/2025  Discharge Date and Time: 7/1/20256:39 PM  Admitting Physician:  Team  Discharging Physician: Beka Servin MD.  Primary Care Physician: Shameka Rooney DO  Consults: Cardiology, Cardiothoracic/Vascular Surgery, and Gastroenterology    Discharge Diagnoses:    New onset acute stroke involving left corona radiata and left frontal lobe cluster of subacute infarcts- 6/26/25  New onset severe Oropharyngeal dysphagia due to above  Aspiration pneumonia - 6/30/25  Recurrent Acute blood loss anemia due to upper GI bleed with Duodenal ulcer required multiple EGD with intervention and multiple blood transfusions, POA  Retroperitoneal bleeding, POA  Persistent Afib with RVR, POA  NSTEMI, type 2 in the setting of A.fib  Obstructive native CAD, not a candidate for CABG, POA  Acute on chronic systolic HF, LVEF 30-40%, POA  Acute kidney injury , POA- improved   Right atrial thrombus, POA  LLL Pulmonary Embolism, POA   Left foot osteomyelitis on IV Daptomycin   Hypernatremia due to free water deficit-6/21/24  NIDDM II  Essential HTN  DNR status      Hx- CVA 03/2025, CAD s/p LAD stent 2017, 1° AVB.       Hospital Course:   74 y.o. male with PMHx of recent  CVA on 03/2025 on ASA/Plavix, CAD s/p LAD stent 2017, venous insuffiency, HTN, 1st Degree AVB, NIDDM2 who initially presented to [facility] ED on 05/23 by daughter from SNF  for generalized weakness. He was found to have dislodged fowler catheter with severe PRIYA and UTI, fowler was replaced, he had good urinary output with significant improvement in renal function. He was also treated with diuretic for CHF exacerbation, workup showed EF of 35-40%, a thrombus was incidentally found in his right atrium. He was started on full dose Lovenox. He continued to be in a fib with RVR despite BB. CCB were being avoided due to low EF, cardioversion was avoided due to existing thrombus that was  confirmed on YO. On 06/01 he had significant drop in his hemoglobin with worsening RVR, Lovenox was held, EGD on 06/02 showed non-bleeding ulcers with active oozing of blood from his gastric mucosa, GI did not recommend to hold anticoagulation due to risk for embolization. On 06/04 the patient became hypotensive with A flutter and RVR, hemoglobin dropped to 4.7, Lovenox was held again and massive transfusion was initiated, the patient was upgraded to the ICU due to hemodynamic instability. Lovenox was started once again on 06/06 and he was downgraded from the ICU on 06/08.  GI consulted on 6/13 for AC clearance given recent GI bleed. Pt was cleared from GI stand point to restart AC. On 6/23 Pt with onset of melena and anemia. EGD on 6/23 showed large duodenal ulcer with visible vessel with clot and oozing treated with clip, epi and hemospray with temporary hemostasis and subsequently IR was consulted for embolization due to high risk of re bleed. On 6/24 underwent IR GDA embolization. Of note on CTA abd pelvis on 6/12 noted to have Rt retroperitoneal hematoma. General Surgery was consulted and no surgical intervention recommended.         Patient was followed by [facility] for  Afib with RVR. He was initially started on IV amiodarone and later po metoprolol. His H&H was closely monitored. He was continued on PPI. Hb stabilized. Cleared by SLP for minced and moist diet and medication crushed in puree as of 6/20/25.      LHC done on 6/18/25 showed -   1. Left main-Patent  2. Lad-70% calcified proximal stenosis, Mid stent is 2.5 mm stent in  a   3.5-4mm vessel.  MSA distal stent edge is 3.9mm2  3. LCX-50% mid lesion  4. Ramus-40% mid  5. RCA-30% proximal calcified stenosis  6. LVEDP-11  7. LIMA patent     Recommended:  1. Consult CTS for LIMA to LAD, ANGELIA ligation, MAZE, and possible surgical   extraction of prominent eustachian valve with thrombus.   2. If turned down he will likely need atherectomy and shockwave for  "  underexpanded stent, and repeat stenting of the prox to mid LAD."      CT surgery do not recommended surgery, instead requested [facility] to perform high risk PCI. Cardiology holding off on high-risk PCI.  Patient continued with dizziness and remained in Afib; Cardiology felt symptomatology related to uncontrolled A.fib but would need to be anticoagulated to perform DCCV.  We restarted AC with FD LOVENOX.  Repeat CT of the abdomen noted retroperitoneal bleed was stable, slightly smaller than previous.  H&H initially stable then  subsequently dropped significantly overnight from 10.2 to 7.3.  Stat CT of the retroperitoneum noted mild increasing size of his prior retroperitoneal bleed, though no active extravasation could be identified. Patient s/t 3 units PRBCs with Hgb rising from 7.3 to 8.6. AC discontinued. .     Additional issue that has been addressed during  this admission include Left foot great toe ulcer with osteomyelitis with wound culture grew MRSA. ID was consulted and recommended daptomycin 500 mg q48h until July 14.      As of 6/26/25, no evidence of overt bleed in the last few days with Hgb staying stable at 7.3 to 7.8. Plavix re started on 6/20/25. On 6/26/25 Pt with new onset difficulty swallowing with evidence of chocking on modified diet. SLP evaluated Pt and place NPO except medication. MBS performed on 6/27/25 showed severe bolus holding unable to initiate swallow. Ct head done showed  evidence of multiple old infarcts both cerebellar hemisphere and extensive microvascular ischemic changes reducing the sensitivity to exclude acute infarct. SLP recommended strict NPO for any enteral intake. Noted to develop hypernatremia and PRIYA. D5 infusion utilized with some improvement of PRIYA however noted to develop some resp  distress as of 6/27/25. Pt's overall clinical condition and poor prognosis discussed with Pt's daughter at bedside. Family elected DNR for code status. Hospice transition also discussed. " Daughter will discuss this option with other family members. Interim Pt was started on Clinimix and medication changed to parenteral as able.     MRI brain on 6/28 showed minimal acute infarct involving left corona radiata in the periventricular location, and left frontal lobe subacute lacunar infarcts. Pt remains with severe oropharyngeal dysphagia. NG tube inserted for continuation of medication and to start enteral nutrition on 6/29/25. Pt with intolerance to NG tube feeding with vomiting and high residual. Aspiration pneumonia noted on CXR following vomiting episode on 6/30/25. Zosyn added for Aspiration pneumonitis. NG tube feeds held and Clinimix re started. Family wants to proceed with PEG tube. GI re consulted. GI asked for Cardiology clearance and holding plavix for 5 days prior to PEG. Cardiology cleared Pt with high risk intraoperative MACE. Family desired to proceed with PEG placement. On 7/1/25, Pt accepted to [facility] and they will continue current plan of care. Pt was examined and deemed stable for discharge to [facility].      Pt was seen and examined on the day of discharge  Vitals:  VITAL SIGNS: 24 HRS MIN & MAX LAST   Temp  Min: 97.5 °F (36.4 °C)  Max: 98.9 °F (37.2 °C) 97.5 °F (36.4 °C)   BP  Min: 97/56  Max: 107/65 107/65   Pulse  Min: 70  Max: 115  (!) 115   Resp  Min: 20  Max: 20 20   SpO2  Min: 86 %  Max: 100 % 97 %       Physical Exam:  General: In no acute distress, afebrile, ill looking, on oxymask   Chest: Clear to auscultation bilaterally  Heart: irregular rhythm +S1, S2, no appreciable murmur  Abdomen: Soft, nontender, BS +  MSK: (+) venous stasis dermatitis , 1+ edema , wound dressings to left foot  Neurologic: Alert and oriented  to self and person.RLE weakness        Procedures Performed: No admission procedures for hospital encounter.     Significant Diagnostic Studies: See Full reports for all details    Recent Labs   Lab 06/27/25  0638 06/29/25  1006 06/30/25  0434   WBC 11.09 11.71*  10.74   RBC 2.60* 2.53* 2.83*   HGB 7.8* 7.6* 8.3*   HCT 26.5* 26.1* 29.6*   .9* 103.2* 104.6*   MCH 30.0 30.0 29.3   MCHC 29.4* 29.1* 28.0*   RDW 19.7* 20.0* 19.9*    202 255   MPV 10.6* 10.6* 11.3*       Recent Labs   Lab 06/25/25  0403 06/26/25  0502 06/27/25  0635 06/27/25  1622 06/28/25  0545 06/29/25  1006 06/30/25  0434   * 157*   < >  --  155* 156* 157*   K 4.2 3.9   < >  --  3.6 3.6 3.9   * 127*   < >  --  124* 124* 124*   CO2 22* 23   < >  --  25 26 26   BUN 58.8* 48.0*   < >  --  34.9* 36.8* 39.2*   CREATININE 1.99* 1.69*   < >  --  1.29* 1.32* 1.28*   * 128*   < >  --  169* 158* 158*   CALCIUM 8.9 9.1   < >  --  8.4* 8.6* 8.7*   PH  --   --   --  7.460*  --   --   --    MG  --   --   --   --  2.20 2.40  --    ALBUMIN 2.3* 2.4*  --   --   --  2.1*  --    PROT 5.4* 5.8  --   --   --  5.6*  --    ALKPHOS 39* 45  --   --   --  41  --    ALT 9 10  --   --   --  14  --    AST 13 19  --   --   --  25  --    BILITOT 0.6 0.7  --   --   --  0.7  --     < > = values in this interval not displayed.        Microbiology Results (last 7 days)       Procedure Component Value Units Date/Time    Blood Culture [[id number]]  (Normal) Collected: 06/30/25 0434    Order Status: Completed Specimen: Blood, Venous Updated: 07/01/25 0900     Blood Culture No Growth At 24 Hours    Blood Culture [[id number]]  (Normal) Collected: 06/30/25 0434    Order Status: Completed Specimen: Blood, Venous Updated: 07/01/25 0900     Blood Culture No Growth At 24 Hours             XR Gastric tube check, non-radiologist performed  Narrative: EXAMINATION:  One-view upper abdomen    CLINICAL HISTORY:  NG tube placement    COMPARISON:  06/28/2020    FINDINGS:  Enteric tube projects past the GE junction ith in the body of the stomach.  Impression: Satisfactory enteric tube placement    Findings are compatible with the preliminary report.    Electronically signed by: Johnathan Bridges  MD  Date:    06/30/2025  Time:    07:01  X-Ray Chest 1 View  Narrative: EXAMINATION:  XR CHEST 1 VIEW    CLINICAL HISTORY:  worsening hypoxia, suspected aspiration with NG tube feeding;    COMPARISON:  06/27/2020    FINDINGS:  Single view of the chest shows new patchy right lower lung consolidation.  Trace right effusion persist.  Enteric tube has been placed with its tip past the GE junction off the inferior margin of the film.  Right PICC is stable.  No pneumothorax.  Heart size is unchanged.  Impression: 1. New patchy right lung base consolidation suggestive of pneumonia/pneumonitis.  2. Enteric tube projects past the GE junction off the inferior margin of the film  3. Findings are compatible with the preliminary report.    Electronically signed by: Johnathan Bridges MD  Date:    06/30/2025  Time:    06:56         Medication List        START taking these medications      acetaminophen 650 mg/20.3 mL Soln  Commonly known as: TYLENOL  20.3 mLs (650 mg total) by Per NG tube route every 4 (four) hours as needed for Temperature greater than or Pain.     albuterol-ipratropium 2.5 mg-0.5 mg/3 mL nebulizer solution  Commonly known as: DUO-NEB  Take 3 mLs by nebulization every 6 (six) hours as needed for Wheezing. Rescue     D5W PgBk 100 mL with piperacillin-tazobactam 4.5 gram SolR 4.5 g  Inject 4.5 g into the vein every 8 (eight) hours.     DAPTOmycin 500 mg injection  Commonly known as: CUBICIN  Inject 500 mg into the vein every 48 hours.  Start taking on: July 2, 2025     metoprolol tartrate 25 MG tablet  Commonly known as: LOPRESSOR  5 tablets (125 mg total) by Per G Tube route 2 (two) times daily.     pantoprazole 40 mg injection  Commonly known as: PROTONIX  Inject 40 mg into the vein 2 (two) times a day.     scopolamine 1.3-1.5 mg (1 mg over 3 days)  Commonly known as: TRANSDERM-SCOP  Place 1 patch onto the skin Every 3 (three) days.  Start taking on: July 3, 2025     sterile water SolP with parenteral amino acid 10%  No.2 10 % SolP 5 %, dextrose 70% SolP 15 %  Inject 50 mL/hr into the vein continuous.     sucralfate 1 gram tablet  Commonly known as: CARAFATE  1 tablet (1 g total) by Per G Tube route 4 (four) times daily.            CHANGE how you take these medications      atorvastatin 40 MG tablet  Commonly known as: LIPITOR  1 tablet (40 mg total) by Per G Tube route once daily.  Start taking on: July 2, 2025  What changed:   medication strength  how much to take  how to take this     clopidogreL 75 mg tablet  Commonly known as: PLAVIX  1 tablet (75 mg total) by Per G Tube route once daily.  Start taking on: July 2, 2025  What changed: how to take this            STOP taking these medications      aspirin 81 MG EC tablet  Commonly known as: ECOTRIN     diclofenac 75 MG EC tablet  Commonly known as: VOLTAREN     docusate sodium 100 MG capsule  Commonly known as: COLACE     GVOKE HYPOPEN 2-PACK 1 mg/0.2 mL Atin  Generic drug: glucagon     HYDROcodone-acetaminophen 5-325 mg per tablet  Commonly known as: NORCO     lactulose 10 gram packet  Commonly known as: CEPHULAC     losartan 100 MG tablet  Commonly known as: COZAAR     metFORMIN 1000 MG tablet  Commonly known as: GLUCOPHAGE     verapamiL 240 MG CR tablet  Commonly known as: CALAN-SR     vitamin D 1000 units Tab  Commonly known as: VITAMIN D3               Where to Get Your Medications        These medications were sent to [facility] [place] [address]      Phone: [phone]   pantoprazole 40 mg injection       Information about where to get these medications is not yet available    Ask your nurse or doctor about these medications  acetaminophen 650 mg/20.3 mL Soln  albuterol-ipratropium 2.5 mg-0.5 mg/3 mL nebulizer solution  atorvastatin 40 MG tablet  clopidogreL 75 mg tablet  D5W PgBk 100 mL with piperacillin-tazobactam 4.5 gram SolR 4.5 g  DAPTOmycin 500 mg injection  metoprolol tartrate 25  MG tablet  scopolamine 1.3-1.5 mg (1 mg over 3 days)  sterile water SolP with parenteral amino acid 10% No.2 10 % SolP 5 %, dextrose 70% SolP 15 %  sucralfate 1 gram tablet          Explained in detail to the patient about the discharge plan, medications, and follow-up visits. Pt understands and agrees with the treatment plan  Discharge Disposition: Short Term Hospital   Discharged Condition: stable  Diet-   Dietary Orders (From admission, onward)       Start     Ordered    07/01/25 1355  Tube Feedings/Formulas 80; 1,600; Diabetisource AC; Peg; Kavin - Fruit Punch; 1 time daily; 60; Every 2 hours  Continuous        Comments: Start at 20 mL for 8 hours, if tolerated, advance by 10 mL every 8 hours as tolerated until goal rate of 80 mL/hr is reached.   Question Answer Comment   Formula Rate (mL/hr): 80    Feeding Volume (mL): 1600    Select Formula: Diabetisource AC    Route: Peg    Additives/Modulars: Kavin - Fruit Punch    Additives/Modulars frequency: 1 time daily    Free water flush volume (mL): 60    Free water flush frequency: Every 2 hours        07/01/25 1354    06/28/25 2153  Diet NPO  Diet effective now         06/28/25 2152                   Medications Per DC med rec  Activities as tolerated    For further questions contact hospitalist office    Discharge time 33 minutes    For worsening symptoms, chest pain, shortness of breath, increased abdominal pain, high grade fever, stroke or stroke like symptoms, immediately go to the nearest Emergency Room or call 911 as soon as possible.      Beka Avalos M.D on 7/1/2025. at 6:39 PM.

## 2025-07-01 NOTE — PHYSICIAN QUERY
Please clarify the Sepsis/Severe Sepsis  diagnosis; with POA status if ruled in:  Clinically undetermined

## 2025-07-01 NOTE — DISCHARGE INSTRUCTIONS
Our goal at [facility] is to always give you outstanding care and exceptional service. You may receive a survey from [email or web address] by mail, text or e-mail in the next 24-48 hours asking about the care you received with us. The survey should only take 5-10 minutes to complete and is very important to us.     Your feedback provides us with a way to recognize our staff who work tirelessly to provide the best care! Also, your responses help us learn how to improve when your experience was below our aspiration of excellence. We are always looking for ways to improve your stay. We WILL use your feedback to continue making improvements to help us provide the highest quality care. We keep your personal information and feedback confidential. We appreciate your time completing this survey and can't wait to hear from you!!!    We look forward to your continued care with us! Thanks so much for choosing [facility] for your healthcare needs!

## 2025-07-01 NOTE — PLAN OF CARE
07/01/25 1522   Final Note   Assessment Type Final Discharge Note   Anticipated Discharge Disposition LTAC   Post-Acute Status   Post-Acute Authorization Placement     Discharge to [facility]. [facility] will transport.

## 2025-07-02 NOTE — PLAN OF CARE
Problem: Adult Inpatient Plan of Care  Goal: Plan of Care Review  Outcome: Progressing  Goal: Patient-Specific Goal (Individualized)  Outcome: Progressing  Goal: Absence of Hospital-Acquired Illness or Injury  Outcome: Progressing  Goal: Optimal Comfort and Wellbeing  Outcome: Progressing  Goal: Readiness for Transition of Care  Outcome: Progressing     Problem: Diabetes Comorbidity  Goal: Blood Glucose Level Within Targeted Range  Outcome: Progressing     Problem: Sepsis/Septic Shock  Goal: Optimal Coping  Outcome: Progressing  Goal: Absence of Bleeding  Outcome: Progressing  Goal: Blood Glucose Level Within Targeted Range  Outcome: Progressing  Goal: Absence of Infection Signs and Symptoms  Outcome: Progressing  Goal: Optimal Nutrition Intake  Outcome: Progressing     Problem: Wound  Goal: Optimal Coping  Outcome: Progressing  Goal: Optimal Functional Ability  Outcome: Progressing  Goal: Absence of Infection Signs and Symptoms  Outcome: Progressing  Goal: Improved Oral Intake  Outcome: Progressing  Goal: Optimal Pain Control and Function  Outcome: Progressing  Goal: Skin Health and Integrity  Outcome: Progressing  Goal: Optimal Wound Healing  Outcome: Progressing     Problem: Infection  Goal: Absence of Infection Signs and Symptoms  Outcome: Progressing     Problem: Skin Injury Risk Increased  Goal: Skin Health and Integrity  Outcome: Progressing     Problem: Fall Injury Risk  Goal: Absence of Fall and Fall-Related Injury  Outcome: Progressing     Problem: Coping Ineffective  Goal: Effective Coping  Outcome: Progressing

## 2025-07-02 NOTE — NURSING
Patient daughter at bedside. Waiting for ambulance for transport. Patient complaining of pain. Morphine given. Patient given 2100 meds with exception of zosyn. PICC line in place. Patient VSS stable on 4L. Patient given breathing treatment prior to transport. NG tube in place. Patient says the meds helped. Patient transferred to [facility] for transport to LTAC. Patient with no needs.

## 2025-07-03 PROBLEM — E11.69 DIABETES MELLITUS ASSOCIATED WITH HORMONAL ETIOLOGY: Status: ACTIVE | Noted: 2025-07-03

## 2025-07-03 PROBLEM — J69.0 ASPIRATION PNEUMONIA OF RIGHT LUNG: Status: ACTIVE | Noted: 2025-07-03

## 2025-07-05 LAB
BACTERIA BLD CULT: NORMAL
BACTERIA BLD CULT: NORMAL

## (undated) DEVICE — KIT SURGICAL COLON .25 1.1OZ

## (undated) DEVICE — SYS WASTE FLD DISPOSAL 1400ML

## (undated) DEVICE — GUIDE LAUNCHER 6FR EBU 3.5

## (undated) DEVICE — SHEATH INTRODUCER 5FR 10CM

## (undated) DEVICE — SOL IRRI STRL WATER 1000ML

## (undated) DEVICE — CANNULA ADULT NASAL 7FT

## (undated) DEVICE — KIT HAND CONTROL HIGH PRESSUR

## (undated) DEVICE — SUT PERCLOSE PROSTYLE MEDIATE

## (undated) DEVICE — GUIDEWIRE INQWIRE SE 3MM JTIP

## (undated) DEVICE — FIRE PROBES STRAIGHT

## (undated) DEVICE — MANIFOLD 4 PORT

## (undated) DEVICE — LINER MEDI-VAC PPV FLTR 1500CC

## (undated) DEVICE — VALVE GUARDIAN II HEMOSTASIS

## (undated) DEVICE — SHEATH INTRODUCER 6FR 11CM

## (undated) DEVICE — PAD DEFIB CADENCE ADULT R2

## (undated) DEVICE — KIT GLIDESHEATH SLEND 6FR 10CM

## (undated) DEVICE — BLOCK BLOX BITE DENT RIM 54FR

## (undated) DEVICE — CLIP RESOLUTION 360 ULT 235CM

## (undated) DEVICE — SET ANGIO ACIST CVI ANGIOTOUCH

## (undated) DEVICE — KIT SYR REUSABLE

## (undated) DEVICE — FORCEP ALLIGATOR 2.8MM W/NDL

## (undated) DEVICE — CATH INJECTION GOLD PROBE 7FR

## (undated) DEVICE — SET MICROPUNCTUREECHO STIFF

## (undated) DEVICE — TUBING HP AIRLSS ROT ADPT 30IN

## (undated) DEVICE — SYRINGE 0.9% NACL 10MIL PREFIL

## (undated) DEVICE — TIP SUCTION YANKAUER

## (undated) DEVICE — ELECTRODE PATIENT RETURN DISP

## (undated) DEVICE — CABLE EKG DL RBBN 3 LEAD DISP

## (undated) DEVICE — DEVICE RESOLUTION 360 CLIP

## (undated) DEVICE — Device

## (undated) DEVICE — MOUTHPIECE ENDO 60FR

## (undated) DEVICE — GUIDEWIRE OMNI STR TIP 185CM

## (undated) DEVICE — KIT MANIFOLD LOW PRESS TUBING

## (undated) DEVICE — SYS NEXPOWDER HEMOSTATIC

## (undated) DEVICE — CATH EAGLE EYE PLATINUM

## (undated) DEVICE — DEVICE INDEFLATOR BASIX

## (undated) DEVICE — CATH OPTITORQUE RADIAL 5FR

## (undated) DEVICE — GUIDEWIRE SION BLUE STR 190CM